# Patient Record
Sex: FEMALE | Race: OTHER | Employment: OTHER | URBAN - METROPOLITAN AREA
[De-identification: names, ages, dates, MRNs, and addresses within clinical notes are randomized per-mention and may not be internally consistent; named-entity substitution may affect disease eponyms.]

---

## 2017-01-23 DIAGNOSIS — M50.90 CERVICAL NECK PAIN WITH EVIDENCE OF DISC DISEASE: ICD-10-CM

## 2017-01-23 DIAGNOSIS — M54.2 POSTERIOR NECK PAIN: ICD-10-CM

## 2017-02-14 DIAGNOSIS — I10 ESSENTIAL HYPERTENSION: ICD-10-CM

## 2017-02-14 DIAGNOSIS — E11.42 DM TYPE 2 WITH DIABETIC PERIPHERAL NEUROPATHY (HCC): ICD-10-CM

## 2017-02-15 ENCOUNTER — HOSPITAL ENCOUNTER (OUTPATIENT)
Dept: PREADMISSION TESTING | Age: 62
Discharge: HOME | End: 2017-02-15
Payer: COMMERCIAL

## 2017-02-15 VITALS
HEART RATE: 60 BPM | TEMPERATURE: 97.7 F | BODY MASS INDEX: 28.17 KG/M2 | HEIGHT: 64 IN | SYSTOLIC BLOOD PRESSURE: 120 MMHG | OXYGEN SATURATION: 95 % | WEIGHT: 165 LBS | DIASTOLIC BLOOD PRESSURE: 59 MMHG | RESPIRATION RATE: 16 BRPM

## 2017-02-15 LAB
ANION GAP SERPL CALCULATED.3IONS-SCNC: 14 MEQ/L (ref 7–13)
BUN BLDV-MCNC: 21 MG/DL (ref 8–23)
CALCIUM SERPL-MCNC: 10 MG/DL (ref 8.6–10.2)
CHLORIDE BLD-SCNC: 103 MEQ/L (ref 98–107)
CO2: 26 MEQ/L (ref 22–29)
CREAT SERPL-MCNC: 1.37 MG/DL (ref 0.5–0.9)
GFR AFRICAN AMERICAN: 47.5
GFR NON-AFRICAN AMERICAN: 39.3
GLUCOSE BLD-MCNC: 166 MG/DL (ref 74–109)
HCT VFR BLD CALC: 38.4 % (ref 37–47)
HEMOGLOBIN: 13 G/DL (ref 12–16)
MCH RBC QN AUTO: 31 PG (ref 27–31.3)
MCHC RBC AUTO-ENTMCNC: 33.8 % (ref 33–37)
MCV RBC AUTO: 91.8 FL (ref 82–100)
PDW BLD-RTO: 13.7 % (ref 11.5–14.5)
PLATELET # BLD: 246 K/UL (ref 130–400)
POTASSIUM SERPL-SCNC: 4.4 MEQ/L (ref 3.5–5.1)
RBC # BLD: 4.18 M/UL (ref 4.2–5.4)
SODIUM BLD-SCNC: 143 MEQ/L (ref 132–144)
TSH SERPL DL<=0.05 MIU/L-ACNC: <0.01 UIU/ML (ref 0.27–4.2)
WBC # BLD: 8.4 K/UL (ref 4.8–10.8)

## 2017-02-15 PROCEDURE — 36415 COLL VENOUS BLD VENIPUNCTURE: CPT

## 2017-02-15 PROCEDURE — 84443 ASSAY THYROID STIM HORMONE: CPT

## 2017-02-15 PROCEDURE — 80048 BASIC METABOLIC PNL TOTAL CA: CPT

## 2017-02-15 PROCEDURE — 85027 COMPLETE CBC AUTOMATED: CPT

## 2017-02-15 RX ORDER — SODIUM CHLORIDE 0.9 % (FLUSH) 0.9 %
10 SYRINGE (ML) INJECTION PRN
Status: CANCELLED | OUTPATIENT
Start: 2017-02-15

## 2017-02-15 RX ORDER — LIDOCAINE HYDROCHLORIDE 10 MG/ML
1 INJECTION, SOLUTION EPIDURAL; INFILTRATION; INTRACAUDAL; PERINEURAL
Status: CANCELLED | OUTPATIENT
Start: 2017-02-15 | End: 2017-02-15

## 2017-02-15 RX ORDER — SODIUM CHLORIDE, SODIUM LACTATE, POTASSIUM CHLORIDE, CALCIUM CHLORIDE 600; 310; 30; 20 MG/100ML; MG/100ML; MG/100ML; MG/100ML
INJECTION, SOLUTION INTRAVENOUS CONTINUOUS
Status: CANCELLED | OUTPATIENT
Start: 2017-02-15

## 2017-02-15 RX ORDER — SODIUM CHLORIDE 0.9 % (FLUSH) 0.9 %
10 SYRINGE (ML) INJECTION EVERY 12 HOURS SCHEDULED
Status: CANCELLED | OUTPATIENT
Start: 2017-02-15

## 2017-02-15 RX ORDER — GABAPENTIN 300 MG/1
CAPSULE ORAL
Qty: 270 CAPSULE | Refills: 1 | Status: SHIPPED | OUTPATIENT
Start: 2017-02-15 | End: 2017-05-24 | Stop reason: DRUGHIGH

## 2017-02-15 RX ORDER — AMLODIPINE BESYLATE 10 MG/1
TABLET ORAL
Qty: 90 TABLET | Refills: 1 | Status: SHIPPED | OUTPATIENT
Start: 2017-02-15 | End: 2017-08-30 | Stop reason: SDUPTHER

## 2017-02-23 ENCOUNTER — TELEPHONE (OUTPATIENT)
Dept: CARDIOLOGY | Facility: CLINIC | Age: 62
End: 2017-02-23

## 2017-02-23 ENCOUNTER — OFFICE VISIT (OUTPATIENT)
Dept: FAMILY MEDICINE CLINIC | Facility: CLINIC | Age: 62
End: 2017-02-23

## 2017-02-23 VITALS
HEART RATE: 68 BPM | WEIGHT: 167 LBS | RESPIRATION RATE: 18 BRPM | HEIGHT: 64 IN | BODY MASS INDEX: 28.51 KG/M2 | SYSTOLIC BLOOD PRESSURE: 128 MMHG | DIASTOLIC BLOOD PRESSURE: 68 MMHG

## 2017-02-23 DIAGNOSIS — I10 ESSENTIAL HYPERTENSION: Chronic | ICD-10-CM

## 2017-02-23 DIAGNOSIS — E05.00 GRAVES' DISEASE: ICD-10-CM

## 2017-02-23 DIAGNOSIS — Z79.4 TYPE 2 DIABETES MELLITUS WITH DIABETIC NEUROPATHY, WITH LONG-TERM CURRENT USE OF INSULIN (HCC): Chronic | ICD-10-CM

## 2017-02-23 DIAGNOSIS — M50.90 CERVICAL NECK PAIN WITH EVIDENCE OF DISC DISEASE: ICD-10-CM

## 2017-02-23 DIAGNOSIS — Z01.818 ENCOUNTER FOR PREOPERATIVE EXAMINATION FOR GENERAL SURGICAL PROCEDURE: Primary | ICD-10-CM

## 2017-02-23 DIAGNOSIS — E11.40 TYPE 2 DIABETES MELLITUS WITH DIABETIC NEUROPATHY, WITH LONG-TERM CURRENT USE OF INSULIN (HCC): Chronic | ICD-10-CM

## 2017-02-23 DIAGNOSIS — I48.91 ATRIAL FIBRILLATION, UNSPECIFIED TYPE (HCC): ICD-10-CM

## 2017-02-23 PROCEDURE — 99213 OFFICE O/P EST LOW 20 MIN: CPT | Performed by: FAMILY MEDICINE

## 2017-02-23 RX ORDER — DIAPER,BRIEF,ADULT, DISPOSABLE
EACH MISCELLANEOUS
Refills: 2 | COMMUNITY
Start: 2017-02-15 | End: 2017-11-21 | Stop reason: SDUPTHER

## 2017-02-23 RX ORDER — HYDROCHLOROTHIAZIDE 50 MG/1
50 TABLET ORAL DAILY
Refills: 11 | COMMUNITY
Start: 2017-01-28 | End: 2017-09-29 | Stop reason: SDUPTHER

## 2017-02-23 RX ORDER — CHLORAL HYDRATE 500 MG
2000 CAPSULE ORAL DAILY
COMMUNITY
End: 2018-07-24 | Stop reason: SDUPTHER

## 2017-02-23 ASSESSMENT — ENCOUNTER SYMPTOMS
PHOTOPHOBIA: 0
APNEA: 0
SORE THROAT: 0
COLOR CHANGE: 0
ABDOMINAL DISTENTION: 0
EYE DISCHARGE: 0
EYE ITCHING: 0
CONSTIPATION: 0
RHINORRHEA: 0
ANAL BLEEDING: 0
CHEST TIGHTNESS: 0
BLOOD IN STOOL: 0
BACK PAIN: 0
COUGH: 0
FACIAL SWELLING: 0
ABDOMINAL PAIN: 0
NAUSEA: 0
SINUS PRESSURE: 0
VOMITING: 0
SHORTNESS OF BREATH: 0
STRIDOR: 0
VOICE CHANGE: 0
TROUBLE SWALLOWING: 0
CHOKING: 0
WHEEZING: 0
EYE PAIN: 0
ALLERGIC/IMMUNOLOGIC NEGATIVE: 1
RECTAL PAIN: 0
EYE REDNESS: 0
DIARRHEA: 0

## 2017-03-07 ENCOUNTER — ANESTHESIA EVENT (OUTPATIENT)
Dept: OPERATING ROOM | Age: 62
End: 2017-03-07
Payer: COMMERCIAL

## 2017-03-08 ENCOUNTER — ANESTHESIA (OUTPATIENT)
Dept: OPERATING ROOM | Age: 62
End: 2017-03-08
Payer: COMMERCIAL

## 2017-03-08 ENCOUNTER — HOSPITAL ENCOUNTER (OUTPATIENT)
Age: 62
Setting detail: OUTPATIENT SURGERY
Discharge: HOME | End: 2017-03-08
Payer: COMMERCIAL

## 2017-03-08 VITALS
TEMPERATURE: 97 F | HEIGHT: 64 IN | BODY MASS INDEX: 25.61 KG/M2 | WEIGHT: 150 LBS | HEART RATE: 74 BPM | OXYGEN SATURATION: 90 % | DIASTOLIC BLOOD PRESSURE: 67 MMHG | RESPIRATION RATE: 16 BRPM | SYSTOLIC BLOOD PRESSURE: 124 MMHG

## 2017-03-08 VITALS
SYSTOLIC BLOOD PRESSURE: 149 MMHG | RESPIRATION RATE: 12 BRPM | DIASTOLIC BLOOD PRESSURE: 67 MMHG | OXYGEN SATURATION: 100 %

## 2017-03-08 LAB
GLUCOSE BLD-MCNC: 134 MG/DL (ref 60–115)
GLUCOSE BLD-MCNC: 134 MG/DL (ref 60–115)
PERFORMED ON: ABNORMAL
PERFORMED ON: ABNORMAL

## 2017-03-08 PROCEDURE — 2500000003 HC RX 250 WO HCPCS: Performed by: ANESTHESIOLOGY

## 2017-03-08 PROCEDURE — 2580000003 HC RX 258

## 2017-03-08 PROCEDURE — 6360000002 HC RX W HCPCS

## 2017-03-08 PROCEDURE — 2500000003 HC RX 250 WO HCPCS

## 2017-03-08 PROCEDURE — 7100000011 HC PHASE II RECOVERY - ADDTL 15 MIN

## 2017-03-08 PROCEDURE — 7100000001 HC PACU RECOVERY - ADDTL 15 MIN

## 2017-03-08 PROCEDURE — 3600000002 HC SURGERY LEVEL 2 BASE

## 2017-03-08 PROCEDURE — 88311 DECALCIFY TISSUE: CPT

## 2017-03-08 PROCEDURE — 7100000010 HC PHASE II RECOVERY - FIRST 15 MIN

## 2017-03-08 PROCEDURE — 2580000003 HC RX 258: Performed by: ANESTHESIOLOGY

## 2017-03-08 PROCEDURE — 3700000001 HC ADD 15 MINUTES (ANESTHESIA)

## 2017-03-08 PROCEDURE — 7100000000 HC PACU RECOVERY - FIRST 15 MIN

## 2017-03-08 PROCEDURE — 3600000012 HC SURGERY LEVEL 2 ADDTL 15MIN

## 2017-03-08 PROCEDURE — 6360000002 HC RX W HCPCS: Performed by: ANESTHESIOLOGY

## 2017-03-08 PROCEDURE — 6370000000 HC RX 637 (ALT 250 FOR IP): Performed by: ANESTHESIOLOGY

## 2017-03-08 PROCEDURE — 3700000000 HC ANESTHESIA ATTENDED CARE

## 2017-03-08 PROCEDURE — 2580000003 HC RX 258: Performed by: STUDENT IN AN ORGANIZED HEALTH CARE EDUCATION/TRAINING PROGRAM

## 2017-03-08 PROCEDURE — 88304 TISSUE EXAM BY PATHOLOGIST: CPT

## 2017-03-08 PROCEDURE — 94640 AIRWAY INHALATION TREATMENT: CPT

## 2017-03-08 RX ORDER — HYDROCODONE BITARTRATE AND ACETAMINOPHEN 5; 325 MG/1; MG/1
1 TABLET ORAL EVERY 4 HOURS PRN
Qty: 20 TABLET | Refills: 0 | Status: SHIPPED | OUTPATIENT
Start: 2017-03-08 | End: 2017-05-24 | Stop reason: ALTCHOICE

## 2017-03-08 RX ORDER — NEOSTIGMINE METHYLSULFATE 1 MG/ML
INJECTION, SOLUTION INTRAVENOUS PRN
Status: DISCONTINUED | OUTPATIENT
Start: 2017-03-08 | End: 2017-03-08 | Stop reason: SDUPTHER

## 2017-03-08 RX ORDER — SODIUM CHLORIDE 0.9 % (FLUSH) 0.9 %
10 SYRINGE (ML) INJECTION PRN
Status: DISCONTINUED | OUTPATIENT
Start: 2017-03-08 | End: 2017-03-08 | Stop reason: HOSPADM

## 2017-03-08 RX ORDER — LIDOCAINE HYDROCHLORIDE 10 MG/ML
1 INJECTION, SOLUTION EPIDURAL; INFILTRATION; INTRACAUDAL; PERINEURAL
Status: DISCONTINUED | OUTPATIENT
Start: 2017-03-08 | End: 2017-03-08 | Stop reason: HOSPADM

## 2017-03-08 RX ORDER — ONDANSETRON 2 MG/ML
4 INJECTION INTRAMUSCULAR; INTRAVENOUS
Status: DISCONTINUED | OUTPATIENT
Start: 2017-03-08 | End: 2017-03-08 | Stop reason: HOSPADM

## 2017-03-08 RX ORDER — SODIUM CHLORIDE 0.9 % (FLUSH) 0.9 %
10 SYRINGE (ML) INJECTION EVERY 12 HOURS SCHEDULED
Status: DISCONTINUED | OUTPATIENT
Start: 2017-03-08 | End: 2017-03-08 | Stop reason: HOSPADM

## 2017-03-08 RX ORDER — SODIUM CHLORIDE, SODIUM LACTATE, POTASSIUM CHLORIDE, CALCIUM CHLORIDE 600; 310; 30; 20 MG/100ML; MG/100ML; MG/100ML; MG/100ML
INJECTION, SOLUTION INTRAVENOUS
Status: DISCONTINUED
Start: 2017-03-08 | End: 2017-03-08 | Stop reason: HOSPADM

## 2017-03-08 RX ORDER — ROCURONIUM BROMIDE 10 MG/ML
INJECTION, SOLUTION INTRAVENOUS PRN
Status: DISCONTINUED | OUTPATIENT
Start: 2017-03-08 | End: 2017-03-08 | Stop reason: SDUPTHER

## 2017-03-08 RX ORDER — ONDANSETRON 2 MG/ML
INJECTION INTRAMUSCULAR; INTRAVENOUS PRN
Status: DISCONTINUED | OUTPATIENT
Start: 2017-03-08 | End: 2017-03-08 | Stop reason: SDUPTHER

## 2017-03-08 RX ORDER — MEPERIDINE HYDROCHLORIDE 50 MG/ML
12.5 INJECTION INTRAMUSCULAR; INTRAVENOUS; SUBCUTANEOUS EVERY 5 MIN PRN
Status: DISCONTINUED | OUTPATIENT
Start: 2017-03-08 | End: 2017-03-08 | Stop reason: HOSPADM

## 2017-03-08 RX ORDER — GLYCOPYRROLATE 0.2 MG/ML
INJECTION INTRAMUSCULAR; INTRAVENOUS PRN
Status: DISCONTINUED | OUTPATIENT
Start: 2017-03-08 | End: 2017-03-08 | Stop reason: SDUPTHER

## 2017-03-08 RX ORDER — FENTANYL CITRATE 50 UG/ML
50 INJECTION, SOLUTION INTRAMUSCULAR; INTRAVENOUS EVERY 10 MIN PRN
Status: DISCONTINUED | OUTPATIENT
Start: 2017-03-08 | End: 2017-03-08 | Stop reason: HOSPADM

## 2017-03-08 RX ORDER — DIPHENHYDRAMINE HYDROCHLORIDE 50 MG/ML
12.5 INJECTION INTRAMUSCULAR; INTRAVENOUS
Status: DISCONTINUED | OUTPATIENT
Start: 2017-03-08 | End: 2017-03-08 | Stop reason: HOSPADM

## 2017-03-08 RX ORDER — LIDOCAINE HYDROCHLORIDE AND EPINEPHRINE 10; 10 MG/ML; UG/ML
INJECTION, SOLUTION INFILTRATION; PERINEURAL PRN
Status: DISCONTINUED | OUTPATIENT
Start: 2017-03-08 | End: 2017-03-08 | Stop reason: HOSPADM

## 2017-03-08 RX ORDER — LIDOCAINE HYDROCHLORIDE 20 MG/ML
INJECTION, SOLUTION INFILTRATION; PERINEURAL PRN
Status: DISCONTINUED | OUTPATIENT
Start: 2017-03-08 | End: 2017-03-08 | Stop reason: SDUPTHER

## 2017-03-08 RX ORDER — HYDROCODONE BITARTRATE AND ACETAMINOPHEN 5; 325 MG/1; MG/1
2 TABLET ORAL PRN
Status: COMPLETED | OUTPATIENT
Start: 2017-03-08 | End: 2017-03-08

## 2017-03-08 RX ORDER — PROPOFOL 10 MG/ML
INJECTION, EMULSION INTRAVENOUS PRN
Status: DISCONTINUED | OUTPATIENT
Start: 2017-03-08 | End: 2017-03-08 | Stop reason: SDUPTHER

## 2017-03-08 RX ORDER — SUCCINYLCHOLINE CHLORIDE 20 MG/ML
INJECTION INTRAMUSCULAR; INTRAVENOUS PRN
Status: DISCONTINUED | OUTPATIENT
Start: 2017-03-08 | End: 2017-03-08 | Stop reason: SDUPTHER

## 2017-03-08 RX ORDER — EPHEDRINE SULFATE 50 MG/ML
INJECTION, SOLUTION INTRAVENOUS PRN
Status: DISCONTINUED | OUTPATIENT
Start: 2017-03-08 | End: 2017-03-08 | Stop reason: SDUPTHER

## 2017-03-08 RX ORDER — HYDROCODONE BITARTRATE AND ACETAMINOPHEN 5; 325 MG/1; MG/1
1 TABLET ORAL PRN
Status: COMPLETED | OUTPATIENT
Start: 2017-03-08 | End: 2017-03-08

## 2017-03-08 RX ORDER — AMOXICILLIN 500 MG/1
500 CAPSULE ORAL 3 TIMES DAILY
Qty: 30 CAPSULE | Refills: 0 | Status: SHIPPED | OUTPATIENT
Start: 2017-03-08 | End: 2017-05-24 | Stop reason: ALTCHOICE

## 2017-03-08 RX ORDER — SODIUM CHLORIDE, SODIUM LACTATE, POTASSIUM CHLORIDE, CALCIUM CHLORIDE 600; 310; 30; 20 MG/100ML; MG/100ML; MG/100ML; MG/100ML
INJECTION, SOLUTION INTRAVENOUS CONTINUOUS
Status: DISCONTINUED | OUTPATIENT
Start: 2017-03-08 | End: 2017-03-08 | Stop reason: HOSPADM

## 2017-03-08 RX ORDER — ALBUTEROL SULFATE 2.5 MG/3ML
SOLUTION RESPIRATORY (INHALATION)
Status: COMPLETED
Start: 2017-03-08 | End: 2017-03-08

## 2017-03-08 RX ORDER — SODIUM CHLORIDE, SODIUM LACTATE, POTASSIUM CHLORIDE, CALCIUM CHLORIDE 600; 310; 30; 20 MG/100ML; MG/100ML; MG/100ML; MG/100ML
INJECTION, SOLUTION INTRAVENOUS CONTINUOUS PRN
Status: DISCONTINUED | OUTPATIENT
Start: 2017-03-08 | End: 2017-03-08 | Stop reason: SDUPTHER

## 2017-03-08 RX ORDER — CHLORHEXIDINE GLUCONATE 0.12 MG/ML
15 RINSE ORAL 2 TIMES DAILY
Qty: 1 BOTTLE | Refills: 0 | Status: SHIPPED | OUTPATIENT
Start: 2017-03-08 | End: 2017-05-24 | Stop reason: ALTCHOICE

## 2017-03-08 RX ORDER — KETOROLAC TROMETHAMINE 30 MG/ML
INJECTION, SOLUTION INTRAMUSCULAR; INTRAVENOUS PRN
Status: DISCONTINUED | OUTPATIENT
Start: 2017-03-08 | End: 2017-03-08 | Stop reason: SDUPTHER

## 2017-03-08 RX ORDER — HYDROMORPHONE HCL 110MG/55ML
0.5 PATIENT CONTROLLED ANALGESIA SYRINGE INTRAVENOUS EVERY 10 MIN PRN
Status: DISCONTINUED | OUTPATIENT
Start: 2017-03-08 | End: 2017-03-08 | Stop reason: HOSPADM

## 2017-03-08 RX ORDER — MAGNESIUM HYDROXIDE 1200 MG/15ML
LIQUID ORAL CONTINUOUS PRN
Status: DISCONTINUED | OUTPATIENT
Start: 2017-03-08 | End: 2017-03-08 | Stop reason: HOSPADM

## 2017-03-08 RX ORDER — ALBUTEROL SULFATE 2.5 MG/3ML
2.5 SOLUTION RESPIRATORY (INHALATION) EVERY 6 HOURS PRN
Status: DISCONTINUED | OUTPATIENT
Start: 2017-03-08 | End: 2017-03-08 | Stop reason: HOSPADM

## 2017-03-08 RX ORDER — METOCLOPRAMIDE HYDROCHLORIDE 5 MG/ML
10 INJECTION INTRAMUSCULAR; INTRAVENOUS
Status: DISCONTINUED | OUTPATIENT
Start: 2017-03-08 | End: 2017-03-08 | Stop reason: HOSPADM

## 2017-03-08 RX ORDER — MIDAZOLAM HYDROCHLORIDE 1 MG/ML
INJECTION INTRAMUSCULAR; INTRAVENOUS PRN
Status: DISCONTINUED | OUTPATIENT
Start: 2017-03-08 | End: 2017-03-08 | Stop reason: SDUPTHER

## 2017-03-08 RX ADMIN — LIDOCAINE HYDROCHLORIDE 100 MG: 20 INJECTION, SOLUTION INFILTRATION; PERINEURAL at 08:04

## 2017-03-08 RX ADMIN — ALBUTEROL SULFATE 2.5 MG: 2.5 SOLUTION RESPIRATORY (INHALATION) at 10:08

## 2017-03-08 RX ADMIN — NEOSTIGMINE METHYLSULFATE 3 MG: 1 INJECTION INTRAVENOUS at 08:55

## 2017-03-08 RX ADMIN — EPHEDRINE SULFATE 10 MG: 50 INJECTION INTRAMUSCULAR; INTRAVENOUS; SUBCUTANEOUS at 08:51

## 2017-03-08 RX ADMIN — KETOROLAC TROMETHAMINE 30 MG: 30 INJECTION, SOLUTION INTRAMUSCULAR; INTRAVENOUS at 08:19

## 2017-03-08 RX ADMIN — SUCCINYLCHOLINE CHLORIDE 120 MG: 20 INJECTION, SOLUTION INTRAMUSCULAR; INTRAVENOUS at 08:04

## 2017-03-08 RX ADMIN — ROCURONIUM BROMIDE 10 MG: 10 INJECTION INTRAVENOUS at 08:14

## 2017-03-08 RX ADMIN — SODIUM CHLORIDE, POTASSIUM CHLORIDE, SODIUM LACTATE AND CALCIUM CHLORIDE: 600; 310; 30; 20 INJECTION, SOLUTION INTRAVENOUS at 07:46

## 2017-03-08 RX ADMIN — EPHEDRINE SULFATE 20 MG: 50 INJECTION INTRAMUSCULAR; INTRAVENOUS; SUBCUTANEOUS at 08:35

## 2017-03-08 RX ADMIN — PROPOFOL 160 MG: 10 INJECTION, EMULSION INTRAVENOUS at 08:04

## 2017-03-08 RX ADMIN — EPHEDRINE SULFATE 10 MG: 50 INJECTION INTRAMUSCULAR; INTRAVENOUS; SUBCUTANEOUS at 08:47

## 2017-03-08 RX ADMIN — SODIUM CHLORIDE, POTASSIUM CHLORIDE, SODIUM LACTATE AND CALCIUM CHLORIDE: 600; 310; 30; 20 INJECTION, SOLUTION INTRAVENOUS at 07:50

## 2017-03-08 RX ADMIN — FENTANYL CITRATE 50 MCG: 50 INJECTION, SOLUTION INTRAMUSCULAR; INTRAVENOUS at 08:04

## 2017-03-08 RX ADMIN — GLYCOPYRROLATE 0.6 MG: 0.2 INJECTION INTRAMUSCULAR; INTRAVENOUS at 08:55

## 2017-03-08 RX ADMIN — MIDAZOLAM HYDROCHLORIDE 2 MG: 1 INJECTION, SOLUTION INTRAMUSCULAR; INTRAVENOUS at 07:58

## 2017-03-08 RX ADMIN — CEFAZOLIN SODIUM 2 G: 2 SOLUTION INTRAVENOUS at 08:16

## 2017-03-08 RX ADMIN — FENTANYL CITRATE 50 MCG: 50 INJECTION, SOLUTION INTRAMUSCULAR; INTRAVENOUS at 08:13

## 2017-03-08 RX ADMIN — ROCURONIUM BROMIDE 10 MG: 10 INJECTION INTRAVENOUS at 08:04

## 2017-03-08 RX ADMIN — HYDROCODONE BITARTRATE AND ACETAMINOPHEN 2 TABLET: 5; 325 TABLET ORAL at 10:20

## 2017-03-08 RX ADMIN — ONDANSETRON HYDROCHLORIDE 4 MG: 2 INJECTION, SOLUTION INTRAVENOUS at 08:21

## 2017-03-08 RX ADMIN — EPHEDRINE SULFATE 10 MG: 50 INJECTION INTRAMUSCULAR; INTRAVENOUS; SUBCUTANEOUS at 08:37

## 2017-03-08 ASSESSMENT — PAIN - FUNCTIONAL ASSESSMENT: PAIN_FUNCTIONAL_ASSESSMENT: 0-10

## 2017-03-08 ASSESSMENT — PAIN SCALES - GENERAL: PAINLEVEL_OUTOF10: 9

## 2017-03-27 RX ORDER — METHIMAZOLE 5 MG/1
TABLET ORAL
Qty: 60 TABLET | Refills: 10 | Status: SHIPPED | OUTPATIENT
Start: 2017-03-27 | End: 2018-01-30 | Stop reason: SDUPTHER

## 2017-03-29 RX ORDER — ISOPROPYL ALCOHOL 70 ML/100ML
SWAB TOPICAL
Qty: 100 EACH | Refills: 5 | Status: SHIPPED | OUTPATIENT
Start: 2017-03-29 | End: 2021-01-29 | Stop reason: SDUPTHER

## 2017-03-29 RX ORDER — BLOOD-GLUCOSE METER
KIT MISCELLANEOUS
Qty: 100 EACH | Refills: 5 | Status: SHIPPED | OUTPATIENT
Start: 2017-03-29 | End: 2020-11-19

## 2017-03-29 RX ORDER — ATORVASTATIN CALCIUM 40 MG/1
TABLET, FILM COATED ORAL
Qty: 30 TABLET | Refills: 11 | Status: SHIPPED | OUTPATIENT
Start: 2017-03-29 | End: 2018-03-29 | Stop reason: SDUPTHER

## 2017-05-04 RX ORDER — APIXABAN 5 MG/1
TABLET, FILM COATED ORAL
Qty: 60 TABLET | Refills: 11 | Status: SHIPPED | OUTPATIENT
Start: 2017-05-04 | End: 2018-04-26 | Stop reason: SDUPTHER

## 2017-05-04 RX ORDER — INSULIN GLARGINE 100 [IU]/ML
INJECTION, SOLUTION SUBCUTANEOUS
Qty: 15 ML | Refills: 11 | Status: SHIPPED | OUTPATIENT
Start: 2017-05-04 | End: 2017-05-24 | Stop reason: SDUPTHER

## 2017-05-04 RX ORDER — LANCETS 28 GAUGE
EACH MISCELLANEOUS
Qty: 100 EACH | Refills: 11 | Status: SHIPPED | OUTPATIENT
Start: 2017-05-04 | End: 2018-08-27 | Stop reason: SDUPTHER

## 2017-05-08 ENCOUNTER — TELEPHONE (OUTPATIENT)
Dept: FAMILY MEDICINE CLINIC | Age: 62
End: 2017-05-08

## 2017-05-08 DIAGNOSIS — E11.40 TYPE 2 DIABETES MELLITUS WITH DIABETIC NEUROPATHY, WITH LONG-TERM CURRENT USE OF INSULIN (HCC): Primary | Chronic | ICD-10-CM

## 2017-05-08 DIAGNOSIS — E05.00 GRAVES' DISEASE: ICD-10-CM

## 2017-05-08 DIAGNOSIS — Z79.4 TYPE 2 DIABETES MELLITUS WITH DIABETIC NEUROPATHY, WITH LONG-TERM CURRENT USE OF INSULIN (HCC): Primary | Chronic | ICD-10-CM

## 2017-05-24 ENCOUNTER — OFFICE VISIT (OUTPATIENT)
Dept: FAMILY MEDICINE CLINIC | Age: 62
End: 2017-05-24
Payer: COMMERCIAL

## 2017-05-24 ENCOUNTER — HOSPITAL ENCOUNTER (OUTPATIENT)
Age: 62
Setting detail: SPECIMEN
Discharge: HOME | End: 2017-05-24
Payer: COMMERCIAL

## 2017-05-24 VITALS
HEART RATE: 64 BPM | BODY MASS INDEX: 26.43 KG/M2 | HEIGHT: 64 IN | DIASTOLIC BLOOD PRESSURE: 68 MMHG | SYSTOLIC BLOOD PRESSURE: 126 MMHG | WEIGHT: 154.8 LBS

## 2017-05-24 DIAGNOSIS — I10 ESSENTIAL HYPERTENSION: Chronic | ICD-10-CM

## 2017-05-24 DIAGNOSIS — E11.43 TYPE 2 DIABETES MELLITUS WITH DIABETIC AUTONOMIC NEUROPATHY, WITH LONG-TERM CURRENT USE OF INSULIN (HCC): Primary | ICD-10-CM

## 2017-05-24 DIAGNOSIS — L50.9 URTICARIA: ICD-10-CM

## 2017-05-24 DIAGNOSIS — Z13.31 POSITIVE DEPRESSION SCREENING: ICD-10-CM

## 2017-05-24 DIAGNOSIS — D50.9 IRON DEFICIENCY ANEMIA, UNSPECIFIED IRON DEFICIENCY ANEMIA TYPE: ICD-10-CM

## 2017-05-24 DIAGNOSIS — F33.0 MILD EPISODE OF RECURRENT MAJOR DEPRESSIVE DISORDER (HCC): ICD-10-CM

## 2017-05-24 DIAGNOSIS — E11.42 DIABETIC PERIPHERAL NEUROPATHY (HCC): ICD-10-CM

## 2017-05-24 DIAGNOSIS — M50.90 CERVICAL NECK PAIN WITH EVIDENCE OF DISC DISEASE: ICD-10-CM

## 2017-05-24 DIAGNOSIS — K58.2 IRRITABLE BOWEL SYNDROME WITH BOTH CONSTIPATION AND DIARRHEA: ICD-10-CM

## 2017-05-24 DIAGNOSIS — Z79.4 TYPE 2 DIABETES MELLITUS WITH DIABETIC AUTONOMIC NEUROPATHY, WITH LONG-TERM CURRENT USE OF INSULIN (HCC): Primary | ICD-10-CM

## 2017-05-24 DIAGNOSIS — M54.2 POSTERIOR NECK PAIN: ICD-10-CM

## 2017-05-24 LAB — HBA1C MFR BLD: 6.7 %

## 2017-05-24 PROCEDURE — 99214 OFFICE O/P EST MOD 30 MIN: CPT | Performed by: FAMILY MEDICINE

## 2017-05-24 PROCEDURE — 82570 ASSAY OF URINE CREATININE: CPT

## 2017-05-24 PROCEDURE — 83036 HEMOGLOBIN GLYCOSYLATED A1C: CPT | Performed by: FAMILY MEDICINE

## 2017-05-24 PROCEDURE — 82043 UR ALBUMIN QUANTITATIVE: CPT

## 2017-05-24 PROCEDURE — G8431 POS CLIN DEPRES SCRN F/U DOC: HCPCS | Performed by: FAMILY MEDICINE

## 2017-05-24 RX ORDER — GABAPENTIN 600 MG/1
600 TABLET ORAL DAILY
Qty: 90 TABLET | Refills: 3 | Status: SHIPPED | OUTPATIENT
Start: 2017-05-24 | End: 2017-09-05 | Stop reason: SDUPTHER

## 2017-05-24 RX ORDER — LORATADINE 10 MG/1
10 TABLET ORAL DAILY
Qty: 90 TABLET | Refills: 1 | Status: SHIPPED | OUTPATIENT
Start: 2017-05-24 | End: 2017-10-31 | Stop reason: SDUPTHER

## 2017-05-24 RX ORDER — FERROUS SULFATE 325(65) MG
325 TABLET ORAL DAILY
Qty: 90 TABLET | Refills: 2 | Status: SHIPPED | OUTPATIENT
Start: 2017-05-24 | End: 2018-01-30 | Stop reason: SDUPTHER

## 2017-05-24 RX ORDER — SERTRALINE HYDROCHLORIDE 25 MG/1
25 TABLET, FILM COATED ORAL DAILY
Qty: 30 TABLET | Refills: 3 | Status: SHIPPED | OUTPATIENT
Start: 2017-05-24 | End: 2018-01-26 | Stop reason: SDUPTHER

## 2017-05-24 ASSESSMENT — ENCOUNTER SYMPTOMS
FACIAL SWELLING: 0
ALLERGIC/IMMUNOLOGIC NEGATIVE: 1
HEMATOCHEZIA: 0
RHINORRHEA: 0
VISUAL CHANGE: 0
EYE DISCHARGE: 0
VOICE CHANGE: 0
BACK PAIN: 0
NAUSEA: 0
CHOKING: 0
RECTAL PAIN: 0
EYE PAIN: 0
CRAMPS: 1
WHEEZING: 0
ANAL BLEEDING: 0
CHEST TIGHTNESS: 0
ABDOMINAL PAIN: 0
SORE THROAT: 0
BELCHING: 0
COUGH: 0
PHOTOPHOBIA: 0
ABDOMINAL DISTENTION: 0
TROUBLE SWALLOWING: 0
EYE ITCHING: 0
VOMITING: 0
EYE REDNESS: 0
SINUS PRESSURE: 0
APNEA: 0
CONSTIPATION: 1
STRIDOR: 0
FLATUS: 0
SHORTNESS OF BREATH: 0
COLOR CHANGE: 0
BLOOD IN STOOL: 0
DIARRHEA: 1

## 2017-05-24 ASSESSMENT — PATIENT HEALTH QUESTIONNAIRE - PHQ9
10. IF YOU CHECKED OFF ANY PROBLEMS, HOW DIFFICULT HAVE THESE PROBLEMS MADE IT FOR YOU TO DO YOUR WORK, TAKE CARE OF THINGS AT HOME, OR GET ALONG WITH OTHER PEOPLE: 1
SUM OF ALL RESPONSES TO PHQ QUESTIONS 1-9: 9
SUM OF ALL RESPONSES TO PHQ9 QUESTIONS 1 & 2: 4
5. POOR APPETITE OR OVEREATING: 1
9. THOUGHTS THAT YOU WOULD BE BETTER OFF DEAD, OR OF HURTING YOURSELF: 0
1. LITTLE INTEREST OR PLEASURE IN DOING THINGS: 2
2. FEELING DOWN, DEPRESSED OR HOPELESS: 2
8. MOVING OR SPEAKING SO SLOWLY THAT OTHER PEOPLE COULD HAVE NOTICED. OR THE OPPOSITE, BEING SO FIGETY OR RESTLESS THAT YOU HAVE BEEN MOVING AROUND A LOT MORE THAN USUAL: 0
4. FEELING TIRED OR HAVING LITTLE ENERGY: 1
3. TROUBLE FALLING OR STAYING ASLEEP: 2
6. FEELING BAD ABOUT YOURSELF - OR THAT YOU ARE A FAILURE OR HAVE LET YOURSELF OR YOUR FAMILY DOWN: 1
7. TROUBLE CONCENTRATING ON THINGS, SUCH AS READING THE NEWSPAPER OR WATCHING TELEVISION: 0

## 2017-05-24 ASSESSMENT — CROHNS DISEASE ACTIVITY INDEX (CDAI): CDAI SCORE: 0

## 2017-05-25 LAB
CREATININE URINE: 100.4 MG/DL (ref 28–217)
MICROALBUMIN/CREAT 24H UR: <12 MG/L
MICROALBUMIN/CREAT UR-RTO: 12 MCG/MG CREAT

## 2017-06-06 DIAGNOSIS — Z12.11 SCREENING FOR COLON CANCER: Primary | ICD-10-CM

## 2017-06-06 LAB
CONTROL: NORMAL
HEMOCCULT STL QL: NORMAL

## 2017-06-14 ENCOUNTER — HOSPITAL ENCOUNTER (EMERGENCY)
Age: 62
Discharge: HOME | End: 2017-06-14
Attending: FAMILY MEDICINE
Payer: COMMERCIAL

## 2017-06-14 ENCOUNTER — APPOINTMENT (OUTPATIENT)
Dept: GENERAL RADIOLOGY | Age: 62
End: 2017-06-14
Payer: COMMERCIAL

## 2017-06-14 VITALS
TEMPERATURE: 98.3 F | OXYGEN SATURATION: 93 % | RESPIRATION RATE: 20 BRPM | DIASTOLIC BLOOD PRESSURE: 68 MMHG | SYSTOLIC BLOOD PRESSURE: 162 MMHG | HEART RATE: 69 BPM

## 2017-06-14 DIAGNOSIS — M79.671 RIGHT FOOT PAIN: Primary | ICD-10-CM

## 2017-06-14 PROCEDURE — 73590 X-RAY EXAM OF LOWER LEG: CPT

## 2017-06-14 PROCEDURE — 99283 EMERGENCY DEPT VISIT LOW MDM: CPT

## 2017-06-14 PROCEDURE — 73630 X-RAY EXAM OF FOOT: CPT

## 2017-06-14 RX ORDER — NAPROXEN 500 MG/1
500 TABLET ORAL 2 TIMES DAILY
Qty: 60 TABLET | Refills: 0 | Status: SHIPPED | OUTPATIENT
Start: 2017-06-14 | End: 2017-09-26 | Stop reason: ALTCHOICE

## 2017-06-14 ASSESSMENT — PAIN - FUNCTIONAL ASSESSMENT: PAIN_FUNCTIONAL_ASSESSMENT: 0-10

## 2017-06-14 ASSESSMENT — PAIN DESCRIPTION - FREQUENCY: FREQUENCY: CONTINUOUS

## 2017-06-14 ASSESSMENT — PAIN DESCRIPTION - LOCATION: LOCATION: FOOT

## 2017-06-14 ASSESSMENT — PAIN DESCRIPTION - DESCRIPTORS: DESCRIPTORS: THROBBING

## 2017-06-14 ASSESSMENT — PAIN DESCRIPTION - ORIENTATION: ORIENTATION: RIGHT

## 2017-06-14 ASSESSMENT — PAIN SCALES - GENERAL
PAINLEVEL_OUTOF10: 8
PAINLEVEL_OUTOF10: 8

## 2017-06-14 ASSESSMENT — PAIN DESCRIPTION - PAIN TYPE: TYPE: ACUTE PAIN

## 2017-06-21 ENCOUNTER — OFFICE VISIT (OUTPATIENT)
Dept: FAMILY MEDICINE CLINIC | Age: 62
End: 2017-06-21
Payer: COMMERCIAL

## 2017-06-21 VITALS
SYSTOLIC BLOOD PRESSURE: 136 MMHG | DIASTOLIC BLOOD PRESSURE: 68 MMHG | BODY MASS INDEX: 28.17 KG/M2 | HEART RATE: 68 BPM | HEIGHT: 63 IN | WEIGHT: 159 LBS

## 2017-06-21 DIAGNOSIS — M79.671 RIGHT FOOT PAIN: Primary | ICD-10-CM

## 2017-06-21 DIAGNOSIS — F32.1 MODERATE SINGLE CURRENT EPISODE OF MAJOR DEPRESSIVE DISORDER (HCC): ICD-10-CM

## 2017-06-21 PROCEDURE — 99213 OFFICE O/P EST LOW 20 MIN: CPT | Performed by: FAMILY MEDICINE

## 2017-06-21 ASSESSMENT — ENCOUNTER SYMPTOMS
VOICE CHANGE: 0
RECTAL PAIN: 0
SHORTNESS OF BREATH: 0
CHEST TIGHTNESS: 0
COLOR CHANGE: 0
ABDOMINAL PAIN: 0
EYE DISCHARGE: 0
TROUBLE SWALLOWING: 0
SINUS PRESSURE: 0
BLOOD IN STOOL: 0
EYE PAIN: 0
ABDOMINAL DISTENTION: 0
EYE ITCHING: 0
NAUSEA: 0
APNEA: 0
FACIAL SWELLING: 0
CONSTIPATION: 0
VOMITING: 0
PHOTOPHOBIA: 0
COUGH: 0
WHEEZING: 0
SORE THROAT: 0
ALLERGIC/IMMUNOLOGIC NEGATIVE: 1
ANAL BLEEDING: 0
RHINORRHEA: 0
DIARRHEA: 0
CHOKING: 0
BACK PAIN: 0
EYE REDNESS: 0
STRIDOR: 0

## 2017-06-28 ENCOUNTER — HOSPITAL ENCOUNTER (OUTPATIENT)
Dept: BONE DENSITY | Age: 62
Discharge: HOME | End: 2017-06-28
Payer: COMMERCIAL

## 2017-06-28 DIAGNOSIS — S92.314D CLOSED NONDISPLACED FRACTURE OF FIRST METATARSAL BONE OF RIGHT FOOT WITH ROUTINE HEALING, SUBSEQUENT ENCOUNTER: ICD-10-CM

## 2017-06-28 PROCEDURE — 77080 DXA BONE DENSITY AXIAL: CPT

## 2017-08-30 DIAGNOSIS — I10 ESSENTIAL HYPERTENSION: ICD-10-CM

## 2017-08-31 RX ORDER — AMLODIPINE BESYLATE 10 MG/1
10 TABLET ORAL DAILY
Qty: 90 TABLET | Refills: 1 | Status: SHIPPED | OUTPATIENT
Start: 2017-08-31 | End: 2018-03-01 | Stop reason: SDUPTHER

## 2017-09-05 ENCOUNTER — TELEPHONE (OUTPATIENT)
Dept: FAMILY MEDICINE CLINIC | Age: 62
End: 2017-09-05

## 2017-09-05 DIAGNOSIS — E11.42 DIABETIC PERIPHERAL NEUROPATHY (HCC): Primary | ICD-10-CM

## 2017-09-05 RX ORDER — GABAPENTIN 600 MG/1
600 TABLET ORAL 2 TIMES DAILY
Qty: 180 TABLET | Refills: 0 | Status: SHIPPED | OUTPATIENT
Start: 2017-09-05 | End: 2017-11-29 | Stop reason: SDUPTHER

## 2017-09-26 ENCOUNTER — OFFICE VISIT (OUTPATIENT)
Dept: FAMILY MEDICINE CLINIC | Age: 62
End: 2017-09-26
Payer: COMMERCIAL

## 2017-09-26 VITALS
RESPIRATION RATE: 18 BRPM | DIASTOLIC BLOOD PRESSURE: 74 MMHG | HEIGHT: 64 IN | WEIGHT: 166 LBS | HEART RATE: 66 BPM | BODY MASS INDEX: 28.34 KG/M2 | SYSTOLIC BLOOD PRESSURE: 130 MMHG

## 2017-09-26 DIAGNOSIS — I10 ESSENTIAL HYPERTENSION: Chronic | ICD-10-CM

## 2017-09-26 DIAGNOSIS — Z23 NEED FOR SHINGLES VACCINE: ICD-10-CM

## 2017-09-26 DIAGNOSIS — Z79.4 TYPE 2 DIABETES MELLITUS WITH DIABETIC NEUROPATHY, WITH LONG-TERM CURRENT USE OF INSULIN (HCC): Primary | Chronic | ICD-10-CM

## 2017-09-26 DIAGNOSIS — E78.2 MIXED HYPERLIPIDEMIA: ICD-10-CM

## 2017-09-26 DIAGNOSIS — E11.40 TYPE 2 DIABETES MELLITUS WITH DIABETIC NEUROPATHY, WITH LONG-TERM CURRENT USE OF INSULIN (HCC): Primary | Chronic | ICD-10-CM

## 2017-09-26 DIAGNOSIS — Z23 NEED FOR INFLUENZA VACCINATION: ICD-10-CM

## 2017-09-26 LAB — HBA1C MFR BLD: 6.6 %

## 2017-09-26 PROCEDURE — 99213 OFFICE O/P EST LOW 20 MIN: CPT | Performed by: FAMILY MEDICINE

## 2017-09-26 PROCEDURE — 90471 IMMUNIZATION ADMIN: CPT | Performed by: FAMILY MEDICINE

## 2017-09-26 PROCEDURE — 83036 HEMOGLOBIN GLYCOSYLATED A1C: CPT | Performed by: FAMILY MEDICINE

## 2017-09-26 PROCEDURE — 90686 IIV4 VACC NO PRSV 0.5 ML IM: CPT | Performed by: FAMILY MEDICINE

## 2017-09-26 ASSESSMENT — ENCOUNTER SYMPTOMS
ALLERGIC/IMMUNOLOGIC NEGATIVE: 1
ABDOMINAL DISTENTION: 0
VOMITING: 0
RECTAL PAIN: 0
PHOTOPHOBIA: 0
FACIAL SWELLING: 0
ANAL BLEEDING: 0
TROUBLE SWALLOWING: 0
SINUS PRESSURE: 0
SHORTNESS OF BREATH: 0
CHEST TIGHTNESS: 0
EYE ITCHING: 0
BLOOD IN STOOL: 0
CONSTIPATION: 0
COLOR CHANGE: 0
NAUSEA: 0
COUGH: 0
RHINORRHEA: 0
EYE REDNESS: 0
BACK PAIN: 0
CHOKING: 0
APNEA: 0
ORTHOPNEA: 0
STRIDOR: 0
ABDOMINAL PAIN: 0
EYE PAIN: 0
SORE THROAT: 0
VOICE CHANGE: 0
EYE DISCHARGE: 0
DIARRHEA: 0
BLURRED VISION: 0
WHEEZING: 0

## 2017-09-29 RX ORDER — HYDROCHLOROTHIAZIDE 50 MG/1
TABLET ORAL
Qty: 30 TABLET | Refills: 11 | Status: SHIPPED | OUTPATIENT
Start: 2017-09-29 | End: 2018-09-19 | Stop reason: SDUPTHER

## 2017-10-09 ENCOUNTER — HOSPITAL ENCOUNTER (OUTPATIENT)
Age: 62
Discharge: HOME | End: 2017-10-09
Payer: COMMERCIAL

## 2017-10-09 DIAGNOSIS — E05.90 HYPERTHYROIDISM: ICD-10-CM

## 2017-10-09 DIAGNOSIS — E11.42 TYPE 2 DIABETES MELLITUS WITH DIABETIC POLYNEUROPATHY, WITH LONG-TERM CURRENT USE OF INSULIN (HCC): ICD-10-CM

## 2017-10-09 DIAGNOSIS — I48.91 ATRIAL FIBRILLATION, UNSPECIFIED TYPE (HCC): ICD-10-CM

## 2017-10-09 DIAGNOSIS — E11.10 DIABETIC KETOACIDOSIS WITHOUT COMA ASSOCIATED WITH TYPE 2 DIABETES MELLITUS (HCC): ICD-10-CM

## 2017-10-09 DIAGNOSIS — E55.9 VITAMIN D DEFICIENCY DISEASE: ICD-10-CM

## 2017-10-09 DIAGNOSIS — I10 ESSENTIAL HYPERTENSION: ICD-10-CM

## 2017-10-09 DIAGNOSIS — Z79.4 TYPE 2 DIABETES MELLITUS WITH DIABETIC POLYNEUROPATHY, WITH LONG-TERM CURRENT USE OF INSULIN (HCC): ICD-10-CM

## 2017-10-09 DIAGNOSIS — Z86.79 HX OF ESSENTIAL HYPERTENSION: ICD-10-CM

## 2017-10-09 DIAGNOSIS — R60.0 BILATERAL EDEMA OF LOWER EXTREMITY: ICD-10-CM

## 2017-10-09 LAB
ABSOLUTE EOS #: 0.5 K/UL (ref 0–0.4)
ABSOLUTE LYMPH #: 2.4 K/UL (ref 1–4.8)
ABSOLUTE MONO #: 0.4 K/UL (ref 0–1)
ALBUMIN SERPL-MCNC: 4.7 G/DL (ref 3.5–5.2)
ALBUMIN/GLOBULIN RATIO: ABNORMAL (ref 1–2.5)
ALP BLD-CCNC: 72 U/L (ref 35–104)
ALT SERPL-CCNC: 20 U/L (ref 5–33)
ANION GAP SERPL CALCULATED.3IONS-SCNC: 13 MMOL/L (ref 9–17)
AST SERPL-CCNC: 22 U/L
BASOPHILS # BLD: 1 %
BASOPHILS ABSOLUTE: 0.1 K/UL (ref 0–0.2)
BILIRUB SERPL-MCNC: 0.47 MG/DL (ref 0.3–1.2)
BUN BLDV-MCNC: 23 MG/DL (ref 8–23)
BUN/CREAT BLD: 17 (ref 9–20)
CALCIUM SERPL-MCNC: 9.5 MG/DL (ref 8.6–10.4)
CHLORIDE BLD-SCNC: 98 MMOL/L (ref 98–107)
CHOLESTEROL/HDL RATIO: 4.1
CHOLESTEROL: 135 MG/DL
CO2: 26 MMOL/L (ref 20–31)
CREAT SERPL-MCNC: 1.37 MG/DL (ref 0.5–0.9)
DIFFERENTIAL TYPE: YES
EKG ATRIAL RATE: 53 BPM
EKG P AXIS: 76 DEGREES
EKG P-R INTERVAL: 230 MS
EKG Q-T INTERVAL: 466 MS
EKG QRS DURATION: 94 MS
EKG QTC CALCULATION (BAZETT): 437 MS
EKG R AXIS: 65 DEGREES
EKG T AXIS: 60 DEGREES
EKG VENTRICULAR RATE: 53 BPM
EOSINOPHILS RELATIVE PERCENT: 9 %
GFR AFRICAN AMERICAN: 48 ML/MIN
GFR NON-AFRICAN AMERICAN: 39 ML/MIN
GFR SERPL CREATININE-BSD FRML MDRD: ABNORMAL ML/MIN/{1.73_M2}
GFR SERPL CREATININE-BSD FRML MDRD: ABNORMAL ML/MIN/{1.73_M2}
GLUCOSE BLD-MCNC: 146 MG/DL (ref 70–99)
HCT VFR BLD CALC: 35 % (ref 36–46)
HDLC SERPL-MCNC: 33 MG/DL
HEMOGLOBIN: 11.5 G/DL (ref 12–16)
LDL CHOLESTEROL: 66 MG/DL (ref 0–130)
LYMPHOCYTES # BLD: 40 %
MAGNESIUM: 2 MG/DL (ref 1.6–2.6)
MCH RBC QN AUTO: 30.8 PG (ref 26–34)
MCHC RBC AUTO-ENTMCNC: 33 G/DL (ref 31–37)
MCV RBC AUTO: 93.2 FL (ref 80–100)
MONOCYTES # BLD: 7 %
PATIENT FASTING?: YES
PDW BLD-RTO: 14.1 % (ref 12.1–15.2)
PLATELET # BLD: 206 K/UL (ref 140–450)
PLATELET ESTIMATE: ABNORMAL
PMV BLD AUTO: ABNORMAL FL (ref 6–12)
POTASSIUM SERPL-SCNC: 3.7 MMOL/L (ref 3.7–5.3)
RBC # BLD: 3.75 M/UL (ref 4–5.2)
RBC # BLD: ABNORMAL 10*6/UL
SEG NEUTROPHILS: 43 %
SEGMENTED NEUTROPHILS ABSOLUTE COUNT: 2.6 K/UL (ref 2.5–7)
SODIUM BLD-SCNC: 137 MMOL/L (ref 135–144)
TOTAL PROTEIN: 7.6 G/DL (ref 6.4–8.3)
TRIGL SERPL-MCNC: 180 MG/DL
TSH SERPL DL<=0.05 MIU/L-ACNC: 3.24 MIU/L (ref 0.3–5)
VITAMIN D 25-HYDROXY: 50.6 NG/ML (ref 30–100)
VLDLC SERPL CALC-MCNC: ABNORMAL MG/DL (ref 1–30)
WBC # BLD: 5.9 K/UL (ref 3.5–11)
WBC # BLD: ABNORMAL 10*3/UL

## 2017-10-09 PROCEDURE — 83735 ASSAY OF MAGNESIUM: CPT

## 2017-10-09 PROCEDURE — 80053 COMPREHEN METABOLIC PANEL: CPT

## 2017-10-09 PROCEDURE — 36415 COLL VENOUS BLD VENIPUNCTURE: CPT

## 2017-10-09 PROCEDURE — 93005 ELECTROCARDIOGRAM TRACING: CPT

## 2017-10-09 PROCEDURE — 85025 COMPLETE CBC W/AUTO DIFF WBC: CPT

## 2017-10-09 PROCEDURE — 82306 VITAMIN D 25 HYDROXY: CPT

## 2017-10-09 PROCEDURE — 80061 LIPID PANEL: CPT

## 2017-10-09 PROCEDURE — 84443 ASSAY THYROID STIM HORMONE: CPT

## 2017-10-18 ENCOUNTER — TELEPHONE (OUTPATIENT)
Dept: FAMILY MEDICINE CLINIC | Age: 62
End: 2017-10-18

## 2017-10-18 DIAGNOSIS — E05.00 GRAVES' DISEASE: Primary | ICD-10-CM

## 2017-10-18 NOTE — TELEPHONE ENCOUNTER
Information faxed to Dr. Virgil Snowden
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

## 2017-10-24 ENCOUNTER — OFFICE VISIT (OUTPATIENT)
Dept: CARDIOLOGY CLINIC | Age: 62
End: 2017-10-24
Payer: COMMERCIAL

## 2017-10-24 VITALS
DIASTOLIC BLOOD PRESSURE: 80 MMHG | SYSTOLIC BLOOD PRESSURE: 160 MMHG | BODY MASS INDEX: 28.32 KG/M2 | WEIGHT: 165 LBS | HEART RATE: 70 BPM | OXYGEN SATURATION: 94 %

## 2017-10-24 DIAGNOSIS — D50.9 IRON DEFICIENCY ANEMIA, UNSPECIFIED IRON DEFICIENCY ANEMIA TYPE: ICD-10-CM

## 2017-10-24 DIAGNOSIS — E78.2 MIXED HYPERLIPIDEMIA: ICD-10-CM

## 2017-10-24 DIAGNOSIS — E55.9 VITAMIN D DEFICIENCY DISEASE: ICD-10-CM

## 2017-10-24 DIAGNOSIS — I48.91 ATRIAL FIBRILLATION, UNSPECIFIED TYPE (HCC): Primary | ICD-10-CM

## 2017-10-24 DIAGNOSIS — E11.10 DIABETIC KETOACIDOSIS WITHOUT COMA ASSOCIATED WITH TYPE 2 DIABETES MELLITUS (HCC): ICD-10-CM

## 2017-10-24 PROCEDURE — 1036F TOBACCO NON-USER: CPT | Performed by: INTERNAL MEDICINE

## 2017-10-24 PROCEDURE — G8427 DOCREV CUR MEDS BY ELIG CLIN: HCPCS | Performed by: INTERNAL MEDICINE

## 2017-10-24 PROCEDURE — 3017F COLORECTAL CA SCREEN DOC REV: CPT | Performed by: INTERNAL MEDICINE

## 2017-10-24 PROCEDURE — G8417 CALC BMI ABV UP PARAM F/U: HCPCS | Performed by: INTERNAL MEDICINE

## 2017-10-24 PROCEDURE — 99214 OFFICE O/P EST MOD 30 MIN: CPT | Performed by: INTERNAL MEDICINE

## 2017-10-24 PROCEDURE — 3044F HG A1C LEVEL LT 7.0%: CPT | Performed by: INTERNAL MEDICINE

## 2017-10-24 PROCEDURE — 3014F SCREEN MAMMO DOC REV: CPT | Performed by: INTERNAL MEDICINE

## 2017-10-24 PROCEDURE — G8484 FLU IMMUNIZE NO ADMIN: HCPCS | Performed by: INTERNAL MEDICINE
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
## 2017-11-02 ENCOUNTER — TELEPHONE (OUTPATIENT)
Dept: FAMILY MEDICINE CLINIC | Age: 62
End: 2017-11-02

## 2017-11-02 DIAGNOSIS — K59.09 CHRONIC CONSTIPATION: Primary | ICD-10-CM

## 2017-11-02 NOTE — TELEPHONE ENCOUNTER
[facility] doesn't cover pt's Linzess at 90 day supply with refills. They will cover it as 30 day supply with refills. Could you send in new Rx for 30 day + refills to [place]? Health Maintenance   Topic Date Due    Zostavax vaccine  08/01/2016    Diabetic foot exam  04/19/2017    Diabetic retinal exam  01/21/2018    Diabetic microalbuminuria test  05/24/2018    Colon Cancer Screen FIT/FOBT  06/06/2018    Diabetic hemoglobin A1C test  09/26/2018    Breast cancer screen  10/05/2018    Lipid screen  10/09/2018    Cervical cancer screen  10/27/2019    DTaP/Tdap/Td vaccine (2 - Td) 08/12/2026    Flu vaccine  Completed    Pneumococcal med risk  Completed    Hepatitis C screen  Addressed    HIV screen  Addressed             (applicable per patient's age: Cancer Screenings, Depression Screening, Fall Risk Screening, Immunizations)    Hemoglobin A1C (%)   Date Value   09/26/2017 6.6   05/24/2017 6.7   10/14/2016 5.5     Microalb/Crt.  Ratio (mcg/mg creat)   Date Value   05/24/2017 12     LDL Cholesterol (mg/dL)   Date Value   10/09/2017 66     AST (U/L)   Date Value   10/09/2017 22     ALT (U/L)   Date Value   10/09/2017 20     BUN (mg/dL)   Date Value   10/09/2017 23      (goal A1C is < 7)   (goal LDL is <100) need 30-50% reduction from baseline     BP Readings from Last 3 Encounters:   10/24/17 (!) 160/80   09/26/17 130/74   06/21/17 136/68    (goal /80)      All Future Testing planned in [facility]:  Lab Frequency Next Occurrence   EKG 12 Lead Once 11/18/2017   XR Chest Standard TWO VW Once 29/35/7257   Basic Metabolic Panel Once 46/42/6127   TSH with Reflex Once 11/24/2018   CBC Auto Differential Once 11/24/2018   Comprehensive Metabolic Panel Once 82/10/2986   Vitamin D 25 Hydroxy Once 11/24/2018   Lipid Panel Once 11/24/2018   Magnesium Once 11/24/2018   EKG 12 Lead Once 11/24/2018   XR CHEST STANDARD (2 VW) Once 11/24/2018       Next Visit Date:  Future Appointments  Date Time

## 2017-11-06 ENCOUNTER — TELEPHONE (OUTPATIENT)
Dept: FAMILY MEDICINE CLINIC | Age: 62
End: 2017-11-06

## 2017-11-06 NOTE — TELEPHONE ENCOUNTER
Pt notified, but can not reschedule with Dr. Gerardo Victor once you no show.   Going to refer pt to another endocrinologist

## 2017-11-21 ENCOUNTER — OFFICE VISIT (OUTPATIENT)
Dept: OBGYN CLINIC | Age: 62
End: 2017-11-21
Payer: COMMERCIAL

## 2017-11-21 ENCOUNTER — HOSPITAL ENCOUNTER (OUTPATIENT)
Age: 62
Setting detail: SPECIMEN
Discharge: HOME | End: 2017-11-21
Payer: COMMERCIAL

## 2017-11-21 VITALS
HEIGHT: 64 IN | BODY MASS INDEX: 27.49 KG/M2 | RESPIRATION RATE: 18 BRPM | WEIGHT: 161 LBS | DIASTOLIC BLOOD PRESSURE: 82 MMHG | SYSTOLIC BLOOD PRESSURE: 132 MMHG

## 2017-11-21 DIAGNOSIS — Z12.4 ENCOUNTER FOR SCREENING FOR CERVICAL CANCER: ICD-10-CM

## 2017-11-21 DIAGNOSIS — R32 INCONTINENCE IN FEMALE: ICD-10-CM

## 2017-11-21 DIAGNOSIS — Z12.31 ENCOUNTER FOR SCREENING MAMMOGRAM FOR BREAST CANCER: Primary | ICD-10-CM

## 2017-11-21 PROCEDURE — G0145 SCR C/V CYTO,THINLAYER,RESCR: HCPCS

## 2017-11-21 PROCEDURE — 99396 PREV VISIT EST AGE 40-64: CPT | Performed by: OBSTETRICS & GYNECOLOGY

## 2017-11-21 RX ORDER — DIAPER,BRIEF,ADULT, DISPOSABLE
1 EACH MISCELLANEOUS PRN
Qty: 100 EACH | Refills: 5 | Status: SHIPPED | OUTPATIENT
Start: 2017-11-21 | End: 2021-01-29 | Stop reason: SDUPTHER

## 2017-11-21 RX ORDER — OXYBUTYNIN CHLORIDE 10 MG/1
10 TABLET, EXTENDED RELEASE ORAL DAILY
Qty: 30 TABLET | Refills: 12 | Status: SHIPPED | OUTPATIENT
Start: 2017-11-21 | End: 2018-12-11 | Stop reason: SDUPTHER

## 2017-11-21 NOTE — PROGRESS NOTES
YEARLY PHYSICAL    Date of service: 2017    Travis Ramon  Is a 65 y.o.   female    PT's PCP is: Zuleika Rodriguez MD     : 1955                                             Subjective:       No LMP recorded.  Patient is postmenopausal.     Are your menses regular: not applicable    OB History    Para Term  AB Living   1 1 1     1   SAB TAB Ectopic Molar Multiple Live Births             1      # Outcome Date GA Lbr Jonas/2nd Weight Sex Delivery Anes PTL Lv   1 Term                    History   Smoking Status    Former Smoker    Packs/day: 1.00    Years: 20.00    Types: Cigarettes    Quit date: 10/23/2013   Smokeless Tobacco    Never Used        History   Alcohol Use No     Comment: beer once a month       Family History   Problem Relation Age of Onset    Heart Disease Mother     High Cholesterol Mother     No Known Problems Son        Allergies: Percocet [oxycodone-acetaminophen]      Current Outpatient Prescriptions:     Incontinence Supply Disposable (POISE PAD) PADS, Apply 1 Piece topically as needed (incontinence), Disp: 100 each, Rfl: 5    oxybutynin (DITROPAN XL) 10 MG extended release tablet, Take 1 tablet by mouth daily, Disp: 30 tablet, Rfl: 12    Linaclotide (LINZESS) 72 MCG CAPS, Take 72 mg by mouth daily as needed (constipation), Disp: 30 capsule, Rfl: 5    sertraline (ZOLOFT) 50 MG tablet, TAKE 1 TABLET BY MOUTH ONCE DAILY, Disp: 90 tablet, Rfl: 1    metFORMIN (GLUCOPHAGE) 500 MG tablet, TAKE 1 TABLET BY MOUTH 2 TIMES DAILY (WITH MEALS), Disp: 180 tablet, Rfl: 1    LINZESS 290 MCG CAPS capsule, TAKE ONE (1) CAPSULE BY MOUTH EACH MORNING BEFORE BREAKFAST, Disp: 90 capsule, Rfl: 2    loratadine (CLARITIN) 10 MG tablet, TAKE (1) TABLET BY MOUTH DAILY, Disp: 90 tablet, Rfl: 1    hydrochlorothiazide (HYDRODIURIL) 50 MG tablet, TAKE (1) TABLET BY MOUTH ONCE DAILY, Disp: 30 tablet, Rfl: 11    gabapentin CAPS capsule, Take 1,000 capsules by mouth daily. (Patient taking differently: Take 1,000 Units by mouth daily.), Disp: 30 capsule, Rfl: 5    BLOOD GLUC METER DISP-STRIPS, by Does not apply route., Disp: , Rfl:     History   Sexual Activity    Sexual activity: Not on file       Any bleeding or pain with intercourse: No    Last Yearly:  2016    Last pap: 2016    Last HPV:     Last Mammogram: 2016    Last Dexascan     Do you do self breast exams: No    Past Medical History:   Diagnosis Date    Asthma     [facility]     COPD (chronic obstructive pulmonary disease) ([facility]     DDD (degenerative disc disease)     Diabetes mellitus ([facility]     Hyperlipidemia     Hypertension     Hyperthyroidism     Type II or unspecified type diabetes mellitus without mention of complication, not stated as uncontrolled        Past Surgical History:   Procedure Laterality Date    BACK SURGERY      COLONOSCOPY  04/23/14   Kendrick Duronbandar DENTAL SURGERY N/A 3/8/2017    REMOVAL OF BILATERAL MANDIBULAR LELO AND MAXILLARY EDENTULOUS ALVEOPLASTY performed by Gadiel Morfin DDS at [facility]      removal    GASTROSTOMY TUBE PLACEMENT      TONSILLECTOMY      TRACHEOSTOMY      TRACHEOSTOMY CLOSURE      TUBAL LIGATION      UPPER GASTROINTESTINAL ENDOSCOPY  3/16/2016    Peg tube insertion       Family History   Problem Relation Age of Onset    Heart Disease Mother     High Cholesterol Mother     No Known Problems Son        Chief Complaint   Patient presents with    Gynecologic Exam     yearly-wants to lose weight          PE:  Vital Signs  Blood pressure 132/82, resp. rate 18, height 5' 4\" (1.626 m), weight 161 lb (73 kg). Labs:    No results found for this visit on 11/21/17. NURSE: Aranza GUILLEN    HPI: The patient is here today for a yearly exam.  She is troubled by incontinence that seems to be getting worse over time.   SHe would like to try some medicine to see if it would help with the symptoms. Review of systems: No PT denies fever, chills, nausea and vomiting         Objective  Lymphatic:   no lymphadenopathy  Heent:   negative   Cor: regular rate and rhythm, no murmurs              Pul:clear to auscultation bilaterally- no wheezes, rales or rhonchi, normal air movement, no respiratory distress      GI: Abdomen soft, non-tender. BS normal. No masses,  No organomegaly           Extremities: normal strength, tone, and muscle mass   Breasts: Breast:normal appearance, no masses or tenderness, Inspection negative, No nipple retraction or dimpling, No nipple discharge or bleeding, No axillary or supraclavicular adenopathy, Normal to palpation without dominant masses   Pelvic Exam: GENITAL/URINARY:  External Genitalia:  General appearance; normal, Hair distribution; normal, Lesions absent  Vagina:  General appearance normal, Discharge absent, Lesions absent, the patient has a mild cystocele and mild rectocele present. Cervix:  General appearance normal, Lesions absent, Discharge absent, Tenderness absent, Enlargement absent, Nodularity absent  Uterus:  Size normal, Contour normal, Position normal, Masses absent, Consistency; normal, Support normal, Tenderness absent, the uterus is well supported with no significant prolapse  Adenexa: Masses absent, Tenderness absent, Enlargement absent, Nodularity absent                                    Vaginal discharge: no vaginal discharge        She was also counseled on her preventative health maintenance recommendations and follow-up. Assessment and Plan: Will try conservative treatment to help with her symptoms of incontinence. If this is ineffective: Consider  referral to urology. 1. Encounter for screening mammogram for breast cancer  MICHAEL DIGITAL SCREEN W CAD BILATERAL   2. Encounter for screening for cervical cancer   PAP Smear   3.  Incontinence in female  Incontinence Supply Disposable (POISE PAD) PADS    oxybutynin

## 2017-11-29 DIAGNOSIS — E11.43 TYPE 2 DIABETES MELLITUS WITH DIABETIC AUTONOMIC NEUROPATHY, WITH LONG-TERM CURRENT USE OF INSULIN (HCC): ICD-10-CM

## 2017-11-29 DIAGNOSIS — M50.90 CERVICAL NECK PAIN WITH EVIDENCE OF DISC DISEASE: ICD-10-CM

## 2017-11-29 DIAGNOSIS — E11.42 DIABETIC PERIPHERAL NEUROPATHY (HCC): ICD-10-CM

## 2017-11-29 DIAGNOSIS — M54.2 POSTERIOR NECK PAIN: ICD-10-CM

## 2017-11-29 DIAGNOSIS — Z79.4 TYPE 2 DIABETES MELLITUS WITH DIABETIC AUTONOMIC NEUROPATHY, WITH LONG-TERM CURRENT USE OF INSULIN (HCC): ICD-10-CM

## 2017-11-30 LAB — CYTOLOGY REPORT: NORMAL

## 2017-11-30 RX ORDER — DIPHENOXYLATE HYDROCHLORIDE AND ATROPINE SULFATE 2.5; .025 MG/1; MG/1
TABLET ORAL
Qty: 30 TABLET | Refills: 10 | Status: SHIPPED | OUTPATIENT
Start: 2017-11-30 | End: 2018-10-23 | Stop reason: SDUPTHER

## 2017-11-30 RX ORDER — GABAPENTIN 600 MG/1
TABLET ORAL
Qty: 180 TABLET | Refills: 10 | Status: SHIPPED | OUTPATIENT
Start: 2017-11-30 | End: 2018-06-15 | Stop reason: DRUGHIGH

## 2017-11-30 RX ORDER — PEN NEEDLE, DIABETIC 31 GX5/16"
NEEDLE, DISPOSABLE MISCELLANEOUS
Qty: 100 EACH | Refills: 2 | Status: SHIPPED | OUTPATIENT
Start: 2017-11-30 | End: 2018-02-26 | Stop reason: SDUPTHER

## 2017-12-20 ENCOUNTER — TELEPHONE (OUTPATIENT)
Dept: FAMILY MEDICINE CLINIC | Age: 62
End: 2017-12-20

## 2017-12-20 ENCOUNTER — HOSPITAL ENCOUNTER (OUTPATIENT)
Dept: MAMMOGRAPHY | Age: 62
Discharge: HOME | End: 2017-12-20
Payer: COMMERCIAL

## 2017-12-20 DIAGNOSIS — Z12.31 ENCOUNTER FOR SCREENING MAMMOGRAM FOR BREAST CANCER: ICD-10-CM

## 2017-12-20 PROCEDURE — G0202 SCR MAMMO BI INCL CAD: HCPCS

## 2017-12-20 NOTE — TELEPHONE ENCOUNTER
Pt's pharmacy called questioning the strength on pt's Linzess. 10-31-17 Dr. Brandy Lee sent in 290 mcg but when asked to correct from 90 day supply to 30 day supply he sent 72 mcg. What do advise at this point please? Health Maintenance   Topic Date Due    Zostavax vaccine  08/01/2016    Diabetic foot exam  04/19/2017    Diabetic retinal exam  01/21/2018    Diabetic microalbuminuria test  05/24/2018    Colon Cancer Screen FIT/FOBT  06/06/2018    Diabetic hemoglobin A1C test  09/26/2018    Breast cancer screen  10/05/2018    Lipid screen  10/09/2018    Cervical cancer screen  11/21/2020    DTaP/Tdap/Td vaccine (2 - Td) 08/12/2026    Flu vaccine  Completed    Pneumococcal med risk  Completed    Hepatitis C screen  Addressed    HIV screen  Addressed             (applicable per patient's age: Cancer Screenings, Depression Screening, Fall Risk Screening, Immunizations)    Hemoglobin A1C (%)   Date Value   09/26/2017 6.6   05/24/2017 6.7   10/14/2016 5.5     Microalb/Crt.  Ratio (mcg/mg creat)   Date Value   05/24/2017 12     LDL Cholesterol (mg/dL)   Date Value   10/09/2017 66     AST (U/L)   Date Value   10/09/2017 22     ALT (U/L)   Date Value   10/09/2017 20     BUN (mg/dL)   Date Value   10/09/2017 23      (goal A1C is < 7)   (goal LDL is <100) need 30-50% reduction from baseline     BP Readings from Last 3 Encounters:   11/21/17 132/82   10/24/17 (!) 160/80   09/26/17 130/74    (goal /80)      All Future Testing planned in [facility]:  Lab Frequency Next Occurrence   TSH with Reflex Once 11/24/2018   CBC Auto Differential Once 11/24/2018   Comprehensive Metabolic Panel Once 13/39/4136   Vitamin D 25 Hydroxy Once 11/24/2018   Lipid Panel Once 11/24/2018   Magnesium Once 11/24/2018   EKG 12 Lead Once 11/24/2018   XR CHEST STANDARD (2 VW) Once 11/24/2018   MICHAEL DIGITAL SCREEN W CAD BILATERAL Once 05/21/2018       Next Visit Date:  Future Appointments  Date Time Provider Diomedes Cardenas   12/20/2017

## 2018-01-02 DIAGNOSIS — E11.43 TYPE 2 DIABETES MELLITUS WITH DIABETIC AUTONOMIC NEUROPATHY, WITH LONG-TERM CURRENT USE OF INSULIN (HCC): ICD-10-CM

## 2018-01-02 DIAGNOSIS — Z79.4 TYPE 2 DIABETES MELLITUS WITH DIABETIC AUTONOMIC NEUROPATHY, WITH LONG-TERM CURRENT USE OF INSULIN (HCC): ICD-10-CM

## 2018-01-03 DIAGNOSIS — E11.43 TYPE 2 DIABETES MELLITUS WITH DIABETIC AUTONOMIC NEUROPATHY, WITH LONG-TERM CURRENT USE OF INSULIN (HCC): ICD-10-CM

## 2018-01-03 DIAGNOSIS — Z79.4 TYPE 2 DIABETES MELLITUS WITH DIABETIC AUTONOMIC NEUROPATHY, WITH LONG-TERM CURRENT USE OF INSULIN (HCC): ICD-10-CM

## 2018-01-26 ENCOUNTER — OFFICE VISIT (OUTPATIENT)
Dept: FAMILY MEDICINE CLINIC | Age: 63
End: 2018-01-26
Payer: COMMERCIAL

## 2018-01-26 VITALS
BODY MASS INDEX: 29.37 KG/M2 | DIASTOLIC BLOOD PRESSURE: 70 MMHG | HEART RATE: 68 BPM | WEIGHT: 172 LBS | RESPIRATION RATE: 18 BRPM | HEIGHT: 64 IN | SYSTOLIC BLOOD PRESSURE: 130 MMHG

## 2018-01-26 DIAGNOSIS — I10 ESSENTIAL HYPERTENSION: Chronic | ICD-10-CM

## 2018-01-26 DIAGNOSIS — E78.2 MIXED HYPERLIPIDEMIA: ICD-10-CM

## 2018-01-26 DIAGNOSIS — E11.40 TYPE 2 DIABETES MELLITUS WITH DIABETIC NEUROPATHY, WITH LONG-TERM CURRENT USE OF INSULIN (HCC): Primary | Chronic | ICD-10-CM

## 2018-01-26 DIAGNOSIS — D50.9 IRON DEFICIENCY ANEMIA, UNSPECIFIED IRON DEFICIENCY ANEMIA TYPE: ICD-10-CM

## 2018-01-26 DIAGNOSIS — Z79.4 TYPE 2 DIABETES MELLITUS WITH DIABETIC NEUROPATHY, WITH LONG-TERM CURRENT USE OF INSULIN (HCC): Primary | Chronic | ICD-10-CM

## 2018-01-26 DIAGNOSIS — K58.2 IRRITABLE BOWEL SYNDROME WITH BOTH CONSTIPATION AND DIARRHEA: ICD-10-CM

## 2018-01-26 DIAGNOSIS — R19.7 DIARRHEA, UNSPECIFIED TYPE: ICD-10-CM

## 2018-01-26 LAB — HBA1C MFR BLD: 6.7 %

## 2018-01-26 PROCEDURE — 99214 OFFICE O/P EST MOD 30 MIN: CPT | Performed by: INTERNAL MEDICINE

## 2018-01-26 PROCEDURE — G8427 DOCREV CUR MEDS BY ELIG CLIN: HCPCS | Performed by: INTERNAL MEDICINE

## 2018-01-26 PROCEDURE — G8417 CALC BMI ABV UP PARAM F/U: HCPCS | Performed by: INTERNAL MEDICINE

## 2018-01-26 PROCEDURE — 1036F TOBACCO NON-USER: CPT | Performed by: INTERNAL MEDICINE

## 2018-01-26 PROCEDURE — 3014F SCREEN MAMMO DOC REV: CPT | Performed by: INTERNAL MEDICINE

## 2018-01-26 PROCEDURE — 3017F COLORECTAL CA SCREEN DOC REV: CPT | Performed by: INTERNAL MEDICINE

## 2018-01-26 PROCEDURE — 83036 HEMOGLOBIN GLYCOSYLATED A1C: CPT | Performed by: INTERNAL MEDICINE

## 2018-01-26 PROCEDURE — G8484 FLU IMMUNIZE NO ADMIN: HCPCS | Performed by: INTERNAL MEDICINE

## 2018-01-26 PROCEDURE — 3044F HG A1C LEVEL LT 7.0%: CPT | Performed by: INTERNAL MEDICINE

## 2018-01-26 ASSESSMENT — ENCOUNTER SYMPTOMS
BLURRED VISION: 0
COUGH: 0
SHORTNESS OF BREATH: 0
ABDOMINAL PAIN: 0
HEARTBURN: 0
NAUSEA: 0
SORE THROAT: 0

## 2018-01-26 NOTE — PROGRESS NOTES
HPI Notes    Name: Rj Ricketts  : 1955         Chief Complaint:     Chief Complaint   Patient presents with    Diabetes     4 month follow up    Hypertension    Hyperlipidemia       History of Present Illness:        Jauny Rowley is here for follow up for DM, HTN, Constipation and hyperlipidemia    She is complaining of constipation. She takes linzess and it does not help with constipation. She  is on iron supplement for hx chronic anemia  She is complaining of having runny stools and wants to stop Linzess because now she is having diarrhea  Has associated cramping in the lower abdomen, \" I felt like was going to have a period \"  Denies seeing bright red blood in her stool      Diabetes   She presents for her follow-up diabetic visit. She has type 2 diabetes mellitus. No MedicAlert identification noted. Her disease course has been stable. There are no hypoglycemic associated symptoms. Pertinent negatives for hypoglycemia include no headaches or nervousness/anxiousness. Associated symptoms include foot paresthesias. Pertinent negatives for diabetes include no blurred vision and no chest pain. There are no hypoglycemic complications. Symptoms are stable. Diabetic complications include peripheral neuropathy. Pertinent negatives for diabetic complications include no CVA or PVD. Risk factors for coronary artery disease include diabetes mellitus, dyslipidemia, hypertension and obesity. Current diabetic treatment includes insulin injections and oral agent (monotherapy). She is compliant with treatment all of the time. Her weight is stable. She is following a generally healthy diet. She has not had a previous visit with a dietitian. She rarely participates in exercise. Her breakfast blood glucose range is generally 110-130 mg/dl. Her dinner blood glucose range is generally 130-140 mg/dl. She does not see a podiatrist.Eye exam is current. Hypertension   This is a chronic problem.  The current episode started more than MALZ OR    GASTROSTOMY TUBE CHANGE      removal    GASTROSTOMY TUBE PLACEMENT      TONSILLECTOMY      TRACHEOSTOMY      TRACHEOSTOMY CLOSURE      TUBAL LIGATION      UPPER GASTROINTESTINAL ENDOSCOPY  3/16/2016    Peg tube insertion        Medications:       Prior to Admission medications    Medication Sig Start Date End Date Taking?  Authorizing Provider   insulin glargine (LANTUS SOLOSTAR) 100 UNIT/ML injection pen Inject 35 Units into the skin nightly 1/3/18  Yes Neetu Rowley MD   EASY TOUCH PEN NEEDLES 31G X 8 MM MISC USE AS DIRECTED DAILY 11/30/17  Yes Neetu Rowley MD   gabapentin (NEURONTIN) 600 MG tablet TAKE ONE (1) TABLET BY MOUTH TWICE DAILY 11/30/17  Yes Neetu Rowley MD   Multiple Vitamin (MULTI-VITAMINS) TABS TAKE ONE (1) TABLET BY MOUTH ONCE DAILY 11/30/17  Yes Neetu Rowley MD   diclofenac (VOLTAREN) 50 MG EC tablet TAKE (1) TABLET BY MOUTH THREE TIMES A DAY WITH MEALS 11/30/17  Yes Neetu Rowley MD   Incontinence Supply Disposable (POISE PAD) PADS Apply 1 Piece topically as needed (incontinence) 11/21/17  Yes Bossman David MD   oxybutynin (DITROPAN XL) 10 MG extended release tablet Take 1 tablet by mouth daily 11/21/17  Yes Bossman David MD   sertraline (ZOLOFT) 50 MG tablet TAKE 1 TABLET BY MOUTH ONCE DAILY 10/31/17  Yes Sneha Shah MD   metFORMIN (GLUCOPHAGE) 500 MG tablet TAKE 1 TABLET BY MOUTH 2 TIMES DAILY (WITH MEALS) 10/31/17  Yes Sneha Shah MD   loratadine (CLARITIN) 10 MG tablet TAKE (1) TABLET BY MOUTH DAILY 10/31/17  Yes Sneha Shah MD   hydrochlorothiazide (HYDRODIURIL) 50 MG tablet TAKE (1) TABLET BY MOUTH ONCE DAILY 9/29/17  Yes Malik Reese MD   amLODIPine (NORVASC) 10 MG tablet Take 1 tablet by mouth daily 8/31/17  Yes Sneha Shah MD   metoprolol tartrate (LOPRESSOR) 25 MG tablet Take 1 tablet by mouth 2 times daily 8/31/17  Yes Sneha Shah MD   Blood Glucose Monitoring Suppl SOUTH 1 each by Does not apply route 3 times Constitutional: Negative for chills and fever. HENT: Negative for congestion and sore throat. Eyes: Negative for blurred vision. Respiratory: Negative for cough and shortness of breath. Cardiovascular: Negative for chest pain and palpitations. Gastrointestinal: Negative for abdominal pain, heartburn and nausea. Genitourinary: Negative for dysuria. Skin: Negative for rash. Neurological: Negative for headaches. Psychiatric/Behavioral: Negative for depression. The patient is not nervous/anxious. Physical Exam:     Vitals:  /70 (Site: Left Arm, Position: Sitting, Cuff Size: Medium Adult)   Pulse 68   Resp 18   Ht 5' 4\" (1.626 m)   Wt 172 lb (78 kg)   BMI 29.52 kg/m²       Physical Exam   Constitutional: She is oriented to person, place, and time. She appears well-developed and well-nourished. No distress. HENT:   Head: Normocephalic and atraumatic. Neck: No thyromegaly present. Cardiovascular: Normal rate, regular rhythm and normal heart sounds. No murmur heard. Pulmonary/Chest: Effort normal and breath sounds normal. She has no wheezes. She has no rales. Abdominal: Soft. Bowel sounds are normal. She exhibits no distension and no mass. There is no tenderness. Musculoskeletal: Normal range of motion. Lymphadenopathy:     She has no cervical adenopathy. Neurological: She is alert and oriented to person, place, and time. Skin: Skin is warm and dry. No rash noted. Psychiatric: She has a normal mood and affect. Her behavior is normal. Judgment normal.   Vitals reviewed.             Data:     Lab Results   Component Value Date     10/09/2017    K 3.7 10/09/2017    CL 98 10/09/2017    CO2 26 10/09/2017    BUN 23 10/09/2017    CREATININE 1.37 10/09/2017    GLUCOSE 146 10/09/2017    PROT 7.6 10/09/2017    LABALBU 4.7 10/09/2017    BILITOT 0.47 10/09/2017    ALKPHOS 72 10/09/2017    AST 22 10/09/2017    ALT 20 10/09/2017     Lab Results   Component Value Date

## 2018-01-30 ENCOUNTER — TELEPHONE (OUTPATIENT)
Dept: FAMILY MEDICINE CLINIC | Age: 63
End: 2018-01-30

## 2018-01-30 DIAGNOSIS — Z79.4 TYPE 2 DIABETES MELLITUS WITH DIABETIC AUTONOMIC NEUROPATHY, WITH LONG-TERM CURRENT USE OF INSULIN (HCC): ICD-10-CM

## 2018-01-30 DIAGNOSIS — E11.43 TYPE 2 DIABETES MELLITUS WITH DIABETIC AUTONOMIC NEUROPATHY, WITH LONG-TERM CURRENT USE OF INSULIN (HCC): ICD-10-CM

## 2018-01-30 RX ORDER — METHIMAZOLE 5 MG/1
TABLET ORAL
Qty: 60 TABLET | Refills: 10 | Status: SHIPPED | OUTPATIENT
Start: 2018-01-30 | End: 2018-02-01 | Stop reason: SDUPTHER

## 2018-01-30 RX ORDER — FERROUS SULFATE 325(65) MG
325 TABLET ORAL DAILY
Qty: 90 TABLET | Refills: 2 | Status: SHIPPED | OUTPATIENT
Start: 2018-01-30 | End: 2018-02-01 | Stop reason: SDUPTHER

## 2018-01-30 NOTE — TELEPHONE ENCOUNTER
Rx refill request    [facility]    Ferrous Sulfate 325 mg qd    Methimazole 5 mg 1 tab bid    Last seen 1/26/18    Health Maintenance   Topic Date Due    Zostavax vaccine  08/01/2016    Diabetic foot exam  04/19/2017    Diabetic retinal exam  01/21/2018    Diabetic microalbuminuria test  05/24/2018    Colon Cancer Screen FIT/FOBT  06/06/2018    Lipid screen  10/09/2018    TSH testing  10/09/2018    Potassium monitoring  10/09/2018    Creatinine monitoring  10/09/2018    A1C test (Diabetic or Prediabetic)  01/26/2019    Breast cancer screen  12/20/2019    Cervical cancer screen  11/21/2020    DTaP/Tdap/Td vaccine (2 - Td) 08/12/2026    Flu vaccine  Completed    Pneumococcal med risk  Completed    Hepatitis C screen  Addressed    HIV screen  Addressed             (applicable per patient's age: Cancer Screenings, Depression Screening, Fall Risk Screening, Immunizations)    Hemoglobin A1C (%)   Date Value   01/26/2018 6.7   09/26/2017 6.6   05/24/2017 6.7     Microalb/Crt.  Ratio (mcg/mg creat)   Date Value   05/24/2017 12     LDL Cholesterol (mg/dL)   Date Value   10/09/2017 66     AST (U/L)   Date Value   10/09/2017 22     ALT (U/L)   Date Value   10/09/2017 20     BUN (mg/dL)   Date Value   10/09/2017 23      (goal A1C is < 7)   (goal LDL is <100) need 30-50% reduction from baseline     BP Readings from Last 3 Encounters:   01/26/18 130/70   11/21/17 132/82   10/24/17 (!) 160/80    (goal /80)      All Future Testing planned in [facility]:  Lab Frequency Next Occurrence   TSH with Reflex Once 11/24/2018   CBC Auto Differential Once 11/24/2018   Comprehensive Metabolic Panel Once 91/28/9646   Vitamin D 25 Hydroxy Once 11/24/2018   Lipid Panel Once 11/24/2018   Magnesium Once 11/24/2018   EKG 12 Lead Once 11/24/2018   XR CHEST STANDARD (2 VW) Once 11/24/2018       Next Visit Date:  Future Appointments  Date Time Provider Diomedes Cardenas   4/26/2018 11:00 AM Baudilio Camacho MD [facility]   10/22/2018 3:00 PM MD Abbey Blankenship [facility]            Patient Active Problem List:     DM2 (diabetes mellitus, type 2) ([facility]     Cervical neck pain with evidence of disc disease     Graves' disease     Atrial fibrillation     Acute respiratory failure ([address]     Asthma with COPD ([facility]     Essential hypertension     Chronic atrial fibrillation ([facility]     Diabetic ketoacidosis without coma associated with type 2 diabetes mellitus ([facility]     Type 2 diabetes mellitus with complications (HCC)     Iron deficiency anemia     Electromechanical dissociation (EMD) (HCC)     Irritable bowel syndrome with both constipation and diarrhea     Mixed hyperlipidemia     Chronic constipation

## 2018-02-01 DIAGNOSIS — Z79.4 TYPE 2 DIABETES MELLITUS WITH DIABETIC AUTONOMIC NEUROPATHY, WITH LONG-TERM CURRENT USE OF INSULIN (HCC): ICD-10-CM

## 2018-02-01 DIAGNOSIS — E11.43 TYPE 2 DIABETES MELLITUS WITH DIABETIC AUTONOMIC NEUROPATHY, WITH LONG-TERM CURRENT USE OF INSULIN (HCC): ICD-10-CM

## 2018-02-01 RX ORDER — FERROUS SULFATE 325(65) MG
325 TABLET ORAL DAILY
Qty: 90 TABLET | Refills: 2 | Status: SHIPPED | OUTPATIENT
Start: 2018-02-01 | End: 2019-01-29 | Stop reason: SDUPTHER

## 2018-02-01 RX ORDER — METHIMAZOLE 5 MG/1
TABLET ORAL
Qty: 60 TABLET | Refills: 10 | Status: SHIPPED | OUTPATIENT
Start: 2018-02-01 | End: 2019-02-05 | Stop reason: ALTCHOICE

## 2018-02-19 ENCOUNTER — TELEPHONE (OUTPATIENT)
Dept: FAMILY MEDICINE CLINIC | Age: 63
End: 2018-02-19

## 2018-02-26 DIAGNOSIS — Z79.4 TYPE 2 DIABETES MELLITUS WITH DIABETIC AUTONOMIC NEUROPATHY, WITH LONG-TERM CURRENT USE OF INSULIN (HCC): ICD-10-CM

## 2018-02-26 DIAGNOSIS — E11.43 TYPE 2 DIABETES MELLITUS WITH DIABETIC AUTONOMIC NEUROPATHY, WITH LONG-TERM CURRENT USE OF INSULIN (HCC): ICD-10-CM

## 2018-02-26 RX ORDER — PEN NEEDLE, DIABETIC 31 GX5/16"
NEEDLE, DISPOSABLE MISCELLANEOUS
Qty: 100 EACH | Refills: 1 | Status: SHIPPED | OUTPATIENT
Start: 2018-02-26 | End: 2018-04-26

## 2018-03-01 DIAGNOSIS — I10 ESSENTIAL HYPERTENSION: ICD-10-CM

## 2018-03-01 RX ORDER — AMLODIPINE BESYLATE 10 MG/1
10 TABLET ORAL DAILY
Qty: 90 TABLET | Refills: 1 | Status: SHIPPED | OUTPATIENT
Start: 2018-03-01 | End: 2018-08-29 | Stop reason: SDUPTHER

## 2018-03-01 NOTE — TELEPHONE ENCOUNTER
Pt needs a refilll on her Amlodipine 10 mg sent to [facility] Maintenance   Topic Date Due    Shingles Vaccine (1 of 2 - 2 Dose Series) 08/01/2006    Diabetic foot exam  04/19/2017    Diabetic retinal exam  01/21/2018    Diabetic microalbuminuria test  05/24/2018    Colon Cancer Screen FIT/FOBT  06/06/2018    Lipid screen  10/09/2018    TSH testing  10/09/2018    Potassium monitoring  10/09/2018    Creatinine monitoring  10/09/2018    A1C test (Diabetic or Prediabetic)  01/26/2019    Breast cancer screen  12/20/2019    Cervical cancer screen  11/21/2020    DTaP/Tdap/Td vaccine (2 - Td) 08/12/2026    Flu vaccine  Completed    Pneumococcal med risk  Completed    Hepatitis C screen  Addressed    HIV screen  Addressed             (applicable per patient's age: Cancer Screenings, Depression Screening, Fall Risk Screening, Immunizations)    Hemoglobin A1C (%)   Date Value   01/26/2018 6.7   09/26/2017 6.6   05/24/2017 6.7     Microalb/Crt.  Ratio (mcg/mg creat)   Date Value   05/24/2017 12     LDL Cholesterol (mg/dL)   Date Value   10/09/2017 66     AST (U/L)   Date Value   10/09/2017 22     ALT (U/L)   Date Value   10/09/2017 20     BUN (mg/dL)   Date Value   10/09/2017 23      (goal A1C is < 7)   (goal LDL is <100) need 30-50% reduction from baseline     BP Readings from Last 3 Encounters:   01/26/18 130/70   11/21/17 132/82   10/24/17 (!) 160/80    (goal /80)      All Future Testing planned in [facility]:  Lab Frequency Next Occurrence   TSH with Reflex Once 11/24/2018   CBC Auto Differential Once 11/24/2018   Comprehensive Metabolic Panel Once 58/14/2215   Vitamin D 25 Hydroxy Once 11/24/2018   Lipid Panel Once 11/24/2018   Magnesium Once 11/24/2018   EKG 12 Lead Once 11/24/2018   XR CHEST STANDARD (2 VW) Once

## 2018-03-02 ENCOUNTER — TELEPHONE (OUTPATIENT)
Dept: FAMILY MEDICINE CLINIC | Age: 63
End: 2018-03-02

## 2018-03-02 DIAGNOSIS — I10 ESSENTIAL HYPERTENSION: ICD-10-CM

## 2018-03-05 RX ORDER — LINACLOTIDE 290 UG/1
CAPSULE, GELATIN COATED ORAL
COMMUNITY
Start: 2018-02-05 | End: 2018-04-26 | Stop reason: SINTOL

## 2018-03-14 ENCOUNTER — HOSPITAL ENCOUNTER (OUTPATIENT)
Age: 63
Discharge: HOME | End: 2018-03-14
Payer: COMMERCIAL

## 2018-03-14 LAB
ABSOLUTE EOS #: 0.6 K/UL (ref 0–0.4)
ABSOLUTE IMMATURE GRANULOCYTE: ABNORMAL K/UL (ref 0–0.3)
ABSOLUTE LYMPH #: 2.5 K/UL (ref 1–4.8)
ABSOLUTE MONO #: 0.4 K/UL (ref 0–1)
ALBUMIN SERPL-MCNC: 4.6 G/DL (ref 3.5–5.2)
ALBUMIN/GLOBULIN RATIO: ABNORMAL (ref 1–2.5)
ALP BLD-CCNC: 60 U/L (ref 35–104)
ALT SERPL-CCNC: 24 U/L (ref 5–33)
ANION GAP SERPL CALCULATED.3IONS-SCNC: 15 MMOL/L (ref 9–17)
AST SERPL-CCNC: 23 U/L
BASOPHILS # BLD: 1 % (ref 0–2)
BASOPHILS ABSOLUTE: 0.1 K/UL (ref 0–0.2)
BILIRUB SERPL-MCNC: 0.29 MG/DL (ref 0.3–1.2)
BUN BLDV-MCNC: 30 MG/DL (ref 8–23)
BUN/CREAT BLD: 20 (ref 9–20)
CALCIUM SERPL-MCNC: 9.6 MG/DL (ref 8.6–10.4)
CHLORIDE BLD-SCNC: 99 MMOL/L (ref 98–107)
CHOLESTEROL/HDL RATIO: 4.8
CHOLESTEROL: 162 MG/DL
CO2: 25 MMOL/L (ref 20–31)
CREAT SERPL-MCNC: 1.52 MG/DL (ref 0.5–0.9)
DIFFERENTIAL TYPE: YES
EOSINOPHILS RELATIVE PERCENT: 9 % (ref 0–5)
ESTIMATED AVERAGE GLUCOSE: 131 MG/DL
GFR AFRICAN AMERICAN: 42 ML/MIN
GFR NON-AFRICAN AMERICAN: 35 ML/MIN
GFR SERPL CREATININE-BSD FRML MDRD: ABNORMAL ML/MIN/{1.73_M2}
GFR SERPL CREATININE-BSD FRML MDRD: ABNORMAL ML/MIN/{1.73_M2}
GLUCOSE BLD-MCNC: 160 MG/DL (ref 70–99)
HBA1C MFR BLD: 6.2 % (ref 4.8–5.9)
HCT VFR BLD CALC: 34.7 % (ref 36–46)
HDLC SERPL-MCNC: 34 MG/DL
HEMOGLOBIN: 12 G/DL (ref 12–16)
IMMATURE GRANULOCYTES: ABNORMAL %
LDL CHOLESTEROL: 60 MG/DL (ref 0–130)
LYMPHOCYTES # BLD: 40 % (ref 15–40)
MCH RBC QN AUTO: 32.3 PG (ref 26–34)
MCHC RBC AUTO-ENTMCNC: 34.5 G/DL (ref 31–37)
MCV RBC AUTO: 93.6 FL (ref 80–100)
MONOCYTES # BLD: 7 % (ref 4–8)
NRBC AUTOMATED: ABNORMAL PER 100 WBC
PATIENT FASTING?: YES
PDW BLD-RTO: 13.9 % (ref 12.1–15.2)
PLATELET # BLD: 238 K/UL (ref 140–450)
PLATELET ESTIMATE: ABNORMAL
PMV BLD AUTO: ABNORMAL FL (ref 6–12)
POTASSIUM SERPL-SCNC: 4.2 MMOL/L (ref 3.7–5.3)
RBC # BLD: 3.7 M/UL (ref 4–5.2)
RBC # BLD: ABNORMAL 10*6/UL
SEG NEUTROPHILS: 43 % (ref 47–75)
SEGMENTED NEUTROPHILS ABSOLUTE COUNT: 2.7 K/UL (ref 2.5–7)
SODIUM BLD-SCNC: 139 MMOL/L (ref 135–144)
T3 FREE: 4.64 PG/ML (ref 2.02–4.43)
THYROXINE, FREE: 1.08 NG/DL (ref 0.93–1.7)
TOTAL PROTEIN: 7.7 G/DL (ref 6.4–8.3)
TRIGL SERPL-MCNC: 338 MG/DL
TSH SERPL DL<=0.05 MIU/L-ACNC: 0.53 MIU/L (ref 0.3–5)
VLDLC SERPL CALC-MCNC: ABNORMAL MG/DL (ref 1–30)
WBC # BLD: 6.2 K/UL (ref 3.5–11)
WBC # BLD: ABNORMAL 10*3/UL

## 2018-03-14 PROCEDURE — 83520 IMMUNOASSAY QUANT NOS NONAB: CPT

## 2018-03-14 PROCEDURE — 80053 COMPREHEN METABOLIC PANEL: CPT

## 2018-03-14 PROCEDURE — 84439 ASSAY OF FREE THYROXINE: CPT

## 2018-03-14 PROCEDURE — 36415 COLL VENOUS BLD VENIPUNCTURE: CPT

## 2018-03-14 PROCEDURE — 84481 FREE ASSAY (FT-3): CPT

## 2018-03-14 PROCEDURE — 84432 ASSAY OF THYROGLOBULIN: CPT

## 2018-03-14 PROCEDURE — 86376 MICROSOMAL ANTIBODY EACH: CPT

## 2018-03-14 PROCEDURE — 83036 HEMOGLOBIN GLYCOSYLATED A1C: CPT

## 2018-03-14 PROCEDURE — 84443 ASSAY THYROID STIM HORMONE: CPT

## 2018-03-14 PROCEDURE — 85025 COMPLETE CBC W/AUTO DIFF WBC: CPT

## 2018-03-14 PROCEDURE — 84445 ASSAY OF TSI GLOBULIN: CPT

## 2018-03-14 PROCEDURE — 80061 LIPID PANEL: CPT

## 2018-03-16 LAB
THYROGLOBULIN: 165 NG/ML (ref 0–63.4)
THYROID PEROXIDASE (TPO) AB: 34.2 IU/ML (ref 0–35)
TSH RECEPTOR AB: 12.64 IU/L

## 2018-03-19 LAB — THYROID STIMULATING IMMUNOGLOB: >500 %

## 2018-03-29 RX ORDER — ATORVASTATIN CALCIUM 40 MG/1
TABLET, FILM COATED ORAL
Qty: 30 TABLET | Refills: 11 | Status: SHIPPED | OUTPATIENT
Start: 2018-03-29 | End: 2019-03-25 | Stop reason: SDUPTHER

## 2018-04-10 ENCOUNTER — TELEPHONE (OUTPATIENT)
Dept: FAMILY MEDICINE CLINIC | Age: 63
End: 2018-04-10

## 2018-04-11 ENCOUNTER — CARE COORDINATION (OUTPATIENT)
Dept: CARE COORDINATION | Age: 63
End: 2018-04-11

## 2018-04-12 DIAGNOSIS — E11.00 TYPE 2 DIABETES MELLITUS WITH HYPEROSMOLARITY WITHOUT COMA, UNSPECIFIED LONG TERM INSULIN USE STATUS: Primary | Chronic | ICD-10-CM

## 2018-04-13 ENCOUNTER — CARE COORDINATION (OUTPATIENT)
Dept: CARE COORDINATION | Age: 63
End: 2018-04-13

## 2018-04-17 ENCOUNTER — CARE COORDINATION (OUTPATIENT)
Dept: CARE COORDINATION | Age: 63
End: 2018-04-17

## 2018-04-26 ENCOUNTER — OFFICE VISIT (OUTPATIENT)
Dept: FAMILY MEDICINE CLINIC | Age: 63
End: 2018-04-26
Payer: COMMERCIAL

## 2018-04-26 VITALS
WEIGHT: 166 LBS | BODY MASS INDEX: 28.34 KG/M2 | SYSTOLIC BLOOD PRESSURE: 130 MMHG | OXYGEN SATURATION: 97 % | HEART RATE: 64 BPM | DIASTOLIC BLOOD PRESSURE: 70 MMHG | HEIGHT: 64 IN | RESPIRATION RATE: 18 BRPM

## 2018-04-26 DIAGNOSIS — J44.9 ASTHMA WITH COPD (HCC): ICD-10-CM

## 2018-04-26 DIAGNOSIS — I10 ESSENTIAL HYPERTENSION: Chronic | ICD-10-CM

## 2018-04-26 DIAGNOSIS — R32 INCONTINENCE IN FEMALE: ICD-10-CM

## 2018-04-26 DIAGNOSIS — E05.00 GRAVES' DISEASE: ICD-10-CM

## 2018-04-26 DIAGNOSIS — E11.43 TYPE 2 DIABETES MELLITUS WITH DIABETIC AUTONOMIC NEUROPATHY, WITH LONG-TERM CURRENT USE OF INSULIN (HCC): Primary | ICD-10-CM

## 2018-04-26 DIAGNOSIS — I48.20 CHRONIC ATRIAL FIBRILLATION (HCC): ICD-10-CM

## 2018-04-26 DIAGNOSIS — L50.9 URTICARIA: ICD-10-CM

## 2018-04-26 DIAGNOSIS — N18.2 CKD (CHRONIC KIDNEY DISEASE) STAGE 2, GFR 60-89 ML/MIN: ICD-10-CM

## 2018-04-26 DIAGNOSIS — M50.90 CERVICAL NECK PAIN WITH EVIDENCE OF DISC DISEASE: ICD-10-CM

## 2018-04-26 DIAGNOSIS — Z79.4 TYPE 2 DIABETES MELLITUS WITH DIABETIC AUTONOMIC NEUROPATHY, WITH LONG-TERM CURRENT USE OF INSULIN (HCC): Primary | ICD-10-CM

## 2018-04-26 PROBLEM — K59.09 CHRONIC CONSTIPATION: Status: RESOLVED | Noted: 2017-11-02 | Resolved: 2018-04-26

## 2018-04-26 PROCEDURE — 1036F TOBACCO NON-USER: CPT | Performed by: INTERNAL MEDICINE

## 2018-04-26 PROCEDURE — 3023F SPIROM DOC REV: CPT | Performed by: INTERNAL MEDICINE

## 2018-04-26 PROCEDURE — G8926 SPIRO NO PERF OR DOC: HCPCS | Performed by: INTERNAL MEDICINE

## 2018-04-26 PROCEDURE — G8417 CALC BMI ABV UP PARAM F/U: HCPCS | Performed by: INTERNAL MEDICINE

## 2018-04-26 PROCEDURE — G8427 DOCREV CUR MEDS BY ELIG CLIN: HCPCS | Performed by: INTERNAL MEDICINE

## 2018-04-26 PROCEDURE — 3044F HG A1C LEVEL LT 7.0%: CPT | Performed by: INTERNAL MEDICINE

## 2018-04-26 PROCEDURE — 3017F COLORECTAL CA SCREEN DOC REV: CPT | Performed by: INTERNAL MEDICINE

## 2018-04-26 PROCEDURE — 2022F DILAT RTA XM EVC RTNOPTHY: CPT | Performed by: INTERNAL MEDICINE

## 2018-04-26 PROCEDURE — 99214 OFFICE O/P EST MOD 30 MIN: CPT | Performed by: INTERNAL MEDICINE

## 2018-04-26 RX ORDER — GLUCOSAMINE HCL/CHONDROITIN SU 500-400 MG
CAPSULE ORAL
Qty: 200 STRIP | Refills: 3 | Status: SHIPPED | OUTPATIENT
Start: 2018-04-26 | End: 2018-07-05 | Stop reason: ALTCHOICE

## 2018-04-26 RX ORDER — LORATADINE 10 MG/1
TABLET ORAL
Qty: 90 TABLET | Refills: 1 | Status: SHIPPED | OUTPATIENT
Start: 2018-04-26 | End: 2018-10-23 | Stop reason: SDUPTHER

## 2018-04-26 RX ORDER — PEN NEEDLE, DIABETIC 31 GX3/16"
NEEDLE, DISPOSABLE MISCELLANEOUS
COMMUNITY
Start: 2018-03-08 | End: 2018-07-24 | Stop reason: SDUPTHER

## 2018-04-26 RX ORDER — LANCETS 30 GAUGE
EACH MISCELLANEOUS
Qty: 100 EACH | Refills: 3 | Status: SHIPPED | OUTPATIENT
Start: 2018-04-26 | End: 2018-07-24 | Stop reason: SDUPTHER

## 2018-04-26 ASSESSMENT — ENCOUNTER SYMPTOMS
WHEEZING: 0
ABDOMINAL PAIN: 0
DIFFICULTY BREATHING: 0
SPUTUM PRODUCTION: 0
CHEST TIGHTNESS: 0
BLURRED VISION: 0
SHORTNESS OF BREATH: 0
HEARTBURN: 0
VOMITING: 0
NAUSEA: 0
COUGH: 1
HEMOPTYSIS: 0
DIARRHEA: 0
SORE THROAT: 0
RHINORRHEA: 0

## 2018-04-26 ASSESSMENT — COPD QUESTIONNAIRES: COPD: 1

## 2018-05-03 ENCOUNTER — TELEPHONE (OUTPATIENT)
Dept: FAMILY MEDICINE CLINIC | Age: 63
End: 2018-05-03

## 2018-05-03 RX ORDER — ALBUTEROL SULFATE 90 UG/1
2 AEROSOL, METERED RESPIRATORY (INHALATION) EVERY 6 HOURS PRN
Qty: 1 INHALER | Refills: 3 | Status: SHIPPED | OUTPATIENT
Start: 2018-05-03 | End: 2018-08-29 | Stop reason: SDUPTHER

## 2018-05-09 ENCOUNTER — CARE COORDINATION (OUTPATIENT)
Dept: CARE COORDINATION | Age: 63
End: 2018-05-09

## 2018-05-18 ENCOUNTER — CARE COORDINATION (OUTPATIENT)
Dept: CARE COORDINATION | Age: 63
End: 2018-05-18

## 2018-06-05 ENCOUNTER — HOSPITAL ENCOUNTER (OUTPATIENT)
Dept: PHYSICAL THERAPY | Age: 63
Setting detail: THERAPIES SERIES
Discharge: HOME | End: 2018-06-05
Payer: COMMERCIAL

## 2018-06-08 ENCOUNTER — HOSPITAL ENCOUNTER (OUTPATIENT)
Dept: ULTRASOUND IMAGING | Age: 63
Discharge: HOME | End: 2018-06-10
Payer: COMMERCIAL

## 2018-06-08 DIAGNOSIS — N18.30 CHRONIC KIDNEY DISEASE, STAGE III (MODERATE) (HCC): ICD-10-CM

## 2018-06-08 PROCEDURE — 76770 US EXAM ABDO BACK WALL COMP: CPT

## 2018-06-15 ENCOUNTER — CARE COORDINATION (OUTPATIENT)
Dept: CARE COORDINATION | Age: 63
End: 2018-06-15

## 2018-06-15 ENCOUNTER — CARE COORDINATION (OUTPATIENT)
Dept: FAMILY MEDICINE CLINIC | Age: 63
End: 2018-06-15

## 2018-06-15 DIAGNOSIS — E11.42 DIABETIC PERIPHERAL NEUROPATHY (HCC): ICD-10-CM

## 2018-06-15 RX ORDER — GABAPENTIN 600 MG/1
600 TABLET ORAL 3 TIMES DAILY
Qty: 90 TABLET | Refills: 3 | Status: SHIPPED | OUTPATIENT
Start: 2018-06-15 | End: 2018-07-18 | Stop reason: SDUPTHER

## 2018-06-15 RX ORDER — GABAPENTIN 600 MG/1
TABLET ORAL
Qty: 180 TABLET | Refills: 10 | Status: CANCELLED
Start: 2018-06-15

## 2018-06-19 ENCOUNTER — HOSPITAL ENCOUNTER (OUTPATIENT)
Dept: PHYSICAL THERAPY | Age: 63
Setting detail: THERAPIES SERIES
Discharge: HOME | End: 2018-06-19
Payer: COMMERCIAL

## 2018-06-19 PROCEDURE — 97162 PT EVAL MOD COMPLEX 30 MIN: CPT

## 2018-06-19 ASSESSMENT — PAIN SCALES - GENERAL: PAINLEVEL_OUTOF10: 6

## 2018-06-22 DIAGNOSIS — Z79.4 TYPE 2 DIABETES MELLITUS WITH DIABETIC AUTONOMIC NEUROPATHY, WITH LONG-TERM CURRENT USE OF INSULIN (HCC): ICD-10-CM

## 2018-06-22 DIAGNOSIS — E11.43 TYPE 2 DIABETES MELLITUS WITH DIABETIC AUTONOMIC NEUROPATHY, WITH LONG-TERM CURRENT USE OF INSULIN (HCC): ICD-10-CM

## 2018-06-25 RX ORDER — PEN NEEDLE, DIABETIC 31 GX3/16"
NEEDLE, DISPOSABLE MISCELLANEOUS
Qty: 100 EACH | Refills: 1 | Status: SHIPPED | OUTPATIENT
Start: 2018-06-25 | End: 2019-01-29 | Stop reason: SDUPTHER

## 2018-06-28 ENCOUNTER — HOSPITAL ENCOUNTER (OUTPATIENT)
Dept: PHYSICAL THERAPY | Age: 63
Setting detail: THERAPIES SERIES
Discharge: HOME | End: 2018-06-28
Payer: COMMERCIAL

## 2018-06-28 PROCEDURE — 97140 MANUAL THERAPY 1/> REGIONS: CPT

## 2018-06-28 PROCEDURE — 97110 THERAPEUTIC EXERCISES: CPT

## 2018-06-28 ASSESSMENT — PAIN SCALES - GENERAL: PAINLEVEL_OUTOF10: 5

## 2018-07-03 ENCOUNTER — CARE COORDINATION (OUTPATIENT)
Dept: CARE COORDINATION | Age: 63
End: 2018-07-03

## 2018-07-03 NOTE — CARE COORDINATION
covered       patient will stay compliant and follow up with her own care   On track (7/3/2018)          Prior to Admission medications    Medication Sig Start Date End Date Taking? Authorizing Provider   EASY TOUCH PEN NEEDLES 31G X 5 MM MISC USE AS DIRECTED DAILY 6/25/18   Lis Quintanilla MD   gabapentin (NEURONTIN) 600 MG tablet Take 1 tablet by mouth 3 times daily for 30 days. . 6/15/18 7/15/18  Lis Quintanilla MD   insulin glargine (BASAGLAR KWIKPEN) 100 UNIT/ML injection pen Inject 32 Units into the skin nightly 6/15/18   Lis Quintanilla MD   albuterol sulfate HFA (VENTOLIN HFA) 108 (90 Base) MCG/ACT inhaler Inhale 2 puffs into the lungs every 6 hours as needed for Wheezing 5/3/18   Lis Quintanilla MD   EASY TOUCH PEN NEEDLES 31G X 5 MM [facility]  3/8/18   Historical Provider, MD   loratadine (CLARITIN) 10 MG tablet TAKE (1) TABLET BY MOUTH DAILY 4/26/18   Lis Quintanilla MD   Glucose Blood (BLOOD GLUCOSE TEST STRIPS) STRP PLease dispense strips to go with her meter that is covered by her insurance  Pt tests BID  Pt is on Insulin  DX E11.9 4/26/18   Lis Quintanilla MD   Blood Glucose Monitoring Suppl SOUTH 1 Units by Does not apply route once for 1 dose PLease dispense a machine that is covered by her insurance  DX E11.9 4/26/18 4/26/18  Lis Quintanilla MD   Lancets MISC Please dispense lancets to go with her meter  Pt tests BID  DX E11.9  Pt does use insulin 4/26/18   Lis Quintanilla MD   apixaban (ELIQUIS) 5 MG TABS tablet Take 1 tablet by mouth 2 times daily 4/26/18   Lis Quintanilla MD   atorvastatin (LIPITOR) 40 MG tablet TAKE 1 TABLET BY MOUTH ONCE DAILY 3/29/18   Vannessa Woodard MD   metoprolol tartrate (LOPRESSOR) 25 MG tablet Take 1 tablet by mouth 2 times daily 3/2/18   Lis Quintanilla MD   amLODIPine (NORVASC) 10 MG tablet Take 1 tablet by mouth daily 3/1/18   Lis Quintanilla MD   ferrous sulfate 325 (65 Fe) MG tablet Take 1 tablet by mouth daily 2/1/18   Lis Quintanilla MD   methimazole Aleksandr [facility] [place] [facility]   7/12/2018 1:00 PM Jeanette Sow PT [facility] [place] [facility]   7/26/2018 11:00 AM Ky Simms MD [facility]   10/22/2018 3:00 PM MD María Carrillo [facility]

## 2018-07-05 ENCOUNTER — OFFICE VISIT (OUTPATIENT)
Dept: FAMILY MEDICINE CLINIC | Age: 63
End: 2018-07-05
Payer: COMMERCIAL

## 2018-07-05 VITALS
DIASTOLIC BLOOD PRESSURE: 78 MMHG | WEIGHT: 156 LBS | HEIGHT: 64 IN | HEART RATE: 76 BPM | BODY MASS INDEX: 26.63 KG/M2 | SYSTOLIC BLOOD PRESSURE: 126 MMHG | RESPIRATION RATE: 18 BRPM

## 2018-07-05 DIAGNOSIS — N39.0 URINARY TRACT INFECTION WITHOUT HEMATURIA, SITE UNSPECIFIED: Primary | ICD-10-CM

## 2018-07-05 DIAGNOSIS — R30.0 BURNING WITH URINATION: ICD-10-CM

## 2018-07-05 DIAGNOSIS — E11.8 TYPE 2 DIABETES MELLITUS WITH COMPLICATION, UNSPECIFIED LONG TERM INSULIN USE STATUS: ICD-10-CM

## 2018-07-05 LAB
BILIRUBIN, POC: ABNORMAL
BLOOD URINE, POC: ABNORMAL
CLARITY, POC: ABNORMAL
COLOR, POC: ABNORMAL
CREATININE URINE POCT: 200
GLUCOSE URINE, POC: 100
KETONES, POC: ABNORMAL
LEUKOCYTE EST, POC: ABNORMAL
MICROALBUMIN/CREAT 24H UR: 30 MG/G{CREAT}
MICROALBUMIN/CREAT UR-RTO: <30
NITRITE, POC: ABNORMAL
PH, POC: 6
PROTEIN, POC: ABNORMAL
SPECIFIC GRAVITY, POC: 1.01
UROBILINOGEN, POC: 0.2

## 2018-07-05 PROCEDURE — G8417 CALC BMI ABV UP PARAM F/U: HCPCS | Performed by: INTERNAL MEDICINE

## 2018-07-05 PROCEDURE — 3017F COLORECTAL CA SCREEN DOC REV: CPT | Performed by: INTERNAL MEDICINE

## 2018-07-05 PROCEDURE — 2022F DILAT RTA XM EVC RTNOPTHY: CPT | Performed by: INTERNAL MEDICINE

## 2018-07-05 PROCEDURE — 3044F HG A1C LEVEL LT 7.0%: CPT | Performed by: INTERNAL MEDICINE

## 2018-07-05 PROCEDURE — G8427 DOCREV CUR MEDS BY ELIG CLIN: HCPCS | Performed by: INTERNAL MEDICINE

## 2018-07-05 PROCEDURE — 1036F TOBACCO NON-USER: CPT | Performed by: INTERNAL MEDICINE

## 2018-07-05 PROCEDURE — 99213 OFFICE O/P EST LOW 20 MIN: CPT | Performed by: INTERNAL MEDICINE

## 2018-07-05 PROCEDURE — 81003 URINALYSIS AUTO W/O SCOPE: CPT | Performed by: INTERNAL MEDICINE

## 2018-07-05 PROCEDURE — 82044 UR ALBUMIN SEMIQUANTITATIVE: CPT | Performed by: INTERNAL MEDICINE

## 2018-07-05 RX ORDER — AMOXICILLIN 500 MG/1
500 CAPSULE ORAL 3 TIMES DAILY
Qty: 15 CAPSULE | Refills: 0 | Status: SHIPPED | OUTPATIENT
Start: 2018-07-05 | End: 2018-07-10

## 2018-07-05 RX ORDER — FLUCONAZOLE 50 MG/1
50 TABLET ORAL DAILY
Qty: 3 TABLET | Refills: 0 | Status: SHIPPED | OUTPATIENT
Start: 2018-07-05 | End: 2018-07-08

## 2018-07-05 ASSESSMENT — PATIENT HEALTH QUESTIONNAIRE - PHQ9
1. LITTLE INTEREST OR PLEASURE IN DOING THINGS: 0
2. FEELING DOWN, DEPRESSED OR HOPELESS: 0
SUM OF ALL RESPONSES TO PHQ QUESTIONS 1-9: 0
SUM OF ALL RESPONSES TO PHQ9 QUESTIONS 1 & 2: 0

## 2018-07-05 ASSESSMENT — ENCOUNTER SYMPTOMS
SHORTNESS OF BREATH: 0
NAUSEA: 0
HEARTBURN: 0
VOMITING: 0
ABDOMINAL PAIN: 0
BLOOD IN STOOL: 0
COUGH: 0
BLURRED VISION: 0
SORE THROAT: 0

## 2018-07-05 NOTE — PROGRESS NOTES
1,000 capsules by mouth daily. Patient taking differently: Take 1,000 Units by mouth daily. 1/5/15  Yes Fady Turner MD   Blood Glucose Monitoring Suppl SOUTH 1 Units by Does not apply route once for 1 dose PLease dispense a machine that is covered by her insurance  DX E11.9 4/26/18 4/26/18  Alayna Ness MD        Allergies:       Percocet [oxycodone-acetaminophen]    Social History:     Tobacco:    reports that she quit smoking about 4 years ago. Her smoking use included Cigarettes. She has a 20.00 pack-year smoking history. She has never used smokeless tobacco.  Alcohol:      reports that she does not drink alcohol. Drug Use:  reports that she does not use drugs. Family History:     Family History   Problem Relation Age of Onset    Heart Disease Mother     High Cholesterol Mother     No Known Problems Son        Review of Systems:         Review of Systems   Constitutional: Negative for chills, fever, malaise/fatigue and weight loss. HENT: Negative for congestion and sore throat. Eyes: Negative for blurred vision. Respiratory: Negative for cough and shortness of breath. Cardiovascular: Negative for chest pain and palpitations. Gastrointestinal: Negative for abdominal pain, blood in stool, heartburn, nausea and vomiting. Genitourinary: Positive for dysuria, flank pain and frequency. Negative for hematuria. Skin: Negative for rash. Neurological: Negative for headaches. Psychiatric/Behavioral: Negative for depression. The patient is not nervous/anxious. Physical Exam:     Vitals:  /78 (Site: Left Arm, Position: Sitting, Cuff Size: Medium Adult)   Pulse 76   Resp 18   Ht 5' 4\" (1.626 m)   Wt 156 lb (70.8 kg)   BMI 26.78 kg/m²       Physical Exam   Constitutional: She is oriented to person, place, and time. She appears well-developed and well-nourished. No distress. HENT:   Head: Normocephalic and atraumatic.    Mouth/Throat: Oropharynx is clear and moist. No

## 2018-07-09 ENCOUNTER — HOSPITAL ENCOUNTER (OUTPATIENT)
Dept: PHYSICAL THERAPY | Age: 63
Setting detail: THERAPIES SERIES
Discharge: HOME | End: 2018-07-09
Payer: COMMERCIAL

## 2018-07-09 PROCEDURE — 97140 MANUAL THERAPY 1/> REGIONS: CPT

## 2018-07-09 PROCEDURE — 97110 THERAPEUTIC EXERCISES: CPT

## 2018-07-09 ASSESSMENT — PAIN SCALES - GENERAL: PAINLEVEL_OUTOF10: 3

## 2018-07-18 RX ORDER — ALBUTEROL SULFATE 2.5 MG/3ML
2.5 SOLUTION RESPIRATORY (INHALATION) 4 TIMES DAILY
COMMUNITY
End: 2020-07-23 | Stop reason: SDUPTHER

## 2018-07-18 RX ORDER — GUAIFENESIN 600 MG/1
1200 TABLET, EXTENDED RELEASE ORAL 2 TIMES DAILY
COMMUNITY
End: 2018-11-06 | Stop reason: ALTCHOICE

## 2018-07-18 NOTE — TELEPHONE ENCOUNTER
Gabapentin 600 mg    Drug Serjio Bey  Med pending    Has appt on 7/24/18 for a hospital follow up    Health Maintenance   Topic Date Due    Shingles Vaccine (1 of 2 - 2 Dose Series) 08/01/2006    Diabetic retinal exam  01/21/2018    Colon Cancer Screen FIT/FOBT  06/06/2018    Flu vaccine (1) 09/01/2018    A1C test (Diabetic or Prediabetic)  03/14/2019    Lipid screen  03/14/2019    TSH testing  03/14/2019    Potassium monitoring  03/14/2019    Creatinine monitoring  03/14/2019    Diabetic foot exam  04/26/2019    Diabetic microalbuminuria test  07/05/2019    Breast cancer screen  12/20/2019    Cervical cancer screen  11/21/2020    DTaP/Tdap/Td vaccine (2 - Td) 08/12/2026    Pneumococcal med risk  Completed    Hepatitis C screen  Addressed    HIV screen  Addressed             (applicable per patient's age: Cancer Screenings, Depression Screening, Fall Risk Screening, Immunizations)    Hemoglobin A1C (%)   Date Value   03/14/2018 6.2 (H)   01/26/2018 6.7   09/26/2017 6.6     Microalb/Crt.  Ratio (mcg/mg creat)   Date Value   05/24/2017 12     LDL Cholesterol (mg/dL)   Date Value   03/14/2018 60     AST (U/L)   Date Value   03/14/2018 23     ALT (U/L)   Date Value   03/14/2018 24     BUN (mg/dL)   Date Value   03/14/2018 30 (H)      (goal A1C is < 7)   (goal LDL is <100) need 30-50% reduction from baseline     BP Readings from Last 3 Encounters:   07/05/18 126/78   04/26/18 130/70   01/26/18 130/70    (goal /80)      All Future Testing planned in [facility]:  Lab Frequency Next Occurrence   TSH with Reflex Once 11/24/2018   CBC Auto Differential Once 11/24/2018   Comprehensive Metabolic Panel Once 33/00/7426   Vitamin D 25 Hydroxy Once 11/24/2018   Lipid Panel Once 11/24/2018   Magnesium Once 11/24/2018   EKG 12 Lead Once 11/24/2018   XR CHEST STANDARD (2 VW) Once 11/24/2018       Next Visit Date:  Future Appointments  Date Time Provider Diomedes Cardenas   7/19/2018 1:45 PM Cleve Simon PT [facility]

## 2018-07-18 NOTE — TELEPHONE ENCOUNTER
understand what all the medications are taken for? yes      ( Update medication list and refresh medication smartlinks below)        All Active Meds in Chart - (keep all current active meds, add hospital meds)  Current Outpatient Prescriptions   Medication Sig Dispense Refill    EASY TOUCH PEN NEEDLES 31G X 5 MM MISC USE AS DIRECTED DAILY 100 each 1    gabapentin (NEURONTIN) 600 MG tablet Take 1 tablet by mouth 3 times daily for 30 days. . 90 tablet 3    insulin glargine (BASAGLAR KWIKPEN) 100 UNIT/ML injection pen Inject 32 Units into the skin nightly 5 pen 3    albuterol sulfate HFA (VENTOLIN HFA) 108 (90 Base) MCG/ACT inhaler Inhale 2 puffs into the lungs every 6 hours as needed for Wheezing 1 Inhaler 3    EASY TOUCH PEN NEEDLES 31G X 5 MM MISC       loratadine (CLARITIN) 10 MG tablet TAKE (1) TABLET BY MOUTH DAILY 90 tablet 1    Blood Glucose Monitoring Suppl SOUTH 1 Units by Does not apply route once for 1 dose PLease dispense a machine that is covered by her insurance  DX E11.9 1 Device 0    Lancets MISC Please dispense lancets to go with her meter  Pt tests BID  DX E11.9  Pt does use insulin 100 each 3    apixaban (ELIQUIS) 5 MG TABS tablet Take 1 tablet by mouth 2 times daily 180 tablet 3    atorvastatin (LIPITOR) 40 MG tablet TAKE 1 TABLET BY MOUTH ONCE DAILY 30 tablet 11    metoprolol tartrate (LOPRESSOR) 25 MG tablet Take 1 tablet by mouth 2 times daily 180 tablet 1    amLODIPine (NORVASC) 10 MG tablet Take 1 tablet by mouth daily 90 tablet 1    ferrous sulfate 325 (65 Fe) MG tablet Take 1 tablet by mouth daily 90 tablet 2    methimazole (TAPAZOLE) 5 MG tablet TAKE ONE (1) TABLET BY MOUTH TWICE DAILY 60 tablet 10    Multiple Vitamin (MULTI-VITAMINS) TABS TAKE ONE (1) TABLET BY MOUTH ONCE DAILY 30 tablet 10    diclofenac (VOLTAREN) 50 MG EC tablet TAKE (1) TABLET BY MOUTH THREE TIMES A DAY WITH MEALS 270 tablet 10    Incontinence Supply Disposable (POISE PAD) PADS Apply 1 Piece topically as needed (incontinence) 100 each 5    oxybutynin (DITROPAN XL) 10 MG extended release tablet Take 1 tablet by mouth daily 30 tablet 12    sertraline (ZOLOFT) 50 MG tablet TAKE 1 TABLET BY MOUTH ONCE DAILY 90 tablet 1    hydrochlorothiazide (HYDRODIURIL) 50 MG tablet TAKE (1) TABLET BY MOUTH ONCE DAILY 30 tablet 11    Blood Glucose Monitoring Suppl SOUTH 1 each by Does not apply route 3 times daily 1 Device 0    FREESTYLE LANCETS MISC TEST BLOOD SUGAR THREE TIMES A  each 11    Alcohol Swabs (EASY TOUCH ALCOHOL PREP MEDIUM) 70 % PADS USE FOUR TIMES DAILY 100 each 5    FREESTYLE LITE strip TEST BLOOD SUGAR THREE TIMES A  each 5    Omega-3 Fatty Acids (FISH OIL) 1000 MG CAPS Take 2,000 mg by mouth daily      Blood Pressure Monitoring (B-D ASSURE BPM/AUTO ARM CUFF) MISC 1 Device by Does not apply route daily as needed (htn) 1 each 0    albuterol (PROVENTIL HFA;VENTOLIN HFA) 108 (90 BASE) MCG/ACT inhaler Inhale 4 puffs into the lungs every 4 hours as needed for Wheezing 1 Inhaler 3    Vitamin D (CHOLECALCIFEROL) 1000 UNITS CAPS capsule Take 1,000 capsules by mouth daily. (Patient taking differently: Take 1,000 Units by mouth daily.) 30 capsule 5     No current facility-administered medications for this visit. Current Medications (record all meds as 'taken' or 'not taken' in home med activity)  No outpatient prescriptions have been marked as taking for the 7/18/18 encounter (Refill) with Bob Eastman MD.         Are there any questions about how the patient should take care of herself? No   Does the patient understand her diet regimen? yes   Does the patient understand his or her activity level? yes   Does the patient understand how to care for her wound? N/A   Does the patient understand how to monitor her weight?  no - patient does not have a scales   Does the patient understand what to do if she becomes short of breath?   yes   Does the patient understand what to do if she has

## 2018-07-19 RX ORDER — GABAPENTIN 600 MG/1
600 TABLET ORAL 3 TIMES DAILY
Qty: 90 TABLET | Refills: 0 | Status: SHIPPED | OUTPATIENT
Start: 2018-07-19 | End: 2018-11-06 | Stop reason: ALTCHOICE

## 2018-07-24 ENCOUNTER — OFFICE VISIT (OUTPATIENT)
Dept: FAMILY MEDICINE CLINIC | Age: 63
End: 2018-07-24
Payer: COMMERCIAL

## 2018-07-24 VITALS
HEART RATE: 75 BPM | WEIGHT: 155 LBS | RESPIRATION RATE: 20 BRPM | BODY MASS INDEX: 26.46 KG/M2 | DIASTOLIC BLOOD PRESSURE: 88 MMHG | SYSTOLIC BLOOD PRESSURE: 156 MMHG | HEIGHT: 64 IN | OXYGEN SATURATION: 97 %

## 2018-07-24 DIAGNOSIS — I10 ESSENTIAL HYPERTENSION: Chronic | ICD-10-CM

## 2018-07-24 DIAGNOSIS — J44.1 COPD EXACERBATION (HCC): Primary | ICD-10-CM

## 2018-07-24 DIAGNOSIS — Z93.0 TRACHEOSTOMY STATUS (HCC): ICD-10-CM

## 2018-07-24 DIAGNOSIS — I48.20 CHRONIC ATRIAL FIBRILLATION (HCC): ICD-10-CM

## 2018-07-24 DIAGNOSIS — Z12.11 SCREENING FOR COLON CANCER: ICD-10-CM

## 2018-07-24 DIAGNOSIS — E11.8 TYPE 2 DIABETES MELLITUS WITH COMPLICATION, UNSPECIFIED LONG TERM INSULIN USE STATUS: ICD-10-CM

## 2018-07-24 DIAGNOSIS — E55.9 VITAMIN D DEFICIENCY: ICD-10-CM

## 2018-07-24 LAB
CONTROL: PRESENT
HEMOCCULT STL QL: NEGATIVE

## 2018-07-24 PROCEDURE — 1111F DSCHRG MED/CURRENT MED MERGE: CPT | Performed by: INTERNAL MEDICINE

## 2018-07-24 PROCEDURE — 82274 ASSAY TEST FOR BLOOD FECAL: CPT | Performed by: INTERNAL MEDICINE

## 2018-07-24 PROCEDURE — 99495 TRANSJ CARE MGMT MOD F2F 14D: CPT | Performed by: INTERNAL MEDICINE

## 2018-07-24 RX ORDER — CHLORAL HYDRATE 500 MG
2000 CAPSULE ORAL DAILY
Qty: 180 CAPSULE | Refills: 2 | Status: SHIPPED | OUTPATIENT
Start: 2018-07-24 | End: 2019-04-11 | Stop reason: SDUPTHER

## 2018-07-24 NOTE — PROGRESS NOTES
Post-Discharge Transitional Care Management Services      Chantal Flaherty   YOB: 1955    Date of Office Visit:  7/24/2018  Date of Hospital Admission: 6/14/17  Date of Hospital Discharge: 6/14/17  [facility] Risk Score [risk of hospital readmission >=10  medium risk (chance of readmission ~ 12%) >14  high risk (chance of readmission ~18%)]: No Data Recorded    Care management risk score Rising risk (score 2-5) and Complex Care (Scores >=6): 8       Patient Active Problem List   Diagnosis    Cervical neck pain with evidence of disc disease    Graves' disease    Asthma with COPD ([facility] [address]    Essential hypertension    Chronic atrial fibrillation (HCC)    Type 2 diabetes mellitus with complications (HCC)    Iron deficiency anemia    Electromechanical dissociation (EMD) (HCC)    Irritable bowel syndrome with both constipation and diarrhea    Mixed hyperlipidemia    CKD (chronic kidney disease) stage 2, GFR 60-89 ml/min       Allergies   Allergen Reactions    Percocet [Oxycodone-Acetaminophen] Nausea And Vomiting       Medications listed as ordered at the time of discharge from hospital   Silvia SMITH   Home Medication Instructions MAXIM:    Printed on:07/24/18 1431   Medication Information                      albuterol (PROVENTIL) (2.5 MG/3ML) 0.083% nebulizer solution  Take 2.5 mg by nebulization 4 times daily             albuterol sulfate HFA (VENTOLIN HFA) 108 (90 Base) MCG/ACT inhaler  Inhale 2 puffs into the lungs every 6 hours as needed for Wheezing             Alcohol Swabs (EASY TOUCH ALCOHOL PREP MEDIUM) 70 % PADS  USE FOUR TIMES DAILY             amLODIPine (NORVASC) 10 MG tablet  Take 1 tablet by mouth daily             apixaban (ELIQUIS) 5 MG TABS tablet  Take 1 tablet by mouth 2 times daily             atorvastatin (LIPITOR) 40 MG tablet  TAKE 1 TABLET BY MOUTH ONCE DAILY             Blood Glucose Monitoring Suppl SOUTH  1 each by Does not apply route 3 times daily             Blood Glucose Monitoring Suppl SOUTH  1 Units by Does not apply route once for 1 dose PLease dispense a machine that is covered by her insurance  DX E11.9             Blood Pressure Monitoring (B-D ASSURE BPM/AUTO ARM CUFF) MISC  1 Device by Does not apply route daily as needed (htn)             EASY TOUCH PEN NEEDLES 31G X 5 MM MISC  USE AS DIRECTED DAILY             ferrous sulfate 325 (65 Fe) MG tablet  Take 1 tablet by mouth daily             FREESTYLE LANCETS MISC  TEST BLOOD SUGAR THREE TIMES A DAY             FREESTYLE LITE strip  TEST BLOOD SUGAR THREE TIMES A DAY             gabapentin (NEURONTIN) 600 MG tablet  Take 1 tablet by mouth 3 times daily for 30 days. Mordecai Stockton guaiFENesin (MUCINEX) 600 MG extended release tablet  Take 1,200 mg by mouth 2 times daily             hydrochlorothiazide (HYDRODIURIL) 50 MG tablet  TAKE (1) TABLET BY MOUTH ONCE DAILY             Incontinence Supply Disposable (POISE PAD) PADS  Apply 1 Piece topically as needed (incontinence)             insulin glargine (BASAGLAR KWIKPEN) 100 UNIT/ML injection pen  Inject 32 Units into the skin nightly             loratadine (CLARITIN) 10 MG tablet  TAKE (1) TABLET BY MOUTH DAILY             methimazole (TAPAZOLE) 5 MG tablet  TAKE ONE (1) TABLET BY MOUTH TWICE DAILY             metoprolol tartrate (LOPRESSOR) 25 MG tablet  Take 1 tablet by mouth 2 times daily             Multiple Vitamin (MULTI-VITAMINS) TABS  TAKE ONE (1) TABLET BY MOUTH ONCE DAILY             Omega-3 Fatty Acids (FISH OIL) 1000 MG CAPS  Take 2 capsules by mouth daily             oxybutynin (DITROPAN XL) 10 MG extended release tablet  Take 1 tablet by mouth daily             vitamin D (CHOLECALCIFEROL) 1000 UNIT TABS tablet  Take 1 tablet by mouth daily             Vitamin D (CHOLECALCIFEROL) 1000 UNITS CAPS capsule  Take 1,000 capsules by mouth daily.                    Medications marked \"taking\" at this time  Outpatient Prescriptions Marked as Taking for the 7/24/18 encounter (Office Visit) with Melly Scott MD   Medication Sig Dispense Refill    Omega-3 Fatty Acids (FISH OIL) 1000 MG CAPS Take 2 capsules by mouth daily 180 capsule 2    vitamin D (CHOLECALCIFEROL) 1000 UNIT TABS tablet Take 1 tablet by mouth daily 90 tablet 2    gabapentin (NEURONTIN) 600 MG tablet Take 1 tablet by mouth 3 times daily for 30 days. . 90 tablet 0    albuterol (PROVENTIL) (2.5 MG/3ML) 0.083% nebulizer solution Take 2.5 mg by nebulization 4 times daily      guaiFENesin (MUCINEX) 600 MG extended release tablet Take 1,200 mg by mouth 2 times daily      EASY TOUCH PEN NEEDLES 31G X 5 MM MISC USE AS DIRECTED DAILY 100 each 1    insulin glargine (BASAGLAR KWIKPEN) 100 UNIT/ML injection pen Inject 32 Units into the skin nightly 5 pen 3    albuterol sulfate HFA (VENTOLIN HFA) 108 (90 Base) MCG/ACT inhaler Inhale 2 puffs into the lungs every 6 hours as needed for Wheezing 1 Inhaler 3    loratadine (CLARITIN) 10 MG tablet TAKE (1) TABLET BY MOUTH DAILY 90 tablet 1    apixaban (ELIQUIS) 5 MG TABS tablet Take 1 tablet by mouth 2 times daily 180 tablet 3    atorvastatin (LIPITOR) 40 MG tablet TAKE 1 TABLET BY MOUTH ONCE DAILY 30 tablet 11    metoprolol tartrate (LOPRESSOR) 25 MG tablet Take 1 tablet by mouth 2 times daily 180 tablet 1    amLODIPine (NORVASC) 10 MG tablet Take 1 tablet by mouth daily 90 tablet 1    ferrous sulfate 325 (65 Fe) MG tablet Take 1 tablet by mouth daily 90 tablet 2    methimazole (TAPAZOLE) 5 MG tablet TAKE ONE (1) TABLET BY MOUTH TWICE DAILY 60 tablet 10    Multiple Vitamin (MULTI-VITAMINS) TABS TAKE ONE (1) TABLET BY MOUTH ONCE DAILY 30 tablet 10    Incontinence Supply Disposable (POISE PAD) PADS Apply 1 Piece topically as needed (incontinence) 100 each 5    oxybutynin (DITROPAN XL) 10 MG extended release tablet Take 1 tablet by mouth daily 30 tablet 12    hydrochlorothiazide (HYDRODIURIL) 50 MG tablet TAKE (1) TABLET BY MOUTH ONCE DAILY 30 tablet 11    Blood

## 2018-07-26 ENCOUNTER — HOSPITAL ENCOUNTER (EMERGENCY)
Age: 63
Discharge: HOME | End: 2018-07-26
Attending: EMERGENCY MEDICINE
Payer: COMMERCIAL

## 2018-07-26 VITALS
HEART RATE: 82 BPM | DIASTOLIC BLOOD PRESSURE: 85 MMHG | TEMPERATURE: 98.9 F | RESPIRATION RATE: 17 BRPM | OXYGEN SATURATION: 94 % | SYSTOLIC BLOOD PRESSURE: 141 MMHG

## 2018-07-26 DIAGNOSIS — R10.10 PAIN OF UPPER ABDOMEN: ICD-10-CM

## 2018-07-26 DIAGNOSIS — K62.5 RECTAL BLEEDING: Primary | ICD-10-CM

## 2018-07-26 LAB
ABSOLUTE EOS #: 0.3 K/UL (ref 0–0.4)
ABSOLUTE IMMATURE GRANULOCYTE: ABNORMAL K/UL (ref 0–0.3)
ABSOLUTE LYMPH #: 3.5 K/UL (ref 1–4.8)
ABSOLUTE MONO #: 0.8 K/UL (ref 0–1)
ALBUMIN SERPL-MCNC: 3.7 G/DL (ref 3.5–5.2)
ALBUMIN/GLOBULIN RATIO: ABNORMAL (ref 1–2.5)
ALP BLD-CCNC: 90 U/L (ref 35–104)
ALT SERPL-CCNC: 49 U/L (ref 5–33)
ANION GAP SERPL CALCULATED.3IONS-SCNC: 11 MMOL/L (ref 9–17)
AST SERPL-CCNC: 27 U/L
BASOPHILS # BLD: 1 % (ref 0–2)
BASOPHILS ABSOLUTE: 0.2 K/UL (ref 0–0.2)
BILIRUB SERPL-MCNC: 0.27 MG/DL (ref 0.3–1.2)
BILIRUBIN URINE: NEGATIVE
BUN BLDV-MCNC: 24 MG/DL (ref 8–23)
BUN/CREAT BLD: 25 (ref 9–20)
CALCIUM SERPL-MCNC: 9.5 MG/DL (ref 8.6–10.4)
CHLORIDE BLD-SCNC: 101 MMOL/L (ref 98–107)
CO2: 29 MMOL/L (ref 20–31)
COLOR: YELLOW
COMMENT UA: ABNORMAL
CREAT SERPL-MCNC: 0.97 MG/DL (ref 0.5–0.9)
DIFFERENTIAL TYPE: YES
EOSINOPHILS RELATIVE PERCENT: 3 % (ref 0–5)
GFR AFRICAN AMERICAN: >60 ML/MIN
GFR NON-AFRICAN AMERICAN: 58 ML/MIN
GFR SERPL CREATININE-BSD FRML MDRD: ABNORMAL ML/MIN/{1.73_M2}
GFR SERPL CREATININE-BSD FRML MDRD: ABNORMAL ML/MIN/{1.73_M2}
GLUCOSE BLD-MCNC: 252 MG/DL (ref 70–99)
GLUCOSE URINE: ABNORMAL
HCT VFR BLD CALC: 37.3 % (ref 36–46)
HEMOGLOBIN: 12.5 G/DL (ref 12–16)
IMMATURE GRANULOCYTES: ABNORMAL %
KETONES, URINE: NEGATIVE
LEUKOCYTE ESTERASE, URINE: NEGATIVE
LIPASE: 44 U/L (ref 13–60)
LYMPHOCYTES # BLD: 29 % (ref 15–40)
MCH RBC QN AUTO: 29.9 PG (ref 26–34)
MCHC RBC AUTO-ENTMCNC: 33.5 G/DL (ref 31–37)
MCV RBC AUTO: 89.3 FL (ref 80–100)
MONOCYTES # BLD: 6 % (ref 4–8)
NITRITE, URINE: NEGATIVE
NRBC AUTOMATED: ABNORMAL PER 100 WBC
PDW BLD-RTO: 13 % (ref 12.1–15.2)
PH UA: 7 (ref 5–8)
PLATELET # BLD: 214 K/UL (ref 140–450)
PLATELET ESTIMATE: ABNORMAL
PMV BLD AUTO: ABNORMAL FL (ref 6–12)
POTASSIUM SERPL-SCNC: 4.3 MMOL/L (ref 3.7–5.3)
PROTEIN UA: NEGATIVE
RBC # BLD: 4.18 M/UL (ref 4–5.2)
RBC # BLD: ABNORMAL 10*6/UL
SEG NEUTROPHILS: 61 % (ref 47–75)
SEGMENTED NEUTROPHILS ABSOLUTE COUNT: 7.3 K/UL (ref 2.5–7)
SODIUM BLD-SCNC: 141 MMOL/L (ref 135–144)
SPECIFIC GRAVITY UA: 1 (ref 1–1.03)
TOTAL PROTEIN: 6.4 G/DL (ref 6.4–8.3)
TURBIDITY: CLEAR
URINE HGB: NEGATIVE
UROBILINOGEN, URINE: NORMAL
WBC # BLD: 12.1 K/UL (ref 3.5–11)
WBC # BLD: ABNORMAL 10*3/UL

## 2018-07-26 PROCEDURE — 85025 COMPLETE CBC W/AUTO DIFF WBC: CPT

## 2018-07-26 PROCEDURE — 81003 URINALYSIS AUTO W/O SCOPE: CPT

## 2018-07-26 PROCEDURE — 83690 ASSAY OF LIPASE: CPT

## 2018-07-26 PROCEDURE — 99284 EMERGENCY DEPT VISIT MOD MDM: CPT

## 2018-07-26 PROCEDURE — 80053 COMPREHEN METABOLIC PANEL: CPT

## 2018-07-26 RX ORDER — POLYETHYLENE GLYCOL 3350 17 G/17G
17 POWDER, FOR SOLUTION ORAL ONCE
COMMUNITY
End: 2018-11-06

## 2018-07-26 RX ORDER — FAMOTIDINE 20 MG/1
20 TABLET, FILM COATED ORAL 2 TIMES DAILY
Qty: 14 TABLET | Refills: 0 | Status: SHIPPED | OUTPATIENT
Start: 2018-07-26 | End: 2018-12-11 | Stop reason: ALTCHOICE

## 2018-07-26 ASSESSMENT — ENCOUNTER SYMPTOMS
BACK PAIN: 0
COLOR CHANGE: 0
ANAL BLEEDING: 1
SHORTNESS OF BREATH: 0
VOMITING: 0
ABDOMINAL PAIN: 1
TROUBLE SWALLOWING: 0
SORE THROAT: 0
EYE REDNESS: 0
DIARRHEA: 0
EYE PAIN: 0
COUGH: 0
NAUSEA: 0

## 2018-07-26 ASSESSMENT — PAIN DESCRIPTION - FREQUENCY: FREQUENCY: INTERMITTENT

## 2018-07-26 ASSESSMENT — PAIN DESCRIPTION - ORIENTATION: ORIENTATION: LOWER;MID

## 2018-07-26 ASSESSMENT — PAIN SCALES - GENERAL: PAINLEVEL_OUTOF10: 8

## 2018-07-26 ASSESSMENT — PAIN DESCRIPTION - LOCATION: LOCATION: ABDOMEN

## 2018-07-26 ASSESSMENT — PAIN DESCRIPTION - DESCRIPTORS: DESCRIPTORS: SHARP;STABBING

## 2018-07-26 ASSESSMENT — PAIN DESCRIPTION - PAIN TYPE: TYPE: ACUTE PAIN

## 2018-07-26 NOTE — ED PROVIDER NOTES
[facility] ED  eMERGENCY dEPARTMENT eNCOUnter      Pt Name: Chikis Poole  MRN: [id number]  [place] 1955  Date of evaluation: 7/26/2018  Provider: Rocco Ricketts MD    CHIEF COMPLAINT       Chief Complaint   Patient presents with    Rectal Bleeding     pt states rectal bleeding starting yesterday. she states she has low mid abd pain and bilat flank pain. Patient is a 72-year-old female who presents to the emergency department complaining of mild mid abdominal pain and slight amount of rectal bleeding which started yesterday. She states the pain and bleeding is been intermittent. She states she has had rectal bleeding in the past due to hemorrhoids. She denies any fever or chills. She denies vomiting or diarrhea. She states her pain is mild in severity. She states nothing makes it better or worse. He denies any chest pain or shortness of breath. She denies other associated symptoms. Nursing Notes were reviewed. REVIEW OF SYSTEMS    (2-9 systems for level 4, 10 or more for level 5)     Review of Systems   Constitutional: Negative for chills and fever. HENT: Negative for ear pain, sore throat and trouble swallowing. Eyes: Negative for pain and redness. Respiratory: Negative for cough and shortness of breath. Cardiovascular: Negative for chest pain and palpitations. Gastrointestinal: Positive for abdominal pain and anal bleeding. Negative for diarrhea, nausea and vomiting. Genitourinary: Negative for dysuria and frequency. Musculoskeletal: Negative for back pain and neck pain. Skin: Negative for color change and rash. Neurological: Negative for dizziness, syncope and headaches. Psychiatric/Behavioral: Negative for hallucinations and suicidal ideas. Except as noted above the remainder of the review of systems was reviewed and negative.        PAST MEDICAL HISTORY     Past Medical History:   Diagnosis Date    Asthma     Atrial fibrillation ([facility] [address]     COPD [facility]ronic obstructive pulmonary disease) [facility] [address]     DDD (degenerative disc disease)     Diabetes mellitus ([facility] [address]     Hyperlipidemia     Hypertension     Hyperthyroidism     Type II or unspecified type diabetes mellitus without mention of complication, not stated as uncontrolled          SURGICAL HISTORY       Past Surgical History:   Procedure Laterality Date    BACK SURGERY      COLONOSCOPY  04/23/14   [facility] DENTAL SURGERY N/A 3/8/2017    REMOVAL OF BILATERAL MANDIBULAR LELO AND MAXILLARY EDENTULOUS ALVEOPLASTY performed by Eda Falcon DDS at [facility]      removal    GASTROSTOMY TUBE PLACEMENT      TONSILLECTOMY      TRACHEOSTOMY     [address]      UPPER GASTROINTESTINAL ENDOSCOPY  3/16/2016    Peg tube insertion         ALLERGIES     Percocet [oxycodone-acetaminophen]    FAMILY HISTORY       Family History   Problem Relation Age of Onset    Heart Disease Mother     High Cholesterol Mother     No Known Problems Son           SOCIAL HISTORY       Social History     Social History    Marital status: Single     Spouse name: N/A    Number of children: N/A    Years of education: N/A     Social History Main Topics    Smoking status: Former Smoker     Packs/day: 1.00     Years: 20.00     Types: Cigarettes     Quit date: 10/23/2013    Smokeless tobacco: Never Used    Alcohol use No      Comment: beer once a month    Drug use: No      Comment: former marijuana    Sexual activity: Not Asked     Other Topics Concern    None     Social History Narrative    None           PHYSICAL EXAM    (up to 7 for level 4, 8 or more for level 5)     ED Triage Vitals [07/26/18 1932]   BP Temp Temp Source Pulse Resp SpO2 Height Weight   128/78 98.9 °F (37.2 °C) Oral 82 17 97 % -- --       Physical Exam     Physical    Vital signs and nursing notes were reviewed as well as the social, family, and past medical history. Gen.  appearance: Patient is alert and oriented and in no acute distress    Head: Atraumatic, normocephalic    Neck: Supple, trachea/thyroid normal    EENT: PERRLA, EOMI, conjunctiva normal.    Skin: Warm and dry with no rash    Cardiovascular: Heart RRR, no gallops or rubs, no aortic enlargement or bruits noted. Respiratory: Lungs clear, no wheezing, no rales, normal breath sounds. Gastrointestinal: Abdomen is only tender in the mid abdomen, bowel sounds normal, no rebound/guarding/distention or mass, rectal exam shows one hemorrhoid with no bleeding noted. Musculoskeletal: No tenderness in the extremities, no back or hip pain. Neurological: Patient is alert and oriented ×3, no focal motor or sensory deficits noted,       DIAGNOSTIC RESULTS             LABS:  Labs Reviewed   CBC WITH AUTO DIFFERENTIAL - Abnormal; Notable for the following:        Result Value    WBC 12.1 (*)     Segs Absolute 7.30 (*)     All other components within normal limits   COMPREHENSIVE METABOLIC PANEL - Abnormal; Notable for the following:     Glucose 252 (*)     BUN 24 (*)     CREATININE 0.97 (*)     Bun/Cre Ratio 25 (*)     ALT 49 (*)     Total Bilirubin 0.27 (*)     GFR Non- 58 (*)     All other components within normal limits   URINALYSIS - Abnormal; Notable for the following:     Glucose, Ur 100 mg/dL (*)     All other components within normal limits   LIPASE       All other labs were within normal range or not returned as of this dictation. EMERGENCY DEPARTMENT COURSE and DIFFERENTIAL DIAGNOSIS/MDM:   Vitals:    Vitals:    07/26/18 1932 07/26/18 1935 07/26/18 1948   BP: 128/78 128/78 (!) 141/85   Pulse: 82     Resp: 17     Temp: 98.9 °F (37.2 °C)     TempSrc: Oral     SpO2: 97% 97% 94%                 REASSESSMENT      Patient's labs were unremarkable. On repeat exam patient has no abdominal tenderness.   I discussed with the patient and we'll discharge with Pepcid and have her follow-up with her primary doctor for further

## 2018-07-30 ENCOUNTER — CARE COORDINATION (OUTPATIENT)
Dept: CARE COORDINATION | Age: 63
End: 2018-07-30

## 2018-08-01 ENCOUNTER — CARE COORDINATION (OUTPATIENT)
Dept: CARE COORDINATION | Age: 63
End: 2018-08-01

## 2018-08-01 NOTE — CARE COORDINATION
Ambulatory Care Coordination Note  8/1/2018  CM Risk Score: 8  Zen Mortality Risk Score:      ACC: Taisha Reyes, RN    Summary Note:   PHone call back from Mouna with [place] to discuss patient's needs. Mouna informs, \"patient gets easily confused. \" Patient was evaluated for waiver program; but after caresozoee  followed up with waiver; and they inform that patient actually did not qualify due to level of care. Mouna states, \"that the  with careginnyrichard will be going back to patient's home when passport comes back out to re-evaluate as Mouna truly feels more due to patient's cognitive that she should qualify for assistance. Mouna informs, \"patient has been behind in her bills lately, and [employer] has been trying to help patient organize when she goes to her home for home visits. Patient had recent fall and was recently in the hospital for COPD at [facility]. Patient did see Dr. Cooper Silveira following this hospitalization. Patient was also in the ED on 07/26/18 for Rectal Bleeding. ED discharge instructions included:  FINAL IMPRESSION       1. Rectal bleeding    2. Pain of upper abdomen           DISPOSITION/PLAN   DISPOSITION Decision To Discharge 07/26/2018 09:09:33 PM        PATIENT REFERRED TO:  No follow-up provider specified.     DISCHARGE MEDICATIONS:       New Prescriptions     FAMOTIDINE (PEPCID) 20 MG TABLET    Take 1 tablet by mouth 2 times daily         Per Mouna, \"Patient's phone was shut off, so they can not call her to follow up regarding ER; but patient informed that she did also see the Kidney specialist yesterday as well. \" This nurse CC will call Dr. Joel Melissa office to request copy of office visit note. PHone call to Dr. Joel Melissa office and spoke to staff who informs, patient cancelled her appointment yesterday with Dr. Foster Carr; rescheduling for 09/05/18. Phone call back to Mouna to inform of this.     CC Plan:   Mouna will call this nurse back and keep this nurse posted on patient and home needs, appointments, and scheduled passport assessment. This nurse CC did attempt to call patient's phone and left voicemail requesting kjen call back. Will await phone call back from patient if able to check her voicemail. Otherwise will await call back from Cherry Bonds with [place] with further patient update. Care Coordination Interventions    Program Enrollment:  Complex Care  Referral from Primary Care Provider:  Yes  Suggested Interventions and Community Resources  Diabetes Education: In Process (Comment: requesting order for Diabetic Ed)  Home Care Waiver: In Process (Comment: faxed passport referral)  Meals on Wheels: In Process (Comment: contacted [facility], they will start 04/16)  Medi Set or Pill Pack:  Completed (Comment: patient has exact [facility] pharmacy that prefills her medications and mails them to her)  Transportation Support: In Process           Prior to Admission medications    Medication Sig Start Date End Date Taking? Authorizing Provider   diclofenac (VOLTAREN) 50 MG EC tablet  5/8/18   Historical Provider, MD   polyethylene glycol (GLYCOLAX) powder Take 17 g by mouth once    Historical Provider, MD   famotidine (PEPCID) 20 MG tablet Take 1 tablet by mouth 2 times daily 7/26/18   Debby Leary MD   Omega-3 Fatty Acids (FISH OIL) 1000 MG CAPS Take 2 capsules by mouth daily 7/24/18   Sukumar Zamorano MD   vitamin D (CHOLECALCIFEROL) 1000 UNIT TABS tablet Take 1 tablet by mouth daily 7/24/18   Sukumar Zamorano MD   gabapentin (NEURONTIN) 600 MG tablet Take 1 tablet by mouth 3 times daily for 30 days. .  Patient taking differently: Take 600 mg by mouth 2 times daily.  . 7/19/18 8/18/18  Sukumar Zamorano MD   albuterol (PROVENTIL) (2.5 MG/3ML) 0.083% nebulizer solution Take 2.5 mg by nebulization 4 times daily    Historical Provider, MD   guaiFENesin (MUCINEX) 600 MG extended release tablet Take 1,200 mg by mouth 2 times daily Historical Provider, MD   EASY TOUCH PEN NEEDLES 31G X 5 MM MISC USE AS DIRECTED DAILY 6/25/18   Joel Marcelino MD   insulin glargine (BASAGLAR KWIKPEN) 100 UNIT/ML injection pen Inject 32 Units into the skin nightly 6/15/18   Joel Marcelino MD   albuterol sulfate HFA (VENTOLIN HFA) 108 (90 Base) MCG/ACT inhaler Inhale 2 puffs into the lungs every 6 hours as needed for Wheezing 5/3/18   Joel Marcelino MD   loratadine (CLARITIN) 10 MG tablet TAKE (1) TABLET BY MOUTH DAILY 4/26/18   Joel Marcelino MD   Blood Glucose Monitoring Suppl SOUTH 1 Units by Does not apply route once for 1 dose PLease dispense a machine that is covered by her insurance  DX E11.9 4/26/18 4/26/18  Joel Marcelino MD   apixaban (ELIQUIS) 5 MG TABS tablet Take 1 tablet by mouth 2 times daily 4/26/18   Jeol Marcelino MD   atorvastatin (LIPITOR) 40 MG tablet TAKE 1 TABLET BY MOUTH ONCE DAILY 3/29/18   Jessy Mann MD   metoprolol tartrate (LOPRESSOR) 25 MG tablet Take 1 tablet by mouth 2 times daily 3/2/18   Joel Marcelino MD   amLODIPine (NORVASC) 10 MG tablet Take 1 tablet by mouth daily 3/1/18   Joel Marcelino MD   ferrous sulfate 325 (65 Fe) MG tablet Take 1 tablet by mouth daily 2/1/18   Joel Marcelino MD   methimazole (TAPAZOLE) 5 MG tablet TAKE ONE (1) TABLET BY MOUTH TWICE DAILY 2/1/18   Joel Marcelino MD   Multiple Vitamin (MULTI-VITAMINS) TABS TAKE ONE (1) TABLET BY MOUTH ONCE DAILY 11/30/17   Joel Marcelino MD   Incontinence Supply Disposable (POISE PAD) PADS Apply 1 Piece topically as needed (incontinence) 11/21/17   Iam Maravilla MD   oxybutynin (DITROPAN XL) 10 MG extended release tablet Take 1 tablet by mouth daily 11/21/17   Iam Maravilla MD   hydrochlorothiazide (HYDRODIURIL) 50 MG tablet TAKE (1) TABLET BY MOUTH ONCE DAILY 9/29/17   George Gutierres MD   Blood Glucose Monitoring Suppl SOUTH 1 each by Does not apply route 3 times daily 6/6/17   Janessa Hardin MD   FREESTYLE LANCETS MISC TEST BLOOD SUGAR THREE TIMES A DAY 5/4/17   Pawel Marrufo MD   Alcohol Swabs (EASY TOUCH ALCOHOL PREP MEDIUM) 70 % PADS USE FOUR TIMES DAILY 3/29/17   Pawel Marrufo MD   FREESTYLE LITE strip TEST BLOOD SUGAR THREE TIMES A DAY 3/29/17   Pawel Marrufo MD   Blood Pressure Monitoring (B-D ASSURE BPM/AUTO ARM CUFF) MISC 1 Device by Does not apply route daily as needed (htn) 8/25/16   Pawel Marrufo MD   Vitamin D (CHOLECALCIFEROL) 1000 UNITS CAPS capsule Take 1,000 capsules by mouth daily. Patient taking differently: Take 1,000 Units by mouth daily.  1/5/15   Pawel Marrufo MD       Future Appointments  Date Time Provider Diomedes Cardenas   10/22/2018 3:00 PM MD Anival Bashir [facility]

## 2018-08-01 NOTE — CARE COORDINATION
Aunjc Chang MD   Alcohol Swabs (EASY TOUCH ALCOHOL PREP MEDIUM) 70 % PADS USE FOUR TIMES DAILY 3/29/17   Thania Reese MD   FREESTYLE LITE strip TEST BLOOD SUGAR THREE TIMES A DAY 3/29/17   Thania Reese MD   Blood Pressure Monitoring (B-D ASSURE BPM/AUTO ARM CUFF) MISC 1 Device by Does not apply route daily as needed (htn) 8/25/16   Thania Reese MD   Vitamin D (CHOLECALCIFEROL) 1000 UNITS CAPS capsule Take 1,000 capsules by mouth daily. Patient taking differently: Take 1,000 Units by mouth daily.  1/5/15   Thania Reese MD       Future Appointments  Date Time Provider Diomedes Wiseisti   10/22/2018 3:00 PM MD Hayden Dumont [facility]

## 2018-08-09 ENCOUNTER — HOSPITAL ENCOUNTER (OUTPATIENT)
Dept: PHYSICAL THERAPY | Age: 63
Setting detail: THERAPIES SERIES
Discharge: HOME | End: 2018-08-09
Payer: MEDICAID

## 2018-08-09 PROCEDURE — 97140 MANUAL THERAPY 1/> REGIONS: CPT

## 2018-08-09 ASSESSMENT — PAIN SCALES - GENERAL: PAINLEVEL_OUTOF10: 6

## 2018-08-09 NOTE — PROGRESS NOTES
Phone: [phone]      Fax: [phone]                            Outpatient Physical Therapy                                                                            Daily Note    Date: 2018  Patient Name: Nathaniel Aguila        MRN: [id number]   ACCT#:  [de-identified]  : 1955  (59 y.o.)    Referring Practitioner: Dr. Amalia Andrews    Referral Date : 05/10/18    Diagnosis: Cervical pain  Treatment Diagnosis: Cervical pain    Onset Date: 05/10/18  PT Insurance Information: 27 CRS    Per Physician Order  Total # of Visits to Date: 4  No Show: 1  Canceled Appointment: 1    Pre-Treatment Pain:  6/10     Assessment  Assessment: Patient returns for cervical treatment; patient's last actual attended visit 18 as patient reports several recent hospitalizations related to COPD issues. Patient subjectively reports continued cervical pain/tight with headaches noted every 1-2 days. Her cervical rotation is limited 25% and extension limited 50% with c/o dizziness. Passive rotation WNL as well as C1-2 mobility. Tightness/tenderness is noted of bilateral SCM and cervical paraspinals right > left. As patient's attendance has been inconsistent due to unrelated medical issues and transportation, do not feel that PT has been able to provide that treatment and symptom reduction fully. Discussed with patient need for consistency for treatment to be beneficial.  Patient is scheduled 3 x per week with [employer] [facility] transportation.   Will plan to resume postural strengthening and manual therapy ; will plan to continue x 2 weeks and then determine if additional PT needed or refer back for MD follow-up  Chart Reviewed: Yes    Plan  Plan: Continue with current plan    Exercises/Modalities/Manual:  See DocFlow Sheet    Education: Consistency of treatmemnt          Goals  (Total # of Visits to Date: 4)   Short Term Goals - Time Frame for Short term goals: 6 visits  Short term goal 1: Educate on home

## 2018-08-10 ENCOUNTER — CARE COORDINATION (OUTPATIENT)
Dept: CARE COORDINATION | Age: 63
End: 2018-08-10

## 2018-08-10 ENCOUNTER — HOSPITAL ENCOUNTER (OUTPATIENT)
Age: 63
Discharge: HOME | End: 2018-08-10
Payer: MEDICAID

## 2018-08-10 LAB
-: ABNORMAL
ALBUMIN SERPL-MCNC: 4.1 G/DL (ref 3.5–5.2)
ALBUMIN SERPL-MCNC: 4.1 G/DL (ref 3.5–5.2)
ALBUMIN/GLOBULIN RATIO: ABNORMAL (ref 1–2.5)
ALP BLD-CCNC: 112 U/L (ref 35–104)
ALT SERPL-CCNC: 24 U/L (ref 5–33)
AMORPHOUS: ABNORMAL
ANION GAP SERPL CALCULATED.3IONS-SCNC: 14 MMOL/L (ref 9–17)
ANION GAP SERPL CALCULATED.3IONS-SCNC: 14 MMOL/L (ref 9–17)
AST SERPL-CCNC: 19 U/L
BACTERIA: ABNORMAL
BILIRUB SERPL-MCNC: 0.4 MG/DL (ref 0.3–1.2)
BILIRUBIN URINE: NEGATIVE
BUN BLDV-MCNC: 21 MG/DL (ref 8–23)
BUN BLDV-MCNC: 21 MG/DL (ref 8–23)
BUN/CREAT BLD: 22 (ref 9–20)
BUN/CREAT BLD: 22 (ref 9–20)
CALCIUM SERPL-MCNC: 10 MG/DL (ref 8.6–10.4)
CALCIUM SERPL-MCNC: 10 MG/DL (ref 8.6–10.4)
CASTS UA: ABNORMAL /LPF
CHLORIDE BLD-SCNC: 97 MMOL/L (ref 98–107)
CHLORIDE BLD-SCNC: 97 MMOL/L (ref 98–107)
CHOLESTEROL/HDL RATIO: 3.9
CHOLESTEROL: 143 MG/DL
CO2: 26 MMOL/L (ref 20–31)
CO2: 26 MMOL/L (ref 20–31)
COLOR: YELLOW
COMMENT UA: ABNORMAL
COMPLEMENT C3: 145 MG/DL (ref 90–180)
COMPLEMENT C4: 44 MG/DL (ref 10–40)
CREAT SERPL-MCNC: 0.95 MG/DL (ref 0.5–0.9)
CREAT SERPL-MCNC: 0.95 MG/DL (ref 0.5–0.9)
CREATININE URINE: 60.2 MG/DL (ref 28–217)
CRYSTALS, UA: ABNORMAL /HPF
EPITHELIAL CELLS UA: ABNORMAL /HPF
ESTIMATED AVERAGE GLUCOSE: 189 MG/DL
FERRITIN: 164 UG/L (ref 13–150)
GFR AFRICAN AMERICAN: >60 ML/MIN
GFR AFRICAN AMERICAN: >60 ML/MIN
GFR NON-AFRICAN AMERICAN: 60 ML/MIN
GFR NON-AFRICAN AMERICAN: 60 ML/MIN
GFR SERPL CREATININE-BSD FRML MDRD: ABNORMAL ML/MIN/{1.73_M2}
GLUCOSE BLD-MCNC: 338 MG/DL (ref 70–99)
GLUCOSE BLD-MCNC: 338 MG/DL (ref 70–99)
GLUCOSE URINE: ABNORMAL
HBA1C MFR BLD: 8.2 % (ref 4.8–5.9)
HCT VFR BLD CALC: 38.4 % (ref 36–46)
HCT VFR BLD CALC: 38.4 % (ref 36–46)
HDLC SERPL-MCNC: 37 MG/DL
HEMOGLOBIN: 12.8 G/DL (ref 12–16)
HEMOGLOBIN: 12.8 G/DL (ref 12–16)
IRON SATURATION: 30 % (ref 20–55)
IRON: 80 UG/DL (ref 37–145)
KETONES, URINE: NEGATIVE
LDL CHOLESTEROL: 53 MG/DL (ref 0–130)
LEUKOCYTE ESTERASE, URINE: ABNORMAL
MAGNESIUM: 1.9 MG/DL (ref 1.6–2.6)
MCH RBC QN AUTO: 29.3 PG (ref 26–34)
MCH RBC QN AUTO: 29.3 PG (ref 26–34)
MCHC RBC AUTO-ENTMCNC: 33.4 G/DL (ref 31–37)
MCHC RBC AUTO-ENTMCNC: 33.4 G/DL (ref 31–37)
MCV RBC AUTO: 87.6 FL (ref 80–100)
MCV RBC AUTO: 87.6 FL (ref 80–100)
MUCUS: ABNORMAL
NITRITE, URINE: NEGATIVE
NRBC AUTOMATED: NORMAL PER 100 WBC
NRBC AUTOMATED: NORMAL PER 100 WBC
OTHER OBSERVATIONS UA: ABNORMAL
PATIENT FASTING?: YES
PDW BLD-RTO: 12.8 % (ref 12.1–15.2)
PDW BLD-RTO: 12.8 % (ref 12.1–15.2)
PH UA: 6.5 (ref 5–8)
PHOSPHORUS: 3.7 MG/DL (ref 2.6–4.5)
PLATELET # BLD: 231 K/UL (ref 140–450)
PLATELET # BLD: 231 K/UL (ref 140–450)
PMV BLD AUTO: NORMAL FL (ref 6–12)
PMV BLD AUTO: NORMAL FL (ref 6–12)
POTASSIUM SERPL-SCNC: 3.7 MMOL/L (ref 3.7–5.3)
POTASSIUM SERPL-SCNC: 3.7 MMOL/L (ref 3.7–5.3)
PROTEIN UA: NEGATIVE
PTH INTACT: 41.75 PG/ML (ref 15–65)
RBC # BLD: 4.38 M/UL (ref 4–5.2)
RBC # BLD: 4.38 M/UL (ref 4–5.2)
RBC UA: ABNORMAL /HPF (ref 0–2)
RENAL EPITHELIAL, UA: ABNORMAL /HPF
SODIUM BLD-SCNC: 137 MMOL/L (ref 135–144)
SODIUM BLD-SCNC: 137 MMOL/L (ref 135–144)
SPECIFIC GRAVITY UA: 1.01 (ref 1–1.03)
T3 FREE: 6.66 PG/ML (ref 2.02–4.43)
THYROXINE, FREE: 2.23 NG/DL (ref 0.93–1.7)
TOTAL IRON BINDING CAPACITY: 263 UG/DL (ref 250–450)
TOTAL PROTEIN, URINE: 8 MG/DL
TOTAL PROTEIN: 6.9 G/DL (ref 6.4–8.3)
TRANSFERRIN: 213 MG/DL (ref 200–360)
TRICHOMONAS: ABNORMAL
TRIGL SERPL-MCNC: 263 MG/DL
TSH SERPL DL<=0.05 MIU/L-ACNC: <0.01 MIU/L (ref 0.3–5)
TURBIDITY: CLEAR
UNSATURATED IRON BINDING CAPACITY: 183 UG/DL (ref 112–347)
URINE HGB: NEGATIVE
URINE TOTAL PROTEIN CREATININE RATIO: 0.13 (ref 0–0.2)
UROBILINOGEN, URINE: NORMAL
VITAMIN D 25-HYDROXY: 49.8 NG/ML (ref 30–100)
VLDLC SERPL CALC-MCNC: ABNORMAL MG/DL (ref 1–30)
WBC # BLD: 5.2 K/UL (ref 3.5–11)
WBC # BLD: 5.2 K/UL (ref 3.5–11)
WBC UA: ABNORMAL /HPF
YEAST: ABNORMAL

## 2018-08-10 PROCEDURE — 84443 ASSAY THYROID STIM HORMONE: CPT

## 2018-08-10 PROCEDURE — 82306 VITAMIN D 25 HYDROXY: CPT

## 2018-08-10 PROCEDURE — 83036 HEMOGLOBIN GLYCOSYLATED A1C: CPT

## 2018-08-10 PROCEDURE — 86160 COMPLEMENT ANTIGEN: CPT

## 2018-08-10 PROCEDURE — 36415 COLL VENOUS BLD VENIPUNCTURE: CPT

## 2018-08-10 PROCEDURE — 84466 ASSAY OF TRANSFERRIN: CPT

## 2018-08-10 PROCEDURE — 86335 IMMUNFIX E-PHORSIS/URINE/CSF: CPT

## 2018-08-10 PROCEDURE — 84481 FREE ASSAY (FT-3): CPT

## 2018-08-10 PROCEDURE — 80053 COMPREHEN METABOLIC PANEL: CPT

## 2018-08-10 PROCEDURE — 84156 ASSAY OF PROTEIN URINE: CPT

## 2018-08-10 PROCEDURE — 85027 COMPLETE CBC AUTOMATED: CPT

## 2018-08-10 PROCEDURE — 82570 ASSAY OF URINE CREATININE: CPT

## 2018-08-10 PROCEDURE — 83550 IRON BINDING TEST: CPT

## 2018-08-10 PROCEDURE — 83516 IMMUNOASSAY NONANTIBODY: CPT

## 2018-08-10 PROCEDURE — 81001 URINALYSIS AUTO W/SCOPE: CPT

## 2018-08-10 PROCEDURE — 84439 ASSAY OF FREE THYROXINE: CPT

## 2018-08-10 PROCEDURE — 86334 IMMUNOFIX E-PHORESIS SERUM: CPT

## 2018-08-10 PROCEDURE — 80061 LIPID PANEL: CPT

## 2018-08-10 PROCEDURE — 82728 ASSAY OF FERRITIN: CPT

## 2018-08-10 PROCEDURE — 86038 ANTINUCLEAR ANTIBODIES: CPT

## 2018-08-10 PROCEDURE — 83970 ASSAY OF PARATHORMONE: CPT

## 2018-08-10 PROCEDURE — 83540 ASSAY OF IRON: CPT

## 2018-08-10 NOTE — CARE COORDINATION
Informs, Ishmael Clinton is taking her medications as prescribed. \"     Patient informs, \"her breathing is much better.:\"   States, Ishmael Clinton has her air conditioning on, and she is doing much better having this on. \"   She is u sing her nebulizer machine and is (doing okay with this), and is using her inhaler. Patient reports, Ishmael Clinton did stop the Prednisone as directed. \"   Patient informs, Ishmael Clinton is happy in her apartment, and staying away from her brother and his friends since they smoke. \"     CC Plan: Will follow up with patient in 1 week. Make sure patient able to get meter working. Follow up on blood sugars. Will await call back from Bhavya Rider . Diabetes Assessment    Meal Planning:  Avoidance of concentrated sweets   How often do you test your blood sugar?:  Daily   Do you have barriers with adherence to non-pharmacologic self-management interventions? (Nutrition/Exercise/Self-Monitoring):  Yes   Have you ever had to go to the ED for symptoms of low blood sugar?:  No       Increase or Decrease trend in Blood Sugars   Blood Sugar Trends:  Fluctuating      ,   COPD Assessment    Does the patient understand envrionmental exposure?:  Yes  Is the patient able to verbalize Rescue vs. Long Acting medications?:  Yes  Does the patient have a nebulizer?:  Yes     No patient-reported symptoms         Symptoms:      Change in chronic cough?:  No/At Baseline  Change in sputum?:  No/At Baseline      and   General Assessment             Care Coordination Interventions    Program Enrollment:  Complex Care  Referral from Primary Care Provider:  Yes  Suggested Interventions and Community Resources  Diabetes Education: In Process (Comment: requesting order for Diabetic Ed)  Home Care Waiver: In Process (Comment: faxed passport referral)  Meals on Wheels:   In Process (Comment: contacted [facility], they will start 04/16)  Medi Set or Pill Pack:  Completed (Comment: [facility] medications and

## 2018-08-12 LAB
PATHOLOGIST: NORMAL
SERUM IFX INTERP: NORMAL
URINE IFX INTERP: NORMAL
URINE IFX SPECIMEN: NORMAL
URINE TOTAL PROTEIN: 9 MG/DL
VOLUME: NORMAL ML

## 2018-08-13 ENCOUNTER — HOSPITAL ENCOUNTER (OUTPATIENT)
Dept: PHYSICAL THERAPY | Age: 63
Setting detail: THERAPIES SERIES
Discharge: HOME | End: 2018-08-13
Payer: MEDICAID

## 2018-08-13 LAB — ANTI-NUCLEAR ANTIBODY (ANA): NEGATIVE

## 2018-08-13 PROCEDURE — 97110 THERAPEUTIC EXERCISES: CPT

## 2018-08-13 PROCEDURE — 97140 MANUAL THERAPY 1/> REGIONS: CPT

## 2018-08-13 ASSESSMENT — PAIN SCALES - GENERAL: PAINLEVEL_OUTOF10: 8

## 2018-08-14 LAB
ANCA MYELOPEROXIDASE: 6 AU/ML
ANCA PROTEINASE 3: 3 AU/ML

## 2018-08-15 ENCOUNTER — CARE COORDINATION (OUTPATIENT)
Dept: CARE COORDINATION | Age: 63
End: 2018-08-15

## 2018-08-15 NOTE — CARE COORDINATION
Received voicemail from Damion Barfield with [facility] to inform that patient's face to face universal assessment is 08/24/18. Damion Barfield does plan to be at patient's home with her during the assessment. Damion Barfield to keep this nurse CC posted on how the assessment goes. CC Plan: Will await call back regarding patient's assessment/assess for other CC needs.  Will attempt phone call to patient next week prior to assessment

## 2018-08-16 ENCOUNTER — HOSPITAL ENCOUNTER (OUTPATIENT)
Dept: PHYSICAL THERAPY | Age: 63
Setting detail: THERAPIES SERIES
Discharge: HOME | End: 2018-08-16
Payer: MEDICAID

## 2018-08-16 PROCEDURE — 97110 THERAPEUTIC EXERCISES: CPT

## 2018-08-16 PROCEDURE — 97140 MANUAL THERAPY 1/> REGIONS: CPT

## 2018-08-17 ENCOUNTER — HOSPITAL ENCOUNTER (OUTPATIENT)
Dept: GENERAL RADIOLOGY | Age: 63
Discharge: HOME | End: 2018-08-19
Payer: MEDICAID

## 2018-08-17 ENCOUNTER — OFFICE VISIT (OUTPATIENT)
Dept: FAMILY MEDICINE CLINIC | Age: 63
End: 2018-08-17
Payer: COMMERCIAL

## 2018-08-17 ENCOUNTER — HOSPITAL ENCOUNTER (OUTPATIENT)
Age: 63
Discharge: HOME | End: 2018-08-19
Payer: MEDICAID

## 2018-08-17 VITALS
BODY MASS INDEX: 26.63 KG/M2 | HEART RATE: 72 BPM | SYSTOLIC BLOOD PRESSURE: 130 MMHG | WEIGHT: 156 LBS | HEIGHT: 64 IN | RESPIRATION RATE: 18 BRPM | DIASTOLIC BLOOD PRESSURE: 80 MMHG

## 2018-08-17 DIAGNOSIS — M25.551 PAIN OF BOTH HIP JOINTS: ICD-10-CM

## 2018-08-17 DIAGNOSIS — M25.551 PAIN OF BOTH HIP JOINTS: Primary | ICD-10-CM
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
## 2018-08-17 DIAGNOSIS — M25.552 PAIN OF BOTH HIP JOINTS: ICD-10-CM

## 2018-08-17 DIAGNOSIS — M25.552 PAIN OF BOTH HIP JOINTS: Primary | ICD-10-CM

## 2018-08-17 PROCEDURE — 99213 OFFICE O/P EST LOW 20 MIN: CPT | Performed by: INTERNAL MEDICINE

## 2018-08-17 PROCEDURE — 73502 X-RAY EXAM HIP UNI 2-3 VIEWS: CPT

## 2018-08-17 PROCEDURE — G8417 CALC BMI ABV UP PARAM F/U: HCPCS | Performed by: INTERNAL MEDICINE

## 2018-08-17 PROCEDURE — G8427 DOCREV CUR MEDS BY ELIG CLIN: HCPCS | Performed by: INTERNAL MEDICINE

## 2018-08-17 PROCEDURE — 1036F TOBACCO NON-USER: CPT | Performed by: INTERNAL MEDICINE

## 2018-08-17 PROCEDURE — 3017F COLORECTAL CA SCREEN DOC REV: CPT | Performed by: INTERNAL MEDICINE

## 2018-08-17 RX ORDER — CYCLOBENZAPRINE HCL 5 MG
5 TABLET ORAL 3 TIMES DAILY PRN
Qty: 30 TABLET | Refills: 0 | Status: SHIPPED | OUTPATIENT
Start: 2018-08-17 | End: 2018-08-27 | Stop reason: SDUPTHER

## 2018-08-17 RX ORDER — METHIMAZOLE 5 MG/1
5 TABLET ORAL
COMMUNITY
Start: 2018-08-15 | End: 2018-08-17

## 2018-08-17 RX ORDER — OXYBUTYNIN CHLORIDE 10 MG/1
TABLET, EXTENDED RELEASE ORAL
COMMUNITY
Start: 2018-07-31 | End: 2018-08-17

## 2018-08-17 RX ORDER — NAPROXEN 500 MG/1
500 TABLET ORAL 2 TIMES DAILY WITH MEALS
Qty: 20 TABLET | Refills: 0 | Status: SHIPPED | OUTPATIENT
Start: 2018-08-17 | End: 2018-08-27 | Stop reason: SDUPTHER

## 2018-08-17 ASSESSMENT — ENCOUNTER SYMPTOMS
SHORTNESS OF BREATH: 0
BACK PAIN: 1
CONSTIPATION: 0
ORTHOPNEA: 0
HEARTBURN: 0
ABDOMINAL PAIN: 0
VOMITING: 0
SORE THROAT: 0
DIARRHEA: 0
BLURRED VISION: 0
NAUSEA: 0
COUGH: 0

## 2018-08-17 NOTE — PROGRESS NOTES
HPI Notes    Name: Son Raya  : 1955         Chief Complaint:     Chief Complaint   Patient presents with    Fall     x 2  last week    Hip Pain     right hip radiating to the left-hard to walk at times       History of Present Illness:        Jhonny Aguila presents to office c/o falls and pain in both hips    First time she fell about one week ago  in the parking lot outside her house. She was wearing high heels, lost her balance and fell on her knees. Has no pain in knees but developed pain in both hips  The second fall happened at home few days later, she tripped on something. She did not go to ER for evaluation. The pain is persisting      Hip Pain    The incident occurred 5 to 7 days ago. The incident occurred in the street. The injury mechanism was a fall. The pain is present in the left hip and right hip. The quality of the pain is described as aching. The pain is at a severity of 8/10. The pain is moderate. The pain has been constant since onset. Pertinent negatives include no inability to bear weight, loss of sensation or numbness. She reports no foreign bodies present. The symptoms are aggravated by weight bearing. Treatments tried: gapapentin. The treatment provided mild relief.            Past Medical History:     Past Medical History:   Diagnosis Date    Asthma     Atrial fibrillation ([address]     COPD (chronic obstructive pulmonary disease) ([address]     DDD (degenerative disc disease)     Diabetes mellitus ([address]     Hyperlipidemia     Hypertension     Hyperthyroidism     Type II or unspecified type diabetes mellitus without mention of complication, not stated as uncontrolled       Reviewed all health maintenance requirements and ordered appropriate tests  Health Maintenance Due   Topic Date Due    Shingles Vaccine (1 of 2 - 2 Dose Series) 2006    Diabetic retinal exam  2018       Past Surgical History:     Past Surgical History:   Procedure Laterality Date    BACK SURGERY      into the skin nightly 6/15/18  Yes Marianne Patricia MD   albuterol sulfate HFA (VENTOLIN HFA) 108 (90 Base) MCG/ACT inhaler Inhale 2 puffs into the lungs every 6 hours as needed for Wheezing 5/3/18  Yes Marianne Patricia MD   loratadine (CLARITIN) 10 MG tablet TAKE (1) TABLET BY MOUTH DAILY 4/26/18  Yes Marianne Patricia MD   apixaban (ELIQUIS) 5 MG TABS tablet Take 1 tablet by mouth 2 times daily 4/26/18  Yes Marianne Patricia MD   atorvastatin (LIPITOR) 40 MG tablet TAKE 1 TABLET BY MOUTH ONCE DAILY 3/29/18  Yes Anil Rush MD   metoprolol tartrate (LOPRESSOR) 25 MG tablet Take 1 tablet by mouth 2 times daily 3/2/18  Yes Marianne Patricia MD   amLODIPine (NORVASC) 10 MG tablet Take 1 tablet by mouth daily 3/1/18  Yes Marianne Patricia MD   ferrous sulfate 325 (65 Fe) MG tablet Take 1 tablet by mouth daily 2/1/18  Yes Marianne Patricia MD   methimazole (TAPAZOLE) 5 MG tablet TAKE ONE (1) TABLET BY MOUTH TWICE DAILY 2/1/18  Yes Marianne Patricia MD   Multiple Vitamin (MULTI-VITAMINS) TABS TAKE ONE (1) TABLET BY MOUTH ONCE DAILY 11/30/17  Yes Marianne Patricia MD   Incontinence Supply Disposable (POISE PAD) PADS Apply 1 Piece topically as needed (incontinence) 11/21/17  Yes Gibsno Roberson MD   oxybutynin (DITROPAN XL) 10 MG extended release tablet Take 1 tablet by mouth daily 11/21/17  Yes Gibson Roberson MD   hydrochlorothiazide (HYDRODIURIL) 50 MG tablet TAKE (1) TABLET BY MOUTH ONCE DAILY 9/29/17  Yes Ana Lennon MD   Blood Glucose Monitoring Suppl SOUTH 1 each by Does not apply route 3 times daily 6/6/17  Yes MD PASCUAL [place] LANCETS MISC TEST BLOOD SUGAR THREE TIMES A DAY 5/4/17  Yes Shannon Lawler MD   Alcohol Swabs (EASY TOUCH ALCOHOL PREP MEDIUM) 70 % PADS USE FOUR TIMES DAILY 3/29/17  Yes Shannon Lawler MD   FREESTYLE LITE strip TEST BLOOD SUGAR THREE TIMES A DAY 3/29/17  Yes Shannon Lawler MD   Blood Pressure Monitoring (B-D ASSURE BPM/AUTO ARM CUFF) MISC 1 Device by Does not apply naproxen (EC NAPROSYN) 500 MG EC tablet    cyclobenzaprine (FLEXERIL) 5 MG tablet   2. Fall, initial encounter                     Completed Refills   Requested Prescriptions     Signed Prescriptions Disp Refills    naproxen (EC NAPROSYN) 500 MG EC tablet 20 tablet 0     Sig: Take 1 tablet by mouth 2 times daily (with meals) for 10 days    cyclobenzaprine (FLEXERIL) 5 MG tablet 30 tablet 0     Sig: Take 1 tablet by mouth 3 times daily as needed for Muscle spasms     Return if symptoms worsen or fail to improve, for hip pain. Orders Placed This Encounter   Medications    naproxen (EC NAPROSYN) 500 MG EC tablet     Sig: Take 1 tablet by mouth 2 times daily (with meals) for 10 days     Dispense:  20 tablet     Refill:  0    cyclobenzaprine (FLEXERIL) 5 MG tablet     Sig: Take 1 tablet by mouth 3 times daily as needed for Muscle spasms     Dispense:  30 tablet     Refill:  0     Orders Placed This Encounter   Procedures    XR HIP RIGHT (2-3 VIEWS)     Standing Status:   Future     Standing Expiration Date:   8/17/2019    XR HIP LEFT (2-3 VIEWS)     Standing Status:   Future     Standing Expiration Date:   8/17/2019         There are no Patient Instructions on file for this visit.     Electronically signed by Day Bunch MD on 8/17/2018 at 10:00 AM           Completed Refills   Requested Prescriptions     Signed Prescriptions Disp Refills    naproxen (EC NAPROSYN) 500 MG EC tablet 20 tablet 0     Sig: Take 1 tablet by mouth 2 times daily (with meals) for 10 days    cyclobenzaprine (FLEXERIL) 5 MG tablet 30 tablet 0     Sig: Take 1 tablet by mouth 3 times daily as needed for Muscle spasms

## 2018-08-24 ENCOUNTER — CARE COORDINATION (OUTPATIENT)
Dept: CARE COORDINATION | Age: 63
End: 2018-08-24

## 2018-08-24 NOTE — CARE COORDINATION
Ambulatory Care Coordination Note  8/27/2018  CM Risk Score: 8  Zen Mortality Risk Score:      ACC: Yudi Gilmore RN    Summary Note:   Patient Active Problem List    Diagnosis Date Noted    Cervical neck pain with evidence of disc disease 09/04/2013     Priority: High    CKD (chronic kidney disease) stage 2, GFR 60-89 ml/min 04/26/2018    Mixed hyperlipidemia 09/26/2017    Irritable bowel syndrome with both constipation and diarrhea 05/24/2017    Essential hypertension 08/25/2016    Type 2 diabetes mellitus with complications ([facility] 55/70/8489    Chronic atrial fibrillation ([facility] 04/06/2016    Iron deficiency anemia 04/06/2016    Electromechanical dissociation (EMD) ([facility] 04/06/2016    Asthma with COPD ([facility] 10/21/2015    Graves' disease 04/30/2014     Lab Results   Component Value Date    LABA1C 8.2 (H) 08/10/2018     Lab Results   Component Value Date     08/10/2018     Phone call to patient today to follow up with her care. States, Catalino Munson had 2 falls. \" Reports, Catalino Munson got in too big of a hurry and fell both in her apartment and outside\". States, Catalino Munson is going to therapy now, and is hoping this will hellp. \" Patient seen Dr. Coral Baker 08/17/18 in follow up from the falls\". Patient living in apartments; [facility] in [facility]. Discussed with patient and , Julissa Milton from [address] [place] [address], who was at patient's home when this nurse CC called today\". Reviewed resources with patient/. Patient \"brings the frank Borrero to appointments. \" Patient is bringing the frank Moe to therapy appointments. Patient reports, Catalino Munson feels therapy is going pretty good. \"   States, \"during therapy it feels good, and then she gets sore when therapy is over. \"     Discussed with patient taking her time; with slow position changes. \"   Patient informs, \"therapy was going to reach out to Dr. Coral Baker regarding patient getting a walker. \" Therapy also suggested a shower chair\".  Per Julissa Score, \"  Nell Moncada should cover both of these for patient\". Discussed with patient getting grab bars there at her apartment. Informs, \"the  there at her apartment complex, said he, \"would install these for her\". Per Naveen, \"passport came back today, and did another universal assessment for passport approval\". Naveen was there with patient during the assessment. \"   Informs, \"that the paperwork is being completed for passport/medicaid approval\". Patient did meet level of care as of today\". Naveen also discussed incontinence pads: the Diagnosis needs to actually say Ronnie Marte is the incontinence from that is causing the incontinence. \" Patient has contacted Dr. Yesenia Barrera office today and left voicemail message to request appointment to discuss shower chair, walker, and also incontinence pads\". Discussed with patient/ patient possibly going to [facility] the future? Patient feels she, \"is okay in her apartment currently; but is really hoping that once the home health aides start in there with her, that this will help her. CC Plan:   Plan at this time is to follow up with patient next week and make sure patient gets scheduled with Dr. Star Ramírez. Patient seen Dr. Star Ramírez 08/17/18:   Assessment & Plan           Diagnosis Orders   1. Pain of both hip joints   Order x-ray to rule out occult fractures. Ordered Naproxen for pain relief and Flexeril for muscle spasms. Follow-up in 2 weeks if needed. XR HIP RIGHT (2-3 VIEWS)     XR HIP LEFT (2-3 VIEWS)     naproxen (EC NAPROSYN) 500 MG EC tablet     cyclobenzaprine (FLEXERIL) 5 MG tablet   2. Fall, initial encounter         Patient going to physical therapy and was scheduled today. Care Coordination Interventions    Program Enrollment:  Complex Care  Referral from Primary Care Provider:  Yes  Suggested Interventions and Community Resources  Diabetes Education:   In Process (Comment: requesting order for Diabetic Ed)  Home Care Waiver: In Process (Comment: faxed passport referral)  Meals on Wheels: In Process (Comment: contacted [facility], they will start 04/16)  Medi Set or Pill Pack:  Completed (Comment: patient has exact [facility] pharmacy that prefills her medications and mails them to her)  Transportation Support: In Process         Goals Addressed             Most Recent     Conditions and Symptoms   Improving (8/24/2018)             I will schedule office visits, as directed by my provider. I will keep my appointment or reschedule if I have to cancel. I will notify my provider of any barriers to my plan of care. I will notify my provider of any symptoms that indicate a worsening of my condition. Patient to check blood sugars. Patient to follow up with Dr. Ofelia Garcia as directed. Barriers: financial, lack of support, overwhelmed by complexity of regimen and stress  Plan for overcoming my barriers: patient to continue to follow up with [facility] /; this nurse CC will continue to follow up. Confidence: 6/10  Anticipated Goal Completion Date: 11/15/18              Prior to Admission medications    Medication Sig Start Date End Date Taking?  Authorizing Provider   naproxen (EC NAPROSYN) 500 MG EC tablet Take 1 tablet by mouth 2 times daily (with meals) for 10 days 8/17/18 8/27/18  Ayah Bedolla MD   cyclobenzaprine (FLEXERIL) 5 MG tablet Take 1 tablet by mouth 3 times daily as needed for Muscle spasms 8/17/18 8/27/18  Ayah Bedolla MD   diclofenac (VOLTAREN) 50 MG EC tablet  5/8/18   Historical Provider, MD   polyethylene glycol (GLYCOLAX) powder Take 17 g by mouth once    Historical Provider, MD   famotidine (PEPCID) 20 MG tablet Take 1 tablet by mouth 2 times daily 7/26/18   Renetta Flower MD   Omega-3 Fatty Acids (FISH OIL) 1000 MG CAPS Take 2 capsules by mouth daily 7/24/18   Ayah Bedolla MD   vitamin D (CHOLECALCIFEROL) 1000 UNIT TABS tablet Take 1 tablet by mouth daily 7/24/18   Ayah Bedolla MD   gabapentin (NEURONTIN) 600 MG tablet Take 1 tablet by mouth 3 times daily for 30 days. .  Patient taking differently: Take 600 mg by mouth 2 times daily.  . 7/19/18 8/18/18  Ayah Bedolla MD   albuterol (PROVENTIL) (2.5 MG/3ML) 0.083% nebulizer solution Take 2.5 mg by nebulization 4 times daily    Historical Provider, MD   guaiFENesin (MUCINEX) 600 MG extended release tablet Take 1,200 mg by mouth 2 times daily    Historical Provider, MD   EASY TOUCH PEN NEEDLES 31G X 5 MM MISC USE AS DIRECTED DAILY 6/25/18   Ayah Bedolla MD   insulin glargine (BASAGLAR KWIKPEN) 100 UNIT/ML injection pen Inject 32 Units into the skin nightly 6/15/18   Ayah Bedolla MD   albuterol sulfate HFA (VENTOLIN HFA) 108 (90 Base) MCG/ACT inhaler Inhale 2 puffs into the lungs every 6 hours as needed for Wheezing 5/3/18   Ayah Bedolla MD   loratadine (CLARITIN) 10 MG tablet TAKE (1) TABLET BY MOUTH DAILY 4/26/18   Ayah Bedolla MD   Blood Glucose Monitoring Suppl SOUTH 1 Units by Does not apply route once for 1 dose PLease dispense a machine that is covered by her insurance  DX E11.9 4/26/18 4/26/18  Ayah Bedolla MD   apixaban (ELIQUIS) 5 MG TABS tablet Take 1 tablet by mouth 2 times daily 4/26/18   Ayah Bedolla MD   atorvastatin (LIPITOR) 40 MG tablet TAKE 1 TABLET BY MOUTH ONCE DAILY 3/29/18   Willem Cameron MD   metoprolol tartrate (LOPRESSOR) 25 MG tablet Take 1 tablet by mouth 2 times daily 3/2/18   Ayah Bedolla MD   amLODIPine (NORVASC) 10 MG tablet Take 1 tablet by mouth daily 3/1/18   Ayah Bedolla MD   ferrous sulfate 325 (65 Fe) MG tablet Take 1 tablet by mouth daily 2/1/18   Ayah Bedolla MD   methimazole (TAPAZOLE) 5 MG tablet TAKE ONE (1) TABLET BY MOUTH TWICE DAILY 2/1/18   Ayah Bedolla MD   Multiple Vitamin (MULTI-VITAMINS) TABS TAKE ONE (1) TABLET BY MOUTH ONCE DAILY 11/30/17   Ayah Bedolla MD   Incontinence Supply Disposable (POISE PAD) PADS Apply 1 Piece topically as needed

## 2018-08-27 ENCOUNTER — HOSPITAL ENCOUNTER (OUTPATIENT)
Dept: PHYSICAL THERAPY | Age: 63
Setting detail: THERAPIES SERIES
Discharge: HOME | End: 2018-08-27
Payer: MEDICAID

## 2018-08-27 ENCOUNTER — TELEPHONE (OUTPATIENT)
Dept: FAMILY MEDICINE CLINIC | Age: 63
End: 2018-08-27

## 2018-08-27 DIAGNOSIS — M25.552 PAIN OF BOTH HIP JOINTS: ICD-10-CM

## 2018-08-27 DIAGNOSIS — M50.90 CERVICAL NECK PAIN WITH EVIDENCE OF DISC DISEASE: ICD-10-CM

## 2018-08-27 DIAGNOSIS — M25.551 PAIN OF BOTH HIP JOINTS: ICD-10-CM

## 2018-08-27 DIAGNOSIS — E11.8 TYPE 2 DIABETES MELLITUS WITH COMPLICATION, UNSPECIFIED WHETHER LONG TERM INSULIN USE: Primary | ICD-10-CM

## 2018-08-27 PROCEDURE — 97140 MANUAL THERAPY 1/> REGIONS: CPT

## 2018-08-27 RX ORDER — NAPROXEN 500 MG/1
500 TABLET ORAL 2 TIMES DAILY WITH MEALS
Qty: 20 TABLET | Refills: 0 | Status: SHIPPED | OUTPATIENT
Start: 2018-08-27 | End: 2018-08-29 | Stop reason: SDUPTHER

## 2018-08-27 RX ORDER — CYCLOBENZAPRINE HCL 5 MG
5 TABLET ORAL 3 TIMES DAILY PRN
Qty: 30 TABLET | Refills: 0 | Status: SHIPPED | OUTPATIENT
Start: 2018-08-27 | End: 2018-08-29 | Stop reason: SDUPTHER

## 2018-08-27 ASSESSMENT — PAIN SCALES - GENERAL: PAINLEVEL_OUTOF10: 7

## 2018-08-27 NOTE — TELEPHONE ENCOUNTER
Pt requesting shower bench and Rolator walker to [facility]    Also Rx for her Lancets to [facility] Maintenance   Topic Date Due    Shingles Vaccine (1 of 2 - 2 Dose Series) 08/01/2006    Flu vaccine (1) 09/01/2018    Diabetic foot exam  04/26/2019    Diabetic microalbuminuria test  07/05/2019    Colon Cancer Screen FIT/FOBT  07/24/2019    A1C test (Diabetic or Prediabetic)  08/10/2019    Lipid screen  08/10/2019    TSH testing  08/10/2019    Potassium monitoring  08/10/2019    Creatinine monitoring  08/10/2019    Diabetic retinal exam  08/11/2019    Breast cancer screen  12/20/2019    Cervical cancer screen  11/21/2020    DTaP/Tdap/Td vaccine (2 - Td) 08/12/2026    Pneumococcal med risk  Completed    Hepatitis C screen  Addressed    HIV screen  Addressed             (applicable per patient's age: Cancer Screenings, Depression Screening, Fall Risk Screening, Immunizations)    Hemoglobin A1C (%)   Date Value   08/10/2018 8.2 (H)   03/14/2018 6.2 (H)   01/26/2018 6.7     Microalb/Crt.  Ratio (mcg/mg creat)   Date Value   05/24/2017 12     LDL Cholesterol (mg/dL)   Date Value   08/10/2018 53     AST (U/L)   Date Value   08/10/2018 19     ALT (U/L)   Date Value   08/10/2018 24     BUN (mg/dL)   Date Value   08/10/2018 21      (goal A1C is < 7)   (goal LDL is <100) need 30-50% reduction from baseline     BP Readings from Last 3 Encounters:   08/17/18 130/80   07/26/18 (!) 141/85   07/24/18 (!) 156/88    (goal /80)      All Future Testing planned in [facility]:  Lab Frequency Next Occurrence   TSH with Reflex Once 11/24/2018   CBC Auto Differential Once 11/24/2018   Comprehensive Metabolic Panel Once 44/18/5431   Vitamin D 25 Hydroxy Once 11/24/2018   Lipid Panel Once 11/24/2018   Magnesium Once 11/24/2018   EKG 12 Lead Once 11/24/2018   XR CHEST STANDARD (2 VW) Once 11/24/2018       Next Visit Date:  Future Appointments  Date Time Provider Diomedes Cardenas

## 2018-08-27 NOTE — PROGRESS NOTES
Phone: [address]      Fax: [phone]                            Outpatient Physical Therapy                                                                            Daily Note    Date: 2018  Patient Name: Neil Schofield        MRN: [id number]   ACCT#:  [de-identified]  : 1955  (59 y.o.)    Referring Practitioner: Dr. Nusrat Odom    Referral Date : 05/10/18    Diagnosis: Cervical pain  Treatment Diagnosis: Cervical pain    Onset Date: 05/10/18  PT Insurance Information: 27 CRS    Per Physician Order  Total # of Visits to Date: 7  No Show: 1  Canceled Appointment: 4    Pre-Treatment Pain:  7/10     Assessment  Assessment: Pt presents stating she fell  a few days ago with c/o lumbar and LE pain. She did go to the ER. she notes since then she has had headache d/t \"jarring\" herself. Per. P.T. consult held ex and applied heat followed by manual therapy to cervical region . Pt See's Dr next week. Plan to resume ex as zackary. Chart Reviewed: Yes    Plan  Plan: Continue with current plan    Exercises/Modalities/Manual:  See DocFlow Sheet      Goals  (Total # of Visits to Date: 7)   Short Term Goals - Time Frame for Short term goals: 6 visits  Short term goal 1: Educate on home stretching and postural strengthening exercises-met    Long Term Goals - Time Frame for Long term goals : 12 visits - expires 18  Long term goal 1: Decrease subjective c/o headaches by > 50% in severity and/or frequency  Long term goal 2: Decrease subjective cervical pain to 4/10 with daily activities and complete simple household tasks without rest break.   Long term goal 3: Cervical mobility WFL to complete driving and daily tasks    Post Treatment Pain:  6/10    Time In: 1120    Time Out : 1200  Timed Code Treatment Minutes: 30 Minutes  Total Treatment Time: 40 (Discussing current situation with patient and P.T.) Minutes    [facility] Therapy License Number: [id number]    Date: 2018

## 2018-08-28 RX ORDER — LANCETS 28 GAUGE
EACH MISCELLANEOUS
Qty: 100 EACH | Refills: 11 | Status: SHIPPED | OUTPATIENT
Start: 2018-08-28 | End: 2020-11-19

## 2018-08-29 ENCOUNTER — HOSPITAL ENCOUNTER (OUTPATIENT)
Dept: PHYSICAL THERAPY | Age: 63
Setting detail: THERAPIES SERIES
Discharge: HOME | End: 2018-08-29
Payer: MEDICAID

## 2018-08-29 DIAGNOSIS — I10 ESSENTIAL HYPERTENSION: ICD-10-CM

## 2018-08-29 DIAGNOSIS — M25.551 PAIN OF BOTH HIP JOINTS: ICD-10-CM

## 2018-08-29 DIAGNOSIS — M25.552 PAIN OF BOTH HIP JOINTS: ICD-10-CM

## 2018-08-29 PROCEDURE — 97110 THERAPEUTIC EXERCISES: CPT

## 2018-08-29 PROCEDURE — 97140 MANUAL THERAPY 1/> REGIONS: CPT

## 2018-08-29 ASSESSMENT — PAIN SCALES - GENERAL: PAINLEVEL_OUTOF10: 7

## 2018-08-30 ENCOUNTER — CARE COORDINATION (OUTPATIENT)
Dept: CARE COORDINATION | Age: 63
End: 2018-08-30

## 2018-08-30 RX ORDER — AMLODIPINE BESYLATE 10 MG/1
TABLET ORAL
Qty: 90 TABLET | Refills: 11 | Status: SHIPPED | OUTPATIENT
Start: 2018-08-30 | End: 2019-01-04 | Stop reason: SINTOL

## 2018-08-30 RX ORDER — CYCLOBENZAPRINE HCL 5 MG
TABLET ORAL
Qty: 30 TABLET | Refills: 11 | Status: SHIPPED | OUTPATIENT
Start: 2018-08-30 | End: 2019-04-12 | Stop reason: SDUPTHER

## 2018-08-31 ENCOUNTER — HOSPITAL ENCOUNTER (OUTPATIENT)
Dept: PHYSICAL THERAPY | Age: 63
Setting detail: THERAPIES SERIES
Discharge: HOME | End: 2018-08-31
Payer: MEDICAID

## 2018-08-31 PROCEDURE — 97110 THERAPEUTIC EXERCISES: CPT

## 2018-08-31 PROCEDURE — 97140 MANUAL THERAPY 1/> REGIONS: CPT

## 2018-09-06 ENCOUNTER — HOSPITAL ENCOUNTER (OUTPATIENT)
Dept: PHYSICAL THERAPY | Age: 63
Setting detail: THERAPIES SERIES
Discharge: HOME | End: 2018-09-06
Payer: MEDICAID

## 2018-09-06 ENCOUNTER — CARE COORDINATION (OUTPATIENT)
Dept: CARE COORDINATION | Age: 63
End: 2018-09-06

## 2018-09-06 PROCEDURE — 97110 THERAPEUTIC EXERCISES: CPT

## 2018-09-06 PROCEDURE — 97140 MANUAL THERAPY 1/> REGIONS: CPT

## 2018-09-06 ASSESSMENT — PAIN SCALES - GENERAL: PAINLEVEL_OUTOF10: 4

## 2018-09-06 NOTE — CARE COORDINATION
Planned follow up phone call today to patient. Patient actually scheduled for 145 for physical therapy. Will plan to attempt to contact patient again tomorrow. Wanting to follow up on blood sugars. Lab Results   Component Value Date    LABA1C 8.2 (H) 08/10/2018     Lab Results   Component Value Date     08/10/2018     Wanting to follow up on patient getting DME equipment; shower chair/walker.

## 2018-09-06 NOTE — PROGRESS NOTES
Phone: [address]      Fax: [phone]                            Outpatient Physical Therapy                                                                            Daily Note    Date: 2018  Patient Name: Brunilda Bautista        MRN: [id number]   ACCT#:  [de-identified]  : 1955  (59 y.o.)    Referring Practitioner: Dr. Mane Souza    Referral Date : 05/10/18    Diagnosis: Cervical pain  Treatment Diagnosis: Cervical pain    Onset Date: 05/10/18  PT Insurance Information: 27 CRS    Per Physician Order  Total # of Visits to Date: 10  No Show: 1  Canceled Appointment: 4    Pre-Treatment Pain:  10     Assessment  Assessment: Continlues to report  no headaches. Reviewed proper cervical posture. Tightness/tenderness along C7 parapsinal region. Plan to continue x 2 visits and if patient remains headache-free will plan discharge with HEP. Chart Reviewed: Yes    Plan  Plan: Continue with current plan    Exercises/Modalities/Manual:  See DocFlow Sheet    Education: Proper postural alignment          Goals  (Total # of Visits to Date: 10)   Short Term Goals - Time Frame for Short term goals: 6 visits  Short term goal 1: Educate on home stretching and postural strengthening exercises-met                Long Term Goals - Time Frame for Long term goals : 12 visits - expires 18  Long term goal 1: Decrease subjective c/o headaches by > 50% in severity and/or frequency  Long term goal 2: Decrease subjective cervical pain to 4/10 with daily activities and complete simple household tasks without rest break.   Long term goal 3: Cervical mobility WFL to complete driving and daily tasks          Post Treatment Pain:  10    Time In: 1345    Time Out : 1430        Timed Code Treatment Minutes: 40 Minutes  Total Treatment Time: [address] [facility] Number: [id number]    Date: 2018

## 2018-09-13 ENCOUNTER — HOSPITAL ENCOUNTER (OUTPATIENT)
Dept: PHYSICAL THERAPY | Age: 63
Setting detail: THERAPIES SERIES
Discharge: HOME | End: 2018-09-13
Payer: MEDICAID

## 2018-09-13 ENCOUNTER — CARE COORDINATION (OUTPATIENT)
Dept: CARE COORDINATION | Age: 63
End: 2018-09-13

## 2018-09-13 PROCEDURE — 97140 MANUAL THERAPY 1/> REGIONS: CPT

## 2018-09-13 PROCEDURE — 97110 THERAPEUTIC EXERCISES: CPT

## 2018-09-13 ASSESSMENT — PAIN SCALES - GENERAL: PAINLEVEL_OUTOF10: 2

## 2018-09-13 NOTE — CARE COORDINATION
Ambulatory Care Coordination Note  9/13/2018  CM Risk Score: 8  Zen Mortality Risk Score:      ACC: Shonna Flores RN    Summary Note:   Patient Active Problem List    Diagnosis Date Noted    Cervical neck pain with evidence of disc disease 09/04/2013     Priority: High    CKD (chronic kidney disease) stage 2, GFR 60-89 ml/min 04/26/2018    Mixed hyperlipidemia 09/26/2017    Irritable bowel syndrome with both constipation and diarrhea 05/24/2017    Essential hypertension 08/25/2016    Type 2 diabetes mellitus with complications ([facility] 28/36/2404    Chronic atrial fibrillation ([facility] 04/06/2016    Iron deficiency anemia 04/06/2016    Electromechanical dissociation (EMD) ([facility] 04/06/2016    Asthma with COPD ([facility] 10/21/2015    Graves' disease 04/30/2014     Lab Results   Component Value Date    LABA1C 8.2 (H) 08/10/2018     Lab Results   Component Value Date     08/10/2018     Phone call attempt to patient today. Phone rings, and automated message, \"party not available\". Will attempt to reach patient again tomorrow, and if no answer, may try to reach patient's caresource . Care Coordination Interventions    Program Enrollment:  Complex Care  Referral from Primary Care Provider:  Yes  Suggested Interventions and Community Resources  Diabetes Education: In Process (Comment: requesting order for Diabetic Ed)  Home Care Waiver: In Process (Comment: faxed passport referral)  Meals on Wheels: In Process (Comment: contacted [facility], they will start 04/16)  Medi Set or Pill Pack:  Completed (Comment: patient has exact [facility] pharmacy that prefills her medications and mails them to her)  Transportation Support: In Process           Prior to Admission medications    Medication Sig Start Date End Date Taking?  Authorizing Provider   VENTOLIN  (90 Base) MCG/ACT inhaler INHALE 2 PUFFS BY MOUTH EVERY 6 HOURS AS NEEDED FOR WHEEZING 8/30/18   [place] MD   metoprolol tartrate (LOPRESSOR) 25 MG tablet TAKE 1 TABLET BY MOUTH 2 TIMES DAILY 8/30/18   Haydee Carranza MD   amLODIPine (NORVASC) 10 MG tablet TAKE 1 TABLET BY MOUTH ONCE DAILY 8/30/18   Haydee Carranza MD   NAPROXEN 500 MG EC tablet TAKE ONE (1) TABLET BY MOUTH TWICE DAILY WITH MEALS FOR 10 DAYS 8/30/18   Haydee Carranza MD   cyclobenzaprine (FLEXERIL) 5 MG tablet TAKE ONE (1) TABLET BY MOUTH THREE TIMES DAILY AS NEEDED FOR MUSCLE SPASMS 8/30/18   Haydee Carranza MD   FREESTYLE LANCETS [facility] TEST BLOOD SUGAR THREE TIMES A DAY 8/28/18   Haydee Carranza MD   polyethylene glycol (GLYCOLAX) powder Take 17 g by mouth once    Historical Provider, MD   famotidine (PEPCID) 20 MG tablet Take 1 tablet by mouth 2 times daily 7/26/18   Deana Acevedo MD   Omega-3 Fatty Acids (FISH OIL) 1000 MG CAPS Take 2 capsules by mouth daily 7/24/18   Haydee Carranza MD   vitamin D (CHOLECALCIFEROL) 1000 UNIT TABS tablet Take 1 tablet by mouth daily 7/24/18   Haydee Carranza MD   gabapentin (NEURONTIN) 600 MG tablet Take 1 tablet by mouth 3 times daily for 30 days. .  Patient taking differently: Take 600 mg by mouth 2 times daily.  . 7/19/18 8/18/18  Haydee Carranza MD   albuterol (PROVENTIL) (2.5 MG/3ML) 0.083% nebulizer solution Take 2.5 mg by nebulization 4 times daily    Historical Provider, MD   guaiFENesin (MUCINEX) 600 MG extended release tablet Take 1,200 mg by mouth 2 times daily    Historical Provider, MD   EASY TOUCH PEN NEEDLES 31G X 5 MM MISC USE AS DIRECTED DAILY 6/25/18   Haydee Carranza MD   insulin glargine (BASAGLAR KWIKPEN) 100 UNIT/ML injection pen Inject 32 Units into the skin nightly 6/15/18   Haydee Carranza MD   loratadine (CLARITIN) 10 MG tablet TAKE (1) TABLET BY MOUTH DAILY 4/26/18   Haydee Carranza MD   Blood Glucose Monitoring Suppl SOUTH 1 Units by Does not apply route once for 1 dose PLease dispense a machine that is covered by her insurance  DX E11.9 4/26/18 4/26/18  Haydee Carranza MD   apixaban [facility]) 5 MG TABS tablet Take 1 tablet by mouth 2 times daily 4/26/18   Macy Croft MD   atorvastatin (LIPITOR) 40 MG tablet TAKE 1 TABLET BY MOUTH ONCE DAILY 3/29/18   Ash Ann MD   ferrous sulfate 325 (65 Fe) MG tablet Take 1 tablet by mouth daily 2/1/18   Macy Croft MD   methimazole (TAPAZOLE) 5 MG tablet TAKE ONE (1) TABLET BY MOUTH TWICE DAILY 2/1/18   Macy Croft MD   Multiple Vitamin (MULTI-VITAMINS) TABS TAKE ONE (1) TABLET BY MOUTH ONCE DAILY 11/30/17   Macy Croft MD   Incontinence Supply Disposable (POISE PAD) PADS Apply 1 Piece topically as needed (incontinence) 11/21/17   Nick Crawford MD   oxybutynin (DITROPAN XL) 10 MG extended release tablet Take 1 tablet by mouth daily 11/21/17   Nick Crawford MD   hydrochlorothiazide (HYDRODIURIL) 50 MG tablet TAKE (1) TABLET BY MOUTH ONCE DAILY 9/29/17   Kailey Maki MD   Blood Glucose Monitoring Suppl OSUTH 1 each by Does not apply route 3 times daily 6/6/17   Carri Boo MD   Alcohol Swabs (EASY TOUCH ALCOHOL PREP MEDIUM) 70 % PADS USE FOUR TIMES DAILY 3/29/17   Carri Boo MD   FREESTYLE LITE strip TEST BLOOD SUGAR THREE TIMES A DAY 3/29/17   Carri Boo MD   Blood Pressure Monitoring (B-D ASSURE BPM/AUTO ARM CUFF) MISC 1 Device by Does not apply route daily as needed (htn) 8/25/16   Carri Boo MD       Future Appointments  Date Time Provider Diomedes Cardenas   9/19/2018 2:10  [address]  None   9/19/2018 2:45 PM Selina Morales, PT [facility] PT [place]   9/21/2018 1:40 PM [facility] MOBILE VAN UNIT MTHZ MOBILE  None   9/21/2018 2:15 PM Daniela Tran [address] PT [place]   10/22/2018 3:00 PM MD Pedro Lynn [facility]   11/27/2018 10:40 AM MD Melanie Cid [facility]

## 2018-09-19 ENCOUNTER — HOSPITAL ENCOUNTER (OUTPATIENT)
Dept: PHYSICAL THERAPY | Age: 63
Setting detail: THERAPIES SERIES
Discharge: HOME | End: 2018-09-19
Payer: MEDICAID

## 2018-09-19 PROCEDURE — 97110 THERAPEUTIC EXERCISES: CPT

## 2018-09-19 PROCEDURE — 97140 MANUAL THERAPY 1/> REGIONS: CPT

## 2018-09-19 RX ORDER — INSULIN GLARGINE 100 [IU]/ML
32 INJECTION, SOLUTION SUBCUTANEOUS NIGHTLY
Qty: 15 ML | Refills: 10 | Status: SHIPPED | OUTPATIENT
Start: 2018-09-19 | End: 2018-11-06 | Stop reason: DRUGHIGH

## 2018-09-19 NOTE — PROGRESS NOTES
Phone: Cory Chambers      Fax: [phone]                            Outpatient Physical Therapy                                                                            Daily Note    Date: 2018  Patient Name: Dwight Gill        MRN: [id number]   ACCT#:  [de-identified]  : 1955  (59 y.o.)    Referring Practitioner: Dr. Grant Pretty    Referral Date : 05/10/18    Diagnosis: Cervical pain  Treatment Diagnosis: Cervical pain    Onset Date: 05/10/18  PT Insurance Information: [id number] [address] [place]    Per Physician Order  Total # of Visits to Date: 12  No Show: 1  Canceled Appointment: 4    Pre-Treatment Pain: 2-3/10     Assessment: Patient has completed 12 treatment sessions consisting of exercise for stretching of the cervical region and postural strengthening as well as manual therapy to address soft tissue tightness. She has noted a signfican reduction in headaches however continues to present with limitations with cervical mobility primarily with right rotation and extension. She denies signficant UE radicular symptoms. Patient has been educated on a home program and discussed thoroughly the importance of proper postural alignment.  Patient has completed her scheduled sessions and will now be discharged with a home program.     Chart Reviewed: Yes    Plan  Plan: Discharge    Exercises/Modalities/Manual:  See DocFlow Sheet    Education: Reviewed stretches/postural alignment and tband strengthening ex          Goals  (Total # of Visits to Date: 15)   Short Term Goals - Time Frame for Short term goals: 6 visits  Short term goal 1: Educate on home stretching and postural strengthening exercises-met                Long Term Goals - Time Frame for Long term goals : 12 visits - expires 18  Long term goal 1: Decrease subjective c/o headaches by > 50% in severity and/or frequency- MET  Long term goal 2: Decrease subjective cervical pain to 4/10 with daily activities and complete simple

## 2018-09-20 ENCOUNTER — CARE COORDINATION (OUTPATIENT)
Dept: CARE COORDINATION | Age: 63
End: 2018-09-20

## 2018-09-20 RX ORDER — HYDROCHLOROTHIAZIDE 50 MG/1
TABLET ORAL
Qty: 30 TABLET | Refills: 10 | Status: SHIPPED | OUTPATIENT
Start: 2018-09-20 | End: 2019-03-25 | Stop reason: SDUPTHER

## 2018-09-20 NOTE — CARE COORDINATION
PHone call attempt today too patient to follow up with her care. Automated message, \"not able to reach customer, as they are unavailable. \" [facility] , and she is out of the office until 09/28/18. Will attempt to reach patient again next week. Wanting to follow up on blood sugars. Lab Results   Component Value Date    LABA1C 8.2 (H) 08/10/2018     Lab Results   Component Value Date     08/10/2018     Wanting to follow up on home needs. Walker and bath/shower seat were ordered; wanting to follow up and make sure these were picked up. CC Plan: Will attempt to reach patient again by phone next week.

## 2018-09-21 ENCOUNTER — HOSPITAL ENCOUNTER (OUTPATIENT)
Dept: PHYSICAL THERAPY | Age: 63
Setting detail: THERAPIES SERIES
End: 2018-09-21
Payer: MEDICAID

## 2018-09-25 ENCOUNTER — CARE COORDINATION (OUTPATIENT)
Dept: CARE COORDINATION | Age: 63
End: 2018-09-25

## 2018-09-27 ENCOUNTER — CARE COORDINATION (OUTPATIENT)
Dept: CARE COORDINATION | Age: 63
End: 2018-09-27

## 2018-09-27 NOTE — CARE COORDINATION
(NEURONTIN) 600 MG tablet Take 1 tablet by mouth 3 times daily for 30 days. .  Patient taking differently: Take 600 mg by mouth 2 times daily.  . 7/19/18 8/18/18  Ruslan Almanza MD   albuterol (PROVENTIL) (2.5 MG/3ML) 0.083% nebulizer solution Take 2.5 mg by nebulization 4 times daily    Historical Provider, MD   guaiFENesin (MUCINEX) 600 MG extended release tablet Take 1,200 mg by mouth 2 times daily    Historical Provider, MD   EASY TOUCH PEN NEEDLES 31G X 5 MM MISC USE AS DIRECTED DAILY 6/25/18   Ruslan Almanza MD   loratadine (CLARITIN) 10 MG tablet TAKE (1) TABLET BY MOUTH DAILY 4/26/18   Ruslan Almanza MD   Blood Glucose Monitoring Suppl SOUTH 1 Units by Does not apply route once for 1 dose PLease dispense a machine that is covered by her insurance  DX E11.9 4/26/18 4/26/18  Ruslan Almanza MD   apixaban (ELIQUIS) 5 MG TABS tablet Take 1 tablet by mouth 2 times daily 4/26/18   Ruslan Almanza MD   atorvastatin (LIPITOR) 40 MG tablet TAKE 1 TABLET BY MOUTH ONCE DAILY 3/29/18   Alli Narvaez MD   ferrous sulfate 325 (65 Fe) MG tablet Take 1 tablet by mouth daily 2/1/18   Ruslan Almanza MD   methimazole (TAPAZOLE) 5 MG tablet TAKE ONE (1) TABLET BY MOUTH TWICE DAILY 2/1/18   Ruslan Almanza MD   Multiple Vitamin (MULTI-VITAMINS) TABS TAKE ONE (1) TABLET BY MOUTH ONCE DAILY 11/30/17   Ruslan Almanza MD   Incontinence Supply Disposable (POISE PAD) PADS Apply 1 Piece topically as needed (incontinence) 11/21/17   Franki Barbosa MD   oxybutynin (DITROPAN XL) 10 MG extended release tablet Take 1 tablet by mouth daily 11/21/17   Franki Barbosa MD   Blood Glucose Monitoring Suppl SOUTH 1 each by Does not apply route 3 times daily 6/6/17   Reagan Cabrera MD   Alcohol Swabs (EASY TOUCH ALCOHOL PREP MEDIUM) 70 % PADS USE FOUR TIMES DAILY 3/29/17   Reagan Cabrera MD   FREESTYLE LITE strip TEST BLOOD SUGAR THREE TIMES A DAY 3/29/17   Reagan Cabrera MD   Blood Pressure Monitoring (B-D ASSURE BPM/AUTO ARM

## 2018-09-28 ENCOUNTER — CARE COORDINATION (OUTPATIENT)
Dept: CARE COORDINATION | Age: 63
End: 2018-09-28

## 2018-09-28 NOTE — CARE COORDINATION
Ambulatory Care Coordination Note  9/28/2018  CM Risk Score: 8  Zen Mortality Risk Score:      ACC: Yudi Gilmore RN    Summary Note: PHone call back from [place] with [employer] Whe[employer] to inform that patient is now on the meals on wheels list and will start receiving these hopefully next Wednesday. This will not be affected by her frozen meals she will be getting through [employer]. CC Plan: Will follow up with patient later next week. Also awaiting update back form deborah . Patient's passport service also are to be starting as well. Care Coordination Interventions    Program Enrollment:  Complex Care  Referral from Primary Care Provider:  Yes  Suggested Interventions and Community Resources  Diabetes Education: In Process (Comment: requesting order for Diabetic Ed)  Home Care Waiver: In Process (Comment: faxed passport referral)  Meals on Wheels: In Process (Comment: contacted [facility], they will start 04/16)  Medi Set or Pill Pack:  Completed (Comment: patient has exact [facility] pharmacy that prefills her medications and mails them to her)  Transportation Support: In Process             Prior to Admission medications    Medication Sig Start Date End Date Taking?  Authorizing Provider   hydrochlorothiazide (HYDRODIURIL) 50 MG tablet TAKE 1 TABLET BY MOUTH ONCE DAILY 9/20/18   MD FOREIGN MishraAGLAR KWIKPEN 100 UNIT/ML injection pen INJECT 32 UNITS INTO THE SKIN NIGHTLY 9/19/18   Joel Marcelino MD   VENTOLIN  (90 Base) MCG/ACT inhaler INHALE 2 PUFFS BY MOUTH EVERY 6 HOURS AS NEEDED FOR WHEEZING 8/30/18   Joel Marcelino MD   metoprolol tartrate (LOPRESSOR) 25 MG tablet TAKE 1 TABLET BY MOUTH 2 TIMES DAILY 8/30/18   Joel Marcelino MD   amLODIPine (NORVASC) 10 MG tablet TAKE 1 TABLET BY MOUTH ONCE DAILY 8/30/18   Joel Marcelino MD   NAPROXEN 500 MG EC tablet TAKE ONE (1) TABLET BY MOUTH TWICE DAILY WITH MEALS FOR 10 DAYS 8/30/18   Joel Marcelino MD cyclobenzaprine (FLEXERIL) 5 MG tablet TAKE ONE (1) TABLET BY MOUTH THREE TIMES DAILY AS NEEDED FOR MUSCLE SPASMS 8/30/18   Alayna Ness MD   FREESTYLE LANCETS [facility] TEST BLOOD SUGAR THREE TIMES A DAY 8/28/18   Alayna Ness MD   polyethylene glycol (GLYCOLAX) powder Take 17 g by mouth once    Historical Provider, MD   famotidine (PEPCID) 20 MG tablet Take 1 tablet by mouth 2 times daily 7/26/18   Salinas Acosta MD   Omega-3 Fatty Acids (FISH OIL) 1000 MG CAPS Take 2 capsules by mouth daily 7/24/18   Alayna Ness MD   vitamin D (CHOLECALCIFEROL) 1000 UNIT TABS tablet Take 1 tablet by mouth daily 7/24/18   Alayna Ness MD   gabapentin (NEURONTIN) 600 MG tablet Take 1 tablet by mouth 3 times daily for 30 days. .  Patient taking differently: Take 600 mg by mouth 2 times daily.  . 7/19/18 8/18/18  Alayna Ness MD   albuterol (PROVENTIL) (2.5 MG/3ML) 0.083% nebulizer solution Take 2.5 mg by nebulization 4 times daily    Historical Provider, MD   guaiFENesin (MUCINEX) 600 MG extended release tablet Take 1,200 mg by mouth 2 times daily    Historical Provider, MD   EASY TOUCH PEN NEEDLES 31G X 5 MM MISC USE AS DIRECTED DAILY 6/25/18   Alayna Ness MD   loratadine (CLARITIN) 10 MG tablet TAKE (1) TABLET BY MOUTH DAILY 4/26/18   Alayna Ness MD   Blood Glucose Monitoring Suppl SOUTH 1 Units by Does not apply route once for 1 dose PLease dispense a machine that is covered by her insurance  DX E11.9 4/26/18 4/26/18  Alayna Ness MD   apixaban (ELIQUIS) 5 MG TABS tablet Take 1 tablet by mouth 2 times daily 4/26/18   Alayna Ness MD   atorvastatin (LIPITOR) 40 MG tablet TAKE 1 TABLET BY MOUTH ONCE DAILY 3/29/18   Naomi Lobato MD   ferrous sulfate 325 (65 Fe) MG tablet Take 1 tablet by mouth daily 2/1/18   Alayna Ness MD   methimazole (TAPAZOLE) 5 MG tablet TAKE ONE (1) TABLET BY MOUTH TWICE DAILY 2/1/18   Alayna Ness MD   Multiple Vitamin (MULTI-VITAMINS) TABS TAKE ONE (1) TABLET BY MOUTH

## 2018-10-02 ENCOUNTER — CARE COORDINATION (OUTPATIENT)
Dept: CARE COORDINATION | Age: 63
End: 2018-10-02

## 2018-10-02 NOTE — CARE COORDINATION
Voicemail message received from Lj Camarena with [address] [place] [address] () informed that patient is approved for passport, and they were to be getting services started in the home. Lj Camarena did inform, \"that caresource would be pulling out of patient's case, did not have an exact date\". This nurse CC attempted to call Lj Camarena back and had to leave voicemail back for her. CC Plan: Will await call back from Lj Camarena with patient update. Patient referred to dietician to work with her with her diet with limited resources and needing to try to follow carbohydrate controlled diet with her diabetes.   Lab Results   Component Value Date    LABA1C 8.2 (H) 08/10/2018     Lab Results   Component Value Date     08/10/2018

## 2018-10-02 NOTE — CARE COORDINATION
[facility] Justin Shock are still going to be in patient's case for now. [facility] will update this nurse CC with new information or will notify when they will be pulling out of patient's case as they find out. CC Plan:    Will await update from [place] and [facility] case management team

## 2018-10-03 ENCOUNTER — CARE COORDINATION (OUTPATIENT)
Dept: CARE COORDINATION | Age: 63
End: 2018-10-03

## 2018-10-10 ENCOUNTER — CARE COORDINATION (OUTPATIENT)
Dept: CARE COORDINATION | Age: 63
End: 2018-10-10

## 2018-10-10 NOTE — CARE COORDINATION
TABLET BY MOUTH ONCE DAILY 11/30/17   Everett Ordonez MD   Incontinence Supply Disposable (POISE PAD) PADS Apply 1 Piece topically as needed (incontinence) 11/21/17   Damian Lim MD   oxybutynin (DITROPAN XL) 10 MG extended release tablet Take 1 tablet by mouth daily 11/21/17   Damian Lim MD   Blood Glucose Monitoring Suppl SOUTH 1 each by Does not apply route 3 times daily 6/6/17   Mami Carrington MD   Alcohol Swabs (EASY TOUCH ALCOHOL PREP MEDIUM) 70 % PADS USE FOUR TIMES DAILY 3/29/17   Mami Carrington MD   FREESTYLE LITE strip TEST BLOOD SUGAR THREE TIMES A DAY 3/29/17   Mami Carrington MD   Blood Pressure Monitoring (B-D ASSURE BPM/AUTO ARM CUFF) MISC 1 Device by Does not apply route daily as needed (htn) 8/25/16   Mami Carrington MD       Future Appointments  Date Time Provider Diomedes Cardenas   10/22/2018 3:00 PM MD Hong Hannah [facility]   11/27/2018 10:40 AM MD Madeline Bernstein [facility]

## 2018-10-11 ENCOUNTER — CARE COORDINATION (OUTPATIENT)
Dept: CARE COORDINATION | Age: 63
End: 2018-10-11

## 2018-10-11 NOTE — CARE COORDINATION
daily 7/26/18   Juan Montaño MD   Omega-3 Fatty Acids (FISH OIL) 1000 MG CAPS Take 2 capsules by mouth daily 7/24/18   Heather Christopher MD   vitamin D (CHOLECALCIFEROL) 1000 UNIT TABS tablet Take 1 tablet by mouth daily 7/24/18   Heather Christopher MD   gabapentin (NEURONTIN) 600 MG tablet Take 1 tablet by mouth 3 times daily for 30 days. .  Patient taking differently: Take 600 mg by mouth 2 times daily.  . 7/19/18 8/18/18  Heather Christopher MD   albuterol (PROVENTIL) (2.5 MG/3ML) 0.083% nebulizer solution Take 2.5 mg by nebulization 4 times daily    Historical Provider, MD   guaiFENesin (MUCINEX) 600 MG extended release tablet Take 1,200 mg by mouth 2 times daily    Historical Provider, MD   EASY TOUCH PEN NEEDLES 31G X 5 MM MISC USE AS DIRECTED DAILY 6/25/18   Heather Christopher MD   loratadine (CLARITIN) 10 MG tablet TAKE (1) TABLET BY MOUTH DAILY 4/26/18   Heather Christopher MD   Blood Glucose Monitoring Suppl SOUTH 1 Units by Does not apply route once for 1 dose PLease dispense a machine that is covered by her insurance  DX E11.9 4/26/18 4/26/18  Heather Christopher MD   apixaban (ELIQUIS) 5 MG TABS tablet Take 1 tablet by mouth 2 times daily 4/26/18   Heather Christopher MD   atorvastatin (LIPITOR) 40 MG tablet TAKE 1 TABLET BY MOUTH ONCE DAILY 3/29/18   Leda Larkin MD   ferrous sulfate 325 (65 Fe) MG tablet Take 1 tablet by mouth daily 2/1/18   Heather Christopher MD   methimazole (TAPAZOLE) 5 MG tablet TAKE ONE (1) TABLET BY MOUTH TWICE DAILY 2/1/18   Heather Christopher MD   Multiple Vitamin (MULTI-VITAMINS) TABS TAKE ONE (1) TABLET BY MOUTH ONCE DAILY 11/30/17   Heather Christopher MD   Incontinence Supply Disposable (POISE PAD) PADS Apply 1 Piece topically as needed (incontinence) 11/21/17   Clyde Lal MD   oxybutynin (DITROPAN XL) 10 MG extended release tablet Take 1 tablet by mouth daily 11/21/17   Clyde Lal MD   Blood Glucose Monitoring Suppl SOUTH 1 each by Does not apply route 3 times daily 6/6/17   Christy Slater

## 2018-10-18 ENCOUNTER — HOSPITAL ENCOUNTER (OUTPATIENT)
Dept: NUCLEAR MEDICINE | Age: 63
Discharge: HOME | End: 2018-10-20
Payer: MEDICAID

## 2018-10-18 DIAGNOSIS — E05.00 GRAVES DISEASE: ICD-10-CM

## 2018-10-18 PROCEDURE — 3430000000 HC RX DIAGNOSTIC RADIOPHARMACEUTICAL: Performed by: INTERNAL MEDICINE

## 2018-10-18 PROCEDURE — A9516 IODINE I-123 SOD IODIDE MIC: HCPCS | Performed by: INTERNAL MEDICINE

## 2018-10-18 RX ADMIN — SODIUM IODIDE I 123 253 MICRO CURIE: 100 CAPSULE, GELATIN COATED ORAL at 08:20

## 2018-10-19 ENCOUNTER — HOSPITAL ENCOUNTER (OUTPATIENT)
Dept: NUCLEAR MEDICINE | Age: 63
Discharge: HOME | End: 2018-10-21
Payer: MEDICAID

## 2018-10-19 ENCOUNTER — CARE COORDINATION (OUTPATIENT)
Dept: CARE COORDINATION | Age: 63
End: 2018-10-19

## 2018-10-19 PROCEDURE — 78014 THYROID IMAGING W/BLOOD FLOW: CPT

## 2018-10-22 ENCOUNTER — CARE COORDINATION (OUTPATIENT)
Dept: CARE COORDINATION | Age: 63
End: 2018-10-22

## 2018-10-22 ENCOUNTER — TELEPHONE (OUTPATIENT)
Dept: CARDIOLOGY CLINIC | Age: 63
End: 2018-10-22

## 2018-10-22 DIAGNOSIS — I10 ESSENTIAL HYPERTENSION: Primary | Chronic | ICD-10-CM

## 2018-10-22 DIAGNOSIS — E55.9 VITAMIN D DEFICIENCY: ICD-10-CM

## 2018-10-22 DIAGNOSIS — N18.2 CKD (CHRONIC KIDNEY DISEASE) STAGE 2, GFR 60-89 ML/MIN: ICD-10-CM

## 2018-10-22 DIAGNOSIS — I48.20 CHRONIC ATRIAL FIBRILLATION (HCC): ICD-10-CM

## 2018-10-22 DIAGNOSIS — E11.8 TYPE 2 DIABETES MELLITUS WITH COMPLICATION, WITHOUT LONG-TERM CURRENT USE OF INSULIN (HCC): ICD-10-CM

## 2018-10-22 DIAGNOSIS — E78.2 MIXED HYPERLIPIDEMIA: ICD-10-CM

## 2018-10-22 NOTE — CARE COORDINATION
Ambulatory Care Coordination Note  10/23/2018  CM Risk Score: 8  Zen Mortality Risk Score:      ACC: Sam Banuelos RN    Summary Note:     Patient Active Problem List    Diagnosis Date Noted    Cervical neck pain with evidence of disc disease 09/04/2013     Priority: High    CKD (chronic kidney disease) stage 2, GFR 60-89 ml/min 04/26/2018    Mixed hyperlipidemia 09/26/2017    Irritable bowel syndrome with both constipation and diarrhea 05/24/2017    Essential hypertension 08/25/2016    Type 2 diabetes mellitus with complications ([facility] 75/91/7764    Chronic atrial fibrillation ([facility] 04/06/2016    Iron deficiency anemia 04/06/2016    [facility]) [facility] 04/06/2016    Asthma with COPD ([facility] 10/21/2015    Graves' disease 04/30/2014     Lab Results   Component Value Date    LABA1C 8.2 (H) 08/10/2018     Lab Results   Component Value Date     08/10/2018     Phone call from patient today requesting assistance from this nurse CC. Patient to get an MRI on 10/30/18 for Dr. Sal Colunga for [facility]; patient does have transportation arranged for this. Patient can get transportation through [employer], as well as frank Becker. Patient is aware of these transportation options; but did not get her transportation arranged for Dr. Shruti Kilgore appointment today, therefore she had to cancel this appointment. Patient now rescheduled for December, and is aware of the importance of keeping this appointment, and if transportation is an issue; patient to let this nurse CC know. Patient will need labs and EKG prior to this appointment. Patient is aware of this  Phone call to frank Becker to schedule patient the Rhonda Crespo for this day. Patient does have new tomi, and she is so excited about him. The kittens name is \"Whitman\".      Patient to also see Dr. Shaka Dorseyal, endocrinologist. Patient is not sure when her appointment is/   This nurse CC called Dr. Alexandria Rizvi office today [phone]    This nurse CC to call 1Dr. Taz, 2)Call Concepción mckeerebecca , 3)juan carlos ornelas ext [phone] ([facility]). Phone call to [facility] with passport regarding patient; wanting to verify patient's services. Question can nursing come in 1 times weekly to fill medplanner for patient? Patient needs the support, and would benefit from this. Patient would also benefit from home health aide for personal care 3 times a week; but does feel she is okay and would not need the home health for homemaker services at this time. Left voicemail and requested phone call back. Patient reports, Anton Escamilla took all of her letters to the job/family services to see if she was still going to get the $750 on First of the month\". Informs, Anton Escamilla knows she will be getting the $871 from now on the 3rd of the month, but she was questioning if she would be getting the $750 still on November 1st? Questioned patient if she was aware of who her  is at [facility] through [place]? Patient states, \"it is Candace\". Patient to call Thanh Trevino and inquire and report back to this nurse CC on this. Will refer patient to /CHF on Ambulation Care Coordination team. Patient could use assistance with resources in the community; including assistance in her monthly check. Phone call to Dr. Shahrzad Benson, endocrinology office to question when is patient scheduled for appointment? Spoke to Staff who informs, \"patient seen Dr. Shahrzad Benson on 09/26/18; and is to get a Thyroid scan done, and then Dr. Shahrzad Benson will see patient following this. Did question, \"can this be done here at [facility] which would be more convenient for patient\"  Thyroid uptake Scan order to be faxed to this nurse [facility] at [phone]. This nurse CC to assist patient in getting this scheduled at [facility].     Phone call to Ervin Lew  to see if they are involved in patient's case any longer?  Left vm message and

## 2018-10-23 ENCOUNTER — CARE COORDINATION (OUTPATIENT)
Dept: CARE COORDINATION | Age: 63
End: 2018-10-23

## 2018-10-23 DIAGNOSIS — L50.9 URTICARIA: ICD-10-CM

## 2018-10-23 RX ORDER — DIPHENOXYLATE HYDROCHLORIDE AND ATROPINE SULFATE 2.5; .025 MG/1; MG/1
TABLET ORAL
Qty: 90 TABLET | Refills: 1 | Status: SHIPPED | OUTPATIENT
Start: 2018-10-23 | End: 2019-04-22 | Stop reason: SDUPTHER

## 2018-10-23 RX ORDER — LORATADINE 10 MG/1
TABLET ORAL
Qty: 90 TABLET | Refills: 1 | Status: SHIPPED | OUTPATIENT
Start: 2018-10-23 | End: 2019-04-23 | Stop reason: SDUPTHER

## 2018-10-23 NOTE — TELEPHONE ENCOUNTER
Comprehensive Metabolic Panel Once 32/83/5387   TSH with Reflex Once 12/11/2018   EKG 12 Lead Once 12/11/2018   Magnesium Once 12/11/2018   Hemoglobin A1C Once 10/22/2018       Next Visit Date:  Future Appointments  Date Time Provider Diomedes Crespoi   11/27/2018 10:10 AM [facility] MOBILE VAN UNIT MTHZ MOBILE  None   11/27/2018 10:40 AM Pricila Dias [facility]   12/11/2018 9:00 AM [facility] MOBILE VAN UNIT MTHZ MOBILE  None   12/11/2018 10:00 AM MD [facility]            Patient Active Problem List:     Cervical neck pain with evidence of disc disease     Graves' disease     Asthma with COPD ([facility]     Essential hypertension     Chronic atrial fibrillation (HCC)     Type 2 diabetes mellitus with complications (HCC)     Iron deficiency anemia     Electromechanical dissociation (EMD) (HCC)     Irritable bowel syndrome with both constipation and diarrhea     Mixed hyperlipidemia     CKD (chronic kidney disease) stage 2, GFR 60-89 ml/min

## 2018-10-24 ENCOUNTER — CARE COORDINATION (OUTPATIENT)
Dept: CARE COORDINATION | Age: 63
End: 2018-10-24

## 2018-10-24 NOTE — CARE COORDINATION
called and spoke to Chema oL at [employer]. Per [facility], there is an annual earring limit when an individual collects SS earlier than 77years old. [facility] reports that the limit is 17,040 dollars a year.  explained that Jessica Oscar only makes $840 a month which equals 10,080 a year. Chema Lo was limited to what she information she could provide  due to Jessica Oscar not being present. Dominic recommended having Jessica Oscar call [phone] and setting up an appointment via phone or face-to-face to review her case.  also attempted to reach Rahul Altamirano at the [facility] however had to leave a message.

## 2018-10-24 NOTE — CARE COORDINATION
Destin Chavez is a 59-year-old female who is a patient of Dr. Britney Shearer. Favian Logan sent  a referral to assist Demario Asencio with getting organized, follow up with [facility], and community resources.  called and spoke to Demario Asencio. Demario Asencio reports that she is overwhelmed with being behind on bills. Demario Asencio reports the water company is stating she is behind and owes them over $100 dollars when her water bill is usually around $30 dollars. Demario Asencio also reports that the [employer] is saying she is behind and owes more money this month as well. Demario Asencio reports that she has been paying her bills and is not sure why everyone is saying she owes them more money. Demario Asencio reports that she receives about $800 dollars a month from her [facility]. Demario Asencio stated that she applied for her social security however they told her Vinnie Barnes makes too much to receive SSI. Kaylee Bland stated I just dont understand all of this. Demario Asencio reports that she used to have a [facility]  who helped her however no longer has one due to [employer]. Demario Asencio reports her  would help her keep all her appointments straight and help schedule transportation. Demario Asencio reports that she relies on the [employer], [facility], and the [employer] for transportation.    Plan:    will contact Social Security     will contact [facility]     will contact [place]

## 2018-10-24 NOTE — CARE COORDINATION
11/27/2018 10:40 AM MD Lance Segovia [facility]   12/11/2018 9:00 AM [facility] MOBILE VAN UNIT [facility] MOBILE  None   12/11/2018 10:00 AM MD Fadumo Hyman [facility]

## 2018-10-25 NOTE — CARE COORDINATION
Yashira Wetzel called and left message with  explaining that she already talked to [place] about getting a script from the PCP and sending it to a Medicaid certified [facility] that can come in and setup a pill dispenser for [employer]. Yashira Wetzel reports that she did tell [employer] she is waiting to hear back from referring agencies that can accommodate her needs on the days and times she requested. Yashira Wetzel did state she was a little concerned about how confused [employer] was. Yashira Wetzel reports that [employer] was under the impression that Yashira Wetzel was supposed to come over to her place the other day and assist with her bath, which was not the case.      will follow up with Lena Espinosa to inquire if we can get an order for the medication management    will call [employer] and provide her with SS number to call about receiving her social security       called [employer] however she reports that she is at \[facility]\"" and will call  tomorrow

## 2018-10-26 ENCOUNTER — CARE COORDINATION (OUTPATIENT)
Dept: CARE COORDINATION | Age: 63
End: 2018-10-26

## 2018-10-30 ENCOUNTER — CARE COORDINATION (OUTPATIENT)
Dept: CARE COORDINATION | Age: 63
End: 2018-10-30

## 2018-10-30 NOTE — CARE COORDINATION
attempted to contact Loraine Sam however her phone is still stating \"the party you are trying to reach is unavailable. \"   will contact [address] to inquire if she has spoken with Loraine Sam. Plan:   Follow up with [address]. Possible Safety Check ?

## 2018-10-30 NOTE — CARE COORDINATION
Historical Provider, MD   guaiFENesin (MUCINEX) 600 MG extended release tablet Take 1,200 mg by mouth 2 times daily    Historical Provider, MD   EASY TOUCH PEN NEEDLES 31G X 5 MM MISC USE AS DIRECTED DAILY 6/25/18   Zackary Martines MD   Blood Glucose Monitoring Suppl SOUTH 1 Units by Does not apply route once for 1 dose PLease dispense a machine that is covered by her insurance  DX E11.9 4/26/18 4/26/18  Zackary Martines MD   apixaban (ELIQUIS) 5 MG TABS tablet Take 1 tablet by mouth 2 times daily 4/26/18   Zackary Martines MD   atorvastatin (LIPITOR) 40 MG tablet TAKE 1 TABLET BY MOUTH ONCE DAILY 3/29/18   Latesha Tian MD   ferrous sulfate 325 (65 Fe) MG tablet Take 1 tablet by mouth daily 2/1/18   Zackary Martines MD   methimazole (TAPAZOLE) 5 MG tablet TAKE ONE (1) TABLET BY MOUTH TWICE DAILY 2/1/18   Zackary Martines MD   Incontinence Supply Disposable (POISE PAD) PADS Apply 1 Piece topically as needed (incontinence) 11/21/17   Hubert Lott MD   oxybutynin (DITROPAN XL) 10 MG extended release tablet Take 1 tablet by mouth daily 11/21/17   Hubert Lott MD   Blood Glucose Monitoring Suppl SOUTH 1 each by Does not apply route 3 times daily 6/6/17   Brook Strickland MD   Alcohol Swabs (EASY TOUCH ALCOHOL PREP MEDIUM) 70 % PADS USE FOUR TIMES DAILY 3/29/17   Brook Strickland MD   FREESTYLE LITE strip TEST BLOOD SUGAR THREE TIMES A DAY 3/29/17   Brook Strickland MD   Blood Pressure Monitoring (B-D ASSURE BPM/AUTO ARM CUFF) MISC 1 Device by Does not apply route daily as needed (htn) 8/25/16   Brook Strickland MD       Future Appointments  Date Time Provider Diomedes Cardenas   11/27/2018 10:10 AM [facility] MOBILE VAN UNIT [facility] MOBILE  None   11/27/2018 10:40 AM MD Galen Hawkins [facility]   12/11/2018 9:00 AM [facility] MOBILE VAN UNIT [facility] MOBILE  None   12/11/2018 10:00 AM MD Yvon Kaufman [facility]

## 2018-11-06 ENCOUNTER — CARE COORDINATION (OUTPATIENT)
Dept: CARE COORDINATION | Age: 63
End: 2018-11-06

## 2018-11-06 RX ORDER — GABAPENTIN 600 MG/1
600 TABLET ORAL 2 TIMES DAILY
COMMUNITY
End: 2019-01-14 | Stop reason: ALTCHOICE

## 2018-11-06 NOTE — CARE COORDINATION
Dr. Chela Valdez:     Patient no longer taking Pepcid. This was just given from the ER when she was in in July with REctal bleeding; she has no further problems with rectal bleeding. Patient's bowels moving normal.     Okay to remove Pepcid from medication list?       Ambulatory Care Coordination Note  11/6/2018  CM Risk Score: 8  Zen Mortality Risk Score:      ACC: Salinas Eubanks RN    Summary Note:   Patient Active Problem List    Diagnosis Date Noted    Cervical neck pain with evidence of disc disease 09/04/2013     Priority: High    CKD (chronic kidney disease) stage 2, GFR 60-89 ml/min 04/26/2018    Mixed hyperlipidemia 09/26/2017    Irritable bowel syndrome with both constipation and diarrhea 05/24/2017    Essential hypertension 08/25/2016    Type 2 diabetes mellitus with complications ([facility] 15/08/4932    Chronic atrial fibrillation ([facility] 04/06/2016    Iron deficiency anemia 04/06/2016    Electromechanical dissociation (EMD) ([facility] 04/06/2016    Asthma with COPD ([facility] 10/21/2015    Graves' disease 04/30/2014     Lab Results   Component Value Date    LABA1C 8.2 (H) 08/10/2018     Lab Results   Component Value Date     08/10/2018     Informed patient A1C blood test measures the percentage of glycated hemoglobin cells in your blood, which is a good indicator of the average blood glucose level in your blood for the past 2-3 months. You should have your A1C checked every 3-6 months, with the goal of an A1C below 7. By maintaining an A1C below 7, the patient has a lower risk of diabetic complications including Cardiovascular disease, nerve damage, kidney damage,  and eye damage. Patient verbalizes understanding. Reviewed with patient A1C goal of getting A1C down around 7. Reviewed with patient goal of blood sugar being 130 and under prior to meals, and 180 and under 2 hours after meals. Patient verbalizes understanding.      Blood Sugars Reported:   11/06: 156  11/05: am 152, bedtime gabapentin (NEURONTIN) 600 MG tablet Take 600 mg by mouth 2 times daily. Per Dr. Bhavani Chou, pain management .    Yes Historical Provider, MD   Insulin Glargine (BASAGLAR KWIKPEN SC) Inject 25 Units into the skin nightly Per Dr. Bear Guo   Yes Historical Provider, MD   Multiple Vitamin (MULTI-VITAMINS) TABS TAKE ONE (1) TABLET BY MOUTH ONCE DAILY 10/23/18  Yes Adarsh Hassan MD   loratadine (CLARITIN) 10 MG tablet TAKE (1) TABLET BY MOUTH DAILY 10/23/18  Yes Adarsh Hassan MD   Dulaglutide (TRULICITY) 9.40 VW/4.6FC SOPN Inject 0.75 mg into the skin once a week Per Dr. Sarah Woodson, endocrinologist   Yes Historical Provider, MD   hydrochlorothiazide (HYDRODIURIL) 50 MG tablet TAKE 1 TABLET BY MOUTH ONCE DAILY 9/20/18  Yes Esau Krishna MD   VENTOLIN  (90 Base) MCG/ACT inhaler INHALE 2 PUFFS BY MOUTH EVERY 6 HOURS AS NEEDED FOR WHEEZING 8/30/18  Yes Adarsh Hassan MD   metoprolol tartrate (LOPRESSOR) 25 MG tablet TAKE 1 TABLET BY MOUTH 2 TIMES DAILY 8/30/18  Yes Adarsh Hassan MD   amLODIPine (NORVASC) 10 MG tablet TAKE 1 TABLET BY MOUTH ONCE DAILY 8/30/18  Yes Adarsh Hassan MD   NAPROXEN 500 MG EC tablet TAKE ONE (1) TABLET BY MOUTH TWICE DAILY WITH MEALS FOR 10 DAYS 8/30/18  Yes Adarsh Hassan MD   cyclobenzaprine (FLEXERIL) 5 MG tablet TAKE ONE (1) TABLET BY MOUTH THREE TIMES DAILY AS NEEDED FOR MUSCLE SPASMS 8/30/18  Yes Adarsh Hassan MD   FREESTYLE LANCETS MISC TEST BLOOD SUGAR THREE TIMES A DAY 8/28/18  Yes Adarsh Hassan MD   Omega-3 Fatty Acids (FISH OIL) 1000 MG CAPS Take 2 capsules by mouth daily 7/24/18  Yes Adarsh Hassan MD   vitamin D (CHOLECALCIFEROL) 1000 UNIT TABS tablet Take 1 tablet by mouth daily 7/24/18  Yes Adarsh Hassan MD   albuterol (PROVENTIL) (2.5 MG/3ML) 0.083% nebulizer solution Take 2.5 mg by nebulization 4 times daily   Yes Historical Provider, MD   EASY TOUCH PEN NEEDLES 31G X 5 MM MISC USE AS DIRECTED DAILY 6/25/18  Yes Adarsh Hassan MD   [facility]) 5

## 2018-11-07 ENCOUNTER — CARE COORDINATION (OUTPATIENT)
Dept: CARE COORDINATION | Age: 63
End: 2018-11-07

## 2018-11-07 NOTE — CARE COORDINATION
called and left a message with Jon @ [address] [place] [address].  left contact information and requested a call back.

## 2018-11-09 ENCOUNTER — CARE COORDINATION (OUTPATIENT)
Dept: CARE COORDINATION | Age: 63
End: 2018-11-09

## 2018-11-09 NOTE — CARE COORDINATION
Nutrition Care Coordinator Follow-Up visit:    Food Recall: eating 3 meals/day now    Activity Level:  Sedentary:  Lightly Active: X  Moderately Active:  Very Active:    Adult BMI:  Underweight (below 18.5)  Normal Weight (18.5-24.9)  Overweight (25-29. 9) X  Obese (30-39. 9)  Morbidly Obese (>40)    Weight Change: none noted    Plan:  Plan was established with patient:  Increase dietary fiber by consuming whole grains, fruits and vegetables: X  Limit dietary cholesterol to >200mg/day: Increase water intake:  Avoid added sugar: X  Avoid sweetened beverages: X  Choose lean meats:      Monitoring: Will monitor weight:  Will monitor adherence to meal plan: Will monitor adherence to exercise plan: Will monitor HGA1c: X    Handouts Provided :  Low Carb snacking: X  Carb counting /individual meal plan: X  Portion Control: X  Food Labels: X  Physical Activity:  Low Fat/Cholesterol:  Hypo/Hyperglycemia:  Calorie Controlled Meal Plan:    Goals: Increase water consumption to 8oz. 6-8 times daily:  Manage blood sugars by consuming 3 meals spaced every 4-5 hours with 2-3 snacks daily: Discussed  Increase fiber and decrease fat intake by consuming 1-2 fruit servings and 2-3 vegetable servings per day. Increase physical activity by:  Consume less than 2,000mg of sodium/day  Avoid consumption of sweetened beverages and added sugar by reading food labels: Discussed  Monitor blood sugars by using meter to check blood glucose before morning meal and 2 hours after a meal daily: BS-  per patient  Decrease risk of coronary heart disease by consuming fish that contains omega-3 fatty acids at least twice a week, avoiding partially hydrogenated oil/trans fats and limiting saturated fat intake by reading food labels:    Patient goals set: 1. Reviewed reading food labels and measuring out portions to carb count. Patient relays she is doing better with this, is limiting her portions of carrizales beans and rice now.  Goal is

## 2018-11-14 ENCOUNTER — CARE COORDINATION (OUTPATIENT)
Dept: CARE COORDINATION | Age: 63
End: 2018-11-14

## 2018-11-15 ENCOUNTER — CARE COORDINATION (OUTPATIENT)
Dept: CARE COORDINATION | Age: 63
End: 2018-11-15

## 2018-11-15 NOTE — CARE COORDINATION
called and spoke to [place]. [place] reports \"I think I have the flu. \"  [place] reports that she feels \"awful\".  suggested [place] to call Dr. Michael Evans office. [place] reports that she is thinking about it. [place] reports that she has not been able to keep anything down. [place] reports that her blood sugars are still around 143.  inquired if [place] was able to find out who was using her card. [place] reports that she did call her bank and have her card canceled and a new card mailed to her. [place] reports that she still needs to make a police report however has not felt up to it.  reminded [place] the importance of making a police report. [place] thanked  for General Sanchez out for [employer]. [place] denied any other concerns or needs at this time. Plan:    will inform Vinicio Toledo of Dianna's blood sugar and \"flu systems\".

## 2018-11-16 ENCOUNTER — CARE COORDINATION (OUTPATIENT)
Dept: CARE COORDINATION | Age: 63
End: 2018-11-16

## 2018-11-16 NOTE — CARE COORDINATION
Historical Provider, MD   Insulin Glargine (BASAGLAR KWIKPEN SC) Inject 25 Units into the skin nightly Per Dr. Tyesha Vidal Provider, MD   Multiple Vitamin (MULTI-VITAMINS) TABS TAKE ONE (1) TABLET BY MOUTH ONCE DAILY 10/23/18   Juany Valdez MD   loratadine (CLARITIN) 10 MG tablet TAKE (1) TABLET BY MOUTH DAILY 10/23/18   Juany Valdez MD   Dulaglutide (TRULICITY) 2.06 CS/4.9EQ SOPN Inject 0.75 mg into the skin once a week Per Dr. Shahrzad Benson, endocrinologist    Historical Provider, MD   hydrochlorothiazide (HYDRODIURIL) 50 MG tablet TAKE 1 TABLET BY MOUTH ONCE DAILY 9/20/18   Jessica Dudley MD   VENTOLIN  (90 Base) MCG/ACT inhaler INHALE 2 PUFFS BY MOUTH EVERY 6 HOURS AS NEEDED FOR WHEEZING 8/30/18   Juany Valdez MD   metoprolol tartrate (LOPRESSOR) 25 MG tablet TAKE 1 TABLET BY MOUTH 2 TIMES DAILY 8/30/18   Juany Valdez MD   amLODIPine (NORVASC) 10 MG tablet TAKE 1 TABLET BY MOUTH ONCE DAILY 8/30/18   Juany Valdez MD   NAPROXEN 500 MG EC tablet TAKE ONE (1) TABLET BY MOUTH TWICE DAILY WITH MEALS FOR 10 DAYS 8/30/18   Juany Valdez MD   cyclobenzaprine (FLEXERIL) 5 MG tablet TAKE ONE (1) TABLET BY MOUTH THREE TIMES DAILY AS NEEDED FOR MUSCLE SPASMS 8/30/18   MD BONI Grey [facility] TEST BLOOD SUGAR THREE TIMES A DAY 8/28/18   Juany Valdez MD   famotidine (PEPCID) 20 MG tablet Take 1 tablet by mouth 2 times daily 7/26/18   Patric Barragan MD   Omega-3 Fatty Acids (FISH OIL) 1000 MG CAPS Take 2 capsules by mouth daily 7/24/18   Juany Valdez MD   vitamin D (CHOLECALCIFEROL) 1000 UNIT TABS tablet Take 1 tablet by mouth daily 7/24/18   Juany Valdez MD   albuterol (PROVENTIL) (2.5 MG/3ML) 0.083% nebulizer solution Take 2.5 mg by nebulization 4 times daily    Historical Provider, MD   EASY TOUCH PEN NEEDLES 31G X 5 MM MISC USE AS DIRECTED DAILY 6/25/18   Juany Valdez MD   Blood Glucose Monitoring Suppl SOUTH 1 Units by Does not apply route once for 1 dose

## 2018-11-19 ENCOUNTER — CARE COORDINATION (OUTPATIENT)
Dept: CARE COORDINATION | Age: 63
End: 2018-11-19

## 2018-11-19 NOTE — CARE COORDINATION
Ambulatory Care Coordination Note  2018  CM Risk Score: 8  Zen Mortality Risk Score:      ACC: Tavares Lacy RN    Summary Note:     Phone call from patient today, tearful regarding granddaughter, \"dying over the weekend, as well as her aunt also passing away\". Informs, \"that she, \"doesn't have the money to travel to the , questioning if there is any help? \"  CC Plan: This nurse CC forwarding this case to  to review to see if there are any options available to help her? CC Plan: This nurse CC will attempt follow up to patient again this week, follow up on resolving transportation issues, follow up on supporting patient in her family deaths. Care Coordination Interventions    Program Enrollment:  Complex Care  Referral from Primary Care Provider:  Yes  Suggested Interventions and Community Resources  Diabetes Education: In Process (Comment: requesting order for Diabetic Ed)  Home Care Waiver: In Process (Comment: faxed passport referral)  Meals on Wheels: In Process (Comment: contacted [facility], they will start )  Medi Set or Pill Pack:  Completed (Comment: [facility] her medications and mails them to her)  Registered Dietician:  Completed  Social Work:  In Process  Transportation Support: In Process           Prior to Admission medications    Medication Sig Start Date End Date Taking? Authorizing Provider   gabapentin (NEURONTIN) 600 MG tablet Take 600 mg by mouth 2 times daily. Per Dr. Karissa Maravilla, pain management .     Historical Provider, MD   Insulin Glargine (BASAGLAR KWIKPEN SC) Inject 25 Units into the skin nightly Per Dr. Candelaria Dillard Provider, MD   Multiple Vitamin (MULTI-VITAMINS) TABS TAKE ONE (1) TABLET BY MOUTH ONCE DAILY 10/23/18   Britney Shearer MD   loratadine (CLARITIN) 10 MG tablet TAKE (1) TABLET BY MOUTH DAILY 10/23/18   Britney Shearer MD   Dulaglutide (TRULICITY) 8.31 DP/5.5UW SOPN Inject 0.75 mg

## 2018-11-21 ENCOUNTER — CARE COORDINATION (OUTPATIENT)
Dept: CARE COORDINATION | Age: 63
End: 2018-11-21

## 2018-11-21 NOTE — CARE COORDINATION
Received phone call today from Vincenzo Greene, patient's passport , to inform that patient did, \"take off for [place] for the next couple weeks to go to her granddaughter's \". Vincenzo Greene just wanted to let us know. Future Appointments  Date Time Provider Diomedes Crespoi   2018 1:10 PM [facility] MOBILE VAN UNIT [facility] MOBILE  None   2018 1:25 PM MD Nettie Rivas [facility]   2018 9:00 AM [facility] MOBILE VAN UNIT [facility] MOBILE  None   2018 10:00 AM MD Ashley Dukes [facility]     Patient is on the schedule with Dr. Gordo Carrington for next week. Phone call to Dr. Gordo Carrington office to cancel this appointment for next week, left vm and notified that patient is out of the state for a family emergency. \    PHone call to patient to let her know we are th inking of her. Patient informs, \"that she couldn't go to [place], but she is out of town for her aunt's , but will be returning n next week. Reminded patient of Dr. Gordo Carrington appointment for 18, informed patient that this nurse CC already called to cancel the appointment with her out of town. Patient would like to keep this appointment. States, 'she will get home in time to go to this. Patient denies any other new concerns at this time. Sympathy given to patient for multiple deaths in her family    Phone call back to Dr. Gordo Carrington office, left additional voicemail message to inform that patient will plan to come to appointment with Dr. Gordo Carrington on 18. CC Plan: Will attempt follow up to patient following Dr. Gordo Carrington appointment. Follow up on blood sugars, follow up on breathing, follow up on patient emotionally with loosing multiple family members this week.

## 2018-11-27 ENCOUNTER — CARE COORDINATION (OUTPATIENT)
Dept: CARE COORDINATION | Age: 63
End: 2018-11-27

## 2018-12-03 ENCOUNTER — CARE COORDINATION (OUTPATIENT)
Dept: CARE COORDINATION | Age: 63
End: 2018-12-03

## 2018-12-04 NOTE — CARE COORDINATION
called and spoke with [place]. [place] reports that she is doing ok and has been emotional since her granddaughters death. [place] reports that she was 26years old.  empathized with [place].  encouraged [place] to talk to her PCP if she continues to feel down or depressed. [place] reports she will. [place] denied any concerns or needs. [place] did state she was going to call Maisha Echeverria at the [facility] and request not to have the frozen meals delivered to her house. [place] reports that she does not like them.  inquired about the box of food with bread, milk, and fruit. [place] reports she does enjoy getting those items.  encouraged [place] to keep those items for delivery if possible. Plan:    will follow up with [place] in 3 weeks.

## 2018-12-07 ENCOUNTER — CARE COORDINATION (OUTPATIENT)
Dept: CARE COORDINATION | Age: 63
End: 2018-12-07

## 2018-12-07 NOTE — CARE COORDINATION
Nutrition Care Coordinator Follow-Up visit:    Food Recall: eating 2 meals/d    Activity Level:  Sedentary:  Lightly Active: X  Moderately Active:  Very Active:    Adult BMI:  Underweight (below 18.5)  Normal Weight (18.5-24.9)  Overweight (25-29. 9) X  Obese (30-39. 9)  Morbidly Obese (>40)    Weight Change: no recent change noted    Plan:  Plan was established with patient:  Increase dietary fiber by consuming whole grains, fruits and vegetables: X  Limit dietary cholesterol to >200mg/day: Increase water intake:  Avoid added sugar: X  Avoid sweetened beverages: X  Choose lean meats:      Monitoring: Will monitor weight:  Will monitor adherence to meal plan: Will monitor adherence to exercise plan: Will monitor HGA1c: X    Handouts Provided :  Low Carb snacking: X  Carb counting /individual meal plan:  Portion Control: X  Food Labels: X  Physical Activity:  Low Fat/Cholesterol:  Hypo/Hyperglycemia:  Calorie Controlled Meal Plan:    Goals: Increase water consumption to 8oz. 6-8 times daily:  Manage blood sugars by consuming 3 meals spaced every 4-5 hours with 2-3 snacks daily: Reviewed  Increase fiber and decrease fat intake by consuming 1-2 fruit servings and 2-3 vegetable servings per day. Increase physical activity by:  Consume less than 2,000mg of sodium/day  Avoid consumption of sweetened beverages and added sugar by reading food labels: Reviewed  Monitor blood sugars by using meter to check blood glucose before morning meal and 2 hours after a meal daily: BS  per patient  Decrease risk of coronary heart disease by consuming fish that contains omega-3 fatty acids at least twice a week, avoiding partially hydrogenated oil/trans fats and limiting saturated fat intake by reading food labels: Discussed    Patient goals set:  1. Reviewed reading food labels focusing on looking at serving size and grams of carbohydrate/serving. Goal is for patient to limit intake to 45gms of carb/meal, 15gms/snack.

## 2018-12-10 ENCOUNTER — CARE COORDINATION (OUTPATIENT)
Dept: CARE COORDINATION | Age: 63
End: 2018-12-10

## 2018-12-11 ENCOUNTER — OFFICE VISIT (OUTPATIENT)
Dept: OBGYN CLINIC | Age: 63
End: 2018-12-11
Payer: COMMERCIAL

## 2018-12-11 ENCOUNTER — HOSPITAL ENCOUNTER (OUTPATIENT)
Age: 63
Setting detail: SPECIMEN
Discharge: HOME | End: 2018-12-11
Payer: COMMERCIAL

## 2018-12-11 VITALS
SYSTOLIC BLOOD PRESSURE: 134 MMHG | HEIGHT: 64 IN | WEIGHT: 157 LBS | BODY MASS INDEX: 26.8 KG/M2 | DIASTOLIC BLOOD PRESSURE: 72 MMHG

## 2018-12-11 DIAGNOSIS — Z01.419 WOMEN'S ANNUAL ROUTINE GYNECOLOGICAL EXAMINATION: ICD-10-CM

## 2018-12-11 DIAGNOSIS — R32 INCONTINENCE IN FEMALE: ICD-10-CM

## 2018-12-11 DIAGNOSIS — Z12.39 SCREENING FOR BREAST CANCER: ICD-10-CM

## 2018-12-11 DIAGNOSIS — Z01.419 WOMEN'S ANNUAL ROUTINE GYNECOLOGICAL EXAMINATION: Primary | ICD-10-CM

## 2018-12-11 PROCEDURE — 99396 PREV VISIT EST AGE 40-64: CPT | Performed by: OBSTETRICS & GYNECOLOGY

## 2018-12-11 PROCEDURE — G0145 SCR C/V CYTO,THINLAYER,RESCR: HCPCS

## 2018-12-11 PROCEDURE — G8484 FLU IMMUNIZE NO ADMIN: HCPCS | Performed by: OBSTETRICS & GYNECOLOGY

## 2018-12-11 RX ORDER — OXYBUTYNIN CHLORIDE 10 MG/1
10 TABLET, EXTENDED RELEASE ORAL DAILY
Qty: 30 TABLET | Refills: 12 | Status: SHIPPED | OUTPATIENT
Start: 2018-12-11 | End: 2019-12-17

## 2018-12-11 NOTE — PROGRESS NOTES
famotidine. I am also having her maintain her B-D ASSURE BPM/AUTO ARM CUFF, EASY TOUCH ALCOHOL PREP MEDIUM, FREESTYLE LITE, blood glucose monitor kit and supplies, POISE PAD, ferrous sulfate, methimazole, atorvastatin, blood glucose monitor kit and supplies, apixaban, EASY TOUCH PEN NEEDLES, albuterol, fish oil, vitamin D, FREESTYLE LANCETS, VENTOLIN HFA, metoprolol tartrate, amLODIPine, naproxen, cyclobenzaprine, hydrochlorothiazide, Dulaglutide, MULTI-VITAMINS, loratadine, gabapentin, Insulin Glargine (BASAGLAR KWIKPEN SC), and oxybutynin.

## 2018-12-13 DIAGNOSIS — E11.42 DIABETIC PERIPHERAL NEUROPATHY (HCC): ICD-10-CM

## 2018-12-13 RX ORDER — GABAPENTIN 600 MG/1
TABLET ORAL
Qty: 180 TABLET | Refills: 3 | Status: SHIPPED | OUTPATIENT
Start: 2018-12-13 | End: 2019-01-15 | Stop reason: ALTCHOICE

## 2018-12-17 ENCOUNTER — HOSPITAL ENCOUNTER (OUTPATIENT)
Age: 63
Discharge: HOME | End: 2018-12-17
Payer: COMMERCIAL

## 2018-12-17 DIAGNOSIS — E55.9 VITAMIN D DEFICIENCY: ICD-10-CM

## 2018-12-17 DIAGNOSIS — E11.8 TYPE 2 DIABETES MELLITUS WITH COMPLICATION, WITHOUT LONG-TERM CURRENT USE OF INSULIN (HCC): ICD-10-CM

## 2018-12-17 DIAGNOSIS — N18.2 CKD (CHRONIC KIDNEY DISEASE) STAGE 2, GFR 60-89 ML/MIN: ICD-10-CM

## 2018-12-17 DIAGNOSIS — E78.2 MIXED HYPERLIPIDEMIA: ICD-10-CM

## 2018-12-17 DIAGNOSIS — I48.20 CHRONIC ATRIAL FIBRILLATION (HCC): ICD-10-CM

## 2018-12-17 DIAGNOSIS — I10 ESSENTIAL HYPERTENSION: Chronic | ICD-10-CM

## 2018-12-17 LAB
ABSOLUTE EOS #: 0.4 K/UL (ref 0–0.4)
ABSOLUTE IMMATURE GRANULOCYTE: ABNORMAL K/UL (ref 0–0.3)
ABSOLUTE LYMPH #: 3.2 K/UL (ref 1–4.8)
ABSOLUTE MONO #: 0.6 K/UL (ref 0–1)
ALBUMIN SERPL-MCNC: 4.6 G/DL (ref 3.5–5.2)
ALBUMIN/GLOBULIN RATIO: ABNORMAL (ref 1–2.5)
ALP BLD-CCNC: 113 U/L (ref 35–104)
ALT SERPL-CCNC: 34 U/L (ref 5–33)
ANION GAP SERPL CALCULATED.3IONS-SCNC: 13 MMOL/L (ref 9–17)
AST SERPL-CCNC: 26 U/L
BASOPHILS # BLD: 1 % (ref 0–2)
BASOPHILS ABSOLUTE: 0.1 K/UL (ref 0–0.2)
BILIRUB SERPL-MCNC: 0.38 MG/DL (ref 0.3–1.2)
BUN BLDV-MCNC: 21 MG/DL (ref 8–23)
BUN/CREAT BLD: 20 (ref 9–20)
CALCIUM SERPL-MCNC: 10.5 MG/DL (ref 8.6–10.4)
CHLORIDE BLD-SCNC: 100 MMOL/L (ref 98–107)
CHOLESTEROL/HDL RATIO: 3.1
CHOLESTEROL: 109 MG/DL
CO2: 29 MMOL/L (ref 20–31)
CREAT SERPL-MCNC: 1.05 MG/DL (ref 0.5–0.9)
DIFFERENTIAL TYPE: YES
EKG ATRIAL RATE: 227 BPM
EKG Q-T INTERVAL: 384 MS
EKG QRS DURATION: 76 MS
EKG QTC CALCULATION (BAZETT): 453 MS
EKG R AXIS: 67 DEGREES
EKG T AXIS: 68 DEGREES
EKG VENTRICULAR RATE: 84 BPM
EOSINOPHILS RELATIVE PERCENT: 5 % (ref 0–5)
ESTIMATED AVERAGE GLUCOSE: 143 MG/DL
GFR AFRICAN AMERICAN: >60 ML/MIN
GFR NON-AFRICAN AMERICAN: 53 ML/MIN
GFR SERPL CREATININE-BSD FRML MDRD: ABNORMAL ML/MIN/{1.73_M2}
GFR SERPL CREATININE-BSD FRML MDRD: ABNORMAL ML/MIN/{1.73_M2}
GLUCOSE BLD-MCNC: 157 MG/DL (ref 70–99)
HBA1C MFR BLD: 6.6 % (ref 4.8–5.9)
HCT VFR BLD CALC: 41.3 % (ref 36–46)
HDLC SERPL-MCNC: 35 MG/DL
HEMOGLOBIN: 13.8 G/DL (ref 12–16)
IMMATURE GRANULOCYTES: ABNORMAL %
LDL CHOLESTEROL: 36 MG/DL (ref 0–130)
LYMPHOCYTES # BLD: 43 % (ref 15–40)
MAGNESIUM: 2.1 MG/DL (ref 1.6–2.6)
MCH RBC QN AUTO: 29 PG (ref 26–34)
MCHC RBC AUTO-ENTMCNC: 33.3 G/DL (ref 31–37)
MCV RBC AUTO: 87 FL (ref 80–100)
MONOCYTES # BLD: 8 % (ref 4–8)
NRBC AUTOMATED: ABNORMAL PER 100 WBC
PATIENT FASTING?: YES
PDW BLD-RTO: 12.8 % (ref 12.1–15.2)
PLATELET # BLD: 275 K/UL (ref 140–450)
PLATELET ESTIMATE: ABNORMAL
PMV BLD AUTO: ABNORMAL FL (ref 6–12)
POTASSIUM SERPL-SCNC: 3.2 MMOL/L (ref 3.7–5.3)
RBC # BLD: 4.75 M/UL (ref 4–5.2)
RBC # BLD: ABNORMAL 10*6/UL
SEG NEUTROPHILS: 43 % (ref 47–75)
SEGMENTED NEUTROPHILS ABSOLUTE COUNT: 3.3 K/UL (ref 2.5–7)
SODIUM BLD-SCNC: 142 MMOL/L (ref 135–144)
THYROXINE, FREE: 4.49 NG/DL (ref 0.93–1.7)
TOTAL PROTEIN: 7.9 G/DL (ref 6.4–8.3)
TRIGL SERPL-MCNC: 188 MG/DL
TSH SERPL DL<=0.05 MIU/L-ACNC: <0.01 MIU/L (ref 0.3–5)
VITAMIN D 25-HYDROXY: 52.5 NG/ML (ref 30–100)
VLDLC SERPL CALC-MCNC: ABNORMAL MG/DL (ref 1–30)
WBC # BLD: 7.6 K/UL (ref 3.5–11)
WBC # BLD: ABNORMAL 10*3/UL

## 2018-12-17 PROCEDURE — 85025 COMPLETE CBC W/AUTO DIFF WBC: CPT

## 2018-12-17 PROCEDURE — 80061 LIPID PANEL: CPT

## 2018-12-17 PROCEDURE — 83036 HEMOGLOBIN GLYCOSYLATED A1C: CPT

## 2018-12-17 PROCEDURE — 83735 ASSAY OF MAGNESIUM: CPT

## 2018-12-17 PROCEDURE — 93005 ELECTROCARDIOGRAM TRACING: CPT

## 2018-12-17 PROCEDURE — 36415 COLL VENOUS BLD VENIPUNCTURE: CPT

## 2018-12-17 PROCEDURE — 82306 VITAMIN D 25 HYDROXY: CPT

## 2018-12-17 PROCEDURE — 84439 ASSAY OF FREE THYROXINE: CPT

## 2018-12-17 PROCEDURE — 80053 COMPREHEN METABOLIC PANEL: CPT

## 2018-12-17 PROCEDURE — 84443 ASSAY THYROID STIM HORMONE: CPT

## 2018-12-18 ENCOUNTER — OFFICE VISIT (OUTPATIENT)
Dept: CARDIOLOGY CLINIC | Age: 63
End: 2018-12-18
Payer: COMMERCIAL

## 2018-12-18 VITALS
SYSTOLIC BLOOD PRESSURE: 140 MMHG | BODY MASS INDEX: 26.95 KG/M2 | OXYGEN SATURATION: 98 % | DIASTOLIC BLOOD PRESSURE: 70 MMHG | WEIGHT: 157 LBS | HEART RATE: 104 BPM

## 2018-12-18 DIAGNOSIS — I48.20 CHRONIC ATRIAL FIBRILLATION (HCC): ICD-10-CM

## 2018-12-18 DIAGNOSIS — E78.2 MIXED HYPERLIPIDEMIA: ICD-10-CM

## 2018-12-18 DIAGNOSIS — E55.9 VITAMIN D DEFICIENCY DISEASE: ICD-10-CM

## 2018-12-18 DIAGNOSIS — I10 ESSENTIAL HYPERTENSION: Primary | Chronic | ICD-10-CM

## 2018-12-18 DIAGNOSIS — E11.8 TYPE 2 DIABETES MELLITUS WITH COMPLICATION, WITHOUT LONG-TERM CURRENT USE OF INSULIN (HCC): ICD-10-CM

## 2018-12-18 PROCEDURE — G8417 CALC BMI ABV UP PARAM F/U: HCPCS | Performed by: INTERNAL MEDICINE

## 2018-12-18 PROCEDURE — 99214 OFFICE O/P EST MOD 30 MIN: CPT | Performed by: INTERNAL MEDICINE

## 2018-12-18 PROCEDURE — 2022F DILAT RTA XM EVC RTNOPTHY: CPT | Performed by: INTERNAL MEDICINE

## 2018-12-18 PROCEDURE — 1036F TOBACCO NON-USER: CPT | Performed by: INTERNAL MEDICINE

## 2018-12-18 PROCEDURE — G8428 CUR MEDS NOT DOCUMENT: HCPCS | Performed by: INTERNAL MEDICINE

## 2018-12-18 PROCEDURE — G8484 FLU IMMUNIZE NO ADMIN: HCPCS | Performed by: INTERNAL MEDICINE

## 2018-12-18 PROCEDURE — 3044F HG A1C LEVEL LT 7.0%: CPT | Performed by: INTERNAL MEDICINE

## 2018-12-18 PROCEDURE — 3017F COLORECTAL CA SCREEN DOC REV: CPT | Performed by: INTERNAL MEDICINE

## 2018-12-18 RX ORDER — POTASSIUM CHLORIDE 20 MEQ/1
20 TABLET, EXTENDED RELEASE ORAL DAILY
Qty: 30 TABLET | Refills: 11 | Status: SHIPPED | OUTPATIENT
Start: 2018-12-18 | End: 2020-01-14 | Stop reason: SDUPTHER

## 2018-12-18 RX ORDER — POTASSIUM CHLORIDE 1.5 G/1.77G
20 POWDER, FOR SOLUTION ORAL DAILY
COMMUNITY
End: 2018-12-18

## 2018-12-27 ENCOUNTER — CARE COORDINATION (OUTPATIENT)
Dept: CARE COORDINATION | Age: 63
End: 2018-12-27

## 2018-12-28 LAB — CYTOLOGY REPORT: NORMAL

## 2019-01-02 ENCOUNTER — CARE COORDINATION (OUTPATIENT)
Dept: CARE COORDINATION | Age: 64
End: 2019-01-02

## 2019-01-04 ENCOUNTER — OFFICE VISIT (OUTPATIENT)
Dept: FAMILY MEDICINE CLINIC | Age: 64
End: 2019-01-04
Payer: COMMERCIAL

## 2019-01-04 VITALS
SYSTOLIC BLOOD PRESSURE: 118 MMHG | OXYGEN SATURATION: 98 % | BODY MASS INDEX: 26.29 KG/M2 | DIASTOLIC BLOOD PRESSURE: 58 MMHG | HEIGHT: 64 IN | WEIGHT: 154 LBS | HEART RATE: 80 BPM

## 2019-01-04 DIAGNOSIS — E11.8 TYPE 2 DIABETES MELLITUS WITH COMPLICATION, WITHOUT LONG-TERM CURRENT USE OF INSULIN (HCC): Primary | ICD-10-CM

## 2019-01-04 DIAGNOSIS — E05.00 GRAVES' DISEASE: ICD-10-CM

## 2019-01-04 DIAGNOSIS — M50.90 CERVICAL NECK PAIN WITH EVIDENCE OF DISC DISEASE: ICD-10-CM

## 2019-01-04 DIAGNOSIS — I10 ESSENTIAL HYPERTENSION: Chronic | ICD-10-CM

## 2019-01-04 DIAGNOSIS — I48.0 PAROXYSMAL ATRIAL FIBRILLATION (HCC): ICD-10-CM

## 2019-01-04 DIAGNOSIS — J44.9 ASTHMA WITH COPD (HCC): ICD-10-CM

## 2019-01-04 PROCEDURE — G8926 SPIRO NO PERF OR DOC: HCPCS | Performed by: INTERNAL MEDICINE

## 2019-01-04 PROCEDURE — 3017F COLORECTAL CA SCREEN DOC REV: CPT | Performed by: INTERNAL MEDICINE

## 2019-01-04 PROCEDURE — 2022F DILAT RTA XM EVC RTNOPTHY: CPT | Performed by: INTERNAL MEDICINE

## 2019-01-04 PROCEDURE — G8417 CALC BMI ABV UP PARAM F/U: HCPCS | Performed by: INTERNAL MEDICINE

## 2019-01-04 PROCEDURE — 99214 OFFICE O/P EST MOD 30 MIN: CPT | Performed by: INTERNAL MEDICINE

## 2019-01-04 PROCEDURE — 3046F HEMOGLOBIN A1C LEVEL >9.0%: CPT | Performed by: INTERNAL MEDICINE

## 2019-01-04 PROCEDURE — G8484 FLU IMMUNIZE NO ADMIN: HCPCS | Performed by: INTERNAL MEDICINE

## 2019-01-04 PROCEDURE — G8427 DOCREV CUR MEDS BY ELIG CLIN: HCPCS | Performed by: INTERNAL MEDICINE

## 2019-01-04 PROCEDURE — 1036F TOBACCO NON-USER: CPT | Performed by: INTERNAL MEDICINE

## 2019-01-04 PROCEDURE — 3023F SPIROM DOC REV: CPT | Performed by: INTERNAL MEDICINE

## 2019-01-04 RX ORDER — LISINOPRIL 5 MG/1
5 TABLET ORAL DAILY
Qty: 30 TABLET | Refills: 3 | Status: SHIPPED | OUTPATIENT
Start: 2019-01-04 | End: 2019-04-23 | Stop reason: SDUPTHER

## 2019-01-04 ASSESSMENT — ENCOUNTER SYMPTOMS
SHORTNESS OF BREATH: 0
COUGH: 0
ABDOMINAL DISTENTION: 0
SORE THROAT: 0
DIARRHEA: 0
ABDOMINAL PAIN: 1
CONSTIPATION: 0
BELCHING: 0
CHEST TIGHTNESS: 0
BLOOD IN STOOL: 0
NAUSEA: 1
VOMITING: 1

## 2019-01-14 ENCOUNTER — APPOINTMENT (OUTPATIENT)
Dept: GENERAL RADIOLOGY | Age: 64
End: 2019-01-14
Payer: COMMERCIAL

## 2019-01-14 ENCOUNTER — TELEPHONE (OUTPATIENT)
Dept: FAMILY MEDICINE CLINIC | Age: 64
End: 2019-01-14

## 2019-01-14 ENCOUNTER — HOSPITAL ENCOUNTER (EMERGENCY)
Age: 64
Discharge: HOME | End: 2019-01-14
Attending: EMERGENCY MEDICINE
Payer: COMMERCIAL

## 2019-01-14 VITALS
DIASTOLIC BLOOD PRESSURE: 83 MMHG | HEART RATE: 87 BPM | RESPIRATION RATE: 14 BRPM | SYSTOLIC BLOOD PRESSURE: 110 MMHG | TEMPERATURE: 98.4 F | OXYGEN SATURATION: 95 %

## 2019-01-14 DIAGNOSIS — M50.90 CERVICAL NECK PAIN WITH EVIDENCE OF DISC DISEASE: Primary | ICD-10-CM
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
## 2019-01-14 DIAGNOSIS — S30.0XXA CONTUSION OF LOWER BACK, INITIAL ENCOUNTER: Primary | ICD-10-CM

## 2019-01-14 PROCEDURE — 6370000000 HC RX 637 (ALT 250 FOR IP): Performed by: EMERGENCY MEDICINE

## 2019-01-14 PROCEDURE — 72110 X-RAY EXAM L-2 SPINE 4/>VWS: CPT

## 2019-01-14 PROCEDURE — 99283 EMERGENCY DEPT VISIT LOW MDM: CPT

## 2019-01-14 RX ORDER — GABAPENTIN 800 MG/1
800 TABLET ORAL 2 TIMES DAILY
Qty: 60 TABLET | Refills: 2 | Status: SHIPPED | OUTPATIENT
Start: 2019-01-14 | End: 2019-04-11 | Stop reason: SDUPTHER

## 2019-01-14 RX ORDER — HYDROCODONE BITARTRATE AND ACETAMINOPHEN 5; 325 MG/1; MG/1
1 TABLET ORAL EVERY 6 HOURS PRN
Qty: 6 TABLET | Refills: 0 | Status: SHIPPED | OUTPATIENT
Start: 2019-01-14 | End: 2019-01-17

## 2019-01-14 RX ORDER — NAPROXEN 375 MG/1
375 TABLET ORAL 2 TIMES DAILY
Qty: 12 TABLET | Refills: 0 | Status: SHIPPED | OUTPATIENT
Start: 2019-01-14 | End: 2019-10-14

## 2019-01-14 RX ORDER — HYDROCODONE BITARTRATE AND ACETAMINOPHEN 5; 325 MG/1; MG/1
1 TABLET ORAL ONCE
Status: COMPLETED | OUTPATIENT
Start: 2019-01-14 | End: 2019-01-14

## 2019-01-14 RX ADMIN — HYDROCODONE BITARTRATE AND ACETAMINOPHEN 1 TABLET: 5; 325 TABLET ORAL at 11:52

## 2019-01-14 ASSESSMENT — PAIN DESCRIPTION - LOCATION: LOCATION: BACK

## 2019-01-14 ASSESSMENT — PAIN SCALES - GENERAL: PAINLEVEL_OUTOF10: 6

## 2019-01-14 ASSESSMENT — PAIN DESCRIPTION - ORIENTATION: ORIENTATION: LEFT;RIGHT;LOWER

## 2019-01-14 ASSESSMENT — PAIN DESCRIPTION - ONSET: ONSET: ON-GOING

## 2019-01-14 ASSESSMENT — PAIN DESCRIPTION - FREQUENCY: FREQUENCY: CONTINUOUS

## 2019-01-14 ASSESSMENT — PAIN DESCRIPTION - PROGRESSION: CLINICAL_PROGRESSION: GRADUALLY WORSENING

## 2019-01-14 ASSESSMENT — PAIN DESCRIPTION - PAIN TYPE: TYPE: ACUTE PAIN

## 2019-01-14 ASSESSMENT — PAIN DESCRIPTION - DESCRIPTORS: DESCRIPTORS: CONSTANT

## 2019-01-15 ENCOUNTER — HOSPITAL ENCOUNTER (EMERGENCY)
Age: 64
Discharge: HOME | DRG: 468 | End: 2019-01-15
Attending: INTERNAL MEDICINE
Payer: COMMERCIAL

## 2019-01-15 ENCOUNTER — CARE COORDINATION (OUTPATIENT)
Dept: CARE COORDINATION | Age: 64
End: 2019-01-15

## 2019-01-15 VITALS
DIASTOLIC BLOOD PRESSURE: 76 MMHG | TEMPERATURE: 97.8 F | SYSTOLIC BLOOD PRESSURE: 156 MMHG | OXYGEN SATURATION: 96 % | RESPIRATION RATE: 20 BRPM | BODY MASS INDEX: 25.58 KG/M2 | WEIGHT: 149 LBS | HEART RATE: 68 BPM

## 2019-01-15 DIAGNOSIS — E87.8 ELECTROLYTE IMBALANCE: ICD-10-CM

## 2019-01-15 DIAGNOSIS — E86.0 DEHYDRATION: Primary | ICD-10-CM

## 2019-01-15 DIAGNOSIS — E87.6 HYPOKALEMIA: ICD-10-CM

## 2019-01-15 DIAGNOSIS — E83.52 HYPERCALCEMIA: ICD-10-CM

## 2019-01-15 DIAGNOSIS — R11.2 NAUSEA AND VOMITING, INTRACTABILITY OF VOMITING NOT SPECIFIED, UNSPECIFIED VOMITING TYPE: ICD-10-CM

## 2019-01-15 LAB
-: NORMAL
ABSOLUTE EOS #: 0.2 K/UL (ref 0–0.4)
ABSOLUTE IMMATURE GRANULOCYTE: ABNORMAL K/UL (ref 0–0.3)
ABSOLUTE LYMPH #: 1.9 K/UL (ref 1–4.8)
ABSOLUTE MONO #: 0.5 K/UL (ref 0–1)
ALBUMIN SERPL-MCNC: 4.6 G/DL (ref 3.5–5.2)
ALBUMIN/GLOBULIN RATIO: ABNORMAL (ref 1–2.5)
ALP BLD-CCNC: 103 U/L (ref 35–104)
ALT SERPL-CCNC: 76 U/L (ref 5–33)
AMORPHOUS: NORMAL
AMYLASE: 65 U/L (ref 28–100)
ANION GAP SERPL CALCULATED.3IONS-SCNC: 16 MMOL/L (ref 9–17)
AST SERPL-CCNC: 57 U/L
BACTERIA: NORMAL
BASOPHILS # BLD: 0 % (ref 0–2)
BASOPHILS ABSOLUTE: 0 K/UL (ref 0–0.2)
BILIRUB SERPL-MCNC: 0.79 MG/DL (ref 0.3–1.2)
BILIRUBIN URINE: NEGATIVE
BUN BLDV-MCNC: 32 MG/DL (ref 8–23)
BUN/CREAT BLD: 29 (ref 9–20)
CALCIUM SERPL-MCNC: 10.9 MG/DL (ref 8.6–10.4)
CASTS UA: NORMAL /LPF
CHLORIDE BLD-SCNC: 98 MMOL/L (ref 98–107)
CO2: 27 MMOL/L (ref 20–31)
COLOR: YELLOW
COMMENT UA: ABNORMAL
CREAT SERPL-MCNC: 1.11 MG/DL (ref 0.5–0.9)
CRYSTALS, UA: NORMAL /HPF
DIFFERENTIAL TYPE: YES
EKG ATRIAL RATE: 91 BPM
EKG P AXIS: 80 DEGREES
EKG P-R INTERVAL: 168 MS
EKG Q-T INTERVAL: 372 MS
EKG QRS DURATION: 80 MS
EKG QTC CALCULATION (BAZETT): 457 MS
EKG R AXIS: 58 DEGREES
EKG T AXIS: 50 DEGREES
EKG VENTRICULAR RATE: 91 BPM
EOSINOPHILS RELATIVE PERCENT: 4 % (ref 0–5)
EPITHELIAL CELLS UA: NORMAL /HPF
GFR AFRICAN AMERICAN: >60 ML/MIN
GFR NON-AFRICAN AMERICAN: 50 ML/MIN
GFR SERPL CREATININE-BSD FRML MDRD: ABNORMAL ML/MIN/{1.73_M2}
GFR SERPL CREATININE-BSD FRML MDRD: ABNORMAL ML/MIN/{1.73_M2}
GLUCOSE BLD-MCNC: 174 MG/DL (ref 70–99)
GLUCOSE URINE: NEGATIVE
HCT VFR BLD CALC: 44 % (ref 36–46)
HEMOGLOBIN: 14.4 G/DL (ref 12–16)
IMMATURE GRANULOCYTES: ABNORMAL %
KETONES, URINE: NEGATIVE
LEUKOCYTE ESTERASE, URINE: ABNORMAL
LIPASE: 58 U/L (ref 13–60)
LYMPHOCYTES # BLD: 35 % (ref 15–40)
MAGNESIUM: 2.1 MG/DL (ref 1.6–2.6)
MCH RBC QN AUTO: 28 PG (ref 26–34)
MCHC RBC AUTO-ENTMCNC: 32.7 G/DL (ref 31–37)
MCV RBC AUTO: 85.8 FL (ref 80–100)
MONOCYTES # BLD: 10 % (ref 4–8)
MUCUS: NORMAL
NITRITE, URINE: NEGATIVE
NRBC AUTOMATED: ABNORMAL PER 100 WBC
OTHER OBSERVATIONS UA: NORMAL
PDW BLD-RTO: 12.9 % (ref 12.1–15.2)
PH UA: 6 (ref 5–8)
PLATELET # BLD: 224 K/UL (ref 140–450)
PLATELET ESTIMATE: ABNORMAL
PMV BLD AUTO: ABNORMAL FL (ref 6–12)
POTASSIUM SERPL-SCNC: 3 MMOL/L (ref 3.7–5.3)
PROTEIN UA: NEGATIVE
RBC # BLD: 5.13 M/UL (ref 4–5.2)
RBC # BLD: ABNORMAL 10*6/UL
RBC UA: NORMAL /HPF (ref 0–2)
RENAL EPITHELIAL, UA: NORMAL /HPF
SEG NEUTROPHILS: 51 % (ref 47–75)
SEGMENTED NEUTROPHILS ABSOLUTE COUNT: 2.7 K/UL (ref 2.5–7)
SODIUM BLD-SCNC: 141 MMOL/L (ref 135–144)
SPECIFIC GRAVITY UA: 1.02 (ref 1–1.03)
TOTAL PROTEIN: 7.9 G/DL (ref 6.4–8.3)
TRICHOMONAS: NORMAL
TROPONIN INTERP: NORMAL
TROPONIN T: <0.03 NG/ML
TROPONIN, HIGH SENSITIVITY: NORMAL NG/L (ref 0–14)
TURBIDITY: CLEAR
URINE HGB: NEGATIVE
UROBILINOGEN, URINE: NORMAL
WBC # BLD: 5.4 K/UL (ref 3.5–11)
WBC # BLD: ABNORMAL 10*3/UL
WBC UA: NORMAL /HPF
YEAST: NORMAL

## 2019-01-15 PROCEDURE — 93005 ELECTROCARDIOGRAM TRACING: CPT

## 2019-01-15 PROCEDURE — 85025 COMPLETE CBC W/AUTO DIFF WBC: CPT

## 2019-01-15 PROCEDURE — 81001 URINALYSIS AUTO W/SCOPE: CPT

## 2019-01-15 PROCEDURE — 87086 URINE CULTURE/COLONY COUNT: CPT

## 2019-01-15 PROCEDURE — 6370000000 HC RX 637 (ALT 250 FOR IP): Performed by: INTERNAL MEDICINE

## 2019-01-15 PROCEDURE — 84484 ASSAY OF TROPONIN QUANT: CPT

## 2019-01-15 PROCEDURE — 96374 THER/PROPH/DIAG INJ IV PUSH: CPT

## 2019-01-15 PROCEDURE — 83735 ASSAY OF MAGNESIUM: CPT

## 2019-01-15 PROCEDURE — 82150 ASSAY OF AMYLASE: CPT

## 2019-01-15 PROCEDURE — 2580000003 HC RX 258: Performed by: INTERNAL MEDICINE

## 2019-01-15 PROCEDURE — 83690 ASSAY OF LIPASE: CPT

## 2019-01-15 PROCEDURE — 6360000002 HC RX W HCPCS: Performed by: INTERNAL MEDICINE

## 2019-01-15 PROCEDURE — 99284 EMERGENCY DEPT VISIT MOD MDM: CPT

## 2019-01-15 PROCEDURE — 96361 HYDRATE IV INFUSION ADD-ON: CPT

## 2019-01-15 PROCEDURE — 80053 COMPREHEN METABOLIC PANEL: CPT

## 2019-01-15 RX ORDER — FUROSEMIDE 10 MG/ML
20 INJECTION INTRAMUSCULAR; INTRAVENOUS ONCE
Status: DISCONTINUED | OUTPATIENT
Start: 2019-01-15 | End: 2019-01-15

## 2019-01-15 RX ORDER — ONDANSETRON 2 MG/ML
4 INJECTION INTRAMUSCULAR; INTRAVENOUS ONCE
Status: COMPLETED | OUTPATIENT
Start: 2019-01-15 | End: 2019-01-15

## 2019-01-15 RX ORDER — 0.9 % SODIUM CHLORIDE 0.9 %
1000 INTRAVENOUS SOLUTION INTRAVENOUS ONCE
Status: COMPLETED | OUTPATIENT
Start: 2019-01-15 | End: 2019-01-15

## 2019-01-15 RX ORDER — POTASSIUM CHLORIDE 750 MG/1
40 TABLET, FILM COATED, EXTENDED RELEASE ORAL ONCE
Status: COMPLETED | OUTPATIENT
Start: 2019-01-15 | End: 2019-01-15

## 2019-01-15 RX ORDER — POTASSIUM CHLORIDE 1.5 G/1.77G
40 POWDER, FOR SOLUTION ORAL ONCE
Status: DISCONTINUED | OUTPATIENT
Start: 2019-01-15 | End: 2019-01-15

## 2019-01-15 RX ORDER — ONDANSETRON 4 MG/1
4 TABLET, ORALLY DISINTEGRATING ORAL EVERY 8 HOURS PRN
Qty: 8 TABLET | Refills: 0 | Status: SHIPPED | OUTPATIENT
Start: 2019-01-15 | End: 2019-02-05 | Stop reason: ALTCHOICE

## 2019-01-15 RX ADMIN — POTASSIUM CHLORIDE 40 MEQ: 750 TABLET, FILM COATED, EXTENDED RELEASE ORAL at 15:33

## 2019-01-15 RX ADMIN — SODIUM CHLORIDE 1000 ML: 9 INJECTION, SOLUTION INTRAVENOUS at 14:24

## 2019-01-15 RX ADMIN — ONDANSETRON 4 MG: 2 INJECTION INTRAMUSCULAR; INTRAVENOUS at 14:44

## 2019-01-15 ASSESSMENT — ENCOUNTER SYMPTOMS
RECTAL PAIN: 0
BLOOD IN STOOL: 0
CONSTIPATION: 0
EYE ITCHING: 0
CHEST TIGHTNESS: 0
COLOR CHANGE: 0
COUGH: 0
ANAL BLEEDING: 0
SORE THROAT: 0
WHEEZING: 0
STRIDOR: 0
BACK PAIN: 0
APNEA: 0
EYE PAIN: 0
EYE DISCHARGE: 0
ABDOMINAL PAIN: 0
PHOTOPHOBIA: 0
SINUS PRESSURE: 0
SINUS PAIN: 0
VOMITING: 1
CHOKING: 0
RHINORRHEA: 0
SHORTNESS OF BREATH: 0
ABDOMINAL DISTENTION: 0
EYE REDNESS: 0
NAUSEA: 1
FACIAL SWELLING: 0
DIARRHEA: 0

## 2019-01-16 ENCOUNTER — CARE COORDINATION (OUTPATIENT)
Dept: CARE COORDINATION | Age: 64
End: 2019-01-16

## 2019-01-16 LAB
CULTURE: NORMAL
Lab: NORMAL
SPECIMEN DESCRIPTION: NORMAL
STATUS: NORMAL

## 2019-01-17 ENCOUNTER — CARE COORDINATION (OUTPATIENT)
Dept: CARE COORDINATION | Age: 64
End: 2019-01-17

## 2019-01-18 ENCOUNTER — HOSPITAL ENCOUNTER (INPATIENT)
Age: 64
LOS: 2 days | Discharge: HOME | DRG: 468 | End: 2019-01-20
Attending: EMERGENCY MEDICINE | Admitting: INTERNAL MEDICINE
Payer: COMMERCIAL

## 2019-01-18 ENCOUNTER — APPOINTMENT (OUTPATIENT)
Dept: CT IMAGING | Age: 64
DRG: 468 | End: 2019-01-18
Payer: COMMERCIAL

## 2019-01-18 DIAGNOSIS — I48.91 ATRIAL FIBRILLATION WITH RVR (HCC): ICD-10-CM

## 2019-01-18 DIAGNOSIS — R11.2 INTRACTABLE VOMITING WITH NAUSEA, UNSPECIFIED VOMITING TYPE: Primary | ICD-10-CM

## 2019-01-18 LAB
-: NORMAL
ABSOLUTE EOS #: 0.1 K/UL (ref 0–0.4)
ABSOLUTE IMMATURE GRANULOCYTE: ABNORMAL K/UL (ref 0–0.3)
ABSOLUTE LYMPH #: 1.8 K/UL (ref 1–4.8)
ABSOLUTE MONO #: 0.6 K/UL (ref 0–1)
ALBUMIN SERPL-MCNC: 4.7 G/DL (ref 3.5–5.2)
ALBUMIN/GLOBULIN RATIO: ABNORMAL (ref 1–2.5)
ALP BLD-CCNC: 104 U/L (ref 35–104)
ALT SERPL-CCNC: 102 U/L (ref 5–33)
AMORPHOUS: NORMAL
ANION GAP SERPL CALCULATED.3IONS-SCNC: 18 MMOL/L (ref 9–17)
AST SERPL-CCNC: 88 U/L
BACTERIA: NORMAL
BASOPHILS # BLD: 1 % (ref 0–2)
BASOPHILS ABSOLUTE: 0 K/UL (ref 0–0.2)
BILIRUB SERPL-MCNC: 0.97 MG/DL (ref 0.3–1.2)
BILIRUBIN URINE: NEGATIVE
BUN BLDV-MCNC: 20 MG/DL (ref 8–23)
BUN/CREAT BLD: 19 (ref 9–20)
CALCIUM SERPL-MCNC: 11.6 MG/DL (ref 8.6–10.4)
CASTS UA: NORMAL /LPF
CHLORIDE BLD-SCNC: 95 MMOL/L (ref 98–107)
CO2: 25 MMOL/L (ref 20–31)
COLOR: YELLOW
COMMENT UA: ABNORMAL
CREAT SERPL-MCNC: 1.04 MG/DL (ref 0.5–0.9)
CRYSTALS, UA: NORMAL /HPF
DIFFERENTIAL TYPE: YES
EOSINOPHILS RELATIVE PERCENT: 1 % (ref 0–5)
EPITHELIAL CELLS UA: NORMAL /HPF
ESTIMATED AVERAGE GLUCOSE: 160 MG/DL
GFR AFRICAN AMERICAN: >60 ML/MIN
GFR NON-AFRICAN AMERICAN: 54 ML/MIN
GFR SERPL CREATININE-BSD FRML MDRD: ABNORMAL ML/MIN/{1.73_M2}
GFR SERPL CREATININE-BSD FRML MDRD: ABNORMAL ML/MIN/{1.73_M2}
GLUCOSE BLD-MCNC: 140 MG/DL (ref 65–99)
GLUCOSE BLD-MCNC: 214 MG/DL (ref 70–99)
GLUCOSE URINE: NEGATIVE
HBA1C MFR BLD: 7.2 % (ref 4.8–5.9)
HCT VFR BLD CALC: 45.5 % (ref 36–46)
HEMOGLOBIN: 15.1 G/DL (ref 12–16)
IMMATURE GRANULOCYTES: ABNORMAL %
KETONES, URINE: ABNORMAL
LEUKOCYTE ESTERASE, URINE: NEGATIVE
LIPASE: 38 U/L (ref 13–60)
LYMPHOCYTES # BLD: 31 % (ref 15–40)
MCH RBC QN AUTO: 28.1 PG (ref 26–34)
MCHC RBC AUTO-ENTMCNC: 33.1 G/DL (ref 31–37)
MCV RBC AUTO: 85.1 FL (ref 80–100)
MONOCYTES # BLD: 11 % (ref 4–8)
MUCUS: NORMAL
NITRITE, URINE: NEGATIVE
NRBC AUTOMATED: ABNORMAL PER 100 WBC
OTHER OBSERVATIONS UA: NORMAL
PDW BLD-RTO: 13.1 % (ref 12.1–15.2)
PH UA: 6 (ref 5–8)
PLATELET # BLD: 247 K/UL (ref 140–450)
PLATELET ESTIMATE: ABNORMAL
PMV BLD AUTO: ABNORMAL FL (ref 6–12)
POTASSIUM SERPL-SCNC: 3.4 MMOL/L (ref 3.7–5.3)
PROTEIN UA: ABNORMAL
RBC # BLD: 5.36 M/UL (ref 4–5.2)
RBC # BLD: ABNORMAL 10*6/UL
RBC UA: NORMAL /HPF (ref 0–2)
RENAL EPITHELIAL, UA: NORMAL /HPF
SEG NEUTROPHILS: 56 % (ref 47–75)
SEGMENTED NEUTROPHILS ABSOLUTE COUNT: 3.2 K/UL (ref 2.5–7)
SODIUM BLD-SCNC: 138 MMOL/L (ref 135–144)
SPECIFIC GRAVITY UA: 1.02 (ref 1–1.03)
TOTAL PROTEIN: 8.2 G/DL (ref 6.4–8.3)
TRICHOMONAS: NORMAL
TSH SERPL DL<=0.05 MIU/L-ACNC: <0.01 MIU/L (ref 0.3–5)
TURBIDITY: CLEAR
URINE HGB: ABNORMAL
UROBILINOGEN, URINE: NORMAL
WBC # BLD: 5.8 K/UL (ref 3.5–11)
WBC # BLD: ABNORMAL 10*3/UL
WBC UA: NORMAL /HPF
YEAST: NORMAL

## 2019-01-18 PROCEDURE — 82947 ASSAY GLUCOSE BLOOD QUANT: CPT

## 2019-01-18 PROCEDURE — 2500000003 HC RX 250 WO HCPCS: Performed by: EMERGENCY MEDICINE

## 2019-01-18 PROCEDURE — 2500000003 HC RX 250 WO HCPCS: Performed by: INTERNAL MEDICINE

## 2019-01-18 PROCEDURE — 99285 EMERGENCY DEPT VISIT HI MDM: CPT

## 2019-01-18 PROCEDURE — 6370000000 HC RX 637 (ALT 250 FOR IP): Performed by: INTERNAL MEDICINE

## 2019-01-18 PROCEDURE — 1200000000 HC SEMI PRIVATE

## 2019-01-18 PROCEDURE — 83036 HEMOGLOBIN GLYCOSYLATED A1C: CPT

## 2019-01-18 PROCEDURE — 2580000003 HC RX 258: Performed by: EMERGENCY MEDICINE

## 2019-01-18 PROCEDURE — 84443 ASSAY THYROID STIM HORMONE: CPT

## 2019-01-18 PROCEDURE — 94761 N-INVAS EAR/PLS OXIMETRY MLT: CPT

## 2019-01-18 PROCEDURE — 83690 ASSAY OF LIPASE: CPT

## 2019-01-18 PROCEDURE — 96374 THER/PROPH/DIAG INJ IV PUSH: CPT

## 2019-01-18 PROCEDURE — 99254 IP/OBS CNSLTJ NEW/EST MOD 60: CPT | Performed by: INTERNAL MEDICINE

## 2019-01-18 PROCEDURE — 94640 AIRWAY INHALATION TREATMENT: CPT

## 2019-01-18 PROCEDURE — 80053 COMPREHEN METABOLIC PANEL: CPT

## 2019-01-18 PROCEDURE — 36415 COLL VENOUS BLD VENIPUNCTURE: CPT

## 2019-01-18 PROCEDURE — 85025 COMPLETE CBC W/AUTO DIFF WBC: CPT

## 2019-01-18 PROCEDURE — 93005 ELECTROCARDIOGRAM TRACING: CPT

## 2019-01-18 PROCEDURE — 81001 URINALYSIS AUTO W/SCOPE: CPT

## 2019-01-18 PROCEDURE — 6360000002 HC RX W HCPCS: Performed by: EMERGENCY MEDICINE

## 2019-01-18 PROCEDURE — 6360000004 HC RX CONTRAST MEDICATION: Performed by: EMERGENCY MEDICINE

## 2019-01-18 PROCEDURE — 6360000002 HC RX W HCPCS: Performed by: INTERNAL MEDICINE

## 2019-01-18 PROCEDURE — 6370000000 HC RX 637 (ALT 250 FOR IP): Performed by: EMERGENCY MEDICINE

## 2019-01-18 PROCEDURE — 96375 TX/PRO/DX INJ NEW DRUG ADDON: CPT

## 2019-01-18 PROCEDURE — 74177 CT ABD & PELVIS W/CONTRAST: CPT

## 2019-01-18 RX ORDER — DEXTROSE MONOHYDRATE 50 MG/ML
100 INJECTION, SOLUTION INTRAVENOUS PRN
Status: DISCONTINUED | OUTPATIENT
Start: 2019-01-18 | End: 2019-01-20 | Stop reason: HOSPADM

## 2019-01-18 RX ORDER — 0.9 % SODIUM CHLORIDE 0.9 %
1000 INTRAVENOUS SOLUTION INTRAVENOUS ONCE
Status: COMPLETED | OUTPATIENT
Start: 2019-01-18 | End: 2019-01-18

## 2019-01-18 RX ORDER — SODIUM CHLORIDE 0.9 % (FLUSH) 0.9 %
10 SYRINGE (ML) INJECTION EVERY 12 HOURS SCHEDULED
Status: DISCONTINUED | OUTPATIENT
Start: 2019-01-18 | End: 2019-01-20 | Stop reason: HOSPADM

## 2019-01-18 RX ORDER — OMEGA-3-ACID ETHYL ESTERS 1 G/1
2 CAPSULE, LIQUID FILLED ORAL DAILY
Status: DISCONTINUED | OUTPATIENT
Start: 2019-01-19 | End: 2019-01-20 | Stop reason: HOSPADM

## 2019-01-18 RX ORDER — LISINOPRIL 5 MG/1
5 TABLET ORAL DAILY
Status: DISCONTINUED | OUTPATIENT
Start: 2019-01-18 | End: 2019-01-20 | Stop reason: HOSPADM

## 2019-01-18 RX ORDER — ONDANSETRON 2 MG/ML
4 INJECTION INTRAMUSCULAR; INTRAVENOUS EVERY 6 HOURS PRN
Status: DISCONTINUED | OUTPATIENT
Start: 2019-01-18 | End: 2019-01-20 | Stop reason: HOSPADM

## 2019-01-18 RX ORDER — ALBUTEROL SULFATE 2.5 MG/3ML
2.5 SOLUTION RESPIRATORY (INHALATION) 4 TIMES DAILY
Status: DISCONTINUED | OUTPATIENT
Start: 2019-01-18 | End: 2019-01-20 | Stop reason: HOSPADM

## 2019-01-18 RX ORDER — DULAGLUTIDE 0.75 MG/.5ML
0.75 INJECTION, SOLUTION SUBCUTANEOUS WEEKLY
Refills: 1 | COMMUNITY
Start: 2019-01-15 | End: 2020-04-17 | Stop reason: SINTOL

## 2019-01-18 RX ORDER — FERROUS SULFATE 325(65) MG
325 TABLET ORAL
Status: DISCONTINUED | OUTPATIENT
Start: 2019-01-19 | End: 2019-01-20 | Stop reason: HOSPADM

## 2019-01-18 RX ORDER — METHIMAZOLE 5 MG/1
5 TABLET ORAL 2 TIMES DAILY
Status: DISCONTINUED | OUTPATIENT
Start: 2019-01-18 | End: 2019-01-20 | Stop reason: HOSPADM

## 2019-01-18 RX ORDER — ATORVASTATIN CALCIUM 20 MG/1
40 TABLET, FILM COATED ORAL DAILY
Status: DISCONTINUED | OUTPATIENT
Start: 2019-01-18 | End: 2019-01-20 | Stop reason: HOSPADM

## 2019-01-18 RX ORDER — DEXTROSE MONOHYDRATE 25 G/50ML
12.5 INJECTION, SOLUTION INTRAVENOUS PRN
Status: DISCONTINUED | OUTPATIENT
Start: 2019-01-18 | End: 2019-01-20 | Stop reason: HOSPADM

## 2019-01-18 RX ORDER — LORATADINE 10 MG/1
5 TABLET ORAL DAILY
Status: DISCONTINUED | OUTPATIENT
Start: 2019-01-18 | End: 2019-01-18 | Stop reason: CLARIF

## 2019-01-18 RX ORDER — SODIUM CHLORIDE 0.9 % (FLUSH) 0.9 %
10 SYRINGE (ML) INJECTION PRN
Status: DISCONTINUED | OUTPATIENT
Start: 2019-01-18 | End: 2019-01-20 | Stop reason: HOSPADM

## 2019-01-18 RX ORDER — METOPROLOL TARTRATE 5 MG/5ML
5 INJECTION INTRAVENOUS ONCE
Status: COMPLETED | OUTPATIENT
Start: 2019-01-18 | End: 2019-01-18

## 2019-01-18 RX ORDER — POTASSIUM BICARBONATE 25 MEQ/1
25 TABLET, EFFERVESCENT ORAL ONCE
Status: COMPLETED | OUTPATIENT
Start: 2019-01-18 | End: 2019-01-18

## 2019-01-18 RX ORDER — CHLORAL HYDRATE 500 MG
2000 CAPSULE ORAL DAILY
Status: DISCONTINUED | OUTPATIENT
Start: 2019-01-19 | End: 2019-01-18 | Stop reason: CLARIF

## 2019-01-18 RX ORDER — METOPROLOL TARTRATE 5 MG/5ML
5 INJECTION INTRAVENOUS EVERY 6 HOURS
Status: DISCONTINUED | OUTPATIENT
Start: 2019-01-18 | End: 2019-01-19

## 2019-01-18 RX ORDER — SODIUM CHLORIDE AND POTASSIUM CHLORIDE .9; .15 G/100ML; G/100ML
SOLUTION INTRAVENOUS CONTINUOUS
Status: DISCONTINUED | OUTPATIENT
Start: 2019-01-18 | End: 2019-01-20

## 2019-01-18 RX ORDER — GABAPENTIN 300 MG/1
600 CAPSULE ORAL ONCE
Status: COMPLETED | OUTPATIENT
Start: 2019-01-18 | End: 2019-01-18

## 2019-01-18 RX ORDER — CETIRIZINE HYDROCHLORIDE 10 MG/1
5 TABLET ORAL DAILY
Status: DISCONTINUED | OUTPATIENT
Start: 2019-01-18 | End: 2019-01-20 | Stop reason: HOSPADM

## 2019-01-18 RX ORDER — ONDANSETRON 2 MG/ML
4 INJECTION INTRAMUSCULAR; INTRAVENOUS ONCE
Status: COMPLETED | OUTPATIENT
Start: 2019-01-18 | End: 2019-01-18

## 2019-01-18 RX ORDER — NICOTINE POLACRILEX 4 MG
15 LOZENGE BUCCAL PRN
Status: DISCONTINUED | OUTPATIENT
Start: 2019-01-18 | End: 2019-01-20 | Stop reason: HOSPADM

## 2019-01-18 RX ADMIN — POTASSIUM BICARBONATE 25 MEQ: 25 TABLET, EFFERVESCENT ORAL at 11:56

## 2019-01-18 RX ADMIN — GABAPENTIN 800 MG: 100 CAPSULE ORAL at 20:57

## 2019-01-18 RX ADMIN — METOPROLOL TARTRATE 5 MG: 5 INJECTION, SOLUTION INTRAVENOUS at 12:37

## 2019-01-18 RX ADMIN — ALBUTEROL SULFATE 2.5 MG: 2.5 SOLUTION RESPIRATORY (INHALATION) at 23:04

## 2019-01-18 RX ADMIN — METOPROLOL TARTRATE 5 MG: 5 INJECTION, SOLUTION INTRAVENOUS at 20:57

## 2019-01-18 RX ADMIN — SODIUM CHLORIDE 1000 ML: 9 INJECTION, SOLUTION INTRAVENOUS at 11:32

## 2019-01-18 RX ADMIN — ONDANSETRON 4 MG: 2 INJECTION INTRAMUSCULAR; INTRAVENOUS at 11:33

## 2019-01-18 RX ADMIN — ATORVASTATIN CALCIUM 40 MG: 20 TABLET, FILM COATED ORAL at 16:47

## 2019-01-18 RX ADMIN — CETIRIZINE HYDROCHLORIDE 5 MG: 10 TABLET, FILM COATED ORAL at 16:47

## 2019-01-18 RX ADMIN — ENOXAPARIN SODIUM 70 MG: 80 INJECTION SUBCUTANEOUS at 14:41

## 2019-01-18 RX ADMIN — POTASSIUM CHLORIDE AND SODIUM CHLORIDE: 900; 150 INJECTION, SOLUTION INTRAVENOUS at 16:47

## 2019-01-18 RX ADMIN — IOPAMIDOL 75 ML: 755 INJECTION, SOLUTION INTRAVENOUS at 13:28

## 2019-01-18 RX ADMIN — METOPROLOL TARTRATE 5 MG: 5 INJECTION, SOLUTION INTRAVENOUS at 16:48

## 2019-01-18 RX ADMIN — GABAPENTIN 600 MG: 300 CAPSULE ORAL at 14:52

## 2019-01-18 RX ADMIN — LISINOPRIL 5 MG: 5 TABLET ORAL at 16:47

## 2019-01-18 ASSESSMENT — PAIN DESCRIPTION - LOCATION
LOCATION: ABDOMEN
LOCATION: BACK
LOCATION: BACK

## 2019-01-18 ASSESSMENT — PAIN SCALES - GENERAL
PAINLEVEL_OUTOF10: 9

## 2019-01-18 ASSESSMENT — PAIN DESCRIPTION - PAIN TYPE
TYPE: ACUTE PAIN
TYPE: CHRONIC PAIN
TYPE: CHRONIC PAIN

## 2019-01-18 ASSESSMENT — PAIN DESCRIPTION - DESCRIPTORS: DESCRIPTORS: CRAMPING;CONSTANT

## 2019-01-19 LAB
ABSOLUTE EOS #: 0.2 K/UL (ref 0–0.4)
ABSOLUTE IMMATURE GRANULOCYTE: ABNORMAL K/UL (ref 0–0.3)
ABSOLUTE LYMPH #: 2.1 K/UL (ref 1–4.8)
ABSOLUTE MONO #: 0.5 K/UL (ref 0–1)
ALBUMIN SERPL-MCNC: 3.5 G/DL (ref 3.5–5.2)
ALBUMIN/GLOBULIN RATIO: ABNORMAL (ref 1–2.5)
ALP BLD-CCNC: 75 U/L (ref 35–104)
ALT SERPL-CCNC: 92 U/L (ref 5–33)
ANION GAP SERPL CALCULATED.3IONS-SCNC: 12 MMOL/L (ref 9–17)
AST SERPL-CCNC: 87 U/L
BASOPHILS # BLD: 1 % (ref 0–2)
BASOPHILS ABSOLUTE: 0 K/UL (ref 0–0.2)
BILIRUB SERPL-MCNC: 0.87 MG/DL (ref 0.3–1.2)
BUN BLDV-MCNC: 21 MG/DL (ref 8–23)
BUN/CREAT BLD: 23 (ref 9–20)
CALCIUM SERPL-MCNC: 9.9 MG/DL (ref 8.6–10.4)
CHLORIDE BLD-SCNC: 106 MMOL/L (ref 98–107)
CO2: 23 MMOL/L (ref 20–31)
CREAT SERPL-MCNC: 0.9 MG/DL (ref 0.5–0.9)
DIFFERENTIAL TYPE: YES
EKG ATRIAL RATE: 150 BPM
EKG ATRIAL RATE: 153 BPM
EKG Q-T INTERVAL: 306 MS
EKG Q-T INTERVAL: 364 MS
EKG QRS DURATION: 70 MS
EKG QRS DURATION: 74 MS
EKG QTC CALCULATION (BAZETT): 456 MS
EKG QTC CALCULATION (BAZETT): 483 MS
EKG R AXIS: 67 DEGREES
EKG R AXIS: 68 DEGREES
EKG T AXIS: 33 DEGREES
EKG T AXIS: 44 DEGREES
EKG VENTRICULAR RATE: 106 BPM
EKG VENTRICULAR RATE: 134 BPM
EOSINOPHILS RELATIVE PERCENT: 5 % (ref 0–5)
GFR AFRICAN AMERICAN: >60 ML/MIN
GFR NON-AFRICAN AMERICAN: >60 ML/MIN
GFR SERPL CREATININE-BSD FRML MDRD: ABNORMAL ML/MIN/{1.73_M2}
GFR SERPL CREATININE-BSD FRML MDRD: ABNORMAL ML/MIN/{1.73_M2}
GLUCOSE BLD-MCNC: 105 MG/DL (ref 65–99)
GLUCOSE BLD-MCNC: 121 MG/DL (ref 65–99)
GLUCOSE BLD-MCNC: 123 MG/DL (ref 65–99)
GLUCOSE BLD-MCNC: 130 MG/DL (ref 70–99)
GLUCOSE BLD-MCNC: 175 MG/DL (ref 65–99)
GLUCOSE BLD-MCNC: 187 MG/DL (ref 65–99)
GLUCOSE BLD-MCNC: 188 MG/DL (ref 65–99)
GLUCOSE BLD-MCNC: 240 MG/DL (ref 65–99)
HCT VFR BLD CALC: 38.7 % (ref 36–46)
HEMOGLOBIN: 12.8 G/DL (ref 12–16)
IMMATURE GRANULOCYTES: ABNORMAL %
LYMPHOCYTES # BLD: 52 % (ref 15–40)
MCH RBC QN AUTO: 28.4 PG (ref 26–34)
MCHC RBC AUTO-ENTMCNC: 33 G/DL (ref 31–37)
MCV RBC AUTO: 86.1 FL (ref 80–100)
MONOCYTES # BLD: 12 % (ref 4–8)
NRBC AUTOMATED: ABNORMAL PER 100 WBC
PDW BLD-RTO: 13.3 % (ref 12.1–15.2)
PLATELET # BLD: 190 K/UL (ref 140–450)
PLATELET ESTIMATE: ABNORMAL
PMV BLD AUTO: ABNORMAL FL (ref 6–12)
POTASSIUM SERPL-SCNC: 3.9 MMOL/L (ref 3.7–5.3)
RBC # BLD: 4.5 M/UL (ref 4–5.2)
RBC # BLD: ABNORMAL 10*6/UL
SEG NEUTROPHILS: 30 % (ref 47–75)
SEGMENTED NEUTROPHILS ABSOLUTE COUNT: 1.2 K/UL (ref 2.5–7)
SODIUM BLD-SCNC: 141 MMOL/L (ref 135–144)
TOTAL PROTEIN: 6.3 G/DL (ref 6.4–8.3)
WBC # BLD: 4 K/UL (ref 3.5–11)
WBC # BLD: ABNORMAL 10*3/UL

## 2019-01-19 PROCEDURE — 94640 AIRWAY INHALATION TREATMENT: CPT

## 2019-01-19 PROCEDURE — 6360000002 HC RX W HCPCS: Performed by: INTERNAL MEDICINE

## 2019-01-19 PROCEDURE — 85025 COMPLETE CBC W/AUTO DIFF WBC: CPT

## 2019-01-19 PROCEDURE — 36415 COLL VENOUS BLD VENIPUNCTURE: CPT

## 2019-01-19 PROCEDURE — 94761 N-INVAS EAR/PLS OXIMETRY MLT: CPT

## 2019-01-19 PROCEDURE — 6370000000 HC RX 637 (ALT 250 FOR IP): Performed by: INTERNAL MEDICINE

## 2019-01-19 PROCEDURE — 1200000000 HC SEMI PRIVATE

## 2019-01-19 PROCEDURE — 2500000003 HC RX 250 WO HCPCS: Performed by: INTERNAL MEDICINE

## 2019-01-19 PROCEDURE — 80053 COMPREHEN METABOLIC PANEL: CPT

## 2019-01-19 RX ORDER — METOPROLOL TARTRATE 5 MG/5ML
5 INJECTION INTRAVENOUS EVERY 6 HOURS PRN
Status: DISCONTINUED | OUTPATIENT
Start: 2019-01-19 | End: 2019-01-20 | Stop reason: HOSPADM

## 2019-01-19 RX ADMIN — CETIRIZINE HYDROCHLORIDE 5 MG: 10 TABLET, FILM COATED ORAL at 08:28

## 2019-01-19 RX ADMIN — POTASSIUM CHLORIDE AND SODIUM CHLORIDE: 900; 150 INJECTION, SOLUTION INTRAVENOUS at 23:19

## 2019-01-19 RX ADMIN — POTASSIUM CHLORIDE AND SODIUM CHLORIDE: 900; 150 INJECTION, SOLUTION INTRAVENOUS at 01:04

## 2019-01-19 RX ADMIN — LISINOPRIL 5 MG: 5 TABLET ORAL at 08:28

## 2019-01-19 RX ADMIN — METOPROLOL TARTRATE 25 MG: 25 TABLET ORAL at 08:28

## 2019-01-19 RX ADMIN — GABAPENTIN 800 MG: 100 CAPSULE ORAL at 21:07

## 2019-01-19 RX ADMIN — INSULIN LISPRO 1 UNITS: 100 INJECTION, SOLUTION INTRAVENOUS; SUBCUTANEOUS at 08:25

## 2019-01-19 RX ADMIN — ATORVASTATIN CALCIUM 40 MG: 20 TABLET, FILM COATED ORAL at 08:27

## 2019-01-19 RX ADMIN — APIXABAN 5 MG: 5 TABLET, FILM COATED ORAL at 08:28

## 2019-01-19 RX ADMIN — OMEGA-3-ACID ETHYL ESTERS 2 G: 1 CAPSULE, LIQUID FILLED ORAL at 08:27

## 2019-01-19 RX ADMIN — INSULIN LISPRO 2 UNITS: 100 INJECTION, SOLUTION INTRAVENOUS; SUBCUTANEOUS at 16:50

## 2019-01-19 RX ADMIN — INSULIN LISPRO 1 UNITS: 100 INJECTION, SOLUTION INTRAVENOUS; SUBCUTANEOUS at 11:51

## 2019-01-19 RX ADMIN — FERROUS SULFATE TAB 325 MG (65 MG ELEMENTAL FE) 325 MG: 325 (65 FE) TAB at 08:28

## 2019-01-19 RX ADMIN — METOPROLOL TARTRATE 5 MG: 5 INJECTION, SOLUTION INTRAVENOUS at 04:09

## 2019-01-19 RX ADMIN — ALBUTEROL SULFATE 2.5 MG: 2.5 SOLUTION RESPIRATORY (INHALATION) at 16:46

## 2019-01-19 RX ADMIN — ALBUTEROL SULFATE 2.5 MG: 2.5 SOLUTION RESPIRATORY (INHALATION) at 06:28

## 2019-01-19 RX ADMIN — VITAMIN D, TAB 1000IU (100/BT) 1000 UNITS: 25 TAB at 08:28

## 2019-01-19 RX ADMIN — APIXABAN 5 MG: 5 TABLET, FILM COATED ORAL at 21:07

## 2019-01-19 RX ADMIN — POTASSIUM CHLORIDE AND SODIUM CHLORIDE: 900; 150 INJECTION, SOLUTION INTRAVENOUS at 08:26

## 2019-01-19 RX ADMIN — METHIMAZOLE 5 MG: 5 TABLET ORAL at 08:28

## 2019-01-19 RX ADMIN — METHIMAZOLE 5 MG: 5 TABLET ORAL at 21:07

## 2019-01-19 RX ADMIN — ALBUTEROL SULFATE 2.5 MG: 2.5 SOLUTION RESPIRATORY (INHALATION) at 11:26

## 2019-01-19 RX ADMIN — GABAPENTIN 800 MG: 100 CAPSULE ORAL at 08:28

## 2019-01-19 RX ADMIN — METOPROLOL TARTRATE 25 MG: 25 TABLET ORAL at 21:07

## 2019-01-19 RX ADMIN — ALBUTEROL SULFATE 2.5 MG: 2.5 SOLUTION RESPIRATORY (INHALATION) at 21:23

## 2019-01-19 RX ADMIN — INSULIN LISPRO 1 UNITS: 100 INJECTION, SOLUTION INTRAVENOUS; SUBCUTANEOUS at 21:12

## 2019-01-19 ASSESSMENT — PAIN DESCRIPTION - LOCATION
LOCATION: BACK

## 2019-01-19 ASSESSMENT — PAIN SCALES - GENERAL
PAINLEVEL_OUTOF10: 9
PAINLEVEL_OUTOF10: 9
PAINLEVEL_OUTOF10: 4
PAINLEVEL_OUTOF10: 4
PAINLEVEL_OUTOF10: 3

## 2019-01-19 ASSESSMENT — PAIN DESCRIPTION - DESCRIPTORS
DESCRIPTORS: ACHING

## 2019-01-19 ASSESSMENT — PAIN DESCRIPTION - FREQUENCY
FREQUENCY: CONTINUOUS

## 2019-01-19 ASSESSMENT — PAIN DESCRIPTION - PAIN TYPE
TYPE: CHRONIC PAIN

## 2019-01-20 VITALS
SYSTOLIC BLOOD PRESSURE: 116 MMHG | TEMPERATURE: 97.9 F | OXYGEN SATURATION: 96 % | WEIGHT: 145.1 LBS | HEIGHT: 64 IN | DIASTOLIC BLOOD PRESSURE: 80 MMHG | HEART RATE: 101 BPM | RESPIRATION RATE: 18 BRPM | BODY MASS INDEX: 24.77 KG/M2

## 2019-01-20 LAB
ALBUMIN SERPL-MCNC: 3.6 G/DL (ref 3.5–5.2)
ALBUMIN/GLOBULIN RATIO: ABNORMAL (ref 1–2.5)
ALP BLD-CCNC: 74 U/L (ref 35–104)
ALT SERPL-CCNC: 71 U/L (ref 5–33)
ANION GAP SERPL CALCULATED.3IONS-SCNC: 12 MMOL/L (ref 9–17)
AST SERPL-CCNC: 49 U/L
BILIRUB SERPL-MCNC: 0.51 MG/DL (ref 0.3–1.2)
BUN BLDV-MCNC: 12 MG/DL (ref 8–23)
BUN/CREAT BLD: 14 (ref 9–20)
CALCIUM SERPL-MCNC: 9.9 MG/DL (ref 8.6–10.4)
CHLORIDE BLD-SCNC: 111 MMOL/L (ref 98–107)
CO2: 22 MMOL/L (ref 20–31)
CREAT SERPL-MCNC: 0.83 MG/DL (ref 0.5–0.9)
GFR AFRICAN AMERICAN: >60 ML/MIN
GFR NON-AFRICAN AMERICAN: >60 ML/MIN
GFR SERPL CREATININE-BSD FRML MDRD: ABNORMAL ML/MIN/{1.73_M2}
GFR SERPL CREATININE-BSD FRML MDRD: ABNORMAL ML/MIN/{1.73_M2}
GLUCOSE BLD-MCNC: 131 MG/DL (ref 65–99)
GLUCOSE BLD-MCNC: 141 MG/DL (ref 65–99)
GLUCOSE BLD-MCNC: 154 MG/DL (ref 70–99)
POTASSIUM SERPL-SCNC: 4.3 MMOL/L (ref 3.7–5.3)
SODIUM BLD-SCNC: 145 MMOL/L (ref 135–144)
TOTAL PROTEIN: 6.3 G/DL (ref 6.4–8.3)

## 2019-01-20 PROCEDURE — 6360000002 HC RX W HCPCS: Performed by: INTERNAL MEDICINE

## 2019-01-20 PROCEDURE — 82947 ASSAY GLUCOSE BLOOD QUANT: CPT

## 2019-01-20 PROCEDURE — 6370000000 HC RX 637 (ALT 250 FOR IP): Performed by: INTERNAL MEDICINE

## 2019-01-20 PROCEDURE — 2580000003 HC RX 258: Performed by: INTERNAL MEDICINE

## 2019-01-20 PROCEDURE — 36415 COLL VENOUS BLD VENIPUNCTURE: CPT

## 2019-01-20 PROCEDURE — 80053 COMPREHEN METABOLIC PANEL: CPT

## 2019-01-20 PROCEDURE — 94761 N-INVAS EAR/PLS OXIMETRY MLT: CPT

## 2019-01-20 RX ORDER — NALBUPHINE HCL 10 MG/ML
5 AMPUL (ML) INJECTION ONCE
Status: COMPLETED | OUTPATIENT
Start: 2019-01-20 | End: 2019-01-20

## 2019-01-20 RX ADMIN — ATORVASTATIN CALCIUM 40 MG: 20 TABLET, FILM COATED ORAL at 08:08

## 2019-01-20 RX ADMIN — FERROUS SULFATE TAB 325 MG (65 MG ELEMENTAL FE) 325 MG: 325 (65 FE) TAB at 08:09

## 2019-01-20 RX ADMIN — LISINOPRIL 5 MG: 5 TABLET ORAL at 08:09

## 2019-01-20 RX ADMIN — OMEGA-3-ACID ETHYL ESTERS 2 G: 1 CAPSULE, LIQUID FILLED ORAL at 08:08

## 2019-01-20 RX ADMIN — ONDANSETRON 4 MG: 2 INJECTION INTRAMUSCULAR; INTRAVENOUS at 09:23

## 2019-01-20 RX ADMIN — VITAMIN D, TAB 1000IU (100/BT) 1000 UNITS: 25 TAB at 08:09

## 2019-01-20 RX ADMIN — CETIRIZINE HYDROCHLORIDE 5 MG: 10 TABLET, FILM COATED ORAL at 08:09

## 2019-01-20 RX ADMIN — METOPROLOL TARTRATE 25 MG: 25 TABLET ORAL at 08:09

## 2019-01-20 RX ADMIN — APIXABAN 5 MG: 5 TABLET, FILM COATED ORAL at 08:09

## 2019-01-20 RX ADMIN — GABAPENTIN 800 MG: 100 CAPSULE ORAL at 08:08

## 2019-01-20 RX ADMIN — Medication 10 ML: at 08:12

## 2019-01-20 RX ADMIN — INSULIN LISPRO 1 UNITS: 100 INJECTION, SOLUTION INTRAVENOUS; SUBCUTANEOUS at 12:10

## 2019-01-20 RX ADMIN — Medication 10 ML: at 09:23

## 2019-01-20 RX ADMIN — NALBUPHINE HYDROCHLORIDE 5 MG: 10 INJECTION, SOLUTION INTRAMUSCULAR; INTRAVENOUS; SUBCUTANEOUS at 07:05

## 2019-01-20 RX ADMIN — METHIMAZOLE 5 MG: 5 TABLET ORAL at 08:09

## 2019-01-20 ASSESSMENT — PAIN SCALES - GENERAL
PAINLEVEL_OUTOF10: 4
PAINLEVEL_OUTOF10: 9
PAINLEVEL_OUTOF10: 0
PAINLEVEL_OUTOF10: 5

## 2019-01-20 ASSESSMENT — PAIN DESCRIPTION - DESCRIPTORS
DESCRIPTORS: ACHING
DESCRIPTORS: ACHING

## 2019-01-20 ASSESSMENT — PAIN DESCRIPTION - LOCATION
LOCATION: BACK
LOCATION: BACK

## 2019-01-20 ASSESSMENT — PAIN DESCRIPTION - PAIN TYPE
TYPE: CHRONIC PAIN
TYPE: CHRONIC PAIN

## 2019-01-21 ENCOUNTER — TELEPHONE (OUTPATIENT)
Dept: FAMILY MEDICINE CLINIC | Age: 64
End: 2019-01-21

## 2019-01-23 ENCOUNTER — CARE COORDINATION (OUTPATIENT)
Dept: CARE COORDINATION | Age: 64
End: 2019-01-23

## 2019-01-27 ENCOUNTER — CARE COORDINATION (OUTPATIENT)
Dept: CARE COORDINATION | Age: 64
End: 2019-01-27

## 2019-01-29 DIAGNOSIS — E11.43 TYPE 2 DIABETES MELLITUS WITH DIABETIC AUTONOMIC NEUROPATHY, WITH LONG-TERM CURRENT USE OF INSULIN (HCC): ICD-10-CM

## 2019-01-29 DIAGNOSIS — Z79.4 TYPE 2 DIABETES MELLITUS WITH DIABETIC AUTONOMIC NEUROPATHY, WITH LONG-TERM CURRENT USE OF INSULIN (HCC): ICD-10-CM

## 2019-01-29 RX ORDER — PEN NEEDLE, DIABETIC 31 GX3/16"
NEEDLE, DISPOSABLE MISCELLANEOUS
Qty: 100 EACH | Refills: 3 | Status: SHIPPED | OUTPATIENT
Start: 2019-01-29 | End: 2019-05-20 | Stop reason: SDUPTHER

## 2019-01-29 RX ORDER — FERROUS SULFATE 325(65) MG
325 TABLET ORAL DAILY
Qty: 90 TABLET | Refills: 0 | Status: SHIPPED | OUTPATIENT
Start: 2019-01-29 | End: 2019-04-22 | Stop reason: SDUPTHER

## 2019-01-31 ENCOUNTER — CARE COORDINATION (OUTPATIENT)
Dept: CARE COORDINATION | Age: 64
End: 2019-01-31

## 2019-02-04 ENCOUNTER — CARE COORDINATION (OUTPATIENT)
Dept: CARE COORDINATION | Age: 64
End: 2019-02-04

## 2019-02-05 ENCOUNTER — OFFICE VISIT (OUTPATIENT)
Dept: FAMILY MEDICINE CLINIC | Age: 64
End: 2019-02-05
Payer: COMMERCIAL

## 2019-02-05 VITALS
BODY MASS INDEX: 26.09 KG/M2 | DIASTOLIC BLOOD PRESSURE: 70 MMHG | SYSTOLIC BLOOD PRESSURE: 112 MMHG | OXYGEN SATURATION: 95 % | HEART RATE: 88 BPM | WEIGHT: 152 LBS

## 2019-02-05 DIAGNOSIS — I48.0 PAROXYSMAL ATRIAL FIBRILLATION (HCC): ICD-10-CM

## 2019-02-05 DIAGNOSIS — Z79.4 TYPE 2 DIABETES MELLITUS WITHOUT COMPLICATION, WITH LONG-TERM CURRENT USE OF INSULIN (HCC): ICD-10-CM

## 2019-02-05 DIAGNOSIS — E11.9 TYPE 2 DIABETES MELLITUS WITHOUT COMPLICATION, WITH LONG-TERM CURRENT USE OF INSULIN (HCC): ICD-10-CM

## 2019-02-05 DIAGNOSIS — R11.2 INTRACTABLE VOMITING WITH NAUSEA, UNSPECIFIED VOMITING TYPE: Primary | ICD-10-CM

## 2019-02-05 DIAGNOSIS — E05.00 GRAVES' DISEASE: ICD-10-CM

## 2019-02-05 PROBLEM — N18.2 CKD (CHRONIC KIDNEY DISEASE) STAGE 2, GFR 60-89 ML/MIN: Status: RESOLVED | Noted: 2018-04-26 | Resolved: 2019-02-05

## 2019-02-05 PROCEDURE — 3045F PR MOST RECENT HEMOGLOBIN A1C LEVEL 7.0-9.0%: CPT | Performed by: INTERNAL MEDICINE

## 2019-02-05 PROCEDURE — 99214 OFFICE O/P EST MOD 30 MIN: CPT | Performed by: INTERNAL MEDICINE

## 2019-02-05 PROCEDURE — 1036F TOBACCO NON-USER: CPT | Performed by: INTERNAL MEDICINE

## 2019-02-05 PROCEDURE — 3017F COLORECTAL CA SCREEN DOC REV: CPT | Performed by: INTERNAL MEDICINE

## 2019-02-05 PROCEDURE — G8427 DOCREV CUR MEDS BY ELIG CLIN: HCPCS | Performed by: INTERNAL MEDICINE

## 2019-02-05 PROCEDURE — G8484 FLU IMMUNIZE NO ADMIN: HCPCS | Performed by: INTERNAL MEDICINE

## 2019-02-05 PROCEDURE — 2022F DILAT RTA XM EVC RTNOPTHY: CPT | Performed by: INTERNAL MEDICINE

## 2019-02-05 PROCEDURE — G8417 CALC BMI ABV UP PARAM F/U: HCPCS | Performed by: INTERNAL MEDICINE

## 2019-02-05 RX ORDER — AMLODIPINE BESYLATE 10 MG/1
10 TABLET ORAL DAILY
COMMUNITY
End: 2019-03-25 | Stop reason: SDUPTHER

## 2019-02-05 ASSESSMENT — ENCOUNTER SYMPTOMS
ABDOMINAL PAIN: 0
SORE THROAT: 0
COUGH: 0
NAUSEA: 0
SHORTNESS OF BREATH: 0

## 2019-02-07 ENCOUNTER — CARE COORDINATION (OUTPATIENT)
Dept: CARE COORDINATION | Age: 64
End: 2019-02-07

## 2019-02-11 ENCOUNTER — CARE COORDINATION (OUTPATIENT)
Dept: CARE COORDINATION | Age: 64
End: 2019-02-11

## 2019-02-12 ENCOUNTER — CARE COORDINATION (OUTPATIENT)
Dept: CARE COORDINATION | Age: 64
End: 2019-02-12

## 2019-02-19 ENCOUNTER — HOSPITAL ENCOUNTER (OUTPATIENT)
Age: 64
Discharge: HOME | End: 2019-02-19
Payer: COMMERCIAL

## 2019-02-19 LAB
ALBUMIN SERPL-MCNC: 4.5 G/DL (ref 3.5–5.2)
ALBUMIN/GLOBULIN RATIO: ABNORMAL (ref 1–2.5)
ALP BLD-CCNC: 101 U/L (ref 35–104)
ALT SERPL-CCNC: 29 U/L (ref 5–33)
ANION GAP SERPL CALCULATED.3IONS-SCNC: 13 MMOL/L (ref 9–17)
AST SERPL-CCNC: 24 U/L
BILIRUB SERPL-MCNC: 0.56 MG/DL (ref 0.3–1.2)
BUN BLDV-MCNC: 29 MG/DL (ref 8–23)
BUN/CREAT BLD: 30 (ref 9–20)
CALCIUM SERPL-MCNC: 10.2 MG/DL (ref 8.6–10.4)
CHLORIDE BLD-SCNC: 102 MMOL/L (ref 98–107)
CO2: 24 MMOL/L (ref 20–31)
CREAT SERPL-MCNC: 0.98 MG/DL (ref 0.5–0.9)
GFR AFRICAN AMERICAN: >60 ML/MIN
GFR NON-AFRICAN AMERICAN: 58 ML/MIN
GFR SERPL CREATININE-BSD FRML MDRD: ABNORMAL ML/MIN/{1.73_M2}
GFR SERPL CREATININE-BSD FRML MDRD: ABNORMAL ML/MIN/{1.73_M2}
GLUCOSE BLD-MCNC: 217 MG/DL (ref 70–99)
POTASSIUM SERPL-SCNC: 3.8 MMOL/L (ref 3.7–5.3)
SODIUM BLD-SCNC: 139 MMOL/L (ref 135–144)
T3 FREE: 10.08 PG/ML (ref 2.02–4.43)
THYROXINE, FREE: 3.61 NG/DL (ref 0.93–1.7)
TOTAL PROTEIN: 7.6 G/DL (ref 6.4–8.3)
TSH SERPL DL<=0.05 MIU/L-ACNC: <0.01 MIU/L (ref 0.3–5)

## 2019-02-19 PROCEDURE — 36415 COLL VENOUS BLD VENIPUNCTURE: CPT

## 2019-02-19 PROCEDURE — 84481 FREE ASSAY (FT-3): CPT

## 2019-02-19 PROCEDURE — 84443 ASSAY THYROID STIM HORMONE: CPT

## 2019-02-19 PROCEDURE — 80053 COMPREHEN METABOLIC PANEL: CPT

## 2019-02-19 PROCEDURE — 84439 ASSAY OF FREE THYROXINE: CPT

## 2019-02-20 ENCOUNTER — CARE COORDINATION (OUTPATIENT)
Dept: CARE COORDINATION | Age: 64
End: 2019-02-20

## 2019-02-21 ENCOUNTER — CARE COORDINATION (OUTPATIENT)
Dept: CARE COORDINATION | Age: 64
End: 2019-02-21

## 2019-03-04 ENCOUNTER — CARE COORDINATION (OUTPATIENT)
Dept: CARE COORDINATION | Age: 64
End: 2019-03-04

## 2019-03-11 ENCOUNTER — CARE COORDINATION (OUTPATIENT)
Dept: CARE COORDINATION | Age: 64
End: 2019-03-11

## 2019-03-13 ENCOUNTER — CARE COORDINATION (OUTPATIENT)
Dept: CARE COORDINATION | Age: 64
End: 2019-03-13

## 2019-03-25 ENCOUNTER — CARE COORDINATION (OUTPATIENT)
Dept: CARE COORDINATION | Age: 64
End: 2019-03-25

## 2019-03-25 ENCOUNTER — OFFICE VISIT (OUTPATIENT)
Dept: FAMILY MEDICINE CLINIC | Age: 64
End: 2019-03-25
Payer: COMMERCIAL

## 2019-03-25 VITALS
WEIGHT: 145 LBS | OXYGEN SATURATION: 97 % | SYSTOLIC BLOOD PRESSURE: 126 MMHG | HEIGHT: 64 IN | HEART RATE: 79 BPM | DIASTOLIC BLOOD PRESSURE: 90 MMHG | TEMPERATURE: 97.3 F | BODY MASS INDEX: 24.75 KG/M2

## 2019-03-25 DIAGNOSIS — R11.2 NAUSEA AND VOMITING, INTRACTABILITY OF VOMITING NOT SPECIFIED, UNSPECIFIED VOMITING TYPE: ICD-10-CM

## 2019-03-25 DIAGNOSIS — J44.1 COPD EXACERBATION (HCC): Primary | ICD-10-CM

## 2019-03-25 DIAGNOSIS — E78.2 MIXED HYPERLIPIDEMIA: ICD-10-CM

## 2019-03-25 DIAGNOSIS — I48.0 PAROXYSMAL ATRIAL FIBRILLATION (HCC): ICD-10-CM

## 2019-03-25 DIAGNOSIS — I10 ESSENTIAL HYPERTENSION: ICD-10-CM

## 2019-03-25 PROCEDURE — G8420 CALC BMI NORM PARAMETERS: HCPCS | Performed by: INTERNAL MEDICINE

## 2019-03-25 PROCEDURE — 3017F COLORECTAL CA SCREEN DOC REV: CPT | Performed by: INTERNAL MEDICINE

## 2019-03-25 PROCEDURE — 99213 OFFICE O/P EST LOW 20 MIN: CPT | Performed by: INTERNAL MEDICINE

## 2019-03-25 PROCEDURE — G8926 SPIRO NO PERF OR DOC: HCPCS | Performed by: INTERNAL MEDICINE

## 2019-03-25 PROCEDURE — 1036F TOBACCO NON-USER: CPT | Performed by: INTERNAL MEDICINE

## 2019-03-25 PROCEDURE — 3023F SPIROM DOC REV: CPT | Performed by: INTERNAL MEDICINE

## 2019-03-25 PROCEDURE — G8484 FLU IMMUNIZE NO ADMIN: HCPCS | Performed by: INTERNAL MEDICINE

## 2019-03-25 PROCEDURE — G8427 DOCREV CUR MEDS BY ELIG CLIN: HCPCS | Performed by: INTERNAL MEDICINE

## 2019-03-25 RX ORDER — PREDNISONE 20 MG/1
20 TABLET ORAL DAILY
Qty: 5 TABLET | Refills: 0 | Status: SHIPPED | OUTPATIENT
Start: 2019-03-25 | End: 2019-03-30

## 2019-03-25 RX ORDER — BENZONATATE 100 MG/1
100 CAPSULE ORAL 3 TIMES DAILY PRN
Qty: 15 CAPSULE | Refills: 0 | Status: SHIPPED | OUTPATIENT
Start: 2019-03-25 | End: 2019-03-30

## 2019-03-25 RX ORDER — ATORVASTATIN CALCIUM 40 MG/1
TABLET, FILM COATED ORAL
Qty: 30 TABLET | Refills: 11 | Status: SHIPPED | OUTPATIENT
Start: 2019-03-25 | End: 2019-10-14 | Stop reason: SDUPTHER

## 2019-03-25 RX ORDER — METHIMAZOLE 5 MG/1
10 TABLET ORAL
COMMUNITY
Start: 2019-03-04 | End: 2019-07-13 | Stop reason: ALTCHOICE

## 2019-03-25 RX ORDER — AZITHROMYCIN 500 MG/1
500 TABLET, FILM COATED ORAL DAILY
Qty: 5 TABLET | Refills: 0 | Status: SHIPPED | OUTPATIENT
Start: 2019-03-25 | End: 2019-03-30

## 2019-03-25 RX ORDER — AMLODIPINE BESYLATE 10 MG/1
10 TABLET ORAL DAILY
Qty: 30 TABLET | Refills: 5 | Status: SHIPPED | OUTPATIENT
Start: 2019-03-25 | End: 2019-10-14

## 2019-03-25 RX ORDER — ONDANSETRON 4 MG/1
4 TABLET, FILM COATED ORAL 3 TIMES DAILY PRN
Qty: 20 TABLET | Refills: 0 | Status: SHIPPED
Start: 2019-03-25 | End: 2020-04-17 | Stop reason: SDUPTHER

## 2019-03-25 RX ORDER — HYDROCHLOROTHIAZIDE 50 MG/1
TABLET ORAL
Qty: 30 TABLET | Refills: 5 | Status: SHIPPED | OUTPATIENT
Start: 2019-03-25 | End: 2019-08-23 | Stop reason: SDUPTHER

## 2019-03-25 ASSESSMENT — ENCOUNTER SYMPTOMS
SHORTNESS OF BREATH: 0
VOMITING: 1
COUGH: 1
WHEEZING: 1
EYES NEGATIVE: 1
CHEST TIGHTNESS: 0
RHINORRHEA: 1
ALLERGIC/IMMUNOLOGIC NEGATIVE: 1
ABDOMINAL PAIN: 0
NAUSEA: 1
BACK PAIN: 1
SORE THROAT: 0

## 2019-03-25 ASSESSMENT — PATIENT HEALTH QUESTIONNAIRE - PHQ9
SUM OF ALL RESPONSES TO PHQ QUESTIONS 1-9: 0
1. LITTLE INTEREST OR PLEASURE IN DOING THINGS: 0
2. FEELING DOWN, DEPRESSED OR HOPELESS: 0
SUM OF ALL RESPONSES TO PHQ9 QUESTIONS 1 & 2: 0
SUM OF ALL RESPONSES TO PHQ QUESTIONS 1-9: 0

## 2019-03-26 ENCOUNTER — CARE COORDINATION (OUTPATIENT)
Dept: CARE COORDINATION | Age: 64
End: 2019-03-26

## 2019-03-27 ASSESSMENT — ENCOUNTER SYMPTOMS
ANAL BLEEDING: 0
CONSTIPATION: 0
DIARRHEA: 0

## 2019-04-02 ENCOUNTER — CARE COORDINATION (OUTPATIENT)
Dept: CARE COORDINATION | Age: 64
End: 2019-04-02

## 2019-04-02 NOTE — CARE COORDINATION
called and spoke with [place]. [place] reports that she is doing well. [place] stated that she is taking her medication as prescribed and it has helped with her COPD.  inquired about Dianna's food and supply and utility bills. [place] reports that she is getting low on food however her box of meals and the basics (bread, milk, and fruit cups) are scheduled to be delivered today. [place] also reports that she was unable to receive help from agencies due to not having proof of income.  inquired if [place] had a bank statement or slip from income being deposited into her account? [place] reports that she does not have a savings or checking account. [place] reports that her money is just placed on a card.  inquired if she has spoken to the bank for a print out of what is put on her card? [place] reports she has tried to talk to her bank but they were very rude to her. [place] reports \" I will just pay it\". [place] reports that her water bill is usually $35 dollars. [place] reports that she has so much late fees on her bill she owes over $100 to the water company.  encouraged [place] to call her old employment where she receives her pension payment from to request a printout. [place] stated \"ok\". [place] requested assistance with setting up transportation for her upcoming appointment on 4/4.  called [phone] Centralized intake to schedule transportation. Transportation was already arranged for Jere appointment on the 4/4.       Plan:     [place] will call her and request a print out of pension payment    [place] will use available resources for utilities    Danelle will attend her upcoming doctor's appointment on 4/4

## 2019-04-05 ENCOUNTER — CARE COORDINATION (OUTPATIENT)
Dept: CARE COORDINATION | Age: 64
End: 2019-04-05

## 2019-04-05 NOTE — CARE COORDINATION
denies any questions currently  · Does patient have any cost issues with medications No, denies at this time. -patient, \"getting her medications through [facility]. .    Assessments completed:   Diabetes Assessment    Meal Planning:  Avoidance of concentrated sweets   How often do you test your blood sugar?:  Daily   Do you have barriers with adherence to non-pharmacologic self-management interventions? (Nutrition/Exercise/Self-Monitoring):  Yes   Have you ever had to go to the ED for symptoms of low blood sugar?:  No       No patient-reported symptoms   Do you have hyperglycemia symptoms?:  No   Do you have hypoglycemia symptoms?:  No   Blood Sugar Monitoring Regimen:  Before Meals   Blood Sugar Trends:  No Change (Comment: \"blood sugars controlled currently\")      ,   COPD Assessment    Does the patient understand envrionmental exposure?:  Yes  Is the patient able to verbalize Rescue vs. Long Acting medications?:  Yes  Does the patient have a nebulizer?:  Yes     No patient-reported symptoms         Symptoms:      Symptom course:  stable (Comment: Using Nebulizer machine as needed. Typically, \"uses at bedtime\")  Increase use of rapid acting/rescue inhaled medications?:  No (Comment: \"takes Ventolin inhaler with her when she goes out of the house\")  Change in chronic cough?:  No/At Baseline  Have you had a recent diagnosis of pneumonia either by PCP or at a hospital?:  No      and   General Assessment    Do you have any symptoms that are causing concern?:  No           Zone Management tool reviewed today: Yes, Diabetes, COPD    Reviewed social needs/Home Needs if any:     · Does patient have enough food? Yes,     · Does patient have transportation to appointment/store/pharmacy, etc?Yes    · Does patient need any help in the home? no    ·  Is patient independent with ADL's/IADL'S?yes, denies needing any help     Reviewed Upcoming follow up appointments with   · 05/28/19  Will be Dr. Hermila Preston, Endocrinologist for thyroid monitoring  · :    Goals reviewed. Patient to continue to work to improve:   Goals        Care Coordination     Conditions and Symptoms      I will schedule office visits, as directed by my provider. I will keep my appointment or reschedule if I have to cancel. I will notify my provider of any barriers to my plan of care. I will notify my provider of any symptoms that indicate a worsening of my condition. Patient to check blood sugars. Patient to follow up endocrinology. Patient to try to keep on carbohydrate controlled diet as best as she can, even though she has difficulties with always being able to afford a wide variety of foods. Patient to follow up with Dr. Kelsey Tam as directed. Patient call and arrange transportation through [employer] at [phone], and through [employer] [phone]    Barriers: financial, lack of support, overwhelmed by complexity of regimen and stress  Plan for overcoming my barriers: patient to continue to follow up with [facility] /; this nurse CC will continue to follow up. Confidence: 6/10  Anticipated Goal Completion Date: 04/01/19    Update:   -patient with new passport assessment, denies further need for home health aides/passport service. 0patient to stay independent with keeping her apartment clean  -patient to stay compliant with medications. Medications come pre-packaged through [facility]  -patient's COPD to stay controlled.   -patient to use nebulizer breathing treatments and inhaler as  Needed.  -Patient to take blood sugars daily, continue Diabetic medications; monitor blood sugars and keep log  -Patient to stay compliant with Dr. Lucas Foote follow up appointments  -patient to continue follow up with dr. Cortes Sanchez, call ahead for [facility] transportation if needed.   Anticipated Goal completion Date: 06/02/19         Nutrition Plan      I will follow a nutritional plan as directed   Calorie Controlled:   1400cals/d carb consistent    Barriers: lack of education  Plan for overcoming my barriers: CC dietitian to educate on reading food labels, measuring out portions and carb counting  Confidence: 7/10  Anticipated Goal Completion Date: 04/30/2019  Updated goal- 2/20/19 continue to work on portion control, learning serving size              Education Reviewed with patient today: See Education Module. · Reviewed plan of care with patient Patient denies any questions today. · Patient to reach out to care coordinator at [phone] with questions. · Reminded patient of walk in care clinic availability/hours; and when to utilize the facility if MD office not available. Follow-up care is a key part of your treatment and safety. Be sure to make and go to all appointments, and call your doctor if you are having problems. It's also a good idea to know your test results and keep a list of the medicines you take. Patient  encouraged to contact PCP office for any questions or concerns. patient verbalizes understanding. Care Coordinator Plan of Care: This nurse CC will plan to follow up with patient in 2-3 weeks. Follow up on self care of apartment with, \"[employer] services stopping\". -requested update from [employer]  regarding, will [place] [facility]   -will follow up on Blood sugars, COPD, any other new concerns. -patient feels safe in her home. .                 Care Coordination Interventions    Program Enrollment:  Complex Care  Referral from Primary Care Provider:  Yes  Suggested Interventions and Community Resources  Diabetes Education: In Process (Comment: requesting order for Diabetic Ed)  Home Care Waiver: In Process (Comment: faxed passport referral)  Meals on Wheels:   In Process (Comment: contacted [facility], they will start 04/16)  Medi Set or Pill Pack:  Completed (Comment: [facility] has [facility] that prefills her medications and mails them to her)  Registered Dietician:  Completed  Social Work:  In Process  Transportation Support: In Process           Prior to Admission medications    Medication Sig Start Date End Date Taking? Authorizing Provider   methimazole (TAPAZOLE) 5 MG tablet Take 10 mg by mouth 3/4/19   Historical Provider, MD   amLODIPine (NORVASC) 10 MG tablet Take 1 tablet by mouth daily 3/25/19 4/24/19  George Brink MD   atorvastatin (LIPITOR) 40 MG tablet TAKE 1 TABLET BY MOUTH ONCE DAILY 3/25/19   George Brink MD   apixaban (ELIQUIS) 5 MG TABS tablet Take 1 tablet by mouth 2 times daily 3/25/19   George Brink MD   hydrochlorothiazide (HYDRODIURIL) 50 MG tablet TAKE 1 TABLET BY MOUTH ONCE DAILY 3/25/19   George Brink MD   ondansetron (ZOFRAN) 4 MG tablet Take 1 tablet by mouth 3 times daily as needed for Nausea or Vomiting 3/25/19   George Brink MD   TRUEPLUS PEN NEEDLES 31G X 5 MM MISC USE AS DIRECTED DAILY 1/29/19   George Brink MD   FEROSUL 325 (65 Fe) MG tablet TAKE 1 TABLET BY MOUTH DAILY 1/29/19   George Brink MD   TRULICITY 6.96 UQ/7.9BB SOPN Inject 0.75 mg into the skin once a week 0.75mg/0.5ml 1/15/19   Historical Provider, MD   gabapentin (NEURONTIN) 800 MG tablet Take 1 tablet by mouth 2 times daily for 30 days. . 1/14/19 3/25/19  George Brink MD   naproxen (NAPROSYN) 375 MG tablet Take 1 tablet by mouth 2 times daily 1/14/19   Isadore Cockayne, MD   lisinopril (PRINIVIL;ZESTRIL) 5 MG tablet Take 1 tablet by mouth daily 1/4/19   George Brink MD   potassium chloride (KLOR-CON M) 20 MEQ extended release tablet Take 1 tablet by mouth daily 12/18/18   Austin Miller MD   oxybutynin (DITROPAN XL) 10 MG extended release tablet Take 1 tablet by mouth daily 12/11/18   Elvie Posadas MD   Insulin Glargine [facility] KWIKPEN SC) Inject 25 Units into the skin nightly Per Dr. Laren Mcardle Provider, MD   Multiple Vitamin (MULTI-VITAMINS) TABS TAKE ONE (1) TABLET BY MOUTH ONCE DAILY 10/23/18

## 2019-04-08 ENCOUNTER — CARE COORDINATION (OUTPATIENT)
Dept: CARE COORDINATION | Age: 64
End: 2019-04-08

## 2019-04-08 NOTE — CARE COORDINATION
Reason For Call:   -Phone call from bonnie Webb  to inform that patient did have assessment for stopping passport services, and per patient, she did not feel she needed the help any further with bathing/showering\". - States, \"with patient not needing help in this area, patient no longer meets level of care\". Therefore, \"patient will now be removed from Passport services\". -patient was agreeable to this    -patient can call job/family services to request transportation assistance through Medicaid benefit, if would need help with transportation. Care Coordination Plan of Care: This nurse Care Coordinator will follow up with patient in 2 weeks to assess h ome needs, follow up to see how patient is doing with no passport services.   -Patient had no new concerns on Friday 04/05/19 when this nurse CC followed up with her.

## 2019-04-11 DIAGNOSIS — M50.90 CERVICAL NECK PAIN WITH EVIDENCE OF DISC DISEASE: ICD-10-CM

## 2019-04-11 RX ORDER — MELATONIN
Qty: 90 TABLET | Refills: 11 | Status: SHIPPED | OUTPATIENT
Start: 2019-04-11 | End: 2020-04-14

## 2019-04-11 RX ORDER — CHLORAL HYDRATE 500 MG
2000 CAPSULE ORAL DAILY
Qty: 180 CAPSULE | Refills: 11 | Status: SHIPPED | OUTPATIENT
Start: 2019-04-11 | End: 2020-04-14

## 2019-04-11 RX ORDER — GABAPENTIN 800 MG/1
TABLET ORAL
Qty: 60 TABLET | Refills: 3 | Status: SHIPPED | OUTPATIENT
Start: 2019-04-11 | End: 2019-07-26 | Stop reason: SDUPTHER

## 2019-04-12 ENCOUNTER — OFFICE VISIT (OUTPATIENT)
Dept: FAMILY MEDICINE CLINIC | Age: 64
End: 2019-04-12
Payer: COMMERCIAL

## 2019-04-12 VITALS
WEIGHT: 165 LBS | BODY MASS INDEX: 28.17 KG/M2 | HEART RATE: 74 BPM | SYSTOLIC BLOOD PRESSURE: 110 MMHG | DIASTOLIC BLOOD PRESSURE: 80 MMHG | HEIGHT: 64 IN | OXYGEN SATURATION: 93 %

## 2019-04-12 DIAGNOSIS — E05.00 GRAVES' DISEASE: ICD-10-CM

## 2019-04-12 DIAGNOSIS — E11.43 TYPE 2 DIABETES MELLITUS WITH DIABETIC AUTONOMIC NEUROPATHY, WITH LONG-TERM CURRENT USE OF INSULIN (HCC): Primary | ICD-10-CM

## 2019-04-12 DIAGNOSIS — M25.551 PAIN OF BOTH HIP JOINTS: ICD-10-CM

## 2019-04-12 DIAGNOSIS — I10 ESSENTIAL HYPERTENSION: ICD-10-CM

## 2019-04-12 DIAGNOSIS — Z79.4 TYPE 2 DIABETES MELLITUS WITH DIABETIC AUTONOMIC NEUROPATHY, WITH LONG-TERM CURRENT USE OF INSULIN (HCC): Primary | ICD-10-CM

## 2019-04-12 DIAGNOSIS — J44.9 ASTHMA WITH COPD (HCC): ICD-10-CM

## 2019-04-12 DIAGNOSIS — M25.552 PAIN OF BOTH HIP JOINTS: ICD-10-CM

## 2019-04-12 LAB — HBA1C MFR BLD: 7.6 %

## 2019-04-12 PROCEDURE — G8427 DOCREV CUR MEDS BY ELIG CLIN: HCPCS | Performed by: INTERNAL MEDICINE

## 2019-04-12 PROCEDURE — 99214 OFFICE O/P EST MOD 30 MIN: CPT | Performed by: INTERNAL MEDICINE

## 2019-04-12 PROCEDURE — G8417 CALC BMI ABV UP PARAM F/U: HCPCS | Performed by: INTERNAL MEDICINE

## 2019-04-12 PROCEDURE — 3045F PR MOST RECENT HEMOGLOBIN A1C LEVEL 7.0-9.0%: CPT | Performed by: INTERNAL MEDICINE

## 2019-04-12 PROCEDURE — 1036F TOBACCO NON-USER: CPT | Performed by: INTERNAL MEDICINE

## 2019-04-12 PROCEDURE — 3017F COLORECTAL CA SCREEN DOC REV: CPT | Performed by: INTERNAL MEDICINE

## 2019-04-12 PROCEDURE — G8926 SPIRO NO PERF OR DOC: HCPCS | Performed by: INTERNAL MEDICINE

## 2019-04-12 PROCEDURE — 83036 HEMOGLOBIN GLYCOSYLATED A1C: CPT | Performed by: INTERNAL MEDICINE

## 2019-04-12 PROCEDURE — 2022F DILAT RTA XM EVC RTNOPTHY: CPT | Performed by: INTERNAL MEDICINE

## 2019-04-12 PROCEDURE — 3023F SPIROM DOC REV: CPT | Performed by: INTERNAL MEDICINE

## 2019-04-12 RX ORDER — ATORVASTATIN CALCIUM 40 MG/1
TABLET, FILM COATED ORAL
Qty: 30 TABLET | Refills: 11 | Status: SHIPPED | OUTPATIENT
Start: 2019-04-12 | End: 2019-07-31 | Stop reason: SDUPTHER

## 2019-04-12 RX ORDER — CYCLOBENZAPRINE HCL 5 MG
TABLET ORAL
Qty: 90 TABLET | Refills: 5 | Status: SHIPPED | OUTPATIENT
Start: 2019-04-12 | End: 2019-08-01 | Stop reason: SDUPTHER

## 2019-04-12 ASSESSMENT — ENCOUNTER SYMPTOMS
BACK PAIN: 1
COUGH: 0
DIARRHEA: 0
WHEEZING: 0
ABDOMINAL PAIN: 0
VOMITING: 0
SHORTNESS OF BREATH: 0
DIFFICULTY BREATHING: 0
SORE THROAT: 0
NAUSEA: 0
CONSTIPATION: 0
ORTHOPNEA: 0
HOARSE VOICE: 0
TROUBLE SWALLOWING: 0
BLOOD IN STOOL: 0
CHEST TIGHTNESS: 0

## 2019-04-12 ASSESSMENT — COPD QUESTIONNAIRES: COPD: 1

## 2019-04-12 NOTE — PROGRESS NOTES
HPI Notes    Name: Clem Miller  : 1955         Chief Complaint:     Chief Complaint   Patient presents with    Hypertension    Diabetes     Last A1C was 7.2 on 19, states they have been running good.  COPD       History of Present Illness:        Awa Packer present to office to follow up for DM, HTN and COPD. She gained about 20 lbs in one month. Says has more swelling around stomach and in her feet/ ankles. Says has no abdominal pain, bowels moving ok. Has no CP, SOB. HAs Graves disease, s/p radioactive iodine ablation. Follows up with endocrinologist  at [facility].  has an appointment with him in May.  has been eating more lately. Hypertension   This is a chronic problem. The current episode started more than 1 year ago. The problem is controlled. Associated symptoms include peripheral edema. Pertinent negatives include no chest pain, headaches, orthopnea, palpitations or shortness of breath. Agents associated with hypertension include NSAIDs. Risk factors for coronary artery disease include diabetes mellitus, dyslipidemia, post-menopausal state and sedentary lifestyle. Past treatments include ACE inhibitors, beta blockers, calcium channel blockers and diuretics. The current treatment provides significant improvement. There are no compliance problems. There is no history of kidney disease, CAD/MI, CVA or heart failure. Diabetes   She presents for her follow-up diabetic visit. She has type 2 diabetes mellitus. Her disease course has been stable. Pertinent negatives for hypoglycemia include no dizziness, headaches, nervousness/anxiousness or tremors. There are no diabetic associated symptoms. Pertinent negatives for diabetes include no chest pain and no fatigue. There are no hypoglycemic complications. Pertinent negatives for diabetic complications include no CVA, heart disease, nephropathy or peripheral neuropathy.  Risk factors for coronary artery disease include diabetes mellitus, dyslipidemia, hypertension, post-menopausal, sedentary lifestyle and tobacco exposure. Current diabetic treatment includes insulin injections (has not been taking Trulicity due to nausea). She is compliant with treatment all of the time. Her weight is increasing rapidly. She is following a generally unhealthy diet. Her overall blood glucose range is 140-180 mg/dl. An ACE inhibitor/angiotensin II receptor blocker is being taken. COPD   There is no cough, difficulty breathing, hoarse voice, shortness of breath or wheezing. This is a chronic problem. The current episode started more than 1 year ago. The problem occurs every several days. The problem has been unchanged. Pertinent negatives include no appetite change, chest pain, dyspnea on exertion, ear congestion, ear pain, fever, headaches, sneezing, sore throat or trouble swallowing. Her symptoms are aggravated by change in weather, exposure to smoke and URI. Her symptoms are alleviated by beta-agonist. She reports complete improvement on treatment. Her past medical history is significant for asthma and COPD.            Past Medical History:     Past Medical History:   Diagnosis Date    Asthma     Atrial fibrillation ([address]     COPD (chronic obstructive pulmonary disease) ([address]     DDD (degenerative disc disease)     Diabetes mellitus ([address]     Hyperlipidemia     Hypertension     Hyperthyroidism     Type II or unspecified type diabetes mellitus without mention of complication, not stated as uncontrolled       Reviewed all health maintenance requirements and orderedappropriate tests  Health Maintenance Due   Topic Date Due    Shingles Vaccine (1 of 2) 08/01/2006    Diabetic foot exam  04/26/2019       Past Surgical History:     Past Surgical History:   Procedure Laterality Date    BACK SURGERY      COLONOSCOPY  04/23/14   [facility] DENTAL SURGERY N/A 3/8/2017    REMOVAL OF BILATERAL MANDIBULAR LELO AND MAXILLARY EDENTULOUS ALVEOPLASTY performed by Jimenez Velarde DDS at [facility]      [facility] [address]      UPPER GASTROINTESTINAL ENDOSCOPY  3/16/2016    Peg tube insertion        Medications:       Prior to Admission medications    Medication Sig Start Date End Date Taking?  Authorizing Provider   cyclobenzaprine (FLEXERIL) 5 MG tablet TAKE ONE (1) TABLET BY MOUTH THREE TIMES DAILY AS NEEDED FOR MUSCLE SPASMS 4/12/19  Yes Memo Gomez MD   Cholecalciferol (VITAMIN D3) 1000 units TABS TAKE 1 TABLET BY MOUTH DAILY 4/11/19  Yes Memo Gomez MD   gabapentin (NEURONTIN) 800 MG tablet TAKE 1 TABLET BY MOUTH TWICE DAILY 4/11/19 5/11/19 Yes Memo Gomez MD   Omega-3 Fatty Acids (FISH OIL) 1000 MG CAPS TAKE 2 CAPSULES BY MOUTH DAILY 4/11/19  Yes Memo Gomez MD   methimazole (TAPAZOLE) 5 MG tablet Take 10 mg by mouth 3/4/19  Yes Historical Provider, MD   amLODIPine (NORVASC) 10 MG tablet Take 1 tablet by mouth daily 3/25/19 4/24/19 Yes Memo Gomez MD   atorvastatin (LIPITOR) 40 MG tablet TAKE 1 TABLET BY MOUTH ONCE DAILY 3/25/19  Yes Memo Gomez MD   apixaban (ELIQUIS) 5 MG TABS tablet Take 1 tablet by mouth 2 times daily 3/25/19  Yes Memo Gomez MD   hydrochlorothiazide (HYDRODIURIL) 50 MG tablet TAKE 1 TABLET BY MOUTH ONCE DAILY 3/25/19  Yes Memo Gomez MD   ondansetron (ZOFRAN) 4 MG tablet Take 1 tablet by mouth 3 times daily as needed for Nausea or Vomiting 3/25/19  Yes Memo Gomez MD   TRUEPLUS PEN NEEDLES 31G X 5 MM MISC USE AS DIRECTED DAILY 1/29/19  Yes Memo Gomez MD   FEROSUL 325 (65 Fe) MG tablet TAKE 1 TABLET BY MOUTH DAILY 1/29/19  Yes Memo Gomez MD   TRULICITY 8.54 PK/4.5XG SOPN Inject 0.75 mg into the skin once a week 0.75mg/0.5ml 1/15/19  Yes Historical Provider, MD   naproxen (NAPROSYN) 375 MG tablet Take 1 tablet by mouth 2 times daily 1/14/19  Yes Ivan Villarreal MD   lisinopril (PRINIVIL;ZESTRIL) 5 MG tablet Take 1 tablet by mouth daily 1/4/19  Yes Isaiah Root MD   potassium chloride (KLOR-CON M) 20 MEQ extended release tablet Take 1 tablet by mouth daily 12/18/18  Yes Dinah Sparks MD   oxybutynin (DITROPAN XL) 10 MG extended release tablet Take 1 tablet by mouth daily 12/11/18  Yes Yadira Go MD   Insulin Glargine (Consuello Sheerer SC) Inject 25 Units into the skin nightly Per Dr. Odalis Gordillo   Yes Historical Provider, MD   Multiple Vitamin (MULTI-VITAMINS) TABS TAKE ONE (1) TABLET BY MOUTH ONCE DAILY 10/23/18  Yes Isaiah Root MD   loratadine (CLARITIN) 10 MG tablet TAKE (1) TABLET BY MOUTH DAILY 10/23/18  Yes Isaiah Root MD   VENTOLIN  (90 Base) MCG/ACT inhaler INHALE 2 PUFFS BY MOUTH EVERY 6 HOURS AS NEEDED FOR WHEEZING 8/30/18  Yes Isaiah Root MD   metoprolol tartrate (LOPRESSOR) 25 MG tablet TAKE 1 TABLET BY MOUTH 2 TIMES DAILY 8/30/18  Yes Isaiah Root MD   FREESTYLE LANCETS MISC TEST BLOOD SUGAR THREE TIMES A DAY 8/28/18  Yes Isaiah Root MD   albuterol (PROVENTIL) (2.5 MG/3ML) 0.083% nebulizer solution Take 2.5 mg by nebulization 4 times daily   Yes Historical Provider, MD   Incontinence Supply Disposable (POISE PAD) PADS Apply 1 Piece topically as needed (incontinence) 11/21/17  Yes Yadira Go MD   Blood Glucose Monitoring Suppl SOUTH 1 each by Does not apply route 3 times daily 6/6/17  Yes Zachariah Gunn MD   Alcohol Swabs (EASY TOUCH ALCOHOL PREP MEDIUM) 70 % PADS USE FOUR TIMES DAILY 3/29/17  Yes Zachariah Gunn MD   FREESTYLE LITE strip TEST BLOOD SUGAR THREE TIMES A DAY 3/29/17  Yes Zachariah Gunn MD   Blood Pressure Monitoring (B-D ASSURE BPM/AUTO ARM CUFF) MISC 1 Device by Does not apply route daily as needed (htn) 8/25/16  Yes Zachariah Gunn MD   atorvastatin (LIPITOR) 40 MG tablet TAKE 1 TABLET BY MOUTH ONCE DAILY 4/12/19   Dinah Sparks MD   Blood Glucose Monitoring Suppl SOUTH 1 Units by Does not apply route once for 1 dose [facility] dispense a machine that is covered by her insurance  DX E11.9 4/26/18 4/26/18  Jayesh Zhang MD        Allergies:       Norco [hydrocodone-acetaminophen] and Percocet [oxycodone-acetaminophen]    Social History:     Tobacco: reports that she quit smoking about 5 years ago. Her smoking use included cigarettes. She has a 20.00 pack-year smoking history. She has never used smokeless tobacco.  Alcohol:      reports that she does not drink alcohol. Drug Use:  reports that she does not use drugs. Family History:     Family History   Problem Relation Age of Onset    Heart Disease Mother     High Cholesterol Mother     No Known Problems Son        Review of Systems:         Review of Systems   Constitutional: Positive for unexpected weight change (weight gain). Negative for activity change, appetite change, chills, fatigue and fever. HENT: Negative for congestion, ear pain, hoarse voice, sneezing, sore throat and trouble swallowing. Respiratory: Negative for cough, chest tightness, shortness of breath and wheezing. Cardiovascular: Positive for leg swelling. Negative for chest pain, dyspnea on exertion, palpitations and orthopnea. Gastrointestinal: Negative for abdominal pain, blood in stool, constipation, diarrhea, nausea and vomiting. Genitourinary: Negative for dysuria. Musculoskeletal: Positive for arthralgias and back pain. Skin: Negative for rash. Neurological: Negative for dizziness, tremors and headaches. Psychiatric/Behavioral: Negative for dysphoric mood and sleep disturbance. The patient is not nervous/anxious. Physical Exam:     Vitals:  /80 (Site: Left Upper Arm, Position: Sitting, Cuff Size: Medium Adult)   Pulse 74   Ht 5' 4\" (1.626 m)   Wt 165 lb (74.8 kg)   SpO2 93%   BMI 28.32 kg/m²       Physical Exam   Constitutional: She is oriented to person, place, and time.  She appears well-developed and well-nourished. No distress. HENT:   Head: Normocephalic and atraumatic. Right Ear: External ear normal.   Left Ear: External ear normal.   Nose: Nose normal.   Mouth/Throat: Oropharynx is clear and moist.   Eyes: Conjunctivae are normal. Right eye exhibits no discharge. Left eye exhibits no discharge. No scleral icterus. Neck: No thyromegaly present. Cardiovascular: Normal rate, regular rhythm and normal heart sounds. No murmur heard. Pulmonary/Chest: Effort normal and breath sounds normal. She has no wheezes. She has no rales. Abdominal: Soft. Bowel sounds are normal. She exhibits no distension and no mass. There is no tenderness. Musculoskeletal: Normal range of motion. She exhibits edema (mild edema around ankles). She exhibits no tenderness. Lymphadenopathy:     She has no cervical adenopathy. Neurological: She is alert and oriented to person, place, and time. No cranial nerve deficit. Coordination normal.   Skin: Skin is warm and dry. No rash noted. Psychiatric: She has a normal mood and affect. Her behavior is normal. Judgment normal.   Vitals reviewed.             Data:     Lab Results   Component Value Date     02/19/2019    K 3.8 02/19/2019     02/19/2019    CO2 24 02/19/2019    BUN 29 02/19/2019    CREATININE 0.98 02/19/2019    GLUCOSE 217 02/19/2019    PROT 7.6 02/19/2019    LABALBU 4.5 02/19/2019    BILITOT 0.56 02/19/2019    ALKPHOS 101 02/19/2019    AST 24 02/19/2019    ALT 29 02/19/2019     Lab Results   Component Value Date    WBC 4.0 01/19/2019    RBC 4.50 01/19/2019    HGB 12.8 01/19/2019    HCT 38.7 01/19/2019    MCV 86.1 01/19/2019    MCH 28.4 01/19/2019    MCHC 33.0 01/19/2019    RDW 13.3 01/19/2019     01/19/2019    MPV NOT REPORTED 01/19/2019     Lab Results   Component Value Date    TSH <0.01 02/19/2019     Lab Results   Component Value Date    CHOL 109 12/17/2018    HDL 35 12/17/2018    LABA1C 7.6 04/12/2019          Assessment & Plan        Diagnosis Prescriptions     Signed Prescriptions Disp Refills    cyclobenzaprine (FLEXERIL) 5 MG tablet 90 tablet 5     Sig: TAKE ONE (1) TABLET BY MOUTH THREE TIMES DAILY AS NEEDED FOR MUSCLE SPASMS

## 2019-04-17 ENCOUNTER — HOSPITAL ENCOUNTER (OUTPATIENT)
Age: 64
Discharge: HOME | End: 2019-04-17
Payer: COMMERCIAL

## 2019-04-17 LAB
T3 FREE: 1.19 PG/ML (ref 2.02–4.43)
THYROXINE, FREE: 0.22 NG/DL (ref 0.93–1.7)
TSH SERPL DL<=0.05 MIU/L-ACNC: 0.8 MIU/L (ref 0.3–5)

## 2019-04-17 PROCEDURE — 84439 ASSAY OF FREE THYROXINE: CPT

## 2019-04-17 PROCEDURE — 36415 COLL VENOUS BLD VENIPUNCTURE: CPT

## 2019-04-17 PROCEDURE — 84481 FREE ASSAY (FT-3): CPT

## 2019-04-17 PROCEDURE — 84443 ASSAY THYROID STIM HORMONE: CPT

## 2019-04-22 DIAGNOSIS — Z79.4 TYPE 2 DIABETES MELLITUS WITH DIABETIC AUTONOMIC NEUROPATHY, WITH LONG-TERM CURRENT USE OF INSULIN (HCC): ICD-10-CM

## 2019-04-22 DIAGNOSIS — E11.43 TYPE 2 DIABETES MELLITUS WITH DIABETIC AUTONOMIC NEUROPATHY, WITH LONG-TERM CURRENT USE OF INSULIN (HCC): ICD-10-CM

## 2019-04-23 DIAGNOSIS — I48.0 PAROXYSMAL ATRIAL FIBRILLATION (HCC): ICD-10-CM

## 2019-04-23 DIAGNOSIS — L50.9 URTICARIA: ICD-10-CM

## 2019-04-23 RX ORDER — FERROUS SULFATE 325(65) MG
TABLET ORAL
Qty: 90 TABLET | Refills: 10 | Status: SHIPPED | OUTPATIENT
Start: 2019-04-23 | End: 2021-11-03

## 2019-04-23 RX ORDER — DIPHENOXYLATE HYDROCHLORIDE AND ATROPINE SULFATE 2.5; .025 MG/1; MG/1
TABLET ORAL
Qty: 90 TABLET | Refills: 10 | Status: SHIPPED | OUTPATIENT
Start: 2019-04-23 | End: 2020-05-07

## 2019-04-23 RX ORDER — LORATADINE 10 MG/1
TABLET ORAL
Qty: 90 TABLET | Refills: 1 | Status: SHIPPED | OUTPATIENT
Start: 2019-04-23 | End: 2019-10-17 | Stop reason: SDUPTHER

## 2019-04-23 RX ORDER — LISINOPRIL 5 MG/1
5 TABLET ORAL DAILY
Qty: 30 TABLET | Refills: 3 | Status: SHIPPED | OUTPATIENT
Start: 2019-04-23 | End: 2019-08-23 | Stop reason: SDUPTHER

## 2019-04-23 NOTE — TELEPHONE ENCOUNTER
Rx refill request    [facility]    Lisinopril 5 mg qd    Loratadine 10 mg qd    Eliquis 5 mg 1 tab bid    Last seen 4/12/19    Health Maintenance   Topic Date Due    Shingles Vaccine (1 of 2) 08/01/2006    Diabetic foot exam  04/26/2019    Diabetic microalbuminuria test  07/05/2019    Colon Cancer Screen FIT/FOBT  07/24/2019    Diabetic retinal exam  08/11/2019    Lipid screen  12/17/2019    Breast cancer screen  12/20/2019    Potassium monitoring  02/19/2020    Creatinine monitoring  02/19/2020    A1C test (Diabetic or Prediabetic)  04/12/2020    TSH testing  04/17/2020    Cervical cancer screen  12/11/2021    DTaP/Tdap/Td vaccine (2 - Td) 08/12/2026    Flu vaccine  Completed    Pneumococcal 0-64 years Vaccine  Completed    Hepatitis C screen  Addressed    HIV screen  Addressed             (applicable per patient's age: Cancer Screenings, Depression Screening, Fall Risk Screening, Immunizations)    Hemoglobin A1C (%)   Date Value   04/12/2019 7.6   01/18/2019 7.2 (H)   12/17/2018 6.6 (H)     Microalb/Crt.  Ratio (mcg/mg creat)   Date Value   05/24/2017 12     LDL Cholesterol (mg/dL)   Date Value   12/17/2018 36     AST (U/L)   Date Value   02/19/2019 24     ALT (U/L)   Date Value   02/19/2019 29     BUN (mg/dL)   Date Value   02/19/2019 29 (H)      (goal A1C is < 7)   (goal LDL is <100) need 30-50% reduction from baseline     BP Readings from Last 3 Encounters:   04/12/19 110/80   03/25/19 (!) 126/90   02/05/19 112/70    (goal /80)      All Future Testing planned in [facility]:  Lab Frequency Next Occurrence   MICHAEL DIGITAL SCREEN W CAD BILATERAL Once 07/15/2019   CBC Auto Differential Once 12/18/2019   Comprehensive Metabolic Panel Once 62/73/6073   Vitamin D 25 Hydroxy Once 12/18/2019   Lipid Panel Once 12/18/2019   TSH with Reflex Once 12/18/2019   Magnesium Once 12/18/2019   EKG 12 Lead Once 12/18/2019   XR CHEST STANDARD (2 VW) Once 12/18/2019   Hemoglobin A1C Once 12/18/2019

## 2019-04-23 NOTE — TELEPHONE ENCOUNTER
Last OV: 4/12/2019   Next scheduled apt: 7/11/2019    Medication pending\      Health Maintenance   Topic Date Due    Shingles Vaccine (1 of 2) 08/01/2006    Diabetic foot exam  04/26/2019    Diabetic microalbuminuria test  07/05/2019    Colon Cancer Screen FIT/FOBT  07/24/2019    Diabetic retinal exam  08/11/2019    Lipid screen  12/17/2019    Breast cancer screen  12/20/2019    Potassium monitoring  02/19/2020    Creatinine monitoring  02/19/2020    A1C test (Diabetic or Prediabetic)  04/12/2020    TSH testing  04/17/2020    Cervical cancer screen  12/11/2021    DTaP/Tdap/Td vaccine (2 - Td) 08/12/2026    Flu vaccine  Completed    Pneumococcal 0-64 years Vaccine  Completed    Hepatitis C screen  Addressed    HIV screen  Addressed             (applicable per patient's age: Cancer Screenings, Depression Screening, Fall Risk Screening, Immunizations)    Hemoglobin A1C (%)   Date Value   04/12/2019 7.6   01/18/2019 7.2 (H)   12/17/2018 6.6 (H)     Microalb/Crt.  Ratio (mcg/mg creat)   Date Value   05/24/2017 12     LDL Cholesterol (mg/dL)   Date Value   12/17/2018 36     AST (U/L)   Date Value   02/19/2019 24     ALT (U/L)   Date Value   02/19/2019 29     BUN (mg/dL)   Date Value   02/19/2019 29 (H)      (goal A1C is < 7)   (goal LDL is <100) need 30-50% reduction from baseline     BP Readings from Last 3 Encounters:   04/12/19 110/80   03/25/19 (!) 126/90   02/05/19 112/70    (goal /80)      All Future Testing planned in [facility]:  Lab Frequency Next Occurrence   MICHAEL DIGITAL SCREEN W CAD BILATERAL Once 07/15/2019   CBC Auto Differential Once 12/18/2019   Comprehensive Metabolic Panel Once 71/29/5074   Vitamin D 25 Hydroxy Once 12/18/2019   Lipid Panel Once 12/18/2019   TSH with Reflex Once 12/18/2019   Magnesium Once 12/18/2019   EKG 12 Lead Once 12/18/2019   XR CHEST STANDARD (2 VW) Once 12/18/2019   Hemoglobin A1C Once 12/18/2019       Next Visit Date:  Future Appointments   Date Time Provider

## 2019-04-25 ENCOUNTER — CARE COORDINATION (OUTPATIENT)
Dept: CARE COORDINATION | Age: 64
End: 2019-04-25

## 2019-04-25 NOTE — CARE COORDINATION
called and spoke to [place].  inquired about Dianna's utilities. [place] reports that she paid off her electric bill. [place] reports that she has a credit with the gas company and her water bill is all paid up. [place] reports that her landlord had to replace her water heater. [place] reports she is doing well except her washer and dryer was picked up because she missed a payment and was renting them. [place] reports that a friend has a small washer and dryer for $50 dollars. [place] reports next pay check she will buy it. [place] reports her food supply is pretty good  [place] reports that Charles Oliva her  at [facility] is attempting to kick [place] off PASSP[employer] since she has been bathing without her aide. [place] reports Charles Oliva yelled at her.     [place] is going to appeal the request to discontinue her PASSPORT    Plan:   [place] requested  inform Nancy about PASSP[employer] trying to stop

## 2019-04-26 ENCOUNTER — CARE COORDINATION (OUTPATIENT)
Dept: CARE COORDINATION | Age: 64
End: 2019-04-26

## 2019-04-26 NOTE — CARE COORDINATION
Nutrition Care Coordinator Follow-Up visit:    Food Recall: eating 2-3 meals/day    Activity Level:  Sedentary:  Lightly Active: X  Moderately Active:  Very Active:    Adult BMI:  Underweight (below 18.5)  Normal Weight (18.5-24.9)  Overweight (25-29. 9) X  Obese (30-39. 9)  Morbidly Obese (>40)    Weight Change: weight is up   ? If Last weight 4/12/19- 165# is correct weight on 3/25/19- 145   20# weight gain X 2 weeks? Plan:  Plan was established with patient:  Increase dietary fiber by consuming whole grains, fruits and vegetables: X  Limit dietary cholesterol to >200mg/day: Increase water intake:  Avoid added sugar: X  Avoid sweetened beverages: X  Choose lean meats: X      Monitoring: Will monitor weight:  Will monitor adherence to meal plan: Will monitor adherence to exercise plan: Will monitor HGA1c: X    Handouts Provided :  Low Carb snacking:  Carb counting /individual meal plan:  Portion Control: X  Food Labels:  Physical Activity:  Low Fat/Cholesterol:  Hypo/Hyperglycemia:  Calorie Controlled Meal Plan:    Goals: Increase water consumption to 8oz. 6-8 times daily:  Manage blood sugars by consuming 3 meals spaced every 4-5 hours with 2-3 snacks daily: reviewed  Increase fiber and decrease fat intake by consuming 1-2 fruit servings and 2-3 vegetable servings per day. Increase physical activity by:  Consume less than 2,000mg of sodium/day  Avoid consumption of sweetened beverages and added sugar by reading food labels: reviewed  Monitor blood sugars by using meter to check blood glucose before morning meal and 2 hours after a meal daily: BS- 140's  Decrease risk of coronary heart disease by consuming fish that contains omega-3 fatty acids at least twice a week, avoiding partially hydrogenated oil/trans fats and limiting saturated fat intake by reading food labels: reviewed    Patient goals set:  1.  Reviewed measuring out portions at meals and snacks, what a serving size is and how to estimate a serving using your hand.   Reviewed goal- to limit intake to <1 cup of carb/meal or 45gms of carb/meal, 15gms/snack. 2. Reviewed importance of meal pattern and eating same time of day each day. Goal is 3 meals daily spaced every 4-5 hours. 3. Reviewed using plate method at meals. Goal is to make 1/2 of plate at meals non-starchy vegetables, 1/4 lean proteins, 1/4 carbs.  Patient encouraged to focus on making sure she is eating protein at each meal. Reviewed protein sources- fish, chicken almost daily, discussed eggs, peanut butter and other protein sources. Patient's weight and A1c are up- encouraged patient to continue to work on lifestyle changes focusing on portion control and plate method.   Will follow up in 3-4 weeks to review and answer questions.      Juvenal Trivedi

## 2019-04-26 NOTE — CARE COORDINATION
Thank you for the update Governor Cassette. Per the last 2 care coordinator notes, patient requested to stop the passport services because, \"she did not feel she needed the help anymore\". Spoke to patient on 04/05/19, and patient informed this nurse CC that she did not feel she needed the help\". -patient had contacted Passport and requested the services to be stopped     -She felt, Harlan Reyes would have enough food without the additional frozen meals; as she gets the meals on wheels. -patient reported, Harlan Reyes was showering on her own, and felt she was able to do so\"    -04/08/19 did talk to [facility], Passport , and patient then no longer met level of care because, \"she no longer felt she needed help with bathing/showering\". -patient would have to have a new passport assessment if she feels she needs assistance again with ADL's/IADL's which is how patient meets level of care. -Please keep this Nurse CC posted. Thank you Governor Cassette.

## 2019-04-30 ENCOUNTER — HOSPITAL ENCOUNTER (OUTPATIENT)
Age: 64
Discharge: HOME | End: 2019-04-30
Payer: COMMERCIAL

## 2019-04-30 ENCOUNTER — CARE COORDINATION (OUTPATIENT)
Dept: CARE COORDINATION | Age: 64
End: 2019-04-30

## 2019-04-30 LAB
ABSOLUTE EOS #: 0.6 K/UL (ref 0–0.4)
ABSOLUTE IMMATURE GRANULOCYTE: ABNORMAL K/UL (ref 0–0.3)
ABSOLUTE LYMPH #: 2.6 K/UL (ref 1–4.8)
ABSOLUTE MONO #: 0.4 K/UL (ref 0–1)
ALBUMIN SERPL-MCNC: 5.2 G/DL (ref 3.5–5.2)
ALBUMIN/GLOBULIN RATIO: ABNORMAL (ref 1–2.5)
ALP BLD-CCNC: 105 U/L (ref 35–104)
ALT SERPL-CCNC: 29 U/L (ref 5–33)
ANION GAP SERPL CALCULATED.3IONS-SCNC: 12 MMOL/L (ref 9–17)
AST SERPL-CCNC: 29 U/L
BASOPHILS # BLD: 1 % (ref 0–2)
BASOPHILS ABSOLUTE: 0.1 K/UL (ref 0–0.2)
BILIRUB SERPL-MCNC: 0.32 MG/DL (ref 0.3–1.2)
BUN BLDV-MCNC: 19 MG/DL (ref 8–23)
BUN/CREAT BLD: 14 (ref 9–20)
CALCIUM SERPL-MCNC: 10.2 MG/DL (ref 8.6–10.4)
CHLORIDE BLD-SCNC: 103 MMOL/L (ref 98–107)
CHOLESTEROL/HDL RATIO: 4.8
CHOLESTEROL: 217 MG/DL
CO2: 27 MMOL/L (ref 20–31)
CREAT SERPL-MCNC: 1.4 MG/DL (ref 0.5–0.9)
DIFFERENTIAL TYPE: YES
EOSINOPHILS RELATIVE PERCENT: 9 % (ref 0–5)
ESTIMATED AVERAGE GLUCOSE: 169 MG/DL
GFR AFRICAN AMERICAN: 46 ML/MIN
GFR NON-AFRICAN AMERICAN: 38 ML/MIN
GFR SERPL CREATININE-BSD FRML MDRD: ABNORMAL ML/MIN/{1.73_M2}
GFR SERPL CREATININE-BSD FRML MDRD: ABNORMAL ML/MIN/{1.73_M2}
GLUCOSE BLD-MCNC: 130 MG/DL (ref 70–99)
HBA1C MFR BLD: 7.5 % (ref 4.8–5.9)
HCT VFR BLD CALC: 40.6 % (ref 36–46)
HDLC SERPL-MCNC: 45 MG/DL
HEMOGLOBIN: 13.6 G/DL (ref 12–16)
IMMATURE GRANULOCYTES: ABNORMAL %
LDL CHOLESTEROL: 121 MG/DL (ref 0–130)
LYMPHOCYTES # BLD: 36 % (ref 15–40)
MCH RBC QN AUTO: 29.8 PG (ref 26–34)
MCHC RBC AUTO-ENTMCNC: 33.6 G/DL (ref 31–37)
MCV RBC AUTO: 88.7 FL (ref 80–100)
MONOCYTES # BLD: 6 % (ref 4–8)
NRBC AUTOMATED: ABNORMAL PER 100 WBC
PATIENT FASTING?: YES
PDW BLD-RTO: 17.8 % (ref 12.1–15.2)
PLATELET # BLD: 241 K/UL (ref 140–450)
PLATELET ESTIMATE: ABNORMAL
PMV BLD AUTO: ABNORMAL FL (ref 6–12)
POTASSIUM SERPL-SCNC: 4.8 MMOL/L (ref 3.7–5.3)
RBC # BLD: 4.57 M/UL (ref 4–5.2)
RBC # BLD: ABNORMAL 10*6/UL
SEG NEUTROPHILS: 48 % (ref 47–75)
SEGMENTED NEUTROPHILS ABSOLUTE COUNT: 3.6 K/UL (ref 2.5–7)
SODIUM BLD-SCNC: 142 MMOL/L (ref 135–144)
T3 FREE: 0.72 PG/ML (ref 2.02–4.43)
THYROXINE, FREE: 0.14 NG/DL (ref 0.93–1.7)
TOTAL PROTEIN: 8.5 G/DL (ref 6.4–8.3)
TRIGL SERPL-MCNC: 255 MG/DL
TSH SERPL DL<=0.05 MIU/L-ACNC: 21.3 MIU/L (ref 0.3–5)
VLDLC SERPL CALC-MCNC: ABNORMAL MG/DL (ref 1–30)
WBC # BLD: 7.3 K/UL (ref 3.5–11)
WBC # BLD: ABNORMAL 10*3/UL

## 2019-04-30 PROCEDURE — 84481 FREE ASSAY (FT-3): CPT

## 2019-04-30 PROCEDURE — 80061 LIPID PANEL: CPT

## 2019-04-30 PROCEDURE — 82570 ASSAY OF URINE CREATININE: CPT

## 2019-04-30 PROCEDURE — 80053 COMPREHEN METABOLIC PANEL: CPT

## 2019-04-30 PROCEDURE — 83036 HEMOGLOBIN GLYCOSYLATED A1C: CPT

## 2019-04-30 PROCEDURE — 85025 COMPLETE CBC W/AUTO DIFF WBC: CPT

## 2019-04-30 PROCEDURE — 36415 COLL VENOUS BLD VENIPUNCTURE: CPT

## 2019-04-30 PROCEDURE — 82043 UR ALBUMIN QUANTITATIVE: CPT

## 2019-04-30 PROCEDURE — 84439 ASSAY OF FREE THYROXINE: CPT

## 2019-04-30 PROCEDURE — 84443 ASSAY THYROID STIM HORMONE: CPT

## 2019-04-30 NOTE — CARE COORDINATION
Ambulatory Care Coordination Note  4/30/2019  CM Risk Score: 12  Zen Mortality Risk Score:      ACC: Veronica Diallo RN    Summary Note:  Reason For Call Today: Follow up on passport services, any further nausea, cough/congestion, follow up with Dr. Deangelo Johnson office regarding weight gain, labs due today    Patient got labs done today. Follow up passport:.  -Informs, \"that her passport services are actually still continuing in the home with bathing/homemaker services  -Reports, Telma Neves are continuing 3 times per week\". -Passport also, \"has the passport food being delivered 1 time weekly, with the meals on wheels continuing Monday through Friday\"    Chronic Conditions:   COPD: been, \"using her nebulizer at night mostly, and then during the day if she needs it\". -Albuterol if needed up to 4 times daily per Nebulizer  -Ventolin inhaler 2 puffs by mouth every 6 hours as needed for wheezing    Intervention Per Care Coordinator Today:Reviewed COPD precautions    Assessment completed:   COPD Assessment    Does the patient understand envrionmental exposure?:  Yes  Is the patient able to verbalize Rescue vs. Long Acting medications?:  Yes  Does the patient have a nebulizer?:  Yes     No patient-reported symptoms         Symptoms:   None:  Yes      Have you had a recent diagnosis of pneumonia either by PCP or at a hospital?:  No           DIABETES-Denies any new concerns with her blood sugars.  -States, \"they have been good along\"  Lab Results   Component Value Date    LABA1C 7.5 (H) 04/30/2019     Lab Results   Component Value Date     04/30/2019     Last A1C in January 7.2  -Reviewed A1C goal with patient of 7.0  -Informs, 'that she has not been taking the Trulicity regularly? -Reports, Ramon To is taking the Trulicity now though, and is taking this on Mondays\". -patient states, 'she has now taken It the last 3 times of MOndays\". -patient informs, \"that she is taking this without difficulty now\".    -patient states, \"she had issues with the insulin cost through pharmacy, but is now all cleared up\". -patient to notify Dr. Jamel Miles if having any further issues with cost/insurance coverage, or with any issues of nausea\". Intervention Per Care Coordinator Today: Discussed Diabetes control, and make sure to take medications as prescribed. Assessment Completed today:  Diabetes Assessment    Medic Alert ID:  No  Meal Planning:  Avoidance of concentrated sweets   How often do you test your blood sugar?:  Daily   Do you have barriers with adherence to non-pharmacologic self-management interventions? (Nutrition/Exercise/Self-Monitoring):  Yes   Have you ever had to go to the ED for symptoms of low blood sugar?:  No       Increase or Decrease trend in Blood Sugars   Do you have hyperglycemia symptoms?:  No   Do you have hypoglycemia symptoms?:  No   Blood Sugar Monitoring Regimen:  Before Meals   Blood Sugar Trends:  Fluctuating (Comment: states, 'she was off the Trulicity for a short time, due to insurance mix up, and she felt that it was causing her nausea\". Dr. Jamel Miles managing Diabetes. patient to keep Dr. Malvin Klein office update if feels needs to stop this again if can not tolerate\". )            Swelling in her Legs/weight gain:   -StatesAntolin has some swelling in her legs sometimes\". -Reports, \"that she went up from 147 pounds to greater than 160\". -Reports, \"her next appointment is 05/28/19 at 245pm (patient has transportation set up through [employer] -patient statesAntolin never heard back from Dr. Malvin Klein office, but did call them\". -Questioning for sure on when the follow is? States, \"the labs she did today are for him\"    Spoke to Matthieu villalobos at Dr. Malvin Klein office:     -informed that patient did call in and informed of weight gain. -TSH, T3, T4 was ordered to be drawn (those labs completed today.   -per Pedro Barr inform patient that Dr. Jamel Miles will review these,a nd they will call her if changes need made prior to him seeing her on 05/28/19. Phone call back to patient to inform of Dr. Severiano Mesi instructions.   -Dr. Severiano Mesi office to call patient back following receiving lab results, and will inform patient of further instructions at that time. Recent appointments Reviewed (PCP/Specialist):   04/12/19 Dr. Sonam Wu:        Diagnosis Orders   1. Type 2 diabetes mellitus with diabetic autonomic neuropathy, with long-term current use of insulin ([address]   Continue current dose of Basaglar. She has not been taking Trulicity but planning on starting this coming Monday. Follow-up in 3 months  POCT glycosylated hemoglobin (Hb A1C)   2. Essential hypertension   Blood pressure stable on current medications, continue same      3. Pain of both hip joints   Symptoms well controlled, refill Flexeril  cyclobenzaprine (FLEXERIL) 5 MG tablet   4. Asthma with COPD ([address]   Symptoms stable, continue current management      5. Graves' disease   Follow-up with her endocrinologist.  Urszula Mahoneyin the patient to give them a call and let them know about weight gain. I suspect she is hypothyroid after iodine ablation treatment and may need to be started on Synthroid            Any ED visits or Hospitalizations since last Call? · No    Medications Reviewed with  today:  · Patient verbalizes that she has all of her medications. · Patient denies any questions. · Does patient have any cost issues with medications No, \"not currently. Zone Management tool reviewed today: Yes, copd, Diabetes    Reviewed social needs/Home Needs if any:     · Does patient have enough food? Yes, \"still has meals on wheels delivered daily    · Does patient have transportation to appointment/store/pharmacy, etc?Yes, \"uses [facility], and her family/friends transport her    · Does patient need any help in the home?no, \"denies feels she is doing okay without passport\"    ·  Is patient independent with ADL's/IADL'S? yes    Reviewed Upcoming follow up appointments with   Future Appointments   Date Time Provider Diomedes Crespoi   7/11/2019  1:30  [address] [facility]BILE  None   7/11/2019  2:00 PM Charity Rae MD [id number]   12/9/2019  2:00 PM MD Maxine Patiño [facility]   ·   :    Goals reviewed. Patient to continue to work to improve:   Goals        Care Coordination     Conditions and Symptoms      I will schedule office visits, as directed by my provider. I will keep my appointment or reschedule if I have to cancel. I will notify my provider of any barriers to my plan of care. I will notify my provider of any symptoms that indicate a worsening of my condition. Patient to check blood sugars. Patient to follow up endocrinology. Patient to try to keep on carbohydrate controlled diet as best as she can, even though she has difficulties with always being able to afford a wide variety of foods. Patient to follow up with Dr. Clifford Lebron as directed. Patient call and arrange transportation through [email or web address] at [phone], and through [employer] [phone]    Barriers: financial, lack of support, overwhelmed by complexity of regimen and stress  Plan for overcoming my barriers: [facility]; this nurse CC will continue to follow up. Confidence: 6/10  Anticipated Goal Completion Date: 04/01/19    Update:   -patient with new passport assessment, denies further need for home health aides/passport service. 0patient to stay independent with keeping her apartment clean  -patient to stay compliant with medications.  Medications come pre-packaged [facility]  -patient's COPD to stay controlled.   -patient to use nebulizer breathing treatments and inhaler as  Needed.  -Patient to take blood sugars daily, continue Diabetic medications; monitor blood sugars and keep log  -Patient to stay compliant with Dr. Charanjit Lafleur follow up appointments  -patient to continue follow up with dr. Ankit Bocanegra, call ahead for [facility] transportation if needed. Anticipated Goal completion Date: 06/02/19         Nutrition Plan      I will follow a nutritional plan as directed   Calorie Controlled:   1400cals/d carb consistent    Barriers: lack of education  Plan for overcoming my barriers: CC dietitian to educate on reading food labels, measuring out portions and carb counting  Confidence: 7/10  Anticipated Goal Completion Date: 6/30/2019  Updated goal- 2/20/19 continue to work on portion control, learning serving size              Education Reviewed with patient today: See Education Module. · Reviewed plan of care with patient Patient denies any questions today. · Patient to reach out to care coordinator at [phone] with questions. · Reminded patient of walk in care clinic availability/hours; and when to utilize the facility if MD office not available. Follow-up care is a key part of your treatment and safety. Be sure to make and go to all appointments, and call your doctor if you are having problems. It's also a good idea to know your test results and keep a list of the medicines you take. Patient  encouraged to contact PCP office for any questions or concerns. patient verbalizes understanding. Care Coordinator Plan of Care: This nurse CC will plan to follow up wit patient in 1-2 weeks, follow up on Dr. Sara Braswell instructions, further new orders/instructions following lab work completed today per patient. .                 Care Coordination Interventions    Program Enrollment:  Complex Care  Referral from Primary Care Provider:  Yes  Suggested Interventions and Community Resources  Diabetes Education: In Process (Comment: requesting order for Diabetic Ed)  Home Care Waiver: In Process (Comment: faxed passport referral)  Meals on Wheels:   In Process (Comment: contacted [facility], they will start 04/16)  Medi Set or Pill Pack: Completed (Comment: patient has exact [facility] pharmacy that prefills her medications and mails them to her)  Registered Dietician:  Completed  Social Work:  Completed  Transportation Support: In Process           Prior to Admission medications    Medication Sig Start Date End Date Taking?  Authorizing Provider   Multiple Vitamin (MULTI-VITAMINS) TABS TAKE ONE (1) TABLET BY MOUTH ONCE DAILY 4/23/19   Dotty Madison MD   FEROSUL 325 (65 Fe) MG tablet TAKE (1) TABLET BY MOUTH DAILY 4/23/19   Dotty Madison MD   apixaban (ELIQUIS) 5 MG TABS tablet Take 1 tablet by mouth 2 times daily 4/23/19   Dotty Madison MD   loratadine (CLARITIN) 10 MG tablet TAKE (1) TABLET BY MOUTH DAILY 4/23/19   Dotty Madison MD   lisinopril (PRINIVIL;ZESTRIL) 5 MG tablet Take 1 tablet by mouth daily 4/23/19   Dotty Madison MD   atorvastatin (LIPITOR) 40 MG tablet TAKE 1 TABLET BY MOUTH ONCE DAILY 4/12/19   Isaiah Alvarado MD   cyclobenzaprine (FLEXERIL) 5 MG tablet TAKE ONE (1) TABLET BY MOUTH THREE TIMES DAILY AS NEEDED FOR MUSCLE SPASMS 4/12/19   Dotty Madison MD   Cholecalciferol (VITAMIN D3) 1000 units TABS TAKE 1 TABLET BY MOUTH DAILY 4/11/19   Dotty Madison MD   gabapentin (NEURONTIN) 800 MG tablet TAKE 1 TABLET BY MOUTH TWICE DAILY 4/11/19 5/11/19  Dotty Madison MD   Omega-3 Fatty Acids (FISH OIL) 1000 MG CAPS TAKE 2 CAPSULES BY MOUTH DAILY 4/11/19   Dotty Madison MD   methimazole (TAPAZOLE) 5 MG tablet Take 10 mg by mouth 3/4/19   Historical Provider, MD   amLODIPine (NORVASC) 10 MG tablet Take 1 tablet by mouth daily 3/25/19 4/24/19  Dotty Madison MD   atorvastatin (LIPITOR) 40 MG tablet TAKE 1 TABLET BY MOUTH ONCE DAILY 3/25/19   Dotty Madison MD   hydrochlorothiazide (HYDRODIURIL) 50 MG tablet TAKE 1 TABLET BY MOUTH ONCE DAILY 3/25/19   Dotty Madison MD   ondansetron (ZOFRAN) 4 MG tablet Take 1 tablet by mouth 3 times daily as needed for Nausea or Vomiting 3/25/19   Dotty Madison MD   TRUEPLUS PEN NEEDLES 31G X 5 MM MISC USE AS DIRECTED DAILY 1/29/19   Mookie Garcia MD   TRULICITY 8.16 HO/9.3WX SOPN Inject 0.75 mg into the skin once a week 0.75mg/0.5ml 1/15/19   Historical Provider, MD   naproxen (NAPROSYN) 375 MG tablet Take 1 tablet by mouth 2 times daily 1/14/19   Olesya Ring MD   potassium chloride (KLOR-CON M) 20 MEQ extended release tablet Take 1 tablet by mouth daily 12/18/18   Stephanie Mayen MD   oxybutynin (DITROPAN XL) 10 MG extended release tablet Take 1 tablet by mouth daily 12/11/18   Jeremy Millan MD   Insulin Glargine (BASAGLAR KWIKPEN SC) Inject 25 Units into the skin nightly Per Dr. Lopez Media Provider, MD   VENTOLIN  (90 Base) MCG/ACT inhaler INHALE 2 PUFFS BY MOUTH EVERY 6 HOURS AS NEEDED FOR WHEEZING 8/30/18   Mookie Garcia MD   metoprolol tartrate (LOPRESSOR) 25 MG tablet TAKE 1 TABLET BY MOUTH 2 TIMES DAILY 8/30/18   MD CHECO WyattYLE LANCETS MISC TEST BLOOD SUGAR THREE TIMES A DAY 8/28/18   Mookie Garcia MD   albuterol (PROVENTIL) (2.5 MG/3ML) 0.083% nebulizer solution Take 2.5 mg by nebulization 4 times daily    Historical Provider, MD   Blood Glucose Monitoring Suppl SOUTH 1 Units by Does not apply route once for 1 dose PLease dispense a machine that is covered by her insurance  DX E11.9 4/26/18 4/26/18  Mookie Garcia MD   Incontinence Supply Disposable (POISE PAD) PADS Apply 1 Piece topically as needed (incontinence) 11/21/17   Jeremy Millan MD   Blood Glucose Monitoring Suppl SOUTH 1 each by Does not apply route 3 times daily 6/6/17   Florin Magana MD   Alcohol Swabs (EASY TOUCH ALCOHOL PREP MEDIUM) 70 % PADS USE FOUR TIMES DAILY 3/29/17   Florin Magana MD   FREESTYLE LITE strip TEST BLOOD SUGAR THREE TIMES A DAY 3/29/17   Florin Magana MD   Blood Pressure Monitoring (B-D ASSURE BPM/AUTO ARM CUFF) MISC 1 Device by Does not apply route daily as needed (htn) 8/25/16   Florin Magana MD

## 2019-05-01 LAB
CREATININE URINE: 80.5 MG/DL (ref 28–217)
MICROALBUMIN/CREAT 24H UR: <12 MG/L
MICROALBUMIN/CREAT UR-RTO: NORMAL MCG/MG CREAT

## 2019-05-06 NOTE — TELEPHONE ENCOUNTER
Pt's pen needles 31g x 5 mm are not longer covered. Please send in new prescription for Techlite pen needles or store brand pen needle    Health Maintenance   Topic Date Due    Shingles Vaccine (1 of 2) 08/01/2006    Diabetic foot exam  04/26/2019    Colon Cancer Screen FIT/FOBT  07/24/2019    Diabetic retinal exam  08/11/2019    Breast cancer screen  12/20/2019    A1C test (Diabetic or Prediabetic)  04/30/2020    Diabetic microalbuminuria test  04/30/2020    Lipid screen  04/30/2020    TSH testing  04/30/2020    Potassium monitoring  04/30/2020    Creatinine monitoring  04/30/2020    Cervical cancer screen  12/11/2021    DTaP/Tdap/Td vaccine (2 - Td) 08/12/2026    Flu vaccine  Completed    Pneumococcal 0-64 years Vaccine  Completed    Hepatitis C screen  Addressed    HIV screen  Addressed             (applicable per patient's age: Cancer Screenings, Depression Screening, Fall Risk Screening, Immunizations)    Hemoglobin A1C (%)   Date Value   04/30/2019 7.5 (H)   04/12/2019 7.6   01/18/2019 7.2 (H)     Microalb/Crt.  Ratio (mcg/mg creat)   Date Value   04/30/2019 CANNOT BE CALCULATED     LDL Cholesterol (mg/dL)   Date Value   04/30/2019 121     AST (U/L)   Date Value   04/30/2019 29     ALT (U/L)   Date Value   04/30/2019 29     BUN (mg/dL)   Date Value   04/30/2019 19      (goal A1C is < 7)   (goal LDL is <100) need 30-50% reduction from baseline     BP Readings from Last 3 Encounters:   04/12/19 110/80   03/25/19 (!) 126/90   02/05/19 112/70    (goal /80)      All Future Testing planned in [facility]:  Lab Frequency Next Occurrence   MICHAEL DIGITAL SCREEN W CAD BILATERAL Once 07/15/2019   CBC Auto Differential Once 12/18/2019   Comprehensive Metabolic Panel Once 17/46/6223   Vitamin D 25 Hydroxy Once 12/18/2019   Lipid Panel Once 12/18/2019   TSH with Reflex Once 12/18/2019   Magnesium Once 12/18/2019   EKG 12 Lead Once 12/18/2019   XR CHEST STANDARD (2 VW) Once 12/18/2019   Hemoglobin A1C Once

## 2019-05-14 ENCOUNTER — HOSPITAL ENCOUNTER (OUTPATIENT)
Age: 64
Discharge: HOME | End: 2019-05-14
Payer: COMMERCIAL

## 2019-05-14 ENCOUNTER — CARE COORDINATION (OUTPATIENT)
Dept: CARE COORDINATION | Age: 64
End: 2019-05-14

## 2019-05-14 LAB
T3 FREE: 1.79 PG/ML (ref 2.02–4.43)
THYROXINE, FREE: 0.91 NG/DL (ref 0.93–1.7)
TSH SERPL DL<=0.05 MIU/L-ACNC: 15.83 MIU/L (ref 0.3–5)

## 2019-05-14 PROCEDURE — 36415 COLL VENOUS BLD VENIPUNCTURE: CPT

## 2019-05-14 PROCEDURE — 84443 ASSAY THYROID STIM HORMONE: CPT

## 2019-05-14 PROCEDURE — 84439 ASSAY OF FREE THYROXINE: CPT

## 2019-05-14 PROCEDURE — 84481 FREE ASSAY (FT-3): CPT

## 2019-05-20 DIAGNOSIS — E11.43 TYPE 2 DIABETES MELLITUS WITH DIABETIC AUTONOMIC NEUROPATHY, WITH LONG-TERM CURRENT USE OF INSULIN (HCC): ICD-10-CM

## 2019-05-20 DIAGNOSIS — Z79.4 TYPE 2 DIABETES MELLITUS WITH DIABETIC AUTONOMIC NEUROPATHY, WITH LONG-TERM CURRENT USE OF INSULIN (HCC): ICD-10-CM

## 2019-05-20 RX ORDER — PEN NEEDLE, DIABETIC 31 GX3/16"
NEEDLE, DISPOSABLE MISCELLANEOUS
Qty: 100 EACH | Refills: 2 | Status: SHIPPED | OUTPATIENT
Start: 2019-05-20 | End: 2020-01-28 | Stop reason: SDUPTHER

## 2019-05-24 ENCOUNTER — CARE COORDINATION (OUTPATIENT)
Dept: CARE COORDINATION | Age: 64
End: 2019-05-24

## 2019-05-24 NOTE — CARE COORDINATION
Ambulatory Care Coordination Note  5/24/2019  CM Risk Score: 12  Zen Mortality Risk Score:      ACC: Kavin Gonzalez RN    Summary Note:     Date Care Coordination Episode Started: 04/11/2018    Reason For Call Today: Follow up Diabetes, follow up on patient taking Trulicity as instructed, follow up Dr. Britney London appointment, follow up swelling,COPD,  assisting patient with passport.    -Able to speak to patient today.  Informs, \"that she, \"is in [place] visiting with her sister\". Follow up Passport:   -States, 'they took me off services\". -patient reports, \"that she did not appreciate Verta Cuauhtemoc and how she was treating her; and then they took her off Passport services\". Patient Response:   -She feels she is okay right now doing her own thing, and not having the help come into the home\". -States, Galdino Zapata was upset at first about it, but now she feels she is okay\". -  This nurse CC did offer to put in new referral for patient for passport,       Recent appointments Reviewed (PCP/Specialist): 04/12/19 Dr. Gene Mckeon    Reason Patient is in Care coordination: Diabetes, COPD, home needs    Chronic Conditions:   Diabetes: patient states, \"that her blood sugars are controlled\"   -she is, \"happy with how they are doing now\". COPD-denies any new concerns. Leg swelling: States, \"t hat she is having no leg swelling at all now\"  -patient reports, \"that she is taking the Hydrochlorothiazide\". Dr. Britney London appointment? -States, 'she is to call Dr. Britney London when she is done with  the Active Harmone from the [employer] \"burning of her thyroid\"   -States, Galdino Zapata has a couple more weeks left of this, and will call Dr. Britney London then and get scheduled for follow up\".    Assessment completed:   Diabetes Assessment    Medic Alert ID:  No  Meal Planning:  Avoidance of concentrated sweets   How often do you test your blood sugar?:  Daily   Do you have barriers with adherence to non-pharmacologic self-management [facility]   ·   · :    Goals reviewed. Patient to continue to work to improve:   Goals        Care Coordination     Conditions and Symptoms      I will schedule office visits, as directed by my provider. I will keep my appointment or reschedule if I have to cancel. I will notify my provider of any barriers to my plan of care. I will notify my provider of any symptoms that indicate a worsening of my condition. Patient to check blood sugars. Patient to follow up endocrinology. Patient to try to keep on carbohydrate controlled diet as best as she can, even though she has difficulties with always being able to afford a wide variety of foods. Patient to follow up with Dr. Alycia Heath as directed. Patient call and arrange transportation through [employer] at [phone], and through [employer] [phone]    Barriers: financial, lack of support, overwhelmed by complexity of regimen and stress  Plan for overcoming my barriers: patient to continue to follow up with [facility] /; this nurse CC will continue to follow up. Confidence: 6/10  Anticipated Goal Completion Date: 04/01/19    Update:   -patient with new passport assessment, denies further need for home health aides/passport service. 0patient to stay independent with keeping her apartment clean  -patient to stay compliant with medications. Medications come pre[facility]packaged [facility]  -patient's COPD to stay controlled.   -patient to use nebulizer breathing treatments and inhaler as  Needed.  -Patient to take blood sugars daily, continue Diabetic medications; monitor blood sugars and keep log  -Patient to stay compliant with Dr. Iris Cristobal follow up appointments  -patient to continue follow up with dr. James Antonio, call ahead for [facility] transportation if needed.   Anticipated Goal completion Date: 06/02/19         Nutrition Plan      I will follow a nutritional plan as directed   Calorie Controlled:   1400cals/d carb consistent    Barriers: lack of education  Plan for overcoming my barriers: CC dietitian to educate on reading food labels, measuring out portions and carb counting  Confidence: 7/10  Anticipated Goal Completion Date: 6/30/2019  Updated goal- 2/20/19 continue to work on portion control, learning serving size              Education Reviewed with patient today: See Education Module. Interventions completed today:    · Patient to reach out to care coordinator at [phone] or MD office with questions. · Reminded patient of walk in care clinic availability/hours; and when to utilize the facility if MD office not available. Care Coordinator Plan of Care: This nurse CC will plan to follow up with patient in 3-4 weeks.   -This nurse CC had worked with [facility]  with patient,a nd then deborah  discharged patient.  -patient has been                  Care Coordination Interventions    Program Enrollment:  Complex Care  Referral from Primary Care Provider:  Yes  Suggested Interventions and Community Resources  Diabetes Education: In Process (Comment: requesting order for Diabetic Ed)  Home Care Waiver: In Process (Comment: faxed passport referral)  Meals on Wheels: In Process (Comment: contacted [facility], they will start 04/16)  Medi Set or Pill Pack:  Completed (Comment: [facility] that prefills her medications and mails them to her)  Registered Dietician:  Completed  Social Work:  Completed  Transportation Support: In Process         Prior to Admission medications    Medication Sig Start Date End Date Taking?  Authorizing Provider   TRUEPLUS PEN NEEDLES 31G X 5 MM MISC USE AS DIRECTED DAILY 5/20/19   Mookie Garcia MD   Insulin Pen Needle 31G X 5 MM MISC 1 each by Does not apply route daily 5/6/19   Mookie Garcia MD   Multiple Vitamin (MULTI-VITAMINS) TABS TAKE ONE (1) TABLET BY MOUTH ONCE DAILY 4/23/19   MD SREE Wyatt 901 (26 Fe) MG tablet TAKE (1) TABLET BY MOUTH DAILY 4/23/19   Va Vincent MD   apixaban (ELIQUIS) 5 MG TABS tablet Take 1 tablet by mouth 2 times daily 4/23/19   Va Vincent MD   loratadine (CLARITIN) 10 MG tablet TAKE (1) TABLET BY MOUTH DAILY 4/23/19   Va Vincent MD   lisinopril (PRINIVIL;ZESTRIL) 5 MG tablet Take 1 tablet by mouth daily 4/23/19   Va Vincent MD   atorvastatin (LIPITOR) 40 MG tablet TAKE 1 TABLET BY MOUTH ONCE DAILY 4/12/19   Elvin Zapien MD   cyclobenzaprine (FLEXERIL) 5 MG tablet TAKE ONE (1) TABLET BY MOUTH THREE TIMES DAILY AS NEEDED FOR MUSCLE SPASMS 4/12/19   Va Vincent MD   Cholecalciferol (VITAMIN D3) 1000 units TABS TAKE 1 TABLET BY MOUTH DAILY 4/11/19   Va Vincent MD   gabapentin (NEURONTIN) 800 MG tablet TAKE 1 TABLET BY MOUTH TWICE DAILY 4/11/19 5/11/19  Va Vincent MD   Omega-3 Fatty Acids (FISH OIL) 1000 MG CAPS TAKE 2 CAPSULES BY MOUTH DAILY 4/11/19   Va Vincent MD   methimazole (TAPAZOLE) 5 MG tablet Take 10 mg by mouth 3/4/19   Historical Provider, MD   amLODIPine (NORVASC) 10 MG tablet Take 1 tablet by mouth daily 3/25/19 4/24/19  Va Vincent MD   atorvastatin (LIPITOR) 40 MG tablet TAKE 1 TABLET BY MOUTH ONCE DAILY 3/25/19   Va Vincent MD   hydrochlorothiazide (HYDRODIURIL) 50 MG tablet TAKE 1 TABLET BY MOUTH ONCE DAILY 3/25/19   Va Vincent MD   ondansetron (ZOFRAN) 4 MG tablet Take 1 tablet by mouth 3 times daily as needed for Nausea or Vomiting 3/25/19   Va Vincent MD   TRULICITY 3.78 HX/7.4BU SOPN Inject 0.75 mg into the skin once a week 0.75mg/0.5ml 1/15/19   Historical Provider, MD   naproxen (NAPROSYN) 375 MG tablet Take 1 tablet by mouth 2 times daily 1/14/19   Shai Clarke MD   potassium chloride (KLOR-CON M) 20 MEQ extended release tablet Take 1 tablet by mouth daily 12/18/18   Elvin Zapien MD   oxybutynin (DITROPAN XL) 10 MG extended release tablet Take 1 tablet by mouth daily 12/11/18   Carol Rizo MD Luz   Insulin Glargine (BASAGLAR KWIKPEN SC) Inject 25 Units into the skin nightly Per Dr. Tobi Pinto Provider, MD   VENTOLIN  (90 Base) MCG/ACT inhaler INHALE 2 PUFFS BY MOUTH EVERY 6 HOURS AS NEEDED FOR WHEEZING 8/30/18   Charity Rae MD   metoprolol tartrate (LOPRESSOR) 25 MG tablet TAKE 1 TABLET BY MOUTH 2 TIMES DAILY 8/30/18   Charity Rae MD   FREESTYLE LANCETS MISC TEST BLOOD SUGAR THREE TIMES A DAY 8/28/18   Charity Rae MD   albuterol (PROVENTIL) (2.5 MG/3ML) 0.083% nebulizer solution Take 2.5 mg by nebulization 4 times daily    Historical Provider, MD   Blood Glucose Monitoring Suppl SOUTH 1 Units by Does not apply route once for 1 dose PLease dispense a machine that is covered by her insurance  DX E11.9 4/26/18 4/26/18  Charity Rae MD   Incontinence Supply Disposable (POISE PAD) PADS Apply 1 Piece topically as needed (incontinence) 11/21/17   Carlota Riedel, MD   Blood Glucose Monitoring Suppl SOUTH 1 each by Does not apply route 3 times daily 6/6/17   Jany Vance MD   Alcohol Swabs (EASY TOUCH ALCOHOL PREP MEDIUM) 70 % PADS USE FOUR TIMES DAILY 3/29/17   Jany Vance MD   FREESTYLE LITE strip TEST BLOOD SUGAR THREE TIMES A DAY 3/29/17   Jany Vance MD   Blood Pressure Monitoring (B-D ASSURE BPM/AUTO ARM CUFF) MISC 1 Device by Does not apply route daily as needed (htn) 8/25/16   Jayn Vance MD       Future Appointments   Date Time Provider Diomedes Cardenas   7/11/2019  1:30 PM [facility]  None   7/11/2019  2:00 PM Charity Rae MD [facility]   12/9/2019  2:00 PM MD Maxine Patiño [facility]

## 2019-05-29 ENCOUNTER — CARE COORDINATION (OUTPATIENT)
Dept: CARE COORDINATION | Age: 64
End: 2019-05-29

## 2019-05-30 NOTE — CARE COORDINATION
Reviewed goals with patient-  Patient goals set:  1. Reviewed measuring out portions at meals and snacks, what a serving size is and how to estimate a serving using your hand.   Reviewed goal- to limit intake to <1 cup of carb/meal or 45gms of carb/meal, 15gms/snack. 2. Reviewed importance of meal pattern and eating same time of day each day. Goal is 3 meals daily spaced every 4-5 hours. 3. Reviewed using plate method at meals. Goal is to make 1/2 of plate at meals non-starchy vegetables, 1/4 lean proteins, 1/4 carbs.  Patient encouraged to focus on making sure she is eating protein at each meal. Reviewed protein sources- fish, chicken almost daily, discussed eggs, peanut butter and other protein sources. Patient relays blood sugars have been very good AM- 100's, doing much better with diet and has no questions. Patient given contact information to call with nutrition questions any time. Will remove patient from panel.

## 2019-06-03 ENCOUNTER — CARE COORDINATION (OUTPATIENT)
Dept: CARE COORDINATION | Age: 64
End: 2019-06-03

## 2019-06-03 NOTE — CARE COORDINATION
attempted to reach [employer] however phone was stating the customer you are calling has phone restrictions.  will follow up with [employer] when she can use her office phone rather than work cell.

## 2019-06-05 ENCOUNTER — CARE COORDINATION (OUTPATIENT)
Dept: CARE COORDINATION | Age: 64
End: 2019-06-05

## 2019-06-05 NOTE — CARE COORDINATION
attempted to contact [employer] however no answer.  left voicemail with contact information. Plan:    will attempt to contact [employer] again to inquire about PASSPORT services.

## 2019-06-10 ENCOUNTER — CARE COORDINATION (OUTPATIENT)
Dept: CARE COORDINATION | Age: 64
End: 2019-06-10

## 2019-06-10 NOTE — CARE COORDINATION
Ambulatory Care Coordination Note  6/10/2019  CM Risk Score: 12  Zen Mortality Risk Score:      ACC: Lisa Dhillon, RN    Summary Note:     Reason For Call Today:   Diabetes follow up attempt , follow up endocrinologist appointment to be scheduled, follow up passport    Left vm message and requested phone call back. Care Coordination Plan of Care: This nurse Care Coordinator will await call back from patient, and if no return call; will attempt to reach back out to patient this week.     Future Appointments   Date Time Provider Dimoedes Cardenas   7/11/2019  1:30 PM [address] [place] [address]  None   7/11/2019  2:00 PM Dotty Madison MD [facility]   12/9/2019  2:00 PM MD Lesia Patterson [facility]

## 2019-06-11 ENCOUNTER — CARE COORDINATION (OUTPATIENT)
Dept: CARE COORDINATION | Age: 64
End: 2019-06-11

## 2019-06-11 NOTE — CARE COORDINATION
Ambulatory Care Coordination Note  6/11/2019  CM Risk Score: 12  Zen Mortality Risk Score:      ACC: Tammy Wong RN    Summary Note:   Date Care Coordination Episode Started: 4/11/2018    Reason For Call Today:   Follow up on blood sugar log. Follow up on scheduling appointment with Dr. Ana Chaudhary. Follow up on home needs/passport.     -Spoke with patient today  -Patient still in [place] visiting her sister who just had surgery   -Patient leaving [place] on a train at 2:30 am to come home  -Reports \"everything is going smooth\"       Passport   -Patient recently had been removed from passport services  -Patient states, Amanda Reyna is ok now\"  -Denies the need to refer to passport at this time    Endocrinologist Dr. Ana Chaudhary  -Patient suppose to call and schedule appointment when hormone therapy completed following the \"burning of her thyroid\"  -Patient states she has completed this   -Per patient, \"I was planning on calling them to set up an appointment when I get home from [place]"       Reason Patient is in Care coordination: Diabetes, COPD, home needs    Chronic Conditions:   1.) Diabetes  -Monitoring blood sugar before breakfast and before dinner  -Average readings are 130-140 before meals   -Patient very happy with her numbers  - Reviewed insulin and dosing  -Basaglar 25 units at night  -Trulicity 1.34 mg weekly- Patient reports this is done on Monday     2.) COPD  -Denies any changes in cough  -Denies any congestion or increased shortness of breath  - Does have inhaler is she needs it  -Has the albuterol nebulizer, using it two times daily     Assessment completed:   Diabetes Assessment    Medic Alert ID:  No  Meal Planning:  Avoidance of concentrated sweets   How often do you test your blood sugar?:  Daily   Do you have barriers with adherence to non-pharmacologic self-management interventions?  (Nutrition/Exercise/Self-Monitoring):  Yes   Have you ever had to go to the ED for symptoms of low blood sugar?:  No No patient-reported symptoms   Do you have hyperglycemia symptoms?:  No   Do you have hypoglycemia symptoms?:  No   Blood Sugar Monitoring Regimen:  Before Meals   Blood Sugar Trends:  No Change      ,   COPD Assessment    Does the patient understand envrionmental exposure?:  Yes  Is the patient able to verbalize Rescue vs. Long Acting medications?:  Yes  Does the patient have a nebulizer?:  Yes     No patient-reported symptoms         Symptoms:   None:  Yes      Have you had a recent diagnosis of pneumonia either by PCP or at a hospital?:  No      and   General Assessment    Do you have any symptoms that are causing concern?:  No           Any ED visits or Hospitalizations since last Call? · No    Medications Reviewed with  today:  · Patient verbalizes that she has all medications. · Patient denies any questions. · Any cost issues with medications No.    Reviewed social needs/Home Needs if any:     · Does patient have enough food? Yes  · Does patient have transportation to appointment/store/pharmacy, etc?Yes, [facility]   · Does patient need any help in the home?not at this time    · If yes, what type of help? N/a. Did recently have passport services. Reviewed Upcoming follow up appointments with   Future Appointments   Date Time Provider Diomedes Cardenas   7/11/2019  1:30 PM [facility]  None   7/11/2019  2:00 PM Holly Lee MD [facility]   12/9/2019  2:00 PM MD Boris Zhang [facility]   ·     Goals reviewed. Patient to continue to work to improve:   Goals      Conditions and Symptoms      I will schedule office visits, as directed by my provider. I will keep my appointment or reschedule if I have to cancel. I will notify my provider of any barriers to my plan of care. I will notify my provider of any symptoms that indicate a worsening of my condition. Patient to check blood sugars. Patient to follow up endocrinology.   Patient to try to keep on carbohydrate controlled diet as best as she can, even though she has difficulties with always being able to afford a wide variety of foods. Patient to follow up with Dr. Yun Ramirez as directed. Patient call and arrange transportation through [employer] at [phone], and through [employer] [phone]    Barriers: financial, lack of support, overwhelmed by complexity of regimen and stress  Plan for overcoming my barriers: patient to continue to follow up with [facility] /; this nurse CC will continue to follow up. Confidence: 6/10  Anticipated Goal Completion Date: 04/01/19    Update:   -patient with new passport assessment, denies further need for home health aides/passport service. 0patient to stay independent with keeping her apartment clean  -patient to stay compliant with medications. Medications come pre-packaged through [facility]  -patient's COPD to stay controlled.   -patient to use nebulizer breathing treatments and inhaler as  Needed.  -Patient to take blood sugars daily, continue Diabetic medications; monitor blood sugars and keep log  -Patient to stay compliant with Dr. Kasia Couch follow up appointments  -patient to continue follow up with dr. Taisha Diop, call ahead for [facility] transportation if needed. Anticipated Goal completion Date: 06/02/19    Update:  -Patient to let nurse CC know is she would like to get passport set up again  -Patient to follow up with Dr. Jennifer Rios as scheduled  -Patient to continue monitoring blood sugars two times daily  -Patient to continue to utilize [email or web address] for transportation to appointments   -Patient to inform MD of any changes in cough or increased shortness of breath  Anticipated Goal Completion Date: 9/1/2019              Education Reviewed with patient today: See Education Module. Interventions completed today:Reminded patient to make follow up appointment with Dr. Kasia Couch when patient return home from [place]. · Patient to reach out to care coordinator at [phone] or MD office with questions. Care Coordinator Plan of Care: This nurse CC will plan to reach out to patient in 3-4 weeks. Follow up PCP appointment. Follow up on any new orders/instructions. Follow up scheduling with Dr. Mati Cruz. Assess for any new home needs. May consider sending for graduation approval if no new care coordination needs at that time. Care Coordination Interventions    Program Enrollment:  Complex Care  Referral from Primary Care Provider:  Yes  Suggested Interventions and Community Resources  Diabetes Education: In Process (Comment: requesting order for Diabetic Ed)  Home Care Waiver: In Process (Comment: faxed passport referral)  Meals on Wheels: In Process (Comment[facility]  Medi Set or Pill Pack:  Completed (Comment: [facility] that prefills her medications and mails them to her)  Registered Dietician:  Completed  Social Work:  Completed  Transportation Support: In Process           Prior to Admission medications    Medication Sig Start Date End Date Taking?  Authorizing Provider   TRUEPLUS PEN NEEDLES 31G X 5 MM MISC USE AS DIRECTED DAILY 5/20/19   Mookie Garcia MD   Insulin Pen Needle 31G X 5 MM MISC 1 each by Does not apply route daily 5/6/19   Mookie Garcia MD   Multiple Vitamin (MULTI-VITAMINS) TABS TAKE ONE (1) TABLET BY MOUTH ONCE DAILY 4/23/19   Mookie Garcia MD   FEROSUL 325 (65 Fe) MG tablet TAKE (1) TABLET BY MOUTH DAILY 4/23/19   Mookie Garcia MD   apixaban (ELIQUIS) 5 MG TABS tablet Take 1 tablet by mouth 2 times daily 4/23/19   Mookie Garcia MD   loratadine (CLARITIN) 10 MG tablet TAKE (1) TABLET BY MOUTH DAILY 4/23/19   Mookie Garcia MD   lisinopril (PRINIVIL;ZESTRIL) 5 MG tablet Take 1 tablet by mouth daily 4/23/19   Mookie Garcia MD   atorvastatin (LIPITOR) 40 MG tablet TAKE 1 TABLET BY MOUTH ONCE DAILY 4/12/19   Gill Sal Cassandra Bhatti MD   cyclobenzaprine (FLEXERIL) 5 MG tablet TAKE ONE (1) TABLET BY MOUTH THREE TIMES DAILY AS NEEDED FOR MUSCLE SPASMS 4/12/19   Yane Salcedo MD   Cholecalciferol (VITAMIN D3) 1000 units TABS TAKE 1 TABLET BY MOUTH DAILY 4/11/19   Yane Sacledo MD   gabapentin (NEURONTIN) 800 MG tablet TAKE 1 TABLET BY MOUTH TWICE DAILY 4/11/19 5/11/19  Yane Salcedo MD   Omega-3 Fatty Acids (FISH OIL) 1000 MG CAPS TAKE 2 CAPSULES BY MOUTH DAILY 4/11/19   Yane Salcedo MD   methimazole (TAPAZOLE) 5 MG tablet Take 10 mg by mouth 3/4/19   Historical Provider, MD   amLODIPine (NORVASC) 10 MG tablet Take 1 tablet by mouth daily 3/25/19 4/24/19  Yane Salcedo MD   atorvastatin (LIPITOR) 40 MG tablet TAKE 1 TABLET BY MOUTH ONCE DAILY 3/25/19   Yane Salcedo MD   hydrochlorothiazide (HYDRODIURIL) 50 MG tablet TAKE 1 TABLET BY MOUTH ONCE DAILY 3/25/19   Yane Salcedo MD   ondansetron (ZOFRAN) 4 MG tablet Take 1 tablet by mouth 3 times daily as needed for Nausea or Vomiting 3/25/19   Yane Salcedo MD   TRULICITY 2.29 BR/8.6OM SOPN Inject 0.75 mg into the skin once a week 0.75mg/0.5ml 1/15/19   Historical Provider, MD   naproxen (NAPROSYN) 375 MG tablet Take 1 tablet by mouth 2 times daily 1/14/19   Josephine Avalos MD   potassium chloride (KLOR-CON M) 20 MEQ extended release tablet Take 1 tablet by mouth daily 12/18/18   Della Allen MD   oxybutynin (DITROPAN XL) 10 MG extended release tablet Take 1 tablet by mouth daily 12/11/18   Piper Edwards MD   Insulin Glargine (BASAGLAR KWIKPEN SC) Inject 25 Units into the skin nightly Per Dr. Jaylin Mosqueda Provider, MD   VENTOLIN  (90 Base) MCG/ACT inhaler INHALE 2 PUFFS BY MOUTH EVERY 6 HOURS AS NEEDED FOR WHEEZING 8/30/18   Yane Salcedo MD   metoprolol tartrate (LOPRESSOR) 25 MG tablet TAKE 1 TABLET BY MOUTH 2 TIMES DAILY 8/30/18   MD BONI Espinoza LANCETS [facility] TEST BLOOD SUGAR THREE TIMES A DAY 8/28/18   Yane Salcedo, MD   albuterol (PROVENTIL) (2.5 MG/3ML) 0.083% nebulizer solution Take 2.5 mg by nebulization 4 times daily    Historical Provider, MD   Blood Glucose Monitoring Suppl SOUTH 1 Units by Does not apply route once for 1 dose PLease dispense a machine that is covered by her insurance  DX E11.9 4/26/18 4/26/18  Aneta Batres MD   Incontinence Supply Disposable (POISE PAD) PADS Apply 1 Piece topically as needed (incontinence) 11/21/17   Yanely Celis MD   Blood Glucose Monitoring Suppl SOUTH 1 each by Does not apply route 3 times daily 6/6/17   Bonita Ferguson MD   Alcohol Swabs (EASY TOUCH ALCOHOL PREP MEDIUM) 70 % PADS USE FOUR TIMES DAILY 3/29/17   Bonita Ferguson MD   FREESTYLE LITE strip TEST BLOOD SUGAR THREE TIMES A DAY 3/29/17   Bonita Ferguson MD   Blood Pressure Monitoring (B-D ASSURE BPM/AUTO ARM CUFF) MISC 1 Device by Does not apply route daily as needed (htn) 8/25/16   Bonita Ferguson MD

## 2019-06-18 ENCOUNTER — CARE COORDINATION (OUTPATIENT)
Dept: CARE COORDINATION | Age: 64
End: 2019-06-18

## 2019-06-18 NOTE — CARE COORDINATION
called and spoke with [place]. [place] reports that she is no longer on the PASSPORT program and does not receive the box of food or aide service. [place] reports that she is doing ok without the service. [place] reports that she decided to not have a hearing to stay on the program.  [place] reports that is she becomes desperate for food she will start going to food pantries. [place] reports that she is more worried about being evicted.  inquired about being evicted? [place] reports that she does not have enough money to pay June's rent and was behind already in May.  inquired if [place] knew she was behind on rent before she left for her vacation? [place] report yes she is behind on rent because she paid her Utilities.  inquired if [place] is in a income based apartment? Dianna king no. Discussed Dianna's options. [place] called and left message with [employer]. If evicted, Australia will move in with  Brother. [place] will call [employer] 2-1-1.

## 2019-06-25 ENCOUNTER — CARE COORDINATION (OUTPATIENT)
Dept: CARE COORDINATION | Age: 64
End: 2019-06-25

## 2019-06-25 NOTE — CARE COORDINATION
Reason For Call Today:  -patient called this nurse CC this morning to inform that she is for sure being evicted out of her apartment, and is not sure yet where she is going to live. -Currently she is safe and staying with her brother, but she was questioning if she could have help finding a new apartment?   -will forward to  to review. Patient denies any other new concerns today. Diabetes Assessment    Medic Alert ID:  No  Meal Planning:  Avoidance of concentrated sweets   How often do you test your blood sugar?:  Daily   Do you have barriers with adherence to non-pharmacologic self-management interventions? (Nutrition/Exercise/Self-Monitoring):  Yes   Have you ever had to go to the ED for symptoms of low blood sugar?:  No       No patient-reported symptoms   Do you have hyperglycemia symptoms?:  No   Do you have hypoglycemia symptoms?:  No   Blood Sugar Monitoring Regimen:  Before Meals   Blood Sugar Trends:  No Change      ,   COPD Assessment    Does the patient understand envrionmental exposure?:  Yes  Is the patient able to verbalize Rescue vs. Long Acting medications?:  Yes  Does the patient have a nebulizer?:  Yes     No patient-reported symptoms         Symptoms:   None:  Yes      Have you had a recent diagnosis of pneumonia either by PCP or at a hospital?:  No      and   General Assessment    Do you have any symptoms that are causing concern?:  No       Lab Results   Component Value Date    LABA1C 7.5 (H) 04/30/2019     Lab Results   Component Value Date     04/30/2019     Education REviewed: See Module. Goals reviewed:   Goals        Care Coordination     Conditions and Symptoms      I will schedule office visits, as directed by my provider. I will keep my appointment or reschedule if I have to cancel. I will notify my provider of any barriers to my plan of care. I will notify my provider of any symptoms that indicate a worsening of my condition.     Patient to check blood sugars. Patient to follow up endocrinology. Patient to try to keep on carbohydrate controlled diet as best as she can, even though she has difficulties with always being able to afford a wide variety of foods. Patient to follow up with Dr. Wm Carrion as directed. Patient call and arrange transportation through [employer] at [phone], [phone]    Barriers: financial, lack of support, overwhelmed by complexity of regimen and stress  Plan for overcoming my barriers: [facility]; this nurse CC will continue to follow up. Confidence: 6/10  Anticipated Goal Completion Date: 04/01/19    Update:   -patient with new passport assessment, denies further need for home health aides/passport service. 0patient to stay independent with keeping her apartment clean  -patient to stay compliant with medications. Medications come pre-packaged [facility]  -patient's COPD to stay controlled.   -patient to use nebulizer breathing treatments and inhaler as  Needed.  -Patient to take blood sugars daily, continue Diabetic medications; monitor blood sugars and keep log  -Patient to stay compliant with Dr. Keen Clock follow up appointments  -patient to continue follow up with dr. Silvia Dotson, call ahead for [facility] transportation if needed. Anticipated Goal completion Date: 06/02/19    Update:  -Patient to let nurse CC know is she would like to get passport set up again  -Patient to follow up with Dr. Dario Chandra as scheduled  -Patient to continue monitoring blood sugars two times daily  -Patient to continue to utilize [employer] for transportation to appointments   -Patient to inform MD of any changes in cough or increased shortness of breath  Anticipated Goal Completion Date: 9/1/2019            Care Coordination Plan of Care:    This nurse Care Coordinator will forward to [facility]  to inform of patient being evicted from her apartment and requesting assistance in finding a new one.

## 2019-06-26 ENCOUNTER — CARE COORDINATION (OUTPATIENT)
Dept: CARE COORDINATION | Age: 64
End: 2019-06-26

## 2019-06-26 DIAGNOSIS — Z79.4 TYPE 2 DIABETES MELLITUS WITH DIABETIC AUTONOMIC NEUROPATHY, WITH LONG-TERM CURRENT USE OF INSULIN (HCC): ICD-10-CM

## 2019-06-26 DIAGNOSIS — E11.43 TYPE 2 DIABETES MELLITUS WITH DIABETIC AUTONOMIC NEUROPATHY, WITH LONG-TERM CURRENT USE OF INSULIN (HCC): ICD-10-CM

## 2019-06-26 RX ORDER — PEN NEEDLE, DIABETIC 31 GX3/16"
NEEDLE, DISPOSABLE MISCELLANEOUS
Qty: 100 EACH | Refills: 3 | Status: ON HOLD | OUTPATIENT
Start: 2019-06-26 | End: 2019-12-13 | Stop reason: HOSPADM

## 2019-07-10 ENCOUNTER — CARE COORDINATION (OUTPATIENT)
Dept: CARE COORDINATION | Age: 64
End: 2019-07-10

## 2019-07-11 ENCOUNTER — OFFICE VISIT (OUTPATIENT)
Dept: FAMILY MEDICINE CLINIC | Age: 64
End: 2019-07-11
Payer: COMMERCIAL

## 2019-07-11 VITALS
WEIGHT: 161 LBS | SYSTOLIC BLOOD PRESSURE: 106 MMHG | HEART RATE: 83 BPM | DIASTOLIC BLOOD PRESSURE: 80 MMHG | HEIGHT: 64 IN | BODY MASS INDEX: 27.49 KG/M2 | OXYGEN SATURATION: 97 %

## 2019-07-11 DIAGNOSIS — M54.5 CHRONIC MIDLINE LOW BACK PAIN, WITH SCIATICA PRESENCE UNSPECIFIED: ICD-10-CM

## 2019-07-11 DIAGNOSIS — I10 ESSENTIAL HYPERTENSION: ICD-10-CM

## 2019-07-11 DIAGNOSIS — E11.43 TYPE 2 DIABETES MELLITUS WITH DIABETIC AUTONOMIC NEUROPATHY, WITH LONG-TERM CURRENT USE OF INSULIN (HCC): Primary | ICD-10-CM

## 2019-07-11 DIAGNOSIS — I48.0 PAROXYSMAL ATRIAL FIBRILLATION (HCC): ICD-10-CM

## 2019-07-11 DIAGNOSIS — G89.29 CHRONIC MIDLINE LOW BACK PAIN, WITH SCIATICA PRESENCE UNSPECIFIED: ICD-10-CM

## 2019-07-11 DIAGNOSIS — Z79.4 TYPE 2 DIABETES MELLITUS WITH DIABETIC AUTONOMIC NEUROPATHY, WITH LONG-TERM CURRENT USE OF INSULIN (HCC): Primary | ICD-10-CM

## 2019-07-11 DIAGNOSIS — E78.2 MIXED HYPERLIPIDEMIA: ICD-10-CM

## 2019-07-11 PROCEDURE — G8427 DOCREV CUR MEDS BY ELIG CLIN: HCPCS | Performed by: INTERNAL MEDICINE

## 2019-07-11 PROCEDURE — 3017F COLORECTAL CA SCREEN DOC REV: CPT | Performed by: INTERNAL MEDICINE

## 2019-07-11 PROCEDURE — 99214 OFFICE O/P EST MOD 30 MIN: CPT | Performed by: INTERNAL MEDICINE

## 2019-07-11 PROCEDURE — 3045F PR MOST RECENT HEMOGLOBIN A1C LEVEL 7.0-9.0%: CPT | Performed by: INTERNAL MEDICINE

## 2019-07-11 PROCEDURE — 2022F DILAT RTA XM EVC RTNOPTHY: CPT | Performed by: INTERNAL MEDICINE

## 2019-07-11 PROCEDURE — G8417 CALC BMI ABV UP PARAM F/U: HCPCS | Performed by: INTERNAL MEDICINE

## 2019-07-11 PROCEDURE — 1036F TOBACCO NON-USER: CPT | Performed by: INTERNAL MEDICINE

## 2019-07-11 RX ORDER — LEVOTHYROXINE SODIUM 112 UG/1
112 TABLET ORAL DAILY
Qty: 30 TABLET | Refills: 0 | Status: SHIPPED | OUTPATIENT
Start: 2019-07-11 | End: 2019-08-06 | Stop reason: SDUPTHER

## 2019-07-11 ASSESSMENT — ENCOUNTER SYMPTOMS
SHORTNESS OF BREATH: 0
BACK PAIN: 1
NAUSEA: 0
SORE THROAT: 0
COUGH: 0
ABDOMINAL PAIN: 0
BOWEL INCONTINENCE: 0

## 2019-07-11 NOTE — PROGRESS NOTES
ALKPHOS 105 04/30/2019    AST 29 04/30/2019    ALT 29 04/30/2019     Lab Results   Component Value Date    WBC 7.3 04/30/2019    RBC 4.57 04/30/2019    HGB 13.6 04/30/2019    HCT 40.6 04/30/2019    MCV 88.7 04/30/2019    MCH 29.8 04/30/2019    MCHC 33.6 04/30/2019    RDW 17.8 04/30/2019     04/30/2019    MPV NOT REPORTED 04/30/2019     Lab Results   Component Value Date    TSH 15.83 05/14/2019     Lab Results   Component Value Date    CHOL 217 04/30/2019    HDL 45 04/30/2019    LABA1C 7.5 04/30/2019          Assessment & Plan        Diagnosis Orders   1. Type 2 diabetes mellitus with diabetic autonomic neuropathy, with long-term current use of insulin (HCC)   Continue on current regimen, low carb diet. She also follows up with endo   DIABETES FOOT EXAM   2. Essential hypertension   BPstable on current meds, although somewhat on the low side. She is asymptomatic. Will continue same meds      3. Mixed hyperlipidemia  Continue on Lipitor       4. Paroxysmal atrial fibrillation (HCC)   HR stable, on Eliquis    5. Chronic midline low back pain, with sciatica presence unspecified   On Gabapentin, prn Flexeril. Overall doing well. Completed Refills   Requested Prescriptions     Signed Prescriptions Disp Refills    levothyroxine (SYNTHROID) 112 MCG tablet 30 tablet 0     Sig: Take 1 tablet by mouth daily     Return in about 3 months (around 10/11/2019) for diabetes, HTN, copd. Orders Placed This Encounter   Medications    levothyroxine (SYNTHROID) 112 MCG tablet     Sig: Take 1 tablet by mouth daily     Dispense:  30 tablet     Refill:  0     Orders Placed This Encounter   Procedures     DIABETES FOOT EXAM         Patient Instructions     SURVEY:    You may be receiving a survey from [employer] regarding your visit today. Please complete the survey to enable us to provide the highest quality of care to you and your family.     If you cannot score us a very good on any

## 2019-07-13 ASSESSMENT — ENCOUNTER SYMPTOMS: TROUBLE SWALLOWING: 0

## 2019-07-18 ENCOUNTER — CARE COORDINATION (OUTPATIENT)
Dept: CARE COORDINATION | Age: 64
End: 2019-07-18

## 2019-07-26 DIAGNOSIS — E11.43 TYPE 2 DIABETES MELLITUS WITH DIABETIC AUTONOMIC NEUROPATHY, WITH LONG-TERM CURRENT USE OF INSULIN (HCC): ICD-10-CM

## 2019-07-26 DIAGNOSIS — Z79.4 TYPE 2 DIABETES MELLITUS WITH DIABETIC AUTONOMIC NEUROPATHY, WITH LONG-TERM CURRENT USE OF INSULIN (HCC): ICD-10-CM

## 2019-07-26 DIAGNOSIS — M50.90 CERVICAL NECK PAIN WITH EVIDENCE OF DISC DISEASE: ICD-10-CM

## 2019-07-29 RX ORDER — GABAPENTIN 800 MG/1
TABLET ORAL
Qty: 60 TABLET | Refills: 3 | Status: SHIPPED | OUTPATIENT
Start: 2019-07-29 | End: 2019-10-14 | Stop reason: SDUPTHER

## 2019-07-29 RX ORDER — PEN NEEDLE, DIABETIC 31 GX3/16"
NEEDLE, DISPOSABLE MISCELLANEOUS
Qty: 100 EACH | Refills: 5 | Status: ON HOLD | OUTPATIENT
Start: 2019-07-29 | End: 2019-12-13 | Stop reason: HOSPADM

## 2019-07-29 NOTE — TELEPHONE ENCOUNTER
Date:  Future Appointments   Date Time Provider Diomedes Cardenas   10/14/2019  1:30  [facility] MOBILE  None   10/14/2019  2:00 PM Travis Machado MD [facility]   12/9/2019  2:00 PM MD [facility]            Patient Active Problem List:     Cervical neck pain with evidence of disc disease     Paroxysmal atrial fibrillation ([address]     Graves' disease     Asthma with COPD ([address]     Essential hypertension     Type 2 diabetes mellitus without complication, with long-term current use of insulin (HCC)     Iron deficiency anemia     Electromechanical dissociation (EMD) ([address]     Irritable bowel syndrome with both constipation and diarrhea     Mixed hyperlipidemia

## 2019-07-31 ENCOUNTER — CARE COORDINATION (OUTPATIENT)
Dept: CARE COORDINATION | Age: 64
End: 2019-07-31

## 2019-07-31 NOTE — CARE COORDINATION
Value Date    LABA1C 7.5 (H) 04/30/2019     Lab Results   Component Value Date     04/30/2019   Medications REviewed:   1)Basaglar: 25 units daily  2)Trulicity: every Monday  -Denies any questions regarding injecting the medication; when to inject, or how to inject. Assessment completed:   Diabetes Assessment    Medic Alert ID:  No  Meal Planning:  Avoidance of concentrated sweets   How often do you test your blood sugar?:  Daily   Do you have barriers with adherence to non-pharmacologic self-management interventions? (Nutrition/Exercise/Self-Monitoring):  Yes   Have you ever had to go to the ED for symptoms of low blood sugar?:  No       No patient-reported symptoms   Do you have hyperglycemia symptoms?:  No   Do you have hypoglycemia symptoms?:  No   Blood Sugar Monitoring Regimen:  Before Meals   Blood Sugar Trends:  No Change      ,   COPD Assessment    Does the patient understand envrionmental exposure?:  Yes  Is the patient able to verbalize Rescue vs. Long Acting medications?:  Yes  Does the patient have a nebulizer?:  Yes     No patient-reported symptoms         Symptoms:   None:  Yes      Increase use of rapid acting/rescue inhaled medications?:  No  Change in chronic cough?:  No/At Baseline  Have you had a recent diagnosis of pneumonia either by PCP or at a hospital?:  No      and   General Assessment    Do you have any symptoms that are causing concern?:  No           Any ED visits or Hospitalizations since last Call? · No    Medications Reviewed with  today:  · Patient verbalizes that she has all medications. · Patient denies any questions. · Any cost issues with medications No, denies. Zone Management tool reviewed today is applicable: Yes, Diabetes and COPD    Reviewed social needs/Home Needs if any:     · Does patient have enough food? Yes,   · Does patient have transportation to appointment/store/pharmacy, etc?Yes, brother or friends typically transporting, also uses [employer]  · Does and inhaler as  Needed.  -Patient to take blood sugars daily, continue Diabetic medications; monitor blood sugars and keep log  -Patient to stay compliant with Dr. Umm Boston follow up appointments  -patient to continue follow up with dr. Candy Gayle, call ahead for [facility] transportation if needed. Anticipated Goal completion Date: 06/02/19    Update:  -Patient to let nurse CC know is she would like to get passport set up again  -Patient to follow up with Dr. Lianet Tsai as scheduled  -Patient to continue monitoring blood sugars two times daily  -Patient to continue to utilize [employer] for transportation to appointments   -Patient to inform MD of any changes in cough or increased shortness of breath  Anticipated Goal Completion Date: 9/1/2019                Education Reviewed with patient today: See Education Module. Interventions completed today:reviewed Diabetes precautions, medications. Reviewed COPD precautions, medications, and when to notify PCP of increase shortness of breath. · Patient to reach out to care coordinator at [phone] or MD office with questions. · Reminded patient of walk in care clinic availability/hours; and when to utilize the facility if MD office not available. Care Coordinator Plan of Care: This nurse CC will plan to follow up in 2-3 weeks. Follow up on patient getting labs prior to Dr. Juana Tran appointment  -Follow up on patient moving, stable living condition  -follow up on patient getting refill on Levothyroxine filled. .                 Care Coordination Interventions    Program Enrollment:  Complex Care  Referral from Primary Care Provider:  Yes  Suggested Interventions and Community Resources  Diabetes Education: In Process (Comment: requesting order for Diabetic Ed)  Home Care Waiver: In Process (Comment: faxed passport referral)  Meals on Wheels:   In Process (Comment: contacted [facility], they will start 04/16)  Medi Set or Pill Pack:  Completed (Comment: patient [facility] that prefills her medications and mails them to her)  Registered Dietician:  Completed  Social Work:  Completed  Transportation Support: In Process           Prior to Admission medications    Medication Sig Start Date End Date Taking?  Authorizing Provider   gabapentin (NEURONTIN) 800 MG tablet TAKE ONE (1) TABLET BY MOUTH TWICE DAILY 7/29/19 8/28/19  Phu Yadav MD   TRUEPLUS PEN NEEDLES 31G X 5 MM MISC USE AS DIRECTED DAILY 7/29/19   Phu Yadav MD   levothyroxine (SYNTHROID) 112 MCG tablet Take 1 tablet by mouth daily 7/11/19 8/10/19  Phu Yadav MD   TRUEPLUS PEN NEEDLES 31G X 5 MM MISC USE AS DIRECTED DAILY 6/26/19   Phu Yadav MD   TRUEPLUS PEN NEEDLES 31G X 5 MM MISC USE AS DIRECTED DAILY 5/20/19   Phu Yadav MD   Insulin Pen Needle 31G X 5 MM MISC 1 each by Does not apply route daily 5/6/19   Phu Yadav MD   Multiple Vitamin (MULTI-VITAMINS) TABS TAKE ONE (1) TABLET BY MOUTH ONCE DAILY 4/23/19   Phu Yadav MD   FEROSUL 325 (65 Fe) MG tablet TAKE (1) TABLET BY MOUTH DAILY 4/23/19   Phu Yadav MD   apixaban (ELIQUIS) 5 MG TABS tablet Take 1 tablet by mouth 2 times daily 4/23/19   Phu Yadav MD   loratadine (CLARITIN) 10 MG tablet TAKE (1) TABLET BY MOUTH DAILY 4/23/19   Phu Yadav MD   lisinopril (PRINIVIL;ZESTRIL) 5 MG tablet Take 1 tablet by mouth daily 4/23/19   Phu Yadav MD   atorvastatin (LIPITOR) 40 MG tablet TAKE 1 TABLET BY MOUTH ONCE DAILY 4/12/19   Mehul Alas MD   cyclobenzaprine (FLEXERIL) 5 MG tablet TAKE ONE (1) TABLET BY MOUTH THREE TIMES DAILY AS NEEDED FOR MUSCLE SPASMS 4/12/19   Phu Yadav MD   Cholecalciferol (VITAMIN D3) 1000 units TABS TAKE 1 TABLET BY MOUTH DAILY 4/11/19   Phu Yadav MD   Omega-3 Fatty Acids (FISH OIL) 1000 MG CAPS TAKE 2 CAPSULES BY MOUTH DAILY 4/11/19   Phu Yadav MD   amLODIPine (NORVASC) 10 MG tablet Take 1 tablet by mouth daily 3/25/19 4/24/19  Dale

## 2019-08-01 DIAGNOSIS — M25.551 PAIN OF BOTH HIP JOINTS: ICD-10-CM

## 2019-08-01 DIAGNOSIS — M25.552 PAIN OF BOTH HIP JOINTS: ICD-10-CM

## 2019-08-01 RX ORDER — CYCLOBENZAPRINE HCL 5 MG
TABLET ORAL
Qty: 90 TABLET | Refills: 5 | Status: SHIPPED | OUTPATIENT
Start: 2019-08-01 | End: 2020-08-13

## 2019-08-06 RX ORDER — LEVOTHYROXINE SODIUM 112 UG/1
TABLET ORAL
Qty: 30 TABLET | Refills: 2 | Status: SHIPPED | OUTPATIENT
Start: 2019-08-06 | End: 2019-12-12 | Stop reason: ALTCHOICE

## 2019-08-23 DIAGNOSIS — I10 ESSENTIAL HYPERTENSION: ICD-10-CM

## 2019-08-25 RX ORDER — LISINOPRIL 5 MG/1
TABLET ORAL
Qty: 30 TABLET | Refills: 10 | Status: SHIPPED | OUTPATIENT
Start: 2019-08-25 | End: 2020-07-13 | Stop reason: SDUPTHER

## 2019-08-26 ENCOUNTER — CARE COORDINATION (OUTPATIENT)
Dept: CARE COORDINATION | Age: 64
End: 2019-08-26

## 2019-08-26 RX ORDER — HYDROCHLOROTHIAZIDE 50 MG/1
TABLET ORAL
Qty: 30 TABLET | Refills: 10 | Status: SHIPPED | OUTPATIENT
Start: 2019-08-26 | End: 2019-10-14 | Stop reason: DRUGHIGH

## 2019-08-26 NOTE — CARE COORDINATION
Reason For Call Today:  -Patient requested this nurse care coordinator call her per . -patient states, [place] Adrian has a red flag behind her name now\". -INforms, \"that she tried to make an appointment at the hospital, and wanted a ride to get lab work\". Patient sees Dr. Umm Boston again on 19.   -patient is to get lab work done prior to this appointment.  -Patient reports, \"that the [facility] said they can not transport her just for lab work  -This nurse [address] [place] [address] will call Austyn Chase to inquire    Future Appointments   Date Time Provider Diomedes Cardenas   9/3/2019  9:00 AM [facility] Erik[address] Rob Bajo   10/14/2019  1:30 PM [address] Erik[address] Nirmital Bajo   10/14/2019  2:00 PM Jaci Rodriguez MD [address] [place], [place] [address]y   2019  2:00 PM Lairssa Sam MD [facility]     PHone call to Lizsonia Maguireniyah [phone]:   -patient scheduled for 19 at [address] [place] [address] for lab work    Phone call back to patient to inform of date/time scheduled for [facility]:   -Patient given HOLLR Number ([phone])  -patient verbalizes understanding. Out of Town Transportation:   [facility] [address]: [phone] phone number  2)[facility]-Phone number reviewed. Diabetes:   Lab Results   Component Value Date    LABA1C 7.5 (H) 2019     Lab Results   Component Value Date     2019     -Patient reports, \"blood sugars 123 this morning\". -patient, \"checking 2 times daily\". -Informs, \"that most of the time, \"her blood lazo gars are typically lower the 180\". -Typically, [id number]". COPD:   -Denies any new breathing concerns. Exercises:   -States, [place] Adrian is trying to walk more\". -Wants to be more active, and loose some weight\".     Diabetes Assessment    Medic Alert ID:  No  Meal Planning:  Avoidance of concentrated sweets   How often do you test your blood sugar?:  Daily   Do you have barriers with adherence to non-pharmacologic self-management

## 2019-09-04 ENCOUNTER — HOSPITAL ENCOUNTER (OUTPATIENT)
Age: 64
Discharge: HOME | End: 2019-09-04
Payer: COMMERCIAL

## 2019-09-04 LAB
ABSOLUTE EOS #: 0.6 K/UL (ref 0–0.4)
ABSOLUTE IMMATURE GRANULOCYTE: ABNORMAL K/UL (ref 0–0.3)
ABSOLUTE LYMPH #: 1.8 K/UL (ref 1–4.8)
ABSOLUTE MONO #: 0.4 K/UL (ref 0–1)
ALBUMIN SERPL-MCNC: 4.3 G/DL (ref 3.5–5.2)
ALBUMIN/GLOBULIN RATIO: ABNORMAL (ref 1–2.5)
ALP BLD-CCNC: 96 U/L (ref 35–104)
ALT SERPL-CCNC: 17 U/L (ref 5–33)
ANION GAP SERPL CALCULATED.3IONS-SCNC: 12 MMOL/L (ref 9–17)
AST SERPL-CCNC: 17 U/L
BASOPHILS # BLD: 1 % (ref 0–2)
BASOPHILS ABSOLUTE: 0 K/UL (ref 0–0.2)
BILIRUB SERPL-MCNC: 0.21 MG/DL (ref 0.3–1.2)
BUN BLDV-MCNC: 24 MG/DL (ref 8–23)
BUN/CREAT BLD: 20 (ref 9–20)
CALCIUM SERPL-MCNC: 10.2 MG/DL (ref 8.6–10.4)
CHLORIDE BLD-SCNC: 104 MMOL/L (ref 98–107)
CHOLESTEROL/HDL RATIO: 3.5
CHOLESTEROL: 136 MG/DL
CO2: 24 MMOL/L (ref 20–31)
CREAT SERPL-MCNC: 1.19 MG/DL (ref 0.5–0.9)
CREATININE URINE: 16.5 MG/DL (ref 28–217)
DIFFERENTIAL TYPE: YES
EOSINOPHILS RELATIVE PERCENT: 10 % (ref 0–5)
ESTIMATED AVERAGE GLUCOSE: 146 MG/DL
GFR AFRICAN AMERICAN: 56 ML/MIN
GFR NON-AFRICAN AMERICAN: 46 ML/MIN
GFR SERPL CREATININE-BSD FRML MDRD: ABNORMAL ML/MIN/{1.73_M2}
GFR SERPL CREATININE-BSD FRML MDRD: ABNORMAL ML/MIN/{1.73_M2}
GLUCOSE BLD-MCNC: 186 MG/DL (ref 70–99)
HBA1C MFR BLD: 6.7 % (ref 4.8–5.9)
HCT VFR BLD CALC: 34.7 % (ref 36–46)
HDLC SERPL-MCNC: 39 MG/DL
HEMOGLOBIN: 11.9 G/DL (ref 12–16)
IMMATURE GRANULOCYTES: ABNORMAL %
LDL CHOLESTEROL: 59 MG/DL (ref 0–130)
LYMPHOCYTES # BLD: 31 % (ref 15–40)
MCH RBC QN AUTO: 31.5 PG (ref 26–34)
MCHC RBC AUTO-ENTMCNC: 34.3 G/DL (ref 31–37)
MCV RBC AUTO: 91.8 FL (ref 80–100)
MICROALBUMIN/CREAT 24H UR: <12 MG/L
MICROALBUMIN/CREAT UR-RTO: ABNORMAL MCG/MG CREAT
MONOCYTES # BLD: 6 % (ref 4–8)
NRBC AUTOMATED: ABNORMAL PER 100 WBC
PATIENT FASTING?: YES
PDW BLD-RTO: 13.2 % (ref 12.1–15.2)
PLATELET # BLD: 213 K/UL (ref 140–450)
PLATELET ESTIMATE: ABNORMAL
PMV BLD AUTO: ABNORMAL FL (ref 6–12)
POTASSIUM SERPL-SCNC: 3.9 MMOL/L (ref 3.7–5.3)
RBC # BLD: 3.79 M/UL (ref 4–5.2)
RBC # BLD: ABNORMAL 10*6/UL
SEG NEUTROPHILS: 52 % (ref 47–75)
SEGMENTED NEUTROPHILS ABSOLUTE COUNT: 3.1 K/UL (ref 2.5–7)
SODIUM BLD-SCNC: 140 MMOL/L (ref 135–144)
THYROXINE, FREE: 1.84 NG/DL (ref 0.93–1.7)
TOTAL PROTEIN: 8 G/DL (ref 6.4–8.3)
TRIGL SERPL-MCNC: 190 MG/DL
TSH SERPL DL<=0.05 MIU/L-ACNC: <0.01 MIU/L (ref 0.3–5)
VLDLC SERPL CALC-MCNC: ABNORMAL MG/DL (ref 1–30)
WBC # BLD: 5.9 K/UL (ref 3.5–11)
WBC # BLD: ABNORMAL 10*3/UL

## 2019-09-04 PROCEDURE — 80061 LIPID PANEL: CPT

## 2019-09-04 PROCEDURE — 82570 ASSAY OF URINE CREATININE: CPT

## 2019-09-04 PROCEDURE — 84443 ASSAY THYROID STIM HORMONE: CPT

## 2019-09-04 PROCEDURE — 84439 ASSAY OF FREE THYROXINE: CPT

## 2019-09-04 PROCEDURE — 80053 COMPREHEN METABOLIC PANEL: CPT

## 2019-09-04 PROCEDURE — 83036 HEMOGLOBIN GLYCOSYLATED A1C: CPT

## 2019-09-04 PROCEDURE — 36415 COLL VENOUS BLD VENIPUNCTURE: CPT

## 2019-09-04 PROCEDURE — 82043 UR ALBUMIN QUANTITATIVE: CPT

## 2019-09-04 PROCEDURE — 85025 COMPLETE CBC W/AUTO DIFF WBC: CPT

## 2019-09-16 ENCOUNTER — CARE COORDINATION (OUTPATIENT)
Dept: CARE COORDINATION | Age: 64
End: 2019-09-16

## 2019-09-18 DIAGNOSIS — I10 ESSENTIAL HYPERTENSION: ICD-10-CM

## 2019-09-18 DIAGNOSIS — M25.551 PAIN OF BOTH HIP JOINTS: ICD-10-CM

## 2019-09-18 DIAGNOSIS — M25.552 PAIN OF BOTH HIP JOINTS: ICD-10-CM

## 2019-09-19 RX ORDER — ALBUTEROL SULFATE 90 UG/1
AEROSOL, METERED RESPIRATORY (INHALATION)
Qty: 324 G | Refills: 11 | Status: SHIPPED | OUTPATIENT
Start: 2019-09-19 | End: 2020-10-07

## 2019-09-19 RX ORDER — CYCLOBENZAPRINE HCL 5 MG
TABLET ORAL
Qty: 30 TABLET | Refills: 11 | Status: SHIPPED | OUTPATIENT
Start: 2019-09-19 | End: 2019-10-14

## 2019-09-19 RX ORDER — AMLODIPINE BESYLATE 10 MG/1
TABLET ORAL
Qty: 90 TABLET | Refills: 11 | Status: SHIPPED | OUTPATIENT
Start: 2019-09-19 | End: 2019-10-14

## 2019-10-14 ENCOUNTER — OFFICE VISIT (OUTPATIENT)
Dept: FAMILY MEDICINE CLINIC | Age: 64
End: 2019-10-14
Payer: COMMERCIAL

## 2019-10-14 VITALS
HEART RATE: 64 BPM | OXYGEN SATURATION: 95 % | BODY MASS INDEX: 29.02 KG/M2 | HEIGHT: 64 IN | SYSTOLIC BLOOD PRESSURE: 102 MMHG | WEIGHT: 170 LBS | DIASTOLIC BLOOD PRESSURE: 70 MMHG

## 2019-10-14 DIAGNOSIS — Z79.4 TYPE 2 DIABETES MELLITUS WITH DIABETIC AUTONOMIC NEUROPATHY, WITH LONG-TERM CURRENT USE OF INSULIN (HCC): Primary | ICD-10-CM

## 2019-10-14 DIAGNOSIS — E11.43 TYPE 2 DIABETES MELLITUS WITH DIABETIC AUTONOMIC NEUROPATHY, WITH LONG-TERM CURRENT USE OF INSULIN (HCC): Primary | ICD-10-CM

## 2019-10-14 DIAGNOSIS — E78.2 MIXED HYPERLIPIDEMIA: ICD-10-CM

## 2019-10-14 DIAGNOSIS — Z23 NEED FOR INFLUENZA VACCINATION: ICD-10-CM

## 2019-10-14 DIAGNOSIS — I10 ESSENTIAL HYPERTENSION: ICD-10-CM

## 2019-10-14 DIAGNOSIS — M50.90 CERVICAL NECK PAIN WITH EVIDENCE OF DISC DISEASE: ICD-10-CM

## 2019-10-14 PROCEDURE — 90471 IMMUNIZATION ADMIN: CPT | Performed by: INTERNAL MEDICINE

## 2019-10-14 PROCEDURE — 3017F COLORECTAL CA SCREEN DOC REV: CPT | Performed by: INTERNAL MEDICINE

## 2019-10-14 PROCEDURE — 3044F HG A1C LEVEL LT 7.0%: CPT | Performed by: INTERNAL MEDICINE

## 2019-10-14 PROCEDURE — G8427 DOCREV CUR MEDS BY ELIG CLIN: HCPCS | Performed by: INTERNAL MEDICINE

## 2019-10-14 PROCEDURE — 99214 OFFICE O/P EST MOD 30 MIN: CPT | Performed by: INTERNAL MEDICINE

## 2019-10-14 PROCEDURE — G8417 CALC BMI ABV UP PARAM F/U: HCPCS | Performed by: INTERNAL MEDICINE

## 2019-10-14 PROCEDURE — 1036F TOBACCO NON-USER: CPT | Performed by: INTERNAL MEDICINE

## 2019-10-14 PROCEDURE — 90686 IIV4 VACC NO PRSV 0.5 ML IM: CPT | Performed by: INTERNAL MEDICINE

## 2019-10-14 PROCEDURE — 2022F DILAT RTA XM EVC RTNOPTHY: CPT | Performed by: INTERNAL MEDICINE

## 2019-10-14 PROCEDURE — G8482 FLU IMMUNIZE ORDER/ADMIN: HCPCS | Performed by: INTERNAL MEDICINE

## 2019-10-14 RX ORDER — GABAPENTIN 800 MG/1
TABLET ORAL
Qty: 60 TABLET | Refills: 3 | Status: SHIPPED | OUTPATIENT
Start: 2019-10-14 | End: 2020-03-16

## 2019-10-14 RX ORDER — ATORVASTATIN CALCIUM 40 MG/1
TABLET, FILM COATED ORAL
Qty: 30 TABLET | Refills: 5 | Status: SHIPPED | OUTPATIENT
Start: 2019-10-14 | End: 2020-09-17 | Stop reason: SDUPTHER

## 2019-10-14 RX ORDER — HYDROCHLOROTHIAZIDE 25 MG/1
25 TABLET ORAL EVERY MORNING
Qty: 90 TABLET | Refills: 1 | Status: SHIPPED | OUTPATIENT
Start: 2019-10-14 | End: 2020-03-16

## 2019-10-14 ASSESSMENT — ENCOUNTER SYMPTOMS
VISUAL CHANGE: 0
ABDOMINAL PAIN: 0
COUGH: 0
SORE THROAT: 0
NAUSEA: 0
SHORTNESS OF BREATH: 0

## 2019-10-16 ASSESSMENT — ENCOUNTER SYMPTOMS: BACK PAIN: 1

## 2019-10-17 DIAGNOSIS — L50.9 URTICARIA: ICD-10-CM

## 2019-10-17 RX ORDER — LORATADINE 10 MG/1
TABLET ORAL
Qty: 90 TABLET | Refills: 10 | Status: SHIPPED | OUTPATIENT
Start: 2019-10-17 | End: 2020-11-11

## 2019-12-09 ENCOUNTER — OFFICE VISIT (OUTPATIENT)
Dept: CARDIOLOGY CLINIC | Age: 64
End: 2019-12-09
Payer: COMMERCIAL

## 2019-12-09 ENCOUNTER — HOSPITAL ENCOUNTER (OUTPATIENT)
Age: 64
Discharge: HOME | End: 2019-12-09
Payer: COMMERCIAL

## 2019-12-09 ENCOUNTER — HOSPITAL ENCOUNTER (OUTPATIENT)
Dept: GENERAL RADIOLOGY | Age: 64
Discharge: HOME | End: 2019-12-11
Payer: COMMERCIAL

## 2019-12-09 ENCOUNTER — HOSPITAL ENCOUNTER (OUTPATIENT)
Age: 64
Discharge: HOME | End: 2019-12-11
Payer: COMMERCIAL

## 2019-12-09 VITALS
BODY MASS INDEX: 27.81 KG/M2 | HEART RATE: 90 BPM | OXYGEN SATURATION: 98 % | WEIGHT: 162 LBS | DIASTOLIC BLOOD PRESSURE: 70 MMHG | SYSTOLIC BLOOD PRESSURE: 120 MMHG

## 2019-12-09 DIAGNOSIS — I48.20 CHRONIC ATRIAL FIBRILLATION (HCC): ICD-10-CM

## 2019-12-09 DIAGNOSIS — I10 ESSENTIAL HYPERTENSION: Primary | ICD-10-CM

## 2019-12-09 DIAGNOSIS — E55.9 VITAMIN D DEFICIENCY DISEASE: ICD-10-CM

## 2019-12-09 DIAGNOSIS — E11.8 TYPE 2 DIABETES MELLITUS WITH COMPLICATION, WITHOUT LONG-TERM CURRENT USE OF INSULIN (HCC): ICD-10-CM

## 2019-12-09 DIAGNOSIS — I10 ESSENTIAL HYPERTENSION: ICD-10-CM

## 2019-12-09 DIAGNOSIS — E78.2 MIXED HYPERLIPIDEMIA: ICD-10-CM

## 2019-12-09 DIAGNOSIS — I10 ESSENTIAL HYPERTENSION: Chronic | ICD-10-CM

## 2019-12-09 DIAGNOSIS — R80.9 ALBUMINURIA: ICD-10-CM

## 2019-12-09 DIAGNOSIS — R77.9 ELEVATED BLOOD PROTEIN: ICD-10-CM

## 2019-12-09 LAB
ABSOLUTE EOS #: 0.6 K/UL (ref 0–0.4)
ABSOLUTE IMMATURE GRANULOCYTE: ABNORMAL K/UL (ref 0–0.3)
ABSOLUTE LYMPH #: 2.7 K/UL (ref 1–4.8)
ABSOLUTE MONO #: 0.5 K/UL (ref 0–1)
ALBUMIN SERPL-MCNC: 5.4 G/DL (ref 3.5–5.2)
ALBUMIN/GLOBULIN RATIO: ABNORMAL (ref 1–2.5)
ALP BLD-CCNC: 113 U/L (ref 35–104)
ALT SERPL-CCNC: 29 U/L (ref 5–33)
ANION GAP SERPL CALCULATED.3IONS-SCNC: 13 MMOL/L (ref 9–17)
AST SERPL-CCNC: 28 U/L
BASOPHILS # BLD: 1 % (ref 0–2)
BASOPHILS ABSOLUTE: 0.1 K/UL (ref 0–0.2)
BILIRUB SERPL-MCNC: 0.59 MG/DL (ref 0.3–1.2)
BUN BLDV-MCNC: 16 MG/DL (ref 8–23)
BUN/CREAT BLD: 12 (ref 9–20)
CALCIUM SERPL-MCNC: 11.1 MG/DL (ref 8.6–10.4)
CHLORIDE BLD-SCNC: 100 MMOL/L (ref 98–107)
CHOLESTEROL/HDL RATIO: 3
CHOLESTEROL: 150 MG/DL
CO2: 27 MMOL/L (ref 20–31)
CREAT SERPL-MCNC: 1.33 MG/DL (ref 0.5–0.9)
DIFFERENTIAL TYPE: YES
EOSINOPHILS RELATIVE PERCENT: 7 % (ref 0–5)
ESTIMATED AVERAGE GLUCOSE: 137 MG/DL
GFR AFRICAN AMERICAN: 49 ML/MIN
GFR NON-AFRICAN AMERICAN: 40 ML/MIN
GFR SERPL CREATININE-BSD FRML MDRD: ABNORMAL ML/MIN/{1.73_M2}
GFR SERPL CREATININE-BSD FRML MDRD: ABNORMAL ML/MIN/{1.73_M2}
GLUCOSE BLD-MCNC: 168 MG/DL (ref 70–99)
HBA1C MFR BLD: 6.4 % (ref 4.8–5.9)
HCT VFR BLD CALC: 43 % (ref 36–46)
HDLC SERPL-MCNC: 50 MG/DL
HEMOGLOBIN: 14.5 G/DL (ref 12–16)
IMMATURE GRANULOCYTES: ABNORMAL %
LDL CHOLESTEROL: 72 MG/DL (ref 0–130)
LYMPHOCYTES # BLD: 34 % (ref 15–40)
MAGNESIUM: 2.2 MG/DL (ref 1.6–2.6)
MCH RBC QN AUTO: 30.6 PG (ref 26–34)
MCHC RBC AUTO-ENTMCNC: 33.7 G/DL (ref 31–37)
MCV RBC AUTO: 90.9 FL (ref 80–100)
MONOCYTES # BLD: 6 % (ref 4–8)
NRBC AUTOMATED: ABNORMAL PER 100 WBC
PATIENT FASTING?: YES
PDW BLD-RTO: 14 % (ref 12.1–15.2)
PLATELET # BLD: 283 K/UL (ref 140–450)
PLATELET ESTIMATE: ABNORMAL
PMV BLD AUTO: ABNORMAL FL (ref 6–12)
POTASSIUM SERPL-SCNC: 3.9 MMOL/L (ref 3.7–5.3)
RBC # BLD: 4.73 M/UL (ref 4–5.2)
RBC # BLD: ABNORMAL 10*6/UL
SEG NEUTROPHILS: 52 % (ref 47–75)
SEGMENTED NEUTROPHILS ABSOLUTE COUNT: 4 K/UL (ref 2.5–7)
SODIUM BLD-SCNC: 140 MMOL/L (ref 135–144)
TOTAL PROTEIN: 9.2 G/DL (ref 6.4–8.3)
TRIGL SERPL-MCNC: 142 MG/DL
TSH SERPL DL<=0.05 MIU/L-ACNC: <0.01 MIU/L (ref 0.3–5)
VITAMIN D 25-HYDROXY: 50 NG/ML (ref 30–100)
VLDLC SERPL CALC-MCNC: NORMAL MG/DL (ref 1–30)
WBC # BLD: 7.9 K/UL (ref 3.5–11)
WBC # BLD: ABNORMAL 10*3/UL

## 2019-12-09 PROCEDURE — 1036F TOBACCO NON-USER: CPT | Performed by: INTERNAL MEDICINE

## 2019-12-09 PROCEDURE — 93005 ELECTROCARDIOGRAM TRACING: CPT

## 2019-12-09 PROCEDURE — 80053 COMPREHEN METABOLIC PANEL: CPT

## 2019-12-09 PROCEDURE — 85025 COMPLETE CBC W/AUTO DIFF WBC: CPT

## 2019-12-09 PROCEDURE — 80061 LIPID PANEL: CPT

## 2019-12-09 PROCEDURE — 83735 ASSAY OF MAGNESIUM: CPT

## 2019-12-09 PROCEDURE — 83036 HEMOGLOBIN GLYCOSYLATED A1C: CPT

## 2019-12-09 PROCEDURE — 82306 VITAMIN D 25 HYDROXY: CPT

## 2019-12-09 PROCEDURE — 84443 ASSAY THYROID STIM HORMONE: CPT

## 2019-12-09 PROCEDURE — 3017F COLORECTAL CA SCREEN DOC REV: CPT | Performed by: INTERNAL MEDICINE

## 2019-12-09 PROCEDURE — 36415 COLL VENOUS BLD VENIPUNCTURE: CPT

## 2019-12-09 PROCEDURE — G8427 DOCREV CUR MEDS BY ELIG CLIN: HCPCS | Performed by: INTERNAL MEDICINE

## 2019-12-09 PROCEDURE — 3044F HG A1C LEVEL LT 7.0%: CPT | Performed by: INTERNAL MEDICINE

## 2019-12-09 PROCEDURE — 84439 ASSAY OF FREE THYROXINE: CPT

## 2019-12-09 PROCEDURE — 2022F DILAT RTA XM EVC RTNOPTHY: CPT | Performed by: INTERNAL MEDICINE

## 2019-12-09 PROCEDURE — G8417 CALC BMI ABV UP PARAM F/U: HCPCS | Performed by: INTERNAL MEDICINE

## 2019-12-09 PROCEDURE — G8482 FLU IMMUNIZE ORDER/ADMIN: HCPCS | Performed by: INTERNAL MEDICINE

## 2019-12-09 PROCEDURE — 99214 OFFICE O/P EST MOD 30 MIN: CPT | Performed by: INTERNAL MEDICINE

## 2019-12-09 PROCEDURE — 71046 X-RAY EXAM CHEST 2 VIEWS: CPT

## 2019-12-10 LAB
EKG ATRIAL RATE: 73 BPM
EKG P AXIS: 79 DEGREES
EKG P-R INTERVAL: 192 MS
EKG Q-T INTERVAL: 410 MS
EKG QRS DURATION: 76 MS
EKG QTC CALCULATION (BAZETT): 451 MS
EKG R AXIS: 67 DEGREES
EKG T AXIS: 63 DEGREES
EKG VENTRICULAR RATE: 73 BPM
THYROXINE, FREE: 2.06 NG/DL (ref 0.93–1.7)

## 2019-12-10 PROCEDURE — 93010 ELECTROCARDIOGRAM REPORT: CPT | Performed by: INTERNAL MEDICINE

## 2019-12-12 ENCOUNTER — HOSPITAL ENCOUNTER (INPATIENT)
Age: 64
LOS: 1 days | Discharge: HOME | DRG: 201 | End: 2019-12-13
Attending: INTERNAL MEDICINE | Admitting: INTERNAL MEDICINE
Payer: COMMERCIAL

## 2019-12-12 PROBLEM — I48.91 ATRIAL FIBRILLATION WITH RVR (HCC): Status: ACTIVE | Noted: 2019-12-12

## 2019-12-12 LAB
-: ABNORMAL
ABSOLUTE EOS #: 0.1 K/UL (ref 0–0.4)
ABSOLUTE IMMATURE GRANULOCYTE: ABNORMAL K/UL (ref 0–0.3)
ABSOLUTE LYMPH #: 1.7 K/UL (ref 1–4.8)
ABSOLUTE MONO #: 0.5 K/UL (ref 0–1)
ALBUMIN SERPL-MCNC: 5.8 G/DL (ref 3.5–5.2)
ALBUMIN/GLOBULIN RATIO: ABNORMAL (ref 1–2.5)
ALP BLD-CCNC: 110 U/L (ref 35–104)
ALT SERPL-CCNC: 34 U/L (ref 5–33)
AMORPHOUS: ABNORMAL
ANION GAP SERPL CALCULATED.3IONS-SCNC: 23 MMOL/L (ref 9–17)
AST SERPL-CCNC: 34 U/L
BACTERIA: ABNORMAL
BASOPHILS # BLD: 1 % (ref 0–2)
BASOPHILS ABSOLUTE: 0.1 K/UL (ref 0–0.2)
BETA-HYDROXYBUTYRATE: 0.94 MMOL/L (ref 0.02–0.27)
BILIRUB SERPL-MCNC: 0.79 MG/DL (ref 0.3–1.2)
BILIRUBIN URINE: NEGATIVE
BNP INTERPRETATION: NORMAL
BUN BLDV-MCNC: 19 MG/DL (ref 8–23)
BUN/CREAT BLD: 14 (ref 9–20)
CALCIUM SERPL-MCNC: 12.4 MG/DL (ref 8.6–10.4)
CASTS UA: ABNORMAL /LPF
CASTS UA: ABNORMAL /LPF
CHLORIDE BLD-SCNC: 98 MMOL/L (ref 98–107)
CHP ED QC CHECK: YES
CO2: 20 MMOL/L (ref 20–31)
COLOR: YELLOW
COMMENT UA: ABNORMAL
CREAT SERPL-MCNC: 1.39 MG/DL (ref 0.5–0.9)
CRYSTALS, UA: ABNORMAL /HPF
DIFFERENTIAL TYPE: YES
EKG ATRIAL RATE: 174 BPM
EKG Q-T INTERVAL: 290 MS
EKG QRS DURATION: 66 MS
EKG QTC CALCULATION (BAZETT): 451 MS
EKG R AXIS: 63 DEGREES
EKG T AXIS: 17 DEGREES
EKG VENTRICULAR RATE: 146 BPM
EOSINOPHILS RELATIVE PERCENT: 1 % (ref 0–5)
EPITHELIAL CELLS UA: ABNORMAL /HPF
ESTIMATED AVERAGE GLUCOSE: 140 MG/DL
FIO2: ABNORMAL
GFR AFRICAN AMERICAN: 46 ML/MIN
GFR NON-AFRICAN AMERICAN: 38 ML/MIN
GFR SERPL CREATININE-BSD FRML MDRD: ABNORMAL ML/MIN/{1.73_M2}
GFR SERPL CREATININE-BSD FRML MDRD: ABNORMAL ML/MIN/{1.73_M2}
GLUCOSE BLD-MCNC: 117 MG/DL (ref 65–99)
GLUCOSE BLD-MCNC: 177 MG/DL
GLUCOSE BLD-MCNC: 177 MG/DL (ref 65–99)
GLUCOSE BLD-MCNC: 204 MG/DL (ref 70–99)
GLUCOSE BLD-MCNC: 93 MG/DL (ref 65–99)
GLUCOSE URINE: NEGATIVE
HBA1C MFR BLD: 6.5 % (ref 4.8–5.9)
HCO3 VENOUS: 24.6 MMOL/L (ref 24–28)
HCT VFR BLD CALC: 48.6 % (ref 36–46)
HEMOGLOBIN: 16.3 G/DL (ref 12–16)
IMMATURE GRANULOCYTES: ABNORMAL %
KETONES, URINE: ABNORMAL
LACTIC ACID: 1.9 MMOL/L (ref 0.5–2.2)
LEUKOCYTE ESTERASE, URINE: NEGATIVE
LYMPHOCYTES # BLD: 17 % (ref 15–40)
MCH RBC QN AUTO: 30.5 PG (ref 26–34)
MCHC RBC AUTO-ENTMCNC: 33.4 G/DL (ref 31–37)
MCV RBC AUTO: 91.3 FL (ref 80–100)
MONOCYTES # BLD: 5 % (ref 4–8)
MUCUS: ABNORMAL
NEGATIVE BASE EXCESS, VEN: ABNORMAL (ref 0–5)
NITRITE, URINE: NEGATIVE
NRBC AUTOMATED: ABNORMAL PER 100 WBC
O2 DEVICE/FLOW/%: ABNORMAL
O2 SAT, VEN: 71 % (ref 70–75)
OTHER OBSERVATIONS UA: ABNORMAL
PATIENT TEMP: ABNORMAL
PATIENT TEMP: NORMAL
PCO2, VEN: 40 MM HG (ref 41–51)
PDW BLD-RTO: 13.8 % (ref 12.1–15.2)
PH UA: 6.5 (ref 5–8)
PH VENOUS: 7.4 (ref 7.32–7.43)
PH VENOUS: NORMAL (ref 7.32–7.42)
PLATELET # BLD: 275 K/UL (ref 140–450)
PLATELET ESTIMATE: ABNORMAL
PMV BLD AUTO: ABNORMAL FL (ref 6–12)
PO2, VEN: 37 MM HG (ref 25–40)
POC PCO2 TEMP: ABNORMAL MM HG
POC PH TEMP: ABNORMAL
POC PH TEMP: NORMAL
POC PO2 TEMP: ABNORMAL MM HG
POSITIVE BASE EXCESS, VEN: 0 (ref 0–5)
POTASSIUM SERPL-SCNC: 3.9 MMOL/L (ref 3.7–5.3)
PRO-BNP: 187 PG/ML
PROTEIN UA: ABNORMAL
RBC # BLD: 5.33 M/UL (ref 4–5.2)
RBC # BLD: ABNORMAL 10*6/UL
RBC UA: ABNORMAL /HPF (ref 0–2)
RENAL EPITHELIAL, UA: ABNORMAL /HPF
SEG NEUTROPHILS: 76 % (ref 47–75)
SEGMENTED NEUTROPHILS ABSOLUTE COUNT: 7.3 K/UL (ref 2.5–7)
SODIUM BLD-SCNC: 141 MMOL/L (ref 135–144)
SPECIFIC GRAVITY UA: 1.01 (ref 1–1.03)
TOTAL CO2, VENOUS: 26 MMOL/L (ref 21–31)
TOTAL PROTEIN: 9.9 G/DL (ref 6.4–8.3)
TRICHOMONAS: ABNORMAL
TROPONIN INTERP: NORMAL
TROPONIN T: <0.03 NG/ML
TROPONIN, HIGH SENSITIVITY: NORMAL NG/L (ref 0–14)
TSH SERPL DL<=0.05 MIU/L-ACNC: <0.01 MIU/L (ref 0.3–5)
TURBIDITY: CLEAR
URINE HGB: ABNORMAL
UROBILINOGEN, URINE: NORMAL
WBC # BLD: 9.7 K/UL (ref 3.5–11)
WBC # BLD: ABNORMAL 10*3/UL
WBC UA: ABNORMAL /HPF
YEAST: ABNORMAL

## 2019-12-12 PROCEDURE — 83036 HEMOGLOBIN GLYCOSYLATED A1C: CPT

## 2019-12-12 PROCEDURE — 1200000000 HC SEMI PRIVATE

## 2019-12-12 PROCEDURE — 82947 ASSAY GLUCOSE BLOOD QUANT: CPT

## 2019-12-12 PROCEDURE — 85025 COMPLETE CBC W/AUTO DIFF WBC: CPT

## 2019-12-12 PROCEDURE — 84165 PROTEIN E-PHORESIS SERUM: CPT

## 2019-12-12 PROCEDURE — 96366 THER/PROPH/DIAG IV INF ADDON: CPT

## 2019-12-12 PROCEDURE — 2500000003 HC RX 250 WO HCPCS: Performed by: INTERNAL MEDICINE

## 2019-12-12 PROCEDURE — 83605 ASSAY OF LACTIC ACID: CPT

## 2019-12-12 PROCEDURE — 82803 BLOOD GASES ANY COMBINATION: CPT

## 2019-12-12 PROCEDURE — 96376 TX/PRO/DX INJ SAME DRUG ADON: CPT

## 2019-12-12 PROCEDURE — 96374 THER/PROPH/DIAG INJ IV PUSH: CPT

## 2019-12-12 PROCEDURE — 93005 ELECTROCARDIOGRAM TRACING: CPT | Performed by: INTERNAL MEDICINE

## 2019-12-12 PROCEDURE — 83880 ASSAY OF NATRIURETIC PEPTIDE: CPT

## 2019-12-12 PROCEDURE — 80053 COMPREHEN METABOLIC PANEL: CPT

## 2019-12-12 PROCEDURE — 82010 KETONE BODYS QUAN: CPT

## 2019-12-12 PROCEDURE — G0378 HOSPITAL OBSERVATION PER HR: HCPCS

## 2019-12-12 PROCEDURE — 2580000003 HC RX 258: Performed by: INTERNAL MEDICINE

## 2019-12-12 PROCEDURE — 6360000002 HC RX W HCPCS: Performed by: INTERNAL MEDICINE

## 2019-12-12 PROCEDURE — 99285 EMERGENCY DEPT VISIT HI MDM: CPT

## 2019-12-12 PROCEDURE — 84443 ASSAY THYROID STIM HORMONE: CPT

## 2019-12-12 PROCEDURE — 84484 ASSAY OF TROPONIN QUANT: CPT

## 2019-12-12 PROCEDURE — 96368 THER/DIAG CONCURRENT INF: CPT

## 2019-12-12 PROCEDURE — 96361 HYDRATE IV INFUSION ADD-ON: CPT

## 2019-12-12 PROCEDURE — 6370000000 HC RX 637 (ALT 250 FOR IP): Performed by: INTERNAL MEDICINE

## 2019-12-12 PROCEDURE — 81001 URINALYSIS AUTO W/SCOPE: CPT

## 2019-12-12 PROCEDURE — 82800 BLOOD PH: CPT

## 2019-12-12 PROCEDURE — 93010 ELECTROCARDIOGRAM REPORT: CPT | Performed by: INTERNAL MEDICINE

## 2019-12-12 PROCEDURE — 94761 N-INVAS EAR/PLS OXIMETRY MLT: CPT

## 2019-12-12 PROCEDURE — 96365 THER/PROPH/DIAG IV INF INIT: CPT

## 2019-12-12 PROCEDURE — 84155 ASSAY OF PROTEIN SERUM: CPT

## 2019-12-12 PROCEDURE — 96375 TX/PRO/DX INJ NEW DRUG ADDON: CPT

## 2019-12-12 RX ORDER — DILTIAZEM HYDROCHLORIDE 5 MG/ML
20 INJECTION INTRAVENOUS ONCE
Status: COMPLETED | OUTPATIENT
Start: 2019-12-12 | End: 2019-12-12

## 2019-12-12 RX ORDER — ONDANSETRON 2 MG/ML
4 INJECTION INTRAMUSCULAR; INTRAVENOUS ONCE
Status: COMPLETED | OUTPATIENT
Start: 2019-12-12 | End: 2019-12-12

## 2019-12-12 RX ORDER — GABAPENTIN 800 MG/1
TABLET ORAL
Refills: 3 | Status: ON HOLD | COMMUNITY
Start: 2019-11-29 | End: 2019-12-13 | Stop reason: HOSPADM

## 2019-12-12 RX ORDER — 0.9 % SODIUM CHLORIDE 0.9 %
1000 INTRAVENOUS SOLUTION INTRAVENOUS ONCE
Status: COMPLETED | OUTPATIENT
Start: 2019-12-12 | End: 2019-12-12

## 2019-12-12 RX ORDER — SODIUM CHLORIDE 0.9 % (FLUSH) 0.9 %
10 SYRINGE (ML) INJECTION PRN
Status: DISCONTINUED | OUTPATIENT
Start: 2019-12-12 | End: 2019-12-13 | Stop reason: HOSPADM

## 2019-12-12 RX ORDER — POTASSIUM CHLORIDE 750 MG/1
20 TABLET, FILM COATED, EXTENDED RELEASE ORAL DAILY
Status: DISCONTINUED | OUTPATIENT
Start: 2019-12-12 | End: 2019-12-13 | Stop reason: HOSPADM

## 2019-12-12 RX ORDER — ACETAMINOPHEN 325 MG/1
650 TABLET ORAL EVERY 4 HOURS PRN
Status: DISCONTINUED | OUTPATIENT
Start: 2019-12-12 | End: 2019-12-13 | Stop reason: HOSPADM

## 2019-12-12 RX ORDER — CETIRIZINE HYDROCHLORIDE 10 MG/1
10 TABLET ORAL DAILY
Status: DISCONTINUED | OUTPATIENT
Start: 2019-12-12 | End: 2019-12-13 | Stop reason: HOSPADM

## 2019-12-12 RX ORDER — INSULIN GLARGINE 100 [IU]/ML
25 INJECTION, SOLUTION SUBCUTANEOUS NIGHTLY
Status: DISCONTINUED | OUTPATIENT
Start: 2019-12-12 | End: 2019-12-13 | Stop reason: HOSPADM

## 2019-12-12 RX ORDER — HYDROCHLOROTHIAZIDE 25 MG/1
25 TABLET ORAL EVERY MORNING
Status: DISCONTINUED | OUTPATIENT
Start: 2019-12-13 | End: 2019-12-13 | Stop reason: HOSPADM

## 2019-12-12 RX ORDER — DILTIAZEM HYDROCHLORIDE 5 MG/ML
10 INJECTION INTRAVENOUS ONCE
Status: COMPLETED | OUTPATIENT
Start: 2019-12-12 | End: 2019-12-12

## 2019-12-12 RX ORDER — DEXTROSE MONOHYDRATE 50 MG/ML
100 INJECTION, SOLUTION INTRAVENOUS PRN
Status: DISCONTINUED | OUTPATIENT
Start: 2019-12-12 | End: 2019-12-13 | Stop reason: HOSPADM

## 2019-12-12 RX ORDER — LEVOTHYROXINE SODIUM 0.1 MG/1
TABLET ORAL
Refills: 3 | Status: ON HOLD | COMMUNITY
Start: 2019-11-24 | End: 2019-12-13 | Stop reason: HOSPADM

## 2019-12-12 RX ORDER — AMLODIPINE BESYLATE 10 MG/1
TABLET ORAL
Refills: 11 | Status: ON HOLD | COMMUNITY
Start: 2019-11-27 | End: 2019-12-13 | Stop reason: HOSPADM

## 2019-12-12 RX ORDER — OXYBUTYNIN CHLORIDE 5 MG/1
10 TABLET, EXTENDED RELEASE ORAL DAILY
Status: DISCONTINUED | OUTPATIENT
Start: 2019-12-12 | End: 2019-12-13 | Stop reason: HOSPADM

## 2019-12-12 RX ORDER — ATORVASTATIN CALCIUM 20 MG/1
40 TABLET, FILM COATED ORAL DAILY
Status: DISCONTINUED | OUTPATIENT
Start: 2019-12-12 | End: 2019-12-13 | Stop reason: HOSPADM

## 2019-12-12 RX ORDER — LISINOPRIL 5 MG/1
5 TABLET ORAL DAILY
Status: DISCONTINUED | OUTPATIENT
Start: 2019-12-12 | End: 2019-12-13 | Stop reason: HOSPADM

## 2019-12-12 RX ORDER — GABAPENTIN 100 MG/1
800 CAPSULE ORAL 2 TIMES DAILY
Status: DISCONTINUED | OUTPATIENT
Start: 2019-12-12 | End: 2019-12-13 | Stop reason: SDUPTHER

## 2019-12-12 RX ORDER — SODIUM CHLORIDE 0.9 % (FLUSH) 0.9 %
10 SYRINGE (ML) INJECTION EVERY 12 HOURS SCHEDULED
Status: DISCONTINUED | OUTPATIENT
Start: 2019-12-12 | End: 2019-12-13 | Stop reason: HOSPADM

## 2019-12-12 RX ORDER — DEXTROSE MONOHYDRATE 25 G/50ML
12.5 INJECTION, SOLUTION INTRAVENOUS PRN
Status: DISCONTINUED | OUTPATIENT
Start: 2019-12-12 | End: 2019-12-13 | Stop reason: HOSPADM

## 2019-12-12 RX ORDER — VITAMIN B COMPLEX
1000 TABLET ORAL DAILY
Status: DISCONTINUED | OUTPATIENT
Start: 2019-12-12 | End: 2019-12-13 | Stop reason: HOSPADM

## 2019-12-12 RX ORDER — FERROUS SULFATE 325(65) MG
325 TABLET ORAL
Status: DISCONTINUED | OUTPATIENT
Start: 2019-12-13 | End: 2019-12-13 | Stop reason: HOSPADM

## 2019-12-12 RX ORDER — CYCLOBENZAPRINE HCL 10 MG
10 TABLET ORAL 3 TIMES DAILY PRN
Status: DISCONTINUED | OUTPATIENT
Start: 2019-12-12 | End: 2019-12-13 | Stop reason: HOSPADM

## 2019-12-12 RX ORDER — ONDANSETRON 2 MG/ML
4 INJECTION INTRAMUSCULAR; INTRAVENOUS EVERY 6 HOURS PRN
Status: DISCONTINUED | OUTPATIENT
Start: 2019-12-12 | End: 2019-12-13 | Stop reason: HOSPADM

## 2019-12-12 RX ORDER — SODIUM CHLORIDE 9 MG/ML
INJECTION, SOLUTION INTRAVENOUS CONTINUOUS
Status: DISCONTINUED | OUTPATIENT
Start: 2019-12-12 | End: 2019-12-13

## 2019-12-12 RX ORDER — NICOTINE POLACRILEX 4 MG
15 LOZENGE BUCCAL PRN
Status: DISCONTINUED | OUTPATIENT
Start: 2019-12-12 | End: 2019-12-13 | Stop reason: HOSPADM

## 2019-12-12 RX ADMIN — GABAPENTIN 800 MG: 100 CAPSULE ORAL at 20:18

## 2019-12-12 RX ADMIN — SODIUM CHLORIDE 1000 ML: 9 INJECTION, SOLUTION INTRAVENOUS at 14:53

## 2019-12-12 RX ADMIN — DILTIAZEM HYDROCHLORIDE 5 MG/HR: 5 INJECTION INTRAVENOUS at 15:37

## 2019-12-12 RX ADMIN — DILTIAZEM HYDROCHLORIDE 7.5 MG/HR: 5 INJECTION INTRAVENOUS at 17:35

## 2019-12-12 RX ADMIN — DEXTROSE MONOHYDRATE 150 MG: 50 INJECTION, SOLUTION INTRAVENOUS at 18:28

## 2019-12-12 RX ADMIN — APIXABAN 5 MG: 5 TABLET, FILM COATED ORAL at 20:18

## 2019-12-12 RX ADMIN — SODIUM CHLORIDE 1000 ML: 9 INJECTION, SOLUTION INTRAVENOUS at 13:46

## 2019-12-12 RX ADMIN — DILTIAZEM HYDROCHLORIDE 10 MG: 5 INJECTION INTRAVENOUS at 14:09

## 2019-12-12 RX ADMIN — SODIUM CHLORIDE: 9 INJECTION, SOLUTION INTRAVENOUS at 17:23

## 2019-12-12 RX ADMIN — DILTIAZEM HYDROCHLORIDE 5 MG/HR: 5 INJECTION INTRAVENOUS at 17:23

## 2019-12-12 RX ADMIN — ONDANSETRON 4 MG: 2 INJECTION, SOLUTION INTRAMUSCULAR; INTRAVENOUS at 13:46

## 2019-12-12 RX ADMIN — METOPROLOL TARTRATE 25 MG: 25 TABLET ORAL at 20:18

## 2019-12-12 RX ADMIN — DILTIAZEM HYDROCHLORIDE 20 MG: 5 INJECTION INTRAVENOUS at 14:59

## 2019-12-12 ASSESSMENT — PAIN SCALES - GENERAL
PAINLEVEL_OUTOF10: 8
PAINLEVEL_OUTOF10: 0
PAINLEVEL_OUTOF10: 0

## 2019-12-12 ASSESSMENT — PAIN DESCRIPTION - PROGRESSION: CLINICAL_PROGRESSION: NOT CHANGED

## 2019-12-12 ASSESSMENT — PAIN DESCRIPTION - ONSET: ONSET: ON-GOING

## 2019-12-12 ASSESSMENT — PAIN DESCRIPTION - ORIENTATION: ORIENTATION: MID;UPPER

## 2019-12-12 ASSESSMENT — PAIN DESCRIPTION - DESCRIPTORS: DESCRIPTORS: ACHING

## 2019-12-12 ASSESSMENT — PAIN DESCRIPTION - PAIN TYPE: TYPE: CHRONIC PAIN

## 2019-12-12 ASSESSMENT — PAIN DESCRIPTION - LOCATION: LOCATION: BACK;HIP

## 2019-12-12 NOTE — PLAN OF CARE
Problem: Pain:  Goal: Pain level will decrease  Description  Pain level will decrease  Outcome: Ongoing  Goal: Control of chronic pain  Description  Control of chronic pain  Outcome: Ongoing     Problem: Falls - Risk of:  Goal: Will remain free from falls  Description  Will remain free from falls  Outcome: Ongoing     Problem: Risk for Impaired Skin Integrity  Goal: Tissue integrity - skin and mucous membranes  Description  Structural intactness and normal physiological function of skin and  mucous membranes.   Outcome: Ongoing     Problem: Infection:  Goal: Will remain free from infection  Description  Will remain free from infection  Outcome: Ongoing     Problem: Daily Care:  Goal: Daily care needs are met  Description  Daily care needs are met  Outcome: Ongoing     Problem: Cardiac:  Goal: Ability to maintain an adequate cardiac output will improve  Description  Ability to maintain an adequate cardiac output will improve  Outcome: Ongoing     Problem: Coping:  Goal: Level of anxiety will decrease  Description  Level of anxiety will decrease  Outcome: Ongoing

## 2019-12-12 NOTE — ED PROVIDER NOTES
PASTMEDICAL HISTORY     Past Medical History:   Diagnosis Date    Asthma     Atrial fibrillation (HCC)     COPD (chronic obstructive pulmonary disease) (HCC)     DDD (degenerative disc disease)     Diabetes mellitus (HCC)     Hyperlipidemia     Hypertension     Hyperthyroidism     Type II or unspecified type diabetes mellitus without mention of complication, not stated as uncontrolled          SURGICAL HISTORY       Past Surgical History:   Procedure Laterality Date    BACK SURGERY      COLONOSCOPY  04/23/14   [facility] DENTAL SURGERY N/A 3/8/2017    REMOVAL OF BILATERAL MANDIBULAR LELO AND MAXILLARY EDENTULOUS ALVEOPLASTY performed by Percy Renee DDS at [place] [address]      removal    GASTROSTOMY TUBE PLACEMENT      TONSILLECTOMY      TRACHEOSTOMY     [facility] ENDOSCOPY  3/16/2016    Peg tube insertion         CURRENT MEDICATIONS       Current Discharge Medication List      CONTINUE these medications which have NOT CHANGED    Details   amLODIPine (NORVASC) 10 MG tablet Refills: 11      levothyroxine (SYNTHROID) 100 MCG tablet TAKE 1 TABLET BY MOUTH EVERY DAY  Refills: 3      loratadine (CLARITIN) 10 MG tablet TAKE 1 TABLET BY MOUTH EVERY DAY  Qty: 90 tablet, Refills: 10    Associated Diagnoses: Urticaria      atorvastatin (LIPITOR) 40 MG tablet TAKE 1 TABLET BY MOUTH ONCE DAILY  Qty: 30 tablet, Refills: 5    Associated Diagnoses: Mixed hyperlipidemia      hydrochlorothiazide (HYDRODIURIL) 25 MG tablet Take 1 tablet by mouth every morning  Qty: 90 tablet, Refills: 1    Associated Diagnoses: Essential hypertension      NAPROXEN 500 MG EC tablet TAKE ONE (1) TABLET BY MOUTH TWICE DAILY WITH MEALS FOR 10 DAYS  Qty: 20 tablet, Refills: 11    Associated Diagnoses: Pain of both hip joints      metoprolol tartrate (LOPRESSOR) 25 MG tablet TAKE 1 TABLET BY MOUTH 2 TIMES DAILY  Qty: 180 tablet, Refills: 11    Associated Diagnoses: Essential hypertension      albuterol sulfate  (90 Base) MCG/ACT inhaler INHALE 2 PUFFS BY MOUTH EVERY 6 HOURS AS NEEDED FOR WHEEZING  Qty: 324 g, Refills: 11      lisinopril (PRINIVIL;ZESTRIL) 5 MG tablet TAKE 1 TABLET BY MOUTH EVERY DAY  Qty: 30 tablet, Refills: 10      cyclobenzaprine (FLEXERIL) 5 MG tablet TAKE ONE (1) TABLET BY MOUTH THREE TIMES DAILY AS NEEDED FOR MUSCLE SPASMS  Qty: 90 tablet, Refills: 5    Associated Diagnoses: Pain of both hip joints      Multiple Vitamin (MULTI-VITAMINS) TABS TAKE ONE (1) TABLET BY MOUTH ONCE DAILY  Qty: 90 tablet, Refills: 10      FEROSUL 325 (65 Fe) MG tablet TAKE (1) TABLET BY MOUTH DAILY  Qty: 90 tablet, Refills: 10    Associated Diagnoses: Type 2 diabetes mellitus with diabetic autonomic neuropathy, with long-term current use of insulin (HCC)      apixaban (ELIQUIS) 5 MG TABS tablet Take 1 tablet by mouth 2 times daily  Qty: 180 tablet, Refills: 3    Associated Diagnoses: Paroxysmal atrial fibrillation (HCC)      Cholecalciferol (VITAMIN D3) 1000 units TABS TAKE 1 TABLET BY MOUTH DAILY  Qty: 90 tablet, Refills: 11      Omega-3 Fatty Acids (FISH OIL) 1000 MG CAPS TAKE 2 CAPSULES BY MOUTH DAILY  Qty: 180 capsule, Refills: 11      ondansetron (ZOFRAN) 4 MG tablet Take 1 tablet by mouth 3 times daily as needed for Nausea or Vomiting  Qty: 20 tablet, Refills: 0    Associated Diagnoses: Nausea and vomiting, intractability of vomiting not specified, unspecified vomiting type      TRULICITY 9.31 RZ/6.8GG SOPN Inject 0.75 mg into the skin once a week 0.75mg/0.5ml  Refills: 1      potassium chloride (KLOR-CON M) 20 MEQ extended release tablet Take 1 tablet by mouth daily  Qty: 30 tablet, Refills: 11      oxybutynin (DITROPAN XL) 10 MG extended release tablet Take 1 tablet by mouth daily  Qty: 30 tablet, Refills: 12    Associated Diagnoses: Incontinence in female      Insulin Glargine (BASAGLAR KWIKPEN SC) Inject 25 Units into the skin nightly Per Dr. Jaimee Grewal albuterol (PROVENTIL) (2.5 MG/3ML) 0.083% nebulizer solution Take 2.5 mg by nebulization 4 times daily      gabapentin (NEURONTIN) 800 MG tablet Refills: 3      !! TRUEPLUS PEN NEEDLES 31G X 5 MM MISC USE AS DIRECTED DAILY  Qty: 100 each, Refills: 5    Associated Diagnoses: Type 2 diabetes mellitus with diabetic autonomic neuropathy, with long-term current use of insulin ([address]      ! ! TRUEPLUS PEN NEEDLES 31G X 5 MM MISC USE AS DIRECTED DAILY  Qty: 100 each, Refills: 3    Associated Diagnoses: Type 2 diabetes mellitus with diabetic autonomic neuropathy, with long-term current use of insulin ([address]      ! ! TRUEPLUS PEN NEEDLES 31G X 5 MM MISC USE AS DIRECTED DAILY  Qty: 100 each, Refills: 2    Associated Diagnoses: Type 2 diabetes mellitus with diabetic autonomic neuropathy, with long-term current use of insulin ([address]      ! ! Insulin Pen Needle 31G X 5 MM MISC 1 each by Does not apply route daily  Qty: 100 each, Refills: 3      FREESTYLE LANCETS MISC TEST BLOOD SUGAR THREE TIMES A DAY  Qty: 100 each, Refills: 11    Associated Diagnoses: Type 2 diabetes mellitus with complication, unspecified whether long term insulin use      Incontinence Supply Disposable (POISE PAD) PADS Apply 1 Piece topically as needed (incontinence)  Qty: 100 each, Refills: 5    Associated Diagnoses: Incontinence in female      Blood Glucose Monitoring Suppl SOUTH 1 each by Does not apply route 3 times daily  Qty: 1 Device, Refills: 0    Comments: Please dispense new Glucometer - Patient uses Freestyle lite - easy touch. Patient tests 3 x daily.  Thanks      Alcohol Swabs (EASY TOUCH ALCOHOL PREP MEDIUM) 70 % PADS USE FOUR TIMES DAILY  Qty: 100 each, Refills: 5      FREESTYLE LITE strip TEST BLOOD SUGAR THREE TIMES A DAY  Qty: 100 each, Refills: 5      Blood Pressure Monitoring (B-D ASSURE BPM/AUTO ARM CUFF) MISC 1 Device by Does not apply route daily as needed (htn)  Qty: 1 each, Refills: 0    Associated Diagnoses: Essential hypertension !! - Potential duplicate medications found. Please discuss with provider. ALLERGIES     Norco [hydrocodone-acetaminophen];  Oxycodone; and Percocet [oxycodone-acetaminophen]    FAMILY HISTORY       Family History   Problem Relation Age of Onset    Heart Disease Mother     High Cholesterol Mother     No Known Problems Son           SOCIAL HISTORY       Social History     Socioeconomic History    Marital status: Single     Spouse name: None    Number of children: None    Years of education: None    Highest education level: None   Occupational History    None   Social Needs    Financial resource strain: None    Food insecurity:     Worry: None     Inability: None    Transportation needs:     Medical: None     Non-medical: None   Tobacco Use    Smoking status: Former Smoker     Packs/day: 1.00     Years: 20.00     Pack years: 20.00     Types: Cigarettes     Last attempt to quit: 10/23/2013     Years since quittin.1    Smokeless tobacco: Never Used   Substance and Sexual Activity    Alcohol use: No     Comment: beer once a month    Drug use: No     Comment: former marijuana    Sexual activity: None   Lifestyle    Physical activity:     Days per week: None     Minutes per session: None    Stress: None   Relationships    Social connections:     Talks on phone: None     Gets together: None     Attends [employer] service: None     Active member of club or organization: None     Attends meetings of clubs or organizations: None     Relationship status: None    Intimate partner violence:     Fear of current or ex partner: None     Emotionally abused: None     Physically abused: None     Forced sexual activity: None   Other Topics Concern    None   Social History Narrative    None       SCREENINGS    [place] Coma Scale  Eye Opening: Spontaneous  Best Verbal Response: Oriented  Best Motor Response: Obeys commands  [place] Coma Scale Score: 15        PHYSICAL EXAM    (up to 7 for level 4, 8 or more for level 5)     ED Triage Vitals   BP Temp Temp src Pulse Resp SpO2 Height Weight   -- -- -- -- -- -- -- --       Physical Exam  Physical Exam   Constitutional:  Appears well, well-developed and well-nourished. No distress noted. Non toxic in appearance. HENT:     Head: Normocephalic and atraumatic. Right Ear: External ear normal. TM normal pearly light reflex. LeftEar: External ear normal. TM normal pearly light reflex. Nose: Nose normal.     Mouth/Throat: Oropharynx is clear and mucosa dry. No oropharyngeal exudate noted. Posterior pharynx is pink and noninjected. Eyes: Conjunctivae and EOM are normal. Pupils are equal,round, and reactive to light. No scleral icterus. Neck: Normal range of motion. Neck supple. No tracheal deviation present. Cardiovascular: Normal rate, regular rhythm on arrival., normal heartsounds and intact distal pulses. Exam reveals no gallop or friction rub. No murmur heard. Pulmonary/Chest: Effort normal and breath sounds are symmetric and normal. No respiratory distress. There are no wheezes, rales or rhonchi. No tenderness is exhibited upon palpation of the chest wall. Abdominal: Soft. Bowel sounds are normal. No distension or no mass exhibitted. There is no tenderness, rebound, rigidity or guarding. Genitourinary:   No CVA tenderness noted on examination. Musculoskeletal: Normal range of motion. No edema, tenderness or deformity. Lymphadenopathy:  No cervical adenopathy. Neurological:   alert and oriented to person, place, and time. Normal speech, normal comprehension, normal cognition,  Reflexes are normal.  There are no cranial nerve deficits. Normal muscle tone, motor and sensory function exhibitted. Coordination normal and gait normal.    Skin: Skin is warm and dry. No rash noted. No diaphoresis. No erythema. No pallor. Psychiatric: Pleasant and cooperative. Normal mood and affect.  Behavior is  normal. Judgment and thought content normal. All other components within normal limits   BETA-HYDROXYBUTYRATE - Abnormal; Notable for the following components:    Beta-Hydroxybutyrate 0.94 (*)     All other components within normal limits   MICROSCOPIC URINALYSIS - Abnormal; Notable for the following components:    Bacteria, UA RARE (*)     All other components within normal limits   ELECROPHORESIS PROTEIN, SERUM WITHOUT REFLEX TO IMMUNOFIXATION - Abnormal; Notable for the following components: Total Protein 9.4 (*)     All other components within normal limits   POC PANEL (G3)-SIENNA - Abnormal; Notable for the following components:    pCO2, [place] 40 (*)     All other components within normal limits   HEMOGLOBIN A1C - Abnormal; Notable for the following components:    Hemoglobin A1C 6.5 (*)     All other components within normal limits   GLUCOSE, WHOLE BLOOD - Abnormal; Notable for the following components:    POC Glucose 117 (*)     All other components within normal limits   POCT GLUCOSE - Normal   BRAIN NATRIURETIC PEPTIDE   LACTIC ACID   TROPONIN   GLUCOSE, WHOLE BLOOD   PH, VENOUS   PROTEIN, URINE, RANDOM   IMMUNOFIXATION URINE RANDOM PROFILE   CBC WITH AUTO DIFFERENTIAL   COMPREHENSIVE METABOLIC PANEL W/ REFLEX TO MG FOR LOW K   POCT GLUCOSE   POCT GLUCOSE   POCT GLUCOSE       All other labs were within normal range or not returned as of this dictation. EMERGENCY DEPARTMENT COURSE and DIFFERENTIAL DIAGNOSIS/MDM:   Vitals:    Vitals:    12/12/19 2300 12/12/19 2304 12/13/19 0004 12/13/19 0104   BP:  135/74 139/79 135/72   Pulse: 77 77 78 78   Resp: 17 20 22 19   Temp:   98.2 °F (36.8 °C)    TempSrc:   Oral    SpO2: 94% 94% 92% 94%   Weight:       Height:           Noted    MDM    CRITICAL CARE TIME   Total Critical Care time was 0 minutes. [facility]  ED Course as of Dec 13 0302   Thu Dec 12, 2019   1455 I requested consult of cardiology.   Spoke with Dr. Raudel alexandre who reviewed the chart and indicated that this patient could remain in this facility is available for consult. He confirmed that she is on Eliquis, covering her atrial fibrillation    [MS]   1511 I requested that he call be placed to the hospitalist to discuss admission. [MS]   [facility] I spoke with Dr. Concepcion Covington who accepted the patient for admission given that Dr. Garrison Klein is available for consultation. I told him that this patient arrived in normal sinus rhythm and flipped into atrial fibrillation with RVR. She was given to bolus doses of Cardizem then started on a Cardizem drip. I confirmed for him that this patient's Synthroid dose was reduced 1 month ago to 100 mcg/day. [MS]      ED Course User Index  [MS] Zeina Sunshine MD       CONSULTS:  IP CONSULT TO CARDIOLOGY  IP CONSULT TO SOCIAL WORK    PROCEDURES:  Unlessotherwise noted below, none     Procedures      Summation      Patient admitted to this facility in stable condition with improved dehydration related to nausea vomiting diarrhea, subsequent conversion of rhythm, from normal sinus to atrial fibrillation with RVR now on a Cardizem drip. I obtain consultation with cardiology who agreed that she is a candidate to remain at this facility. Hyperthyroidism is managed by her endocrinologist with reduction in Synthroid dosages. She is anticoagulated with Eliquis. Patient Course:   ED Course as of Dec 13 0302   Thu Dec 12, 2019   1455 I requested consult of cardiology. Spoke with Dr. Thania alexandre who reviewed the chart and indicated that this patient could remain in this facility is available for consult. He confirmed that she is on Eliquis, covering her atrial fibrillation    [MS]   1511 I requested that he call be placed to the hospitalist to discuss admission. [MS]   [facility] I spoke with Dr. Concepcion Covington who accepted the patient for admission given that Dr. Garrison Klein is available for consultation. I told him that this patient arrived in normal sinus rhythm and flipped into atrial fibrillation with RVR.   She was given to bolus doses of Cardizem then started on a Cardizem drip. I confirmed for him that this patient's Synthroid dose was reduced 1 month ago to 100 mcg/day.     [MS]      ED Course User Index  [MS] Singh Chapin MD         ED Medicationsadministered this visit:    Medications   diltiazem injection 10 mg (10 mg Intravenous Given 12/12/19 1409)     Followed by   diltiazem 125 mg in dextrose 5 % 125 mL infusion (0 mg/hr Intravenous Stopped 12/12/19 2027)   apixaban (ELIQUIS) tablet 5 mg (5 mg Oral Given 12/12/19 2018)   atorvastatin (LIPITOR) tablet 40 mg (40 mg Oral Not Given 12/12/19 1724)   Vitamin D (CHOLECALCIFEROL) tablet 1,000 Units (1,000 Units Oral Not Given 12/12/19 1725)   cyclobenzaprine (FLEXERIL) tablet 10 mg (has no administration in time range)   ferrous sulfate tablet 325 mg (has no administration in time range)   gabapentin (NEURONTIN) capsule 800 mg (800 mg Oral Given 12/12/19 2018)   hydrochlorothiazide (HYDRODIURIL) tablet 25 mg (has no administration in time range)   insulin glargine (LANTUS) injection vial 25 Units (25 Units Subcutaneous Not Given 12/12/19 2011)   lisinopril (PRINIVIL;ZESTRIL) tablet 5 mg (5 mg Oral Not Given 12/12/19 2019)   cetirizine (ZYRTEC) tablet 10 mg (10 mg Oral Not Given 12/12/19 1724)   metoprolol tartrate (LOPRESSOR) tablet 25 mg (25 mg Oral Given 12/12/19 2018)   oxybutynin (DITROPAN-XL) extended release tablet 10 mg (10 mg Oral Not Given 12/12/19 1725)   potassium chloride (KLOR-CON) extended release tablet 20 mEq (20 mEq Oral Not Given 12/12/19 1725)   sodium chloride flush 0.9 % injection 10 mL (10 mLs Intravenous Not Given 12/12/19 2012)   sodium chloride flush 0.9 % injection 10 mL (has no administration in time range)   magnesium hydroxide (MILK OF MAGNESIA) 400 MG/5ML suspension 30 mL (has no administration in time range)   ondansetron (ZOFRAN) injection 4 mg (has no administration in time range)   acetaminophen (TYLENOL) tablet 650 mg (has no administration in time range)   0.9 % sodium chloride infusion ( Intravenous New Bag 12/13/19 0250)   glucose (GLUTOSE) 40 % oral gel 15 g (has no administration in time range)   dextrose 50 % IV solution (has no administration in time range)   glucagon (rDNA) injection 1 mg (has no administration in time range)   dextrose 5 % solution (has no administration in time range)   insulin lispro (HUMALOG) injection vial 0-6 Units (0 Units Subcutaneous Not Given 12/13/19 0148)   ondansetron (ZOFRAN) injection 4 mg (4 mg Intravenous Given 12/12/19 1346)   0.9 % sodium chloride bolus (0 mLs Intravenous Stopped 12/12/19 1450)   0.9 % sodium chloride bolus (1,000 mLs Intravenous New Bag 12/12/19 1453)   diltiazem injection 20 mg (20 mg Intravenous Given 12/12/19 1459)   amiodarone (CORDARONE) 150 mg in dextrose 5 % 100 mL bolus (150 mg Intravenous New Bag 12/12/19 1828)       New Prescriptions from this visit:    Current Discharge Medication List          Follow-up:  MD Emilia Rutherford [address] [place] [address]  [phone]              Final Impression:   1. Nausea vomiting and diarrhea    2. Atrial fibrillation with RVR (HCC)    3. Dehydration    4. Hyperthyroidism               (Please note that portions of this note werecompleted with a voice recognition program.  Efforts were made to edit the dictations but occasionally words are mis-transcribed.)    FINAL IMPRESSION      1. Nausea vomiting and diarrhea    2. Atrial fibrillation with RVR (HCC)    3. Dehydration    4.  Hyperthyroidism          DISPOSITION/PLAN   DISPOSITION Admitted 12/12/2019 03:56:31 PM      PATIENT REFERRED TO:  MD Lee Rutherford[address]  [phone]            DISCHARGE MEDICATIONS:  Current Discharge Medication List             (Please note that portions of this note were completed with a voice recognition program.  Efforts were made to edit the dictations but occasionally words are mis-transcribed.)    Juliano eHrnandez MD (electronically signed)  Attending Emergency Physician            Zeina Sunshine MD  12/13/19 [address] Israel Burns MD  12/13/19 5315

## 2019-12-13 VITALS
TEMPERATURE: 97.9 F | SYSTOLIC BLOOD PRESSURE: 119 MMHG | BODY MASS INDEX: 27.79 KG/M2 | HEIGHT: 64 IN | RESPIRATION RATE: 16 BRPM | DIASTOLIC BLOOD PRESSURE: 83 MMHG | WEIGHT: 162.8 LBS | OXYGEN SATURATION: 95 % | HEART RATE: 76 BPM

## 2019-12-13 LAB
ABSOLUTE EOS #: 0.3 K/UL (ref 0–0.4)
ABSOLUTE IMMATURE GRANULOCYTE: ABNORMAL K/UL (ref 0–0.3)
ABSOLUTE LYMPH #: 2.4 K/UL (ref 1–4.8)
ABSOLUTE MONO #: 0.4 K/UL (ref 0–1)
ALBUMIN (CALCULATED): 6 G/DL (ref 3.2–5.2)
ALBUMIN PERCENT: 64 % (ref 45–65)
ALBUMIN SERPL-MCNC: 4 G/DL (ref 3.5–5.2)
ALBUMIN/GLOBULIN RATIO: ABNORMAL (ref 1–2.5)
ALP BLD-CCNC: 78 U/L (ref 35–104)
ALPHA 1 PERCENT: 2 % (ref 3–6)
ALPHA 2 PERCENT: 12 % (ref 6–13)
ALPHA-1-GLOBULIN: 0.2 G/DL (ref 0.1–0.4)
ALPHA-2-GLOBULIN: 1.1 G/DL (ref 0.5–0.9)
ALT SERPL-CCNC: 22 U/L (ref 5–33)
ANION GAP SERPL CALCULATED.3IONS-SCNC: 10 MMOL/L (ref 9–17)
AST SERPL-CCNC: 24 U/L
BASOPHILS # BLD: 1 % (ref 0–2)
BASOPHILS ABSOLUTE: 0.1 K/UL (ref 0–0.2)
BETA GLOBULIN: 0.9 G/DL (ref 0.5–1.1)
BETA PERCENT: 9 % (ref 11–19)
BILIRUB SERPL-MCNC: 0.57 MG/DL (ref 0.3–1.2)
BUN BLDV-MCNC: 15 MG/DL (ref 8–23)
BUN/CREAT BLD: ABNORMAL (ref 9–20)
CALCIUM SERPL-MCNC: 9.6 MG/DL (ref 8.6–10.4)
CHLORIDE BLD-SCNC: 107 MMOL/L (ref 98–107)
CO2: 24 MMOL/L (ref 20–31)
CREAT SERPL-MCNC: 1.19 MG/DL (ref 0.5–0.9)
DIFFERENTIAL TYPE: YES
EKG ATRIAL RATE: 87 BPM
EKG P AXIS: 78 DEGREES
EKG P-R INTERVAL: 188 MS
EKG Q-T INTERVAL: 370 MS
EKG QRS DURATION: 64 MS
EKG QTC CALCULATION (BAZETT): 445 MS
EKG R AXIS: 55 DEGREES
EKG T AXIS: 49 DEGREES
EKG VENTRICULAR RATE: 87 BPM
EOSINOPHILS RELATIVE PERCENT: 5 % (ref 0–5)
GAMMA GLOBULIN %: 13 % (ref 9–20)
GAMMA GLOBULIN: 1.2 G/DL (ref 0.5–1.5)
GFR AFRICAN AMERICAN: 56 ML/MIN
GFR NON-AFRICAN AMERICAN: 46 ML/MIN
GFR SERPL CREATININE-BSD FRML MDRD: ABNORMAL ML/MIN/{1.73_M2}
GFR SERPL CREATININE-BSD FRML MDRD: ABNORMAL ML/MIN/{1.73_M2}
GLUCOSE BLD-MCNC: 108 MG/DL (ref 65–99)
GLUCOSE BLD-MCNC: 111 MG/DL (ref 65–99)
GLUCOSE BLD-MCNC: 115 MG/DL (ref 70–99)
GLUCOSE BLD-MCNC: 160 MG/DL (ref 65–99)
HCT VFR BLD CALC: 35.8 % (ref 36–46)
HEMOGLOBIN: 12.1 G/DL (ref 12–16)
IMMATURE GRANULOCYTES: ABNORMAL %
LYMPHOCYTES # BLD: 37 % (ref 15–40)
MCH RBC QN AUTO: 31 PG (ref 26–34)
MCHC RBC AUTO-ENTMCNC: 34 G/DL (ref 31–37)
MCV RBC AUTO: 91.3 FL (ref 80–100)
MONOCYTES # BLD: 7 % (ref 4–8)
NRBC AUTOMATED: ABNORMAL PER 100 WBC
PATHOLOGIST: ABNORMAL
PDW BLD-RTO: 13.6 % (ref 12.1–15.2)
PLATELET # BLD: 190 K/UL (ref 140–450)
PLATELET ESTIMATE: ABNORMAL
PMV BLD AUTO: ABNORMAL FL (ref 6–12)
POTASSIUM SERPL-SCNC: 3.7 MMOL/L (ref 3.7–5.3)
PROTEIN ELECTROPHORESIS, SERUM: ABNORMAL
PTH INTACT: 81.42 PG/ML (ref 15–65)
RBC # BLD: 3.91 M/UL (ref 4–5.2)
RBC # BLD: ABNORMAL 10*6/UL
SEG NEUTROPHILS: 50 % (ref 47–75)
SEGMENTED NEUTROPHILS ABSOLUTE COUNT: 3.2 K/UL (ref 2.5–7)
SODIUM BLD-SCNC: 141 MMOL/L (ref 135–144)
TOTAL PROT. SUM,%: 100 % (ref 98–102)
TOTAL PROT. SUM: 9.4 G/DL (ref 6.3–8.2)
TOTAL PROTEIN: 7 G/DL (ref 6.4–8.3)
TOTAL PROTEIN: 9.4 G/DL (ref 6.4–8.3)
TROPONIN INTERP: NORMAL
TROPONIN T: <0.03 NG/ML
TROPONIN, HIGH SENSITIVITY: NORMAL NG/L (ref 0–14)
WBC # BLD: 6.4 K/UL (ref 3.5–11)
WBC # BLD: ABNORMAL 10*3/UL

## 2019-12-13 PROCEDURE — 80053 COMPREHEN METABOLIC PANEL: CPT

## 2019-12-13 PROCEDURE — 84484 ASSAY OF TROPONIN QUANT: CPT

## 2019-12-13 PROCEDURE — 36415 COLL VENOUS BLD VENIPUNCTURE: CPT

## 2019-12-13 PROCEDURE — 83970 ASSAY OF PARATHORMONE: CPT

## 2019-12-13 PROCEDURE — 93010 ELECTROCARDIOGRAM REPORT: CPT | Performed by: INTERNAL MEDICINE

## 2019-12-13 PROCEDURE — 6370000000 HC RX 637 (ALT 250 FOR IP): Performed by: INTERNAL MEDICINE

## 2019-12-13 PROCEDURE — 82947 ASSAY GLUCOSE BLOOD QUANT: CPT

## 2019-12-13 PROCEDURE — 85025 COMPLETE CBC W/AUTO DIFF WBC: CPT

## 2019-12-13 PROCEDURE — G0378 HOSPITAL OBSERVATION PER HR: HCPCS

## 2019-12-13 PROCEDURE — 94761 N-INVAS EAR/PLS OXIMETRY MLT: CPT

## 2019-12-13 PROCEDURE — 99218 PR INITIAL OBSERVATION CARE/DAY 30 MINUTES: CPT | Performed by: INTERNAL MEDICINE

## 2019-12-13 PROCEDURE — 2580000003 HC RX 258: Performed by: INTERNAL MEDICINE

## 2019-12-13 RX ORDER — LEVOTHYROXINE SODIUM 88 UG/1
88 TABLET ORAL DAILY
Qty: 30 TABLET | Refills: 5 | Status: SHIPPED | OUTPATIENT
Start: 2019-12-13 | End: 2020-02-10 | Stop reason: SDUPTHER

## 2019-12-13 RX ORDER — DILTIAZEM HYDROCHLORIDE 120 MG/1
120 CAPSULE, COATED, EXTENDED RELEASE ORAL DAILY
Status: DISCONTINUED | OUTPATIENT
Start: 2019-12-13 | End: 2019-12-13 | Stop reason: HOSPADM

## 2019-12-13 RX ORDER — DILTIAZEM HYDROCHLORIDE 120 MG/1
120 CAPSULE, COATED, EXTENDED RELEASE ORAL DAILY
Qty: 30 CAPSULE | Refills: 3 | Status: SHIPPED | OUTPATIENT
Start: 2019-12-13 | End: 2020-02-10 | Stop reason: SDUPTHER

## 2019-12-13 RX ADMIN — POTASSIUM CHLORIDE 20 MEQ: 750 TABLET, FILM COATED, EXTENDED RELEASE ORAL at 09:33

## 2019-12-13 RX ADMIN — DILTIAZEM HYDROCHLORIDE 120 MG: 120 CAPSULE, COATED, EXTENDED RELEASE ORAL at 09:33

## 2019-12-13 RX ADMIN — SODIUM CHLORIDE: 9 INJECTION, SOLUTION INTRAVENOUS at 02:50

## 2019-12-13 RX ADMIN — FERROUS SULFATE TAB 325 MG (65 MG ELEMENTAL FE) 325 MG: 325 (65 FE) TAB at 09:33

## 2019-12-13 RX ADMIN — OXYBUTYNIN CHLORIDE 10 MG: 5 TABLET, EXTENDED RELEASE ORAL at 09:33

## 2019-12-13 RX ADMIN — ATORVASTATIN CALCIUM 40 MG: 20 TABLET, FILM COATED ORAL at 09:33

## 2019-12-13 RX ADMIN — CETIRIZINE HYDROCHLORIDE 10 MG: 10 TABLET, FILM COATED ORAL at 09:33

## 2019-12-13 RX ADMIN — HYDROCHLOROTHIAZIDE 25 MG: 25 TABLET ORAL at 09:33

## 2019-12-13 RX ADMIN — METOPROLOL TARTRATE 25 MG: 25 TABLET ORAL at 09:33

## 2019-12-13 RX ADMIN — APIXABAN 5 MG: 5 TABLET, FILM COATED ORAL at 09:33

## 2019-12-13 RX ADMIN — Medication 1000 UNITS: at 09:33

## 2019-12-13 RX ADMIN — GABAPENTIN 800 MG: 100 CAPSULE ORAL at 09:32

## 2019-12-13 RX ADMIN — Medication 10 ML: at 09:33

## 2019-12-13 RX ADMIN — LISINOPRIL 5 MG: 5 TABLET ORAL at 09:33

## 2019-12-13 ASSESSMENT — PAIN SCALES - GENERAL
PAINLEVEL_OUTOF10: 0

## 2019-12-13 NOTE — DISCHARGE SUMMARY
Hospitalist Discharge Summary    Franki Sin  :  1955  MRN:  [id number]    Admit date:  2019  Discharge date:  19    Admitting Physician:  Nelda Rocha MD    Discharge Diagnoses:   Atrial fibrillation with RVR. Intractable nausea / vomiting - resolved. Hypercalcemia. CKD - at baseline. Admission Condition:  poor      Discharged Condition:  good    Hospital Course: The patient is a 62 y.o. female who presents to the ER with nausea and vomiting. According to Rommel Alec, she woke on Wednesday and did not feel well. She felt warm but did not check her temperature. Rommel Michael states she lives with her brother who has been having nausea and vomiting but it was felt this was secondary to him running out of medication. Later in the day she started to develop nausea which turned into vomiting. During this time she noticed some discomfort in her upper stomach which she felt was from the vomiting. No chest pain or SOB. She did have \"a couple\" of loose stools and then it normalized. Thursday, her symptoms continued and she decided to come to the ER. While in the ER she went into atrial fib with RVR which was managed with IV Cardizem followed with a Cardizem drip for rate control. Labs showed a normal CMP other than a creatinine of 1.39, Alk phos 110, ALT 34, AST 34, and total protein of 9.9. Lactic acid was 1.9. Troponin was negative and her BNP was 187. CBC showed a WBC of 9.7, Hgb 16.3, and Plt 275. UA was negative for UTI. TSH was < 0.01. Her dose of Levothyroxine had been adjusted in September. IV Zofran was given in the ER with improvement in her nausea. Rommel Michael was admitted to the ICU on a Cardizem drip for rate control. Dr. Charlette Pacheco in Cardiology was consulted. She was placed on bowel rest with IV hydration and IV Zofran as needed. Rommel Michael remained in atrial fibrillation so Dr. Charlette Pacheco gave her a dose of Amiodarone which converted her to sinus rhythm.   Cardizem drip was albuterol (PROVENTIL) (2.5 MG/3ML) 0.083% nebulizer solution  Take 2.5 mg by nebulization 4 times daily             albuterol sulfate  (90 Base) MCG/ACT inhaler  INHALE 2 PUFFS BY MOUTH EVERY 6 HOURS AS NEEDED FOR WHEEZING             Alcohol Swabs (EASY TOUCH ALCOHOL PREP MEDIUM) 70 % PADS  USE FOUR TIMES DAILY             apixaban (ELIQUIS) 5 MG TABS tablet  Take 1 tablet by mouth 2 times daily             atorvastatin (LIPITOR) 40 MG tablet  TAKE 1 TABLET BY MOUTH ONCE DAILY             Blood Glucose Monitoring Suppl SOUTH  1 each by Does not apply route 3 times daily             Blood Glucose Monitoring Suppl SOUTH  1 Units by Does not apply route once for 1 dose PLease dispense a machine that is covered by her insurance  DX E11.9             Blood Pressure Monitoring (B-D ASSURE BPM/AUTO ARM CUFF) MISC  1 Device by Does not apply route daily as needed (htn)             Cholecalciferol (VITAMIN D3) 1000 units TABS  TAKE 1 TABLET BY MOUTH DAILY             cyclobenzaprine (FLEXERIL) 5 MG tablet  TAKE ONE (1) TABLET BY MOUTH THREE TIMES DAILY AS NEEDED FOR MUSCLE SPASMS             diltiazem (CARDIZEM CD) 120 MG extended release capsule  Take 1 capsule by mouth daily             FEROSUL 325 (65 Fe) MG tablet  TAKE (1) TABLET BY MOUTH DAILY             FREESTYLE LANCETS MISC  TEST BLOOD SUGAR THREE TIMES A DAY             FREESTYLE LITE strip  TEST BLOOD SUGAR THREE TIMES A DAY             gabapentin (NEURONTIN) 800 MG tablet  TAKE ONE (1) TABLET BY MOUTH TWICE DAILY             hydrochlorothiazide (HYDRODIURIL) 25 MG tablet  Take 1 tablet by mouth every morning             Incontinence Supply Disposable (POISE PAD) PADS  Apply 1 Piece topically as needed (incontinence)             Insulin Glargine (BASAGLAR KWIKPEN SC)  Inject 25 Units into the skin nightly Per Dr. Derick Dominique             Insulin Pen Needle 31G X 5 MM MISC  1 each by Does not apply route daily             levothyroxine (SYNTHROID) 88 MCG tablet  Take 1 tablet by mouth daily             lisinopril (PRINIVIL;ZESTRIL) 5 MG tablet  TAKE 1 TABLET BY MOUTH EVERY DAY             loratadine (CLARITIN) 10 MG tablet  TAKE 1 TABLET BY MOUTH EVERY DAY             metoprolol tartrate (LOPRESSOR) 25 MG tablet  TAKE 1 TABLET BY MOUTH 2 TIMES DAILY             Multiple Vitamin (MULTI-VITAMINS) TABS  TAKE ONE (1) TABLET BY MOUTH ONCE DAILY             Omega-3 Fatty Acids (FISH OIL) 1000 MG CAPS  TAKE 2 CAPSULES BY MOUTH DAILY             ondansetron (ZOFRAN) 4 MG tablet  Take 1 tablet by mouth 3 times daily as needed for Nausea or Vomiting             oxybutynin (DITROPAN XL) 10 MG extended release tablet  Take 1 tablet by mouth daily             potassium chloride (KLOR-CON M) 20 MEQ extended release tablet  Take 1 tablet by mouth daily             TRUEPLUS PEN NEEDLES 31G X 5 MM MISC  USE AS DIRECTED DAILY             TRULICITY 2.32 ZD/6.5RP SOPN  Inject 0.75 mg into the skin once a week 0.75mg/0.5ml                 Consults:  Dr. Blanca Meadows (Cardiology)    Significant Diagnostic Studies:  Labs, ECG, UA. Treatments:   IV Fluids with bowel rest, IV Zofran, IV Cardizem converted to oral, one time dose of Amiodarone 150 mg. Disposition:   Home. Discharge InstructionsResume previous home medication but stop Amlodipine and Levothyroxine 100 mcg. Take Levothyroxine 88 mcg daily. Take Cardizem  mg daily. Clear liquid diet. Advance as tolerated to a diabetic diet. Activity:  As tolerated. Follow up with Endocrinology as previously scheduled. Follow up with Abdullahi Mai MD in 1 week. Discharge time =  35 minutes.      Signed:  Sacha Back  12/13/2019, 6:16 AM

## 2019-12-13 NOTE — CONSULTS
[place] [address]                                  CONSULTATION    PATIENT NAME: Beatrice Marcano                    :        1955  MED REC NO:   [id number]                              ROOM:       5371  ACCOUNT NO:   [de-identified]                           ADMIT DATE: 2019  PROVIDER:     Kendall Marte    CONSULT DATE:  2019    CHIEF COMPLAINT:  1. Atrial fibrillation in ER where she presented because of nausea and  vomiting. 2.  History of paroxysmal atrial fibrillation. HISTORY OF PRESENT ILLNESS:  The patient is a pleasant 71-year-old  female who I have been following for paroxysmal atrial fibrillation. I  had just seen her in our office on 2019, at which time she was  doing well. She had a long history of paroxysmal atrial fibrillation  and is anticoagulated with Eliquis 5 mg b.i.d. This was first noted  when she was having colonoscopy on 2014. At that time, she was  also markedly hyperthyroid and was anticoagulated to Coumadin and  eventually converted to sinus rhythm. She was treated with Tapazole. She had another episode of atrial fibrillation on 2015, and was  converted to sinus rhythm. On 2019, she was hospitalized because of nausea. She had atrial  fibrillation with RVR. She converted to sinus rhythm. She did well  since that time and I had actually just seen her in my clinic on  2019. At that time, she was doing well with no chest pain,  shortness of breath, PND, orthopnea or pedal edema. Her blood pressure  was under excellent control. She had had no hospitalizations or  procedures since 2019. On , she came to my office, I did not  make any changes in her medications. I arranged to see her in a year. She developed nausea on , with vomiting.   This continued, and  because of her nausea and vomiting, she could not take any of her  medications. She came to the emergency room and was found to be in  atrial fibrillation with RVR, with heart rates in the 130s to 150s  range. She was given IV Cardizem with decreasing her rate to 100, which  then increased to 130. She was then placed on Cardizem infusion and was  hospitalized for nausea and vomiting and her atrial fibrillation. I saw her in the emergency room. In the emergency room, she had been  given Zofran and was actually feeling much better. She was still in  atrial fibrillation with the rate of 110 in the emergency room when I  saw her. She denies any chest pain or chest discomfort. She has had no unusual  shortness of breath. Again, no PND, orthopnea or pedal edema. She has  had no syncope or near syncope. She really cannot feel that she is in  atrial fibrillation. We gave her a bolus of amiodarone of 150 mg and she converted to sinus  rhythm. Cardizem infusion was stopped last night. She has never had a myocardial infarction, never had a cardiac  catheterization. She is good about taking her medications. She is treated for hyperthyroidism and I did blood work after she left  my office on the night of December. This did show that she was mildly  hyperthyroid. CARDIAC RISK FACTORS:  Known CAD:  Negative. Prior MI:  Negative. Peripheral Vascular Disease:  Negative. Diabetes:  Positive. Hypertension:  Positive. Hyperlipidemia:  Positive. Other Family Members:  Positive. MEDICATIONS AT HOME:  She is on albuterol 2 puffs q.4 hours p.r.n.,  Eliquis 5 mg b.i.d., Lipitor 40 mg daily, vitamin D 1000 units daily,  Flexeril 10 mg p.r.n., gabapentin 800 mg b.i.d., HydroDIURIL 25 mg  daily, Synthroid 100 mcg daily, lisinopril 5 mg daily, Claritin 10 mg  daily, Lopressor 25 mg b.i.d., fish oil 1000 mg 2 tablets daily, Zofran  p.r.n., Ditropan XL 10 mg daily, potassium 20 mEq daily, and Trulicity  1.42 mg weekly.     PAST MEDICAL HISTORY:  1. She has insulin-dependent diabetes. 2.  Hypertension. 3.  COPD. 4.  Asthma. 5.  Tonsillectomy. 6.  Colonoscopy. 7.  Hyperthyroidism, treated by Dr. Dina Boo. 8.  She does have a history of Graves disease, treated with Tapazole. Her levothyroxine was just decreased to 100 mcg daily in September. FAMILY HISTORY:  Mother had heart disease. SOCIAL HISTORY:  She is 62years old. She had a 40-year-old  granddaughter who  in 2018. She stopped smoking 8 years ago. Occasionally drinks alcohol. Lives with her brother. Has severe back  pain, is wheelchair bound. REVIEW OF SYSTEMS:  Cardiac as above. Other systems reviewed including  constitutional, eyes, ears, nose and throat, cardiovascular,  respiratory, GI, , musculoskeletal, integumentary, neurologic,  psychiatric, endocrine, hematologic and allergic/immunologic are  negative except for what is described above. No weight loss or weight  gain. No change in bowel habits, no blood in stools. She again did  develop nausea yesterday. PHYSICAL EXAMINATION:  VITAL SIGNS:  In the emergency room, her blood pressure was 151/80 with  a heart rate of 120 and irregular. Respirations were 16. O2 saturation  was 97%. GENERAL:  She is a pleasant 71-year-old female. Denied pain. Her  nausea was improved after the Zofran. She was oriented to person, place  and time. Answered questions appropriately. Denied any pain. SKIN:  No unusual skin changes. HEENT:  The pupils are equally round and intact. Mucous membranes were  dry. NECK:  No JVD. Good carotid pulses. No carotid bruits. No  lymphadenopathy or thyromegaly. CARDIOVASCULAR EXAM:  S1 and S2 were normal.  No S3 or S4. Soft  systolic blowing type murmur. No diastolic murmur. PMI was normal.  No  lift, thrust, or pericardial friction rub. LUNGS:  Quite clear to auscultation and percussion. ABDOMEN:  Soft and nontender. Good bowel sounds.   EXTREMITIES:  Good

## 2019-12-13 NOTE — H&P
Hospital Medicine  History and Physical    Patient:  Rk Britton  MRN: [id number]    CHIEF COMPLAINT:  Nausea and vomiting    History Obtained From:  patient  PCP: Julianne Reed MD    HISTORY OF PRESENT ILLNESS:   The patient is a 62 y.o. female who presents to the ER with nausea and vomiting. According to Fabichelly Bryan, she woke on Wednesday and did not feel well. She felt warm but did not check her temperature. Ingrid Bryan states she lives with her brother who has been having nausea and vomiting but it was felt this was secondary to him running out of medication. Later in the day she started to develop nausea which turned into vomiting. During this time she noticed some discomfort in her upper stomach which she felt was from the vomiting. No chest pain or SOB. She did have \"a couple\" of loose stools and then it normalized. Thursday, her symptoms continued and she decided to come to the ER. While in the ER she went into atrial fib with RVR which was managed with IV Cardizem followed with a Cardizem drip for rate control. Labs showed a normal CMP other than a creatinine of 1.39, Alk phos 110, ALT 34, AST 34, and total protein of 9.9. Lactic acid was 1.9. Troponin was negative and her BNP was 187. CBC showed a WBC of 9.7, Hgb 16.3, and Plt 275. UA was negative for UTI. TSH was < 0.01. Her dose of Levothyroxine had been adjusted in September. IV Zofran was given in the ER with improvement in her nausea. Ingrid Bryan is admitted to the ICU on a Cardizem drip for rate control and a consult to Cardiology.       Past Medical History:        Diagnosis Date    Asthma     Atrial fibrillation (HCC)     COPD (chronic obstructive pulmonary disease) (HCC)     DDD (degenerative disc disease)     Diabetes mellitus ([facility] [address]     Hyperlipidemia     Hypertension     Hyperthyroidism     Type II or unspecified type diabetes mellitus without mention of complication, not stated as uncontrolled        Past Surgical History: Procedure Laterality Date    BACK SURGERY      COLONOSCOPY  04/23/14   Rayraysean Alcantarjenny DENTAL SURGERY N/A 3/8/2017    REMOVAL OF BILATERAL MANDIBULAR LELO AND MAXILLARY EDENTULOUS ALVEOPLASTY performed by Roula Reynaga DDS at [place] [address]      removal    GASTROSTOMY TUBE PLACEMENT      TONSILLECTOMY      TRACHEOSTOMY      TRACHEOSTOMY CLOSURE      TUBAL LIGATION      UPPER GASTROINTESTINAL ENDOSCOPY  3/16/2016    Peg tube insertion       Medications Prior to Admission:  I obtained, documented, reviewed, and updated the patient's current medications. Prior to Admission medications    Medication Sig Start Date End Date Taking?  Authorizing Provider   amLODIPine (NORVASC) 10 MG tablet  11/27/19  Yes Historical Provider, MD   levothyroxine (SYNTHROID) 100 MCG tablet TAKE 1 TABLET BY MOUTH EVERY DAY 11/24/19  Yes Historical Provider, MD   loratadine (CLARITIN) 10 MG tablet TAKE 1 TABLET BY MOUTH EVERY DAY 10/17/19  Yes Jesenia Granados MD   atorvastatin (LIPITOR) 40 MG tablet TAKE 1 TABLET BY MOUTH ONCE DAILY 10/14/19  Yes Jesenia Granados MD   hydrochlorothiazide (HYDRODIURIL) 25 MG tablet Take 1 tablet by mouth every morning 10/14/19  Yes Jesenia Granados MD   NAPROXEN 500 MG EC tablet TAKE ONE (1) TABLET BY MOUTH TWICE DAILY WITH MEALS FOR 10 DAYS 9/19/19  Yes Jesenia Granados MD   metoprolol tartrate (LOPRESSOR) 25 MG tablet TAKE 1 TABLET BY MOUTH 2 TIMES DAILY 9/19/19  Yes Jesenia Granados MD   albuterol sulfate  (90 Base) MCG/ACT inhaler INHALE 2 PUFFS BY MOUTH EVERY 6 HOURS AS NEEDED FOR WHEEZING 9/19/19  Yes Jesenia Granados MD   lisinopril (PRINIVIL;ZESTRIL) 5 MG tablet TAKE 1 TABLET BY MOUTH EVERY DAY 8/25/19  Yes Jesenia Granados MD   cyclobenzaprine (FLEXERIL) 5 MG tablet TAKE ONE (1) TABLET BY MOUTH THREE TIMES DAILY AS NEEDED FOR MUSCLE SPASMS 8/1/19  Yes Jesenia Granados MD   Multiple Vitamin (MULTI-VITAMINS) TABS TAKE ONE (1) TABLET BY MOUTH ONCE DAILY 4/23/19  Yes Jalil Roland Lila Betts MD   FEROSUL 325 (65 Fe) MG tablet TAKE (1) TABLET BY MOUTH DAILY 4/23/19  Yes Rigoberto Monique MD   apixaban (ELIQUIS) 5 MG TABS tablet Take 1 tablet by mouth 2 times daily 4/23/19  Yes Rigoberto Monique MD   Cholecalciferol (VITAMIN D3) 1000 units TABS TAKE 1 TABLET BY MOUTH DAILY 4/11/19  Yes Rigoberto Monique MD   Omega-3 Fatty Acids (FISH OIL) 1000 MG CAPS TAKE 2 CAPSULES BY MOUTH DAILY 4/11/19  Yes Rigoberto Monique MD   ondansetron (ZOFRAN) 4 MG tablet Take 1 tablet by mouth 3 times daily as needed for Nausea or Vomiting 3/25/19  Yes Rigoberto Monique MD   TRULICITY 2.97 PC/7.6WY SOPN Inject 0.75 mg into the skin once a week 0.75mg/0.5ml 1/15/19  Yes Historical Provider, MD   potassium chloride (KLOR-CON M) 20 MEQ extended release tablet Take 1 tablet by mouth daily 12/18/18  Yes Sabrina Palacios MD   oxybutynin (DITROPAN XL) 10 MG extended release tablet Take 1 tablet by mouth daily 12/11/18  Yes Abdifatah Isaac MD   Insulin Glargine (BASAGLAR KWIKPEN SC) Inject 25 Units into the skin nightly Per Dr. Christian Ambrocio   Yes Historical Provider, MD   albuterol (PROVENTIL) (2.5 MG/3ML) 0.083% nebulizer solution Take 2.5 mg by nebulization 4 times daily   Yes Historical Provider, MD   gabapentin (NEURONTIN) 800 MG tablet  11/29/19   Historical Provider, MD   gabapentin (NEURONTIN) 800 MG tablet TAKE ONE (1) TABLET BY MOUTH TWICE DAILY 10/14/19 11/14/19  Rigoberto Monique MD   TRUEPLUS PEN NEEDLES 31G X 5 MM MISC USE AS DIRECTED DAILY 7/29/19   Rigoberto Monique MD   TRUEPLUS PEN NEEDLES 31G X 5 MM MISC USE AS DIRECTED DAILY 6/26/19   Rigoberto Monique MD   TRUEPLUS PEN NEEDLES 31G X 5 MM MISC USE AS DIRECTED DAILY 5/20/19   Rigoberto Monique MD   Insulin Pen Needle 31G X 5 MM MISC 1 each by Does not apply route daily 5/6/19   Rigoberto Monique MD   FREESTYLE LANCETS MISC TEST BLOOD SUGAR THREE TIMES A DAY 8/28/18   Rigoberto Monique MD   Blood Glucose Monitoring Suppl SOUTH 1 Units by Does not apply route once for 1 dose PLease dispense a machine that is covered by her insurance  DX E11.9 4/26/18 4/26/18  Deborah Church MD   Incontinence Supply Disposable (POISE PAD) PADS Apply 1 Piece topically as needed (incontinence) 11/21/17   Antoni Mac MD   Blood Glucose Monitoring Suppl SOUTH 1 each by Does not apply route 3 times daily 6/6/17   Brock Gotti MD   Alcohol Swabs (EASY TOUCH ALCOHOL PREP MEDIUM) 70 % PADS USE FOUR TIMES DAILY 3/29/17   Brock Gotti MD   FREESTYLE LITE strip TEST BLOOD SUGAR THREE TIMES A DAY 3/29/17   Brock Gotti MD   Blood Pressure Monitoring (B-D ASSURE BPM/AUTO ARM CUFF) MISC 1 Device by Does not apply route daily as needed (htn) 8/25/16   Brock Gotti MD       Allergies:  Norco [hydrocodone-acetaminophen]; Oxycodone; and Percocet [oxycodone-acetaminophen]    Social History:   TOBACCO:   reports that she quit smoking about 6 years ago. Her smoking use included cigarettes. She has a 20.00 pack-year smoking history. She has never used smokeless tobacco.  ETOH:   reports no history of alcohol use.   OCCUPATION:      Family History:       Problem Relation Age of Onset    Heart Disease Mother     High Cholesterol Mother     No Known Problems Son        REVIEW OF SYSTEMS:  Constitutional:  positive for  feverish  negative for  weight loss  HEENT:  positive for  Runny nose  negative for  earaches, nasal congestion and sore throat  Neck:  No lumps or masses  Cardiac:  negative for  chest pain, palpitations, positive for chronic dyspnea  Respiratory:  positive for  Chronic dyspnea  negative for  cough with sputum, wheezing and hemoptysis  Gastrointestinal:  positive for nausea, vomiting, abdominal pain and episodes of loose stools  :  negative for frequency, dysuria and hesitancy  Musculoskeletal:  positive for  muscle weakness  Neuro:  negative      Physical Exam:    Vitals: /72   Pulse 78   Temp 98.2 °F (36.8 °C) (Oral)   Resp 19   Ht 5' 4\" (1.626 m)   Wt 162 lb 6.4 oz (73.7 kg)   SpO2 94%   BMI 27.88 kg/m²   General appearance: alert, appears stated age and cooperative  Skin: Skin color, texture, turgor normal. No rashes or lesions  HEENT: Head: Normocephalic, no lesions, without obvious abnormality. Eye: Normal external eye, conjunctiva, lids cornea, TYRESE. Ears: Normal TM's bilaterally. Normal auditory canals and external ears. Non-tender. Nose: Normal external nose, mucus membranes and septum. Pharynx: Upper and lower dentures are not in, oral mucosa moist, no oral lesions, posterior pharynx without erythema. Neck: no adenopathy, no carotid bruit and supple, symmetrical, trachea midline  Lungs: clear to auscultation bilaterally  Heart: regular rate and rhythm, S1, S2 normal and II/VI systolic murmur. Abdomen: soft, non-tender; bowel sounds normal; no masses,  no organomegaly  Extremities: extremities normal, atraumatic, no cyanosis or edema  Neurologic: Mental status: Alert, oriented, thought content appropriate    CBC:   Recent Labs     12/12/19  1346   WBC 9.7   HGB 16.3*        BMP:    Recent Labs     12/12/19  1346 12/12/19  1349     --    K 3.9  --    CL 98  --    CO2 20  --    BUN 19  --    CREATININE 1.39*  --    GLUCOSE 204* 177     Hepatic:   Recent Labs     12/12/19  1346   AST 34*   ALT 34*   BILITOT 0.79   ALKPHOS 110*     Troponin: No results for input(s): TROPONINI in the last 72 hours. BNP: No results for input(s): BNP in the last 72 hours. Lipids: No results for input(s): CHOL, HDL in the last 72 hours. Invalid input(s): LDLCALCU  ABGs: No results found for: PHART, PO2ART, SAB6JNL  INR: No results for input(s): INR in the last 72 hours. -----------------------------------------------------------------    EKG: Interpreted by me:  Atrial fib with RVR, nonspecific ST - T wave changes. Assessment and Plan   1. Atrial fibrillation with RVR - converted to normal sinus rhythm on Cardizem drip and a dose of Amiodarone.   Started on Oral Cardizem by Dr. Ibis Chaves. On Eliquis. 2. Intractable nausea / vomiting - Likely viral since her brother has the same symptoms. Improved with bowel rest, IV fluids, and IV Zofran. 3. Hyperthyroidism - on Levothyroxine after radioactive iodine treatment for graves. Last adjusted down to 100 mcg in September. 4. CKD - at baseline. 5. Hypercalcemia - may be secondary to dehydration. PTH level pending. 6. Mild elevation in LFT's.  7. DM II - stable. 8. H/O Asthma - stable. 9. Hyperlipidemia - controlled on Lipitor. 10.   HTN - stable. 11.   Vitamin D deficiency - on replacement. Plan:  1. Admitted to the ICU on Cardizem drip. 2.  Bowel rest with IV fluids. 3.  Consult to Dr. Ibis Chaves in Cardiology. 4.  IV Zofran PRN for nausea. 5.  Continue home medication. Levothyroxine held and will need to be adjusted down at discharge. 6.  FSBS with Humalog sliding scale. 7.  Repeat labs this am.  8.  Continue Eliquis for DVT prophylaxis. 9.  Full code. Denita Borja converted to NSR yesterday afternoon after receiving Amiodarone. Her nausea has resolved. Will move to step down with a clear liquid diet and saline lock the IV fluids. Ambulate in borrego this morning. Consider discharge this am if she tolerates a diet. Medical Necessity: In patient is appropriate for this patient secondary to the need for IV Cardizem, IV fluids with bowel rest, IV Zofran, and close monitoring of her condition. Estimated length of stay 2 days but patient has responded faster that what was expected and may be discharged today. The beneficiary may reasonably be expected to be discharged or transferred to a hospital within 96 hours after admission.       Patient Active Problem List   Diagnosis Code    Cervical neck pain with evidence of disc disease M50.90    Paroxysmal atrial fibrillation (HCC) I48.0    Graves' disease E05.00    Asthma with COPD ([facility] [address] J44.9    Essential hypertension I10    Type 2 diabetes

## 2019-12-13 NOTE — PROGRESS NOTES
Discharge instructions given to patient. Verbalizes understanding on all instructions including medications, follow up appointments, diet, activity, and reasons to call the doctor or return to the ED. IV x 2 discontinued with catheter intact, band aid applied. Pt states her brother will be here soon to pick her up. All personal belongings returned to patient and packed in belonging back for patient.
Dr. Chela Guardado called and message left concerning patient.
Dr. Jaida Sanders calls back and Cardizem gtt stopped at this time.
Patient consumes 650ml of clear liquids for breakfast. No complaints voiced. Denies nausea.
Pt arrives to floor from ER. Pt is awake, alert and oriented x 3. Pt is able to transfer self to bed. Pt becomes dyspnic with minimal exertion. Pt states that she can not tell she is in atrial fib with RVR. Pt connected to monitor, BP, and SPO2. Pt remains in atrial fib with rate from . Heart rate also increases with exertion. Pt states that she became nauseated yesterday without accompanying fever. Denies diarrhea. States that she took her medications this morning but did vomit them up. Pt relates chronic pain in her back and left hip that she takes neurotin for. Pt denies any dyspnea, chest pain/pressure or discomfort. IV cardizem moved to right antecub IV site. Admission completed per patient recall. Pt becomes tearful when talking about family and heart rate increases. Pt educated on use of call light, bed mechanics and plan of care with verbalized understanding.
Pt remains in atrial fib with heart rate from . Cardizem drip increased to 7.5 mg/hr. Both IV infusing through right antecub site. Pt relates of chronic pain and asks for neurotin. Pt updated on dose to be given at 2100 tonight, pt states that she is ok with waiting for dose. Pt relates of chronic pain in back and left hip. Call light in reach.
Quality flow rounds held on 12/13/19     Jaren Larson is admitted for  Atrial fibrillation, nausea / vomitting    Length of stay 1. Education:    Needed Education: diet, meds, follow up, disease process      Do you have any questions regarding your plan of care while at the hospital? denies    Planned Disposition:               [x]  Home when able                [] Swing Bed                [] ECF/SNF               [] Other/TBD    Barriers to Discharge:    Can you afford your medications? yes   Do you have transportation to follow up appointments? Ankit Hendricks provides or her brother   Do you need any new equipment at home? none   Current equipment includes   nebulizer, inhalers    Do you have a living will or durable power of  for healthcare? no               If yes do we have a copy on file? n/a    Do you or your family have any questions or concerns we haven't already discussed? John Mcneil and writer present for rounding.
115 (H) 12/13/2019    CALCIUM 9.6 12/13/2019    PROT 7.0 12/13/2019    LABALBU 4.0 12/13/2019    BILITOT 0.57 12/13/2019    ALKPHOS 78 12/13/2019    AST 24 12/13/2019    ALT 22 12/13/2019    LABGLOM 46 (L) 12/13/2019    GFRAA 56 (L) 12/13/2019    GLOB NOT REPORTED 02/24/2016     Lab Results   Component Value Date    LABA1C 6.5 (H) 12/12/2019     Lab Results   Component Value Date     12/12/2019     Nutrition Interventions:   Modify current diet(advance diet as tolerated)  Continued Inpatient Monitoring, Coordination of Care, Education not appropriate at this time    Nutrition Evaluation:   · Evaluation: Goals set   · Goals: PO >75% meals     · Monitoring: Patient/Family Education, Pertinent Labs, Weight, I&O, Meal Intake    Electronically signed by Jordan Gardner RD, LD on 12/13/19 at 11:47 AM    Contact Number: [phone]

## 2019-12-13 NOTE — PLAN OF CARE
Problem: Daily Care:  Goal: Daily care needs are met  Description  Daily care needs are met  Outcome: Ongoing     Problem: Skin Integrity:  Goal: Skin integrity will stabilize  Description  Skin integrity will stabilize  Outcome: Ongoing     Problem: Discharge Planning:  Goal: Patients continuum of care needs are met  Description  Patients continuum of care needs are met  Outcome: Ongoing

## 2019-12-16 ENCOUNTER — TELEPHONE (OUTPATIENT)
Dept: FAMILY MEDICINE CLINIC | Age: 64
End: 2019-12-16

## 2019-12-17 ENCOUNTER — OFFICE VISIT (OUTPATIENT)
Dept: OBGYN CLINIC | Age: 64
End: 2019-12-17
Payer: COMMERCIAL

## 2019-12-17 ENCOUNTER — HOSPITAL ENCOUNTER (OUTPATIENT)
Age: 64
Setting detail: SPECIMEN
Discharge: HOME | End: 2019-12-17
Payer: COMMERCIAL

## 2019-12-17 VITALS
BODY MASS INDEX: 28.85 KG/M2 | SYSTOLIC BLOOD PRESSURE: 103 MMHG | HEIGHT: 64 IN | WEIGHT: 169 LBS | DIASTOLIC BLOOD PRESSURE: 71 MMHG

## 2019-12-17 DIAGNOSIS — Z12.39 SCREENING FOR BREAST CANCER: ICD-10-CM

## 2019-12-17 DIAGNOSIS — Z01.419 WOMEN'S ANNUAL ROUTINE GYNECOLOGICAL EXAMINATION: Primary | ICD-10-CM

## 2019-12-17 DIAGNOSIS — Z01.419 WOMEN'S ANNUAL ROUTINE GYNECOLOGICAL EXAMINATION: ICD-10-CM

## 2019-12-17 DIAGNOSIS — N32.89 UNSTABLE BLADDER: ICD-10-CM

## 2019-12-17 PROCEDURE — G8482 FLU IMMUNIZE ORDER/ADMIN: HCPCS | Performed by: OBSTETRICS & GYNECOLOGY

## 2019-12-17 PROCEDURE — 99396 PREV VISIT EST AGE 40-64: CPT | Performed by: OBSTETRICS & GYNECOLOGY

## 2019-12-17 PROCEDURE — G0145 SCR C/V CYTO,THINLAYER,RESCR: HCPCS

## 2019-12-17 RX ORDER — OXYBUTYNIN CHLORIDE 15 MG/1
15 TABLET, EXTENDED RELEASE ORAL DAILY
Qty: 30 TABLET | Refills: 12 | Status: SHIPPED | OUTPATIENT
Start: 2019-12-17 | End: 2020-12-17

## 2019-12-20 ENCOUNTER — OFFICE VISIT (OUTPATIENT)
Dept: FAMILY MEDICINE CLINIC | Age: 64
End: 2019-12-20
Payer: COMMERCIAL

## 2019-12-20 VITALS
DIASTOLIC BLOOD PRESSURE: 82 MMHG | WEIGHT: 174 LBS | HEART RATE: 78 BPM | HEIGHT: 64 IN | SYSTOLIC BLOOD PRESSURE: 129 MMHG | BODY MASS INDEX: 29.71 KG/M2 | OXYGEN SATURATION: 96 %

## 2019-12-20 DIAGNOSIS — M50.90 CERVICAL NECK PAIN WITH EVIDENCE OF DISC DISEASE: ICD-10-CM

## 2019-12-20 DIAGNOSIS — E83.52 HYPERCALCEMIA: ICD-10-CM

## 2019-12-20 DIAGNOSIS — G89.29 CHRONIC BILATERAL LOW BACK PAIN WITH BILATERAL SCIATICA: ICD-10-CM

## 2019-12-20 DIAGNOSIS — M54.41 CHRONIC BILATERAL LOW BACK PAIN WITH BILATERAL SCIATICA: ICD-10-CM

## 2019-12-20 DIAGNOSIS — I48.0 PAROXYSMAL ATRIAL FIBRILLATION (HCC): Primary | ICD-10-CM

## 2019-12-20 DIAGNOSIS — M54.42 CHRONIC BILATERAL LOW BACK PAIN WITH BILATERAL SCIATICA: ICD-10-CM

## 2019-12-20 DIAGNOSIS — R11.2 INTRACTABLE NAUSEA AND VOMITING: ICD-10-CM

## 2019-12-20 PROCEDURE — 1111F DSCHRG MED/CURRENT MED MERGE: CPT | Performed by: INTERNAL MEDICINE

## 2019-12-20 PROCEDURE — 99214 OFFICE O/P EST MOD 30 MIN: CPT | Performed by: INTERNAL MEDICINE

## 2019-12-20 RX ORDER — LIDOCAINE 4 G/G
1 PATCH TOPICAL DAILY
Qty: 30 PATCH | Refills: 0 | Status: SHIPPED | OUTPATIENT
Start: 2019-12-20 | End: 2020-01-19

## 2019-12-30 LAB — CYTOLOGY REPORT: NORMAL

## 2020-01-02 ENCOUNTER — HOSPITAL ENCOUNTER (OUTPATIENT)
Dept: PHYSICAL THERAPY | Age: 65
Setting detail: THERAPIES SERIES
Discharge: HOME | End: 2020-01-02
Payer: COMMERCIAL

## 2020-01-02 PROCEDURE — 97162 PT EVAL MOD COMPLEX 30 MIN: CPT

## 2020-01-02 PROCEDURE — 97140 MANUAL THERAPY 1/> REGIONS: CPT

## 2020-01-02 ASSESSMENT — PAIN SCALES - GENERAL: PAINLEVEL_OUTOF10: 9

## 2020-01-02 ASSESSMENT — PAIN DESCRIPTION - LOCATION: LOCATION: BACK;NECK

## 2020-01-02 ASSESSMENT — PAIN DESCRIPTION - ORIENTATION: ORIENTATION: RIGHT;LEFT

## 2020-01-02 NOTE — PROGRESS NOTES
Phone: [phone]         Fax: [phone]                      Outpatient Physical Therapy                                                                      Evaluation    Date: 2020  Patient: Chaya Kearns  : 1955  ACCT #: [de-identified]    Referring Practitioner: Dr. Donald Ji    Referral Date : 19    Diagnosis: Cervical Neck Pain with DDD, chronic bilateral low back pain with sciatica    Treatment Diagnosis: neck pain, back pain  Onset Date: 19(Referral)  PT Insurance Information: [facility]  Total # of Visits Approved: 12 Per Physician Order  Total # of Visits to Date: 1     Subjective     Additional Pertinent Hx: Patient reports neck pain 15-20 years with gradually worsening of symptoms. Pain Bilateral neck from base of skull and radites to shlds and mid back. Also pain low back radiating into B hips. Slipped and fell about one year ago which aggrivated low back pain. Pain 8-9/10 in neck and back. Meds- Cyclobenzabrine and gabapentine. Hx- injections in neck several years ago,  diabetes, HBP, asthma/ bronchitis with inhaler, tracheotomy for emmergency air way opening. Xrays- DDD and DJD in spine. Early custodial due to neck / back pain.     Pain Screening  Patient Currently in Pain: Yes  Pain Assessment  Pain Assessment: 0-10  Pain Level: 9  Pain Location: Back, Neck  Pain Orientation: Right, Left  Social/Functional History  Occupation: Retired       Objective        Spine  Cervical: rotation: left 60, right 45 degrees  Lumbar: forward flexion WFL, and B rotation 75% WFL  Joint Mobility  Spine: tightness upper thoracic area  Strength RUE  Strength RUE: WFL  Strength LUE  Strength LUE: WFL    AROM RUE (degrees)  RUE AROM : WFL  AROM LUE (degrees)  LUE AROM : WFL                             Ambulation 1  Quality of Gait: WFL             Assessment  Body structures, Functions, Activity limitations: Decreased ADL status, Decreased ROM, Increased pain, Decreased posture  Assessment: Chronic neck and back pain from DDD and stenosis. Manual therapy per Doc Flow. Educated patient on and issued HEP handout. Prognosis: Fair  Decision Making: Medium Complexity  History: as above  Exam: Neck Disability 18/50; Oswestry 13/50    Clinical Presentation:  Evolving  The Following Comorbities will impact the patients progression and Plan of Care:   Diabetes, Obesity and Previous Orthopedic Injury/Surgery          Education: On POC and HEP handout          Goals  Short term goals  Time Frame for Short term goals: 8  Short term goal 1: Patient to be independent with HEP  Short term goal 2:  Increase cervical ROM R rotation 60 degrees  Short term goal 3: Decrease pain neck and back 6/10 at worst x 3 days    Long term goals  Time Frame for Long term goals : 12  Long term goal 1: Decrease pain neck and back 4/10 at worst x 3 days  Long term goal 2: Improve functional activities with Neck Disability score < 12/50  Long term goal 3: Improve functional mobility with Oswestry score < 10/50    Patient's Goal:    Decrease neck and back pain     Timed Code Treatment Minutes: 20 Minutes  Total Treatment Time: 50     Time In: 13:50  Time Out: 14:40    Latonia Rush, PT Date: 1/2/2020

## 2020-01-02 NOTE — PLAN OF CARE
[facility] KELSEY NAM       Phone: [phone]   Date: 2020                      Outpatient Physical Therapy  Fax: [phone]    ACCT #: [de-identified]                     Plan of Care  [facility]#: [id number]  Patient: Toi Bahena  : 1955    Referring Practitioner: Dr. Aubree Foster    Referral Date : 19    Diagnosis: Cervical Neck Pain with DDD, chronic bilateral low back pain with sciatica  Onset Date: 19(Referral)  Treatment Diagnosis: neck pain, back pain    Assessment  Body structures, Functions, Activity limitations: Decreased ADL status, Decreased ROM, Increased pain, Decreased posture  Assessment: Chronic neck and back pain from DDD and stenosis. Manual therapy per Doc Flow. Educated patient on and issued HEP handout. Prognosis: Fair    Treatment Plan   Days: 2 times per week Weeks: 6 weeks Total # of Visits Approved: 12    Patient Education/HEP, Back Education, Therapeutic Exercise, Manual Therapy: Myofacial Release/Cupping and Manual Therapy: Mobilization/Manipulation     Goals  Short term goals  Time Frame for Short term goals: 8  Short term goal 1: Patient to be independent with HEP  Short term goal 2: Increase cervical ROM R rotation 60 degrees  Short term goal 3: Decrease pain neck and back 6/10 at worst x 3 days  Long term goals  Time Frame for Long term goals : 12  Long term goal 1: Decrease pain neck and back 4/10 at worst x 3 days  Long term goal 2: Improve functional activities with Neck Disability score < 12/50  Long term goal 3: Improve functional mobility with Oswestry score < 10/50     Rony Nicholas, PT   Date: 2020    ______________________________________ Date: 2020  Physician Signature  By signing above or cosigning electronically, I have reviewed this Plan of Care and certify a need for medically necessary rehabilitation services.

## 2020-01-06 ENCOUNTER — CARE COORDINATION (OUTPATIENT)
Dept: CARE COORDINATION | Age: 65
End: 2020-01-06

## 2020-01-09 ENCOUNTER — HOSPITAL ENCOUNTER (OUTPATIENT)
Dept: PHYSICAL THERAPY | Age: 65
Setting detail: THERAPIES SERIES
Discharge: HOME | End: 2020-01-09
Payer: COMMERCIAL

## 2020-01-09 PROCEDURE — 97140 MANUAL THERAPY 1/> REGIONS: CPT

## 2020-01-09 PROCEDURE — 97110 THERAPEUTIC EXERCISES: CPT

## 2020-01-09 ASSESSMENT — PAIN SCALES - GENERAL: PAINLEVEL_OUTOF10: 8

## 2020-01-09 NOTE — PROGRESS NOTES
Phone: [address]      Fax: [phone]                            Outpatient Physical Therapy                                                                            Daily Note    Date: 2020  Patient Name: Blossom Cartwright        MRN: [id number]   ACCT#:  [[id number]]  : 1955  (62 y.o.)    Referring Practitioner: Dr. Dakota Rivers    Referral Date : 19    Diagnosis: Cervical Neck Pain with DDD, chronic bilateral low back pain with sciatica  Treatment Diagnosis: neck pain, back pain    Onset Date: 19(Referral)  PT Insurance Information: [facility]  Total # of Visits Approved: 12 Per Physician Order  Total # of Visits to Date: 2  Plan of Care/Certification Expiration Date: 20    Pre-Treatment Pain:  8/10     Assessment  Assessment: Patient reports neck and LBP is a an 8/10 this afternoon. She also reports increased stiffness in neck and low back. Initiated exercises and manual as outlined. Patient reports completing HEP with good understanding. Following manual patient reports pain decreased to a 7/10. Chart Reviewed: Yes    Plan  Plan: Continue with current plan    Exercises/Modalities/Manual:  See DocFlow Sheet    Education:           Goals  (Total # of Visits to Date: 2)   Short Term Goals - Time Frame for Short term goals: 8  Short term goal 1: Patient to be independent with HEP  Short term goal 2:  Increase cervical ROM R rotation 60 degrees  Short term goal 3: Decrease pain neck and back 6/10 at worst x 3 days          Long Term Goals - Time Frame for Long term goals : 12  Long term goal 1: Decrease pain neck and back 4/10 at worst x 3 days  Long term goal 2: Improve functional activities with Neck Disability score < 12/50  Long term goal 3: Improve functional mobility with Oswestry score < 10/50          Post Treatment Pain:  7/10    Time In: 1350    Time Out : 1425   Timed Code Treatment Minutes: 35 Minutes  Total Treatment Time: 35 Minutes    Nancy Contreras, [place]     Date: 1/9/2020

## 2020-01-14 ENCOUNTER — OFFICE VISIT (OUTPATIENT)
Dept: FAMILY MEDICINE CLINIC | Age: 65
End: 2020-01-14
Payer: COMMERCIAL

## 2020-01-14 VITALS
HEART RATE: 77 BPM | DIASTOLIC BLOOD PRESSURE: 72 MMHG | WEIGHT: 179 LBS | OXYGEN SATURATION: 97 % | SYSTOLIC BLOOD PRESSURE: 120 MMHG | BODY MASS INDEX: 31.21 KG/M2

## 2020-01-14 PROBLEM — I48.91 ATRIAL FIBRILLATION WITH RVR (HCC): Status: RESOLVED | Noted: 2019-12-12 | Resolved: 2020-01-14

## 2020-01-14 PROCEDURE — 2022F DILAT RTA XM EVC RTNOPTHY: CPT | Performed by: INTERNAL MEDICINE

## 2020-01-14 PROCEDURE — G8427 DOCREV CUR MEDS BY ELIG CLIN: HCPCS | Performed by: INTERNAL MEDICINE

## 2020-01-14 PROCEDURE — 3017F COLORECTAL CA SCREEN DOC REV: CPT | Performed by: INTERNAL MEDICINE

## 2020-01-14 PROCEDURE — G8482 FLU IMMUNIZE ORDER/ADMIN: HCPCS | Performed by: INTERNAL MEDICINE

## 2020-01-14 PROCEDURE — 3046F HEMOGLOBIN A1C LEVEL >9.0%: CPT | Performed by: INTERNAL MEDICINE

## 2020-01-14 PROCEDURE — G8417 CALC BMI ABV UP PARAM F/U: HCPCS | Performed by: INTERNAL MEDICINE

## 2020-01-14 PROCEDURE — 99214 OFFICE O/P EST MOD 30 MIN: CPT | Performed by: INTERNAL MEDICINE

## 2020-01-14 PROCEDURE — 1036F TOBACCO NON-USER: CPT | Performed by: INTERNAL MEDICINE

## 2020-01-14 RX ORDER — AMLODIPINE BESYLATE 10 MG/1
TABLET ORAL
COMMUNITY
Start: 2019-12-26 | End: 2020-01-14 | Stop reason: SDUPTHER

## 2020-01-14 RX ORDER — POTASSIUM CHLORIDE 20 MEQ/1
20 TABLET, EXTENDED RELEASE ORAL DAILY
Qty: 90 TABLET | Refills: 2 | Status: SHIPPED | OUTPATIENT
Start: 2020-01-14 | End: 2020-09-17 | Stop reason: SDUPTHER

## 2020-01-14 ASSESSMENT — PATIENT HEALTH QUESTIONNAIRE - PHQ9
SUM OF ALL RESPONSES TO PHQ QUESTIONS 1-9: 0
2. FEELING DOWN, DEPRESSED OR HOPELESS: 0
SUM OF ALL RESPONSES TO PHQ9 QUESTIONS 1 & 2: 0
1. LITTLE INTEREST OR PLEASURE IN DOING THINGS: 0
SUM OF ALL RESPONSES TO PHQ QUESTIONS 1-9: 0

## 2020-01-14 ASSESSMENT — ENCOUNTER SYMPTOMS
VOMITING: 0
BACK PAIN: 1
SORE THROAT: 0
ABDOMINAL PAIN: 0
NAUSEA: 0
SHORTNESS OF BREATH: 0
COUGH: 0

## 2020-01-14 NOTE — PROGRESS NOTES
HPI Notes    Name: Anil Gavin  : 1955         Chief Complaint:     Chief Complaint   Patient presents with    Hypertension    Hyperlipidemia    Diabetes     Last A1C was 6.5 on 19, has eye exam scheduled in Feb, in [place]    Arm Pain     Right arm pain, forearm feels like its bruised. History of Present Illness:        Mrs. Monika Ramirez presents to office to follow up for DM, HTN, Hyperlipidemia. Has a history of paroxysmal atrial fibrillation, anticoagulated with Eliquis. Reports no bleeding sources       She also  has a history of hypercalcemia with calcium level 12.4 on 2019 during her hospitalization at [facility] for A. fib with RVR. PTH level was checked and came back elevated at 81.4. She follows up with endocrinologist Dr. Usha Arreola for history of Graves' disease. Diabetes   She presents for her follow-up diabetic visit. She has type 2 diabetes mellitus. Her disease course has been stable. There are no hypoglycemic associated symptoms. Pertinent negatives for hypoglycemia include no dizziness, headaches, nervousness/anxiousness or tremors. Pertinent negatives for diabetes include no chest pain. Diabetic complications include nephropathy. Pertinent negatives for diabetic complications include no CVA or heart disease. Risk factors for coronary artery disease include diabetes mellitus, dyslipidemia, obesity, tobacco exposure and hypertension. Current diabetic treatment includes insulin injections (trulicity). She is compliant with treatment all of the time. She is following a generally healthy diet. Her overall blood glucose range is 140-180 mg/dl. An ACE inhibitor/angiotensin II receptor blocker is being taken. She does not see a podiatrist.Eye exam is current. Hypertension   This is a chronic problem. The current episode started more than 1 year ago. The problem is controlled. Associated symptoms include neck pain.  Pertinent negatives include no chest pain, ONCE DAILY 4/23/19  Yes Nick Hall MD   FEROSUL 325 (65 Fe) MG tablet TAKE (1) TABLET BY MOUTH DAILY 4/23/19  Yes Nick Hall MD   apixaban (ELIQUIS) 5 MG TABS tablet Take 1 tablet by mouth 2 times daily 4/23/19  Yes Nick Hall MD   Cholecalciferol (VITAMIN D3) 1000 units TABS TAKE 1 TABLET BY MOUTH DAILY 4/11/19  Yes Nick Hall MD   Omega-3 Fatty Acids (FISH OIL) 1000 MG CAPS TAKE 2 CAPSULES BY MOUTH DAILY 4/11/19  Yes Nick Hall MD   TRULICITY 6.46 MC/3.8JA SOPN Inject 0.75 mg into the skin once a week 0.75mg/0.5ml 1/15/19  Yes Historical Provider, MD   Insulin Glargine ([facility]) Inject 25 Units into the skin nightly Per Dr. Elena Rdz   Yes Historical Provider, MD   FREESTYLE LANCETS MISC TEST BLOOD SUGAR THREE TIMES A DAY 8/28/18  Yes Nick Hall MD   albuterol (PROVENTIL) (2.5 MG/3ML) 0.083% nebulizer solution Take 2.5 mg by nebulization 4 times daily   Yes Historical Provider, MD   Incontinence Supply Disposable (POISE PAD) PADS Apply 1 Piece topically as needed (incontinence) 11/21/17  Yes Adrienne Dodd MD   Blood Glucose Monitoring Suppl SOUTH 1 each by Does not apply route 3 times daily 6/6/17  Yes Félix Hernandez MD   Alcohol Swabs (EASY TOUCH ALCOHOL PREP MEDIUM) 70 % PADS USE FOUR TIMES DAILY 3/29/17  Yes Félix Hernandez MD   FREESTYLE LITE strip TEST BLOOD SUGAR THREE TIMES A DAY 3/29/17  Yes Félix Hernandez MD   Blood Pressure Monitoring (B-D ASSURE BPM/AUTO ARM CUFF) MISC 1 Device by Does not apply route daily as needed (htn) 8/25/16  Yes Félix Hernandez MD   amLODIPine (NORVASC) 10 MG tablet  12/26/19   Historical Provider, MD   ondansetron (ZOFRAN) 4 MG tablet Take 1 tablet by mouth 3 times daily as needed for Nausea or Vomiting  Patient not taking: Reported on 12/17/2019 3/25/19   Nick Hall MD   Blood Glucose Monitoring Suppl SOUTH 1 Units by Does not apply route once for 1 dose PLease dispense a machine that is covered by her insurance DX E11.9 4/26/18 4/26/18  Vitaly Bynum MD        Allergies:       Norco [hydrocodone-acetaminophen]; Oxycodone; and Percocet [oxycodone-acetaminophen]    Social History:     Tobacco: reports that she quit smoking about 6 years ago. Her smoking use included cigarettes. She has a 20.00 pack-year smoking history. She has never used smokeless tobacco.  Alcohol:      reports no history of alcohol use. Drug Use:  reports no history of drug use. Family History:     Family History   Problem Relation Age of Onset    Heart Disease Mother     High Cholesterol Mother     No Known Problems Son        Review of Systems:         Review of Systems   Constitutional: Negative for activity change, appetite change, chills, fever and malaise/fatigue. HENT: Negative for congestion and sore throat. Respiratory: Negative for cough and shortness of breath. Cardiovascular: Negative for chest pain and palpitations. Gastrointestinal: Negative for abdominal pain, nausea and vomiting. Genitourinary: Negative for dysuria. Musculoskeletal: Positive for arthralgias, back pain and neck pain. Negative for myalgias. Skin: Negative for rash. Neurological: Negative for dizziness, tremors and headaches. Psychiatric/Behavioral: Negative for dysphoric mood and sleep disturbance. The patient is not nervous/anxious. Physical Exam:     Vitals:  /72 (Site: Left Upper Arm, Position: Sitting, Cuff Size: Medium Adult)   Pulse 77   Wt 179 lb (81.2 kg)   SpO2 97%   BMI 31.21 kg/m²       Physical Exam  Vitals signs reviewed. Constitutional:       General: She is not in acute distress. Appearance: She is well-developed. HENT:      Head: Normocephalic and atraumatic. Right Ear: Tympanic membrane, ear canal and external ear normal. There is no impacted cerumen. Left Ear: Tympanic membrane, ear canal and external ear normal. There is no impacted cerumen. Nose: No congestion or rhinorrhea. Mouth/Throat:      Pharynx: No posterior oropharyngeal erythema. Eyes:      General: No scleral icterus. Right eye: No discharge. Left eye: No discharge. Conjunctiva/sclera: Conjunctivae normal.   Neck:      Thyroid: No thyromegaly. Cardiovascular:      Rate and Rhythm: Normal rate and regular rhythm. Heart sounds: Murmur present. Pulmonary:      Effort: Pulmonary effort is normal.      Breath sounds: Normal breath sounds. No wheezing, rhonchi or rales. Abdominal:      General: Bowel sounds are normal. There is no distension. Palpations: Abdomen is soft. There is no mass. Tenderness: There is no tenderness. Musculoskeletal: Normal range of motion. Right lower leg: No edema. Left lower leg: No edema. Lymphadenopathy:      Cervical: No cervical adenopathy. Skin:     General: Skin is warm and dry. Coloration: Skin is not pale. Findings: No rash. Neurological:      General: No focal deficit present. Mental Status: She is alert and oriented to person, place, and time.    Psychiatric:         Behavior: Behavior normal.         Judgment: Judgment normal.               Data:     Lab Results   Component Value Date     12/13/2019    K 3.7 12/13/2019     12/13/2019    CO2 24 12/13/2019    BUN 15 12/13/2019    CREATININE 1.19 12/13/2019    GLUCOSE 115 12/13/2019    PROT 7.0 12/13/2019    LABALBU 4.0 12/13/2019    BILITOT 0.57 12/13/2019    ALKPHOS 78 12/13/2019    AST 24 12/13/2019    ALT 22 12/13/2019     Lab Results   Component Value Date    WBC 6.4 12/13/2019    RBC 3.91 12/13/2019    HGB 12.1 12/13/2019    HCT 35.8 12/13/2019    MCV 91.3 12/13/2019    MCH 31.0 12/13/2019    MCHC 34.0 12/13/2019    RDW 13.6 12/13/2019     12/13/2019    MPV NOT REPORTED 12/13/2019     Lab Results   Component Value Date    TSH <0.01 12/12/2019     Lab Results   Component Value Date    CHOL 150 12/09/2019    HDL 50 12/09/2019    LABA1C 6.5 12/12/2019 Assessment & Plan        Diagnosis Orders   1. Type 2 diabetes mellitus with diabetic autonomic neuropathy, with long-term current use of insulin ([address]   Diabetes is well controlled, continue on Basaglar 25 units and Trulicity. Reminded to follow-up with an eye doctor for yearly eye examination    2. Hypercalcemia   We will check calcium level for follow-up Calcium   3. Essential hypertension   blood pressure is stable on current  Regimen, continue same    4. Mixed hyperlipidemia   Lipid panel is acceptable, continue on Lipitor    5. Paroxysmal atrial fibrillation (HCC)   Heart rate is stable, on Eliquis for stroke prophylaxis                    Completed Refills   Requested Prescriptions     Signed Prescriptions Disp Refills    potassium chloride (KLOR-CON M) 20 MEQ extended release tablet 90 tablet 2     Sig: Take 1 tablet by mouth daily     No follow-ups on file. Orders Placed This Encounter   Medications    potassium chloride (KLOR-CON M) 20 MEQ extended release tablet     Sig: Take 1 tablet by mouth daily     Dispense:  90 tablet     Refill:  2     Orders Placed This Encounter   Procedures    Calcium     Standing Status:   Future     Standing Expiration Date:   1/14/2021         Patient Instructions   You may be receiving a survey from [employer] regarding your visit today. You may get this in the mail, through your MyChart or in your email. Please complete the survey to enable us to provide the highest quality of care to you and your family. If you cannot score us as very good ( 5 Stars) on any question, please feel free to call the office to discuss how we could have made your experience exceptional.     Thank You!       Dr. Angie Flores        Electronically signed by Nick Hall MD on 1/14/2020 at 1:48 PM           Completed Refills      Requested Prescriptions     Signed Prescriptions Disp Refills    potassium chloride (KLOR-CON M) 20 MEQ extended

## 2020-01-16 ENCOUNTER — HOSPITAL ENCOUNTER (OUTPATIENT)
Dept: PHYSICAL THERAPY | Age: 65
Setting detail: THERAPIES SERIES
Discharge: HOME | End: 2020-01-16
Payer: COMMERCIAL

## 2020-01-16 PROCEDURE — 97140 MANUAL THERAPY 1/> REGIONS: CPT

## 2020-01-16 PROCEDURE — 97110 THERAPEUTIC EXERCISES: CPT

## 2020-01-16 ASSESSMENT — PAIN DESCRIPTION - ORIENTATION: ORIENTATION: RIGHT;LEFT

## 2020-01-16 ASSESSMENT — PAIN DESCRIPTION - LOCATION: LOCATION: BACK

## 2020-01-16 ASSESSMENT — PAIN SCALES - GENERAL: PAINLEVEL_OUTOF10: 6

## 2020-01-16 NOTE — PROGRESS NOTES
Phone: Cory Chambers      Fax: [phone]                            Outpatient Physical Therapy                                                                            Daily Note    Date: 2020  Patient Name: Evelia Castrejon        MRN: [id number]   ACCT#:  [de-identified]  : 1955  (62 y.o.)    Referring Practitioner: Dr. Jonas Alberts    Referral Date : 19    Diagnosis: Cervical Neck Pain with DDD, chronic bilateral low back pain with sciatica  Treatment Diagnosis: neck pain, back pain    Onset Date: 19(Referral)  PT Insurance Information: [employer]  Total # of Visits Approved: 12 Per Physician Order  Total # of Visits to Date: 3  No Show: 1  Plan of Care/Certification Expiration Date: 20    Pre-Treatment Pain:  6/10     Assessment  Assessment: Reviewed HEP, patient independent. Patient report she is doing a little better with pain 6/10 neck and back. Patient going out of town for two weeks to visit family, place PT on hold. Chart Reviewed: Yes    Plan  Plan: Continue with current plan    Exercises/Modalities/Manual:  See DocFlow Sheet    Education:           Goals  (Total # of Visits to Date: 3)   Short Term Goals - Time Frame for Short term goals: 8  Short term goal 1: Patient to be independent with HEP-Met  Short term goal 2:  Increase cervical ROM R rotation 60 degrees  Short term goal 3: Decrease pain neck and back 6/10 at worst x 3 days          Long Term Goals - Time Frame for Long term goals : 12  Long term goal 1: Decrease pain neck and back 4/10 at worst x 3 days  Long term goal 2: Improve functional activities with Neck Disability score < 12/50  Long term goal 3: Improve functional mobility with Oswestry score < 10/50          Post Treatment Pain:  5/10    Time In: 14:55    Time Out : 15:30        Timed Code Treatment Minutes: 35 Minutes  Total Treatment Time: Sirena Rivera, PT     Date: 2020

## 2020-01-28 ENCOUNTER — TELEPHONE (OUTPATIENT)
Dept: FAMILY MEDICINE CLINIC | Age: 65
End: 2020-01-28

## 2020-02-10 ENCOUNTER — TELEPHONE (OUTPATIENT)
Dept: FAMILY MEDICINE CLINIC | Age: 65
End: 2020-02-10

## 2020-02-10 RX ORDER — DILTIAZEM HYDROCHLORIDE 120 MG/1
120 CAPSULE, COATED, EXTENDED RELEASE ORAL DAILY
Qty: 30 CAPSULE | Refills: 0 | Status: SHIPPED | OUTPATIENT
Start: 2020-02-10 | End: 2020-05-14

## 2020-02-10 RX ORDER — LEVOTHYROXINE SODIUM 88 UG/1
88 TABLET ORAL DAILY
Qty: 30 TABLET | Refills: 0 | Status: SHIPPED | OUTPATIENT
Start: 2020-02-10 | End: 2020-09-18 | Stop reason: SDUPTHER

## 2020-02-10 NOTE — TELEPHONE ENCOUNTER
Pt called in because she is down in [place] and has run out of her levothyroxine, does have a couple days of the diltiazem left, will not be able to get back until the 18th. Her brother already picked up the prescriptions for this month, she is wondering if she will be ok not to take these until she gets back,    8 days for the levothyroxine and about 5 for the diltiazem. Please advise. Health Maintenance   Topic Date Due    Hepatitis B vaccine (1 of 3 - Risk 3-dose series) 08/01/1975    Shingles Vaccine (1 of 2) 08/01/2006    Diabetic retinal exam  08/11/2019    Breast cancer screen  12/20/2019    Diabetic foot exam  07/11/2020    Diabetic microalbuminuria test  09/04/2020    Lipid screen  12/09/2020    A1C test (Diabetic or Prediabetic)  12/12/2020    TSH testing  12/12/2020    Potassium monitoring  12/13/2020    Creatinine monitoring  12/13/2020    Cervical cancer screen  12/17/2022    Colon cancer screen colonoscopy  04/23/2024    DTaP/Tdap/Td vaccine (2 - Td) 08/12/2026    Flu vaccine  Completed    Pneumococcal 0-64 years Vaccine  Completed    Hepatitis C screen  Addressed    HIV screen  Addressed    Hepatitis A vaccine  Aged Out    Hib vaccine  Aged Out    Meningococcal (ACWY) vaccine  Aged Out             (applicable per patient's age: Cancer Screenings, Depression Screening, Fall Risk Screening, Immunizations)    Hemoglobin A1C (%)   Date Value   12/12/2019 6.5 (H)   12/09/2019 6.4 (H)   09/04/2019 6.7 (H)     Microalb/Crt.  Ratio (mcg/mg creat)   Date Value   09/04/2019 CANNOT BE CALCULATED     LDL Cholesterol (mg/dL)   Date Value   12/09/2019 72     AST (U/L)   Date Value   12/13/2019 24     ALT (U/L)   Date Value   12/13/2019 22     BUN (mg/dL)   Date Value   12/13/2019 15      (goal A1C is < 7)   (goal LDL is <100) need 30-50% reduction from baseline     BP Readings from Last 3 Encounters:   01/14/20 120/72   12/20/19 129/82   12/17/19 103/71    (goal /80)      All Future Testing planned in [facility]:  Lab Frequency Next Occurrence   XR CHEST STANDARD (2 VW) Once 12/09/2020   CHANEL profile Once 12/12/2019   Electrophoresis Protein, Serum without Reflex to Immunofixation Once 12/12/2019   Immunofixation urine random profile Once 12/12/2019   Calcium Once 04/10/2020   MICHAEL DIGITAL SCREEN W CAD BILATERAL Once 01/14/2020       Next Visit Date:  Future Appointments   Date Time Provider [facility] [place]   3/16/2020  9:30 AM [id number] [facility]   3/16/2020 10:00 AM [address] [facility]   4/14/2020 12:50 PM [id number] [facility]   4/14/2020  1:45 PM Wil Huber MD [id number]   12/3/2020  9:30 AM [facility]            Patient Active Problem List:     Cervical neck pain with evidence of disc disease     Paroxysmal atrial fibrillation ([address]     Graves' disease     Asthma with COPD ([address]     Essential hypertension     Type 2 diabetes mellitus without complication, with long-term current use of insulin ([facility])     Iron deficiency anemia     Electromechanical dissociation (EMD) ([address]     Irritable bowel syndrome with both constipation and diarrhea     Mixed hyperlipidemia

## 2020-02-20 ENCOUNTER — HOSPITAL ENCOUNTER (OUTPATIENT)
Dept: GENERAL RADIOLOGY | Age: 65
Discharge: HOME | End: 2020-02-22
Payer: COMMERCIAL

## 2020-02-20 ENCOUNTER — OFFICE VISIT (OUTPATIENT)
Dept: FAMILY MEDICINE CLINIC | Age: 65
End: 2020-02-20
Payer: COMMERCIAL

## 2020-02-20 ENCOUNTER — HOSPITAL ENCOUNTER (OUTPATIENT)
Age: 65
Discharge: HOME | End: 2020-02-22
Payer: COMMERCIAL

## 2020-02-20 VITALS
OXYGEN SATURATION: 98 % | WEIGHT: 172 LBS | BODY MASS INDEX: 29.99 KG/M2 | HEART RATE: 72 BPM | SYSTOLIC BLOOD PRESSURE: 128 MMHG | DIASTOLIC BLOOD PRESSURE: 80 MMHG

## 2020-02-20 PROCEDURE — G8427 DOCREV CUR MEDS BY ELIG CLIN: HCPCS | Performed by: INTERNAL MEDICINE

## 2020-02-20 PROCEDURE — 1036F TOBACCO NON-USER: CPT | Performed by: INTERNAL MEDICINE

## 2020-02-20 PROCEDURE — 3017F COLORECTAL CA SCREEN DOC REV: CPT | Performed by: INTERNAL MEDICINE

## 2020-02-20 PROCEDURE — G8417 CALC BMI ABV UP PARAM F/U: HCPCS | Performed by: INTERNAL MEDICINE

## 2020-02-20 PROCEDURE — G8482 FLU IMMUNIZE ORDER/ADMIN: HCPCS | Performed by: INTERNAL MEDICINE

## 2020-02-20 PROCEDURE — 74018 RADEX ABDOMEN 1 VIEW: CPT

## 2020-02-20 PROCEDURE — 99213 OFFICE O/P EST LOW 20 MIN: CPT | Performed by: INTERNAL MEDICINE

## 2020-02-20 RX ORDER — GABAPENTIN 800 MG/1
TABLET ORAL
COMMUNITY
Start: 2020-01-27 | End: 2020-02-20 | Stop reason: SDUPTHER

## 2020-02-20 RX ORDER — POLYETHYLENE GLYCOL 3350 17 G/17G
17 POWDER, FOR SOLUTION ORAL DAILY
Qty: 1530 G | Refills: 1 | Status: SHIPPED | OUTPATIENT
Start: 2020-02-20 | End: 2020-03-21

## 2020-02-20 RX ORDER — GREEN TEA/HOODIA GORDONII 315-12.5MG
1 CAPSULE ORAL DAILY
Qty: 30 TABLET | Refills: 0 | Status: SHIPPED | OUTPATIENT
Start: 2020-02-20 | End: 2020-03-21

## 2020-02-20 RX ORDER — AMLODIPINE BESYLATE 10 MG/1
10 TABLET ORAL DAILY
COMMUNITY
Start: 2020-01-27 | End: 2020-08-24 | Stop reason: ALTCHOICE

## 2020-02-20 ASSESSMENT — ENCOUNTER SYMPTOMS
CONSTIPATION: 1
COUGH: 0
BLOATING: 1
SORE THROAT: 0
SHORTNESS OF BREATH: 0
ABDOMINAL PAIN: 1
RECTAL PAIN: 0
NAUSEA: 0

## 2020-02-20 NOTE — PROGRESS NOTES
HPI Notes    Name: Cassie Donohue  : 1955         Chief Complaint:     Chief Complaint   Patient presents with    Constipation     Has been having issues with contipation/stomach pain for the last week. states when she does go it is hard and not a lot. would like to see about maybe going onto align.  Health Maintenance     is due in april for eye exam at [facility] in [place]. History of Present Illness:        Mrs. Errol Pichardo presents to office for evaluation of constipation and lower abdominal discomfort for the past one week. States stools are hard. Last BM was this morning. States it was a small amount of hard stools. No nausea or vomiting. States last colonoscopy was done in 2014. Unremarkable. Constipation   This is a new problem. The current episode started in the past 7 days. The problem is unchanged. Her stool frequency is 1 time per week or less. The stool is described as firm and formed. The patient is on a high fiber diet. She does not exercise regularly. There has been adequate water intake. Associated symptoms include abdominal pain and bloating. Pertinent negatives include no fever, nausea or rectal pain. She has tried diet changes for the symptoms. The treatment provided no relief.            Past Medical History:     Past Medical History:   Diagnosis Date    Asthma     Atrial fibrillation ([address]     COPD (chronic obstructive pulmonary disease) ([address]     DDD (degenerative disc disease)     Diabetes mellitus ([address]     Hyperlipidemia     Hypertension     Hyperthyroidism     Type II or unspecified type diabetes mellitus without mention of complication, not stated as uncontrolled       Reviewed all health maintenance requirements and orderedappropriate tests  Health Maintenance Due   Topic Date Due    Hepatitis B vaccine (1 of 3 - Risk 3-dose series) 1975    Shingles Vaccine (1 of 2) 2006    Diabetic retinal exam  2019    Breast cancer screen 12/20/2019       Past Surgical History:     Past Surgical History:   Procedure Laterality Date    BACK SURGERY      COLONOSCOPY  04/23/14   [place] DENTAL SURGERY N/A 3/8/2017    REMOVAL OF BILATERAL MANDIBULAR LELO AND MAXILLARY EDENTULOUS ALVEOPLASTY performed by Jesse Stephens DDS at [facility]      removal    GASTROSTOMY TUBE PLACEMENT      TONSILLECTOMY      TRACHEOSTOMY      TRACHEOSTOMY CLOSURE      TUBAL LIGATION      UPPER GASTROINTESTINAL ENDOSCOPY  3/16/2016    Peg tube insertion        Medications:       Prior to Admission medications    Medication Sig Start Date End Date Taking?  Authorizing Provider   amLODIPine (NORVASC) 10 MG tablet  1/27/20  Yes Historical Provider, MD   Lactobacillus (PROBIOTIC ACIDOPHILUS) TABS Take 1 tablet by mouth daily 2/20/20 3/21/20 Yes Pk Hammond MD   magnesium citrate solution Take 296 mLs by mouth once for 1 dose 2/20/20 2/20/20 Yes Pk Hammond MD   polyethylene glycol (GLYCOLAX) powder Take 17 g by mouth daily 2/20/20 3/21/20 Yes Pk Hammond MD   diltiazem (CARDIZEM CD) 120 MG extended release capsule Take 1 capsule by mouth daily 2/10/20  Yes Pk Hammond MD   levothyroxine (SYNTHROID) 88 MCG tablet Take 1 tablet by mouth daily 2/10/20  Yes Pk Hammond MD   insulin glargine (LANTUS SOLOSTAR) 100 UNIT/ML injection pen Inject 25 Units into the skin nightly 1/29/20  Yes Pk Hammond MD   Insulin Pen Needle (TRUEPLUS PEN NEEDLES) 31G X 5 MM MISC USE AS DIRECTED DAILY 1/28/20  Yes Pk Hammond MD   potassium chloride (KLOR-CON M) 20 MEQ extended release tablet Take 1 tablet by mouth daily 1/14/20  Yes Pk Hammond MD   oxybutynin (DITROPAN XL) 15 MG extended release tablet Take 1 tablet by mouth daily 12/17/19  Yes Bridgette Johnson MD   loratadine (CLARITIN) 10 MG tablet TAKE 1 TABLET BY MOUTH EVERY DAY 10/17/19  Yes Pk Hammond MD   atorvastatin (LIPITOR) 40 MG tablet TAKE 1 TABLET BY MOUTH ONCE DAILY 10/14/19  Yes Vitaly Bynum MD   gabapentin (NEURONTIN) 800 MG tablet TAKE ONE (1) TABLET BY MOUTH TWICE DAILY 10/14/19 2/20/20 Yes Vitaly Bynum MD   hydrochlorothiazide (HYDRODIURIL) 25 MG tablet Take 1 tablet by mouth every morning 10/14/19  Yes Vitaly Bynum MD   metoprolol tartrate (LOPRESSOR) 25 MG tablet TAKE 1 TABLET BY MOUTH 2 TIMES DAILY 9/19/19  Yes Vitaly Bynum MD   albuterol sulfate  (90 Base) MCG/ACT inhaler INHALE 2 PUFFS BY MOUTH EVERY 6 HOURS AS NEEDED FOR WHEEZING 9/19/19  Yes Vitaly Bynum MD   lisinopril (PRINIVIL;ZESTRIL) 5 MG tablet TAKE 1 TABLET BY MOUTH EVERY DAY 8/25/19  Yes Vitaly Bynum MD   cyclobenzaprine (FLEXERIL) 5 MG tablet TAKE ONE (1) TABLET BY MOUTH THREE TIMES DAILY AS NEEDED FOR MUSCLE SPASMS 8/1/19  Yes Vitaly Bynum MD   Multiple Vitamin (MULTI-VITAMINS) TABS TAKE ONE (1) TABLET BY MOUTH ONCE DAILY 4/23/19  Yes Vitaly Bynum MD   FEROSUL 325 (65 Fe) MG tablet TAKE (1) TABLET BY MOUTH DAILY 4/23/19  Yes Vitaly Bynum MD   apixaban (ELIQUIS) 5 MG TABS tablet Take 1 tablet by mouth 2 times daily 4/23/19  Yes Vitaly Bynum MD   Cholecalciferol (VITAMIN D3) 1000 units TABS TAKE 1 TABLET BY MOUTH DAILY 4/11/19  Yes Vitaly Bynum MD   Omega-3 Fatty Acids (FISH OIL) 1000 MG CAPS TAKE 2 CAPSULES BY MOUTH DAILY 4/11/19  Yes Vitaly Bynum MD   ondansetron (ZOFRAN) 4 MG tablet Take 1 tablet by mouth 3 times daily as needed for Nausea or Vomiting 3/25/19  Yes Vitaly Bynum MD   TRULICITY 3.59 YJ/6.9GL SOPN Inject 0.75 mg into the skin once a week 0.75mg/0.5ml 1/15/19  Yes Historical Provider, MD   FREESTYLE LANCETS MISC TEST BLOOD SUGAR THREE TIMES A DAY 8/28/18  Yes Vitaly Bynum MD   albuterol (PROVENTIL) (2.5 MG/3ML) 0.083% nebulizer solution Take 2.5 mg by nebulization 4 times daily   Yes Historical Provider, MD   Incontinence Supply Disposable (POISE PAD) PADS Apply 1 Piece topically as needed (incontinence) 11/21/17  Yes Naheed Jackson 3.91 12/13/2019    HGB 12.1 12/13/2019    HCT 35.8 12/13/2019    MCV 91.3 12/13/2019    MCH 31.0 12/13/2019    MCHC 34.0 12/13/2019    RDW 13.6 12/13/2019     12/13/2019    MPV NOT REPORTED 12/13/2019     Lab Results   Component Value Date    TSH <0.01 12/12/2019     Lab Results   Component Value Date    CHOL 150 12/09/2019    HDL 50 12/09/2019    LABA1C 6.5 12/12/2019          Assessment & Plan        Diagnosis Orders   1. Lower abdominal pain  XR ABDOMEN (KUB) (SINGLE AP VIEW)   2. Constipation, unspecified constipation type  XR ABDOMEN (KUB) (SINGLE AP VIEW)    Lactobacillus (PROBIOTIC ACIDOPHILUS) TABS    magnesium citrate solution    polyethylene glycol (GLYCOLAX) powder       Ordered XR abdomen for evaluation of abd pain and distention   Advised to try Miralax and if no help then take magnesium citrate. She requested Lactobacillus tabs as it helped her in the past to combat constipation   She is told to go to ER if abdominal pain worsens, or if she develops nausea, vomiting             Completed Refills   Requested Prescriptions     Signed Prescriptions Disp Refills    Lactobacillus (PROBIOTIC ACIDOPHILUS) TABS 30 tablet 0     Sig: Take 1 tablet by mouth daily    magnesium citrate solution 296 mL 0     Sig: Take 296 mLs by mouth once for 1 dose    polyethylene glycol (GLYCOLAX) powder 1530 g 1     Sig: Take 17 g by mouth daily     No follow-ups on file.      Orders Placed This Encounter   Medications    Lactobacillus (PROBIOTIC ACIDOPHILUS) TABS     Sig: Take 1 tablet by mouth daily     Dispense:  30 tablet     Refill:  0    magnesium citrate solution     Sig: Take 296 mLs by mouth once for 1 dose     Dispense:  296 mL     Refill:  0    polyethylene glycol (GLYCOLAX) powder     Sig: Take 17 g by mouth daily     Dispense:  1530 g     Refill:  1     Orders Placed This Encounter   Procedures    XR ABDOMEN (KUB) (SINGLE AP VIEW)     Standing Status:   Future     Standing Expiration Date:   2/20/2021 There are no Patient Instructions on file for this visit.     Electronically signed by Rowdy Ruiz MD on 2/20/2020 at 1:25 PM           Completed Refills      Requested Prescriptions     Signed Prescriptions Disp Refills    Lactobacillus (PROBIOTIC ACIDOPHILUS) TABS 30 tablet 0     Sig: Take 1 tablet by mouth daily    magnesium citrate solution 296 mL 0     Sig: Take 296 mLs by mouth once for 1 dose    polyethylene glycol (GLYCOLAX) powder 1530 g 1     Sig: Take 17 g by mouth daily

## 2020-03-02 ENCOUNTER — HOSPITAL ENCOUNTER (OUTPATIENT)
Dept: PHYSICAL THERAPY | Age: 65
Setting detail: THERAPIES SERIES
Discharge: HOME | End: 2020-03-02
Payer: COMMERCIAL

## 2020-03-02 ENCOUNTER — HOSPITAL ENCOUNTER (OUTPATIENT)
Age: 65
Discharge: HOME | End: 2020-03-02
Payer: COMMERCIAL

## 2020-03-02 LAB
ALBUMIN SERPL-MCNC: 4.7 G/DL (ref 3.5–5.2)
ALBUMIN/GLOBULIN RATIO: ABNORMAL (ref 1–2.5)
ALP BLD-CCNC: 75 U/L (ref 35–104)
ALT SERPL-CCNC: 29 U/L (ref 5–33)
ANION GAP SERPL CALCULATED.3IONS-SCNC: 13 MMOL/L (ref 9–17)
AST SERPL-CCNC: 20 U/L
BILIRUB SERPL-MCNC: 0.26 MG/DL (ref 0.3–1.2)
BUN BLDV-MCNC: 23 MG/DL (ref 8–23)
BUN/CREAT BLD: 18 (ref 9–20)
CALCIUM SERPL-MCNC: 10.4 MG/DL (ref 8.6–10.4)
CHLORIDE BLD-SCNC: 101 MMOL/L (ref 98–107)
CO2: 25 MMOL/L (ref 20–31)
CREAT SERPL-MCNC: 1.3 MG/DL (ref 0.5–0.9)
ESTIMATED AVERAGE GLUCOSE: 177 MG/DL
GFR AFRICAN AMERICAN: 50 ML/MIN
GFR NON-AFRICAN AMERICAN: 41 ML/MIN
GFR SERPL CREATININE-BSD FRML MDRD: ABNORMAL ML/MIN/{1.73_M2}
GFR SERPL CREATININE-BSD FRML MDRD: ABNORMAL ML/MIN/{1.73_M2}
GLUCOSE BLD-MCNC: 165 MG/DL (ref 70–99)
HBA1C MFR BLD: 7.8 % (ref 4.8–5.9)
HCT VFR BLD CALC: 34.2 % (ref 36–46)
HEMOGLOBIN: 11.5 G/DL (ref 12–16)
MCH RBC QN AUTO: 30.7 PG (ref 26–34)
MCHC RBC AUTO-ENTMCNC: 33.6 G/DL (ref 31–37)
MCV RBC AUTO: 91.2 FL (ref 80–100)
NRBC AUTOMATED: ABNORMAL PER 100 WBC
PDW BLD-RTO: 13.6 % (ref 12.1–15.2)
PLATELET # BLD: 202 K/UL (ref 140–450)
PMV BLD AUTO: ABNORMAL FL (ref 6–12)
POTASSIUM SERPL-SCNC: 4.7 MMOL/L (ref 3.7–5.3)
RBC # BLD: 3.74 M/UL (ref 4–5.2)
SODIUM BLD-SCNC: 139 MMOL/L (ref 135–144)
THYROXINE, FREE: 1.55 NG/DL (ref 0.93–1.7)
TOTAL PROTEIN: 7.7 G/DL (ref 6.4–8.3)
TSH SERPL DL<=0.05 MIU/L-ACNC: <0.01 MIU/L (ref 0.3–5)
WBC # BLD: 6.4 K/UL (ref 3.5–11)

## 2020-03-02 PROCEDURE — 36415 COLL VENOUS BLD VENIPUNCTURE: CPT

## 2020-03-02 PROCEDURE — 84439 ASSAY OF FREE THYROXINE: CPT

## 2020-03-02 PROCEDURE — 83036 HEMOGLOBIN GLYCOSYLATED A1C: CPT

## 2020-03-02 PROCEDURE — 97110 THERAPEUTIC EXERCISES: CPT

## 2020-03-02 PROCEDURE — 97140 MANUAL THERAPY 1/> REGIONS: CPT

## 2020-03-02 PROCEDURE — 85027 COMPLETE CBC AUTOMATED: CPT

## 2020-03-02 PROCEDURE — 80053 COMPREHEN METABOLIC PANEL: CPT

## 2020-03-02 PROCEDURE — 84443 ASSAY THYROID STIM HORMONE: CPT

## 2020-03-02 ASSESSMENT — PAIN DESCRIPTION - ORIENTATION: ORIENTATION: RIGHT;LEFT

## 2020-03-02 ASSESSMENT — PAIN SCALES - GENERAL: PAINLEVEL_OUTOF10: 8

## 2020-03-02 ASSESSMENT — PAIN DESCRIPTION - LOCATION: LOCATION: BACK;HIP

## 2020-03-02 NOTE — PROGRESS NOTES
12  Long term goal 1: Decrease pain neck and back 4/10 at worst x 3 days  Long term goal 2: Improve functional activities with Neck Disability score < 12/50  Long term goal 3: Improve functional mobility with Oswestry score < 10/50          Post Treatment Pain:  5/10    Time In: 14:25    Time Out : 15:10        Timed Code Treatment Minutes: 45 Minutes  Total Treatment Time: [address] [facility],      Date: 3/2/2020

## 2020-03-02 NOTE — PLAN OF CARE
[facility] KELSEY NAM  Phone: [phone]   Date: 3/2/2020                  Outpatient Physical Therapy Fax: [phone] #: [de-identified]                  Recertification  [facility]#: [id number]  Patient: Tyrese Stewart  : 1955    Referring Practitioner: Dr. Mychal Blevins    Referral Date : 19    Diagnosis: Cervical Neck Pain with DDD, chronic bilateral low back pain with sciatica  Onset Date: 19(Referral)  Treatment Diagnosis: neck pain, back pain  # PT visits: 4    Assessment  Body structures, Functions, Activity limitations: Decreased ADL status, Decreased ROM, Increased pain, Decreased posture  Assessment: Patient has been in [place] for past month visiting relatives. She returned to resume PT as low back and bilateral hip pain 8/10; neck and shld pain /10. Patient c/o stiffness and pain in hips and low back make it difficulty to transfer sit to stand and walking first couple steps after sitting is painful. Therex per Doc Flow with addition of core strengthening exercises to improve sit to stand transfers. Manual therapy for decreasing stiffness and pain. Plan to complete aquatic exercise next session to exercise with less pain and releive stiffness in multiple joints from arthritis. Prognosis: Fair    Treatment Plan   Days: 2 times per week for additional 4 weeks for Total # of Visits Approved: 12  Patient Education/HEP, Therapeutic Exercise, Manual Therapy: Myofacial Release/Cupping, Manual Therapy: Mobilization/Manipulation and Aquatics    Goals  Short term goals  Time Frame for Short term goals: 8  Short term goal 1: Patient to be independent with HEP-Met  Short term goal 2:  Increase cervical ROM R rotation 60 degrees  Short term goal 3: Decrease pain neck and back 6/10 at worst x 3 days  Long term goals  Time Frame for Long term goals : 12  Long term goal 1: Decrease pain neck and back 4/10 at worst x 3 days  Long term goal 2: Improve functional activities with Neck Disability score < 12/50  Long term goal 3: Improve functional mobility with Oswestry score < 10/50    Pham Irving    Date: 3/2/2020        ______________________________________ Date: 3/2/2020  Physician Signature

## 2020-03-12 ENCOUNTER — HOSPITAL ENCOUNTER (OUTPATIENT)
Dept: PHYSICAL THERAPY | Age: 65
Setting detail: THERAPIES SERIES
Discharge: HOME | End: 2020-03-12
Payer: COMMERCIAL

## 2020-03-12 PROCEDURE — 97140 MANUAL THERAPY 1/> REGIONS: CPT

## 2020-03-12 PROCEDURE — 97110 THERAPEUTIC EXERCISES: CPT

## 2020-03-12 ASSESSMENT — PAIN DESCRIPTION - LOCATION: LOCATION: BACK;HIP;NECK

## 2020-03-12 ASSESSMENT — PAIN SCALES - GENERAL: PAINLEVEL_OUTOF10: 8

## 2020-03-12 ASSESSMENT — PAIN DESCRIPTION - ORIENTATION: ORIENTATION: RIGHT;LEFT

## 2020-03-12 NOTE — PROGRESS NOTES
Phone: Cory Chambers      Fax: [phone]                            Outpatient Physical Therapy                                                                            Daily Note    Date: 3/12/2020  Patient Name: Evelia Castrejon        MRN: [id number]   ACCT#:  [de-identified]  : 1955  (62 y.o.)    Referring Practitioner: Dr. Jonas Alberts    Referral Date : 19    Diagnosis: Cervical Neck Pain with DDD, chronic bilateral low back pain with sciatica  Treatment Diagnosis: neck pain, back pain    Onset Date: 19(Referral)  PT Insurance Information: [employer]  Total # of Visits Approved: 12 Per Physician Order  Total # of Visits to Date: 5  No Show: 3  Canceled Appointment: 1  Plan of Care/Certification Expiration Date: 20    Pre-Treatment Pain:  8/10     Assessment  Assessment: Patient did not bring swim suit; completed therex and manual therapy for neck and back pain. C/o pain neck and low back 8/10. Cervical rotation to left limited 25% less than rotation to left side. THerex and manual therapy to increase ROM and improving posture to improve functional activities. Chart Reviewed: Yes    Plan  Plan: Continue with current plan  Comment: see Pt recert    Exercises/Modalities/Manual:  See DocFlow Sheet    Education:         Goals  (Total # of Visits to Date: 5)   Short Term Goals - Time Frame for Short term goals: 8  Short term goal 1: Patient to be independent with HEP-Met  Short term goal 2:  Increase cervical ROM R rotation 60 degrees-Not Met  Short term goal 3: Decrease pain neck and back 6/10 at worst x 3 days          Long Term Goals - Time Frame for Long term goals : 12  Long term goal 1: Decrease pain neck and back 4/10 at worst x 3 days  Long term goal 2: Improve functional activities with Neck Disability score < 12/50  Long term goal 3: Improve functional mobility with Oswestry score < 10/50          Post Treatment Pain:  6/10    Time In: 9:45    Time Out : 10:25        Timed Code Treatment Minutes: 40 Minutes  Total Treatment Time: [address], PT     Date: 3/12/2020

## 2020-03-16 RX ORDER — HYDROCHLOROTHIAZIDE 25 MG/1
25 TABLET ORAL EVERY MORNING
Qty: 90 TABLET | Refills: 10 | Status: SHIPPED | OUTPATIENT
Start: 2020-03-16 | End: 2021-03-09

## 2020-03-16 RX ORDER — GABAPENTIN 800 MG/1
TABLET ORAL
Qty: 60 TABLET | Refills: 10 | Status: SHIPPED | OUTPATIENT
Start: 2020-03-16 | End: 2021-02-08

## 2020-03-23 ENCOUNTER — APPOINTMENT (OUTPATIENT)
Dept: PHYSICAL THERAPY | Age: 65
End: 2020-03-23
Payer: COMMERCIAL

## 2020-03-27 ENCOUNTER — APPOINTMENT (OUTPATIENT)
Dept: PHYSICAL THERAPY | Age: 65
End: 2020-03-27
Payer: COMMERCIAL

## 2020-03-30 ENCOUNTER — APPOINTMENT (OUTPATIENT)
Dept: PHYSICAL THERAPY | Age: 65
End: 2020-03-30
Payer: COMMERCIAL

## 2020-04-10 ENCOUNTER — HOSPITAL ENCOUNTER (EMERGENCY)
Age: 65
Discharge: HOME | End: 2020-04-10
Attending: FAMILY MEDICINE
Payer: COMMERCIAL

## 2020-04-10 VITALS
HEIGHT: 64 IN | HEART RATE: 107 BPM | BODY MASS INDEX: 27.68 KG/M2 | DIASTOLIC BLOOD PRESSURE: 91 MMHG | OXYGEN SATURATION: 95 % | WEIGHT: 162.1 LBS | TEMPERATURE: 99 F | RESPIRATION RATE: 17 BRPM | SYSTOLIC BLOOD PRESSURE: 143 MMHG

## 2020-04-10 LAB
-: ABNORMAL
ABSOLUTE EOS #: 0.1 K/UL (ref 0–0.4)
ABSOLUTE IMMATURE GRANULOCYTE: ABNORMAL K/UL (ref 0–0.3)
ABSOLUTE LYMPH #: 2 K/UL (ref 1–4.8)
ABSOLUTE MONO #: 0.6 K/UL (ref 0–1)
ALBUMIN SERPL-MCNC: 5.3 G/DL (ref 3.5–5.2)
ALBUMIN/GLOBULIN RATIO: ABNORMAL (ref 1–2.5)
ALP BLD-CCNC: 101 U/L (ref 35–104)
ALT SERPL-CCNC: 35 U/L (ref 5–33)
AMORPHOUS: ABNORMAL
ANION GAP SERPL CALCULATED.3IONS-SCNC: 22 MMOL/L (ref 9–17)
AST SERPL-CCNC: 32 U/L
BACTERIA: ABNORMAL
BASOPHILS # BLD: 0 % (ref 0–2)
BASOPHILS ABSOLUTE: 0 K/UL (ref 0–0.2)
BILIRUB SERPL-MCNC: 0.74 MG/DL (ref 0.3–1.2)
BILIRUBIN URINE: NEGATIVE
BUN BLDV-MCNC: 26 MG/DL (ref 8–23)
BUN/CREAT BLD: 16 (ref 9–20)
CALCIUM SERPL-MCNC: 12.2 MG/DL (ref 8.6–10.4)
CASTS UA: ABNORMAL /LPF
CHLORIDE BLD-SCNC: 91 MMOL/L (ref 98–107)
CO2: 23 MMOL/L (ref 20–31)
COLOR: YELLOW
COMMENT UA: ABNORMAL
CREAT SERPL-MCNC: 1.67 MG/DL (ref 0.5–0.9)
CRYSTALS, UA: ABNORMAL /HPF
DIFFERENTIAL TYPE: YES
EOSINOPHILS RELATIVE PERCENT: 1 % (ref 0–5)
EPITHELIAL CELLS UA: ABNORMAL /HPF
GFR AFRICAN AMERICAN: 38 ML/MIN
GFR NON-AFRICAN AMERICAN: 31 ML/MIN
GFR SERPL CREATININE-BSD FRML MDRD: ABNORMAL ML/MIN/{1.73_M2}
GFR SERPL CREATININE-BSD FRML MDRD: ABNORMAL ML/MIN/{1.73_M2}
GLUCOSE BLD-MCNC: 239 MG/DL (ref 70–99)
GLUCOSE URINE: NEGATIVE
HCT VFR BLD CALC: 44.9 % (ref 36–46)
HEMOGLOBIN: 15 G/DL (ref 12–16)
IMMATURE GRANULOCYTES: ABNORMAL %
KETONES, URINE: ABNORMAL
LACTIC ACID: 2.4 MMOL/L (ref 0.5–2.2)
LEUKOCYTE ESTERASE, URINE: ABNORMAL
LIPASE: 99 U/L (ref 13–60)
LYMPHOCYTES # BLD: 19 % (ref 15–40)
MCH RBC QN AUTO: 30.6 PG (ref 26–34)
MCHC RBC AUTO-ENTMCNC: 33.5 G/DL (ref 31–37)
MCV RBC AUTO: 91.5 FL (ref 80–100)
MONOCYTES # BLD: 6 % (ref 4–8)
MUCUS: ABNORMAL
NITRITE, URINE: NEGATIVE
NRBC AUTOMATED: ABNORMAL PER 100 WBC
OTHER OBSERVATIONS UA: ABNORMAL
PDW BLD-RTO: 13.5 % (ref 12.1–15.2)
PH UA: 7 (ref 5–8)
PLATELET # BLD: 262 K/UL (ref 140–450)
PLATELET ESTIMATE: ABNORMAL
PMV BLD AUTO: ABNORMAL FL (ref 6–12)
POTASSIUM SERPL-SCNC: 3.7 MMOL/L (ref 3.7–5.3)
PROTEIN UA: ABNORMAL
RBC # BLD: 4.91 M/UL (ref 4–5.2)
RBC # BLD: ABNORMAL 10*6/UL
RBC UA: ABNORMAL /HPF (ref 0–2)
RENAL EPITHELIAL, UA: ABNORMAL /HPF
SEG NEUTROPHILS: 74 % (ref 47–75)
SEGMENTED NEUTROPHILS ABSOLUTE COUNT: 7.9 K/UL (ref 2.5–7)
SODIUM BLD-SCNC: 136 MMOL/L (ref 135–144)
SPECIFIC GRAVITY UA: 1.01 (ref 1–1.03)
THYROXINE, FREE: 2.85 NG/DL (ref 0.93–1.7)
TOTAL PROTEIN: 9.1 G/DL (ref 6.4–8.3)
TRICHOMONAS: ABNORMAL
TSH SERPL DL<=0.05 MIU/L-ACNC: <0.01 MIU/L (ref 0.3–5)
TURBIDITY: ABNORMAL
URINE HGB: ABNORMAL
UROBILINOGEN, URINE: NORMAL
WBC # BLD: 10.7 K/UL (ref 3.5–11)
WBC # BLD: ABNORMAL 10*3/UL
WBC UA: ABNORMAL /HPF
YEAST: ABNORMAL

## 2020-04-10 PROCEDURE — 83690 ASSAY OF LIPASE: CPT

## 2020-04-10 PROCEDURE — 93005 ELECTROCARDIOGRAM TRACING: CPT | Performed by: FAMILY MEDICINE

## 2020-04-10 PROCEDURE — 87086 URINE CULTURE/COLONY COUNT: CPT

## 2020-04-10 PROCEDURE — 96361 HYDRATE IV INFUSION ADD-ON: CPT

## 2020-04-10 PROCEDURE — 6370000000 HC RX 637 (ALT 250 FOR IP): Performed by: FAMILY MEDICINE

## 2020-04-10 PROCEDURE — 84439 ASSAY OF FREE THYROXINE: CPT

## 2020-04-10 PROCEDURE — 81001 URINALYSIS AUTO W/SCOPE: CPT

## 2020-04-10 PROCEDURE — 96375 TX/PRO/DX INJ NEW DRUG ADDON: CPT

## 2020-04-10 PROCEDURE — C9113 INJ PANTOPRAZOLE SODIUM, VIA: HCPCS | Performed by: FAMILY MEDICINE

## 2020-04-10 PROCEDURE — 2580000003 HC RX 258: Performed by: FAMILY MEDICINE

## 2020-04-10 PROCEDURE — 84443 ASSAY THYROID STIM HORMONE: CPT

## 2020-04-10 PROCEDURE — 36415 COLL VENOUS BLD VENIPUNCTURE: CPT

## 2020-04-10 PROCEDURE — 96374 THER/PROPH/DIAG INJ IV PUSH: CPT

## 2020-04-10 PROCEDURE — 83605 ASSAY OF LACTIC ACID: CPT

## 2020-04-10 PROCEDURE — 6360000002 HC RX W HCPCS: Performed by: FAMILY MEDICINE

## 2020-04-10 PROCEDURE — 85025 COMPLETE CBC W/AUTO DIFF WBC: CPT

## 2020-04-10 PROCEDURE — 99284 EMERGENCY DEPT VISIT MOD MDM: CPT

## 2020-04-10 PROCEDURE — 80053 COMPREHEN METABOLIC PANEL: CPT

## 2020-04-10 RX ORDER — SULFAMETHOXAZOLE AND TRIMETHOPRIM 800; 160 MG/1; MG/1
1 TABLET ORAL 2 TIMES DAILY
Qty: 10 TABLET | Refills: 0 | Status: SHIPPED | OUTPATIENT
Start: 2020-04-10 | End: 2020-04-12 | Stop reason: SINTOL

## 2020-04-10 RX ORDER — ONDANSETRON 2 MG/ML
4 INJECTION INTRAMUSCULAR; INTRAVENOUS ONCE
Status: COMPLETED | OUTPATIENT
Start: 2020-04-10 | End: 2020-04-10

## 2020-04-10 RX ORDER — ONDANSETRON 4 MG/1
4 TABLET, ORALLY DISINTEGRATING ORAL EVERY 4 HOURS PRN
Qty: 15 TABLET | Refills: 0 | Status: SHIPPED | OUTPATIENT
Start: 2020-04-10 | End: 2020-08-06 | Stop reason: SDUPTHER

## 2020-04-10 RX ORDER — PANTOPRAZOLE SODIUM 40 MG/10ML
40 INJECTION, POWDER, LYOPHILIZED, FOR SOLUTION INTRAVENOUS DAILY
Status: DISCONTINUED | OUTPATIENT
Start: 2020-04-10 | End: 2020-04-10 | Stop reason: HOSPADM

## 2020-04-10 RX ORDER — 0.9 % SODIUM CHLORIDE 0.9 %
1000 INTRAVENOUS SOLUTION INTRAVENOUS ONCE
Status: COMPLETED | OUTPATIENT
Start: 2020-04-10 | End: 2020-04-10

## 2020-04-10 RX ORDER — SODIUM CHLORIDE 9 MG/ML
10 INJECTION INTRAVENOUS DAILY
Status: DISCONTINUED | OUTPATIENT
Start: 2020-04-10 | End: 2020-04-10 | Stop reason: HOSPADM

## 2020-04-10 RX ADMIN — Medication 10 ML: at 16:41

## 2020-04-10 RX ADMIN — PANTOPRAZOLE SODIUM 40 MG: 40 INJECTION, POWDER, FOR SOLUTION INTRAVENOUS at 16:41

## 2020-04-10 RX ADMIN — LIDOCAINE HYDROCHLORIDE: 20 SOLUTION ORAL; TOPICAL at 16:41

## 2020-04-10 RX ADMIN — ONDANSETRON 4 MG: 2 INJECTION INTRAMUSCULAR; INTRAVENOUS at 15:42

## 2020-04-10 RX ADMIN — SODIUM CHLORIDE 1000 ML: 9 INJECTION, SOLUTION INTRAVENOUS at 15:41

## 2020-04-10 ASSESSMENT — ENCOUNTER SYMPTOMS
NAUSEA: 1
VOMITING: 1
DIARRHEA: 0
ABDOMINAL PAIN: 1

## 2020-04-10 ASSESSMENT — PAIN SCALES - GENERAL: PAINLEVEL_OUTOF10: 8

## 2020-04-10 NOTE — ED PROVIDER NOTES
following components:    Bacteria, UA 1+ (*)     All other components within normal limits   CULTURE, URINE   T4, FREE       EMERGENCY DEPARTMENT COURSE:   Vitals:    Vitals:    04/10/20 1650 04/10/20 1704 04/10/20 1720 04/10/20 1736   BP: (!) 148/81 132/75 (!) 156/102 (!) 143/91   Pulse: 100 98 116 107   Resp: 15 18 18 17   Temp:       TempSrc:       SpO2: 95% 97% 98% 95%   Weight:       Height:         Patient history and physical exam taken at bedside, discussed patient symptoms and exam findings, discussed initial plan work-up to include EKG, chest x-ray, IV access, blood and urine studies. Patient placed on cardiac monitor, pulse ox, resting semi-Fowlers in bed. EMR reviewed, noting history atrial fibrillation, Graves' disease, asthma with COPD, diabetes, iron deficiency anemia, IBS with both constipation diarrhea, hyperlipidemia, hypertension    Initial lab work-up reviewed    Discussed with patient her initial lab findings, would still like to get urine sample, will give patient oral fluids in the interim, will also give GI cocktail and reevaluate, patient acknowledges    Patient was able to tolerate oral fluids    Urinalysis reviewed    Discussed with patient urine findings, discussed elevated lipase levels could be due to possible early pancreatitis versus secondary to her vomiting, discussed Zofran ODT for nausea control, antibiotic for presumptive UTI pending culture, discussed importance of hydration with water or non-sugary sports type drinks, discussed clear liquid diet for a few days, will draw outpatient labs in 3 days, patient has telemedicine appointment with her PCP in 4 days, return to ER if symptoms change worsen or other concerns in the interim    FINAL IMPRESSION      1. Non-intractable vomiting with nausea, unspecified vomiting type    2. Elevated lipase    3. Symptoms of dehydration    4. Acute cystitis without hematuria    5.  History of diabetes mellitus          DISPOSITION/PLAN D/c    PATIENT REFERRED TO:  MD Emilia Archuleta [address] [facility]  [phone]      Please keep stated telemedicine appointment 4/14    [facility]  [address] [facility] [address]  [phone]    As needed, If symptoms worsen      DISCHARGE MEDICATIONS:  Discharge Medication List as of 4/10/2020  5:43 PM      START taking these medications    Details   sulfamethoxazole-trimethoprim (BACTRIM DS) 800-160 MG per tablet Take 1 tablet by mouth 2 times daily for 5 days, Disp-10 tablet, R-0Print      ondansetron (ZOFRAN ODT) 4 MG disintegrating tablet Take 1 tablet by mouth every 4 hours as needed for Nausea or Vomiting, Disp-15 tablet, R-0Print                 Summation      Patient Course:  D/c    ED Medications administered this visit:    Medications   pantoprazole (PROTONIX) injection 40 mg (40 mg Intravenous Given 4/10/20 1641)     And   sodium chloride (PF) 0.9 % injection 10 mL (10 mLs Intravenous Given 4/10/20 1641)   0.9 % sodium chloride bolus (0 mLs Intravenous Stopped 4/10/20 1647)   ondansetron (ZOFRAN) injection 4 mg (4 mg Intravenous Given 4/10/20 1542)   aluminum & magnesium hydroxide-simethicone (MAALOX) 30 mL, lidocaine viscous hcl (XYLOCAINE) 5 mL (GI COCKTAIL) ( Oral Given 4/10/20 1641)       New Prescriptions from this visit:    Discharge Medication List as of 4/10/2020  5:43 PM      START taking these medications    Details   sulfamethoxazole-trimethoprim (BACTRIM DS) 800-160 MG per tablet Take 1 tablet by mouth 2 times daily for 5 days, Disp-10 tablet, R-0Print      ondansetron (ZOFRAN ODT) 4 MG disintegrating tablet Take 1 tablet by mouth every 4 hours as needed for Nausea or Vomiting, Disp-15 tablet, R-0Print             Follow-up:  MD Emilia Archuleta [address] [facility]  [phone]      Please keep stated telemedicine appointment 4/14    [facility]  [address]    As

## 2020-04-11 ENCOUNTER — HOSPITAL ENCOUNTER (EMERGENCY)
Age: 65
Discharge: HOME | End: 2020-04-12
Attending: FAMILY MEDICINE
Payer: COMMERCIAL

## 2020-04-11 ENCOUNTER — CARE COORDINATION (OUTPATIENT)
Dept: CARE COORDINATION | Age: 65
End: 2020-04-11

## 2020-04-11 VITALS
RESPIRATION RATE: 16 BRPM | OXYGEN SATURATION: 98 % | DIASTOLIC BLOOD PRESSURE: 74 MMHG | SYSTOLIC BLOOD PRESSURE: 111 MMHG | TEMPERATURE: 98.5 F | HEART RATE: 101 BPM

## 2020-04-11 LAB
ABSOLUTE EOS #: 0.1 K/UL (ref 0–0.4)
ABSOLUTE IMMATURE GRANULOCYTE: ABNORMAL K/UL (ref 0–0.3)
ABSOLUTE LYMPH #: 2.5 K/UL (ref 1–4.8)
ABSOLUTE MONO #: 0.7 K/UL (ref 0–1)
ALBUMIN SERPL-MCNC: 4.7 G/DL (ref 3.5–5.2)
ALBUMIN/GLOBULIN RATIO: ABNORMAL (ref 1–2.5)
ALP BLD-CCNC: 80 U/L (ref 35–104)
ALT SERPL-CCNC: 36 U/L (ref 5–33)
AMYLASE: 73 U/L (ref 28–100)
ANION GAP SERPL CALCULATED.3IONS-SCNC: 19 MMOL/L (ref 9–17)
AST SERPL-CCNC: 38 U/L
BASOPHILS # BLD: 1 % (ref 0–2)
BASOPHILS ABSOLUTE: 0 K/UL (ref 0–0.2)
BILIRUB SERPL-MCNC: 0.49 MG/DL (ref 0.3–1.2)
BUN BLDV-MCNC: 21 MG/DL (ref 8–23)
BUN/CREAT BLD: 9 (ref 9–20)
CALCIUM SERPL-MCNC: 10.9 MG/DL (ref 8.6–10.4)
CHLORIDE BLD-SCNC: 91 MMOL/L (ref 98–107)
CO2: 21 MMOL/L (ref 20–31)
CREAT SERPL-MCNC: 2.25 MG/DL (ref 0.5–0.9)
CULTURE: NORMAL
DIFFERENTIAL TYPE: YES
EKG ATRIAL RATE: 120 BPM
EKG P AXIS: 76 DEGREES
EKG P-R INTERVAL: 158 MS
EKG Q-T INTERVAL: 330 MS
EKG QRS DURATION: 74 MS
EKG QTC CALCULATION (BAZETT): 466 MS
EKG R AXIS: 57 DEGREES
EKG T AXIS: 60 DEGREES
EKG VENTRICULAR RATE: 120 BPM
EOSINOPHILS RELATIVE PERCENT: 2 % (ref 0–5)
GFR AFRICAN AMERICAN: 27 ML/MIN
GFR NON-AFRICAN AMERICAN: 22 ML/MIN
GFR SERPL CREATININE-BSD FRML MDRD: ABNORMAL ML/MIN/{1.73_M2}
GFR SERPL CREATININE-BSD FRML MDRD: ABNORMAL ML/MIN/{1.73_M2}
GLUCOSE BLD-MCNC: 150 MG/DL (ref 70–99)
HCT VFR BLD CALC: 36 % (ref 36–46)
HEMOGLOBIN: 12.2 G/DL (ref 12–16)
IMMATURE GRANULOCYTES: ABNORMAL %
LIPASE: 43 U/L (ref 13–60)
LYMPHOCYTES # BLD: 27 % (ref 15–40)
Lab: NORMAL
MCH RBC QN AUTO: 31 PG (ref 26–34)
MCHC RBC AUTO-ENTMCNC: 33.9 G/DL (ref 31–37)
MCV RBC AUTO: 91.3 FL (ref 80–100)
MONOCYTES # BLD: 7 % (ref 4–8)
NRBC AUTOMATED: ABNORMAL PER 100 WBC
PDW BLD-RTO: 13.3 % (ref 12.1–15.2)
PLATELET # BLD: 233 K/UL (ref 140–450)
PLATELET ESTIMATE: ABNORMAL
PMV BLD AUTO: ABNORMAL FL (ref 6–12)
POTASSIUM SERPL-SCNC: 3.8 MMOL/L (ref 3.7–5.3)
RBC # BLD: 3.94 M/UL (ref 4–5.2)
RBC # BLD: ABNORMAL 10*6/UL
SEG NEUTROPHILS: 63 % (ref 47–75)
SEGMENTED NEUTROPHILS ABSOLUTE COUNT: 5.9 K/UL (ref 2.5–7)
SODIUM BLD-SCNC: 131 MMOL/L (ref 135–144)
SPECIMEN DESCRIPTION: NORMAL
TOTAL PROTEIN: 7.9 G/DL (ref 6.4–8.3)
WBC # BLD: 9.3 K/UL (ref 3.5–11)
WBC # BLD: ABNORMAL 10*3/UL

## 2020-04-11 PROCEDURE — 96374 THER/PROPH/DIAG INJ IV PUSH: CPT

## 2020-04-11 PROCEDURE — 99284 EMERGENCY DEPT VISIT MOD MDM: CPT

## 2020-04-11 PROCEDURE — 96361 HYDRATE IV INFUSION ADD-ON: CPT

## 2020-04-11 PROCEDURE — 82150 ASSAY OF AMYLASE: CPT

## 2020-04-11 PROCEDURE — 6360000002 HC RX W HCPCS: Performed by: FAMILY MEDICINE

## 2020-04-11 PROCEDURE — 2580000003 HC RX 258: Performed by: FAMILY MEDICINE

## 2020-04-11 PROCEDURE — 85025 COMPLETE CBC W/AUTO DIFF WBC: CPT

## 2020-04-11 PROCEDURE — 93010 ELECTROCARDIOGRAM REPORT: CPT | Performed by: INTERNAL MEDICINE

## 2020-04-11 PROCEDURE — 83690 ASSAY OF LIPASE: CPT

## 2020-04-11 PROCEDURE — 80053 COMPREHEN METABOLIC PANEL: CPT

## 2020-04-11 RX ORDER — ONDANSETRON 2 MG/ML
4 INJECTION INTRAMUSCULAR; INTRAVENOUS ONCE
Status: COMPLETED | OUTPATIENT
Start: 2020-04-11 | End: 2020-04-11

## 2020-04-11 RX ORDER — 0.9 % SODIUM CHLORIDE 0.9 %
1000 INTRAVENOUS SOLUTION INTRAVENOUS ONCE
Status: COMPLETED | OUTPATIENT
Start: 2020-04-11 | End: 2020-04-12

## 2020-04-11 RX ADMIN — ONDANSETRON 4 MG: 2 INJECTION INTRAMUSCULAR; INTRAVENOUS at 23:15

## 2020-04-11 RX ADMIN — SODIUM CHLORIDE 1000 ML: 9 INJECTION, SOLUTION INTRAVENOUS at 23:13

## 2020-04-11 ASSESSMENT — PAIN SCALES - GENERAL: PAINLEVEL_OUTOF10: 9

## 2020-04-11 ASSESSMENT — PAIN DESCRIPTION - PAIN TYPE: TYPE: ACUTE PAIN

## 2020-04-11 ASSESSMENT — PAIN DESCRIPTION - ORIENTATION: ORIENTATION: RIGHT;LEFT;MID

## 2020-04-11 ASSESSMENT — PAIN DESCRIPTION - DESCRIPTORS: DESCRIPTORS: SORE

## 2020-04-11 ASSESSMENT — PAIN DESCRIPTION - FREQUENCY: FREQUENCY: CONTINUOUS

## 2020-04-11 ASSESSMENT — PAIN DESCRIPTION - LOCATION: LOCATION: BACK;HIP

## 2020-04-11 NOTE — CARE COORDINATION
other people in your home. Also, you should use a separate bathroom, if available. Animals: You should restrict contact with pets and other animals while you are sick with COVID-19, just like you would around other people. Although there have not been reports of pets or other animals becoming sick with COVID-19, it is still recommended that people sick with COVID-19 limit contact with animals until more information is known about the virus. When possible, have another member of your household care for your animals while you are sick. If you are sick with COVID-19, avoid contact with your pet, including petting, snuggling, being kissed or licked, and sharing food. If you must care for your pet or be around animals while you are sick, wash your hands before and after you interact with pets and wear a facemask. Call ahead before visiting your doctor  If you have a medical appointment, call the healthcare provider and tell them that you have or may have COVID-19. This will help the healthcare providers office take steps to keep other people from getting infected or exposed. Wear a facemask  You should wear a facemask when you are around other people (e.g., sharing a room or vehicle) or pets and before you enter a healthcare providers office. If you are not able to wear a facemask (for example, because it causes trouble breathing), then people who live with you should not stay in the same room with you, or they should wear a facemask if they enter your room. Cover your coughs and sneezes  Cover your mouth and nose with a tissue when you cough or sneeze. Throw used tissues in a lined trash can. Immediately wash your hands with soap and water for at least 20 seconds or, if soap and water are not available, clean your hands with an alcohol-based hand  that contains at least 60% alcohol.   Clean your hands often  Wash your hands often with soap and water for at least 20 seconds, especially after blowing your nose, coughing, or sneezing; going to the bathroom; and before eating or preparing food. If soap and water are not readily available, use an alcohol-based hand  with at least 60% alcohol, covering all surfaces of your hands and rubbing them together until they feel dry. Soap and water are the best option if hands are visibly dirty. Avoid touching your eyes, nose, and mouth with unwashed hands. Avoid sharing personal household items  You should not share dishes, drinking glasses, cups, eating utensils, towels, or bedding with other people or pets in your home. After using these items, they should be washed thoroughly with soap and water. Clean all high-touch surfaces everyday  High touch surfaces include counters, tabletops, doorknobs, bathroom fixtures, toilets, phones, keyboards, tablets, and bedside tables. Also, clean any surfaces that may have blood, stool, or body fluids on them. Use a household cleaning spray or wipe, according to the label instructions. Labels contain instructions for safe and effective use of the cleaning product including precautions you should take when applying the product, such as wearing gloves and making sure you have good ventilation during use of the product. Monitor your symptoms  Seek prompt medical attention if your illness is worsening (e.g., difficulty breathing). Before seeking care, call your healthcare provider and tell them that you have, or are being evaluated for, COVID-19. Put on a facemask before you enter the facility. These steps will help the healthcare providers office to keep other people in the office or waiting room from getting infected or exposed. Ask your healthcare provider to call the local or [facility] health department. Persons who are placed under active monitoring or facilitated self-monitoring should follow instructions provided by their local health department or occupational health professionals, as appropriate.  When working with your local health department check their available hours. If you have a medical emergency and need to call 911, notify the dispatch personnel that you have, or are being evaluated for COVID-19. If possible, put on a facemask before emergency medical services arrive. Discontinuing home isolation  Patients with confirmed COVID-19 should remain under home isolation precautions until the risk of secondary transmission to others is thought to be low. The decision to discontinue home isolation precautions should be made on a case-by-case basis, in consultation with healthcare providers and state and local health departments.      at

## 2020-04-12 ENCOUNTER — APPOINTMENT (OUTPATIENT)
Dept: CT IMAGING | Age: 65
End: 2020-04-12
Payer: COMMERCIAL

## 2020-04-12 PROCEDURE — 6370000000 HC RX 637 (ALT 250 FOR IP): Performed by: FAMILY MEDICINE

## 2020-04-12 PROCEDURE — 74176 CT ABD & PELVIS W/O CONTRAST: CPT

## 2020-04-12 RX ORDER — NITROFURANTOIN 25; 75 MG/1; MG/1
100 CAPSULE ORAL ONCE
Status: COMPLETED | OUTPATIENT
Start: 2020-04-12 | End: 2020-04-12

## 2020-04-12 RX ORDER — NITROFURANTOIN 25; 75 MG/1; MG/1
100 CAPSULE ORAL 2 TIMES DAILY
Qty: 14 CAPSULE | Refills: 0 | Status: SHIPPED | OUTPATIENT
Start: 2020-04-12 | End: 2020-04-19

## 2020-04-12 RX ADMIN — NITROFURANTOIN MONOHYDRATE/MACROCRYSTALLINE 100 MG: 25; 75 CAPSULE ORAL at 01:17

## 2020-04-12 NOTE — ED PROVIDER NOTES
eMERGENCY dEPARTMENT eNCOUnter      [facility]    Chief Complaint   Patient presents with    Emesis     pt states she was here yesterday for the same thing. she states she has been vomiting for 3 days. she states zofran isn't helping and she can't keep her antibiotic down. HPI    Mable Cerda is a 62 y.o. female who presents with nausea and vomiting for 2 days. She describes her symptoms as being moderate in severity. Patient has recently been treated for a UTI with Bactrim DS. She has had a reaction to the Bactrim DS. She has been experiencing hallucinations due to this. Patient has also had generalized abdominal pain for 2 days. Symptoms are described as being moderate in severity. REVIEW OF SYSTEMS    All systems reviewed and positives are in the HPI.     PAST MEDICAL HISTORY    Past Medical History:   Diagnosis Date    Asthma     Atrial fibrillation ([address]     COPD (chronic obstructive pulmonary disease) ([address]     DDD (degenerative disc disease)     Diabetes mellitus ([address]     Hyperlipidemia     Hypertension     Hyperthyroidism     Type II or unspecified type diabetes mellitus without mention of complication, not stated as uncontrolled        SURGICAL HISTORY    Past Surgical History:   Procedure Laterality Date    BACK SURGERY      COLONOSCOPY  04/23/14   [facility] DENTAL SURGERY N/A 3/8/2017    REMOVAL OF BILATERAL MANDIBULAR LELO AND MAXILLARY EDENTULOUS ALVEOPLASTY performed by Lawyer Juan DDS at [facility] 80      removal    GASTROSTOMY TUBE PLACEMENT      TONSILLECTOMY      TRACHEOSTOMY      TRACHEOSTOMY CLOSURE      TUBAL LIGATION      UPPER GASTROINTESTINAL ENDOSCOPY  3/16/2016    Peg tube insertion       CURRENT MEDICATIONS    Current Outpatient Rx   Medication Sig Dispense Refill    nitrofurantoin, macrocrystal-monohydrate, (MACROBID) 100 MG capsule Take 1 capsule by mouth 2 times daily for 7 days 14 capsule 0    ondansetron (ZOFRAN ODT) 4 MG disintegrating tablet Take 1 tablet by mouth every 4 hours as needed for Nausea or Vomiting 15 tablet 0    hydroCHLOROthiazide (HYDRODIURIL) 25 MG tablet TAKE 1 TABLET BY MOUTH EVERY MORNING 90 tablet 10    gabapentin (NEURONTIN) 800 MG tablet TAKE 1 TABLET BY MOUTH TWICE DAILY 60 tablet 10    amLODIPine (NORVASC) 10 MG tablet       diltiazem (CARDIZEM CD) 120 MG extended release capsule Take 1 capsule by mouth daily 30 capsule 0    levothyroxine (SYNTHROID) 88 MCG tablet Take 1 tablet by mouth daily 30 tablet 0    insulin glargine (LANTUS SOLOSTAR) 100 UNIT/ML injection pen Inject 25 Units into the skin nightly 5 pen 3    potassium chloride (KLOR-CON M) 20 MEQ extended release tablet Take 1 tablet by mouth daily 90 tablet 2    oxybutynin (DITROPAN XL) 15 MG extended release tablet Take 1 tablet by mouth daily 30 tablet 12    loratadine (CLARITIN) 10 MG tablet TAKE 1 TABLET BY MOUTH EVERY DAY 90 tablet 10    atorvastatin (LIPITOR) 40 MG tablet TAKE 1 TABLET BY MOUTH ONCE DAILY 30 tablet 5    metoprolol tartrate (LOPRESSOR) 25 MG tablet TAKE 1 TABLET BY MOUTH 2 TIMES DAILY 180 tablet 11    albuterol sulfate  (90 Base) MCG/ACT inhaler INHALE 2 PUFFS BY MOUTH EVERY 6 HOURS AS NEEDED FOR WHEEZING 324 g 11    lisinopril (PRINIVIL;ZESTRIL) 5 MG tablet TAKE 1 TABLET BY MOUTH EVERY DAY 30 tablet 10    cyclobenzaprine (FLEXERIL) 5 MG tablet TAKE ONE (1) TABLET BY MOUTH THREE TIMES DAILY AS NEEDED FOR MUSCLE SPASMS 90 tablet 5    Multiple Vitamin (MULTI-VITAMINS) TABS TAKE ONE (1) TABLET BY MOUTH ONCE DAILY 90 tablet 10    FEROSUL 325 (65 Fe) MG tablet TAKE (1) TABLET BY MOUTH DAILY 90 tablet 10    apixaban (ELIQUIS) 5 MG TABS tablet Take 1 tablet by mouth 2 times daily 180 tablet 3    Cholecalciferol (VITAMIN D3) 1000 units TABS TAKE 1 TABLET BY MOUTH DAILY 90 tablet 11    Omega-3 Fatty Acids (FISH OIL) 1000 MG CAPS TAKE 2 CAPSULES BY MOUTH DAILY 180 capsule 11    ondansetron (ZOFRAN) 4 MG tablet Take 1 tablet by mouth 3 times daily as needed for Nausea or Vomiting 20 tablet 0    TRULICITY 4.96 MM/3.9TO SOPN Inject 0.75 mg into the skin once a week 0.75mg/0.5ml  1    albuterol (PROVENTIL) (2.5 MG/3ML) 0.083% nebulizer solution Take 2.5 mg by nebulization 4 times daily      magnesium citrate solution Take 296 mLs by mouth once for 1 dose 296 mL 0    Insulin Pen Needle (TRUEPLUS PEN NEEDLES) 31G X 5 MM MISC USE AS DIRECTED DAILY 100 each 2    FREESTYLE LANCETS MISC TEST BLOOD SUGAR THREE TIMES A  each 11    Blood Glucose Monitoring Suppl SOUTH 1 Units by Does not apply route once for 1 dose PLease dispense a machine that is covered by her insurance  DX E11.9 1 Device 0    Incontinence Supply Disposable (POISE PAD) PADS Apply 1 Piece topically as needed (incontinence) 100 each 5    Blood Glucose Monitoring Suppl SOUTH 1 each by Does not apply route 3 times daily 1 Device 0    Alcohol Swabs (EASY TOUCH ALCOHOL PREP MEDIUM) 70 % PADS USE FOUR TIMES DAILY 100 each 5    FREESTYLE LITE strip TEST BLOOD SUGAR THREE TIMES A  each 5    Blood Pressure Monitoring (B-D ASSURE BPM/AUTO ARM CUFF) MISC 1 Device by Does not apply route daily as needed (htn) 1 each 0       ALLERGIES    Allergies   Allergen Reactions    Norco [Hydrocodone-Acetaminophen]      Nausea and vomiting    Oxycodone Nausea And Vomiting    Percocet [Oxycodone-Acetaminophen] Nausea And Vomiting    Sulfa Antibiotics Other (See Comments)     Hallucinations.        FAMILY HISTORY    Family History   Problem Relation Age of Onset    Heart Disease Mother     High Cholesterol Mother     No Known Problems Son        SOCIAL HISTORY    Social History     Socioeconomic History    Marital status: Single     Spouse name: None    Number of children: None    Years of education: None    Highest education level: None   Occupational History    None   Social Needs    Financial resource strain: None    Food insecurity     Worry: None Bactrim DS. Treat UTI with Macrobid. Patient had a good response to IV fluids and IV Zofran which she received in the ER. Patient was stable on discharge. FINAL IMPRESSION    1. Nausea and vomiting  2. Abdominal pain  3   UTI    Summation      Patient Course: Discontinue Bactrim DS. Treat with Macrobid. Patient had a good response to treatment she received in the ER. She was stable on discharge. ED Medications administered this visit:     Medications   0.9 % sodium chloride bolus (0 mLs Intravenous Stopped 4/12/20 0013)   ondansetron (ZOFRAN) injection 4 mg (4 mg Intravenous Given 4/11/20 4565)   nitrofurantoin (macrocrystal-monohydrate) (MACROBID) capsule 100 mg (100 mg Oral Given 4/12/20 0117)       New Prescriptions from this visit:    New Prescriptions    NITROFURANTOIN, MACROCRYSTAL-MONOHYDRATE, (MACROBID) 100 MG CAPSULE    Take 1 capsule by mouth 2 times daily for 7 days       Follow-up:  Jessie Segundo MD  [place] [address] [address] [place] [address]  [phone]    Schedule an appointment as soon as possible for a visit in 10 days          Final Impression:   1. Non-intractable vomiting with nausea, unspecified vomiting type    2. Generalized abdominal pain    3.  Urinary tract infection without hematuria, site unspecified               (Please note that portions of this note were completed with a voice recognition program.  Efforts were made to edit the dictations but occasionally words are mis-transcribed.)     Madeleine Morales MD  04/12/20 0493

## 2020-04-14 RX ORDER — CHLORAL HYDRATE 500 MG
CAPSULE ORAL
Qty: 180 CAPSULE | Refills: 10 | Status: SHIPPED | OUTPATIENT
Start: 2020-04-14

## 2020-04-14 RX ORDER — APIXABAN 5 MG/1
TABLET, FILM COATED ORAL
Qty: 180 TABLET | Refills: 10 | Status: ON HOLD | OUTPATIENT
Start: 2020-04-14 | End: 2021-01-27 | Stop reason: SDUPTHER

## 2020-04-14 RX ORDER — MELATONIN
Qty: 90 TABLET | Refills: 10 | Status: SHIPPED | OUTPATIENT
Start: 2020-04-14 | End: 2021-07-01

## 2020-04-14 RX ORDER — PEN NEEDLE, DIABETIC 31 GX3/16"
NEEDLE, DISPOSABLE MISCELLANEOUS
Qty: 100 EACH | Refills: 10 | Status: SHIPPED | OUTPATIENT
Start: 2020-04-14 | End: 2020-12-16 | Stop reason: SDUPTHER

## 2020-04-17 ENCOUNTER — VIRTUAL VISIT (OUTPATIENT)
Dept: FAMILY MEDICINE CLINIC | Age: 65
End: 2020-04-17
Payer: COMMERCIAL

## 2020-04-17 ENCOUNTER — HOSPITAL ENCOUNTER (EMERGENCY)
Age: 65
Discharge: HOME | End: 2020-04-17
Attending: EMERGENCY MEDICINE
Payer: COMMERCIAL

## 2020-04-17 VITALS
WEIGHT: 165 LBS | HEIGHT: 67 IN | RESPIRATION RATE: 20 BRPM | OXYGEN SATURATION: 95 % | DIASTOLIC BLOOD PRESSURE: 69 MMHG | BODY MASS INDEX: 25.9 KG/M2 | HEART RATE: 86 BPM | SYSTOLIC BLOOD PRESSURE: 124 MMHG | TEMPERATURE: 98.8 F

## 2020-04-17 LAB
ABSOLUTE EOS #: 0.4 K/UL (ref 0–0.4)
ABSOLUTE IMMATURE GRANULOCYTE: ABNORMAL K/UL (ref 0–0.3)
ABSOLUTE LYMPH #: 1.9 K/UL (ref 1–4.8)
ABSOLUTE MONO #: 0.4 K/UL (ref 0–1)
ALBUMIN SERPL-MCNC: 4.3 G/DL (ref 3.5–5.2)
ALBUMIN/GLOBULIN RATIO: ABNORMAL (ref 1–2.5)
ALP BLD-CCNC: 76 U/L (ref 35–104)
ALT SERPL-CCNC: 24 U/L (ref 5–33)
ANION GAP SERPL CALCULATED.3IONS-SCNC: 12 MMOL/L (ref 9–17)
AST SERPL-CCNC: 24 U/L
BASOPHILS # BLD: 0 % (ref 0–2)
BASOPHILS ABSOLUTE: 0 K/UL (ref 0–0.2)
BILIRUB SERPL-MCNC: 0.24 MG/DL (ref 0.3–1.2)
BILIRUBIN URINE: NEGATIVE
BUN BLDV-MCNC: 15 MG/DL (ref 8–23)
BUN/CREAT BLD: 12 (ref 9–20)
CALCIUM SERPL-MCNC: 9.7 MG/DL (ref 8.6–10.4)
CHLORIDE BLD-SCNC: 103 MMOL/L (ref 98–107)
CO2: 22 MMOL/L (ref 20–31)
COLOR: YELLOW
COMMENT UA: NORMAL
CREAT SERPL-MCNC: 1.22 MG/DL (ref 0.5–0.9)
DIFFERENTIAL TYPE: YES
EOSINOPHILS RELATIVE PERCENT: 5 % (ref 0–5)
GFR AFRICAN AMERICAN: 54 ML/MIN
GFR NON-AFRICAN AMERICAN: 45 ML/MIN
GFR SERPL CREATININE-BSD FRML MDRD: ABNORMAL ML/MIN/{1.73_M2}
GFR SERPL CREATININE-BSD FRML MDRD: ABNORMAL ML/MIN/{1.73_M2}
GLUCOSE BLD-MCNC: 189 MG/DL (ref 70–99)
GLUCOSE URINE: NEGATIVE
HCT VFR BLD CALC: 35.4 % (ref 36–46)
HEMOGLOBIN: 11.9 G/DL (ref 12–16)
IMMATURE GRANULOCYTES: ABNORMAL %
KETONES, URINE: NEGATIVE
LEUKOCYTE ESTERASE, URINE: NEGATIVE
LIPASE: 48 U/L (ref 13–60)
LYMPHOCYTES # BLD: 25 % (ref 15–40)
MCH RBC QN AUTO: 31.1 PG (ref 26–34)
MCHC RBC AUTO-ENTMCNC: 33.6 G/DL (ref 31–37)
MCV RBC AUTO: 92.5 FL (ref 80–100)
MONOCYTES # BLD: 6 % (ref 4–8)
NITRITE, URINE: NEGATIVE
NRBC AUTOMATED: ABNORMAL PER 100 WBC
PDW BLD-RTO: 13.5 % (ref 12.1–15.2)
PH UA: 7 (ref 5–8)
PLATELET # BLD: 247 K/UL (ref 140–450)
PLATELET ESTIMATE: ABNORMAL
PMV BLD AUTO: ABNORMAL FL (ref 6–12)
POTASSIUM SERPL-SCNC: 4.3 MMOL/L (ref 3.7–5.3)
PROTEIN UA: NEGATIVE
RBC # BLD: 3.82 M/UL (ref 4–5.2)
RBC # BLD: ABNORMAL 10*6/UL
SEG NEUTROPHILS: 64 % (ref 47–75)
SEGMENTED NEUTROPHILS ABSOLUTE COUNT: 5 K/UL (ref 2.5–7)
SODIUM BLD-SCNC: 137 MMOL/L (ref 135–144)
SPECIFIC GRAVITY UA: 1.01 (ref 1–1.03)
TOTAL PROTEIN: 7.2 G/DL (ref 6.4–8.3)
TROPONIN INTERP: NORMAL
TROPONIN T: <0.03 NG/ML
TROPONIN, HIGH SENSITIVITY: NORMAL NG/L (ref 0–14)
TURBIDITY: CLEAR
URINE HGB: NEGATIVE
UROBILINOGEN, URINE: NORMAL
WBC # BLD: 7.7 K/UL (ref 3.5–11)
WBC # BLD: ABNORMAL 10*3/UL

## 2020-04-17 PROCEDURE — 83690 ASSAY OF LIPASE: CPT

## 2020-04-17 PROCEDURE — 81003 URINALYSIS AUTO W/O SCOPE: CPT

## 2020-04-17 PROCEDURE — 96375 TX/PRO/DX INJ NEW DRUG ADDON: CPT

## 2020-04-17 PROCEDURE — 80053 COMPREHEN METABOLIC PANEL: CPT

## 2020-04-17 PROCEDURE — 84484 ASSAY OF TROPONIN QUANT: CPT

## 2020-04-17 PROCEDURE — 99442 PR PHYS/QHP TELEPHONE EVALUATION 11-20 MIN: CPT | Performed by: INTERNAL MEDICINE

## 2020-04-17 PROCEDURE — 99284 EMERGENCY DEPT VISIT MOD MDM: CPT

## 2020-04-17 PROCEDURE — 93005 ELECTROCARDIOGRAM TRACING: CPT | Performed by: EMERGENCY MEDICINE

## 2020-04-17 PROCEDURE — 96374 THER/PROPH/DIAG INJ IV PUSH: CPT

## 2020-04-17 PROCEDURE — 6360000002 HC RX W HCPCS: Performed by: EMERGENCY MEDICINE

## 2020-04-17 PROCEDURE — 85025 COMPLETE CBC W/AUTO DIFF WBC: CPT

## 2020-04-17 PROCEDURE — 2580000003 HC RX 258: Performed by: EMERGENCY MEDICINE

## 2020-04-17 RX ORDER — ONDANSETRON 2 MG/ML
4 INJECTION INTRAMUSCULAR; INTRAVENOUS ONCE
Status: COMPLETED | OUTPATIENT
Start: 2020-04-17 | End: 2020-04-17

## 2020-04-17 RX ORDER — FENTANYL CITRATE 50 UG/ML
100 INJECTION, SOLUTION INTRAMUSCULAR; INTRAVENOUS ONCE
Status: COMPLETED | OUTPATIENT
Start: 2020-04-17 | End: 2020-04-17

## 2020-04-17 RX ORDER — 0.9 % SODIUM CHLORIDE 0.9 %
1000 INTRAVENOUS SOLUTION INTRAVENOUS ONCE
Status: COMPLETED | OUTPATIENT
Start: 2020-04-17 | End: 2020-04-17

## 2020-04-17 RX ADMIN — ONDANSETRON 4 MG: 2 INJECTION INTRAMUSCULAR; INTRAVENOUS at 18:52

## 2020-04-17 RX ADMIN — SODIUM CHLORIDE 1000 ML: 9 INJECTION, SOLUTION INTRAVENOUS at 16:53

## 2020-04-17 RX ADMIN — FENTANYL CITRATE 100 MCG: 50 INJECTION, SOLUTION INTRAMUSCULAR; INTRAVENOUS at 18:24

## 2020-04-17 ASSESSMENT — PAIN SCALES - GENERAL
PAINLEVEL_OUTOF10: 0
PAINLEVEL_OUTOF10: 9
PAINLEVEL_OUTOF10: 8

## 2020-04-17 NOTE — ED PROVIDER NOTES
EMERGENCY DEPARTMENT ENCOUNTER      CHIEF COMPLAINT    Chief Complaint   Patient presents with    Abdominal Pain     ongoning abdominal pain has been seen twice in the last two weeks in the ER for the same thing PCP told her the abdominal pain is probably due to the trulicity injection she is on        HPI    Moy Lopez is a 62 y.o. female who presentsto ED from home. By car. With complaint of LLQ abdominal pain. Onset x 1 week. Patient had 2 prior visits for the same symptoms. Patient was seen emergency room and started on antibiotics several days ago. Intensity of symptoms moderate pain off and on  Location of symptoms left lower quadrant. Also complains of nausea no vomiting. Patient denies diarrhea patient states that he had 2 normal bowel movements today. No blood in the stool. Patient  denies fever, denies chills. Patient denies shortness of breath denies cough or congestion.       PAST MEDICAL HISTORY    Past Medical History:   Diagnosis Date    Asthma     Atrial fibrillation ([address]     COPD (chronic obstructive pulmonary disease) ([address]     DDD (degenerative disc disease)     Diabetes mellitus ([address]     Hyperlipidemia     Hypertension     Hyperthyroidism     Type II or unspecified type diabetes mellitus without mention of complication, not stated as uncontrolled        SURGICAL HISTORY    Past Surgical History:   Procedure Laterality Date    BACK SURGERY      COLONOSCOPY  04/23/14   [facility] DENTAL SURGERY N/A 3/8/2017    REMOVAL OF BILATERAL MANDIBULAR LELO AND MAXILLARY EDENTULOUS ALVEOPLASTY performed by Clem Mcmillan DDS at [facility] 80      removal    GASTROSTOMY TUBE PLACEMENT      TONSILLECTOMY      TRACHEOSTOMY      TRACHEOSTOMY CLOSURE      TUBAL LIGATION      UPPER GASTROINTESTINAL ENDOSCOPY  3/16/2016    Peg tube insertion       CURRENT MEDICATIONS    Current Outpatient Rx   Medication Sig Dispense Refill    Cholecalciferol (VITAMIN D3) 25 MCG worse.  Up with primary care provider on Monday. ED Medications administered this visit:    Medications   0.9 % sodium chloride bolus (0 mLs Intravenous Stopped 4/17/20 1757)   fentaNYL (SUBLIMAZE) injection 100 mcg (100 mcg Intravenous Given 4/17/20 1824)   ondansetron (ZOFRAN) injection 4 mg (4 mg Intravenous Given 4/17/20 1852)       New Prescriptions from this visit:    New Prescriptions    No medications on file       Follow-up:  Esme Magaña MD  [place] [address] [address] [place] [address]  [phone]    In 2 days          Final Impression:   1.  Abdominal pain, unspecified abdominal location               (Please note that portions of this note were completed with a voice recognition program.  Efforts were made to edit the dictations but occasionally words are mis-transcribed.)        Ruth Vitale MD  04/17/20 8168

## 2020-04-17 NOTE — PROGRESS NOTES
Ankit Jacinto is a 62 y.o. female evaluated via telephone on 4/17/2020. Consent:  She and/or health care decision maker is aware that that she may receive a bill for this telephone service, depending on her insurance coverage, and has provided verbal consent to proceed: Yes      Documentation:  I communicated with the patient and/or health care decision maker about Abdominal pain  States went to ER on 4/10/20 for abd. Pain, nausea and vomiting. ER records reviewed. Was diagnosed with UTI and discharged with Bactrim. She also had elevated Lipase level 99. CBC was normal. Her lactic acid was 2.4. She had worsening renal function. States the next day was not feeling better and went to ER again. Was told to stop bactrim due to renal failure. This time was discharged with Macrobid. States still taking it. Urine cx showed no growth. She also had a CT abd/pelvis on 4/12/20 showing no acute process, no renal stone , mild diverticulosis w/o diverticulitis. Efra Dk states she is still having some abdominal pain in the upper abdomen and occasional hausea vomiting. We reviewed her labs. It is noted she is on Trulicity. She confirms that still takes injections once a week on Mondays. She believes she has been on this medication for about one year. Since her ER work was unremarkable except UTI which is being treated, I feel her persisting symptoms are probably related to Trulicity side effects. Advised to stop using it for a month to see if pain gets better. She is also advised to go to ER if symptoms worsen   . Details of this discussion including any medical advice provided:       I affirm this is a Patient Initiated Episode with an Established Patient who has not had a related appointment within my department in the past 7 days or scheduled within the next 24 hours. Total Time: minutes: 11-20 minutes.  Total of 14 minutes spent    Note: not billable if this call serves to triage the patient into an

## 2020-04-18 ENCOUNTER — CARE COORDINATION (OUTPATIENT)
Dept: CARE COORDINATION | Age: 65
End: 2020-04-18

## 2020-04-18 LAB
EKG ATRIAL RATE: 82 BPM
EKG P AXIS: 86 DEGREES
EKG P-R INTERVAL: 222 MS
EKG Q-T INTERVAL: 392 MS
EKG QRS DURATION: 84 MS
EKG QTC CALCULATION (BAZETT): 457 MS
EKG R AXIS: 73 DEGREES
EKG T AXIS: 76 DEGREES
EKG VENTRICULAR RATE: 82 BPM

## 2020-04-18 PROCEDURE — 93010 ELECTROCARDIOGRAM REPORT: CPT | Performed by: INTERNAL MEDICINE

## 2020-04-27 ENCOUNTER — OFFICE VISIT (OUTPATIENT)
Dept: FAMILY MEDICINE CLINIC | Age: 65
End: 2020-04-27
Payer: COMMERCIAL

## 2020-04-27 VITALS
OXYGEN SATURATION: 96 % | HEART RATE: 68 BPM | DIASTOLIC BLOOD PRESSURE: 60 MMHG | BODY MASS INDEX: 28.35 KG/M2 | SYSTOLIC BLOOD PRESSURE: 110 MMHG | WEIGHT: 181 LBS

## 2020-04-27 PROCEDURE — 3017F COLORECTAL CA SCREEN DOC REV: CPT | Performed by: INTERNAL MEDICINE

## 2020-04-27 PROCEDURE — G8417 CALC BMI ABV UP PARAM F/U: HCPCS | Performed by: INTERNAL MEDICINE

## 2020-04-27 PROCEDURE — 99214 OFFICE O/P EST MOD 30 MIN: CPT | Performed by: INTERNAL MEDICINE

## 2020-04-27 PROCEDURE — 1036F TOBACCO NON-USER: CPT | Performed by: INTERNAL MEDICINE

## 2020-04-27 PROCEDURE — G8427 DOCREV CUR MEDS BY ELIG CLIN: HCPCS | Performed by: INTERNAL MEDICINE

## 2020-04-27 RX ORDER — SENNA PLUS 8.6 MG/1
1 TABLET ORAL 2 TIMES DAILY
Qty: 60 TABLET | Refills: 11 | Status: SHIPPED | OUTPATIENT
Start: 2020-04-27 | End: 2021-01-29 | Stop reason: SDUPTHER

## 2020-04-27 RX ORDER — DICYCLOMINE HYDROCHLORIDE 10 MG/1
10 CAPSULE ORAL 4 TIMES DAILY
Qty: 120 CAPSULE | Refills: 3 | Status: SHIPPED | OUTPATIENT
Start: 2020-04-27 | End: 2021-11-03

## 2020-04-27 ASSESSMENT — ENCOUNTER SYMPTOMS
SORE THROAT: 0
HEMATOCHEZIA: 0
BACK PAIN: 1
CONSTIPATION: 1
NAUSEA: 1
ABDOMINAL PAIN: 1
SHORTNESS OF BREATH: 0
BELCHING: 0
VOMITING: 0
COUGH: 0
SINUS PRESSURE: 0

## 2020-04-27 NOTE — PROGRESS NOTES
history of colon cancer, irritable bowel syndrome or PUD. Constipation   This is a chronic problem. Episode onset: about 6 months ago. The problem is unchanged. Her stool frequency is 2 to 3 times per week. The stool is described as firm. The patient is not on a high fiber diet. She does not exercise regularly. Associated symptoms include abdominal pain, back pain and nausea. Pertinent negatives include no difficulty urinating, fever, hematochezia, vomiting or weight loss. Risk factors include dietary change and obesity. She has tried stool softeners for the symptoms. The treatment provided no relief. There is no history of irritable bowel syndrome. Past Medical History:     Past Medical History:   Diagnosis Date    Asthma     Atrial fibrillation ([address]     COPD (chronic obstructive pulmonary disease) ([address]     DDD (degenerative disc disease)     Diabetes mellitus ([address]     Hyperlipidemia     Hypertension     Hyperthyroidism     Type II or unspecified type diabetes mellitus without mention of complication, not stated as uncontrolled       Reviewed all health maintenance requirements and orderedappropriate tests  Health Maintenance Due   Topic Date Due    Shingles Vaccine (1 of 2) 08/01/2006    Diabetic retinal exam  08/11/2019    Breast cancer screen  12/20/2019       Past Surgical History:     Past Surgical History:   Procedure Laterality Date    BACK SURGERY      COLONOSCOPY  04/23/14   [facility] DENTAL SURGERY N/A 3/8/2017    REMOVAL OF BILATERAL MANDIBULAR LELO AND MAXILLARY EDENTULOUS ALVEOPLASTY performed by Holden Galindo DDS at [facility] 80      removal    GASTROSTOMY TUBE PLACEMENT      [facility] GASTROINTESTINAL ENDOSCOPY  3/16/2016    Peg tube insertion        Medications:       Prior to Admission medications    Medication Sig Start Date End Date Taking?  Authorizing Provider senna (SENOKOT) 8.6 MG tablet Take 1 tablet by mouth 2 times daily 4/27/20 4/27/21 Yes Isela Cooley MD   dicyclomine (BENTYL) 10 MG capsule Take 1 capsule by mouth 4 times daily 4/27/20  Yes Isela Cooley MD   Cholecalciferol (VITAMIN D3) 25 MCG (1000 UT) TABS TAKE (1) TABLET BY MOUTH DAILY 4/14/20  Yes Isela Cooley MD   Omega-3 Fatty Acids (FISH OIL) 1000 MG CAPS TAKE TWO (2) CAPSULES BY MOUTH DAILY 4/14/20  Yes Isela Cooley MD   ELIQUIS 5 MG TABS tablet TAKE ONE (1) TABLET BY MOUTH TWICE DAILY 4/14/20  Yes Isela Cooley MD   ondansetron (ZOFRAN ODT) 4 MG disintegrating tablet Take 1 tablet by mouth every 4 hours as needed for Nausea or Vomiting 4/10/20  Yes Anderson Bynum MD   hydroCHLOROthiazide (HYDRODIURIL) 25 MG tablet TAKE 1 TABLET BY MOUTH EVERY MORNING 3/16/20  Yes Isela Cooley MD   amLODIPine (NORVASC) 10 MG tablet  1/27/20  Yes Constantino Provider, MD   diltiazem (CARDIZEM CD) 120 MG extended release capsule Take 1 capsule by mouth daily 2/10/20  Yes Isela Cooley MD   levothyroxine (SYNTHROID) 88 MCG tablet Take 1 tablet by mouth daily 2/10/20  Yes Isela Cooley MD   insulin glargine (LANTUS SOLOSTAR) 100 UNIT/ML injection pen Inject 25 Units into the skin nightly 1/29/20  Yes Isela Cooley MD   potassium chloride (KLOR-CON M) 20 MEQ extended release tablet Take 1 tablet by mouth daily 1/14/20  Yes Isela Cooley MD   oxybutynin (DITROPAN XL) 15 MG extended release tablet Take 1 tablet by mouth daily 12/17/19  Yes Kathleen Hall MD   loratadine (CLARITIN) 10 MG tablet TAKE 1 TABLET BY MOUTH EVERY DAY 10/17/19  Yes Isela Cooley MD   atorvastatin (LIPITOR) 40 MG tablet TAKE 1 TABLET BY MOUTH ONCE DAILY 10/14/19  Yes Isela Cooley MD   metoprolol tartrate (LOPRESSOR) 25 MG tablet TAKE 1 TABLET BY MOUTH 2 TIMES DAILY 9/19/19  Yes Isela Cooley MD   lisinopril (PRINIVIL;ZESTRIL) 5 MG tablet TAKE 1 TABLET BY MOUTH EVERY DAY 8/25/19  Yes Isela Cooley MD cyclobenzaprine (FLEXERIL) 5 MG tablet TAKE ONE (1) TABLET BY MOUTH THREE TIMES DAILY AS NEEDED FOR MUSCLE SPASMS 8/1/19  Yes Xochitl Brown MD   Multiple Vitamin (MULTI-VITAMINS) TABS TAKE ONE (1) TABLET BY MOUTH ONCE DAILY 4/23/19  Yes Xochitl Brown MD   FEROSUL 325 (65 Fe) MG tablet TAKE (1) TABLET BY MOUTH DAILY 4/23/19  Yes Xochitl Brown MD   albuterol (PROVENTIL) (2.5 MG/3ML) 0.083% nebulizer solution Take 2.5 mg by nebulization 4 times daily   Yes Historical Provider, MD   Insulin Pen Needle (TRUEPLUS PEN NEEDLES) 31G X 5 MM MISC USE AS DIRECTED DAILY 4/14/20   Xochitl Brown MD   gabapentin (NEURONTIN) 800 MG tablet TAKE 1 TABLET BY MOUTH TWICE DAILY 3/16/20 4/16/20  Xochitl Brown MD   magnesium citrate solution Take 296 mLs by mouth once for 1 dose 2/20/20 2/20/20  Xochitl Brown MD   albuterol sulfate  (90 Base) MCG/ACT inhaler INHALE 2 PUFFS BY MOUTH EVERY 6 HOURS AS NEEDED FOR WHEEZING 9/19/19   Xochitl Brown MD   FREESTYLE LANCETS MISC TEST BLOOD SUGAR THREE TIMES A DAY 8/28/18   Xochitl Brown MD   Blood Glucose Monitoring Suppl SOUTH 1 Units by Does not apply route once for 1 dose PLease dispense a machine that is covered by her insurance  DX E11.9 4/26/18 4/26/18  Xochitl Brown MD   Incontinence Supply Disposable (POISE PAD) PADS Apply 1 Piece topically as needed (incontinence) 11/21/17   Alexandria Hughes MD   Blood Glucose Monitoring Suppl SOUTH 1 each by Does not apply route 3 times daily 6/6/17   Dary Noriega MD   Alcohol Swabs (EASY TOUCH ALCOHOL PREP MEDIUM) 70 % PADS USE FOUR TIMES DAILY 3/29/17   Dary Noriega MD   FREESTYLE LITE strip TEST BLOOD SUGAR THREE TIMES A DAY 3/29/17   Dary Noriega MD   Blood Pressure Monitoring (B-D ASSURE BPM/AUTO ARM CUFF) MISC 1 Device by Does not apply route daily as needed (htn) 8/25/16   Dary Noriega MD        Allergies:       Norco [hydrocodone-acetaminophen];  Oxycodone; Percocet [oxycodone-acetaminophen]; and Sulfa antibiotics    Social History:     Tobacco: reports that she quit smoking about 6 years ago. Her smoking use included cigarettes. She has a 20.00 pack-year smoking history. She has never used smokeless tobacco.  Alcohol:      reports no history of alcohol use. Drug Use:  reports no history of drug use. Family History:     Family History   Problem Relation Age of Onset    Heart Disease Mother     High Cholesterol Mother     No Known Problems Son        Review of Systems:         Review of Systems   Constitutional: Negative for activity change, appetite change, chills, fatigue, fever, unexpected weight change and weight loss. HENT: Positive for dental problem. Negative for congestion, sinus pressure and sore throat. Respiratory: Negative for cough and shortness of breath. Cardiovascular: Negative for chest pain and palpitations. Gastrointestinal: Positive for abdominal pain, constipation and nausea. Negative for hematochezia and vomiting. Endocrine: Negative for cold intolerance and heat intolerance. Genitourinary: Negative for decreased urine volume, difficulty urinating, dysuria, flank pain, frequency, pelvic pain, vaginal discharge and vaginal pain. Musculoskeletal: Positive for arthralgias and back pain. Skin: Negative for rash. Neurological: Negative for headaches. Psychiatric/Behavioral: The patient is not nervous/anxious. Physical Exam:     Vitals:  /60   Pulse 68   Wt 181 lb (82.1 kg)   SpO2 96%   BMI 28.35 kg/m²       Physical Exam  Vitals signs reviewed. Constitutional:       General: She is not in acute distress. Appearance: She is well-developed. She is not ill-appearing or diaphoretic. HENT:      Head: Normocephalic and atraumatic. Right Ear: External ear normal.      Left Ear: External ear normal.      Nose: Nose normal. No congestion. Mouth/Throat:      Mouth: Mucous membranes are moist.      Pharynx: Oropharynx is clear.  No oropharyngeal exudate or posterior oropharyngeal erythema. Eyes:      General:         Right eye: No discharge. Left eye: No discharge. Conjunctiva/sclera: Conjunctivae normal.   Neck:      Thyroid: No thyromegaly. Cardiovascular:      Rate and Rhythm: Normal rate and regular rhythm. Heart sounds: Normal heart sounds. No murmur. Pulmonary:      Effort: Pulmonary effort is normal.      Breath sounds: Normal breath sounds. No wheezing or rales. Abdominal:      General: Bowel sounds are normal. There is no distension. Palpations: Abdomen is soft. There is no mass. Tenderness: There is abdominal tenderness (periumbilical and lower abdomen). There is no right CVA tenderness, left CVA tenderness, guarding or rebound. Hernia: No hernia is present. Musculoskeletal: Normal range of motion. Right lower leg: No edema. Left lower leg: No edema. Lymphadenopathy:      Cervical: No cervical adenopathy. Skin:     General: Skin is warm and dry. Coloration: Skin is not jaundiced or pale. Findings: No rash. Neurological:      Mental Status: She is alert and oriented to person, place, and time.    Psychiatric:         Mood and Affect: Mood normal.         Behavior: Behavior normal.         Judgment: Judgment normal.               Data:     Lab Results   Component Value Date     04/17/2020    K 4.3 04/17/2020     04/17/2020    CO2 22 04/17/2020    BUN 15 04/17/2020    CREATININE 1.22 04/17/2020    GLUCOSE 189 04/17/2020    PROT 7.2 04/17/2020    LABALBU 4.3 04/17/2020    BILITOT 0.24 04/17/2020    ALKPHOS 76 04/17/2020    AST 24 04/17/2020    ALT 24 04/17/2020     Lab Results   Component Value Date    WBC 7.7 04/17/2020    RBC 3.82 04/17/2020    HGB 11.9 04/17/2020    HCT 35.4 04/17/2020    MCV 92.5 04/17/2020    MCH 31.1 04/17/2020    MCHC 33.6 04/17/2020    RDW 13.5 04/17/2020     04/17/2020    MPV NOT REPORTED 04/17/2020     Lab Results   Component Value Date    TSH <0.01 04/10/2020     Lab Results   Component Value Date    CHOL 150 12/09/2019    HDL 50 12/09/2019    LABA1C 7.8 03/02/2020          Assessment & Plan        Diagnosis Orders   1. Constipation, unspecified constipation type  Prescribed Senokot bid, advised to try OTC Magnesium citrate for persisting constipation. Also advised to use OTC fiber supplement s and drink a lot of fluids. senna (SENOKOT) 8.6 MG tablet   2. Abdominal pain, unspecified abdominal location   Possibly due to chronic constipation. Will address constipation issues, also try Bentyl prn  If no improvement in symptoms will refer to GI for evaluation                     Completed Refills   Requested Prescriptions     Signed Prescriptions Disp Refills    senna (SENOKOT) 8.6 MG tablet 60 tablet 11     Sig: Take 1 tablet by mouth 2 times daily    dicyclomine (BENTYL) 10 MG capsule 120 capsule 3     Sig: Take 1 capsule by mouth 4 times daily     Return in about 2 weeks (around 5/11/2020), or constipation and abdominal pain. Orders Placed This Encounter   Medications    senna (SENOKOT) 8.6 MG tablet     Sig: Take 1 tablet by mouth 2 times daily     Dispense:  60 tablet     Refill:  11    dicyclomine (BENTYL) 10 MG capsule     Sig: Take 1 capsule by mouth 4 times daily     Dispense:  120 capsule     Refill:  3     No orders of the defined types were placed in this encounter. There are no Patient Instructions on file for this visit.     Electronically signed by Samuel Hancock MD on 4/27/2020 at 5:08 PM           Completed Refills      Requested Prescriptions     Signed Prescriptions Disp Refills    senna (SENOKOT) 8.6 MG tablet 60 tablet 11     Sig: Take 1 tablet by mouth 2 times daily    dicyclomine (BENTYL) 10 MG capsule 120 capsule 3     Sig: Take 1 capsule by mouth 4 times daily

## 2020-05-07 RX ORDER — DIPHENOXYLATE HYDROCHLORIDE AND ATROPINE SULFATE 2.5; .025 MG/1; MG/1
TABLET ORAL
Qty: 90 TABLET | Refills: 4 | Status: SHIPPED | OUTPATIENT
Start: 2020-05-07 | End: 2021-11-03

## 2020-05-11 ENCOUNTER — OFFICE VISIT (OUTPATIENT)
Dept: FAMILY MEDICINE CLINIC | Age: 65
End: 2020-05-11
Payer: COMMERCIAL

## 2020-05-11 VITALS
DIASTOLIC BLOOD PRESSURE: 74 MMHG | WEIGHT: 176.6 LBS | BODY MASS INDEX: 30.15 KG/M2 | HEIGHT: 64 IN | SYSTOLIC BLOOD PRESSURE: 119 MMHG | RESPIRATION RATE: 18 BRPM | HEART RATE: 76 BPM | OXYGEN SATURATION: 97 %

## 2020-05-11 PROCEDURE — G8417 CALC BMI ABV UP PARAM F/U: HCPCS | Performed by: INTERNAL MEDICINE

## 2020-05-11 PROCEDURE — 99213 OFFICE O/P EST LOW 20 MIN: CPT | Performed by: INTERNAL MEDICINE

## 2020-05-11 PROCEDURE — 1036F TOBACCO NON-USER: CPT | Performed by: INTERNAL MEDICINE

## 2020-05-11 PROCEDURE — G8427 DOCREV CUR MEDS BY ELIG CLIN: HCPCS | Performed by: INTERNAL MEDICINE

## 2020-05-11 PROCEDURE — 3017F COLORECTAL CA SCREEN DOC REV: CPT | Performed by: INTERNAL MEDICINE

## 2020-05-11 RX ORDER — DULAGLUTIDE 1.5 MG/.5ML
1.5 INJECTION, SOLUTION SUBCUTANEOUS WEEKLY
COMMUNITY
Start: 2020-04-06 | End: 2021-12-22 | Stop reason: SDUPTHER

## 2020-05-11 ASSESSMENT — ENCOUNTER SYMPTOMS
NAUSEA: 0
RECTAL PAIN: 0
CONSTIPATION: 1
SHORTNESS OF BREATH: 0
COUGH: 0
SORE THROAT: 0
DIARRHEA: 0
ABDOMINAL PAIN: 1
VOMITING: 0

## 2020-05-11 NOTE — PROGRESS NOTES
HPI Notes    Name: Chavo Colunga  : 1955         Chief Complaint:     Chief Complaint   Patient presents with    Constipation     states \"has improved and the pain has went away\"       History of Present Illness:        Kb Ospina presents to office to follow up for constipation and abdominal pain. He was seen on 2020 in our office for ER follow-up for abdominal pain. She was seen in ER for abdominal pain and associated nausea/vomiting on 20 and 20. She had CT abd/pelvis w/o contrast on 20 and it did not show acute abnormalities  Labs were unremarkable . The patient was prescribed Senokot twice daily and advised to try OTC magnesium citrate for constipation. States that constipation improved and abdominal pain resolved. States takes Senokot bid and Magnesium citrate only as needed. Has no diarrhea. She is happy her bowels are moving every day  She is eating more salads. Avoids fried and greasy food. Constipation   This is a chronic problem. The current episode started more than 1 month ago. The problem has been gradually improving since onset. Her stool frequency is 1 time per day. The patient is on a high fiber diet. She does not exercise regularly. There has been adequate water intake. Associated symptoms include abdominal pain. Pertinent negatives include no diarrhea, fever, nausea, rectal pain or vomiting. Risk factors include immobility, obesity and dietary change. She has tried laxatives, fiber and stool softeners for the symptoms. The treatment provided significant relief.            Past Medical History:     Past Medical History:   Diagnosis Date    Asthma     Atrial fibrillation ([address]     COPD (chronic obstructive pulmonary disease) ([address]     DDD (degenerative disc disease)     Diabetes mellitus ([address]     Hyperlipidemia     Hypertension     Hyperthyroidism     Type II or unspecified type diabetes mellitus without mention of complication, not stated as uncontrolled       Reviewed all health maintenance requirements and orderedappropriate tests  Health Maintenance Due   Topic Date Due    Diabetic retinal exam  08/11/2019    Breast cancer screen  12/20/2019       Past Surgical History:     Past Surgical History:   Procedure Laterality Date    BACK SURGERY      COLONOSCOPY  04/23/14   [facility] DENTAL SURGERY N/A 3/8/2017    REMOVAL OF BILATERAL MANDIBULAR LELO AND MAXILLARY EDENTULOUS ALVEOPLASTY performed by Eliane Wesley DDS at [facility] 80      removal    GASTROSTOMY TUBE PLACEMENT      TONSILLECTOMY      TRACHEOSTOMY      TRACHEOSTOMY CLOSURE      TUBAL LIGATION      UPPER GASTROINTESTINAL ENDOSCOPY  3/16/2016    Peg tube insertion        Medications:       Prior to Admission medications    Medication Sig Start Date End Date Taking?  Authorizing Provider   Multiple Vitamin (MULTI-VITAMINS) TABS TAKE 1 TABLET BY MOUTH EVERY DAY 5/7/20  Yes Esme Magaña MD   senna (SENOKOT) 8.6 MG tablet Take 1 tablet by mouth 2 times daily 4/27/20 4/27/21 Yes Esme Magaña MD   dicyclomine (BENTYL) 10 MG capsule Take 1 capsule by mouth 4 times daily 4/27/20  Yes Esme Magaña MD   Cholecalciferol (VITAMIN D3) 25 MCG (1000 UT) TABS TAKE (1) TABLET BY MOUTH DAILY 4/14/20  Yes Esme Magaña MD   Omega-3 Fatty Acids (FISH OIL) 1000 MG CAPS TAKE TWO (2) CAPSULES BY MOUTH DAILY 4/14/20  Yes Esme Magaña MD   ELIQUIS 5 MG TABS tablet TAKE ONE (1) TABLET BY MOUTH TWICE DAILY 4/14/20  Yes Esme Magaña MD   Insulin Pen Needle (TRUEPLUS PEN NEEDLES) 31G X 5 MM MISC USE AS DIRECTED DAILY 4/14/20  Yes Esme Magaña MD   ondansetron (ZOFRAN ODT) 4 MG disintegrating tablet Take 1 tablet by mouth every 4 hours as needed for Nausea or Vomiting 4/10/20  Yes Sadaf Obrien MD   hydroCHLOROthiazide (HYDRODIURIL) 25 MG tablet TAKE 1 TABLET BY MOUTH EVERY MORNING 3/16/20  Yes Esme Magaña MD   amLODIPine (NORVASC) 10 MG tablet  1/27/20  Yes headaches. Psychiatric/Behavioral: The patient is not nervous/anxious. Physical Exam:     Vitals:  /74 (Site: Right Upper Arm, Position: Sitting, Cuff Size: Large Adult)   Pulse 76   Resp 18   Ht 5' 4\" (1.626 m)   Wt 176 lb 9.6 oz (80.1 kg)   SpO2 97%   BMI 30.31 kg/m²       Physical Exam  Vitals signs reviewed. Constitutional:       General: She is not in acute distress. Appearance: She is well-developed. HENT:      Head: Normocephalic and atraumatic. Nose: Nose normal. No congestion. Mouth/Throat:      Mouth: Mucous membranes are moist.   Neck:      Thyroid: No thyromegaly. Cardiovascular:      Rate and Rhythm: Normal rate and regular rhythm. Heart sounds: Normal heart sounds. No murmur. Pulmonary:      Effort: Pulmonary effort is normal.      Breath sounds: Normal breath sounds. No wheezing or rales. Abdominal:      General: Bowel sounds are normal. There is no distension. Palpations: Abdomen is soft. There is no mass. Tenderness: There is no abdominal tenderness. There is no right CVA tenderness, left CVA tenderness, guarding or rebound. Musculoskeletal: Normal range of motion. Right lower leg: No edema. Left lower leg: No edema. Lymphadenopathy:      Cervical: No cervical adenopathy. Skin:     General: Skin is warm and dry. Coloration: Skin is not jaundiced or pale. Findings: No rash. Neurological:      Mental Status: She is alert and oriented to person, place, and time.    Psychiatric:         Behavior: Behavior normal.         Judgment: Judgment normal.               Data:     Lab Results   Component Value Date     04/17/2020    K 4.3 04/17/2020     04/17/2020    CO2 22 04/17/2020    BUN 15 04/17/2020    CREATININE 1.22 04/17/2020    GLUCOSE 189 04/17/2020    PROT 7.2 04/17/2020    LABALBU 4.3 04/17/2020    BILITOT 0.24 04/17/2020    ALKPHOS 76 04/17/2020    AST 24 04/17/2020    ALT 24 04/17/2020     Lab

## 2020-05-11 NOTE — PATIENT INSTRUCTIONS
SURVEY:    You may be receiving a survey from [employer] regarding your visit today. Please complete the survey to enable us to provide the highest quality of care to you and your family. If you cannot score us a very good on any question, please call the office to discuss how we could have made your experience a very good one. Thank you.

## 2020-05-14 ENCOUNTER — HOSPITAL ENCOUNTER (OUTPATIENT)
Dept: PHYSICAL THERAPY | Age: 65
Setting detail: THERAPIES SERIES
Discharge: HOME | End: 2020-05-14
Payer: COMMERCIAL

## 2020-05-14 PROCEDURE — 97110 THERAPEUTIC EXERCISES: CPT

## 2020-05-14 PROCEDURE — 97140 MANUAL THERAPY 1/> REGIONS: CPT

## 2020-05-14 RX ORDER — DILTIAZEM HYDROCHLORIDE 120 MG/1
CAPSULE, COATED, EXTENDED RELEASE ORAL
Qty: 30 CAPSULE | Refills: 5 | Status: SHIPPED | OUTPATIENT
Start: 2020-05-14 | End: 2020-11-16 | Stop reason: SDUPTHER

## 2020-05-14 ASSESSMENT — PAIN SCALES - GENERAL: PAINLEVEL_OUTOF10: 6

## 2020-05-14 ASSESSMENT — PAIN DESCRIPTION - LOCATION: LOCATION: BACK;LEG

## 2020-05-14 ASSESSMENT — PAIN DESCRIPTION - ORIENTATION: ORIENTATION: RIGHT;LEFT

## 2020-05-14 NOTE — TELEPHONE ENCOUNTER
Health Maintenance   Topic Date Due    Diabetic retinal exam  08/11/2019    Breast cancer screen  12/20/2019    Shingles Vaccine (1 of 2) 05/11/2021 (Originally 8/1/2006)    Diabetic foot exam  07/11/2020    Diabetic microalbuminuria test  09/04/2020    Lipid screen  12/09/2020    A1C test (Diabetic or Prediabetic)  03/02/2021    TSH testing  04/10/2021    Potassium monitoring  04/17/2021    Creatinine monitoring  04/17/2021    Cervical cancer screen  12/17/2022    Colon cancer screen colonoscopy  04/23/2024    DTaP/Tdap/Td vaccine (2 - Td) 08/12/2026    Flu vaccine  Completed    Pneumococcal 0-64 years Vaccine  Completed    Hepatitis C screen  Addressed    HIV screen  Addressed    Hepatitis A vaccine  Aged Out    Hib vaccine  Aged Out    Meningococcal (ACWY) vaccine  Aged Out             (applicable per patient's age: Cancer Screenings, Depression Screening, Fall Risk Screening, Immunizations)    Hemoglobin A1C (%)   Date Value   03/02/2020 7.8 (H)   12/12/2019 6.5 (H)   12/09/2019 6.4 (H)     Microalb/Crt.  Ratio (mcg/mg creat)   Date Value   09/04/2019 CANNOT BE CALCULATED     LDL Cholesterol (mg/dL)   Date Value   12/09/2019 72     AST (U/L)   Date Value   04/17/2020 24     ALT (U/L)   Date Value   04/17/2020 24     BUN (mg/dL)   Date Value   04/17/2020 15      (goal A1C is < 7)   (goal LDL is <100) need 30-50% reduction from baseline     BP Readings from Last 3 Encounters:   05/11/20 119/74   04/27/20 110/60   04/17/20 124/69    (goal /80)      All Future Testing planned in [facility]:  Lab Frequency Next Occurrence   XR CHEST STANDARD (2 VW) Once 12/09/2020   CHANEL profile Once 12/12/2019   Electrophoresis Protein, Serum without Reflex to Immunofixation Once 12/12/2019   Immunofixation urine random profile Once 12/12/2019   Calcium Once 05/20/2020   MICHAEL DIGITAL SCREEN W CAD BILATERAL Once 01/14/2020       Next Visit Date:  Future Appointments   Date Time Provider Diomedes Cardenas

## 2020-05-14 NOTE — PROGRESS NOTES
Phone: [address]      Fax: [phone]                            Outpatient Physical Therapy                                                                            Daily Note    Date: 2020  Patient Name: Nelly Orellana        MRN: [id number]   ACCT#:  [de-identified]  : 1955  (62 y.o.)    Referring Practitioner: Dr. Ute Mendosa    Referral Date : 19    Diagnosis: Cervical Neck Pain with DDD, chronic bilateral low back pain with sciatica  Treatment Diagnosis: neck pain, back pain    Onset Date: 19(Referral)  PT Insurance Information: [facility]  Total # of Visits Approved: 16 Per Physician Order  Total # of Visits to Date: 6    Pre-Treatment Pain:  6/10     Assessment  Assessment: PT was on hold for 2 months due to coronavirus outbreak. Pain 6/10 low back and into legs; neck pain 4/10. Main c/o difficulty walking due to legs weak and painful. Strength B hip flexion 4- /5, B hip abd 4-/5, Knee extension 4/5. Oswestry score 17/45,  Patient c/o difficulty doing housework and taking care of herself due to weakness and pain. Resumed PT with strengthening exercises and manual therapy back and neck. See PT recertification for details. Chart Reviewed: Yes    Plan  Plan: Continue with current plan  Comment: see Pt recert    Exercises/Modalities/Manual:  See DocFlow Sheet    Education:           Goals  (Total # of Visits to Date: 6)   Short Term Goals -             Long Term Goals - Time Frame for Long term goals : 12  Long term goal 1: Decrease pain neck and back 4/10 at worst x 3 days  Long term goal 2:  Increase strength B hip flexion 4+/5 to squat and lift with good form  Long term goal 3: Improve functional mobility with Oswestry score < 10/50          Post Treatment Pain:  4/10    Time In: 13:00    Time Out : 13:45        Timed Code Treatment Minutes: 45 Minutes  Total Treatment Time: [address] PT     Date: 2020

## 2020-05-18 ENCOUNTER — APPOINTMENT (OUTPATIENT)
Dept: PHYSICAL THERAPY | Age: 65
End: 2020-05-18
Payer: COMMERCIAL

## 2020-05-21 ENCOUNTER — HOSPITAL ENCOUNTER (OUTPATIENT)
Dept: PHYSICAL THERAPY | Age: 65
Setting detail: THERAPIES SERIES
End: 2020-05-21
Payer: COMMERCIAL

## 2020-05-22 ENCOUNTER — APPOINTMENT (OUTPATIENT)
Dept: PHYSICAL THERAPY | Age: 65
End: 2020-05-22
Payer: COMMERCIAL

## 2020-05-27 ENCOUNTER — HOSPITAL ENCOUNTER (OUTPATIENT)
Dept: PHYSICAL THERAPY | Age: 65
Setting detail: THERAPIES SERIES
Discharge: HOME | End: 2020-05-27
Payer: COMMERCIAL

## 2020-05-27 PROCEDURE — 97140 MANUAL THERAPY 1/> REGIONS: CPT

## 2020-05-27 PROCEDURE — 97110 THERAPEUTIC EXERCISES: CPT

## 2020-05-27 ASSESSMENT — PAIN SCALES - GENERAL: PAINLEVEL_OUTOF10: 9

## 2020-05-29 ENCOUNTER — HOSPITAL ENCOUNTER (OUTPATIENT)
Dept: PHYSICAL THERAPY | Age: 65
Setting detail: THERAPIES SERIES
Discharge: HOME | End: 2020-05-29
Payer: COMMERCIAL

## 2020-05-29 PROCEDURE — 97110 THERAPEUTIC EXERCISES: CPT

## 2020-05-29 PROCEDURE — 97140 MANUAL THERAPY 1/> REGIONS: CPT

## 2020-05-29 ASSESSMENT — PAIN SCALES - GENERAL: PAINLEVEL_OUTOF10: 9

## 2020-06-02 ENCOUNTER — HOSPITAL ENCOUNTER (OUTPATIENT)
Dept: PHYSICAL THERAPY | Age: 65
Setting detail: THERAPIES SERIES
Discharge: HOME | End: 2020-06-02
Payer: COMMERCIAL

## 2020-06-02 PROCEDURE — 97140 MANUAL THERAPY 1/> REGIONS: CPT

## 2020-06-02 PROCEDURE — 97110 THERAPEUTIC EXERCISES: CPT

## 2020-06-02 ASSESSMENT — PAIN DESCRIPTION - LOCATION: LOCATION: NECK

## 2020-06-02 ASSESSMENT — PAIN SCALES - GENERAL: PAINLEVEL_OUTOF10: 6

## 2020-06-08 ENCOUNTER — HOSPITAL ENCOUNTER (OUTPATIENT)
Age: 65
Discharge: HOME | End: 2020-06-08
Payer: COMMERCIAL

## 2020-06-08 ENCOUNTER — HOSPITAL ENCOUNTER (OUTPATIENT)
Dept: PHYSICAL THERAPY | Age: 65
Setting detail: THERAPIES SERIES
Discharge: HOME | End: 2020-06-08
Payer: COMMERCIAL

## 2020-06-08 LAB
ABSOLUTE EOS #: 0.49 K/UL (ref 0–0.4)
ABSOLUTE IMMATURE GRANULOCYTE: ABNORMAL K/UL (ref 0–0.3)
ABSOLUTE LYMPH #: 1.47 K/UL (ref 1–4.8)
ABSOLUTE MONO #: 0.63 K/UL (ref 0–1)
ALBUMIN SERPL-MCNC: 4.8 G/DL (ref 3.5–5.2)
ALBUMIN/GLOBULIN RATIO: ABNORMAL (ref 1–2.5)
ALP BLD-CCNC: 89 U/L (ref 35–104)
ALT SERPL-CCNC: 28 U/L (ref 5–33)
ANION GAP SERPL CALCULATED.3IONS-SCNC: 9 MMOL/L (ref 9–17)
AST SERPL-CCNC: 25 U/L
BASOPHILS # BLD: 4 % (ref 0–2)
BASOPHILS ABSOLUTE: 0.28 K/UL (ref 0–0.2)
BILIRUB SERPL-MCNC: 0.42 MG/DL (ref 0.3–1.2)
BUN BLDV-MCNC: 19 MG/DL (ref 8–23)
BUN/CREAT BLD: 13 (ref 9–20)
CALCIUM SERPL-MCNC: 10.5 MG/DL (ref 8.6–10.4)
CHLORIDE BLD-SCNC: 102 MMOL/L (ref 98–107)
CHOLESTEROL/HDL RATIO: 3.1
CHOLESTEROL: 115 MG/DL
CO2: 28 MMOL/L (ref 20–31)
CREAT SERPL-MCNC: 1.47 MG/DL (ref 0.5–0.9)
DIFFERENTIAL TYPE: ABNORMAL
EOSINOPHILS RELATIVE PERCENT: 7 % (ref 0–5)
ESTIMATED AVERAGE GLUCOSE: 186 MG/DL
GFR AFRICAN AMERICAN: 44 ML/MIN
GFR NON-AFRICAN AMERICAN: 36 ML/MIN
GFR SERPL CREATININE-BSD FRML MDRD: ABNORMAL ML/MIN/{1.73_M2}
GFR SERPL CREATININE-BSD FRML MDRD: ABNORMAL ML/MIN/{1.73_M2}
GLUCOSE BLD-MCNC: 260 MG/DL (ref 70–99)
HBA1C MFR BLD: 8.1 % (ref 4–6)
HCT VFR BLD CALC: 35.3 % (ref 36–46)
HDLC SERPL-MCNC: 37 MG/DL
HEMOGLOBIN: 12.4 G/DL (ref 12–16)
IMMATURE GRANULOCYTES: ABNORMAL %
LDL CHOLESTEROL: 45 MG/DL (ref 0–130)
LYMPHOCYTES # BLD: 21 % (ref 15–40)
MCH RBC QN AUTO: 31.3 PG (ref 26–34)
MCHC RBC AUTO-ENTMCNC: 35.1 G/DL (ref 31–37)
MCV RBC AUTO: 89.1 FL (ref 80–100)
MONOCYTES # BLD: 9 % (ref 4–8)
MORPHOLOGY: ABNORMAL
NRBC AUTOMATED: ABNORMAL PER 100 WBC
PATIENT FASTING?: YES
PDW BLD-RTO: 12 % (ref 12.1–15.2)
PLATELET # BLD: 222 K/UL (ref 140–450)
PLATELET ESTIMATE: ABNORMAL
PMV BLD AUTO: ABNORMAL FL
POTASSIUM SERPL-SCNC: 4.8 MMOL/L (ref 3.7–5.3)
RBC # BLD: 3.96 M/UL (ref 4–5.2)
RBC # BLD: ABNORMAL 10*6/UL
SEG NEUTROPHILS: 59 % (ref 47–75)
SEGMENTED NEUTROPHILS ABSOLUTE COUNT: 4.13 K/UL (ref 2.5–7)
SODIUM BLD-SCNC: 139 MMOL/L (ref 135–144)
T3 FREE: 3.41 PG/ML (ref 2.02–4.43)
THYROXINE, FREE: 1.91 NG/DL (ref 0.93–1.7)
TOTAL PROTEIN: 7.8 G/DL (ref 6.4–8.3)
TRIGL SERPL-MCNC: 165 MG/DL
TSH SERPL DL<=0.05 MIU/L-ACNC: <0.01 MIU/L (ref 0.3–5)
VLDLC SERPL CALC-MCNC: ABNORMAL MG/DL (ref 1–30)
WBC # BLD: 7 K/UL (ref 3.5–11)
WBC # BLD: ABNORMAL 10*3/UL

## 2020-06-08 PROCEDURE — 80053 COMPREHEN METABOLIC PANEL: CPT

## 2020-06-08 PROCEDURE — 84481 FREE ASSAY (FT-3): CPT

## 2020-06-08 PROCEDURE — 84443 ASSAY THYROID STIM HORMONE: CPT

## 2020-06-08 PROCEDURE — 97140 MANUAL THERAPY 1/> REGIONS: CPT

## 2020-06-08 PROCEDURE — 83036 HEMOGLOBIN GLYCOSYLATED A1C: CPT

## 2020-06-08 PROCEDURE — 36415 COLL VENOUS BLD VENIPUNCTURE: CPT

## 2020-06-08 PROCEDURE — 80061 LIPID PANEL: CPT

## 2020-06-08 PROCEDURE — 85025 COMPLETE CBC W/AUTO DIFF WBC: CPT

## 2020-06-08 PROCEDURE — 97110 THERAPEUTIC EXERCISES: CPT

## 2020-06-08 PROCEDURE — 84439 ASSAY OF FREE THYROXINE: CPT

## 2020-06-15 ENCOUNTER — HOSPITAL ENCOUNTER (OUTPATIENT)
Dept: PHYSICAL THERAPY | Age: 65
Setting detail: THERAPIES SERIES
Discharge: HOME | End: 2020-06-15
Payer: COMMERCIAL

## 2020-06-15 PROCEDURE — 97110 THERAPEUTIC EXERCISES: CPT

## 2020-06-15 PROCEDURE — 97140 MANUAL THERAPY 1/> REGIONS: CPT

## 2020-06-15 NOTE — PROGRESS NOTES
Phone: [address]      Fax: [phone]                            Outpatient Physical Therapy                                                                            Daily Note    Date: 6/15/2020  Patient Name: Renuka Ho        MRN: [id number]   ACCT#:  [de-identified]  : 1955  (62 y.o.)    Referring Practitioner: Dr. Gopal Clarke    Referral Date : 19    Diagnosis: Cervical Neck Pain with DDD, chronic bilateral low back pain with sciatica  Treatment Diagnosis: neck pain, back pain    Onset Date: 19(Referral)  PT Insurance Information: [facility]  Total # of Visits Approved: 16 Per Physician Order  Total # of Visits to Date: 6  Canceled Appointment: 3    Pre-Treatment Pain:  0/10     Assessment  Assessment: Patient reports 0/10 pain in neck or back since took meds this morning and rubbed medicated lotion on neck on neck. Main c/o right knee sore and swollen, probably from arthritis per patient. Therex and manual therapy per Doc Flow. Held squat and lifts due to knee pain. Chart Reviewed: Yes    Plan  Plan: Continue with current plan    Exercises/Modalities/Manual:  See DocFlow Sheet    Education:         Goals  (Total # of Visits to Date: 6)   Short Term Goals -          Long Term Goals - Time Frame for Long term goals : 12  Long term goal 1: Decrease pain neck and back 4/10 at worst x 3 days  Long term goal 2:  Increase strength B hip flexion 4+/5 to squat and lift with good form  Long term goal 3: Improve functional mobility with Oswestry score < 10/50          Post Treatment Pain:  0/10    Time In: 13:00    Time Out : 13:40        Timed Code Treatment Minutes: 40 Minutes  Total Treatment Time: 2 Sukh Rivera PT     Date: 6/15/2020

## 2020-06-18 ENCOUNTER — HOSPITAL ENCOUNTER (OUTPATIENT)
Dept: PHYSICAL THERAPY | Age: 65
Setting detail: THERAPIES SERIES
Discharge: HOME | End: 2020-06-18
Payer: COMMERCIAL

## 2020-06-18 PROCEDURE — 97140 MANUAL THERAPY 1/> REGIONS: CPT

## 2020-06-18 PROCEDURE — 97110 THERAPEUTIC EXERCISES: CPT

## 2020-06-18 ASSESSMENT — PAIN SCALES - GENERAL: PAINLEVEL_OUTOF10: 9

## 2020-06-18 NOTE — PROGRESS NOTES
Phone: [address]      Fax: [phone]                            Outpatient Physical Therapy                                                                            Daily Note    Date: 2020  Patient Name: Gayatri Covington        MRN: [id number]   ACCT#:  [de-identified]  : 1955  (62 y.o.)    Referring Practitioner: Dr. Boo Quick    Referral Date : 19    Diagnosis: Cervical Neck Pain with DDD, chronic bilateral low back pain with sciatica  Treatment Diagnosis: neck pain, back pain    Onset Date: 19(Referral)  PT Insurance Information: [facility]  Total # of Visits Approved: 16 Per Physician Order  Total # of Visits to Date: 12  No Show: 3  Canceled Appointment: 3    Pre-Treatment Pain:  9/10     Assessment  Assessment: Patient reports upper neck pain is a 9/10 this afternoon. Overall she notes there hasn't been much improvement with neck and low back pain since beginning therapy. She usually has relief for the rest of the day following therapy, but then returns the next morning. Completed exercises and manual for neck and low back. Following session patient reports neck pain decreased to a 6/10. Plan to continue x2 visits and if no sig improvement will D/C and have patient return to physician. Chart Reviewed: Yes    Plan  Plan: Continue with current plan    Exercises/Modalities/Manual:  See DocFlow Sheet    Education:           Goals  (Total # of Visits to Date: 15)   Short Term Goals -                     Long Term Goals - Time Frame for Long term goals : 12  Long term goal 1: Decrease pain neck and back 4/10 at worst x 3 days  Long term goal 2:  Increase strength B hip flexion 4+/5 to squat and lift with good form  Long term goal 3: Improve functional mobility with Oswestry score < 10/50          Post Treatment Pain:  6/10    Time In: 1450    Time Out : 1530   Timed Code Treatment Minutes: 40 Minutes  Total Treatment Time: 36 Minutes    [place], [place] Date: 6/18/2020

## 2020-06-19 RX ORDER — GLUCOSAMINE HCL/CHONDROITIN SU 500-400 MG
CAPSULE ORAL
Qty: 100 STRIP | Refills: 3 | Status: SHIPPED | OUTPATIENT
Start: 2020-06-19 | End: 2020-11-19

## 2020-06-19 RX ORDER — BLOOD-GLUCOSE METER
1 KIT MISCELLANEOUS DAILY PRN
Qty: 1 KIT | Refills: 0 | Status: SHIPPED | OUTPATIENT
Start: 2020-06-19 | End: 2020-11-19

## 2020-06-19 RX ORDER — LANCETS 30 GAUGE
1 EACH MISCELLANEOUS DAILY
Qty: 100 EACH | Refills: 3 | Status: SHIPPED | OUTPATIENT
Start: 2020-06-19 | End: 2021-11-03

## 2020-06-19 NOTE — TELEPHONE ENCOUNTER
[place]   6/22/2020  2:00 PM Damian Ortiz, PT [facility] PT Dagmar Coreen   6/22/2020  2:00 PM [address] MOBILE VAN UNIT [facility] MOBILE  Thomas Barthel   6/25/2020  3:00  [facility] PT Dagmar Samsonar   6/25/2020  3:00 PM [facility]  Sebastian Davisel   8/10/2020 10:30 AM Esme Magaña MD [facility]   8/10/2020 10:30 AM [facility]  Sebastian Davisel   12/3/2020  9:30 AM Shobha Timmons MD [facility]            Patient Active Problem List:     Cervical neck pain with evidence of disc disease     Paroxysmal atrial fibrillation ([address]     Graves' disease     Asthma with COPD ([address]     Essential hypertension     Type 2 diabetes mellitus without complication, with long-term current use of insulin (HCC)     Iron deficiency anemia     Electromechanical dissociation (EMD) ([facility])     Irritable bowel syndrome with both constipation and diarrhea     Mixed hyperlipidemia

## 2020-06-25 ENCOUNTER — HOSPITAL ENCOUNTER (OUTPATIENT)
Dept: PHYSICAL THERAPY | Age: 65
Setting detail: THERAPIES SERIES
Discharge: HOME | End: 2020-06-25
Payer: COMMERCIAL

## 2020-06-25 PROCEDURE — 97110 THERAPEUTIC EXERCISES: CPT

## 2020-06-25 PROCEDURE — 97140 MANUAL THERAPY 1/> REGIONS: CPT

## 2020-06-25 ASSESSMENT — PAIN SCALES - GENERAL: PAINLEVEL_OUTOF10: 5

## 2020-07-13 RX ORDER — LISINOPRIL 5 MG/1
TABLET ORAL
Qty: 30 TABLET | Refills: 10 | Status: SHIPPED | OUTPATIENT
Start: 2020-07-13 | End: 2021-07-22

## 2020-07-13 NOTE — TELEPHONE ENCOUNTER
Medication pending    . Health Maintenance   Topic Date Due    Diabetic retinal exam  08/11/2019    Breast cancer screen  12/20/2019    Diabetic foot exam  07/11/2020    Shingles Vaccine (1 of 2) 05/11/2021 (Originally 8/1/2006)    Flu vaccine (1) 09/01/2020    Diabetic microalbuminuria test  09/04/2020    A1C test (Diabetic or Prediabetic)  06/08/2021    Lipid screen  06/08/2021    TSH testing  06/08/2021    Potassium monitoring  06/08/2021    Creatinine monitoring  06/08/2021    Cervical cancer screen  12/17/2022    Colon cancer screen colonoscopy  04/23/2024    DTaP/Tdap/Td vaccine (2 - Td) 08/12/2026    Pneumococcal 0-64 years Vaccine  Completed    Hepatitis C screen  Addressed    HIV screen  Addressed    Hepatitis A vaccine  Aged Out    Hib vaccine  Aged Out    Meningococcal (ACWY) vaccine  Aged Out             (applicable per patient's age: Cancer Screenings, Depression Screening, Fall Risk Screening, Immunizations)    Hemoglobin A1C (%)   Date Value   06/08/2020 8.1 (H)   03/02/2020 7.8 (H)   12/12/2019 6.5 (H)     Microalb/Crt.  Ratio (mcg/mg creat)   Date Value   09/04/2019 CANNOT BE CALCULATED     LDL Cholesterol (mg/dL)   Date Value   06/08/2020 45     AST (U/L)   Date Value   06/08/2020 25     ALT (U/L)   Date Value   06/08/2020 28     BUN (mg/dL)   Date Value   06/08/2020 19      (goal A1C is < 7)   (goal LDL is <100) need 30-50% reduction from baseline     BP Readings from Last 3 Encounters:   05/11/20 119/74   04/27/20 110/60   04/17/20 124/69    (goal /80)      All Future Testing planned in [facility]:  Lab Frequency Next Occurrence   XR CHEST STANDARD (2 VW) Once 12/09/2020   CHANEL profile Once 12/12/2019   Electrophoresis Protein, Serum without Reflex to Immunofixation Once 12/12/2019   Immunofixation urine random profile Once 12/12/2019   Calcium Once 07/07/2020   MICHAEL DIGITAL SCREEN W CAD BILATERAL Once 01/14/2020       Next Visit Date:  Future Appointments   Date Time Provider Diomedes Carednas   8/10/2020 10:30 AM Katerin Watt MD [facility]   8/10/2020 10:30 AM [facility]  Mary Kyleigh   12/3/2020  9:30 AM [place] Swtai Woodard [facility]            Patient Active Problem List:     Cervical neck pain with evidence of disc disease     Paroxysmal atrial fibrillation ([address]     Graves' disease     Asthma with COPD ([address]     Essential hypertension     Type 2 diabetes mellitus without complication, with long-term current use of insulin (HCC)     Iron deficiency anemia     Electromechanical dissociation (EMD) (HCC)     Irritable bowel syndrome with both constipation and diarrhea     Mixed hyperlipidemia

## 2020-07-23 RX ORDER — ALBUTEROL SULFATE 2.5 MG/3ML
2.5 SOLUTION RESPIRATORY (INHALATION) 4 TIMES DAILY
Qty: 120 EACH | Refills: 2 | Status: SHIPPED | OUTPATIENT
Start: 2020-07-23 | End: 2022-07-21 | Stop reason: SDUPTHER

## 2020-07-23 NOTE — TELEPHONE ENCOUNTER
Medication pending    Health Maintenance   Topic Date Due    Diabetic retinal exam  08/11/2019    Breast cancer screen  12/20/2019    Diabetic foot exam  07/11/2020    Shingles Vaccine (1 of 2) 05/11/2021 (Originally 8/1/2006)    Flu vaccine (1) 09/01/2020    Diabetic microalbuminuria test  09/04/2020    A1C test (Diabetic or Prediabetic)  06/08/2021    Lipid screen  06/08/2021    TSH testing  06/08/2021    Potassium monitoring  06/08/2021    Creatinine monitoring  06/08/2021    Cervical cancer screen  12/17/2022    Colon cancer screen colonoscopy  04/23/2024    DTaP/Tdap/Td vaccine (2 - Td) 08/12/2026    Pneumococcal 0-64 years Vaccine  Completed    Hepatitis C screen  Addressed    HIV screen  Addressed    Hepatitis A vaccine  Aged Out    Hib vaccine  Aged Out    Meningococcal (ACWY) vaccine  Aged Out             (applicable per patient's age: Cancer Screenings, Depression Screening, Fall Risk Screening, Immunizations)    Hemoglobin A1C (%)   Date Value   06/08/2020 8.1 (H)   03/02/2020 7.8 (H)   12/12/2019 6.5 (H)     Microalb/Crt.  Ratio (mcg/mg creat)   Date Value   09/04/2019 CANNOT BE CALCULATED     LDL Cholesterol (mg/dL)   Date Value   06/08/2020 45     AST (U/L)   Date Value   06/08/2020 25     ALT (U/L)   Date Value   06/08/2020 28     BUN (mg/dL)   Date Value   06/08/2020 19      (goal A1C is < 7)   (goal LDL is <100) need 30-50% reduction from baseline     BP Readings from Last 3 Encounters:   05/11/20 119/74   04/27/20 110/60   04/17/20 124/69    (goal /80)      All Future Testing planned in [facility]:  Lab Frequency Next Occurrence   XR CHEST STANDARD (2 VW) Once 12/09/2020   CHANEL profile Once 12/12/2019   Electrophoresis Protein, Serum without Reflex to Immunofixation Once 12/12/2019   Immunofixation urine random profile Once 12/12/2019   Calcium Once 08/12/2020   MICHAEL DIGITAL SCREEN W CAD BILATERAL Once 01/14/2020       Next Visit Date:  Future Appointments   Date Time Provider Diomedes Cardenas   8/10/2020 10:30 AM Yen Lemos MD [facility]   8/10/2020 10:30 AM [facility] MOBILE [facility] UNIT [facility]BI[facility]  Jose Guadalupe Sharma   12/3/2020  9:30 AM MD Sparkle Chau [facility]            Patient Active Problem List:     Cervical neck pain with evidence of disc disease     Paroxysmal atrial fibrillation ([address]     Graves' disease     Asthma with COPD ([address]     Essential hypertension     Type 2 diabetes mellitus without complication, with long-term current use of insulin (HCC)     Iron deficiency anemia     Electromechanical dissociation (EMD) (HCC)     Irritable bowel syndrome with both constipation and diarrhea     Mixed hyperlipidemia

## 2020-08-06 ENCOUNTER — HOSPITAL ENCOUNTER (EMERGENCY)
Age: 65
Discharge: HOME | End: 2020-08-06
Attending: INTERNAL MEDICINE
Payer: COMMERCIAL

## 2020-08-06 ENCOUNTER — APPOINTMENT (OUTPATIENT)
Dept: CT IMAGING | Age: 65
End: 2020-08-06
Payer: COMMERCIAL

## 2020-08-06 ENCOUNTER — OFFICE VISIT (OUTPATIENT)
Dept: FAMILY MEDICINE CLINIC | Age: 65
End: 2020-08-06
Payer: COMMERCIAL

## 2020-08-06 VITALS
WEIGHT: 158 LBS | DIASTOLIC BLOOD PRESSURE: 64 MMHG | BODY MASS INDEX: 29.83 KG/M2 | SYSTOLIC BLOOD PRESSURE: 139 MMHG | TEMPERATURE: 98.5 F | OXYGEN SATURATION: 95 % | HEIGHT: 61 IN | RESPIRATION RATE: 18 BRPM | HEART RATE: 99 BPM

## 2020-08-06 VITALS
BODY MASS INDEX: 27.14 KG/M2 | SYSTOLIC BLOOD PRESSURE: 134 MMHG | OXYGEN SATURATION: 98 % | HEART RATE: 109 BPM | HEIGHT: 64 IN | WEIGHT: 159 LBS | DIASTOLIC BLOOD PRESSURE: 82 MMHG | RESPIRATION RATE: 18 BRPM

## 2020-08-06 LAB
-: ABNORMAL
ABSOLUTE EOS #: 0.1 K/UL (ref 0–0.4)
ABSOLUTE IMMATURE GRANULOCYTE: NORMAL K/UL (ref 0–0.3)
ABSOLUTE LYMPH #: 1.7 K/UL (ref 1–4.8)
ABSOLUTE MONO #: 0.5 K/UL (ref 0–1)
ALBUMIN SERPL-MCNC: 4.9 G/DL (ref 3.5–5.2)
ALBUMIN/GLOBULIN RATIO: ABNORMAL (ref 1–2.5)
ALP BLD-CCNC: 85 U/L (ref 35–104)
ALT SERPL-CCNC: 19 U/L (ref 5–33)
AMORPHOUS: ABNORMAL
AMYLASE: 60 U/L (ref 28–100)
ANION GAP SERPL CALCULATED.3IONS-SCNC: 17 MMOL/L (ref 9–17)
AST SERPL-CCNC: 22 U/L
BACTERIA: ABNORMAL
BASOPHILS # BLD: 1 % (ref 0–2)
BASOPHILS ABSOLUTE: 0.1 K/UL (ref 0–0.2)
BILIRUB SERPL-MCNC: 0.5 MG/DL (ref 0.3–1.2)
BILIRUBIN URINE: NEGATIVE
BUN BLDV-MCNC: 23 MG/DL (ref 8–23)
BUN/CREAT BLD: 19 (ref 9–20)
CALCIUM SERPL-MCNC: 11.2 MG/DL (ref 8.6–10.4)
CASTS UA: ABNORMAL /LPF
CHLORIDE BLD-SCNC: 97 MMOL/L (ref 98–107)
CO2: 24 MMOL/L (ref 20–31)
COLOR: YELLOW
COMMENT UA: ABNORMAL
CREAT SERPL-MCNC: 1.23 MG/DL (ref 0.5–0.9)
CRYSTALS, UA: ABNORMAL /HPF
DIFFERENTIAL TYPE: YES
EOSINOPHILS RELATIVE PERCENT: 2 % (ref 0–5)
EPITHELIAL CELLS UA: ABNORMAL /HPF
GFR AFRICAN AMERICAN: 53 ML/MIN
GFR NON-AFRICAN AMERICAN: 44 ML/MIN
GFR SERPL CREATININE-BSD FRML MDRD: ABNORMAL ML/MIN/{1.73_M2}
GFR SERPL CREATININE-BSD FRML MDRD: ABNORMAL ML/MIN/{1.73_M2}
GLUCOSE BLD-MCNC: 127 MG/DL (ref 70–99)
GLUCOSE URINE: NEGATIVE
HCT VFR BLD CALC: 41.6 % (ref 36–46)
HEMOGLOBIN: 14.4 G/DL (ref 12–16)
IMMATURE GRANULOCYTES: NORMAL %
KETONES, URINE: NEGATIVE
LACTIC ACID: 1.2 MMOL/L (ref 0.5–2.2)
LEUKOCYTE ESTERASE, URINE: ABNORMAL
LIPASE: 40 U/L (ref 13–60)
LYMPHOCYTES # BLD: 22 % (ref 15–40)
MCH RBC QN AUTO: 31.2 PG (ref 26–34)
MCHC RBC AUTO-ENTMCNC: 34.7 G/DL (ref 31–37)
MCV RBC AUTO: 89.8 FL (ref 80–100)
MONOCYTES # BLD: 6 % (ref 4–8)
MUCUS: ABNORMAL
NITRITE, URINE: NEGATIVE
NRBC AUTOMATED: NORMAL PER 100 WBC
OTHER OBSERVATIONS UA: ABNORMAL
PDW BLD-RTO: 14.1 % (ref 12.1–15.2)
PH UA: 7 (ref 5–8)
PLATELET # BLD: 373 K/UL (ref 140–450)
PLATELET ESTIMATE: NORMAL
PMV BLD AUTO: NORMAL FL (ref 6–12)
POTASSIUM SERPL-SCNC: 3.6 MMOL/L (ref 3.7–5.3)
PROTEIN UA: ABNORMAL
RBC # BLD: 4.63 M/UL (ref 4–5.2)
RBC # BLD: NORMAL 10*6/UL
RBC UA: ABNORMAL /HPF (ref 0–2)
RENAL EPITHELIAL, UA: ABNORMAL /HPF
SEG NEUTROPHILS: 69 % (ref 47–75)
SEGMENTED NEUTROPHILS ABSOLUTE COUNT: 5.3 K/UL (ref 2.5–7)
SODIUM BLD-SCNC: 138 MMOL/L (ref 135–144)
SPECIFIC GRAVITY UA: 1.01 (ref 1–1.03)
TOTAL PROTEIN: 8.8 G/DL (ref 6.4–8.3)
TRICHOMONAS: ABNORMAL
TROPONIN INTERP: NORMAL
TROPONIN T: <0.03 NG/ML
TROPONIN, HIGH SENSITIVITY: NORMAL NG/L (ref 0–14)
TSH SERPL DL<=0.05 MIU/L-ACNC: <0.01 MIU/L (ref 0.3–5)
TURBIDITY: ABNORMAL
URINE HGB: ABNORMAL
UROBILINOGEN, URINE: NORMAL
WBC # BLD: 7.6 K/UL (ref 3.5–11)
WBC # BLD: NORMAL 10*3/UL
WBC UA: ABNORMAL /HPF
YEAST: ABNORMAL

## 2020-08-06 PROCEDURE — G8417 CALC BMI ABV UP PARAM F/U: HCPCS | Performed by: INTERNAL MEDICINE

## 2020-08-06 PROCEDURE — 36415 COLL VENOUS BLD VENIPUNCTURE: CPT

## 2020-08-06 PROCEDURE — 74176 CT ABD & PELVIS W/O CONTRAST: CPT

## 2020-08-06 PROCEDURE — 1036F TOBACCO NON-USER: CPT | Performed by: INTERNAL MEDICINE

## 2020-08-06 PROCEDURE — 83605 ASSAY OF LACTIC ACID: CPT

## 2020-08-06 PROCEDURE — 87086 URINE CULTURE/COLONY COUNT: CPT

## 2020-08-06 PROCEDURE — 81001 URINALYSIS AUTO W/SCOPE: CPT

## 2020-08-06 PROCEDURE — 3017F COLORECTAL CA SCREEN DOC REV: CPT | Performed by: INTERNAL MEDICINE

## 2020-08-06 PROCEDURE — 85025 COMPLETE CBC W/AUTO DIFF WBC: CPT

## 2020-08-06 PROCEDURE — 82150 ASSAY OF AMYLASE: CPT

## 2020-08-06 PROCEDURE — 99284 EMERGENCY DEPT VISIT MOD MDM: CPT

## 2020-08-06 PROCEDURE — 2580000003 HC RX 258: Performed by: INTERNAL MEDICINE

## 2020-08-06 PROCEDURE — 99213 OFFICE O/P EST LOW 20 MIN: CPT | Performed by: INTERNAL MEDICINE

## 2020-08-06 PROCEDURE — 6370000000 HC RX 637 (ALT 250 FOR IP): Performed by: INTERNAL MEDICINE

## 2020-08-06 PROCEDURE — 6360000002 HC RX W HCPCS: Performed by: INTERNAL MEDICINE

## 2020-08-06 PROCEDURE — G8427 DOCREV CUR MEDS BY ELIG CLIN: HCPCS | Performed by: INTERNAL MEDICINE

## 2020-08-06 PROCEDURE — 84443 ASSAY THYROID STIM HORMONE: CPT

## 2020-08-06 PROCEDURE — 96374 THER/PROPH/DIAG INJ IV PUSH: CPT

## 2020-08-06 PROCEDURE — 84484 ASSAY OF TROPONIN QUANT: CPT

## 2020-08-06 PROCEDURE — 93005 ELECTROCARDIOGRAM TRACING: CPT | Performed by: INTERNAL MEDICINE

## 2020-08-06 PROCEDURE — 83690 ASSAY OF LIPASE: CPT

## 2020-08-06 PROCEDURE — 80053 COMPREHEN METABOLIC PANEL: CPT

## 2020-08-06 RX ORDER — ONDANSETRON 2 MG/ML
4 INJECTION INTRAMUSCULAR; INTRAVENOUS ONCE
Status: COMPLETED | OUTPATIENT
Start: 2020-08-06 | End: 2020-08-06

## 2020-08-06 RX ORDER — CIPROFLOXACIN 500 MG/1
500 TABLET, FILM COATED ORAL ONCE
Status: COMPLETED | OUTPATIENT
Start: 2020-08-06 | End: 2020-08-06

## 2020-08-06 RX ORDER — 0.9 % SODIUM CHLORIDE 0.9 %
1000 INTRAVENOUS SOLUTION INTRAVENOUS ONCE
Status: COMPLETED | OUTPATIENT
Start: 2020-08-06 | End: 2020-08-06

## 2020-08-06 RX ORDER — CIPROFLOXACIN 500 MG/1
500 TABLET, FILM COATED ORAL 2 TIMES DAILY
Qty: 14 TABLET | Refills: 0 | Status: SHIPPED | OUTPATIENT
Start: 2020-08-06 | End: 2020-08-13

## 2020-08-06 RX ORDER — ONDANSETRON 4 MG/1
4 TABLET, ORALLY DISINTEGRATING ORAL EVERY 8 HOURS PRN
Qty: 9 TABLET | Refills: 0 | Status: SHIPPED | OUTPATIENT
Start: 2020-08-06 | End: 2021-01-21

## 2020-08-06 RX ADMIN — CIPROFLOXACIN HYDROCHLORIDE 500 MG: 500 TABLET, FILM COATED ORAL at 16:33

## 2020-08-06 RX ADMIN — ONDANSETRON 4 MG: 2 INJECTION INTRAMUSCULAR; INTRAVENOUS at 14:58

## 2020-08-06 RX ADMIN — SODIUM CHLORIDE 1000 ML: 9 INJECTION, SOLUTION INTRAVENOUS at 14:58

## 2020-08-06 ASSESSMENT — ENCOUNTER SYMPTOMS
BLOOD IN STOOL: 0
VOMITING: 1
TROUBLE SWALLOWING: 0
NAUSEA: 1
ABDOMINAL PAIN: 1
CONSTIPATION: 0
COUGH: 0
SHORTNESS OF BREATH: 0
SORE THROAT: 0

## 2020-08-06 NOTE — ED PROVIDER NOTES
[facility] ED  EMERGENCY DEPARTMENT ENCOUNTER      Pt Name: Clovis Barrios  MRN: [id number]  [place] 1955  Date of evaluation: 8/6/2020  Provider: Kimberlee Luciano MD    [address] [place] [address]       Chief Complaint   Patient presents with    Nausea & Vomiting     3xs today- symptoms x2 days - abdominal pain in the left quadrant that comes and goes         HISTORY OF PRESENT ILLNESS   (Location/Symptom, Timing/Onset, Context/Setting, Quality, Duration, Modifying Factors, Severity)  Note limiting factors. Clovis Barrios is a 60 y.o. female who has a history of diabetes mellitus, pancreatitis, hypertension, atrial fibrillation, Graves' disease, asthma with COPD, irritable bowel syndrome, presents to the emergency department for evaluation and management of intermittent nausea for 1 month. She is also follow-up vomiting and left-sided abdominal pain yesterday. Primary care provider Dr. Mariano Wang called and reported she was concerned about her abdominal pain and the possibility of pancreatitis because of her medications including Trulicity. Last meal was yesterday. When she last ate it made her throw up. Last bowel movement which was normal was 2 days ago. She was evaluated in this emergency department on several occasions for non-intractable vomiting with nausea and abdominal pain in April. She reports that she tolerates morphine for pain control. This patient is being evaluated during the COVID-19 pandemic. Her last test was negative. HPI    Nursing Notes were reviewed. REVIEW OF SYSTEMS    (2-9 systems for level 4, 10 or more for level 5)       REVIEW OF SYSTEMS    Constitutional: Negative for fatigue and fever. Respiratory: Negative for cough, chest tightness and shortness of breath. Cardiovascular: Negative for chest pain, palpitations and leg swelling.    Gastrointestinal: Positive for nausea, vomiting, left-sided abdominal pain, negative for abdominal distention, diarrhea, constipation  Genitourinary: Negative for difficulty urinating, dysuria, hematuria and urgency. Musculoskeletal: Negative for arthralgias, back pain and neck pain. Except as noted above the remainder of the review of systems was reviewed and negative. PASTMEDICAL HISTORY     Past Medical History:   Diagnosis Date    Asthma     Atrial fibrillation (HCC)     [facility]     DDD (degenerative disc disease)     Diabetes mellitus ([facility]     Hyperlipidemia     Hypertension     Hyperthyroidism     Type II or unspecified type diabetes mellitus without mention of complication, not stated as uncontrolled          SURGICAL HISTORY       Past Surgical History:   Procedure Laterality Date    BACK SURGERY      COLONOSCOPY  04/23/14   [facility] DENTAL SURGERY N/A 3/8/2017    REMOVAL OF BILATERAL MANDIBULAR LELO AND MAXILLARY EDENTULOUS ALVEOPLASTY performed by Lois Collins DDS at [place] [address]      removal    GASTROSTOMY TUBE PLACEMENT      TONSILLECTOMY      TRACHEOSTOMY      TRACHEOSTOMY CLOSURE      TUBAL LIGATION      UPPER GASTROINTESTINAL ENDOSCOPY  3/16/2016    Peg tube insertion         CURRENT MEDICATIONS       Previous Medications    ALBUTEROL (PROVENTIL) (2.5 MG/3ML) 0.083% NEBULIZER SOLUTION    Take 3 mLs by nebulization 4 times daily    ALBUTEROL SULFATE  (90 BASE) MCG/ACT INHALER    INHALE 2 PUFFS BY MOUTH EVERY 6 HOURS AS NEEDED FOR WHEEZING    ALCOHOL SWABS (EASY TOUCH ALCOHOL PREP MEDIUM) 70 % PADS    USE FOUR TIMES DAILY    AMLODIPINE (NORVASC) 10 MG TABLET    Take 10 mg by mouth daily     ATORVASTATIN (LIPITOR) 40 MG TABLET    TAKE 1 TABLET BY MOUTH ONCE DAILY    BLOOD GLUCOSE MONITOR STRIPS    Test 3 times a day & as needed for symptoms of irregular blood glucose.  Please dispense what [place] will cover to go with her new Glucometer    BLOOD GLUCOSE MONITORING SUPPL SOUTH    1 each by Does not apply route 3 times daily    BLOOD GLUCOSE MONITORING SUPPL SOUTH    1 Units by Does not apply route once for 1 dose PLease dispense a machine that is covered by her insurance  DX E11.9    BLOOD PRESSURE MONITORING (B-D ASSURE BPM/AUTO ARM CUFF) MISC    1 Device by Does not apply route daily as needed (htn)    CHOLECALCIFEROL (VITAMIN D3) 25 MCG (1000 UT) TABS    TAKE (1) TABLET BY MOUTH DAILY    CYCLOBENZAPRINE (FLEXERIL) 5 MG TABLET    TAKE ONE (1) TABLET BY MOUTH THREE TIMES DAILY AS NEEDED FOR MUSCLE SPASMS    DICYCLOMINE (BENTYL) 10 MG CAPSULE    Take 1 capsule by mouth 4 times daily    DILTIAZEM (CARDIZEM CD) 120 MG EXTENDED RELEASE CAPSULE    TAKE 1 CAPSULE BY MOUTH EVERY DAY    ELIQUIS 5 MG TABS TABLET    TAKE ONE (1) TABLET BY MOUTH TWICE DAILY    FEROSUL 325 (65 FE) MG TABLET    TAKE (1) TABLET BY MOUTH DAILY    FREESTYLE LANCETS MISC    TEST BLOOD SUGAR THREE TIMES A DAY    FREESTYLE LITE STRIP    TEST BLOOD SUGAR THREE TIMES A DAY    GABAPENTIN (NEURONTIN) 800 MG TABLET    TAKE 1 TABLET BY MOUTH TWICE DAILY    GLUCOSE MONITORING KIT (FREESTYLE) MONITORING KIT    1 kit by Does not apply route daily as needed (Glucometer) Please dispense what [employer] will cover    HYDROCHLOROTHIAZIDE (HYDRODIURIL) 25 MG TABLET    TAKE 1 TABLET BY MOUTH EVERY MORNING    INCONTINENCE SUPPLY DISPOSABLE (POISE PAD) PADS    Apply 1 Piece topically as needed (incontinence)    INSULIN GLARGINE (LANTUS SOLOSTAR) 100 UNIT/ML INJECTION PEN    Inject 25 Units into the skin nightly    INSULIN PEN NEEDLE (TRUEPLUS PEN NEEDLES) 31G X 5 MM MISC    USE AS DIRECTED DAILY    LANCETS MISC    1 each by Does not apply route daily Tests 3 times a day/PRN.  Please dispense what [employer] covers to go with her new Glucometer    LEVOTHYROXINE (SYNTHROID) 88 MCG TABLET    Take 1 tablet by mouth daily    LISINOPRIL (PRINIVIL;ZESTRIL) 5 MG TABLET    TAKE 1 TABLET BY MOUTH EVERY DAY    LORATADINE (CLARITIN) 10 MG TABLET    TAKE 1 TABLET BY MOUTH EVERY DAY    MAGNESIUM CITRATE SOLUTION    Take 296 mLs by mouth once for 1 dose    METOPROLOL TARTRATE (LOPRESSOR) 25 MG TABLET    TAKE 1 TABLET BY MOUTH 2 TIMES DAILY    MULTIPLE VITAMIN (MULTI-VITAMINS) TABS    TAKE 1 TABLET BY MOUTH EVERY DAY    OMEGA-3 FATTY ACIDS (FISH OIL) 1000 MG CAPS    TAKE TWO (2) CAPSULES BY MOUTH DAILY    ONDANSETRON (ZOFRAN ODT) 4 MG DISINTEGRATING TABLET    Take 1 tablet by mouth every 4 hours as needed for Nausea or Vomiting    OXYBUTYNIN (DITROPAN XL) 15 MG EXTENDED RELEASE TABLET    Take 1 tablet by mouth daily    POTASSIUM CHLORIDE (KLOR-CON M) 20 MEQ EXTENDED RELEASE TABLET    Take 1 tablet by mouth daily    SENNA (SENOKOT) 8.6 MG TABLET    Take 1 tablet by mouth 2 times daily    TRULICITY 1.5 TT/7.2ZX SOPN    1.5 mg once a week Monday's       ALLERGIES     Norco [hydrocodone-acetaminophen];  Oxycodone; Percocet [oxycodone-acetaminophen]; and Sulfa antibiotics    FAMILY HISTORY       Family History   Problem Relation Age of Onset    Heart Disease Mother     High Cholesterol Mother     No Known Problems Son           SOCIAL HISTORY       Social History     Socioeconomic History    Marital status: Single     Spouse name: None    Number of children: None    Years of education: None    Highest education level: None   Occupational History    None   Social Needs    Financial resource strain: None    Food insecurity     Worry: None     Inability: None    Transportation needs     Medical: None     Non-medical: None   Tobacco Use    Smoking status: Former Smoker     Packs/day: 1.00     Years: 20.00     Pack years: 20.00     Types: Cigarettes     Last attempt to quit: 10/23/2013     Years since quittin.7    Smokeless tobacco: Never Used   Substance and Sexual Activity    Alcohol use: No     Comment: beer once a month    Drug use: No     Comment: former marijuana    Sexual activity: None   Lifestyle    Physical activity     Days per week: None     Minutes per session: None    Stress: None Relationships    Social connections     Talks on phone: None     Gets together: None     Attends Zoroastrian service: None     Active member of club or organization: None     Attends meetings of clubs or organizations: None     Relationship status: None    Intimate partner violence     Fear of current or ex partner: None     Emotionally abused: None     Physically abused: None     Forced sexual activity: None   Other Topics Concern    None   Social History Narrative    None       SCREENINGS    [place] Coma Scale  Eye Opening: Spontaneous  Best Verbal Response: Oriented  Best Motor Response: Obeys commands  [place] Coma Scale Score: 15        PHYSICAL EXAM    (up to 7 for level 4, 8 or more for level 5)     ED Triage Vitals [08/06/20 1441]   BP Temp Temp Source Pulse Resp SpO2 Height Weight   139/64 98.5 °F (36.9 °C) Oral 99 18 95 % 5' 1\" (1.549 m) 158 lb (71.7 kg)       Physical Exam  Physical Exam   Constitutional:  Appears well, well-developed and well-nourished. No distress noted. Non toxic in appearance. HENT:     Head: Normocephalic and atraumatic. Mouth/Throat: Oropharynx is clear and mucosa moist. No oropharyngeal exudate noted. Posterior pharynx is pink and noninjected. Eyes: Conjunctivae and EOM are normal. Pupils are equal, round, and reactive to light. No scleral icterus. There is no tearing or drainage. Examination of the fundi show crisp optic cups without any is evidence of papilledema. Disc to cup ratio is normal.  Neck: Normal range of motion. Neck supple. No tracheal deviation present. Cardiovascular: Normal rate, regular rhythm, normal heartsounds and intact distal pulses. Exam reveals no gallop or friction rub. No murmur heard. Pulmonary/Chest: Effort normal and breath sounds are symmetric and normal. No respiratory distress. There are no wheezes, rales or rhonchi. Abdominal: Soft. Bowel sounds are normal. No distension or no mass exhibitted.  There is mild to moderate left-lower quadrant tenderness, without rebound, rigidity or guarding. Genitourinary:   No CVA tenderness noted on examination. Musculoskeletal: Normal range of motion. No edema, tenderness or deformity. Lymphadenopathy:  No cervical adenopathy. Neurological:   alert and oriented to person, place, and time. Normal speech, normal comprehension, normal cognition,  Reflexes are normal.  There are no cranial nerve deficits. Normal muscle tone, motor and sensory function including SILT exhibited. Coordination normal and gait normal.    Skin: Skin is warm and dry. No rash noted. No diaphoresis. No erythema. No pallor. Psychiatric: Pleasant and cooperative. Normal mood and affect. Behavior is  normal. Judgment and thought content normal.     DIAGNOSTIC RESULTS     EKG: All EKG's are interpreted by the Emergency Department Physician who either signs or Co-signs this chart in the absence of a cardiologist.    A twelve-lead EKG was performed at 1513. There is a normal sinus rhythm with a ventricular rate 87 bpm.  There is a normal parable, QRS duration, QT, QTC and axis no acute ST segment or T wave changes are identified. Normal EKG. RADIOLOGY:   Non-plain film images such as CT, Ultrasoundand MRI are read by the radiologist. Plain radiographic images are visualized and preliminarily interpreted by the emergency physician with the below findings:    Not indicated. Interpretation per the Radiologist below, if available at the time of this note:    CT ABDOMEN PELVIS WO CONTRAST Additional Contrast? None   Final Result      No acute process. No abnormality is seen to clearly account for the symptoms    within the limitations of a noncontrast exam.      Stable hepatosplenomegaly with diffuse fatty change in the liver. Other stable findings as above.                      ED BEDSIDE ULTRASOUND:   Performed by ED Physician - none    LABS:  Labs Reviewed   COMPREHENSIVE METABOLIC PANEL W/ REFLEX TO MG FOR LOW K - Abnormal; Notable for the following components:       Result Value    Glucose 127 (*)     CREATININE 1.23 (*)     Calcium 11.2 (*)     Potassium 3.6 (*)     Chloride 97 (*)     Total Protein 8.8 (*)     GFR Non- 44 (*)     GFR  53 (*)     All other components within normal limits   TSH WITHOUT REFLEX - Abnormal; Notable for the following components:    TSH <0.01 (*)     All other components within normal limits   URINALYSIS - Abnormal; Notable for the following components:    Turbidity UA HAZY (*)     Urine Hgb TRACE (*)     Protein, UA TRACE (*)     Leukocyte Esterase, Urine 3+ (*)     All other components within normal limits   MICROSCOPIC URINALYSIS - Abnormal; Notable for the following components:    Bacteria, UA 2+ (*)     All other components within normal limits   CULTURE, URINE   CBC WITH AUTO DIFFERENTIAL   AMYLASE   LIPASE   LACTIC ACID   TROPONIN       All other labs were within normal range or not returned as of this dictation. EMERGENCY DEPARTMENT COURSE and DIFFERENTIAL DIAGNOSIS/MDM:   Vitals:    Vitals:    08/06/20 1441   BP: 139/64   Pulse: 99   Resp: 18   Temp: 98.5 °F (36.9 °C)   TempSrc: Oral   SpO2: 95%   Weight: 158 lb (71.7 kg)   Height: 5' 1\" (1.549 m)       Noted    MDM    CRITICAL CARE TIME   Total Critical Care time was 0 minutes. [facility]  ED Course as of Aug 06 1631   Thu Aug 06, 2020   1629 Bacteria, UA(!): 2+ [MS]      ED Course User Index  [MS] Mendy Moffett MD       CONSULTS:  None    PROCEDURES:  Unless otherwise noted below, none     Procedures      Summation    Vini Morales is a 60 y.o. female who has a history of diabetes mellitus, pancreatitis, hypertension, atrial fibrillation, Graves' disease, asthma with COPD, irritable bowel syndrome, presented with intermittent nausea, which progressed to vomiting and left-sided abdominal pain. No abdominal pathology was identified on imaging.   She has an acute urinary tract infection which will be

## 2020-08-06 NOTE — PROGRESS NOTES
HPI Notes    Name: Vini Morales  : 1955         Chief Complaint:     Chief Complaint   Patient presents with    Nausea & Vomiting     x 1 week       History of Present Illness:        Rey Carbajal presents to office for evaluation of nausea and vomiting and left sided abdominal pain  Symptoms started 2 weeks ago but became worse in the last 2 days. Unable to keep down anything even water. Feels nauseous most of the time. Did not take medications since yesterday. She vomits yellowish liquid, no blood in it. Reports no diarrhea. Has abdominal apin in th left lower abdomen. The pain is stabbing , comes and goes, woke her up this morning. The pain is 6 out of 10 in intensity. States had similar problem before.  Has a h/o  Pancreatitis in the past.             Past Medical History:     Past Medical History:   Diagnosis Date    Asthma     Atrial fibrillation ([address]     COPD (chronic obstructive pulmonary disease) ([address]     DDD (degenerative disc disease)     Diabetes mellitus ([address]     Hyperlipidemia     Hypertension     Hyperthyroidism     Type II or unspecified type diabetes mellitus without mention of complication, not stated as uncontrolled       Reviewed all health maintenance requirements and orderedappropriate tests  Health Maintenance Due   Topic Date Due    Diabetic retinal exam  2019    Breast cancer screen  2019    Diabetic foot exam  2020       Past Surgical History:     Past Surgical History:   Procedure Laterality Date    BACK SURGERY      COLONOSCOPY  14   [facility] DENTAL SURGERY N/A 3/8/2017    REMOVAL OF BILATERAL MANDIBULAR LELO AND MAXILLARY EDENTULOUS ALVEOPLASTY performed by Juan Jose Laughlin DDS at [facility]      removal   [facility] ENDOSCOPY  3/16/2016    Peg tube insertion        Medications: Fatty Acids (FISH OIL) 1000 MG CAPS TAKE TWO (2) CAPSULES BY MOUTH DAILY  Patient not taking: Reported on 8/6/2020 4/14/20   Danica Hernandez MD   ELIQUIS 5 MG TABS tablet TAKE ONE (1) TABLET BY MOUTH TWICE DAILY  Patient not taking: Reported on 8/6/2020 4/14/20   Danica Hernandez MD   Insulin Pen Needle (TRUEPLUS PEN NEEDLES) 31G X 5 MM MISC USE AS DIRECTED DAILY  Patient not taking: Reported on 8/6/2020 4/14/20   Danica Hernandez MD   ondansetron (ZOFRAN ODT) 4 MG disintegrating tablet Take 1 tablet by mouth every 4 hours as needed for Nausea or Vomiting  Patient not taking: Reported on 8/6/2020 4/10/20   Felisa Talbert MD   hydroCHLOROthiazide (HYDRODIURIL) 25 MG tablet TAKE 1 TABLET BY MOUTH EVERY MORNING  Patient not taking: Reported on 8/6/2020 3/16/20   Danica Hernandez MD   gabapentin (NEURONTIN) 800 MG tablet TAKE 1 TABLET BY MOUTH TWICE DAILY 3/16/20 4/16/20  Danica Hernandez MD   amLODIPine (NORVASC) 10 MG tablet  1/27/20   Constantino Cisneros MD   magnesium citrate solution Take 296 mLs by mouth once for 1 dose 2/20/20 2/20/20  Danica Hernandez MD   levothyroxine (SYNTHROID) 88 MCG tablet Take 1 tablet by mouth daily  Patient not taking: Reported on 8/6/2020 2/10/20   Danica Hernandez MD   insulin glargine (LANTUS SOLOSTAR) 100 UNIT/ML injection pen Inject 25 Units into the skin nightly  Patient not taking: Reported on 8/6/2020 1/29/20   Danica Hernandez MD   potassium chloride (KLOR-CON M) 20 MEQ extended release tablet Take 1 tablet by mouth daily  Patient not taking: Reported on 8/6/2020 1/14/20   Danica Hernandez MD   oxybutynin (DITROPAN XL) 15 MG extended release tablet Take 1 tablet by mouth daily  Patient not taking: Reported on 8/6/2020 12/17/19   Mingo Marie MD   loratadine (CLARITIN) 10 MG tablet TAKE 1 TABLET BY MOUTH EVERY DAY  Patient not taking: Reported on 8/6/2020 10/17/19   Danica Hernandez MD   atorvastatin (LIPITOR) 40 MG tablet TAKE 1 TABLET BY MOUTH ONCE DAILY  Patient not taking: Reported on 8/6/2020 10/14/19   Hunter Klein MD   metoprolol tartrate (LOPRESSOR) 25 MG tablet TAKE 1 TABLET BY MOUTH 2 TIMES DAILY  Patient not taking: Reported on 8/6/2020 9/19/19   Hunter Klien MD   albuterol sulfate  (90 Base) MCG/ACT inhaler INHALE 2 PUFFS BY MOUTH EVERY 6 HOURS AS NEEDED FOR WHEEZING  Patient not taking: Reported on 8/6/2020 9/19/19   Hunter Klein MD   cyclobenzaprine (FLEXERIL) 5 MG tablet TAKE ONE (1) TABLET BY MOUTH THREE TIMES DAILY AS NEEDED FOR MUSCLE SPASMS  Patient not taking: Reported on 8/6/2020 8/1/19   Hunter Klein MD   FEROSUL 325 (65 Fe) MG tablet TAKE (1) TABLET BY MOUTH DAILY  Patient not taking: Reported on 8/6/2020 4/23/19   Hunter Klein MD   FREESTYLE LANCETS [address] [facility] TEST BLOOD SUGAR THREE TIMES A DAY  Patient not taking: Reported on 8/6/2020 8/28/18   Hunter Klein MD   Blood Glucose Monitoring Suppl SOUTH 1 Units by Does not apply route once for 1 dose PLease dispense a machine that is covered by her insurance  DX E11.9 4/26/18 4/26/18  Hunter Klein MD   Incontinence Supply Disposable (POISE PAD) PADS Apply 1 Piece topically as needed (incontinence)  Patient not taking: Reported on 8/6/2020 11/21/17   Nettie Morfin MD   Blood Glucose Monitoring Suppl SOUTH 1 each by Does not apply route 3 times daily  Patient not taking: Reported on 8/6/2020 6/6/17   Fritz Jernigan MD   Alcohol Swabs (EASY TOUCH ALCOHOL PREP MEDIUM) 70 % PADS USE FOUR TIMES DAILY  Patient not taking: Reported on 8/6/2020 3/29/17   Fritz Jernigan MD   FREESTYLE LITE strip TEST BLOOD SUGAR THREE TIMES A DAY  Patient not taking: Reported on 8/6/2020 3/29/17   Fritz Jernigan MD   Blood Pressure Monitoring (B-D ASSURE BPM/AUTO ARM CUFF) MISC 1 Device by Does not apply route daily as needed (htn)  Patient not taking: Reported on 8/6/2020 8/25/16   Fritz Jernigan MD        Allergies:       Norco [hydrocodone-acetaminophen];  Oxycodone; Percocet No murmur. Pulmonary:      Effort: Pulmonary effort is normal.      Breath sounds: Normal breath sounds. No wheezing or rales. Abdominal:      General: Bowel sounds are normal. There is no distension. Palpations: Abdomen is soft. There is no mass. Tenderness: There is abdominal tenderness (periumbilical area). There is no right CVA tenderness, left CVA tenderness, guarding or rebound. Musculoskeletal: Normal range of motion. Right lower leg: No edema. Left lower leg: No edema. Lymphadenopathy:      Cervical: No cervical adenopathy. Skin:     General: Skin is warm and dry. Coloration: Skin is not jaundiced or pale. Findings: No rash. Neurological:      General: No focal deficit present. Mental Status: She is alert and oriented to person, place, and time. Psychiatric:         Mood and Affect: Mood normal.         Behavior: Behavior normal.         Thought Content: Thought content normal.         Judgment: Judgment normal.               Data:     Lab Results   Component Value Date     06/08/2020    K 4.8 06/08/2020     06/08/2020    CO2 28 06/08/2020    BUN 19 06/08/2020    CREATININE 1.47 06/08/2020    GLUCOSE 260 06/08/2020    PROT 7.8 06/08/2020    LABALBU 4.8 06/08/2020    BILITOT 0.42 06/08/2020    ALKPHOS 89 06/08/2020    AST 25 06/08/2020    ALT 28 06/08/2020     Lab Results   Component Value Date    WBC 7.0 06/08/2020    RBC 3.96 06/08/2020    HGB 12.4 06/08/2020    HCT 35.3 06/08/2020    MCV 89.1 06/08/2020    MCH 31.3 06/08/2020    MCHC 35.1 06/08/2020    RDW 12.0 06/08/2020     06/08/2020    MPV NOT REPORTED 06/08/2020     Lab Results   Component Value Date    TSH <0.01 06/08/2020     Lab Results   Component Value Date    CHOL 115 06/08/2020    HDL 37 06/08/2020    LABA1C 8.1 06/08/2020          Assessment & Plan        Diagnosis Orders   1. Lower abdominal pain     2.  Nausea and vomiting, intractability of vomiting not specified, unspecified vomiting type       Patient advised to go to the emergency room immediately for work-up. Need to rule out acute pancreatitis, colitis, diverticulitis, kidney stone etc.  Also will need IV fluids for hydration and IV antiemetics to help with intractable nausea and vomiting. Patient agreeable with this plan. Completed Refills   Requested Prescriptions      No prescriptions requested or ordered in this encounter     No follow-ups on file. No orders of the defined types were placed in this encounter. No orders of the defined types were placed in this encounter. There are no Patient Instructions on file for this visit.     Electronically signed by Mila Pruitt MD on 8/6/2020 at 2:16 PM           Completed Refills      Requested Prescriptions      No prescriptions requested or ordered in this encounter

## 2020-08-07 ENCOUNTER — CARE COORDINATION (OUTPATIENT)
Dept: FAMILY MEDICINE CLINIC | Age: 65
End: 2020-08-07

## 2020-08-07 LAB
CULTURE: NORMAL
EKG ATRIAL RATE: 87 BPM
EKG P AXIS: 85 DEGREES
EKG P-R INTERVAL: 200 MS
EKG Q-T INTERVAL: 396 MS
EKG QRS DURATION: 82 MS
EKG QTC CALCULATION (BAZETT): 476 MS
EKG R AXIS: 62 DEGREES
EKG T AXIS: 70 DEGREES
EKG VENTRICULAR RATE: 87 BPM
Lab: NORMAL
SPECIMEN DESCRIPTION: NORMAL

## 2020-08-07 PROCEDURE — 93010 ELECTROCARDIOGRAM REPORT: CPT | Performed by: INTERNAL MEDICINE

## 2020-08-07 NOTE — CARE COORDINATION
ACM attempted outreach for ED follow up, UTI, COVID 19 Protocol    No answer and LVM requesting update with symptoms, and COVID 19 preventions/precautions.

## 2020-08-08 ENCOUNTER — CARE COORDINATION (OUTPATIENT)
Dept: FAMILY MEDICINE CLINIC | Age: 65
End: 2020-08-08

## 2020-08-08 NOTE — CARE COORDINATION
ACM attempted 2nd outreach for ED follow up, UTI, COVID 19 Protocol    No answer and [facility] for updates with symptoms, and COVID 19 prevention/precaution

## 2020-08-13 RX ORDER — CYCLOBENZAPRINE HCL 5 MG
TABLET ORAL
Qty: 30 TABLET | Refills: 10 | Status: SHIPPED | OUTPATIENT
Start: 2020-08-13 | End: 2022-06-29 | Stop reason: DRUGHIGH

## 2020-08-24 ENCOUNTER — OFFICE VISIT (OUTPATIENT)
Dept: FAMILY MEDICINE CLINIC | Age: 65
End: 2020-08-24
Payer: COMMERCIAL

## 2020-08-24 VITALS
BODY MASS INDEX: 27.31 KG/M2 | DIASTOLIC BLOOD PRESSURE: 76 MMHG | HEART RATE: 53 BPM | RESPIRATION RATE: 18 BRPM | HEIGHT: 64 IN | WEIGHT: 160 LBS | SYSTOLIC BLOOD PRESSURE: 124 MMHG | OXYGEN SATURATION: 96 %

## 2020-08-24 PROCEDURE — 3017F COLORECTAL CA SCREEN DOC REV: CPT | Performed by: INTERNAL MEDICINE

## 2020-08-24 PROCEDURE — 99214 OFFICE O/P EST MOD 30 MIN: CPT | Performed by: INTERNAL MEDICINE

## 2020-08-24 PROCEDURE — 3052F HG A1C>EQUAL 8.0%<EQUAL 9.0%: CPT | Performed by: INTERNAL MEDICINE

## 2020-08-24 PROCEDURE — G8417 CALC BMI ABV UP PARAM F/U: HCPCS | Performed by: INTERNAL MEDICINE

## 2020-08-24 PROCEDURE — G8427 DOCREV CUR MEDS BY ELIG CLIN: HCPCS | Performed by: INTERNAL MEDICINE

## 2020-08-24 PROCEDURE — 2022F DILAT RTA XM EVC RTNOPTHY: CPT | Performed by: INTERNAL MEDICINE

## 2020-08-24 PROCEDURE — 1036F TOBACCO NON-USER: CPT | Performed by: INTERNAL MEDICINE

## 2020-08-24 RX ORDER — INSULIN GLARGINE 100 [IU]/ML
30 INJECTION, SOLUTION SUBCUTANEOUS NIGHTLY
Qty: 5 PEN | Refills: 3 | Status: SHIPPED
Start: 2020-08-24 | End: 2021-11-03

## 2020-08-24 ASSESSMENT — ENCOUNTER SYMPTOMS
NAUSEA: 0
CONSTIPATION: 1
SINUS PRESSURE: 0
SORE THROAT: 0
BLOOD IN STOOL: 0
CHEST TIGHTNESS: 0
BACK PAIN: 1
ABDOMINAL PAIN: 0
VOMITING: 0
COUGH: 0
BLURRED VISION: 0
SHORTNESS OF BREATH: 0

## 2020-08-24 NOTE — PROGRESS NOTES
breath. Past treatments include ACE inhibitors, calcium channel blockers, diuretics and beta blockers. The current treatment provides significant improvement. There are no compliance problems. Hypertensive end-organ damage includes kidney disease. There is no history of angina, CAD/MI or CVA. Hyperlipidemia   This is a chronic problem. The current episode started more than 1 year ago. The problem is controlled. Recent lipid tests were reviewed and are variable. Exacerbating diseases include diabetes. Pertinent negatives include no chest pain, myalgias or shortness of breath. Current antihyperlipidemic treatment includes statins. The current treatment provides significant improvement of lipids. There are no compliance problems.             Past Medical History:     Past Medical History:   Diagnosis Date    Asthma     [facility]     COPD (chronic obstructive pulmonary disease) ([facility]     DDD (degenerative disc disease)     Diabetes mellitus ([facility]     Hyperlipidemia     Hypertension     Hyperthyroidism     Type II or unspecified type diabetes mellitus without mention of complication, not stated as uncontrolled       Reviewed all health maintenance requirements and orderedappropriate tests  Health Maintenance Due   Topic Date Due    Diabetic retinal exam  08/11/2019    Breast cancer screen  12/20/2019    Diabetic foot exam  07/11/2020    Diabetic microalbuminuria test  09/04/2020       Past Surgical History:     Past Surgical History:   Procedure Laterality Date    BACK SURGERY      COLONOSCOPY  04/23/14   [facility] DENTAL SURGERY N/A 3/8/2017    REMOVAL OF BILATERAL MANDIBULAR LELO AND MAXILLARY EDENTULOUS ALVEOPLASTY performed by Juan Jose Laughlin DDS at [facility]      removal    GASTROSTOMY TUBE PLACEMENT      TONSILLECTOMY      TRACHEOSTOMY      TRACHEOSTOMY CLOSURE      TUBAL LIGATION      UPPER GASTROINTESTINAL ENDOSCOPY  3/16/2016    Peg tube insertion Medications:       Prior to Admission medications    Medication Sig Start Date End Date Taking?  Authorizing Provider   insulin glargine (LANTUS SOLOSTAR) 100 UNIT/ML injection pen Inject 30 Units into the skin nightly 8/24/20  Yes Katerin Watt MD   cyclobenzaprine (FLEXERIL) 5 MG tablet TAKE 1 TABLET BY MOUTH THREE TIMES DAILY AS NEEDED FOR MUSCLE SPASMS 8/13/20  Yes Katerin Watt MD   ondansetron (ZOFRAN ODT) 4 MG disintegrating tablet Take 1 tablet by mouth every 8 hours as needed for Nausea or Vomiting 8/6/20  Yes Sheila Self MD   albuterol (PROVENTIL) (2.5 MG/3ML) 0.083% nebulizer solution Take 3 mLs by nebulization 4 times daily 7/23/20  Yes Katerin Watt MD   lisinopril (PRINIVIL;ZESTRIL) 5 MG tablet TAKE 1 TABLET BY MOUTH EVERY DAY 7/13/20  Yes Katerin Watt MD   dilTIAZem (CARDIZEM CD) 120 MG extended release capsule TAKE 1 CAPSULE BY MOUTH EVERY DAY 5/14/20  Yes Sacha Ruiz MD   TRULICITY 1.5 RY/2.6DI SOPN 1.5 mg once a week Monday's 4/6/20  Yes Historical Provider, MD   Multiple Vitamin (MULTI-VITAMINS) TABS TAKE 1 TABLET BY MOUTH EVERY DAY 5/7/20  Yes Katerin Watt MD   senna (SENOKOT) 8.6 MG tablet Take 1 tablet by mouth 2 times daily 4/27/20 4/27/21 Yes Katerin Watt MD   dicyclomine (BENTYL) 10 MG capsule Take 1 capsule by mouth 4 times daily 4/27/20  Yes Katerin Watt MD   Cholecalciferol (VITAMIN D3) 25 MCG (1000 UT) TABS TAKE (1) TABLET BY MOUTH DAILY 4/14/20  Yes Katerin Watt MD   Omega-3 Fatty Acids (FISH OIL) 1000 MG CAPS TAKE TWO (2) CAPSULES BY MOUTH DAILY 4/14/20  Yes Katerin Watt MD   ELIQUIS 5 MG TABS tablet TAKE ONE (1) TABLET BY MOUTH TWICE DAILY 4/14/20  Yes Katerin Watt MD   hydroCHLOROthiazide (HYDRODIURIL) 25 MG tablet TAKE 1 TABLET BY MOUTH EVERY MORNING 3/16/20  Yes Katerin Watt MD   gabapentin (NEURONTIN) 800 MG tablet TAKE 1 TABLET BY MOUTH TWICE DAILY 3/16/20 8/24/20 Yes Katerin Free, MD   levothyroxine (SYNTHROID) 88 MCG tablet Take 1 tablet by mouth daily 2/10/20  Yes Shasta Redman MD   potassium chloride (KLOR-CON M) 20 MEQ extended release tablet Take 1 tablet by mouth daily 1/14/20  Yes Shasta Redman MD   oxybutynin (DITROPAN XL) 15 MG extended release tablet Take 1 tablet by mouth daily 12/17/19  Yes Marlin Mccrary MD   loratadine (CLARITIN) 10 MG tablet TAKE 1 TABLET BY MOUTH EVERY DAY 10/17/19  Yes Shasta Redman MD   atorvastatin (LIPITOR) 40 MG tablet TAKE 1 TABLET BY MOUTH ONCE DAILY 10/14/19  Yes Shasta Redman MD   metoprolol tartrate (LOPRESSOR) 25 MG tablet TAKE 1 TABLET BY MOUTH 2 TIMES DAILY 9/19/19  Yes Shasta Redman MD   albuterol sulfate  (90 Base) MCG/ACT inhaler INHALE 2 PUFFS BY MOUTH EVERY 6 HOURS AS NEEDED FOR WHEEZING 9/19/19  Yes Shasta Redman MD   FEROSUL 325 (65 Fe) MG tablet TAKE (1) TABLET BY MOUTH DAILY 4/23/19  Yes Shasta Redman MD   Blood Glucose Monitoring Suppl SOUTH 1 Units by Does not apply route once for 1 dose PLease dispense a machine that is covered by her insurance  DX E11.9 4/26/18 8/24/20 Yes Shasta Redman MD   glucose monitoring kit (FREESTYLE) monitoring kit 1 kit by Does not apply route daily as needed (Glucometer) Please dispense what [employer] will cover  Patient not taking: Reported on 8/6/2020 6/19/20   Shasta Redman MD   Lancets MISC 1 each by Does not apply route daily Tests 3 times a day/PRN. Please dispense what [employer] covers to go with her new Glucometer  Patient not taking: Reported on 8/6/2020 6/19/20   Shasta Redman MD   blood glucose monitor strips Test 3 times a day & as needed for symptoms of irregular blood glucose.  Please dispense what [employer] will cover to go with her new Glucometer  Patient not taking: Reported on 8/6/2020 6/19/20   Shasta Redman MD   Insulin Pen Needle (TRUEPLUS PEN NEEDLES) 31G X 5 MM MISC USE AS DIRECTED DAILY  Patient not taking: Reported on 8/6/2020 4/14/20   Shasta Redman MD   magnesium citrate solution Take mg/day    3. Essential hypertension   Bp controlled. Stop amlodipine since taking Cardizem                     Completed Refills   Requested Prescriptions     Signed Prescriptions Disp Refills    insulin glargine (LANTUS SOLOSTAR) 100 UNIT/ML injection pen 5 pen 3     Sig: Inject 30 Units into the skin nightly     Return in about 3 months (around 11/24/2020) for diabetes, hyperlipidemia, HTN. Orders Placed This Encounter   Medications    insulin glargine (LANTUS SOLOSTAR) 100 UNIT/ML injection pen     Sig: Inject 30 Units into the skin nightly     Dispense:  5 pen     Refill:  3     Orders Placed This Encounter   Procedures    HM DIABETES FOOT EXAM         There are no Patient Instructions on file for this visit.     Electronically signed by Lori Leija MD on 8/24/2020 at 8:09 PM           Completed Refills      Requested Prescriptions     Signed Prescriptions Disp Refills    insulin glargine (LANTUS SOLOSTAR) 100 UNIT/ML injection pen 5 pen 3     Sig: Inject 30 Units into the skin nightly

## 2020-09-17 RX ORDER — POTASSIUM CHLORIDE 20 MEQ/1
20 TABLET, EXTENDED RELEASE ORAL DAILY
Qty: 90 TABLET | Refills: 2 | Status: SHIPPED | OUTPATIENT
Start: 2020-09-17 | End: 2021-07-22

## 2020-09-17 RX ORDER — ATORVASTATIN CALCIUM 40 MG/1
TABLET, FILM COATED ORAL
Qty: 90 TABLET | Refills: 1 | Status: SHIPPED | OUTPATIENT
Start: 2020-09-17 | End: 2021-03-09

## 2020-09-17 NOTE — TELEPHONE ENCOUNTER
Medication pending    Health Maintenance   Topic Date Due    Diabetic retinal exam  08/11/2019    Breast cancer screen  12/20/2019    Flu vaccine (1) 09/01/2020    Diabetic microalbuminuria test  09/04/2020    Shingles Vaccine (1 of 2) 05/11/2021 (Originally 8/1/2006)    A1C test (Diabetic or Prediabetic)  06/08/2021    Lipid screen  06/08/2021    TSH testing  08/06/2021    Potassium monitoring  08/06/2021    Creatinine monitoring  08/06/2021    Diabetic foot exam  08/24/2021    Cervical cancer screen  12/17/2022    Colon cancer screen colonoscopy  04/23/2024    DTaP/Tdap/Td vaccine (2 - Td) 08/12/2026    Pneumococcal 0-64 years Vaccine  Completed    Hepatitis C screen  Addressed    HIV screen  Addressed    Hepatitis A vaccine  Aged Out    Hib vaccine  Aged Out    Meningococcal (ACWY) vaccine  Aged Out             (applicable per patient's age: Cancer Screenings, Depression Screening, Fall Risk Screening, Immunizations)    Hemoglobin A1C (%)   Date Value   06/08/2020 8.1 (H)   03/02/2020 7.8 (H)   12/12/2019 6.5 (H)     Microalb/Crt.  Ratio (mcg/mg creat)   Date Value   09/04/2019 CANNOT BE CALCULATED     LDL Cholesterol (mg/dL)   Date Value   06/08/2020 45     AST (U/L)   Date Value   08/06/2020 22     ALT (U/L)   Date Value   08/06/2020 19     BUN (mg/dL)   Date Value   08/06/2020 23      (goal A1C is < 7)   (goal LDL is <100) need 30-50% reduction from baseline     BP Readings from Last 3 Encounters:   08/24/20 124/76   08/06/20 139/64   08/06/20 134/82    (goal /80)      All Future Testing planned in [facility]:  Lab Frequency Next Occurrence   XR CHEST STANDARD (2 VW) Once 12/09/2020   CHANEL profile Once 12/12/2019   Electrophoresis Protein, Serum without Reflex to Immunofixation Once 12/12/2019   Immunofixation urine random profile Once 12/12/2019   Calcium Once 09/24/2020   MICHAEL DIGITAL SCREEN W CAD BILATERAL Once 01/14/2020       Next Visit Date:  Future Appointments   Date Time Provider [place]   11/19/2020  2:00 PM [address] UNIT [facility] MOBILE  Era Laws   11/19/2020  2:30 PM Henok Hagan MD [facility]   12/3/2020  9:30 AM MD Aaron Cooper [facility]   12/22/2020  1:00 PM [facility]            Patient Active Problem List:     Cervical neck pain with evidence of disc disease     Paroxysmal atrial fibrillation ([address]     Graves' disease     Asthma with COPD ([address]     Essential hypertension     Type 2 diabetes mellitus without complication, with long-term current use of insulin (HCC)     Iron deficiency anemia     Irritable bowel syndrome with both constipation and diarrhea     Mixed hyperlipidemia

## 2020-09-18 RX ORDER — LEVOTHYROXINE SODIUM 88 UG/1
88 TABLET ORAL DAILY
Qty: 30 TABLET | Refills: 3 | Status: SHIPPED | OUTPATIENT
Start: 2020-09-18 | End: 2021-01-18

## 2020-10-07 RX ORDER — ALBUTEROL SULFATE 90 UG/1
AEROSOL, METERED RESPIRATORY (INHALATION)
Qty: 324 G | Refills: 10 | Status: SHIPPED | OUTPATIENT
Start: 2020-10-07 | End: 2022-07-21 | Stop reason: SDUPTHER

## 2020-11-11 RX ORDER — AMLODIPINE BESYLATE 10 MG/1
TABLET ORAL
Qty: 30 TABLET | Refills: 11 | Status: SHIPPED | OUTPATIENT
Start: 2020-11-11 | End: 2020-12-03

## 2020-11-11 RX ORDER — LORATADINE 10 MG/1
TABLET ORAL
Qty: 30 TABLET | Refills: 10 | Status: SHIPPED | OUTPATIENT
Start: 2020-11-11

## 2020-11-11 NOTE — TELEPHONE ENCOUNTER
Refill request for loratadine 10mg and amlodipine 10mg  Last OV: 8/24/2020  Last RX: 10/17/19   Next scheduled apt: 11/19/2020  Both medications are pended    Amlodipine 10mg was ended on 8/24/20, discontinue reason was therapy completed. I tried calling patient to see if she is taking that medication and if she really does need a refill but she was not able to answer so I lvm asking her to return my call. Health Maintenance   Topic Date Due    Diabetic retinal exam  08/11/2019    Breast cancer screen  12/20/2019    Flu vaccine (1) 09/01/2020    Diabetic microalbuminuria test  09/04/2020    Shingles Vaccine (1 of 2) 05/11/2021 (Originally 8/1/2006)    A1C test (Diabetic or Prediabetic)  06/08/2021    Lipid screen  06/08/2021    TSH testing  08/06/2021    Potassium monitoring  08/06/2021    Creatinine monitoring  08/06/2021    Diabetic foot exam  08/24/2021    Cervical cancer screen  12/17/2022    Colon cancer screen colonoscopy  04/23/2024    DTaP/Tdap/Td vaccine (2 - Td) 08/12/2026    Pneumococcal 0-64 years Vaccine  Completed    Hepatitis C screen  Addressed    HIV screen  Addressed    Hepatitis A vaccine  Aged Out    Hib vaccine  Aged Out    Meningococcal (ACWY) vaccine  Aged Out             (applicable per patient's age: Cancer Screenings, Depression Screening, Fall Risk Screening, Immunizations)    Hemoglobin A1C (%)   Date Value   06/08/2020 8.1 (H)   03/02/2020 7.8 (H)   12/12/2019 6.5 (H)     Microalb/Crt.  Ratio (mcg/mg creat)   Date Value   09/04/2019 CANNOT BE CALCULATED     LDL Cholesterol (mg/dL)   Date Value   06/08/2020 45     AST (U/L)   Date Value   08/06/2020 22     ALT (U/L)   Date Value   08/06/2020 19     BUN (mg/dL)   Date Value   08/06/2020 23      (goal A1C is < 7)   (goal LDL is <100) need 30-50% reduction from baseline     BP Readings from Last 3 Encounters:   08/24/20 124/76   08/06/20 139/64   08/06/20 134/82    (goal /80)      All Future Testing planned in [facility]:  Lab Frequency Next Occurrence   XR CHEST STANDARD (2 VW) Once 12/09/2020   CHANEL profile Once 12/12/2019   Electrophoresis Protein, Serum without Reflex to Immunofixation Once 12/12/2019   Immunofixation urine random profile Once 12/12/2019   Calcium Once 01/14/2021   MICHAEL DIGITAL SCREEN W CAD BILATERAL Once 01/14/2020       Next Visit Date:  Future Appointments   Date Time Provider Diomedes Cardenas   11/19/2020  2:00 PM [facility] MOBILE VAN UNIT [facility]   11/19/2020  2:30 PM [facility]   12/3/2020  9:30 AM MD Naomi Bland [facility]   12/22/2020  1:00 PM MD sofia Muniz obgy [facility]            Patient Active Problem List:     Cervical neck pain with evidence of disc disease     Paroxysmal atrial fibrillation ([address]     Graves' disease     Asthma with COPD ([address]     Essential hypertension     Type 2 diabetes mellitus without complication, with long-term current use of insulin (HCC)     Iron deficiency anemia     Irritable bowel syndrome with both constipation and diarrhea     Mixed hyperlipidemia

## 2020-11-11 NOTE — CARE COORDINATION
0.083% nebulizer solution Take 2.5 mg by nebulization 4 times daily    Historical Provider, MD   guaiFENesin (MUCINEX) 600 MG extended release tablet Take 1,200 mg by mouth 2 times daily    Historical Provider, MD   EASY TOUCH PEN NEEDLES 31G X 5 MM MISC USE AS DIRECTED DAILY 6/25/18   Ayah Bedolla MD   loratadine (CLARITIN) 10 MG tablet TAKE (1) TABLET BY MOUTH DAILY 4/26/18   Ayah Bedolla MD   Blood Glucose Monitoring Suppl SOUTH 1 Units by Does not apply route once for 1 dose PLease dispense a machine that is covered by her insurance  DX E11.9 4/26/18 4/26/18  Ayah Bedolla MD   apixaban (ELIQUIS) 5 MG TABS tablet Take 1 tablet by mouth 2 times daily 4/26/18   Ayah Bedolla MD   atorvastatin (LIPITOR) 40 MG tablet TAKE 1 TABLET BY MOUTH ONCE DAILY 3/29/18   Willem Cameron MD   ferrous sulfate 325 (65 Fe) MG tablet Take 1 tablet by mouth daily 2/1/18   Ayah Bedolla MD   methimazole (TAPAZOLE) 5 MG tablet TAKE ONE (1) TABLET BY MOUTH TWICE DAILY 2/1/18   Ayah Bedolla MD   Multiple Vitamin (MULTI-VITAMINS) TABS TAKE ONE (1) TABLET BY MOUTH ONCE DAILY 11/30/17   Ayah Bedolla MD   Incontinence Supply Disposable (POISE PAD) PADS Apply 1 Piece topically as needed (incontinence) 11/21/17   Sukhwinder Oleary MD   oxybutynin (DITROPAN XL) 10 MG extended release tablet Take 1 tablet by mouth daily 11/21/17   Sukhwinder Oleary MD   Blood Glucose Monitoring Suppl SOUTH 1 each by Does not apply route 3 times daily 6/6/17   Cali Chavarria MD   Alcohol Swabs (EASY TOUCH ALCOHOL PREP MEDIUM) 70 % PADS USE FOUR TIMES DAILY 3/29/17   Cali Chavarria MD   FREESTYLE LITE strip TEST BLOOD SUGAR THREE TIMES A DAY 3/29/17   Cali Chavarria MD   Blood Pressure Monitoring (B-D ASSURE BPM/AUTO ARM CUFF) MISC 1 Device by Does not apply route daily as needed (htn) 8/25/16   Cali Chavarria MD       Future Appointments  Date Time Provider Diomedes Cardenas   10/22/2018 3:00 PM MD Chuckie Portillo [facility]   11/27/2018 10:40 AM MD Jesse Hargrove ROSIE Oriented - self; Oriented - place; Oriented - time

## 2020-11-13 ENCOUNTER — TELEPHONE (OUTPATIENT)
Dept: FAMILY MEDICINE CLINIC | Age: 65
End: 2020-11-13

## 2020-11-13 NOTE — TELEPHONE ENCOUNTER
Pt is asking if she can start taking Collagen? Health Maintenance   Topic Date Due    Diabetic retinal exam  08/11/2019    Breast cancer screen  12/20/2019    Flu vaccine (1) 09/01/2020    Diabetic microalbuminuria test  09/04/2020    Shingles Vaccine (1 of 2) 05/11/2021 (Originally 8/1/2006)    A1C test (Diabetic or Prediabetic)  06/08/2021    Lipid screen  06/08/2021    TSH testing  08/06/2021    Potassium monitoring  08/06/2021    Creatinine monitoring  08/06/2021    Diabetic foot exam  08/24/2021    Cervical cancer screen  12/17/2022    Colon cancer screen colonoscopy  04/23/2024    DTaP/Tdap/Td vaccine (2 - Td) 08/12/2026    Pneumococcal 0-64 years Vaccine  Completed    Hepatitis C screen  Addressed    HIV screen  Addressed    Hepatitis A vaccine  Aged Out    Hib vaccine  Aged Out    Meningococcal (ACWY) vaccine  Aged Out             (applicable per patient's age: Cancer Screenings, Depression Screening, Fall Risk Screening, Immunizations)    Hemoglobin A1C (%)   Date Value   06/08/2020 8.1 (H)   03/02/2020 7.8 (H)   12/12/2019 6.5 (H)     Microalb/Crt.  Ratio (mcg/mg creat)   Date Value   09/04/2019 CANNOT BE CALCULATED     LDL Cholesterol (mg/dL)   Date Value   06/08/2020 45     AST (U/L)   Date Value   08/06/2020 22     ALT (U/L)   Date Value   08/06/2020 19     BUN (mg/dL)   Date Value   08/06/2020 23      (goal A1C is < 7)   (goal LDL is <100) need 30-50% reduction from baseline     BP Readings from Last 3 Encounters:   08/24/20 124/76   08/06/20 139/64   08/06/20 134/82    (goal /80)      All Future Testing planned in [facility]:  Lab Frequency Next Occurrence   XR CHEST STANDARD (2 VW) Once 12/09/2020   CHANEL profile Once 12/12/2019   Electrophoresis Protein, Serum without Reflex to Immunofixation Once 12/12/2019   Immunofixation urine random profile Once 12/12/2019   Calcium Once 01/14/2021   MICHAEL DIGITAL SCREEN W CAD BILATERAL Once 01/14/2020       Next Visit Date:  Future Appointments   Date Time Provider Diomedes Crespoi   11/19/2020  2:00 PM [address] UNIT [facility] MOBILE  Eric Olivares   11/19/2020  [facility]   12/3/2020  9:30 AM MD Anna Bai [facility]   12/22/2020  1:00 PM MD sofia Kaplan obgy [facility]            Patient Active Problem List:     Cervical neck pain with evidence of disc disease     Paroxysmal atrial fibrillation ([address]     Graves' disease     Asthma with COPD ([address]     Essential hypertension     Type 2 diabetes mellitus without complication, with long-term current use of insulin (HCC)     Iron deficiency anemia     Irritable bowel syndrome with both constipation and diarrhea     Mixed hyperlipidemia

## 2020-11-16 ENCOUNTER — TELEPHONE (OUTPATIENT)
Dept: FAMILY MEDICINE CLINIC | Age: 65
End: 2020-11-16

## 2020-11-16 RX ORDER — DILTIAZEM HYDROCHLORIDE 120 MG/1
CAPSULE, COATED, EXTENDED RELEASE ORAL
Qty: 30 CAPSULE | Refills: 5 | Status: SHIPPED | OUTPATIENT
Start: 2020-11-16 | End: 2022-05-19 | Stop reason: SDUPTHER

## 2020-11-16 NOTE — TELEPHONE ENCOUNTER
Otto Jara asked if you want her to have labs done before her appt Thursday? Health Maintenance   Topic Date Due    Diabetic retinal exam  08/11/2019    Breast cancer screen  12/20/2019    Flu vaccine (1) 09/01/2020    Diabetic microalbuminuria test  09/04/2020    Shingles Vaccine (1 of 2) 05/11/2021 (Originally 8/1/2006)    A1C test (Diabetic or Prediabetic)  06/08/2021    Lipid screen  06/08/2021    TSH testing  08/06/2021    Potassium monitoring  08/06/2021    Creatinine monitoring  08/06/2021    Diabetic foot exam  08/24/2021    Cervical cancer screen  12/17/2022    Colon cancer screen colonoscopy  04/23/2024    DTaP/Tdap/Td vaccine (2 - Td) 08/12/2026    Pneumococcal 0-64 years Vaccine  Completed    Hepatitis C screen  Addressed    HIV screen  Addressed    Hepatitis A vaccine  Aged Out    Hib vaccine  Aged Out    Meningococcal (ACWY) vaccine  Aged Out             (applicable per patient's age: Cancer Screenings, Depression Screening, Fall Risk Screening, Immunizations)    Hemoglobin A1C (%)   Date Value   06/08/2020 8.1 (H)   03/02/2020 7.8 (H)   12/12/2019 6.5 (H)     Microalb/Crt.  Ratio (mcg/mg creat)   Date Value   09/04/2019 CANNOT BE CALCULATED     LDL Cholesterol (mg/dL)   Date Value   06/08/2020 45     AST (U/L)   Date Value   08/06/2020 22     ALT (U/L)   Date Value   08/06/2020 19     BUN (mg/dL)   Date Value   08/06/2020 23      (goal A1C is < 7)   (goal LDL is <100) need 30-50% reduction from baseline     BP Readings from Last 3 Encounters:   08/24/20 124/76   08/06/20 139/64   08/06/20 134/82    (goal /80)      All Future Testing planned in [facility]:  Lab Frequency Next Occurrence   XR CHEST STANDARD (2 VW) Once 12/09/2020   CHANEL profile Once 12/12/2019   Electrophoresis Protein, Serum without Reflex to Immunofixation Once 12/12/2019   Immunofixation urine random profile Once 12/12/2019   Calcium Once 01/14/2021   MICHAEL DIGITAL SCREEN W CAD BILATERAL Once 01/14/2020       Next Visit Date:  Future Appointments   Date Time Provider Diomedes [place]   11/19/2020  2:00 PM [facility] MOBILE VAN UNIT [facility]BILE  Mariajose Eastman   11/19/2020  2:30 PM [facility]des [facility]   12/3/2020  9:30 AM MD Amada Davey [facility]   [facility]22/2020  [facility]            Patient Active Problem List:     Cervical neck pain with evidence of disc disease     Paroxysmal atrial fibrillation ([facility]     Graves' disease     Asthma with COPD ([facility] [facility]     Essential hypertension     Type 2 diabetes mellitus without complication, with long-term current use of insulin ([facility])     Iron deficiency anemia     Irritable bowel syndrome with both constipation and diarrhea     Mixed hyperlipidemia

## 2020-11-16 NOTE — TELEPHONE ENCOUNTER
[place]   11/19/2020  2:00 PM [address] UNIT [facility] MOBILE  Shira Channel   11/19/2020  2:30 PM Gilmer Menon MD [facility]   12/3/2020  9:30 AM MD Yaneth Cedillo [facility]   12/22/2020  1:00 PM MD tadeo Couchard obgy [facility]            Patient Active Problem List:     Cervical neck pain with evidence of disc disease     Paroxysmal atrial fibrillation ([address]     Graves' disease     Asthma with COPD ([facility]     Essential hypertension     Type 2 diabetes mellitus without complication, with long-term current use of insulin (HCC)     Iron deficiency anemia     Irritable bowel syndrome with both constipation and diarrhea     Mixed hyperlipidemia

## 2020-11-18 ENCOUNTER — HOSPITAL ENCOUNTER (OUTPATIENT)
Age: 65
Discharge: HOME | End: 2020-11-18
Payer: COMMERCIAL

## 2020-11-18 ENCOUNTER — HOSPITAL ENCOUNTER (OUTPATIENT)
Age: 65
Discharge: HOME | End: 2020-11-20
Payer: COMMERCIAL

## 2020-11-18 ENCOUNTER — HOSPITAL ENCOUNTER (OUTPATIENT)
Dept: GENERAL RADIOLOGY | Age: 65
Discharge: HOME | End: 2020-11-20
Payer: COMMERCIAL

## 2020-11-18 LAB
ABSOLUTE EOS #: 0.5 K/UL (ref 0–0.4)
ABSOLUTE IMMATURE GRANULOCYTE: ABNORMAL K/UL (ref 0–0.3)
ABSOLUTE LYMPH #: 1.8 K/UL (ref 1–4.8)
ABSOLUTE MONO #: 0.4 K/UL (ref 0–1)
ALBUMIN SERPL-MCNC: 4.7 G/DL (ref 3.5–5.2)
ALBUMIN/GLOBULIN RATIO: ABNORMAL (ref 1–2.5)
ALP BLD-CCNC: 99 U/L (ref 35–104)
ALT SERPL-CCNC: 20 U/L (ref 5–33)
ANION GAP SERPL CALCULATED.3IONS-SCNC: 10 MMOL/L (ref 9–17)
ANION GAP SERPL CALCULATED.3IONS-SCNC: 11 MMOL/L (ref 9–17)
AST SERPL-CCNC: 21 U/L
BASOPHILS # BLD: 1 % (ref 0–2)
BASOPHILS ABSOLUTE: 0.1 K/UL (ref 0–0.2)
BILIRUB SERPL-MCNC: 0.26 MG/DL (ref 0.3–1.2)
BUN BLDV-MCNC: 15 MG/DL (ref 8–23)
BUN BLDV-MCNC: 16 MG/DL (ref 8–23)
BUN/CREAT BLD: 12 (ref 9–20)
BUN/CREAT BLD: 13 (ref 9–20)
CALCIUM SERPL-MCNC: 10.6 MG/DL (ref 8.6–10.4)
CALCIUM SERPL-MCNC: 10.7 MG/DL (ref 8.6–10.4)
CHLORIDE BLD-SCNC: 102 MMOL/L (ref 98–107)
CHLORIDE BLD-SCNC: 102 MMOL/L (ref 98–107)
CHOLESTEROL/HDL RATIO: 4.2
CHOLESTEROL: 150 MG/DL
CO2: 27 MMOL/L (ref 20–31)
CO2: 28 MMOL/L (ref 20–31)
CREAT SERPL-MCNC: 1.2 MG/DL (ref 0.5–0.9)
CREAT SERPL-MCNC: 1.24 MG/DL (ref 0.5–0.9)
DIFFERENTIAL TYPE: YES
EOSINOPHILS RELATIVE PERCENT: 7 % (ref 0–5)
GFR AFRICAN AMERICAN: 53 ML/MIN
GFR AFRICAN AMERICAN: 55 ML/MIN
GFR NON-AFRICAN AMERICAN: 44 ML/MIN
GFR NON-AFRICAN AMERICAN: 45 ML/MIN
GFR SERPL CREATININE-BSD FRML MDRD: ABNORMAL ML/MIN/{1.73_M2}
GLUCOSE BLD-MCNC: 188 MG/DL (ref 70–99)
GLUCOSE BLD-MCNC: 191 MG/DL (ref 70–99)
HCT VFR BLD CALC: 37.1 % (ref 36–46)
HDLC SERPL-MCNC: 36 MG/DL
HEMOGLOBIN: 12.6 G/DL (ref 12–16)
IMMATURE GRANULOCYTES: ABNORMAL %
LDL CHOLESTEROL: 74 MG/DL (ref 0–130)
LYMPHOCYTES # BLD: 28 % (ref 15–40)
MAGNESIUM: 1.8 MG/DL (ref 1.6–2.6)
MCH RBC QN AUTO: 31 PG (ref 26–34)
MCHC RBC AUTO-ENTMCNC: 33.8 G/DL (ref 31–37)
MCV RBC AUTO: 91.7 FL (ref 80–100)
MONOCYTES # BLD: 7 % (ref 4–8)
NRBC AUTOMATED: ABNORMAL PER 100 WBC
PATIENT FASTING?: YES
PDW BLD-RTO: 14 % (ref 12.1–15.2)
PLATELET # BLD: 232 K/UL (ref 140–450)
PLATELET ESTIMATE: ABNORMAL
PMV BLD AUTO: ABNORMAL FL (ref 6–12)
POTASSIUM SERPL-SCNC: 4.7 MMOL/L (ref 3.7–5.3)
POTASSIUM SERPL-SCNC: 4.8 MMOL/L (ref 3.7–5.3)
RBC # BLD: 4.05 M/UL (ref 4–5.2)
RBC # BLD: ABNORMAL 10*6/UL
SEG NEUTROPHILS: 57 % (ref 47–75)
SEGMENTED NEUTROPHILS ABSOLUTE COUNT: 3.7 K/UL (ref 2.5–7)
SODIUM BLD-SCNC: 140 MMOL/L (ref 135–144)
SODIUM BLD-SCNC: 140 MMOL/L (ref 135–144)
TOTAL PROTEIN: 8 G/DL (ref 6.4–8.3)
TRIGL SERPL-MCNC: 201 MG/DL
TSH SERPL DL<=0.05 MIU/L-ACNC: <0.01 MIU/L (ref 0.3–5)
VITAMIN D 25-HYDROXY: 50.5 NG/ML (ref 30–100)
VLDLC SERPL CALC-MCNC: ABNORMAL MG/DL (ref 1–30)
WBC # BLD: 6.5 K/UL (ref 3.5–11)
WBC # BLD: ABNORMAL 10*3/UL

## 2020-11-18 PROCEDURE — 84439 ASSAY OF FREE THYROXINE: CPT

## 2020-11-18 PROCEDURE — 71046 X-RAY EXAM CHEST 2 VIEWS: CPT

## 2020-11-18 PROCEDURE — 82306 VITAMIN D 25 HYDROXY: CPT

## 2020-11-18 PROCEDURE — 80061 LIPID PANEL: CPT

## 2020-11-18 PROCEDURE — 85025 COMPLETE CBC W/AUTO DIFF WBC: CPT

## 2020-11-18 PROCEDURE — 83735 ASSAY OF MAGNESIUM: CPT

## 2020-11-18 PROCEDURE — 84443 ASSAY THYROID STIM HORMONE: CPT

## 2020-11-18 PROCEDURE — 93005 ELECTROCARDIOGRAM TRACING: CPT

## 2020-11-18 PROCEDURE — 80048 BASIC METABOLIC PNL TOTAL CA: CPT

## 2020-11-18 PROCEDURE — 36415 COLL VENOUS BLD VENIPUNCTURE: CPT

## 2020-11-18 PROCEDURE — 80053 COMPREHEN METABOLIC PANEL: CPT

## 2020-11-19 ENCOUNTER — OFFICE VISIT (OUTPATIENT)
Dept: FAMILY MEDICINE CLINIC | Age: 65
End: 2020-11-19
Payer: COMMERCIAL

## 2020-11-19 VITALS
HEART RATE: 80 BPM | OXYGEN SATURATION: 92 % | SYSTOLIC BLOOD PRESSURE: 125 MMHG | DIASTOLIC BLOOD PRESSURE: 80 MMHG | WEIGHT: 165.4 LBS | BODY MASS INDEX: 28.39 KG/M2 | TEMPERATURE: 97.7 F

## 2020-11-19 LAB
EKG ATRIAL RATE: 69 BPM
EKG P AXIS: 74 DEGREES
EKG P-R INTERVAL: 208 MS
EKG Q-T INTERVAL: 400 MS
EKG QRS DURATION: 78 MS
EKG QTC CALCULATION (BAZETT): 428 MS
EKG R AXIS: 63 DEGREES
EKG T AXIS: 66 DEGREES
EKG VENTRICULAR RATE: 69 BPM
HBA1C MFR BLD: 7.2 %
THYROXINE, FREE: 1.31 NG/DL (ref 0.93–1.7)

## 2020-11-19 PROCEDURE — 83036 HEMOGLOBIN GLYCOSYLATED A1C: CPT | Performed by: INTERNAL MEDICINE

## 2020-11-19 PROCEDURE — 3017F COLORECTAL CA SCREEN DOC REV: CPT | Performed by: INTERNAL MEDICINE

## 2020-11-19 PROCEDURE — 3051F HG A1C>EQUAL 7.0%<8.0%: CPT | Performed by: INTERNAL MEDICINE

## 2020-11-19 PROCEDURE — 1036F TOBACCO NON-USER: CPT | Performed by: INTERNAL MEDICINE

## 2020-11-19 PROCEDURE — 93010 ELECTROCARDIOGRAM REPORT: CPT | Performed by: INTERNAL MEDICINE

## 2020-11-19 PROCEDURE — 99214 OFFICE O/P EST MOD 30 MIN: CPT | Performed by: INTERNAL MEDICINE

## 2020-11-19 PROCEDURE — G8417 CALC BMI ABV UP PARAM F/U: HCPCS | Performed by: INTERNAL MEDICINE

## 2020-11-19 PROCEDURE — 2022F DILAT RTA XM EVC RTNOPTHY: CPT | Performed by: INTERNAL MEDICINE

## 2020-11-19 PROCEDURE — G8484 FLU IMMUNIZE NO ADMIN: HCPCS | Performed by: INTERNAL MEDICINE

## 2020-11-19 PROCEDURE — G8427 DOCREV CUR MEDS BY ELIG CLIN: HCPCS | Performed by: INTERNAL MEDICINE

## 2020-11-19 ASSESSMENT — ENCOUNTER SYMPTOMS
CHEST TIGHTNESS: 0
VOMITING: 0
SHORTNESS OF BREATH: 0
COUGH: 0
BLURRED VISION: 0
VISUAL CHANGE: 0
SORE THROAT: 0
ABDOMINAL PAIN: 0
CONSTIPATION: 0
SINUS PRESSURE: 0
NAUSEA: 0

## 2020-11-19 NOTE — PROGRESS NOTES
HPI Notes    Name: Kelvin Moffett  : 1955         Chief Complaint:     Chief Complaint   Patient presents with    Diabetes     Patient presents today for 3 month check up. Patient states she is feeling good.  Hyperlipidemia    Hypertension       History of Present Illness:        Boo Diggs presents to office to follow-up for diabetes, hypertension, hyperlipidemia. Has no complaints. States doing well  She had labs done on 20. We reviewed the results together. BUN/Cr .. supposed to follow up with DR. Win Roberts . Hemoglobin A1c 7.2% in our office today  Follows up with endocrinologist Dr. Dwayne Riley for h/o Graves disease. Has a h/o paroxysmal Afib. On Eliquis. Reports no bleeding or bruising. Had no CP, no palpitations, no SOB . Diabetes   She presents for her follow-up diabetic visit. She has type 2 diabetes mellitus. Her disease course has been stable. There are no hypoglycemic associated symptoms. Pertinent negatives for hypoglycemia include no dizziness, headaches or nervousness/anxiousness. Pertinent negatives for diabetes include no blurred vision, no chest pain, no fatigue and no visual change. There are no hypoglycemic complications. Symptoms are stable. Diabetic complications include nephropathy. Pertinent negatives for diabetic complications include no CVA, heart disease or PVD. Risk factors for coronary artery disease include diabetes mellitus, dyslipidemia, obesity and post-menopausal. Current diabetic treatment includes insulin injections (trulicity). She is compliant with treatment all of the time. She is following a generally healthy diet. Meal planning includes avoidance of concentrated sweets. An ACE inhibitor/angiotensin II receptor blocker is being taken. Eye exam is current. Hyperlipidemia   This is a chronic problem. The current episode started more than 1 year ago. The problem is controlled. Exacerbating diseases include diabetes and obesity.  Pertinent negatives include no chest pain or shortness of breath. Current antihyperlipidemic treatment includes statins. There are no compliance problems. Hypertension   This is a chronic problem. The current episode started more than 1 year ago. The problem is unchanged. The problem is controlled. Pertinent negatives include no anxiety, blurred vision, chest pain, headaches, palpitations or shortness of breath. Past treatments include ACE inhibitors, calcium channel blockers, beta blockers and diuretics. The current treatment provides significant improvement. Hypertensive end-organ damage includes kidney disease. There is no history of CAD/MI, CVA, heart failure or PVD.            Past Medical History:     Past Medical History:   Diagnosis Date    Asthma     [facility]     COPD [facility]chronic obstructive pulmonary disease) [facility]     DDD (degenerative disc disease)     Diabetes mellitus ([facility]     Hyperlipidemia     Hypertension     Hyperthyroidism     Type II or unspecified type diabetes mellitus without mention of complication, not stated as uncontrolled       Reviewed all health maintenance requirements and orderedappropriate tests  Health Maintenance Due   Topic Date Due    Diabetic retinal exam  08/11/2019    Breast cancer screen  12/20/2019    Flu vaccine (1) 09/01/2020    Diabetic microalbuminuria test  09/04/2020       Past Surgical History:     Past Surgical History:   Procedure Laterality Date    BACK SURGERY      COLONOSCOPY  04/23/14   Floydene Ada DENTAL SURGERY N/A 3/8/2017    REMOVAL OF BILATERAL MANDIBULAR LELO AND MAXILLARY EDENTULOUS ALVEOPLASTY performed by Fritzi Sacks, DDS at [facility]      removal    GASTROSTOMY TUBE PLACEMENT      [facility] GASTROINTEST[facility]AL [facility]OSCOPY  3/16/2016    Peg tube insertion        Medications:       Prior to Admission medications    Medication Sig Start Date End Date Taking? Authorizing Provider   dilTIAZem (CARDIZEM CD) 120 MG extended release capsule TAKE 1 CAPSULE BY MOUTH EVERY DAY 11/16/20  Yes Carlos Sevilla MD   loratadine (CLARITIN) 10 MG tablet TAKE 1 TABLET BY MOUTH EVERY DAY 11/11/20  Yes Carlos Sevilla MD   albuterol sulfate  (90 Base) MCG/ACT inhaler INHALE TWO (2) PUFFS BY MOUTH EVERY 6 HOURS AS NEEDED FOR WHEEZING 10/7/20  Yes Carlos Sevilla MD   metoprolol tartrate (LOPRESSOR) 25 MG tablet TAKE ONE (1) TABLET BY MOUTH TWICE DAILY 10/7/20  Yes Carlos Sevilla MD   levothyroxine (SYNTHROID) 88 MCG tablet Take 1 tablet by mouth daily 9/18/20  Yes Carlos Sevilla MD   atorvastatin (LIPITOR) 40 MG tablet TAKE 1 TABLET BY MOUTH ONCE DAILY 9/17/20  Yes Carlos Sevilla MD   potassium chloride (KLOR-CON M) 20 MEQ extended release tablet Take 1 tablet by mouth daily 9/17/20  Yes Carlos Sevilla MD   insulin glargine (LANTUS SOLOSTAR) 100 UNIT/ML injection pen Inject 30 Units into the skin nightly 8/24/20  Yes Carlos Sevilla MD   cyclobenzaprine (FLEXERIL) 5 MG tablet TAKE 1 TABLET BY MOUTH THREE TIMES DAILY AS NEEDED FOR MUSCLE SPASMS 8/13/20  Yes Carlos Sevilla MD   ondansetron (ZOFRAN ODT) 4 MG disintegrating tablet Take 1 tablet by mouth every 8 hours as needed for Nausea or Vomiting 8/6/20  Yes Hebert Carter MD   albuterol (PROVENTIL) (2.5 MG/3ML) 0.083% nebulizer solution Take 3 mLs by nebulization 4 times daily 7/23/20  Yes Carlos Sevilla MD   lisinopril (PRINIVIL;ZESTRIL) 5 MG tablet TAKE 1 TABLET BY MOUTH EVERY DAY 7/13/20  Yes Carlos Sevilla MD   Lancets MISC 1 each by Does not apply route daily Tests 3 times a day/PRN.  Please dispense what [employer] covers to go with her new Glucometer 6/19/20  Yes Carlos Sevilla MD   TRULICITY 1.5 KA/8.3FO SOPN 1.5 mg once a week Monday's 4/6/20  Yes Historical Provider, MD   Multiple Vitamin (MULTI-VITAMINS) TABS TAKE 1 TABLET BY MOUTH EVERY DAY 5/7/20  Yes Carlos Sevilla MD   dicyclomine (BENTYL) 10 MG capsule Take 1 capsule by mouth 4 times daily 4/27/20  Yes Zuleika Lara MD   Cholecalciferol (VITAMIN D3) 25 MCG (1000 UT) TABS TAKE (1) TABLET BY MOUTH DAILY 4/14/20  Yes Zuleika Lara MD   Omega-3 Fatty Acids (FISH OIL) 1000 MG CAPS TAKE TWO (2) CAPSULES BY MOUTH DAILY 4/14/20  Yes Zuleika Lara MD   ELIQUIS 5 MG TABS tablet TAKE ONE (1) TABLET BY MOUTH TWICE DAILY 4/14/20  Yes Zuleika Lara MD   Insulin Pen Needle (TRUEPLUS PEN NEEDLES) 31G X 5 MM MISC USE AS DIRECTED DAILY 4/14/20  Yes Zuleika Lara MD   gabapentin (NEURONTIN) 800 MG tablet TAKE 1 TABLET BY MOUTH TWICE DAILY 3/16/20 11/19/20 Yes Zuleika Lara MD   magnesium citrate solution Take 296 mLs by mouth once for 1 dose 2/20/20 11/19/20 Yes Zuleika Lara MD   oxybutynin (DITROPAN XL) 15 MG extended release tablet Take 1 tablet by mouth daily 12/17/19  Yes Charlene Huang MD   FEROSUL 325 (65 Fe) MG tablet TAKE (1) TABLET BY MOUTH DAILY 4/23/19  Yes Zuleika Lara MD   Incontinence Supply Disposable (POISE PAD) PADS Apply 1 Piece topically as needed (incontinence) 11/21/17  Yes Charlene Huang MD   amLODIPine (NORVASC) 10 MG tablet TAKE 1 TABLET BY MOUTH EVERY DAY  Patient not taking: Reported on 11/19/2020 11/11/20   Zuleika Lara MD   senna (SENOKOT) 8.6 MG tablet Take 1 tablet by mouth 2 times daily  Patient not taking: Reported on 11/19/2020 4/27/20 4/27/21  Zuleika Lara MD   hydroCHLOROthiazide (HYDRODIURIL) 25 MG tablet TAKE 1 TABLET BY MOUTH EVERY MORNING 3/16/20   Zuleika Lara MD   Blood Glucose Monitoring Suppl SOUTH 1 Units by Does not apply route once for 1 dose PLease dispense a machine that is covered by her insurance  DX E11.9 4/26/18 8/24/20  Zuleika Lara MD   Alcohol Swabs (EASY TOUCH ALCOHOL PREP MEDIUM) 70 % PADS USE FOUR TIMES DAILY  Patient not taking: Reported on 8/6/2020 3/29/17   Anuradha Keating MD   Blood Pressure Monitoring (B-D ASSURE BPM/AUTO ARM CUFF) MISC 1 Device by Does not apply route daily as needed (htn)  Patient not taking: Reported on 8/6/2020 8/25/16   Deonna Baptiste MD        Allergies:       Norco [hydrocodone-acetaminophen]; Oxycodone; Percocet [oxycodone-acetaminophen]; and Sulfa antibiotics    Social History:     Tobacco: reports that she quit smoking about 7 years ago. Her smoking use included cigarettes. She has a 20.00 pack-year smoking history. She has never used smokeless tobacco.  Alcohol:      reports no history of alcohol use. Drug Use:  reports no history of drug use. Family History:     Family History   Problem Relation Age of Onset    Heart Disease Mother     High Cholesterol Mother     No Known Problems Son        Review of Systems:         Review of Systems   Constitutional: Negative for activity change, appetite change, chills, fatigue and fever. HENT: Negative for congestion, sinus pressure and sore throat. Eyes: Negative for blurred vision. Respiratory: Negative for cough, chest tightness and shortness of breath. Cardiovascular: Negative for chest pain, palpitations and leg swelling. Gastrointestinal: Negative for abdominal pain, constipation, nausea and vomiting. Genitourinary: Negative for dysuria. Skin: Negative for rash. Neurological: Negative for dizziness and headaches. Psychiatric/Behavioral: Negative for dysphoric mood. The patient is not nervous/anxious. Physical Exam:     Vitals:  /80 (Site: Right Upper Arm, Position: Sitting, Cuff Size: Medium Adult)   Pulse 80   Temp 97.7 °F (36.5 °C)   Wt 165 lb 6.4 oz (75 kg)   SpO2 92%   BMI 28.39 kg/m²       Physical Exam  Vitals signs reviewed. Constitutional:       General: She is not in acute distress. Appearance: She is well-developed. HENT:      Head: Normocephalic and atraumatic.       Right Ear: Tympanic membrane, ear canal and external ear normal.      Left Ear: Tympanic membrane, ear canal and external ear normal.      Nose: Nose normal. No congestion. Mouth/Throat:      Mouth: Mucous membranes are moist.      Pharynx: Oropharynx is clear. Eyes:      General: No scleral icterus. Right eye: No discharge. Left eye: No discharge. Conjunctiva/sclera: Conjunctivae normal.      Pupils: Pupils are equal, round, and reactive to light. Neck:      Thyroid: No thyromegaly. Cardiovascular:      Rate and Rhythm: Normal rate and regular rhythm. Heart sounds: Normal heart sounds. No murmur. Pulmonary:      Effort: Pulmonary effort is normal.      Breath sounds: Normal breath sounds. No wheezing or rales. Abdominal:      General: Bowel sounds are normal. There is no distension. Palpations: Abdomen is soft. There is no mass. Tenderness: There is no abdominal tenderness. Musculoskeletal: Normal range of motion. Right lower leg: No edema. Left lower leg: No edema. Lymphadenopathy:      Cervical: No cervical adenopathy. Skin:     General: Skin is warm and dry. Findings: No rash. Neurological:      General: No focal deficit present. Mental Status: She is alert and oriented to person, place, and time. Mental status is at baseline. Psychiatric:         Mood and Affect: Mood normal.         Behavior: Behavior normal.         Thought Content:  Thought content normal.         Judgment: Judgment normal.               Data:     Lab Results   Component Value Date     11/18/2020    K 4.8 11/18/2020     11/18/2020    CO2 28 11/18/2020    BUN 16 11/18/2020    CREATININE 1.20 11/18/2020    GLUCOSE 191 11/18/2020    PROT 8.0 11/18/2020    LABALBU 4.7 11/18/2020    BILITOT 0.26 11/18/2020    ALKPHOS 99 11/18/2020    AST 21 11/18/2020    ALT 20 11/18/2020     Lab Results   Component Value Date    WBC 6.5 11/18/2020    RBC 4.05 11/18/2020    HGB 12.6 11/18/2020    HCT 37.1 11/18/2020    MCV 91.7 11/18/2020    MCH 31.0 11/18/2020    MCHC 33.8 11/18/2020    RDW 14.0 11/18/2020     11/18/2020    MPV NOT REPORTED 11/18/2020     Lab Results   Component Value Date    TSH <0.01 11/18/2020     Lab Results   Component Value Date    CHOL 150 11/18/2020    HDL 36 11/18/2020    LABA1C 7.2 11/19/2020          Assessment & Plan        Diagnosis Orders   1. Type 2 diabetes mellitus without complication, with long-term current use of insulin (HCC)   Improving, hemoglobin A1c 7.2%, continue on Lantus 30 units nightly and Trulicity POCT glycosylated hemoglobin (Hb A1C)   2. Essential hypertension   Blood pressure is well controlled, continue on current regimen    3. Mixed hyperlipidemia   Reasonably controlled, LDL is less than 100, continue Lipitor    4. Paroxysmal atrial fibrillation (HCC)   Heart rate is stable on Cardizem and Lopressor, anticoagulated with Eliquis. Follows up with Dr. Shala Castle    5. Graves' disease   Was treated with radioactive iodine in January 2019, post ablation hypothyroidism, on levothyroxine, follows up with endocrinologist Dr. Jaylin Kelley                      Completed Refills   Requested Prescriptions      No prescriptions requested or ordered in this encounter     Return in about 3 months (around 2/19/2021) for diabetes, HTN, hyperlipidemia. No orders of the defined types were placed in this encounter. Orders Placed This Encounter   Procedures    POCT glycosylated hemoglobin (Hb A1C)         Patient Instructions   SURVEY:    You may be receiving a survey from [employer] regarding your visit today. Please complete the survey to enable us to provide the highest quality of care to you and your family. If you cannot score us a very good on any question, please call the office to discuss how we could of made your experience a very good one. Thank you. You may be receiving a survey from [employer] regarding your visit today. You may get this in the mail, through your MyChart or in your email.      Please complete the survey to enable us to provide the highest quality of care to you and your family. If you cannot score us as very good ( 5 Stars) on any question, please feel free to call the office to discuss how we could have made your experience exceptional.     Thank You!       MD Connor Wooten sean, [address] [place] [address]        Electronically signed by Kamran Groves MD on 11/19/2020 at 3:19 PM           Completed Refills      Requested Prescriptions      No prescriptions requested or ordered in this encounter

## 2020-11-19 NOTE — PATIENT INSTRUCTIONS
SURVEY:    You may be receiving a survey from [employer] regarding your visit today. Please complete the survey to enable us to provide the highest quality of care to you and your family. If you cannot score us a very good on any question, please call the office to discuss how we could of made your experience a very good one. Thank you. You may be receiving a survey from [employer] regarding your visit today. You may get this in the mail, through your MyChart or in your email. Please complete the survey to enable us to provide the highest quality of care to you and your family. If you cannot score us as very good ( 5 Stars) on any question, please feel free to call the office to discuss how we could have made your experience exceptional.     Thank You!       MD Anderson Vieira, JOVANY Loaiza, PCA

## 2020-12-03 ENCOUNTER — OFFICE VISIT (OUTPATIENT)
Dept: CARDIOLOGY CLINIC | Age: 65
End: 2020-12-03
Payer: COMMERCIAL

## 2020-12-03 VITALS
BODY MASS INDEX: 28.34 KG/M2 | DIASTOLIC BLOOD PRESSURE: 60 MMHG | HEIGHT: 64 IN | SYSTOLIC BLOOD PRESSURE: 130 MMHG | WEIGHT: 166 LBS | HEART RATE: 88 BPM | OXYGEN SATURATION: 97 %

## 2020-12-03 PROCEDURE — 1036F TOBACCO NON-USER: CPT | Performed by: INTERNAL MEDICINE

## 2020-12-03 PROCEDURE — 2022F DILAT RTA XM EVC RTNOPTHY: CPT | Performed by: INTERNAL MEDICINE

## 2020-12-03 PROCEDURE — 3017F COLORECTAL CA SCREEN DOC REV: CPT | Performed by: INTERNAL MEDICINE

## 2020-12-03 PROCEDURE — 3051F HG A1C>EQUAL 7.0%<8.0%: CPT | Performed by: INTERNAL MEDICINE

## 2020-12-03 PROCEDURE — G8484 FLU IMMUNIZE NO ADMIN: HCPCS | Performed by: INTERNAL MEDICINE

## 2020-12-03 PROCEDURE — G8427 DOCREV CUR MEDS BY ELIG CLIN: HCPCS | Performed by: INTERNAL MEDICINE

## 2020-12-03 PROCEDURE — G8417 CALC BMI ABV UP PARAM F/U: HCPCS | Performed by: INTERNAL MEDICINE

## 2020-12-03 PROCEDURE — 99214 OFFICE O/P EST MOD 30 MIN: CPT | Performed by: INTERNAL MEDICINE

## 2020-12-03 NOTE — PATIENT INSTRUCTIONS
Survey: You may be receiving a survey from [employer] regarding your visit today. You may get this in the mail, through your MyChart or in your email. Please complete the survey to enable us to provide the highest quality of care to you and your family. Please also, mention our names. If you cannot score us as very good (5 Stars) on any question, please feel free to call the office to discuss how we could have made your experience exceptional.      Thank You!     Dr. Shala Hayes [address]

## 2020-12-03 NOTE — LETTER
Guilherme Paez M.D. [address] [place] [address]  [phone]        December 3, 2020        Rob Roe MD  [address] [facility], [address] [place] [address]    RE:   Hernán Payne  :  1955    Dear Dr. Krysta Colon:    CHIEF COMPLAINT:  1. Paroxysmal atrial fibrillation. 2.  Hyperlipidemia. HISTORY OF PRESENT ILLNESS:  I had the pleasure of seeing Ms. Raghu Hassan in our office on 2020. She is a pleasant 41-year-old female who has a long history of paroxysmal atrial fibrillation and is anticoagulated with Eliquis. She first developed atrial fibrillation during a colonoscopy on 2014. She was also markedly hyperthyroid and was anticoagulated with Coumadin and she eventually converted to sinus rhythm. She was treated with Tapazole with her free thyroxine normalizing. She had another episode of atrial fibrillation on 2015, and was converted to sinus rhythm. She was hospitalized on 2019 with nausea and vomiting and was also in atrial fibrillation at that time and converted spontaneously again to sinus rhythm. She has had several hospitalizations because of dehydration. She, however, has had no cardiac issues since I last saw her in December. She has had no chest pain or chest discomfort. No unusual shortness of breath. Energy level has been good. Her thyroid and her diabetes are managed by Dr. Orville Panda, who she is seeing at 11:30 today. She last saw Dr. Krysta Colon several weeks ago and her hemoglobin A1c at that time was 7.2. She has had no palpitations. CARDIAC RISK FACTORS:  Known CAD: Negative. Prior MI:  Negative. Peripheral Vascular Disease:  Negative. Diabetes:  Positive. Hypertension:  Positive. Hyperlipidemia:  Positive. Other Family Members:  Positive.     MEDICATIONS AT THIS TIME:  She is on Proventil inhaler p.r.n., Lipitor 40 mg daily, vitamin D 1000 units daily, Flexeril 5 mg t.i.d. p.r.n., Cardizem  mg daily, Eliquis 5 mg b.i.d., iron 5 grains daily, Neurontin 800 mg b.i.d., HydroDIURIL 25 mg daily, glargine 30 units nightly, Synthroid 88 mcg daily, lisinopril 5 mg daily, Claritin 10 mg daily, magnesium daily, Lopressor 25 mg daily, multivitamin daily, fish oil 1000 mg 2 tablets daily, Ditropan XL 15 mg daily, potassium 20 mEq daily, Trulicity daily. PAST MEDICAL AND SURGICAL HISTORY:  1. She has insulin-dependent diabetes, last hemoglobin A1c of 7.2.  2.  Hypertension. 3.  COPD. 4.  Asthma. 5.  Tonsillectomy. 6.  Colonoscopy. 7.  Hyperthyroidism, treated by Dr. Robbi Denise. FAMILY HISTORY:  Mother had heart disease. SOCIAL HISTORY:  She is 60years old. Had a 80-year-old granddaughter who  in 2018. She stopped smoking 9 years ago. Occasionally drinks alcohol. Lives with her brother, Rakel Beasley, along with 3 cats. REVIEW OF SYSTEMS:  Cardiac as above. Other systems reviewed including constitutional, eyes, ears, nose and throat, cardiovascular, respiratory, GI, , musculoskeletal, integumentary, neurologic, endocrine, hematologic and allergic/immunologic are negative except for what is described above. No weight loss or weight gain. No change in bowel habits. No blood in stool. No fevers, sweats or chills. PHYSICAL EXAMINATION:  VITAL SIGNS:  Her blood pressure was 130/60 with a heart rate of 80 and regular. Respiratory rate 18. O2 sat 97%. Weight 166 pounds. GENERAL:  She is a pleasant 80-year-old female. Denied pain. She was oriented to person, place and time. Answered questions appropriately. SKIN:  No unusual skin changes. HEENT:  The pupils are equally round and intact. Mucous membranes were dry. NECK:  No JVD. Good carotid pulses. No carotid bruits. No lymphadenopathy or thyromegaly. CARDIOVASCULAR EXAM:  S1 and S2 were normal.  No S3 or S4.   Soft systolic blowing type murmur. No diastolic murmur. PMI was normal.  No lift, thrust, or pericardial friction rub. LUNGS:  Quite clear to auscultation and percussion. ABDOMEN:  Soft and nontender. Good bowel sounds. EXTREMITIES:  Good femoral pulses. Good pedal pulses. No pedal edema. Skin was warm and dry. No calf tenderness. Nail beds pink. Good cap refill. PULSES:  Bilateral symmetrical radial, brachial and carotid pulses. No carotid bruits. Good femoral and pedal pulses. NEUROLOGIC EXAM:  Within normal limits. PSYCHIATRIC EXAM:  Within normal limits. LABORATORY DATA:  Her sodium was 140, potassium 4.8, BUN 16, creatinine 1.20, GFR was 45, which is about her baseline. Cholesterol 150, HDL 36, LDL 74, triglycerides 201. ALT was 20, AST was 21. TSH was less than 0.01. Free thyroxine was normal at 1.31. Vitamin D 50.5. White count 6.5, hemoglobin 12.6, with a platelet count of [phone],021. EKG showed normal sinus rhythm, was normal.    Chest x-ray was unremarkable. IMPRESSION:  1. Paroxysmal atrial fibrillation, well controlled with Lopressor and Cardizem, with her anticoagulated with Eliquis, with no episodes for 1 year. 2.  Atrial fibrillation, first discovered in 2014 during a colonoscopy, with again on 06/26/2015, and on 01/18/2019, as well as 12/12/2019.  3.  History of elevated liver function tests, which are now normal.  4.  Insulin-dependent diabetes, with her hemoglobin A1c of 7.2.  5.  Hypertension, well controlled. 6.  Hyperlipidemia, well controlled. 7.  Status post hyperthyroidism, treated with Tapazole, with her now having a normal T4. PLAN:  1. No change in medications. 2.  See in 1 year. DISCUSSION:  Ms. Umang Castle overall is doing well. She has had no chest pain or chest discomfort. No unusual shortness of breath. Her EKG looks good and she is tolerating Eliquis without difficulty. Her risks are nicely modified.   I will plan on seeing her in 1 year unless a problem would develop. Thank you very much for allowing me the privilege of seeing Ms. Roca Perry. If you have any questions on my thoughts, please do not hesitate to contact me.      Sincerely,        Antonia Houston    D: 12/03/2020 9:42:52     T: 12/03/2020 9:51:17     [email or web address]  Job#: [id number]   Doc#: [id number]

## 2020-12-03 NOTE — PROGRESS NOTES
Pt presents today for 1 year check up  No CP or SOB  No Dizziness or HA  No Hospitalizations or procedures  No Edema    Pt states overall doing well  Had check up appt with   Dr. Justa Tyler a couple weeks  Ago. Hgba1c was 7.2    Pt's brother Brandon Robledo) lives with her  She states he needs a hip  Surgery.    Pt has 3 cats    Will see back in 1 year  No changes at his time

## 2020-12-07 NOTE — PROGRESS NOTES
Salvador Soares M.D. [address] [place] [address]  [phone]        December 3, 2020        Flor Bello MD  [address] [facility], [address] [place] [address]    RE:   Brian Richards  :  1955    Dear Dr. Yara Diaz:    CHIEF COMPLAINT:  1. Paroxysmal atrial fibrillation. 2.  Hyperlipidemia. HISTORY OF PRESENT ILLNESS:  I had the pleasure of seeing Ms. Sandi Chavarria in our office on 2020. She is a pleasant 61-year-old female who has a long history of paroxysmal atrial fibrillation and is anticoagulated with Eliquis. She first developed atrial fibrillation during a colonoscopy on 2014. She was also markedly hyperthyroid and was anticoagulated with Coumadin and she eventually converted to sinus rhythm. She was treated with Tapazole with her free thyroxine normalizing. She had another episode of atrial fibrillation on 2015, and was converted to sinus rhythm. She was hospitalized on 2019 with nausea and vomiting and was also in atrial fibrillation at that time and converted spontaneously again to sinus rhythm. She has had several hospitalizations because of dehydration. She, however, has had no cardiac issues since I last saw her in December. She has had no chest pain or chest discomfort. No unusual shortness of breath. Energy level has been good. Her thyroid and her diabetes are managed by Dr. Cain Singh, who she is seeing at 11:30 today. She last saw Dr. Yara Diaz several weeks ago and her hemoglobin A1c at that time was 7.2. She has had no palpitations. CARDIAC RISK FACTORS:  Known CAD: Negative. Prior MI:  Negative. Peripheral Vascular Disease:  Negative. Diabetes:  Positive. Hypertension:  Positive. Hyperlipidemia:  Positive. Other Family Members:  Positive.     MEDICATIONS AT THIS TIME:  She is on Proventil inhaler p.r.n., Lipitor 40 mg daily, vitamin D 1000 units daily, Flexeril 5 mg t.i.d. p.r.n., Cardizem  mg daily, Eliquis 5 mg b.i.d., iron 5 grains daily, Neurontin 800 mg b.i.d., HydroDIURIL 25 mg daily, glargine 30 units nightly, Synthroid 88 mcg daily, lisinopril 5 mg daily, Claritin 10 mg daily, magnesium daily, Lopressor 25 mg daily, multivitamin daily, fish oil 1000 mg 2 tablets daily, Ditropan XL 15 mg daily, potassium 20 mEq daily, Trulicity daily. PAST MEDICAL AND SURGICAL HISTORY:  1. She has insulin-dependent diabetes, last hemoglobin A1c of 7.2.  2.  Hypertension. 3.  COPD. 4.  Asthma. 5.  Tonsillectomy. 6.  Colonoscopy. 7.  Hyperthyroidism, treated by Dr. Cosmo Lopez. FAMILY HISTORY:  Mother had heart disease. SOCIAL HISTORY:  She is 60years old. Had a 80-year-old granddaughter who  in 2018. She stopped smoking 9 years ago. Occasionally drinks alcohol. Lives with her brother, Gabby Paul, along with 3 cats. REVIEW OF SYSTEMS:  Cardiac as above. Other systems reviewed including constitutional, eyes, ears, nose and throat, cardiovascular, respiratory, GI, , musculoskeletal, integumentary, neurologic, endocrine, hematologic and allergic/immunologic are negative except for what is described above. No weight loss or weight gain. No change in bowel habits. No blood in stool. No fevers, sweats or chills. PHYSICAL EXAMINATION:  VITAL SIGNS:  Her blood pressure was 130/60 with a heart rate of 80 and regular. Respiratory rate 18. O2 sat 97%. Weight 166 pounds. GENERAL:  She is a pleasant 23-year-old female. Denied pain. She was oriented to person, place and time. Answered questions appropriately. SKIN:  No unusual skin changes. HEENT:  The pupils are equally round and intact. Mucous membranes were dry. NECK:  No JVD. Good carotid pulses. No carotid bruits. No lymphadenopathy or thyromegaly. CARDIOVASCULAR EXAM:  S1 and S2 were normal.  No S3 or S4. Soft systolic blowing type murmur. No diastolic murmur.   PMI was normal.  No lift, thrust, or pericardial friction rub. LUNGS:  Quite clear to auscultation and percussion. ABDOMEN:  Soft and nontender. Good bowel sounds. EXTREMITIES:  Good femoral pulses. Good pedal pulses. No pedal edema. Skin was warm and dry. No calf tenderness. Nail beds pink. Good cap refill. PULSES:  Bilateral symmetrical radial, brachial and carotid pulses. No carotid bruits. Good femoral and pedal pulses. NEUROLOGIC EXAM:  Within normal limits. PSYCHIATRIC EXAM:  Within normal limits. LABORATORY DATA:  Her sodium was 140, potassium 4.8, BUN 16, creatinine 1.20, GFR was 45, which is about her baseline. Cholesterol 150, HDL 36, LDL 74, triglycerides 201. ALT was 20, AST was 21. TSH was less than 0.01. Free thyroxine was normal at 1.31. Vitamin D 50.5. White count 6.5, hemoglobin 12.6, with a platelet count of 537,498. EKG showed normal sinus rhythm, was normal.    Chest x-ray was unremarkable. IMPRESSION:  1. Paroxysmal atrial fibrillation, well controlled with Lopressor and Cardizem, with her anticoagulated with Eliquis, with no episodes for 1 year. 2.  Atrial fibrillation, first discovered in 2014 during a colonoscopy, with again on 06/26/2015, and on 01/18/2019, as well as 12/12/2019.  3.  History of elevated liver function tests, which are now normal.  4.  Insulin-dependent diabetes, with her hemoglobin A1c of 7.2.  5.  Hypertension, well controlled. 6.  Hyperlipidemia, well controlled. 7.  Status post hyperthyroidism, treated with Tapazole, with her now having a normal T4. PLAN:  1. No change in medications. 2.  See in 1 year. DISCUSSION:  Ms. Dipti Reinoso overall is doing well. She has had no chest pain or chest discomfort. No unusual shortness of breath. Her EKG looks good and she is tolerating Eliquis without difficulty. Her risks are nicely modified. I will plan on seeing her in 1 year unless a problem would develop.     Thank you very much for allowing me the privilege of seeing MsRahul Jared Councilman. If you have any questions on my thoughts, please do not hesitate to contact me.      Sincerely,        Carolyn Falcon    D: 12/03/2020 9:42:52     T: 12/03/2020 9:51:17     [email or web address]  Job#: [id number]   Doc#: [id number]

## 2020-12-16 RX ORDER — PEN NEEDLE, DIABETIC 31 GX3/16"
NEEDLE, DISPOSABLE MISCELLANEOUS
Qty: 100 EACH | Refills: 10 | Status: SHIPPED | OUTPATIENT
Start: 2020-12-16 | End: 2021-01-29 | Stop reason: SDUPTHER

## 2020-12-16 NOTE — TELEPHONE ENCOUNTER
Medication pending    Health Maintenance   Topic Date Due    Diabetic retinal exam  08/11/2019    Breast cancer screen  12/20/2019    Flu vaccine (1) 09/01/2020    Diabetic microalbuminuria test  09/04/2020    Shingles Vaccine (1 of 2) 05/11/2021 (Originally 8/1/2006)    Diabetic foot exam  08/24/2021    Lipid screen  11/18/2021    TSH testing  11/18/2021    Potassium monitoring  11/18/2021    Creatinine monitoring  11/18/2021    A1C test (Diabetic or Prediabetic)  11/19/2021    Cervical cancer screen  12/17/2022    Colon cancer screen colonoscopy  04/23/2024    DTaP/Tdap/Td vaccine (2 - Td) 08/12/2026    Pneumococcal 0-64 years Vaccine  Completed    Hepatitis C screen  Addressed    HIV screen  Addressed    Hepatitis A vaccine  Aged Out    Hib vaccine  Aged Out    Meningococcal (ACWY) vaccine  Aged Out             (applicable per patient's age: Cancer Screenings, Depression Screening, Fall Risk Screening, Immunizations)    Hemoglobin A1C (%)   Date Value   11/19/2020 7.2   06/08/2020 8.1 (H)   03/02/2020 7.8 (H)     Microalb/Crt.  Ratio (mcg/mg creat)   Date Value   09/04/2019 CANNOT BE CALCULATED     LDL Cholesterol (mg/dL)   Date Value   11/18/2020 74     AST (U/L)   Date Value   11/18/2020 21     ALT (U/L)   Date Value   11/18/2020 20     BUN (mg/dL)   Date Value   11/18/2020 16      (goal A1C is < 7)   (goal LDL is <100) need 30-50% reduction from baseline     BP Readings from Last 3 Encounters:   12/03/20 130/60   11/19/20 125/80   08/24/20 124/76    (goal /80)      All Future Testing planned in [facility]:  Lab Frequency Next Occurrence   Calcium Once 01/14/2021   MICHAEL DIGITAL SCREEN W CAD BILATERAL Once 01/14/2020   XR CHEST (2 VW) Once 12/31/2021   CBC Auto Differential Once 12/31/2021   Comprehensive Metabolic Panel Once 22/31/0633   EKG 12 Lead Once 12/31/2021   Lipid Panel Once 12/31/2021   Magnesium Once 12/31/2021   TSH without Reflex Once 12/31/2021   Vitamin D 25 Hydroxy Once 12/31/2021       Next Visit Date:  Future Appointments   Date Time Provider Diomedes Crespoi   1/12/2021  4:00 PM MD sofia Shi [facility]   2/19/2021  1:30 PM [id number]   2/19/2021  2:00 PM Nato Barone MD [facility]   12/6/2021 11:00 AM MD Samantha Espinoza [facility]            Patient Active Problem List:     Cervical neck pain with evidence of disc disease     Paroxysmal atrial fibrillation ([facility]     Graves' disease     Asthma with COPD ([facility]     Essential hypertension     Type 2 diabetes mellitus without complication, with long-term current use of insulin (HCC)     Iron deficiency anemia     Irritable bowel syndrome with both constipation and diarrhea     Mixed hyperlipidemia

## 2021-01-05 ENCOUNTER — HOSPITAL ENCOUNTER (EMERGENCY)
Age: 66
Discharge: HOME | End: 2021-01-06
Attending: FAMILY MEDICINE
Payer: COMMERCIAL

## 2021-01-05 VITALS
DIASTOLIC BLOOD PRESSURE: 100 MMHG | SYSTOLIC BLOOD PRESSURE: 182 MMHG | OXYGEN SATURATION: 99 % | HEART RATE: 94 BPM | WEIGHT: 160 LBS | BODY MASS INDEX: 27.46 KG/M2 | RESPIRATION RATE: 16 BRPM | TEMPERATURE: 98 F

## 2021-01-05 DIAGNOSIS — R19.7 NAUSEA VOMITING AND DIARRHEA: Primary | ICD-10-CM

## 2021-01-05 DIAGNOSIS — R11.2 NAUSEA VOMITING AND DIARRHEA: Primary | ICD-10-CM

## 2021-01-05 LAB
-: NORMAL
ABSOLUTE EOS #: 0.1 K/UL (ref 0–0.4)
ABSOLUTE IMMATURE GRANULOCYTE: ABNORMAL K/UL (ref 0–0.3)
ABSOLUTE LYMPH #: 2 K/UL (ref 1–4.8)
ABSOLUTE MONO #: 0.5 K/UL (ref 0–1)
ALBUMIN SERPL-MCNC: 5.2 G/DL (ref 3.5–5.2)
ALBUMIN/GLOBULIN RATIO: ABNORMAL (ref 1–2.5)
ALP BLD-CCNC: 112 U/L (ref 35–104)
ALT SERPL-CCNC: 24 U/L (ref 5–33)
AMORPHOUS: NORMAL
ANION GAP SERPL CALCULATED.3IONS-SCNC: 19 MMOL/L (ref 9–17)
AST SERPL-CCNC: 26 U/L
BACTERIA: NORMAL
BASOPHILS # BLD: 0 % (ref 0–2)
BASOPHILS ABSOLUTE: 0 K/UL (ref 0–0.2)
BILIRUB SERPL-MCNC: 0.62 MG/DL (ref 0.3–1.2)
BILIRUBIN URINE: NEGATIVE
BUN BLDV-MCNC: 20 MG/DL (ref 8–23)
BUN/CREAT BLD: 17 (ref 9–20)
CALCIUM SERPL-MCNC: 11.1 MG/DL (ref 8.6–10.4)
CASTS UA: NORMAL /LPF
CHLORIDE BLD-SCNC: 96 MMOL/L (ref 98–107)
CO2: 24 MMOL/L (ref 20–31)
COLOR: YELLOW
COMMENT UA: ABNORMAL
CREAT SERPL-MCNC: 1.18 MG/DL (ref 0.5–0.9)
CRYSTALS, UA: NORMAL /HPF
DIFFERENTIAL TYPE: YES
EOSINOPHILS RELATIVE PERCENT: 1 % (ref 0–5)
EPITHELIAL CELLS UA: NORMAL /HPF
GFR AFRICAN AMERICAN: 56 ML/MIN
GFR NON-AFRICAN AMERICAN: 46 ML/MIN
GFR SERPL CREATININE-BSD FRML MDRD: ABNORMAL ML/MIN/{1.73_M2}
GFR SERPL CREATININE-BSD FRML MDRD: ABNORMAL ML/MIN/{1.73_M2}
GLUCOSE BLD-MCNC: 177 MG/DL (ref 70–99)
GLUCOSE URINE: NEGATIVE
HCT VFR BLD CALC: 44 % (ref 36–46)
HEMOGLOBIN: 14.9 G/DL (ref 12–16)
IMMATURE GRANULOCYTES: ABNORMAL %
KETONES, URINE: ABNORMAL
LACTIC ACID: 1.7 MMOL/L (ref 0.5–2.2)
LEUKOCYTE ESTERASE, URINE: NEGATIVE
LIPASE: 38 U/L (ref 13–60)
LYMPHOCYTES # BLD: 18 % (ref 15–40)
MCH RBC QN AUTO: 31.2 PG (ref 26–34)
MCHC RBC AUTO-ENTMCNC: 33.9 G/DL (ref 31–37)
MCV RBC AUTO: 92.1 FL (ref 80–100)
MONOCYTES # BLD: 4 % (ref 4–8)
MUCUS: NORMAL
NITRITE, URINE: NEGATIVE
NRBC AUTOMATED: ABNORMAL PER 100 WBC
OTHER OBSERVATIONS UA: NORMAL
PDW BLD-RTO: 14.5 % (ref 12.1–15.2)
PH UA: 6.5 (ref 5–8)
PLATELET # BLD: 326 K/UL (ref 140–450)
PLATELET ESTIMATE: ABNORMAL
PMV BLD AUTO: ABNORMAL FL (ref 6–12)
POTASSIUM SERPL-SCNC: 3.8 MMOL/L (ref 3.7–5.3)
PROTEIN UA: ABNORMAL
RBC # BLD: 4.78 M/UL (ref 4–5.2)
RBC # BLD: ABNORMAL 10*6/UL
RBC UA: NORMAL /HPF (ref 0–2)
RENAL EPITHELIAL, UA: NORMAL /HPF
SEG NEUTROPHILS: 77 % (ref 47–75)
SEGMENTED NEUTROPHILS ABSOLUTE COUNT: 8.4 K/UL (ref 2.5–7)
SODIUM BLD-SCNC: 139 MMOL/L (ref 135–144)
SPECIFIC GRAVITY UA: 1.01 (ref 1–1.03)
TOTAL PROTEIN: 9.1 G/DL (ref 6.4–8.3)
TRICHOMONAS: NORMAL
TURBIDITY: CLEAR
URINE HGB: ABNORMAL
UROBILINOGEN, URINE: NORMAL
WBC # BLD: 11 K/UL (ref 3.5–11)
WBC # BLD: ABNORMAL 10*3/UL
WBC UA: NORMAL /HPF
YEAST: NORMAL

## 2021-01-05 PROCEDURE — 80053 COMPREHEN METABOLIC PANEL: CPT

## 2021-01-05 PROCEDURE — 6370000000 HC RX 637 (ALT 250 FOR IP): Performed by: FAMILY MEDICINE

## 2021-01-05 PROCEDURE — 2580000003 HC RX 258: Performed by: FAMILY MEDICINE

## 2021-01-05 PROCEDURE — 96374 THER/PROPH/DIAG INJ IV PUSH: CPT

## 2021-01-05 PROCEDURE — 83690 ASSAY OF LIPASE: CPT

## 2021-01-05 PROCEDURE — 99284 EMERGENCY DEPT VISIT MOD MDM: CPT

## 2021-01-05 PROCEDURE — 83605 ASSAY OF LACTIC ACID: CPT

## 2021-01-05 PROCEDURE — 96361 HYDRATE IV INFUSION ADD-ON: CPT

## 2021-01-05 PROCEDURE — 85025 COMPLETE CBC W/AUTO DIFF WBC: CPT

## 2021-01-05 PROCEDURE — 81001 URINALYSIS AUTO W/SCOPE: CPT

## 2021-01-05 PROCEDURE — 6360000002 HC RX W HCPCS: Performed by: FAMILY MEDICINE

## 2021-01-05 PROCEDURE — 96376 TX/PRO/DX INJ SAME DRUG ADON: CPT

## 2021-01-05 RX ORDER — ONDANSETRON 2 MG/ML
4 INJECTION INTRAMUSCULAR; INTRAVENOUS ONCE
Status: COMPLETED | OUTPATIENT
Start: 2021-01-05 | End: 2021-01-05

## 2021-01-05 RX ORDER — 0.9 % SODIUM CHLORIDE 0.9 %
1000 INTRAVENOUS SOLUTION INTRAVENOUS ONCE
Status: COMPLETED | OUTPATIENT
Start: 2021-01-05 | End: 2021-01-05

## 2021-01-05 RX ORDER — ONDANSETRON 4 MG/1
8 TABLET, ORALLY DISINTEGRATING ORAL ONCE
Status: COMPLETED | OUTPATIENT
Start: 2021-01-05 | End: 2021-01-05

## 2021-01-05 RX ORDER — ONDANSETRON 4 MG/1
4 TABLET, ORALLY DISINTEGRATING ORAL EVERY 4 HOURS PRN
Qty: 15 TABLET | Refills: 0 | Status: SHIPPED | OUTPATIENT
Start: 2021-01-05 | End: 2021-01-21

## 2021-01-05 RX ADMIN — ONDANSETRON 4 MG: 2 INJECTION, SOLUTION INTRAMUSCULAR; INTRAVENOUS at 21:29

## 2021-01-05 RX ADMIN — SODIUM CHLORIDE 1000 ML: 9 INJECTION, SOLUTION INTRAVENOUS at 21:29

## 2021-01-05 RX ADMIN — LIDOCAINE HYDROCHLORIDE: 20 SOLUTION ORAL; TOPICAL at 22:31

## 2021-01-05 RX ADMIN — ONDANSETRON 4 MG: 2 INJECTION, SOLUTION INTRAMUSCULAR; INTRAVENOUS at 23:53

## 2021-01-05 RX ADMIN — ONDANSETRON 8 MG: 4 TABLET, ORALLY DISINTEGRATING ORAL at 23:53

## 2021-01-05 ASSESSMENT — ENCOUNTER SYMPTOMS
DIARRHEA: 1
VOMITING: 1
NAUSEA: 1

## 2021-01-05 ASSESSMENT — PAIN SCALES - GENERAL: PAINLEVEL_OUTOF10: 0

## 2021-01-06 NOTE — ED PROVIDER NOTES
[address] [facility]  eMERGENCY dEPARTMENT eNCOUnter          [address] [facility]       Chief Complaint   Patient presents with    Illness     Pt C/O nausea, vomiting, and diarrhea x 1 day. Nurses Notes reviewed and I agree except as noted in the HPI. HISTORY OF PRESENT ILLNESS    Deshawn Maxwell is a 60 y.o. female who presents to the emergency room via EMS from home, patient reports nausea vomiting diarrhea that began this morning upon waking, describes \"got up this morning and got straight into it\", patient describing mostly gagging issues not had much food intake, describes the vomitus product as \"just bile\". Patient denies any fever has had occasional chills, denies any alcohol ingestion, denies any history of abdominal surgery save tubal ligation. Patient denies any alcohol use, denies any drug use including marijuana    REVIEW OF SYSTEMS     Review of Systems   Constitutional: Positive for chills. Negative for fever. Gastrointestinal: Positive for diarrhea, nausea and vomiting. All other systems reviewed and are negative. PAST MEDICAL HISTORY    has a past medical history of Asthma, Atrial fibrillation ([address] COPD (chronic obstructive pulmonary disease) ([address] DDD (degenerative disc disease), Diabetes mellitus ([address] Hyperlipidemia, Hypertension, Hyperthyroidism, and Type II or unspecified type diabetes mellitus without mention of complication, not stated as uncontrolled. SURGICAL HISTORY      has a past surgical history that includes Tubal ligation; Tonsillectomy; Colonoscopy (04/23/14); back surgery; Upper gastrointestinal endoscopy (3/16/2016); tracheostomy; tracheostomy closure; Gastrostomy tube placement; gastrostomy tube change; and Dental surgery (N/A, 3/8/2017).     CURRENT MEDICATIONS       Previous Medications    ALBUTEROL (PROVENTIL) (2.5 MG/3ML) 0.083% NEBULIZER SOLUTION    Take 3 mLs by nebulization 4 times daily    ALBUTEROL SULFATE  (90 BASE) MCG/ACT INHALER    INHALE TWO (2) PUFFS BY MOUTH EVERY 6 HOURS AS NEEDED FOR WHEEZING    ALCOHOL SWABS (EASY TOUCH ALCOHOL PREP MEDIUM) 70 % PADS    USE FOUR TIMES DAILY    ATORVASTATIN (LIPITOR) 40 MG TABLET    TAKE 1 TABLET BY MOUTH ONCE DAILY    BLOOD GLUCOSE MONITORING SUPPL SOUTH    1 Units by Does not apply route once for 1 dose PLease dispense a machine that is covered by her insurance  DX E11.9    BLOOD PRESSURE MONITORING (B-D ASSURE BPM/AUTO ARM CUFF) MISC    1 Device by Does not apply route daily as needed (htn)    CHOLECALCIFEROL (VITAMIN D3) 25 MCG (1000 UT) TABS    TAKE (1) TABLET BY MOUTH DAILY    CYCLOBENZAPRINE (FLEXERIL) 5 MG TABLET    TAKE 1 TABLET BY MOUTH THREE TIMES DAILY AS NEEDED FOR MUSCLE SPASMS    DICYCLOMINE (BENTYL) 10 MG CAPSULE    Take 1 capsule by mouth 4 times daily    DILTIAZEM (CARDIZEM CD) 120 MG EXTENDED RELEASE CAPSULE    TAKE 1 CAPSULE BY MOUTH EVERY DAY    ELIQUIS 5 MG TABS TABLET    TAKE ONE (1) TABLET BY MOUTH TWICE DAILY    FEROSUL 325 (65 FE) MG TABLET    TAKE (1) TABLET BY MOUTH DAILY    GABAPENTIN (NEURONTIN) 800 MG TABLET    TAKE 1 TABLET BY MOUTH TWICE DAILY    HYDROCHLOROTHIAZIDE (HYDRODIURIL) 25 MG TABLET    TAKE 1 TABLET BY MOUTH EVERY MORNING    INCONTINENCE SUPPLY DISPOSABLE (POISE PAD) PADS    Apply 1 Piece topically as needed (incontinence)    INSULIN GLARGINE (LANTUS SOLOSTAR) 100 UNIT/ML INJECTION PEN    Inject 30 Units into the skin nightly    INSULIN PEN NEEDLE (TRUEPLUS PEN NEEDLES) 31G X 5 MM MISC    USE AS DIRECTED DAILY    LANCETS MISC    1 each by Does not apply route daily Tests 3 times a day/PRN.  [facility] with her new Glucometer    LEVOTHYROXINE (SYNTHROID) 88 MCG TABLET    Take 1 tablet by mouth daily    LISINOPRIL (PRINIVIL;ZESTRIL) 5 MG TABLET    TAKE 1 TABLET BY MOUTH EVERY DAY    LORATADINE (CLARITIN) 10 MG TABLET    TAKE 1 TABLET BY MOUTH EVERY DAY    MAGNESIUM CITRATE SOLUTION    Take 296 mLs by mouth once for 1 dose    METOPROLOL TARTRATE (LOPRESSOR) 25 MG TABLET    TAKE ONE (1) TABLET BY MOUTH TWICE DAILY    MULTIPLE VITAMIN (MULTI-VITAMINS) TABS    TAKE 1 TABLET BY MOUTH EVERY DAY    OMEGA-3 FATTY ACIDS (FISH OIL) 1000 MG CAPS    TAKE TWO (2) CAPSULES BY MOUTH DAILY    ONDANSETRON (ZOFRAN ODT) 4 MG DISINTEGRATING TABLET    Take 1 tablet by mouth every 8 hours as needed for Nausea or Vomiting    OXYBUTYNIN (DITROPAN XL) 15 MG EXTENDED RELEASE TABLET    TAKE 1 TABLET BY MOUTH DAILY    POTASSIUM CHLORIDE (KLOR-CON M) 20 MEQ EXTENDED RELEASE TABLET    Take 1 tablet by mouth daily    SENNA (SENOKOT) 8.6 MG TABLET    Take 1 tablet by mouth 2 times daily    TRULICITY 1.5 DE/8.8RN SOPN    1.5 mg once a week Monday's       ALLERGIES     is allergic to norco [hydrocodone-acetaminophen]; oxycodone; percocet [oxycodone-acetaminophen]; and sulfa antibiotics. FAMILY HISTORY     She indicated that her mother is alive. She indicated that her father is . She indicated that her brother is alive. She indicated that her son is alive. She indicated that her maternal aunt is . family history includes Heart Disease in her mother; High Cholesterol in her mother; No Known Problems in her son. SOCIAL HISTORY      reports that she quit smoking about 7 years ago. Her smoking use included cigarettes. She has a 20.00 pack-year smoking history. She has never used smokeless tobacco. She reports that she does not drink alcohol or use drugs. PHYSICAL EXAM     INITIAL VITALS:  weight is 160 lb (72.6 kg). Her oral temperature is 98 °F (36.7 °C). Her blood pressure is 182/100 (abnormal) and her pulse is 94. Her respiration is 16 and oxygen saturation is 99%. Physical Exam   Constitutional: Patient is oriented to person, place, and time. Patient appears well-developed and well-nourished. Patient is active and cooperative. HENT:   Head: Normocephalic and atraumatic. Head is without contusion.    Right Ear: Hearing and external ear normal. No drainage. Left Ear: Hearing and external ear normal. No drainage. Nose: Nose normal. No nasal deformity. No epistaxis. Mouth/Throat: Mucous membranes are slight dry. Eyes: EOMI. Conjunctivae, sclera, and lids are normal. Right eye exhibits no discharge. Left eye exhibits no discharge. Neck: Full passive range of motion without pain and phonation normal.   Cardiovascular:  Normal rate, regular rhythm and intact distal pulses. Pulses: Right radial pulse  2+   Pulmonary/Chest: Effort normal. No tachypnea and no bradypnea. Abdominal: BMI 27.4, soft, mild tenderness to mild to moderate palpation primarily in the upper abdominal area without rigidity rebound or guarding  Musculoskeletal:   Negative acute trauma or deformity,  apparent full range of motion and normal strength all extremities appropriate to age. Neurological: Patient is alert and oriented to person, place, and time. patient displays no tremor. Patient displays no seizure activity. .     Skin: Skin is warm and dry. Patient is not diaphoretic. Psychiatric: Patient has a normal mood and pleasant affect.  Patient speech is normal and behavior is normal. Cognition and memory are normal.    DIFFERENTIAL DIAGNOSIS:   AGE, SHAHRAM, MAURA, dehydration, electrolyte abnormality    DIAGNOSTIC RESULTS           RADIOLOGY: non-plain film images(s) such as CT, Ultrasound and MRI are read by the radiologist.  No orders to display       LABS:   Labs Reviewed   CBC WITH AUTO DIFFERENTIAL - Abnormal; Notable for the following components:       Result Value    Seg Neutrophils 77 (*)     Segs Absolute 8.40 (*)     All other components within normal limits   COMPREHENSIVE METABOLIC PANEL W/ REFLEX TO MG FOR LOW K - Abnormal; Notable for the following components:    Glucose 177 (*)     CREATININE 1.18 (*)     Calcium 11.1 (*)     Chloride 96 (*)     Anion Gap 19 (*)     Alkaline Phosphatase 112 (*)     Total Protein 9.1 (*)     GFR Non- American 46 (*)     GFR  56 (*)     All other components within normal limits   URINALYSIS - Abnormal; Notable for the following components:    Ketones, Urine TRACE (*)     Urine Hgb TRACE (*)     Protein, UA 3+ (*)     All other components within normal limits   LIPASE   LACTIC ACID   MICROSCOPIC URINALYSIS       EMERGENCY DEPARTMENT COURSE:   Vitals:    Vitals:    01/05/21 2059   BP: (!) 182/100   Pulse: 94   Resp: 16   Temp: 98 °F (36.7 °C)   TempSrc: Oral   SpO2: 99%   Weight: 160 lb (72.6 kg)     Patient history and physical exam taken at bedside, discussed patient symptoms and exam findings, discussed initial work-up to include blood and urine studies, IV access with IV fluids, will give IV Zofran for nausea. Patient sitting bed semi-Fowlers, acknowledges    EMR reviewed, noting patient's been the emergency room several times for abdominal pain, nausea vomiting, including admissions, many of them seem to be tied to having urinary tract infection    Initial lab work-up reviewed, noting WBC 11.0 with 7% PMNs no reported bandemia, lactic acid, SCr 1.18 BUN 20, normal/normal electrolytes, normal lipase, normal liver enzymes and bilirubin awaiting urine sample from patient    Follow-up with patient, we discussed her initial labs, would still get urine sample from patient noting her past history also having some similar symptoms with UTIs, patient acknowledges. Urinalysis reviewed    Discussed the patient overall work-up including urine findings, patient nausea has significant improved, with active patient oral challenge with water or Gatorade Zero (Sugar Free) to make sure she can keep fluids down, and if she is able to will discharge home with Zofran ODT from emergency room, prescription same, patient acknowledges. FINAL IMPRESSION      1.  Nausea vomiting and diarrhea          DISPOSITION/PLAN   Discharge    PATIENT REFERRED TO:  Hailey Solorio MD  [address] [place] [address] 3 [facility]  [address] [facility] [address]  [phone]    As needed, If symptoms worsen      DISCHARGE MEDICATIONS:  New Prescriptions    ONDANSETRON (ZOFRAN ODT) 4 MG DISINTEGRATING TABLET    Take 1 tablet by mouth every 4 hours as needed for Nausea or Vomiting           Summation      Patient Course: Discharge    ED Medications administered this visit:    Medications   0.9 % sodium chloride bolus (0 mLs Intravenous Stopped 1/5/21 2235)   ondansetron (ZOFRAN) injection 4 mg (4 mg Intravenous Given 1/5/21 2129)   aluminum & magnesium hydroxide-simethicone (MAALOX) 30 mL, lidocaine viscous hcl (XYLOCAINE) 5 mL (GI COCKTAIL) ( Oral Given 1/5/21 2231)   ondansetron (ZOFRAN-ODT) disintegrating tablet 8 mg (8 mg Oral Given 1/5/21 2353)   ondansetron (ZOFRAN) injection 4 mg (4 mg Intravenous Given 1/5/21 2353)       New Prescriptions from this visit:    New Prescriptions    ONDANSETRON (ZOFRAN ODT) 4 MG DISINTEGRATING TABLET    Take 1 tablet by mouth every 4 hours as needed for Nausea or Vomiting       Follow-up:  Rhett Ybarra MD  [address] [place] [address]  [phone]    Call       [facility]  [address] [facility] [address] [phone]    As needed, If symptoms worsen        Final Impression:   1.  Nausea vomiting and diarrhea               (Please note that portions of this note were completed with a voice recognition program.  Efforts were made to edit the dictations but occasionally words are mis-transcribed.)    MD Katie Kilgore MD  01/06/21 0004

## 2021-01-08 DIAGNOSIS — Z79.4 TYPE 2 DIABETES MELLITUS WITH DIABETIC AUTONOMIC NEUROPATHY, WITH LONG-TERM CURRENT USE OF INSULIN (HCC): ICD-10-CM

## 2021-01-08 DIAGNOSIS — E11.43 TYPE 2 DIABETES MELLITUS WITH DIABETIC AUTONOMIC NEUROPATHY, WITH LONG-TERM CURRENT USE OF INSULIN (HCC): ICD-10-CM

## 2021-01-08 RX ORDER — PEN NEEDLE, DIABETIC 31 G X1/4"
NEEDLE, DISPOSABLE MISCELLANEOUS
Qty: 100 EACH | Refills: 10 | Status: SHIPPED | OUTPATIENT
Start: 2021-01-08 | End: 2021-11-03

## 2021-01-18 RX ORDER — LEVOTHYROXINE SODIUM 88 UG/1
TABLET ORAL
Qty: 30 TABLET | Refills: 3 | Status: SHIPPED | OUTPATIENT
Start: 2021-01-18 | End: 2022-05-19 | Stop reason: SDUPTHER

## 2021-01-21 ENCOUNTER — APPOINTMENT (OUTPATIENT)
Dept: CT IMAGING | Age: 66
End: 2021-01-21
Payer: COMMERCIAL

## 2021-01-21 ENCOUNTER — APPOINTMENT (OUTPATIENT)
Dept: CT IMAGING | Age: 66
DRG: 253 | End: 2021-01-21
Payer: COMMERCIAL

## 2021-01-21 ENCOUNTER — APPOINTMENT (OUTPATIENT)
Dept: GENERAL RADIOLOGY | Age: 66
DRG: 253 | End: 2021-01-21
Payer: COMMERCIAL

## 2021-01-21 ENCOUNTER — HOSPITAL ENCOUNTER (EMERGENCY)
Age: 66
Discharge: ANOTHER HOSPITAL | End: 2021-01-21
Attending: INTERNAL MEDICINE
Payer: COMMERCIAL

## 2021-01-21 ENCOUNTER — HOSPITAL ENCOUNTER (INPATIENT)
Age: 66
LOS: 6 days | Discharge: HOME | DRG: 253 | End: 2021-01-27
Attending: EMERGENCY MEDICINE | Admitting: INTERNAL MEDICINE
Payer: COMMERCIAL

## 2021-01-21 VITALS
SYSTOLIC BLOOD PRESSURE: 103 MMHG | HEART RATE: 109 BPM | TEMPERATURE: 97.8 F | BODY MASS INDEX: 27.49 KG/M2 | DIASTOLIC BLOOD PRESSURE: 65 MMHG | RESPIRATION RATE: 24 BRPM | HEIGHT: 64 IN | WEIGHT: 161 LBS | OXYGEN SATURATION: 93 %

## 2021-01-21 DIAGNOSIS — R73.9 HYPERGLYCEMIA: ICD-10-CM

## 2021-01-21 DIAGNOSIS — N17.9 AKI (ACUTE KIDNEY INJURY) (HCC): ICD-10-CM

## 2021-01-21 DIAGNOSIS — E87.20 METABOLIC ACIDOSIS: ICD-10-CM

## 2021-01-21 DIAGNOSIS — I10 ESSENTIAL HYPERTENSION: ICD-10-CM

## 2021-01-21 DIAGNOSIS — I62.9 INTRACRANIAL HEMORRHAGE (HCC): ICD-10-CM

## 2021-01-21 DIAGNOSIS — K62.5 GASTROINTESTINAL HEMORRHAGE ASSOCIATED WITH ANORECTAL SOURCE: ICD-10-CM

## 2021-01-21 DIAGNOSIS — E86.0 DEHYDRATION: Primary | ICD-10-CM

## 2021-01-21 DIAGNOSIS — K59.00 CONSTIPATION, UNSPECIFIED CONSTIPATION TYPE: ICD-10-CM

## 2021-01-21 DIAGNOSIS — R10.30 LOWER ABDOMINAL PAIN: Primary | ICD-10-CM

## 2021-01-21 DIAGNOSIS — I48.0 PAROXYSMAL ATRIAL FIBRILLATION (HCC): ICD-10-CM

## 2021-01-21 DIAGNOSIS — K92.2 GASTROINTESTINAL HEMORRHAGE, UNSPECIFIED GASTROINTESTINAL HEMORRHAGE TYPE: ICD-10-CM

## 2021-01-21 DIAGNOSIS — R40.4 TRANSIENT ALTERATION OF AWARENESS: ICD-10-CM

## 2021-01-21 DIAGNOSIS — Z53.1 REFUSAL OF BLOOD TRANSFUSIONS AS PATIENT IS JEHOVAH'S WITNESS: ICD-10-CM

## 2021-01-21 DIAGNOSIS — R10.84 GENERALIZED ABDOMINAL PAIN: ICD-10-CM

## 2021-01-21 LAB
-: NORMAL
-: NORMAL
ABSOLUTE EOS #: 0.4 K/UL (ref 0–0.4)
ABSOLUTE EOS #: <0.03 K/UL (ref 0–0.44)
ABSOLUTE IMMATURE GRANULOCYTE: 0.13 K/UL (ref 0–0.3)
ABSOLUTE IMMATURE GRANULOCYTE: ABNORMAL K/UL (ref 0–0.3)
ABSOLUTE LYMPH #: 1.18 K/UL (ref 1.1–3.7)
ABSOLUTE LYMPH #: 3.43 K/UL (ref 1–4.8)
ABSOLUTE MONO #: 0.81 K/UL (ref 0–1)
ABSOLUTE MONO #: 1.1 K/UL (ref 0.1–1.2)
ACETAMINOPHEN LEVEL: <5 UG/ML (ref 10–30)
ALBUMIN SERPL-MCNC: 3.5 G/DL (ref 3.5–5.2)
ALBUMIN SERPL-MCNC: 4.1 G/DL (ref 3.5–5.2)
ALBUMIN/GLOBULIN RATIO: 1.5 (ref 1–2.5)
ALBUMIN/GLOBULIN RATIO: ABNORMAL (ref 1–2.5)
ALLEN TEST: ABNORMAL
ALP BLD-CCNC: 210 U/L (ref 35–104)
ALP BLD-CCNC: 217 U/L (ref 35–104)
ALT SERPL-CCNC: 21 U/L (ref 5–33)
ALT SERPL-CCNC: 26 U/L (ref 5–33)
AMORPHOUS: NORMAL
AMORPHOUS: NORMAL
AMPHETAMINE SCREEN URINE: POSITIVE
AMYLASE: 356 U/L (ref 28–100)
ANION GAP SERPL CALCULATED.3IONS-SCNC: 14 MMOL/L (ref 9–17)
ANION GAP SERPL CALCULATED.3IONS-SCNC: 15 MMOL/L (ref 9–17)
ANION GAP: 13 MMOL/L (ref 7–16)
AST SERPL-CCNC: 25 U/L
AST SERPL-CCNC: 30 U/L
BACTERIA: NORMAL
BACTERIA: NORMAL
BARBITURATE SCREEN URINE: NEGATIVE
BASOPHILS # BLD: 0 % (ref 0–2)
BASOPHILS # BLD: ABNORMAL % (ref 0–2)
BASOPHILS ABSOLUTE: 0.06 K/UL (ref 0–0.2)
BASOPHILS ABSOLUTE: ABNORMAL K/UL (ref 0–0.2)
BENZODIAZEPINE SCREEN, URINE: NEGATIVE
BETA-HYDROXYBUTYRATE: 0.12 MMOL/L (ref 0.02–0.27)
BILIRUB SERPL-MCNC: 0.44 MG/DL (ref 0.3–1.2)
BILIRUB SERPL-MCNC: 1.01 MG/DL (ref 0.3–1.2)
BILIRUBIN URINE: ABNORMAL
BILIRUBIN URINE: ABNORMAL
BNP INTERPRETATION: NORMAL
BUN BLDV-MCNC: 21 MG/DL (ref 8–23)
BUN BLDV-MCNC: 26 MG/DL (ref 8–23)
BUN/CREAT BLD: 15 (ref 9–20)
BUN/CREAT BLD: ABNORMAL (ref 9–20)
BUPRENORPHINE URINE: ABNORMAL
C DIFF AG + TOXIN: NEGATIVE
C-REACTIVE PROTEIN: <3 MG/L (ref 0–5)
CALCIUM SERPL-MCNC: 10.7 MG/DL (ref 8.6–10.4)
CALCIUM SERPL-MCNC: 9.5 MG/DL (ref 8.6–10.4)
CANNABINOID SCREEN URINE: POSITIVE
CASTS UA: NORMAL /LPF
CASTS UA: NORMAL /LPF (ref 0–8)
CHLORIDE BLD-SCNC: 101 MMOL/L (ref 98–107)
CHLORIDE BLD-SCNC: 99 MMOL/L (ref 98–107)
CO2: 15 MMOL/L (ref 20–31)
CO2: 18 MMOL/L (ref 20–31)
COCAINE METABOLITE, URINE: NEGATIVE
COLOR: ABNORMAL
COLOR: ABNORMAL
COMMENT UA: ABNORMAL
COMMENT UA: ABNORMAL
CREAT SERPL-MCNC: 1.43 MG/DL (ref 0.5–0.9)
CREAT SERPL-MCNC: 1.46 MG/DL (ref 0.5–0.9)
CRYSTALS, UA: NORMAL /HPF
CRYSTALS, UA: NORMAL /HPF
DIFFERENTIAL TYPE: ABNORMAL
DIFFERENTIAL TYPE: ABNORMAL
EOSINOPHILS RELATIVE PERCENT: 0 % (ref 1–4)
EOSINOPHILS RELATIVE PERCENT: 2 % (ref 0–5)
EPITHELIAL CELLS UA: NORMAL /HPF
EPITHELIAL CELLS UA: NORMAL /HPF (ref 0–5)
ETHANOL PERCENT: <0.01 %
ETHANOL: <10 MG/DL
FIO2: ABNORMAL
GFR AFRICAN AMERICAN: 44 ML/MIN
GFR AFRICAN AMERICAN: 45 ML/MIN
GFR NON-AFRICAN AMERICAN: 36 ML/MIN
GFR NON-AFRICAN AMERICAN: 37 ML/MIN
GFR NON-AFRICAN AMERICAN: 43 ML/MIN
GFR SERPL CREATININE-BSD FRML MDRD: 52 ML/MIN
GFR SERPL CREATININE-BSD FRML MDRD: ABNORMAL ML/MIN/{1.73_M2}
GLUCOSE BLD-MCNC: 278 MG/DL (ref 70–99)
GLUCOSE BLD-MCNC: 293 MG/DL (ref 65–99)
GLUCOSE BLD-MCNC: 386 MG/DL (ref 70–99)
GLUCOSE BLD-MCNC: 401 MG/DL (ref 74–100)
GLUCOSE URINE: ABNORMAL
GLUCOSE URINE: ABNORMAL
HCO3 VENOUS: 16.4 MMOL/L (ref 22–29)
HCT VFR BLD CALC: 47.5 % (ref 36–46)
HCT VFR BLD CALC: 48.8 % (ref 36.3–47.1)
HEMOGLOBIN: 15.7 G/DL (ref 12–16)
HEMOGLOBIN: 16.7 G/DL (ref 11.9–15.1)
HEMOGLOBIN: 17.2 G/DL (ref 12–16)
IMMATURE GRANULOCYTES: 1 %
IMMATURE GRANULOCYTES: ABNORMAL %
INR BLD: 1
KETONES, URINE: ABNORMAL
KETONES, URINE: ABNORMAL
LACTIC ACID, SEPSIS WHOLE BLOOD: 5.8 MMOL/L (ref 0.5–1.9)
LACTIC ACID, SEPSIS WHOLE BLOOD: ABNORMAL MMOL/L (ref 0.5–1.9)
LACTIC ACID, SEPSIS WHOLE BLOOD: ABNORMAL MMOL/L (ref 0.5–1.9)
LACTIC ACID, SEPSIS: 3 MMOL/L (ref 0.5–1.9)
LACTIC ACID, SEPSIS: 3.1 MMOL/L (ref 0.5–1.9)
LACTIC ACID, SEPSIS: ABNORMAL MMOL/L (ref 0.5–1.9)
LACTIC ACID, WHOLE BLOOD: ABNORMAL MMOL/L (ref 0.7–2.1)
LACTIC ACID: 3.7 MMOL/L (ref 0.5–2.2)
LEUKOCYTE ESTERASE, URINE: ABNORMAL
LEUKOCYTE ESTERASE, URINE: ABNORMAL
LIPASE: 41 U/L (ref 13–60)
LIPASE: 71 U/L (ref 13–60)
LYMPHOCYTES # BLD: 17 % (ref 15–40)
LYMPHOCYTES # BLD: 6 % (ref 24–43)
MCH RBC QN AUTO: 30.8 PG (ref 25.2–33.5)
MCH RBC QN AUTO: 30.8 PG (ref 26–34)
MCHC RBC AUTO-ENTMCNC: 33 G/DL (ref 31–37)
MCHC RBC AUTO-ENTMCNC: 34.2 G/DL (ref 28.4–34.8)
MCV RBC AUTO: 90 FL (ref 82.6–102.9)
MCV RBC AUTO: 93.3 FL (ref 80–100)
MDMA URINE: ABNORMAL
METHADONE SCREEN, URINE: NEGATIVE
METHAMPHETAMINE, URINE: NEGATIVE
MODE: ABNORMAL
MONOCYTES # BLD: 4 % (ref 4–8)
MONOCYTES # BLD: 6 % (ref 3–12)
MORPHOLOGY: ABNORMAL
MUCUS: NORMAL
MUCUS: NORMAL
MYOGLOBIN: 27 NG/ML (ref 25–58)
NEGATIVE BASE EXCESS, VEN: 12 (ref 0–2)
NITRITE, URINE: NEGATIVE
NITRITE, URINE: NEGATIVE
NRBC AUTOMATED: 0 PER 100 WBC
NRBC AUTOMATED: ABNORMAL PER 100 WBC
O2 DEVICE/FLOW/%: ABNORMAL
O2 SAT, VEN: 66 % (ref 60–85)
OPIATES, URINE: POSITIVE
OTHER OBSERVATIONS UA: NORMAL
OTHER OBSERVATIONS UA: NORMAL
OXYCODONE SCREEN URINE: NEGATIVE
PARTIAL THROMBOPLASTIN TIME: 21.4 SEC (ref 20.5–30.5)
PATIENT TEMP: ABNORMAL
PCO2, VEN: 43.7 MM HG (ref 41–51)
PDW BLD-RTO: 13.2 % (ref 11.8–14.4)
PDW BLD-RTO: 14.6 % (ref 12.1–15.2)
PH UA: 6 (ref 5–8)
PH UA: 6 (ref 5–8)
PH VENOUS: 7.18 (ref 7.32–7.43)
PHENCYCLIDINE, URINE: NEGATIVE
PLATELET # BLD: 276 K/UL (ref 138–453)
PLATELET # BLD: 293 K/UL (ref 140–450)
PLATELET ESTIMATE: ABNORMAL
PLATELET ESTIMATE: ABNORMAL
PMV BLD AUTO: 10.6 FL (ref 8.1–13.5)
PMV BLD AUTO: ABNORMAL FL (ref 6–12)
PO2, VEN: 42.8 MM HG (ref 30–50)
POC CHLORIDE: 103 MMOL/L (ref 98–107)
POC CREATININE: 1.26 MG/DL (ref 0.51–1.19)
POC HEMATOCRIT: 48 % (ref 36–46)
POC HEMOGLOBIN: 16.2 G/DL (ref 12–16)
POC IONIZED CALCIUM: 0.86 MMOL/L (ref 1.15–1.33)
POC LACTIC ACID: 5.99 MMOL/L (ref 0.56–1.39)
POC PCO2 TEMP: ABNORMAL MM HG
POC PH TEMP: ABNORMAL
POC PO2 TEMP: ABNORMAL MM HG
POC POTASSIUM: 4.1 MMOL/L (ref 3.5–4.5)
POC SODIUM: 132 MMOL/L (ref 138–146)
POSITIVE BASE EXCESS, VEN: ABNORMAL (ref 0–3)
POTASSIUM SERPL-SCNC: 3.9 MMOL/L (ref 3.7–5.3)
POTASSIUM SERPL-SCNC: 4.2 MMOL/L (ref 3.7–5.3)
PRO-BNP: 242 PG/ML
PROPOXYPHENE, URINE: NEGATIVE
PROTEIN UA: ABNORMAL
PROTEIN UA: NEGATIVE
PROTHROMBIN TIME: 11 SEC (ref 9–12)
RBC # BLD: 5.09 M/UL (ref 4–5.2)
RBC # BLD: 5.42 M/UL (ref 3.95–5.11)
RBC # BLD: ABNORMAL 10*6/UL
RBC # BLD: ABNORMAL 10*6/UL
RBC UA: NORMAL /HPF (ref 0–2)
RBC UA: NORMAL /HPF (ref 0–4)
RENAL EPITHELIAL, UA: NORMAL /HPF
RENAL EPITHELIAL, UA: NORMAL /HPF
SALICYLATE LEVEL: <1 MG/DL (ref 3–10)
SAMPLE SITE: ABNORMAL
SARS-COV-2, RAPID: NOT DETECTED
SARS-COV-2: NORMAL
SARS-COV-2: NORMAL
SEDIMENTATION RATE, ERYTHROCYTE: 15 MM (ref 0–30)
SEG NEUTROPHILS: 77 % (ref 47–75)
SEG NEUTROPHILS: 87 % (ref 36–65)
SEGMENTED NEUTROPHILS ABSOLUTE COUNT: 15.56 K/UL (ref 2.5–7)
SEGMENTED NEUTROPHILS ABSOLUTE COUNT: 17.35 K/UL (ref 1.5–8.1)
SODIUM BLD-SCNC: 131 MMOL/L (ref 135–144)
SODIUM BLD-SCNC: 131 MMOL/L (ref 135–144)
SOURCE: NORMAL
SPECIFIC GRAVITY UA: 1.01 (ref 1–1.03)
SPECIFIC GRAVITY UA: 1.03 (ref 1–1.03)
SPECIMEN DESCRIPTION: NORMAL
TEST INFORMATION: ABNORMAL
THYROXINE, FREE: 1.02 NG/DL (ref 0.93–1.7)
TOTAL CK: 38 U/L (ref 26–192)
TOTAL CO2, VENOUS: 18 MMOL/L (ref 23–30)
TOTAL PROTEIN: 5.9 G/DL (ref 6.4–8.3)
TOTAL PROTEIN: 7.1 G/DL (ref 6.4–8.3)
TOXIC TRICYCLIC SC,BLOOD: NEGATIVE
TRICHOMONAS: NORMAL
TRICHOMONAS: NORMAL
TRICYCLIC ANTIDEPRESSANTS, UR: POSITIVE
TROPONIN INTERP: NORMAL
TROPONIN T: <0.03 NG/ML
TROPONIN, HIGH SENSITIVITY: NORMAL NG/L (ref 0–14)
TURBIDITY: ABNORMAL
TURBIDITY: CLEAR
URINE HGB: ABNORMAL
URINE HGB: NEGATIVE
UROBILINOGEN, URINE: NORMAL
UROBILINOGEN, URINE: NORMAL
WBC # BLD: 19.8 K/UL (ref 3.5–11.3)
WBC # BLD: 20.2 K/UL (ref 3.5–11)
WBC # BLD: ABNORMAL 10*3/UL
WBC # BLD: ABNORMAL 10*3/UL
WBC UA: NORMAL /HPF
WBC UA: NORMAL /HPF (ref 0–5)
YEAST: NORMAL
YEAST: NORMAL

## 2021-01-21 PROCEDURE — 83605 ASSAY OF LACTIC ACID: CPT

## 2021-01-21 PROCEDURE — 2580000003 HC RX 258: Performed by: STUDENT IN AN ORGANIZED HEALTH CARE EDUCATION/TRAINING PROGRAM

## 2021-01-21 PROCEDURE — 82150 ASSAY OF AMYLASE: CPT

## 2021-01-21 PROCEDURE — 74176 CT ABD & PELVIS W/O CONTRAST: CPT

## 2021-01-21 PROCEDURE — 2000000000 HC ICU R&B

## 2021-01-21 PROCEDURE — 2500000003 HC RX 250 WO HCPCS: Performed by: STUDENT IN AN ORGANIZED HEALTH CARE EDUCATION/TRAINING PROGRAM

## 2021-01-21 PROCEDURE — 81001 URINALYSIS AUTO W/SCOPE: CPT

## 2021-01-21 PROCEDURE — 85610 PROTHROMBIN TIME: CPT

## 2021-01-21 PROCEDURE — 86920 COMPATIBILITY TEST SPIN: CPT

## 2021-01-21 PROCEDURE — 74174 CTA ABD&PLVS W/CONTRAST: CPT

## 2021-01-21 PROCEDURE — 93005 ELECTROCARDIOGRAM TRACING: CPT | Performed by: INTERNAL MEDICINE

## 2021-01-21 PROCEDURE — 87086 URINE CULTURE/COLONY COUNT: CPT

## 2021-01-21 PROCEDURE — 85652 RBC SED RATE AUTOMATED: CPT

## 2021-01-21 PROCEDURE — 84132 ASSAY OF SERUM POTASSIUM: CPT

## 2021-01-21 PROCEDURE — 80179 DRUG ASSAY SALICYLATE: CPT

## 2021-01-21 PROCEDURE — 85025 COMPLETE CBC W/AUTO DIFF WBC: CPT

## 2021-01-21 PROCEDURE — 87449 NOS EACH ORGANISM AG IA: CPT

## 2021-01-21 PROCEDURE — C9132 KCENTRA, PER I.U.: HCPCS

## 2021-01-21 PROCEDURE — 82947 ASSAY GLUCOSE BLOOD QUANT: CPT

## 2021-01-21 PROCEDURE — G0480 DRUG TEST DEF 1-7 CLASSES: HCPCS

## 2021-01-21 PROCEDURE — 96375 TX/PRO/DX INJ NEW DRUG ADDON: CPT

## 2021-01-21 PROCEDURE — 99285 EMERGENCY DEPT VISIT HI MDM: CPT

## 2021-01-21 PROCEDURE — U0002 COVID-19 LAB TEST NON-CDC: HCPCS

## 2021-01-21 PROCEDURE — 80053 COMPREHEN METABOLIC PANEL: CPT

## 2021-01-21 PROCEDURE — 82550 ASSAY OF CK (CPK): CPT

## 2021-01-21 PROCEDURE — 84439 ASSAY OF FREE THYROXINE: CPT

## 2021-01-21 PROCEDURE — 83874 ASSAY OF MYOGLOBIN: CPT

## 2021-01-21 PROCEDURE — 85730 THROMBOPLASTIN TIME PARTIAL: CPT

## 2021-01-21 PROCEDURE — 84484 ASSAY OF TROPONIN QUANT: CPT

## 2021-01-21 PROCEDURE — C9113 INJ PANTOPRAZOLE SODIUM, VIA: HCPCS | Performed by: STUDENT IN AN ORGANIZED HEALTH CARE EDUCATION/TRAINING PROGRAM

## 2021-01-21 PROCEDURE — 86901 BLOOD TYPING SEROLOGIC RH(D): CPT

## 2021-01-21 PROCEDURE — 6360000002 HC RX W HCPCS: Performed by: STUDENT IN AN ORGANIZED HEALTH CARE EDUCATION/TRAINING PROGRAM

## 2021-01-21 PROCEDURE — 96365 THER/PROPH/DIAG IV INF INIT: CPT

## 2021-01-21 PROCEDURE — 80306 DRUG TEST PRSMV INSTRMNT: CPT

## 2021-01-21 PROCEDURE — 6360000002 HC RX W HCPCS

## 2021-01-21 PROCEDURE — 74018 RADEX ABDOMEN 1 VIEW: CPT

## 2021-01-21 PROCEDURE — 85018 HEMOGLOBIN: CPT

## 2021-01-21 PROCEDURE — 86140 C-REACTIVE PROTEIN: CPT

## 2021-01-21 PROCEDURE — 82565 ASSAY OF CREATININE: CPT

## 2021-01-21 PROCEDURE — 6370000000 HC RX 637 (ALT 250 FOR IP): Performed by: EMERGENCY MEDICINE

## 2021-01-21 PROCEDURE — 86850 RBC ANTIBODY SCREEN: CPT

## 2021-01-21 PROCEDURE — 82803 BLOOD GASES ANY COMBINATION: CPT

## 2021-01-21 PROCEDURE — 96376 TX/PRO/DX INJ SAME DRUG ADON: CPT

## 2021-01-21 PROCEDURE — 83690 ASSAY OF LIPASE: CPT

## 2021-01-21 PROCEDURE — 70450 CT HEAD/BRAIN W/O DYE: CPT

## 2021-01-21 PROCEDURE — 83880 ASSAY OF NATRIURETIC PEPTIDE: CPT

## 2021-01-21 PROCEDURE — 86900 BLOOD TYPING SEROLOGIC ABO: CPT

## 2021-01-21 PROCEDURE — C9803 HOPD COVID-19 SPEC COLLECT: HCPCS

## 2021-01-21 PROCEDURE — 85014 HEMATOCRIT: CPT

## 2021-01-21 PROCEDURE — 80143 DRUG ASSAY ACETAMINOPHEN: CPT

## 2021-01-21 PROCEDURE — 87040 BLOOD CULTURE FOR BACTERIA: CPT

## 2021-01-21 PROCEDURE — 6360000002 HC RX W HCPCS: Performed by: INTERNAL MEDICINE

## 2021-01-21 PROCEDURE — 87324 CLOSTRIDIUM AG IA: CPT

## 2021-01-21 PROCEDURE — 84295 ASSAY OF SERUM SODIUM: CPT

## 2021-01-21 PROCEDURE — 82330 ASSAY OF CALCIUM: CPT

## 2021-01-21 PROCEDURE — 93005 ELECTROCARDIOGRAM TRACING: CPT | Performed by: EMERGENCY MEDICINE

## 2021-01-21 PROCEDURE — 6360000004 HC RX CONTRAST MEDICATION: Performed by: EMERGENCY MEDICINE

## 2021-01-21 PROCEDURE — 36415 COLL VENOUS BLD VENIPUNCTURE: CPT

## 2021-01-21 PROCEDURE — 2580000003 HC RX 258: Performed by: INTERNAL MEDICINE

## 2021-01-21 PROCEDURE — 82010 KETONE BODYS QUAN: CPT

## 2021-01-21 PROCEDURE — 80307 DRUG TEST PRSMV CHEM ANLYZR: CPT

## 2021-01-21 PROCEDURE — 82435 ASSAY OF BLOOD CHLORIDE: CPT

## 2021-01-21 PROCEDURE — 84443 ASSAY THYROID STIM HORMONE: CPT

## 2021-01-21 PROCEDURE — 2580000003 HC RX 258: Performed by: EMERGENCY MEDICINE

## 2021-01-21 RX ORDER — ONDANSETRON 2 MG/ML
4 INJECTION INTRAMUSCULAR; INTRAVENOUS EVERY 6 HOURS PRN
Status: DISCONTINUED | OUTPATIENT
Start: 2021-01-21 | End: 2021-01-27 | Stop reason: HOSPADM

## 2021-01-21 RX ORDER — SODIUM PHOSPHATE, DIBASIC AND SODIUM PHOSPHATE, MONOBASIC 7; 19 G/133ML; G/133ML
1 ENEMA RECTAL
Status: COMPLETED | OUTPATIENT
Start: 2021-01-21 | End: 2021-01-21

## 2021-01-21 RX ORDER — PROMETHAZINE HYDROCHLORIDE 12.5 MG/1
12.5 TABLET ORAL EVERY 6 HOURS PRN
Status: DISCONTINUED | OUTPATIENT
Start: 2021-01-21 | End: 2021-01-27 | Stop reason: HOSPADM

## 2021-01-21 RX ORDER — LORAZEPAM 0.5 MG/1
2 TABLET ORAL
Status: DISCONTINUED | OUTPATIENT
Start: 2021-01-21 | End: 2021-01-26

## 2021-01-21 RX ORDER — LORAZEPAM 2 MG/ML
3 INJECTION INTRAMUSCULAR
Status: DISCONTINUED | OUTPATIENT
Start: 2021-01-21 | End: 2021-01-26

## 2021-01-21 RX ORDER — SODIUM CHLORIDE, SODIUM LACTATE, POTASSIUM CHLORIDE, CALCIUM CHLORIDE 600; 310; 30; 20 MG/100ML; MG/100ML; MG/100ML; MG/100ML
INJECTION, SOLUTION INTRAVENOUS CONTINUOUS
Status: DISCONTINUED | OUTPATIENT
Start: 2021-01-21 | End: 2021-01-22

## 2021-01-21 RX ORDER — NICOTINE POLACRILEX 4 MG
15 LOZENGE BUCCAL PRN
Status: DISCONTINUED | OUTPATIENT
Start: 2021-01-21 | End: 2021-01-27 | Stop reason: HOSPADM

## 2021-01-21 RX ORDER — LORAZEPAM 2 MG/ML
4 INJECTION INTRAMUSCULAR
Status: DISCONTINUED | OUTPATIENT
Start: 2021-01-21 | End: 2021-01-26

## 2021-01-21 RX ORDER — SODIUM CHLORIDE 0.9 % (FLUSH) 0.9 %
10 SYRINGE (ML) INJECTION EVERY 12 HOURS SCHEDULED
Status: DISCONTINUED | OUTPATIENT
Start: 2021-01-21 | End: 2021-01-27 | Stop reason: HOSPADM

## 2021-01-21 RX ORDER — AMLODIPINE BESYLATE 10 MG/1
10 TABLET ORAL DAILY
COMMUNITY
Start: 2020-12-04 | End: 2021-04-07

## 2021-01-21 RX ORDER — SODIUM CHLORIDE 0.9 % (FLUSH) 0.9 %
10 SYRINGE (ML) INJECTION PRN
Status: DISCONTINUED | OUTPATIENT
Start: 2021-01-21 | End: 2021-01-27 | Stop reason: HOSPADM

## 2021-01-21 RX ORDER — LORAZEPAM 2 MG/ML
2 INJECTION INTRAMUSCULAR
Status: DISCONTINUED | OUTPATIENT
Start: 2021-01-21 | End: 2021-01-26

## 2021-01-21 RX ORDER — SODIUM CHLORIDE 9 MG/ML
1000 INJECTION, SOLUTION INTRAVENOUS CONTINUOUS
Status: DISCONTINUED | OUTPATIENT
Start: 2021-01-21 | End: 2021-01-21 | Stop reason: HOSPADM

## 2021-01-21 RX ORDER — DEXTROSE MONOHYDRATE 50 MG/ML
100 INJECTION, SOLUTION INTRAVENOUS PRN
Status: DISCONTINUED | OUTPATIENT
Start: 2021-01-21 | End: 2021-01-27 | Stop reason: HOSPADM

## 2021-01-21 RX ORDER — SODIUM CHLORIDE, SODIUM LACTATE, POTASSIUM CHLORIDE, AND CALCIUM CHLORIDE .6; .31; .03; .02 G/100ML; G/100ML; G/100ML; G/100ML
30 INJECTION, SOLUTION INTRAVENOUS ONCE
Status: COMPLETED | OUTPATIENT
Start: 2021-01-21 | End: 2021-01-21

## 2021-01-21 RX ORDER — ALBUTEROL SULFATE 2.5 MG/3ML
2.5 SOLUTION RESPIRATORY (INHALATION) 4 TIMES DAILY
Status: DISCONTINUED | OUTPATIENT
Start: 2021-01-21 | End: 2021-01-27 | Stop reason: HOSPADM

## 2021-01-21 RX ORDER — ONDANSETRON 2 MG/ML
4 INJECTION INTRAMUSCULAR; INTRAVENOUS ONCE
Status: COMPLETED | OUTPATIENT
Start: 2021-01-21 | End: 2021-01-21

## 2021-01-21 RX ORDER — ONDANSETRON 2 MG/ML
INJECTION INTRAMUSCULAR; INTRAVENOUS
Status: COMPLETED
Start: 2021-01-21 | End: 2021-01-21

## 2021-01-21 RX ORDER — TRANEXAMIC ACID 10 MG/ML
1000 INJECTION, SOLUTION INTRAVENOUS ONCE
Status: COMPLETED | OUTPATIENT
Start: 2021-01-21 | End: 2021-01-21

## 2021-01-21 RX ORDER — LORAZEPAM 0.5 MG/1
1 TABLET ORAL
Status: DISCONTINUED | OUTPATIENT
Start: 2021-01-21 | End: 2021-01-26

## 2021-01-21 RX ORDER — SODIUM CHLORIDE 9 MG/ML
INJECTION, SOLUTION INTRAVENOUS PRN
Status: DISCONTINUED | OUTPATIENT
Start: 2021-01-21 | End: 2021-01-21 | Stop reason: HOSPADM

## 2021-01-21 RX ORDER — DEXTROSE MONOHYDRATE 25 G/50ML
12.5 INJECTION, SOLUTION INTRAVENOUS PRN
Status: DISCONTINUED | OUTPATIENT
Start: 2021-01-21 | End: 2021-01-27 | Stop reason: HOSPADM

## 2021-01-21 RX ORDER — ALBUTEROL SULFATE 90 UG/1
2 AEROSOL, METERED RESPIRATORY (INHALATION) EVERY 4 HOURS PRN
Status: DISCONTINUED | OUTPATIENT
Start: 2021-01-21 | End: 2021-01-27 | Stop reason: HOSPADM

## 2021-01-21 RX ORDER — TRANEXAMIC ACID 100 MG/ML
15 INJECTION, SOLUTION INTRAVENOUS ONCE
Status: DISCONTINUED | OUTPATIENT
Start: 2021-01-21 | End: 2021-01-21

## 2021-01-21 RX ORDER — 0.9 % SODIUM CHLORIDE 0.9 %
1000 INTRAVENOUS SOLUTION INTRAVENOUS ONCE
Status: COMPLETED | OUTPATIENT
Start: 2021-01-21 | End: 2021-01-21

## 2021-01-21 RX ORDER — MORPHINE SULFATE 4 MG/ML
4 INJECTION, SOLUTION INTRAMUSCULAR; INTRAVENOUS ONCE
Status: COMPLETED | OUTPATIENT
Start: 2021-01-21 | End: 2021-01-21

## 2021-01-21 RX ORDER — LORAZEPAM 2 MG/ML
1 INJECTION INTRAMUSCULAR
Status: DISCONTINUED | OUTPATIENT
Start: 2021-01-21 | End: 2021-01-26

## 2021-01-21 RX ORDER — LORAZEPAM 2 MG/1
4 TABLET ORAL
Status: DISCONTINUED | OUTPATIENT
Start: 2021-01-21 | End: 2021-01-26

## 2021-01-21 RX ORDER — VANCOMYCIN HYDROCHLORIDE 1 G/200ML
15 INJECTION, SOLUTION INTRAVENOUS ONCE
Status: COMPLETED | OUTPATIENT
Start: 2021-01-21 | End: 2021-01-21

## 2021-01-21 RX ADMIN — ONDANSETRON 4 MG: 2 INJECTION, SOLUTION INTRAMUSCULAR; INTRAVENOUS at 16:59

## 2021-01-21 RX ADMIN — SODIUM CHLORIDE, POTASSIUM CHLORIDE, SODIUM LACTATE AND CALCIUM CHLORIDE 2190 ML: 600; 310; 30; 20 INJECTION, SOLUTION INTRAVENOUS at 22:39

## 2021-01-21 RX ADMIN — MORPHINE SULFATE 4 MG: 4 INJECTION, SOLUTION INTRAMUSCULAR; INTRAVENOUS at 07:43

## 2021-01-21 RX ADMIN — ONDANSETRON 4 MG: 2 INJECTION, SOLUTION INTRAMUSCULAR; INTRAVENOUS at 07:43

## 2021-01-21 RX ADMIN — PIPERACILLIN SODIUM AND TAZOBACTAM SODIUM 3375 MG: 3; .375 INJECTION, POWDER, LYOPHILIZED, FOR SOLUTION INTRAVENOUS at 09:03

## 2021-01-21 RX ADMIN — TRANEXAMIC ACID 1000 MG: 10 INJECTION, SOLUTION INTRAVENOUS at 18:47

## 2021-01-21 RX ADMIN — SODIUM CHLORIDE 1000 ML: 9 INJECTION, SOLUTION INTRAVENOUS at 07:43

## 2021-01-21 RX ADMIN — VANCOMYCIN HYDROCHLORIDE 1000 MG: 1 INJECTION, SOLUTION INTRAVENOUS at 22:45

## 2021-01-21 RX ADMIN — PROTHROMBIN, COAGULATION FACTOR VII HUMAN, COAGULATION FACTOR IX HUMAN, COAGULATION FACTOR X HUMAN, PROTEIN C, PROTEIN S HUMAN, AND WATER 3650 UNITS: KIT at 16:40

## 2021-01-21 RX ADMIN — SODIUM PHOSPHATE 1 ENEMA: 7; 19 ENEMA RECTAL at 11:29

## 2021-01-21 RX ADMIN — SODIUM CHLORIDE 1000 ML: 9 INJECTION, SOLUTION INTRAVENOUS at 14:41

## 2021-01-21 RX ADMIN — ONDANSETRON 4 MG: 2 INJECTION INTRAMUSCULAR; INTRAVENOUS at 16:59

## 2021-01-21 RX ADMIN — PANTOPRAZOLE SODIUM 8 MG/HR: 40 INJECTION, POWDER, FOR SOLUTION INTRAVENOUS at 18:51

## 2021-01-21 RX ADMIN — PIPERACILLIN AND TAZOBACTAM 4500 MG: 4; .5 INJECTION, POWDER, LYOPHILIZED, FOR SOLUTION INTRAVENOUS; PARENTERAL at 19:07

## 2021-01-21 RX ADMIN — IOPAMIDOL 75 ML: 755 INJECTION, SOLUTION INTRAVENOUS at 14:01

## 2021-01-21 ASSESSMENT — PAIN DESCRIPTION - FREQUENCY: FREQUENCY: CONTINUOUS

## 2021-01-21 ASSESSMENT — PAIN SCALES - GENERAL
PAINLEVEL_OUTOF10: 3
PAINLEVEL_OUTOF10: 10
PAINLEVEL_OUTOF10: 10

## 2021-01-21 ASSESSMENT — PAIN DESCRIPTION - LOCATION: LOCATION: ABDOMEN

## 2021-01-21 NOTE — ED PROVIDER NOTES
[facility] ED  EMERGENCY DEPARTMENT ENCOUNTER      Pt Name: Gabby Tijerina  MRN: [id number]  [place] 1955  Date of evaluation: 1/21/2021  Provider: Lissa Pittman MD    CHIEF COMPLAINT       Chief Complaint   Patient presents with    Abdominal Pain     woke up this morning with abdominal pain - states last BM was two days ago          HISTORY OF PRESENT ILLNESS   (Location/Symptom, Timing/Onset, Context/Setting, Quality, Duration, Modifying Factors, Severity)  Note limiting factors. Gabby Tijerina is a 60 y.o. female who is a history of hypertension, atrial fibrillation (on Eliquis), Graves' disease, asthma, COPD, diabetes mellitus, iron deficiency anemia, irritable bowel syndrome with constipation diarrhea, hyperlipidemia, presents to the emergency department for evaluation and management of abdominal pain which started this morning upon awakening. She states that her last bowel movement was 2 days ago. When I asked her again later what time she ate she said that she had breakfast this morning with oatmeal and toast.  Subsequently she clarified that she did not wake up with the pain but the pain started after breakfast.  She did not take any medication specifically for her pain but she did take all of her medications as prescribed including her insulin. Prior surgeries include colonoscopy, gastrostomy tube, tracheostomy with closure, tubal ligation. Last Colonoscopy results were excellent and she was instructed to return in 10 years for repeat. This patient is being evaluated during the COVID-19 pandemic. HPI    Nursing Notes were reviewed. REVIEW OF SYSTEMS    (2-9 systems for level 4, 10 or more for level 5)       REVIEW OF SYSTEMS    Constitutional: Negative for fatigue and fever. Respiratory: Positive for mild intermittent shortness of breath, negative for cough, chest tightness   Cardiovascular: Negative for chest pain, palpitations and leg swelling.    Gastrointestinal: Positive for abdominal distention, abdominal pain, nausea, negative for diarrhea, and vomiting. Genitourinary: Positive for dysuria, negative for difficulty urinating, hematuria and urgency. Musculoskeletal: Negative for arthralgias, back pain and neck pain. Neurological: Negative for dizziness, speech difficulty, weakness, distal tingling/numbness and headaches. Except as noted above the remainder of the review of systems was reviewed and negative.        PASTMEDICAL HISTORY     Past Medical History:   Diagnosis Date    Asthma     Atrial fibrillation (HCC)     [facility]     DDD (degenerative disc disease)     Diabetes mellitus ([facility]     Hyperlipidemia     Hypertension     Hyperthyroidism     Type II or unspecified type diabetes mellitus without mention of complication, not stated as uncontrolled          SURGICAL HISTORY       Past Surgical History:   Procedure Laterality Date    BACK SURGERY      COLONOSCOPY  04/23/14   [facility] DENTAL SURGERY N/A 3/8/2017    REMOVAL OF BILATERAL MANDIBULAR LELO AND MAXILLARY EDENTULOUS ALVEOPLASTY performed by Addy Jasso DDS at [facility]      removal    GASTROSTOMY TUBE PLACEMENT      TONSILLECTOMY      TRACHEOSTOMY      TRACHEOSTOMY CLOSURE      TUBAL LIGATION      UPPER GASTROINTESTINAL ENDOSCOPY  3/16/2016    Peg tube insertion         CURRENT MEDICATIONS       Previous Medications    ALBUTEROL (PROVENTIL) (2.5 MG/3ML) 0.083% NEBULIZER SOLUTION    Take 3 mLs by nebulization 4 times daily    ALBUTEROL SULFATE  (90 BASE) MCG/ACT INHALER    INHALE TWO (2) PUFFS BY MOUTH EVERY 6 HOURS AS NEEDED FOR WHEEZING    ALCOHOL SWABS (EASY TOUCH ALCOHOL PREP MEDIUM) 70 % PADS    USE FOUR TIMES DAILY    AMLODIPINE (NORVASC) 10 MG TABLET    Take 10 mg by mouth daily    ATORVASTATIN (LIPITOR) 40 MG TABLET    TAKE 1 TABLET BY MOUTH ONCE DAILY    BLOOD GLUCOSE MONITORING SUPPL SOUTH    1 Units by Does not apply route once for 1 dose PLease dispense a machine that is covered by her insurance  DX E11.9    BLOOD PRESSURE MONITORING (B-D ASSURE BPM/AUTO ARM CUFF) MISC    1 Device by Does not apply route daily as needed (htn)    CHOLECALCIFEROL (VITAMIN D3) 25 MCG (1000 UT) TABS    TAKE (1) TABLET BY MOUTH DAILY    CYCLOBENZAPRINE (FLEXERIL) 5 MG TABLET    TAKE 1 TABLET BY MOUTH THREE TIMES DAILY AS NEEDED FOR MUSCLE SPASMS    DICYCLOMINE (BENTYL) 10 MG CAPSULE    Take 1 capsule by mouth 4 times daily    DILTIAZEM (CARDIZEM CD) 120 MG EXTENDED RELEASE CAPSULE    TAKE 1 CAPSULE BY MOUTH EVERY DAY    ELIQUIS 5 MG TABS TABLET    TAKE ONE (1) TABLET BY MOUTH TWICE DAILY    FEROSUL 325 (65 FE) MG TABLET    TAKE (1) TABLET BY MOUTH DAILY    GABAPENTIN (NEURONTIN) 800 MG TABLET    TAKE 1 TABLET BY MOUTH TWICE DAILY    HYDROCHLOROTHIAZIDE (HYDRODIURIL) 25 MG TABLET    TAKE 1 TABLET BY MOUTH EVERY MORNING    INCONTINENCE SUPPLY DISPOSABLE (POISE PAD) PADS    Apply 1 Piece topically as needed (incontinence)    INSULIN GLARGINE (LANTUS SOLOSTAR) 100 UNIT/ML INJECTION PEN    Inject 30 Units into the skin nightly    INSULIN PEN NEEDLE (TRUEPLUS 5-BEVEL PEN NEEDLES) 31G X 6 MM MISC    USE AS DIRECTED DAILY    INSULIN PEN NEEDLE (TRUEPLUS PEN NEEDLES) 31G X 5 MM MISC    USE AS DIRECTED DAILY    LANCETS MISC    1 each by Does not apply route daily Tests 3 times a day/PRN.  Please dispense what [facility] covers to go with her new Glucometer    LEVOTHYROXINE (SYNTHROID) 88 MCG TABLET    TAKE 1 TABLET BY MOUTH EVERY DAY    LISINOPRIL (PRINIVIL;ZESTRIL) 5 MG TABLET    TAKE 1 TABLET BY MOUTH EVERY DAY    LORATADINE (CLARITIN) 10 MG TABLET    TAKE 1 TABLET BY MOUTH EVERY DAY    METOPROLOL TARTRATE (LOPRESSOR) 25 MG TABLET    TAKE ONE (1) TABLET BY MOUTH TWICE DAILY    MULTIPLE VITAMIN (MULTI-VITAMINS) TABS    TAKE 1 TABLET BY MOUTH EVERY DAY    OMEGA-3 FATTY ACIDS (FISH OIL) 1000 MG CAPS    TAKE TWO (2) CAPSULES BY MOUTH DAILY OXYBUTYNIN (DITROPAN XL) 15 MG EXTENDED RELEASE TABLET    TAKE 1 TABLET BY MOUTH DAILY    POTASSIUM CHLORIDE (KLOR-CON M) 20 MEQ EXTENDED RELEASE TABLET    Take 1 tablet by mouth daily    SENNA (SENOKOT) 8.6 MG TABLET    Take 1 tablet by mouth 2 times daily    TRULICITY 1.5 BJ/4.2NB SOPN    1.5 mg once a week Monday's       ALLERGIES     Norco [hydrocodone-acetaminophen], Oxycodone, Percocet [oxycodone-acetaminophen], and Sulfa antibiotics    FAMILY HISTORY       Family History   Problem Relation Age of Onset    Heart Disease Mother     High Cholesterol Mother     No Known Problems Son           SOCIAL HISTORY       Social History     Socioeconomic History    Marital status: Single     Spouse name: None    Number of children: None    Years of education: None    Highest education level: None   Occupational History    None   Social Needs    Financial resource strain: None    Food insecurity     Worry: None     Inability: None    Transportation needs     Medical: None     Non-medical: None   Tobacco Use    Smoking status: Former Smoker     Packs/day: 1.00     Years: 20.00     Pack years: 20.00     Types: Cigarettes     Quit date: 10/23/2013     Years since quittin.2    Smokeless tobacco: Never Used   Substance and Sexual Activity    Alcohol use: No     Comment: beer once a month    Drug use: No     Comment: former marijuana    Sexual activity: None   Lifestyle    Physical activity     Days per week: None     Minutes per session: None    Stress: None   Relationships    Social connections     Talks on phone: None     Gets together: None     Attends Rastafari service: None     Active member of club or organization: None     Attends meetings of clubs or organizations: None     Relationship status: None    Intimate partner violence     Fear of current or ex partner: None     Emotionally abused: None     Physically abused: None     Forced sexual activity: None   Other Topics Concern    None   Social History Narrative    None       SCREENINGS    [place] Coma Scale  Eye Opening: Spontaneous  Best Verbal Response: Oriented  Best Motor Response: Obeys commands  [place] Coma Scale Score: 15        PHYSICAL EXAM    (up to 7 for level 4, 8 or more for level 5)     ED Triage Vitals   BP Temp Temp src Pulse Resp SpO2 Height Weight   -- -- -- -- -- -- -- --       Physical Exam  Physical Exam   Constitutional:  Appears well-developed and well-nourished. Mild distress noted during cramping. Non toxic in appearance. HENT:     Head: Normocephalic and atraumatic. Mouth/Throat: Oropharynx is clear and mucosa moist. No oropharyngeal exudate noted. Posterior pharynx is pink and noninjected. Eyes: Conjunctivae and EOM are normal. Pupils are equal, round, and reactive to light. No scleral icterus. There is no tearing or drainage. Neck: Normal range of motion. Neck supple. No tracheal deviation present. Cardiovascular: Normal rate, regular rhythm, normal heartsounds and intact distal pulses. Exam reveals no gallop or friction rub. No murmur heard. Pulmonary/Chest: Effort normal and breath sounds are symmetric and normal. No respiratory distress. There are no wheezes, rales or rhonchi. No tenderness is exhibited upon palpation of the chest wall. Abdominal: Mildly distended in upper regions. Bowel sounds are hyperactive in lower quadrants. No mass exhibitted. There is diffuse moderate tenderness,  rebound, rigidity or guarding. Genitourinary:   No CVA tenderness noted on examination. Musculoskeletal: Normal range of motion. No edema, tenderness or deformity. Lymphadenopathy:  No cervical adenopathy. Neurological:   alert and oriented to person, place, and time. Normal speech, normal comprehension, normal cognition,  Reflexes are normal.  There are no cranial nerve deficits. Normal muscle tone, motor and sensory function including SILT exhibited.   Coordination normal and gait normal.     Skin: Skin is warm and dry. No rash noted. No diaphoresis. No erythema. No pallor. Psychiatric: Pleasant and cooperative. Normal mood and affect. Behavior is  normal. Judgment and thought content normal.     DIAGNOSTIC RESULTS     EKG: All EKG's are interpreted by the Emergency Department Physician who either signs or Co-signs this chart in the absence of a cardiologist.    A 12-lead EKG was performed at 749. There is sinus rhythm with a ventricular rate 78 bpm.  There is a first-degree AV block with a MS interval of 212 ms. There is a normal QRS duration, QT, QTC and axis. No acute ST segment or T wave changes are identified. RADIOLOGY:   Non-plain film images such as CT, Ultrasoundand MRI are read by the radiologist. Plain radiographic images are visualized and preliminarily interpreted by the emergency physician with the below findings:    Not indicated. Interpretation per the Radiologist below, if available at the time of this note:     [facility]   Final Result      The colon remains obstructed by impacted stool in the distal sigmoid and rectum    with a large amount of stool from rectum to cecum. Increasing third space fluid in the lower abdomen and pelvis. No pneumatosis or evidence of high-grade arterial obstruction. Normal bowel    wall enhancement. The findings were discussed with Dr. Catherine Brink in the ED at 2:35 PM.                        CT ABDOMEN PELVIS WO CONTRAST Additional Contrast? None   Final Result      Impacted stool throughout the sigmoid colon and rectum causing dilatation of    the more proximal colon to the cecum. The cecum measures 7 cm in diameter. Small amount of fluid in the cul-de-sac.                         ED BEDSIDE ULTRASOUND:   Performed by ED Physician - none    LABS:  Labs Reviewed   CBC WITH AUTO DIFFERENTIAL - Abnormal; Notable for the following components:       Result Value    WBC 20.2 (*)     Hematocrit 47.5 (*)     Seg Neutrophils 77 (*)     Segs Absolute 15.56 (*)     All other components within normal limits   COMPREHENSIVE METABOLIC PANEL W/ REFLEX TO MG FOR LOW K - Abnormal; Notable for the following components:    Glucose 278 (*)     CREATININE 1.43 (*)     Calcium 10.7 (*)     Sodium 131 (*)     CO2 18 (*)     Alkaline Phosphatase 217 (*)     GFR Non- 37 (*)     GFR  45 (*)     All other components within normal limits   GLUCOSE, WHOLE BLOOD - Abnormal; Notable for the following components:    POC Glucose 293 (*)     All other components within normal limits   AMYLASE - Abnormal; Notable for the following components:    Amylase 356 (*)     All other components within normal limits   LIPASE - Abnormal; Notable for the following components:    Lipase 71 (*)     All other components within normal limits   LACTATE, SEPSIS - Abnormal; Notable for the following components:    Lactic Acid, Sepsis 3.0 (*)     All other components within normal limits   LACTATE, SEPSIS - Abnormal; Notable for the following components:    Lactic Acid, Sepsis 3.1 (*)     All other components within normal limits   LACTIC ACID, PLASMA - Abnormal; Notable for the following components:    Lactic Acid 3.7 (*)     All other components within normal limits   C DIFF TOXIN/ANTIGEN   CULTURE, BLOOD 1   CULTURE, BLOOD 2   COVID-19   TROPONIN   SEDIMENTATION RATE   URINALYSIS   URINE DRUG SCREEN   C-REACTIVE PROTEIN   HEMOGLOBIN   TYPE AND SCREEN   TYPE AND SCREEN   PREPARE RBC (CROSSMATCH)       All other labs were within normal range or not returned as of this dictation.     EMERGENCY DEPARTMENT COURSE and DIFFERENTIAL DIAGNOSIS/MDM:   Vitals:    Vitals:    01/21/21 1422 01/21/21 1433 01/21/21 1442 01/21/21 1444   BP: (!) 161/93 (!) 152/96     Pulse:   113    Resp:   15    Temp:       TempSrc:       SpO2:   96%    Weight:       Height:    5' 4\" (1.626 m)       Noted    MDM    CRITICAL CARE TIME   Total Critical Care time was 0 minutes      [facility] Course as of Jan 21 1602   Thu Jan 21, 2021   8783 The lab called to report a white cell count of 20.2. [MS]      ED Course User Index  [MS] Rosetta Coffey MD       CONSULTS:  None    PROCEDURES:  Unless otherwise noted below, none     Procedures      Summation    Salvatore Hoyt is a 60 y.o. female who has a history of hypertension, atrial fibrillation, Graves' disease, asthma, COPD, diabetes mellitus, iron deficiency anemia, irritable bowel syndrome with constipation diarrhea, hyperlipidemia, presented with acute onset lower abdominal pain and distention in the upper regions. She is being evaluated for obstruction and other intra-abdominal pathology. She is also complaining of dysuria and urinalysis is pending. Care was transferred to Dr. Mandy Anderson at 0800. CT abdomen pelvis with constipation, large amount of fluid in the large bowel. No bowel obstruction. Patient was manually disimpacted. After disimpaction patient had multiple large liquidy bloody stools. CTA abdomen with no acute findings. No mesenteric ischemia. No signs of ischemic bowel. Patient is on Eliquis. Last dose was last night. Patient is given Kcentra. Discussed the patient with . Patient is accepted for transfer to [facility]. [facility] was called to transfer the patient. Prior to transfer patient is in stable condition. Patient remained drowsy but arousable. Patient Course:   ED Course as of Jan 21 1602   Thu Jan 21, 2021   4286 The lab called to report a white cell count of 20.2.     [MS]      ED Course User Index  [MS] Rosetta Coffey MD         ED Medicationsadministered this visit:    Medications   0.9 % sodium chloride infusion (0 mLs Intravenous Stopped 1/21/21 1442)   0.9 % sodium chloride infusion (has no administration in time range)   ondansetron (ZOFRAN) injection 4 mg (4 mg Intravenous Given 1/21/21 0743)   morphine sulfate (PF) injection 4 mg (4 mg Intravenous Given 1/21/21 0743) piperacillin-tazobactam (ZOSYN) 3,375 mg in dextrose 5 % 50 mL IVPB (mini-bag) (0 mg Intravenous Stopped 1/21/21 0924)   fleet rectal enema 1 enema (1 enema Rectal Given 1/21/21 1129)   0.9 % sodium chloride bolus (0 mLs Intravenous Stopped 1/21/21 1538)   iopamidol (ISOVUE-370) 76 % injection 75 mL (75 mLs Intravenous Given 1/21/21 1401)       New Prescriptions from this visit:    New Prescriptions    No medications on file       Follow-up:  No follow-up provider specified. Final Impression:   1. Lower abdominal pain    2. Essential hypertension    3. Hyperglycemia    4. Gastrointestinal hemorrhage associated with anorectal source               (Please note that portions of this note werecompleted with a voice recognition program.  Efforts were made to edit the dictations but occasionally words are mis-transcribed.)    FINAL IMPRESSION      1. Lower abdominal pain    2. Essential hypertension    3. Hyperglycemia    4. Gastrointestinal hemorrhage associated with anorectal source          DISPOSITION/PLAN   DISPOSITION        PATIENT REFERRED TO:  No follow-up provider specified.     DISCHARGE MEDICATIONS:  New Prescriptions    No medications on file          (Please note that portions of this note were completed with a voice recognition program.  Efforts were made to edit the dictations but occasionally words are mis-transcribed.)    Charissa Roberts MD (electronically signed)  Attending Emergency Physician            Charissa Roberts MD  01/22/21 0592

## 2021-01-21 NOTE — ED NOTES
Bed: 12  Expected date:   Expected time:   Means of arrival:   Comments:  Jeanette Stallworth RN  01/21/21 3599

## 2021-01-21 NOTE — PROGRESS NOTES
[facility] was contacted for interdepartmental transport from [facility] to [facility]. Viets for concern of a 29-year-old female with a gastrointestinal bleed. On arrival to [facility], patient was intermittently confused however aware of person place and time but did not recall how she got to the hospital but does state that she lives with her brother. Nursing staff states that she was brought in by EMS for concern of abdominal pain, and subsequently was found to have active bright red/stool colored bleeding per rectum times multiple episodes. Patient had a CT abdomen pelvis without contrast due to GFR concerns that showed significant stool burden and dilatation of cecum at 7 cm. Patient was positive for opioids, amphetamines, tricyclic antidepressants, and marijuana on drug screen. Nursing staff commented that patient does take a heavy amount of opioid prescriptions at home. Patient does admit to alcohol use in the past, however denies it in the recent past.  Blood was ordered for patient at [facility], however patient states that she is practicing Yazidism. Patient states that she has 1 child, lives with her brother and her mother still alive. Prior to her departure for transport and attempt to reach out to contact was made regarding patient's choices, brother stated that her and him have been practicing [employer] for 2 years. Patient understood that if this was a life or death situation that she would not want blood products prior to her transport. Patient did not receive any blood products prior to transport. Patient does take Eliquis for concern of pulmonary embolus, however patient does not have this on her chart but does have atrial fibrillation. Patient was found to be in a normal sinus rhythm today with a first-degree AV block on EKG. Patient received prothrombin complex concentrate, Kcentra, at [facility] prior to departure.   Patient also received

## 2021-01-21 NOTE — ED NOTES
This nurse changed patient brief and performed pericare. Pt continues to have liquid stool brownish red in color with strong odor. Pt c/o severe abdominal pain at this time. Dr. Lexus De La Rosa made aware.       Marlee Clark RN  01/21/21 4772

## 2021-01-21 NOTE — ED NOTES
Mani Christianson with [facility] talked with patient and pt refuses to receive any blood or blood products. Pt states, \"my Religious says I can't have any of that. \"  Mani Christianson explained to patient that it could be a life or death situation and pt still refuses. Pt is alert and oriented x4.  Juventino Kirkland DO with [facility] also talked with pt brother Gloria Charles. Pt brother states that the patient and him have been practicing [employer] for past 2 years as patient does not wish to receive any blood or blood products. Juventino Kirkland DO with [employer] updates pt brother on patient condition. Juventino Kirkland DO with [facility] also attempted to call patient son Tanner Sanz but no answer, per pt brother Manual, pt has not spoken to son if over 2 years.       Larissa Ramos, RN  01/21/21 6635

## 2021-01-21 NOTE — ED NOTES
Pt transferred from [facility] for GI bleed. Pt went to [facility] this am for abdominal pain and constipation. Pt found to have large stool burden on ct scan. Pt was disimpacted, enema was given. Pt had large bowel movement with then followed with gross amount of blood. [facility] [place] [facility] [place] [facility] Pt is Wilbern Goon witness and is declining any blood products. Pt oriented x4, slightly confused. Pt awakes to verbal stimuli. Pt brother was notified at [facility] with whom she lives. Pt arrives with large amount of dark/bloody stool. Cal[place] in place with dark urine, iv x2. Pt is cold to the touch.        Sarina Bellamy, CLIFTON  01/21/21 0787

## 2021-01-21 NOTE — ED NOTES
Report given to Tess Boone [facility] and RN at bedside.       Nurys Pressley, CLIFTON  01/21/21 5158

## 2021-01-21 NOTE — ED NOTES
Report called to [facility] ED.       Henny Parks RN  01/21/21 0274

## 2021-01-21 NOTE — ED NOTES
Placed on bedpan to have BM. No stool noted, but moderate amount dark red liquid noted to bed alves. Dr. Tricia Mckeon made aware. Pt now leaves to go to radiology.      Henny Praks RN  01/21/21 1640

## 2021-01-21 NOTE — ED PROVIDER NOTES
[address]  Emergency Department Encounter  Emergency Medicine Resident         This patient was evaluated in the Emergency Department for symptoms described in the history of present illness. He/she was evaluated in the context of the global COVID-19 pandemic, which necessitated consideration that the patient might be at risk for infection with the SARS-CoV-2 virus that causes COVID-19. Institutional protocols and algorithms that pertain to the evaluation of patients at risk for COVID-19 are in a state of rapid change based on information released by regulatory bodies including the CDC and federal and state organizations. These policies and algorithms were followed during the patient's care in the ED. Pt Name: Mariah Benitez  MRN: [id number]  [place] 1955  Date of evaluation: 1/21/21  PCP:  Marsha Mullins MD    [address] [place] [address]       Chief Complaint   Patient presents with    GI Bleeding       HISTORY OF PRESENT ILLNESS  (Location/Symptom, Timing/Onset, Context/Setting, Quality, Duration, Modifying Factors, Severity.)    Mariah Benitez is a 60 y.o. female who presents with GIB. 76-year-old female history alcoholism hypertension hyperlipidemia hypothyroidism diabetes as well as atrial fibrillation (on Eliquis) transferred from [facility] due to GI bleed. Patient is a [employer]. She received 3 L of fluid in transport as well as Kcentra. She has refused blood products however she is open to EPO and iron. Intermittently responsive to questions multiple contradictions very poor historian. Per chart review, had abd pain this AM with last BM 2 days ago. Appears that she was more awake and responsive.      UDS positive for amphetamines oxycodone TCA Cannibus WBC 20 LA 3.1 -> 3.7          PAST MEDICAL / SURGICAL / SOCIAL / FAMILY HISTORY    has a past medical history of Asthma, Atrial fibrillation ([address] COPD (chronic obstructive pulmonary disease) ([address] DDD (degenerative sexual activity: Not on file   Other Topics Concern    Not on file   Social History Narrative    Not on file       Family History   Problem Relation Age of Onset    Heart Disease Mother     High Cholesterol Mother     No Known Problems Son        Allergies:    Norco [hydrocodone-acetaminophen], Oxycodone, Percocet [oxycodone-acetaminophen], and Sulfa antibiotics    Home Medications:  Prior to Admission medications    Medication Sig Start Date End Date Taking?  Authorizing Provider   amLODIPine (NORVASC) 10 MG tablet Take 10 mg by mouth daily 12/4/20   Historical Provider, MD   levothyroxine (SYNTHROID) 88 MCG tablet TAKE 1 TABLET BY MOUTH EVERY DAY 1/18/21   Terry Borden MD   Insulin Pen Needle (TRUEPLUS 5-BEVEL PEN NEEDLES) 31G X 6 MM MISC USE AS DIRECTED DAILY 1/8/21   Terry Borden MD   oxybutynin (DITROPAN XL) 15 MG extended release tablet TAKE 1 TABLET BY MOUTH DAILY 12/17/20   RAJNI Acevedo CNM   Insulin Pen Needle (TRUEPLUS PEN NEEDLES) 31G X 5 MM MISC USE AS DIRECTED DAILY 12/16/20   Terry Borden MD   dilTIAZem (CARDIZEM CD) 120 MG extended release capsule TAKE 1 CAPSULE BY MOUTH EVERY DAY 11/16/20   Terry Borden MD   loratadine (CLARITIN) 10 MG tablet TAKE 1 TABLET BY MOUTH EVERY DAY 11/11/20   Terry Borden MD   albuterol sulfate  (90 Base) MCG/ACT inhaler INHALE TWO (2) PUFFS BY MOUTH EVERY 6 HOURS AS NEEDED FOR WHEEZING 10/7/20   Terry Borden MD   metoprolol tartrate (LOPRESSOR) 25 MG tablet TAKE ONE (1) TABLET BY MOUTH TWICE DAILY 10/7/20   Terry Borden MD   atorvastatin (LIPITOR) 40 MG tablet TAKE 1 TABLET BY MOUTH ONCE DAILY 9/17/20   Terry Borden MD   potassium chloride (KLOR-CON M) 20 MEQ extended release tablet Take 1 tablet by mouth daily 9/17/20   Terry Borden MD   insulin glargine (LANTUS SOLOSTAR) 100 UNIT/ML injection pen Inject 30 Units into the skin nightly  Patient taking differently: Inject 25 Units into the skin nightly  8/24/20 Caio Stern MD   cyclobenzaprine (FLEXERIL) 5 MG tablet TAKE 1 TABLET BY MOUTH THREE TIMES DAILY AS NEEDED FOR MUSCLE SPASMS 8/13/20   Caio Stern MD   albuterol (PROVENTIL) (2.5 MG/3ML) 0.083% nebulizer solution Take 3 mLs by nebulization 4 times daily 7/23/20   Caio Stern MD   lisinopril (PRINIVIL;ZESTRIL) 5 MG tablet TAKE 1 TABLET BY MOUTH EVERY DAY 7/13/20   Caio Stern MD   Lancets MISC 1 each by Does not apply route daily Tests 3 times a day/PRN.  Please dispense what [facility] covers to go with her new Glucometer 6/19/20   Caio Stern MD   TRULICITY 1.5 CE/5.7AL SOPN 1.5 mg once a week Monday's 4/6/20   Historical Provider, MD   Multiple Vitamin (MULTI-VITAMINS) TABS TAKE 1 TABLET BY MOUTH EVERY DAY 5/7/20   Caio Stern MD   senna (SENOKOT) 8.6 MG tablet Take 1 tablet by mouth 2 times daily 4/27/20 4/27/21  Caio Stern MD   dicyclomine (BENTYL) 10 MG capsule Take 1 capsule by mouth 4 times daily 4/27/20   Caio Stern MD   Cholecalciferol (VITAMIN D3) 25 MCG (1000 UT) TABS TAKE (1) TABLET BY MOUTH DAILY 4/14/20   Caio Stern MD   Omega-3 Fatty Acids (FISH OIL) 1000 MG CAPS TAKE TWO (2) CAPSULES BY MOUTH DAILY 4/14/20   Caio Stern MD   ELIQUIS 5 MG TABS tablet TAKE ONE (1) TABLET BY MOUTH TWICE DAILY 4/14/20   Caio Stern MD   hydroCHLOROthiazide (HYDRODIURIL) 25 MG tablet TAKE 1 TABLET BY MOUTH EVERY MORNING 3/16/20   Caio Stern MD   gabapentin (NEURONTIN) 800 MG tablet TAKE 1 TABLET BY MOUTH TWICE DAILY 3/16/20 1/21/21  Caio Stern MD   FEROSUL 325 (65 Fe) MG tablet TAKE (1) TABLET BY MOUTH DAILY 4/23/19   Caio Stern MD   Blood Glucose Monitoring Suppl SOUTH 1 Units by Does not apply route once for 1 dose PLease dispense a machine that is covered by her insurance  DX E11.9 4/26/18 8/24/20  Caio Stern MD   Incontinence Supply Disposable (POISE PAD) PADS Apply 1 Piece topically as needed (incontinence) 11/21/17   Oksana Hutson MD Alcohol Swabs (EASY TOUCH ALCOHOL PREP MEDIUM) 70 % PADS USE FOUR TIMES DAILY  Patient not taking: Reported on 8/6/2020 3/29/17   Lena Carpenter MD   Blood Pressure Monitoring (B-D ASSURE BPM/AUTO ARM CUFF) MISC 1 Device by Does not apply route daily as needed (htn)  Patient not taking: Reported on 8/6/2020 8/25/16   Lena Carpenter MD       REVIEW OF SYSTEMS    (2-9 systems for level 4, 10 or more for level 5)    A 10 point review of systems was performed and negative unless otherwise specified in HPI  Gen: No Fever, No chills  EYES: No blurry visiion, no double vision  HENT: No sore throat, No runny nose. No cough  CV: No CP , No palpitation  RESP: No SOB, No respiratory distress  GI: No N/V, No Abdm pain  : No dysuria  SKIN: No rash  MSK: No back pain, no joint pain  NEURO: No HA, no weakness  PSYCH: No SI/HI        PHYSICAL EXAM   (up to 7 for level 4, 8 or more for level 5)     INITIAL VITALS:     BP (!) 164/88   Pulse 63   Temp 98.5 °F (36.9 °C) (Oral)   Resp 16   Ht 5' 4\" (1.626 m)   Wt 161 lb 8 oz (73.3 kg)   SpO2 95%   BMI 27.72 kg/m²     Physical Exam  Patient is ill-appearing without conjunctival. Poor skin turgor. Dry mucosal membranes. She is intermittently responsive and requires multiple repeated attempts but is responsive to voice. Maintaining her airway at this time. She is normocephalic. Neck supple no jvd. Trach midline. Chest tachycardic to auscultation without MRG. No WRR. Obese. Abdom diffusely ttp all fields with associated rebound/guarding. No CVA tenderness. Rectal examination demonstrates gross hematochezia intermixed with brownish bleeding. No hemorroids, thrombosed hemorrhoids, mucus perianal fissures, or rectal tone abnormalities. FOBT is positive. No stool impaction. Skin warm not mottled. Moves all extremities gross motor strength intact in the bilateral upper and lower extremities to major muscle groups.         DIFFERENTIAL  DIAGNOSIS/ MDM   PLAN (LABS / IMAGING / EKG):  Orders Placed This Encounter   Procedures    Culture, Urine    Culture, Blood 1    Culture, Blood 2    CT HEAD WO CONTRAST    XR ABDOMEN FOR NG/OG/NE TUBE PLACEMENT    XR ABDOMEN (KUB) (SINGLE AP VIEW)    XR ABDOMEN (KUB) (SINGLE AP VIEW)    CT ABDOMEN PELVIS W IV CONTRAST Additional Contrast? None    CBC auto differential    Comprehensive Metabolic Panel w/ Reflex to MG    Urinalysis    Lactate, Sepsis    APTT    Protime-INR    Lipase    Troponin    Brain Natriuretic Peptide    Hemoglobin and hematocrit, blood    SODIUM (POC)    POTASSIUM (POC)    CHLORIDE (POC)    CALCIUM, IONIC (POC)    Microscopic Urinalysis    TOX SCR, BLD, ED    BETA-HYDROXYBUTYRATE    CK    T4, Free    Myoglobin, Serum    TSH with Reflex    CBC WITH AUTO DIFFERENTIAL    BLOOD GAS, ARTERIAL    BASIC METABOLIC PANEL    Hemoglobin and hematocrit, blood    SODIUM (POC)    POTASSIUM (POC)    CHLORIDE (POC)    CALCIUM, IONIC (POC)    MAGNESIUM    LACTIC ACID, WHOLE BLOOD    Procalcitonin    CALCIUM, IONIZED    CBC WITH AUTO DIFFERENTIAL    MRSA by PCR    AMMONIA    SPECIMEN REJECTION    Basic Metabolic Panel w/ Reflex to MG    CBC WITH AUTO DIFFERENTIAL    LACTIC ACID, WHOLE BLOOD    Magnesium    Basic Metabolic Panel w/ Reflex to MG    CALCIUM, IONIZED    HEMOGLOBIN AND HEMATOCRIT, BLOOD    Lactic Acid, Whole Blood    Magnesium    Magnesium    Basic Metabolic Panel w/ Reflex to MG    Surgical Pathology    Surgical Pathology    Surgical Pathology    Surgical Pathology    Surgical Pathology    Surgical Pathology    DIET FULL LIQUID;    Nasogastric tube insertion    Soap suds enema    Vital signs per unit routine    Daily weights    Intake and output    Place intermittent pneumatic compression device    Notify physician    Up as tolerated    HYPOGLYCEMIA TREATMENT: blood glucose less than 50 mg/dL and patient  ALERT and TOLERATING PO    HYPOGLYCEMIA TREATMENT: blood Glucose Fingerstick    POC Glucose Fingerstick    POC Glucose Fingerstick    POC Glucose Fingerstick    POC Glucose Fingerstick    POC Glucose Fingerstick    POC Glucose Fingerstick    POC Glucose Fingerstick    POC Glucose Fingerstick    POC Glucose Fingerstick    POC Glucose Fingerstick    POC Glucose Fingerstick    POC Glucose Fingerstick    POC Glucose Fingerstick    EKG 12 Lead    EKG REPORT    PATIENT STATUS (FROM ED OR OR/PROCEDURAL) Inpatient    Transfer patient    Transfer Patient    Alcohol and or drug assessment    Seizure precautions    Fall precautions    Aspiration precautions       MEDICATIONS ORDERED:  Orders Placed This Encounter   Medications    DISCONTD: pantoprazole (PROTONIX) 80 mg in sodium chloride 0.9 % 100 mL infusion    DISCONTD: tranexamic acid (CYKLOKAPRON) injection 15 mg/kg    tranexamic acid-NaCl IVPB premix 1,000 mg    piperacillin-tazobactam (ZOSYN) 4,500 mg in dextrose 5 % 100 mL IVPB (mini-bag)     Order Specific Question:   Antimicrobial Indications     Answer: Other     Order Specific Question:   Other Abx Indication     Answer:    Suspected Sepsis of Abdominal Origin    vancomycin (VANCOCIN) 1000 mg in dextrose 5% 200 mL IVPB     Order Specific Question:   Antimicrobial Indications     Answer:   Sepsis of Unknown Etiology    lactated ringers bolus    polyethylene glycol (GoLYTELY) solution 200 mL    sodium chloride flush 0.9 % injection 10 mL    sodium chloride flush 0.9 % injection 10 mL    OR Linked Order Group     promethazine (PHENERGAN) tablet 12.5 mg     ondansetron (ZOFRAN) injection 4 mg    DISCONTD: famotidine (PEPCID) injection 20 mg    albuterol (PROVENTIL) nebulizer solution 2.5 mg    albuterol sulfate  (90 Base) MCG/ACT inhaler 2 puff    DISCONTD: insulin regular (HUMULIN R;NOVOLIN R) 100 Units in sodium chloride 0.9 % 100 mL infusion    glucose (GLUTOSE) 40 % oral gel 15 g    dextrose 50 % IV solution    glucagon (rDNA) injection 1 mg    dextrose 5 % solution    sodium chloride flush 0.9 % injection 10 mL    sodium chloride flush 0.9 % injection 10 mL    DISCONTD: LORazepam (ATIVAN) tablet 1 mg    DISCONTD: LORazepam (ATIVAN) injection 1 mg    DISCONTD: LORazepam (ATIVAN) tablet 2 mg    DISCONTD: LORazepam (ATIVAN) injection 2 mg    DISCONTD: LORazepam (ATIVAN) tablet 3 mg    DISCONTD: LORazepam (ATIVAN) injection 3 mg    DISCONTD: LORazepam (ATIVAN) tablet 4 mg    DISCONTD: LORazepam (ATIVAN) injection 4 mg    DISCONTD: lactated ringers infusion    DISCONTD: cefepime (MAXIPIME) 2000 mg IVPB minibag     Order Specific Question:   Antimicrobial Indications     Answer:   Sepsis of Unknown Etiology    DISCONTD: metronidazole (FLAGYL) 500 mg in NaCl 100 mL IVPB premix     Order Specific Question:   Antimicrobial Indications     Answer:   Sepsis of Unknown Etiology    0.9 % sodium chloride bolus    DISCONTD: sodium bicarbonate 50 mEq in dextrose 5 % 1,000 mL infusion    lactated ringers bolus    DISCONTD: polyethylene glycol (GoLYTELY) solution 4,000 mL    metoprolol (LOPRESSOR) injection 5 mg    metoprolol (LOPRESSOR) injection 5 mg    DISCONTD: sodium bicarbonate 150 mEq in dextrose 5 % 1,000 mL infusion    DISCONTD: bisacodyl (DULCOLAX) suppository 10 mg    labetalol (NORMODYNE;TRANDATE) injection 10 mg    DISCONTD: 0.9 % sodium chloride infusion    DISCONTD: potassium bicarb-citric acid (EFFER-K) effervescent tablet 40 mEq    potassium chloride 10 mEq/100 mL IVPB (Peripheral Line)    magnesium sulfate 2000 mg in 50 mL IVPB premix    DISCONTD: dextrose 5 % and 0.45 % sodium chloride infusion    DISCONTD: insulin glargine (LANTUS) injection vial 10 Units    insulin lispro (HUMALOG) injection vial 0-12 Units    insulin glargine (LANTUS) injection vial 10 Units    calcium gluconate 1000 mg in sodium chloride 50 mL    DISCONTD: amLODIPine (NORVASC) tablet 5 mg    levothyroxine (SYNTHROID) tablet 88 mcg    DISCONTD: 0.45 % sodium chloride infusion    0.9 % sodium chloride infusion    DISCONTD: bisacodyl (DULCOLAX) suppository 10 mg    milk and molasses enema 240 mL    iopamidol (ISOVUE-370) 76 % injection 75 mL    metoclopramide (REGLAN) injection 10 mg    methylnaltrexone (RELISTOR) injection 12 mg    promethazine (PHENERGAN) injection 6.25 mg    polyethylene glycol (GoLYTELY) solution 4,000 mL    metoprolol tartrate (LOPRESSOR) tablet 25 mg    DISCONTD: potassium bicarb-citric acid (EFFER-K) effervescent tablet 40 mEq    DISCONTD: amLODIPine (NORVASC) tablet 10 mg    atorvastatin (LIPITOR) tablet 40 mg    dilTIAZem (CARDIZEM CD) extended release capsule 120 mg    DISCONTD: potassium chloride 10 mEq/100 mL IVPB (Peripheral Line)    magnesium sulfate 2000 mg in 50 mL IVPB premix    potassium bicarb-citric acid (EFFER-K) effervescent tablet 40 mEq    labetalol (NORMODYNE;TRANDATE) injection 10 mg    potassium bicarb-citric acid (EFFER-K) effervescent tablet 40 mEq    pantoprazole (PROTONIX) tablet 40 mg         MDM:    Oliva Willard is a 60 y.o. female who presents with altered mental status and GI bleed from [facility]. .  Patient has extremely complex medical history and is unable to provide further history at this time as she is acutely altered. Labs were reviewed as well as imaging from outlying facility. Patient has elevation of lactic acid of nearly 3 with associated received 20 and large stool burden and sigmoid with gross lower GI bleed. She was given Kcentra by flight as patient is on Eliquis for atrial fibrillation and is [facility]. Initial examination patient is tachycardic and altered with inability to provide history. Rectal examination did demonstrate gross bleeding without hemorrhoids or stool impaction. Abdomen was distended with tenderness in all quadrants significant concern for peritoneal findings. General surgery as well as GI were consulted. Work-up was expanded to include altered mental status evaluation patient to complete CT head as well as ammonia CK/myoglobin, sepsis and TSH/T4. Lower suspicion the patient has TCA overdose as she has moist axilla, no EKG findings that would be suggestive that other than tachycardia. Not having prominent R wave in his right axis deviation or widened QRS. Additionally not hypotensive. Lower suspicion for thyroid etiology patient symptoms of thyroid storm versus myxedema as she is not febrile or hypothermic with associated bradycardia. Patient's pupils are reactive bilaterally not pinpoint no mydriasis or ptosis. Patient did receive 3 L of IV fluid prior to arrival however on repeat her glucose remains significantly elevated she is in metabolic acidosis with associated tachypnea, blackish colored urine with significant dehydration hemoconcentration, lactic acid elevation from initial 3.0 to 5.8. Patient's SHAHRAM appears to be prerenal in origin. Additionally patient's hyponatremia is likely secondary to practice secondary to glucose elevation. EMERGENCY DEPARTMENT COURSE:  ED Course as of Jan 26 2048   Thu Jan 21, 2021   1824 Discussed case with Dr. Kobe Boyd, GI attending. Informed patient having rectal bleeding with associated decrease in hemoglobin and tachycardia abdominal pain and blood on rectal examination. Additionally informed him that patient is a Yars[employer] and has refused any blood products. She is also on Eliquis and had Shae Solitario continues to bleed. He does not believe that this is a GI emergency he believes the abdominal distension and bleeding is secondary to bleeding hemorroids and chronic constipation despite no obvious hemorrhoids on rectal exam. He is recommending medicine admission, no further GI interventions. [RB]   2155 D/w GS, will eval.     [RB]   2027 Patient reassessed, she still remains altered, moving all extremities with mild compliance neurological examination. She is able to lift her lower extremities 5 seconds with flexed. She has a good hand grasp bilaterally. She is more reluctant to follow commands however maintained airway at this time. [RB]   2037 Patient signed out to Dr. Gala Blankenship    [RB]   2120 I spoke with [facility] radiology, they do not believe there is an acute bleed and think that the hyperdensity noted as seen on image 25 is not blood or an acute bleed.     [JG]   2121 Discussed patient with critical care, to be admitted    [JG]      ED Course User Index  [JG] Renata Scott DO  [RB] Shirley Plunkett DO         DIAGNOSTIC RESULTS / EMERGENCYDEPARTMENT COURSE   LABS:  Labs Reviewed   CBC WITH AUTO DIFFERENTIAL - Abnormal; Notable for the following components:       Result Value    WBC 19.8 (*)     RBC 5.42 (*)     Hemoglobin 16.7 (*)     Hematocrit 48.8 (*)     Seg Neutrophils 87 (*)     Lymphocytes 6 (*)     Eosinophils % 0 (*)     Immature Granulocytes 1 (*)     Segs Absolute 17.35 (*)     All other components within normal limits   COMPREHENSIVE METABOLIC PANEL W/ REFLEX TO MG FOR LOW K - Abnormal; Notable for the following components:    Glucose 386 (*)     BUN 26 (*)     CREATININE 1.46 (*)     Sodium 131 (*)     CO2 15 (*)     Alkaline Phosphatase 210 (*)     Total Protein 5.9 (*)     GFR Non- 36 (*)     GFR  44 (*)     All other components within normal limits   URINALYSIS - Abnormal; Notable for the following components:    Color, UA DARK YELLOW (*)     Glucose, Ur 1+ (*)     Bilirubin Urine NEGATIVE  Verified by ictotest. (*)     Ketones, Urine TRACE (*)     Specific Gravity, UA 1.031 (*)     Leukocyte Esterase, Urine TRACE (*)     All other components within normal limits   LACTATE, SEPSIS - Abnormal; Notable for the following components:    Lactic Acid, Sepsis, Whole Blood 5.8 (*)     All other components within normal limits   HGB/HCT - Abnormal; Notable for the following components:    POC Hemoglobin 16.2 (*)     POC Hematocrit 48 (*)     All other components within normal limits   SODIUM (POC) - Abnormal; Notable for the following components:    POC Sodium 132 (*)     All other components within normal limits   CALCIUM, IONIC (POC) - Abnormal; Notable for the following components:    POC Ionized Calcium 0.86 (*)     All other components within normal limits   TOX SCR, BLD, ED - Abnormal; Notable for the following components:    Acetaminophen Level <5 (*)     Salicylate Lvl <1 (*)     All other components within normal limits   TSH WITH REFLEX - Abnormal; Notable for the following components:    TSH 0.01 (*)     All other components within normal limits   CBC WITH AUTO DIFFERENTIAL - Abnormal; Notable for the following components:    WBC 17.2 (*)     Seg Neutrophils 92 (*)     Lymphocytes 7 (*)     Eosinophils % 0 (*)     Segs Absolute 15.83 (*)     All other components within normal limits   BASIC METABOLIC PANEL - Abnormal; Notable for the following components:    Glucose 472 (*)     BUN 28 (*)     CREATININE 1.60 (*)     Sodium 132 (*)     CO2 13 (*)     GFR Non- 32 (*)     GFR  39 (*)     All other components within normal limits   SODIUM (POC) - Abnormal; Notable for the following components:    POC Sodium 133 (*)     All other components within normal limits   LACTIC ACID, WHOLE BLOOD - Abnormal; Notable for the following components:    Lactic Acid, Whole Blood 4.3 (*)     All other components within normal limits   LACTIC ACID, WHOLE BLOOD - Abnormal; Notable for the following components:    Lactic Acid, Whole Blood 5.0 (*)     All other components within normal limits   LACTIC ACID, WHOLE BLOOD - Abnormal; Notable for the following components:    Lactic Acid, Whole Blood 2.4 (*)     All other components within normal limits   PROCALCITONIN - Abnormal; Notable for the following components:    Procalcitonin 78.27 (*)     All other components within normal limits   CBC WITH AUTO DIFFERENTIAL - Abnormal; Notable for the following components:    WBC 19.4 (*)     Immature Granulocytes 1 (*)     Seg Neutrophils 82 (*)     Lymphocytes 9 (*)     Segs Absolute 15.92 (*)     All other components within normal limits   BASIC METABOLIC PANEL W/ REFLEX TO MG FOR LOW K - Abnormal; Notable for the following components:    Glucose 141 (*)     CREATININE 1.12 (*)     Calcium 7.9 (*)     Sodium 134 (*)     Potassium 3.1 (*)     Chloride 97 (*)     GFR Non- 49 (*)     GFR  59 (*)     All other components within normal limits   CBC WITH AUTO DIFFERENTIAL - Abnormal; Notable for the following components:    RBC 3.44 (*)     Hemoglobin 10.7 (*)     Hematocrit 31.7 (*)     Seg Neutrophils 76 (*)     Lymphocytes 16 (*)     All other components within normal limits   LACTIC ACID, WHOLE BLOOD - Abnormal; Notable for the following components:    Lactic Acid, Whole Blood 3.1 (*)     All other components within normal limits   LACTIC ACID, WHOLE BLOOD - Abnormal; Notable for the following components:    Lactic Acid, Whole Blood 2.8 (*)     All other components within normal limits   BASIC METABOLIC PANEL W/ REFLEX TO MG FOR LOW K - Abnormal; Notable for the following components:    Glucose 158 (*)     CREATININE 1.09 (*)     Calcium 8.3 (*)     Potassium 3.0 (*)     Chloride 95 (*)     GFR Non- 51 (*)     All other components within normal limits   CALCIUM, IONIZED - Abnormal; Notable for the following components:    Calcium, Ion 1.01 (*)     All other components within normal limits   HEMOGLOBIN AND HEMATOCRIT, BLOOD - Abnormal; Notable for the following components:    Hemoglobin 11.6 (*)     Hematocrit 34.0 (*)     All other components within normal limits   LACTIC ACID, WHOLE BLOOD - Abnormal; Notable for the following components:    Lactic Acid, Whole Blood 2.7 (*)     All other components within normal limits   MAGNESIUM - Abnormal; Notable for the following components:    Magnesium 1.5 (*)     All other components within normal limits   BASIC METABOLIC PANEL W/ REFLEX TO MG FOR LOW K - Abnormal; Notable for the following components:    Glucose 201 (*)     CREATININE 1.06 (*)     Sodium 130 (*)     Chloride 92 (*)     GFR Non- 52 (*)     All other components within normal limits   CBC WITH AUTO DIFFERENTIAL - Abnormal; Notable for the following components:    RBC 3.65 (*)     Hemoglobin 11.1 (*)     Hematocrit 33.3 (*)     Seg Neutrophils 79 (*)     Lymphocytes 14 (*)     Eosinophils % 0 (*)     All other components within normal limits   BASIC METABOLIC PANEL W/ REFLEX TO MG FOR LOW K - Abnormal; Notable for the following components:    Glucose 152 (*)     CREATININE 0.92 (*)     Calcium 8.3 (*)     Potassium 3.1 (*)     All other components within normal limits   CBC WITH AUTO DIFFERENTIAL - Abnormal; Notable for the following components:    RBC 3.52 (*)     Hemoglobin 10.7 (*)     Hematocrit 32.9 (*)     Seg Neutrophils 67 (*)     Lymphocytes 21 (*)     Immature Granulocytes 1 (*)     All other components within normal limits   BASIC METABOLIC PANEL W/ REFLEX TO MG FOR LOW K - Abnormal; Notable for the following components:    Glucose 187 (*)     Anion Gap 7 (*)     All other components within normal limits   BASIC METABOLIC PANEL W/ REFLEX TO MG FOR LOW K - Abnormal; Notable for the following components:    Glucose 142 (*)     BUN 7 (*)     Calcium 8.3 (*)     Potassium 3.6 (*)     All other components within normal limits   CBC WITH AUTO DIFFERENTIAL - Abnormal; Notable for the following components:    RBC 3.72 (*)     Hemoglobin 11.2 (*)     Hematocrit 34.7 (*)     Lymphocytes 22 (*)     Immature Granulocytes 2 (*)     All other components within normal limits   VENOUS BLOOD GAS, POINT OF CARE - Abnormal; Notable for the following components:    pH, Leobardo 7.182 (*)     HCO3, Venous 16.4 (*)     Total CO2, Venous 18 (*)     Negative Base Excess, Leobardo 12 (*)     All other components within normal limits   CREATININE W/GFR POINT OF CARE - Abnormal; Notable for the following components:    POC Creatinine 1.26 (*)     GFR Comment 52 (*)     GFR Non- 43 (*)     All other components within normal limits   LACTIC ACID,POINT OF CARE - Abnormal; Notable for the following components:    POC Lactic Acid 5.99 (*)     All other components within normal limits   POCT GLUCOSE - Abnormal; Notable for the following components:    POC Glucose 401 (*)     All other components within normal limits   POC GLUCOSE FINGERSTICK - Abnormal; Notable for the following components:    POC Glucose 411 (*)     All other components within normal limits   ARTERIAL BLOOD GAS, POC - Abnormal; Notable for the following components:    POC pH 7.318 (*)     POC pCO2 34.6 (*)     POC PO2 63.8 (*)     POC HCO3 17.7 (*)     TCO2 (calc), Art 19 (*)     Negative Base Excess, Art 8 (*)     POC O2 SAT 90 (*)     All other components within normal limits   CREATININE W/GFR POINT OF CARE - Abnormal; Notable for the following components:    POC Creatinine 1.69 (*)     GFR Comment 37 (*)     GFR Non- 31 (*)     All other components within normal limits   LACTIC ACID,POINT OF CARE - Abnormal; Notable for the following components:    POC Lactic Acid 4.62 (*)     All other components within normal limits   POCT GLUCOSE - Abnormal; Notable for the following components:    POC Glucose 461 (*)     All other components within normal limits   POC GLUCOSE FINGERSTICK - Abnormal; Notable for the following components:    POC Glucose 464 (*)     All other components within normal limits   POC GLUCOSE FINGERSTICK - Abnormal; Notable for the following components:    POC Glucose 338 (*)     All other components within normal limits   POC GLUCOSE FINGERSTICK - Abnormal; Notable for the following components:    POC Glucose 263 (*)     All other components within normal limits   POC GLUCOSE FINGERSTICK - Abnormal; Notable for the following components:    POC Glucose 237 (*)     All other components within normal limits   POC GLUCOSE FINGERSTICK - Abnormal; Notable for the following components:    POC Glucose 190 (*)     All other components within normal limits   POC GLUCOSE FINGERSTICK - Abnormal; Notable for the following components:    POC Glucose 196 (*)     All other components within normal limits   POC GLUCOSE FINGERSTICK - Abnormal; Notable for the following components:    POC Glucose 167 (*)     All other components within normal limits   POC GLUCOSE FINGERSTICK - Abnormal; Notable for the following components:    POC Glucose 140 (*)     All other components within normal limits   POC GLUCOSE FINGERSTICK - Abnormal; Notable for the following components:    POC Glucose 197 (*)     All other components within normal limits   POC GLUCOSE FINGERSTICK - Abnormal; Notable for the following components:    POC Glucose 111 (*)     All other components within normal limits   POC GLUCOSE FINGERSTICK - Abnormal; Notable for the following components:    POC Glucose 127 (*)     All other components within normal limits   POC GLUCOSE FINGERSTICK - Abnormal; Notable for the following components:    POC Glucose 110 (*)     All other components within normal limits   POC GLUCOSE FINGERSTICK - Abnormal; Notable for the following components:    POC Glucose 149 (*)     All other components within normal limits   POC GLUCOSE FINGERSTICK - Abnormal; Notable for the following components:    POC Glucose 165 (*)     All other components within normal limits   POC GLUCOSE FINGERSTICK - Abnormal; Notable for the following components:    POC Glucose 163 (*)     All other components within normal limits   POC GLUCOSE FINGERSTICK - Abnormal; Notable for the following components:    POC Glucose 154 (*)     All other components within normal limits   POC GLUCOSE FINGERSTICK - Abnormal; Notable for the following components: limits   POC GLUCOSE FINGERSTICK - Abnormal; Notable for the following components:    POC Glucose 135 (*)     All other components within normal limits   POC GLUCOSE FINGERSTICK - Abnormal; Notable for the following components:    POC Glucose 106 (*)     All other components within normal limits   POC GLUCOSE FINGERSTICK - Abnormal; Notable for the following components:    POC Glucose 137 (*)     All other components within normal limits   POC GLUCOSE FINGERSTICK - Abnormal; Notable for the following components:    POC Glucose 151 (*)     All other components within normal limits   POC GLUCOSE FINGERSTICK - Abnormal; Notable for the following components:    POC Glucose 138 (*)     All other components within normal limits   POC GLUCOSE FINGERSTICK - Abnormal; Notable for the following components:    POC Glucose 186 (*)     All other components within normal limits   POC GLUCOSE FINGERSTICK - Abnormal; Notable for the following components:    POC Glucose 204 (*)     All other components within normal limits   POC GLUCOSE FINGERSTICK - Abnormal; Notable for the following components:    POC Glucose 178 (*)     All other components within normal limits   POC GLUCOSE FINGERSTICK - Abnormal; Notable for the following components:    POC Glucose 166 (*)     All other components within normal limits   POC GLUCOSE FINGERSTICK - Abnormal; Notable for the following components:    POC Glucose 168 (*)     All other components within normal limits   POC GLUCOSE FINGERSTICK - Abnormal; Notable for the following components:    POC Glucose 168 (*)     All other components within normal limits   POC GLUCOSE FINGERSTICK - Abnormal; Notable for the following components:    POC Glucose 131 (*)     All other components within normal limits   POC GLUCOSE FINGERSTICK - Abnormal; Notable for the following components:    POC Glucose 151 (*)     All other components within normal limits   POC GLUCOSE FINGERSTICK - Abnormal; Notable for the following components:    POC Glucose 135 (*)     All other components within normal limits   POC GLUCOSE FINGERSTICK - Abnormal; Notable for the following components:    POC Glucose 160 (*)     All other components within normal limits   POC GLUCOSE FINGERSTICK - Abnormal; Notable for the following components:    POC Glucose 169 (*)     All other components within normal limits   POC GLUCOSE FINGERSTICK - Abnormal; Notable for the following components:    POC Glucose 132 (*)     All other components within normal limits   POC GLUCOSE FINGERSTICK - Abnormal; Notable for the following components:    POC Glucose 218 (*)     All other components within normal limits   CULTURE, URINE   CULTURE, BLOOD 1   CULTURE, BLOOD 2   APTT   PROTIME-INR   LIPASE   TROPONIN   BRAIN NATRIURETIC PEPTIDE   POTASSIUM (POC)   CHLORIDE (POC)   MICROSCOPIC URINALYSIS   BETA-HYDROXYBUTYRATE   CK   T4, FREE   MYOGLOBIN, SERUM   BLOOD GAS, ARTERIAL   HGB/HCT   POTASSIUM (POC)   CHLORIDE (POC)   CALCIUM, IONIC (POC)   MAGNESIUM   CALCIUM, IONIZED   MRSA BY PCR   AMMONIA   SPECIMEN REJECTION   MAGNESIUM   LACTIC ACID, WHOLE BLOOD   LACTIC ACID, WHOLE BLOOD   MAGNESIUM   SURGICAL PATHOLOGY   SURGICAL PATHOLOGY   SURGICAL PATHOLOGY   SURGICAL PATHOLOGY   SURGICAL PATHOLOGY   SURGICAL PATHOLOGY   BASIC METABOLIC PANEL W/ REFLEX TO MG FOR LOW K   CBC WITH AUTO DIFFERENTIAL   ANION GAP (CALC) POC   ANION GAP (CALC) POC   POCT GLUCOSE   POCT GLUCOSE   POCT GLUCOSE   POCT GLUCOSE   POCT GLUCOSE   POCT GLUCOSE   POCT GLUCOSE   POCT GLUCOSE   POCT GLUCOSE   POCT GLUCOSE   POCT GLUCOSE   POCT GLUCOSE   POCT GLUCOSE   POCT GLUCOSE   POCT GLUCOSE   POCT GLUCOSE       RADIOLOGY:  No results found. CONSULTS:  IP CONSULT TO GI  IP CONSULT TO GENERAL SURGERY  IP CONSULT TO CRITICAL CARE  IP CONSULT TO SOCIAL WORK  IP CONSULT TO IV TEAM    CRITICAL CARE:    Critical care time > 90 mins    FINAL IMPRESSION     1. Dehydration    2.  Transient alteration of awareness    3. Intracranial hemorrhage ([facility] [address]    4. Refusal of blood transfusions as patient is [place]    5. Gastrointestinal hemorrhage, unspecified gastrointestinal hemorrhage type    6. SHAHRAM (acute kidney injury) ([facility] [address]    7. Metabolic acidosis    8. Constipation, unspecified constipation type    9. Generalized abdominal pain    10. Paroxysmal atrial fibrillation ([facility] [address]          DISPOSITION / PLAN   DISPOSITION Admitted 01/21/2021 09:54:42 PM        PATIENT REFERRED TO:  No follow-up provider specified. DISCHARGE MEDICATIONS:  Current Discharge Medication List          Dr. Candace Greenwood.  1967 [facility]  Emergency Medicine Resident Physician, PGY-3    (Please note that portions of this note were completed with a voice recognition program.  Efforts were made to edit the dictations but occasionally words are mis-transcribed.)         Evi Lake DO  Resident  01/26/21 2048

## 2021-01-22 ENCOUNTER — APPOINTMENT (OUTPATIENT)
Dept: GENERAL RADIOLOGY | Age: 66
DRG: 253 | End: 2021-01-22
Payer: COMMERCIAL

## 2021-01-22 LAB
-: NORMAL
ABO/RH: NORMAL
ABSOLUTE EOS #: 0 K/UL (ref 0–0.4)
ABSOLUTE EOS #: 0.19 K/UL (ref 0–0.44)
ABSOLUTE IMMATURE GRANULOCYTE: 0 K/UL (ref 0–0.3)
ABSOLUTE IMMATURE GRANULOCYTE: 0.19 K/UL (ref 0–0.3)
ABSOLUTE LYMPH #: 1.2 K/UL (ref 1–4.8)
ABSOLUTE LYMPH #: 1.75 K/UL (ref 1.1–3.7)
ABSOLUTE MONO #: 0.17 K/UL (ref 0.1–0.8)
ABSOLUTE MONO #: 1.16 K/UL (ref 0.1–1.2)
ALLEN TEST: ABNORMAL
AMMONIA: 34 UMOL/L (ref 11–51)
ANION GAP SERPL CALCULATED.3IONS-SCNC: 15 MMOL/L (ref 9–17)
ANION GAP: 11 MMOL/L (ref 7–16)
ANTIBODY SCREEN: NEGATIVE
ARM BAND NUMBER: NORMAL
BASOPHILS # BLD: 0 % (ref 0–2)
BASOPHILS # BLD: 1 % (ref 0–2)
BASOPHILS ABSOLUTE: 0 K/UL (ref 0–0.2)
BASOPHILS ABSOLUTE: 0.19 K/UL (ref 0–0.2)
BLD PROD TYP BPU: NORMAL
BUN BLDV-MCNC: 28 MG/DL (ref 8–23)
BUN/CREAT BLD: ABNORMAL (ref 9–20)
CALCIUM IONIZED: 1.2 MMOL/L (ref 1.13–1.33)
CALCIUM SERPL-MCNC: 9.1 MG/DL (ref 8.6–10.4)
CHLORIDE BLD-SCNC: 104 MMOL/L (ref 98–107)
CO2: 13 MMOL/L (ref 20–31)
CREAT SERPL-MCNC: 1.6 MG/DL (ref 0.5–0.9)
CROSSMATCH RESULT: NORMAL
CULTURE: NO GROWTH
CULTURE: NO GROWTH
DIFFERENTIAL TYPE: ABNORMAL
DIFFERENTIAL TYPE: ABNORMAL
DIRECT EXAM: NORMAL
DISPENSE STATUS BLOOD BANK: NORMAL
EKG ATRIAL RATE: 112 BPM
EKG ATRIAL RATE: 78 BPM
EKG P AXIS: 74 DEGREES
EKG P AXIS: 79 DEGREES
EKG P-R INTERVAL: 190 MS
EKG P-R INTERVAL: 212 MS
EKG Q-T INTERVAL: 318 MS
EKG Q-T INTERVAL: 404 MS
EKG QRS DURATION: 72 MS
EKG QRS DURATION: 80 MS
EKG QTC CALCULATION (BAZETT): 434 MS
EKG QTC CALCULATION (BAZETT): 460 MS
EKG R AXIS: 27 DEGREES
EKG R AXIS: 55 DEGREES
EKG T AXIS: 48 DEGREES
EKG T AXIS: 74 DEGREES
EKG VENTRICULAR RATE: 112 BPM
EKG VENTRICULAR RATE: 78 BPM
EOSINOPHILS RELATIVE PERCENT: 0 % (ref 1–4)
EOSINOPHILS RELATIVE PERCENT: 1 % (ref 1–4)
EXPIRATION DATE: NORMAL
FIO2: 21
GFR AFRICAN AMERICAN: 39 ML/MIN
GFR NON-AFRICAN AMERICAN: 31 ML/MIN
GFR NON-AFRICAN AMERICAN: 32 ML/MIN
GFR SERPL CREATININE-BSD FRML MDRD: 37 ML/MIN
GFR SERPL CREATININE-BSD FRML MDRD: ABNORMAL ML/MIN/{1.73_M2}
GLUCOSE BLD-MCNC: 110 MG/DL (ref 65–105)
GLUCOSE BLD-MCNC: 111 MG/DL (ref 65–105)
GLUCOSE BLD-MCNC: 127 MG/DL (ref 65–105)
GLUCOSE BLD-MCNC: 140 MG/DL (ref 65–105)
GLUCOSE BLD-MCNC: 141 MG/DL (ref 65–105)
GLUCOSE BLD-MCNC: 149 MG/DL (ref 65–105)
GLUCOSE BLD-MCNC: 154 MG/DL (ref 65–105)
GLUCOSE BLD-MCNC: 161 MG/DL (ref 65–105)
GLUCOSE BLD-MCNC: 163 MG/DL (ref 65–105)
GLUCOSE BLD-MCNC: 164 MG/DL (ref 65–105)
GLUCOSE BLD-MCNC: 165 MG/DL (ref 65–105)
GLUCOSE BLD-MCNC: 167 MG/DL (ref 65–105)
GLUCOSE BLD-MCNC: 190 MG/DL (ref 65–105)
GLUCOSE BLD-MCNC: 196 MG/DL (ref 65–105)
GLUCOSE BLD-MCNC: 197 MG/DL (ref 65–105)
GLUCOSE BLD-MCNC: 237 MG/DL (ref 65–105)
GLUCOSE BLD-MCNC: 263 MG/DL (ref 65–105)
GLUCOSE BLD-MCNC: 338 MG/DL (ref 65–105)
GLUCOSE BLD-MCNC: 411 MG/DL (ref 65–105)
GLUCOSE BLD-MCNC: 461 MG/DL (ref 74–100)
GLUCOSE BLD-MCNC: 464 MG/DL (ref 65–105)
GLUCOSE BLD-MCNC: 472 MG/DL (ref 70–99)
HCT VFR BLD CALC: 44.5 % (ref 36.3–47.1)
HCT VFR BLD CALC: 46.3 % (ref 36.3–47.1)
HEMOGLOBIN: 14.1 G/DL (ref 11.9–15.1)
HEMOGLOBIN: 14.5 G/DL (ref 11.9–15.1)
IMMATURE GRANULOCYTES: 0 %
IMMATURE GRANULOCYTES: 1 %
LACTIC ACID, WHOLE BLOOD: 2.4 MMOL/L (ref 0.7–2.1)
LACTIC ACID, WHOLE BLOOD: 4.3 MMOL/L (ref 0.7–2.1)
LACTIC ACID, WHOLE BLOOD: 5 MMOL/L (ref 0.7–2.1)
LYMPHOCYTES # BLD: 7 % (ref 24–44)
LYMPHOCYTES # BLD: 9 % (ref 24–43)
Lab: NORMAL
MAGNESIUM: 2.2 MG/DL (ref 1.6–2.6)
MCH RBC QN AUTO: 30.9 PG (ref 25.2–33.5)
MCH RBC QN AUTO: 31.1 PG (ref 25.2–33.5)
MCHC RBC AUTO-ENTMCNC: 31.3 G/DL (ref 28.4–34.8)
MCHC RBC AUTO-ENTMCNC: 31.7 G/DL (ref 28.4–34.8)
MCV RBC AUTO: 98 FL (ref 82.6–102.9)
MCV RBC AUTO: 98.7 FL (ref 82.6–102.9)
MODE: ABNORMAL
MONOCYTES # BLD: 1 % (ref 1–7)
MONOCYTES # BLD: 6 % (ref 3–12)
MORPHOLOGY: NORMAL
MORPHOLOGY: NORMAL
NEGATIVE BASE EXCESS, ART: 8 (ref 0–2)
NRBC AUTOMATED: 0 PER 100 WBC
NRBC AUTOMATED: 0 PER 100 WBC
O2 DEVICE/FLOW/%: ABNORMAL
PATIENT TEMP: ABNORMAL
PDW BLD-RTO: 13.2 % (ref 11.8–14.4)
PDW BLD-RTO: 13.4 % (ref 11.8–14.4)
PLATELET # BLD: 222 K/UL (ref 138–453)
PLATELET # BLD: 228 K/UL (ref 138–453)
PLATELET ESTIMATE: ABNORMAL
PLATELET ESTIMATE: ABNORMAL
PMV BLD AUTO: 10.8 FL (ref 8.1–13.5)
PMV BLD AUTO: 10.8 FL (ref 8.1–13.5)
POC CHLORIDE: 104 MMOL/L (ref 98–107)
POC CREATININE: 1.69 MG/DL (ref 0.51–1.19)
POC HCO3: 17.7 MMOL/L (ref 21–28)
POC HEMATOCRIT: 43 % (ref 36–46)
POC HEMOGLOBIN: 14.5 G/DL (ref 12–16)
POC IONIZED CALCIUM: 1.31 MMOL/L (ref 1.15–1.33)
POC LACTIC ACID: 4.62 MMOL/L (ref 0.56–1.39)
POC O2 SATURATION: 90 % (ref 94–98)
POC PCO2 TEMP: ABNORMAL MM HG
POC PCO2: 34.6 MM HG (ref 35–48)
POC PH TEMP: ABNORMAL
POC PH: 7.32 (ref 7.35–7.45)
POC PO2 TEMP: ABNORMAL MM HG
POC PO2: 63.8 MM HG (ref 83–108)
POC POTASSIUM: 4.1 MMOL/L (ref 3.5–4.5)
POC SODIUM: 133 MMOL/L (ref 138–146)
POSITIVE BASE EXCESS, ART: ABNORMAL (ref 0–3)
POTASSIUM SERPL-SCNC: 4.6 MMOL/L (ref 3.7–5.3)
PROCALCITONIN: 78.27 NG/ML
RBC # BLD: 4.54 M/UL (ref 3.95–5.11)
RBC # BLD: 4.69 M/UL (ref 3.95–5.11)
RBC # BLD: ABNORMAL 10*6/UL
RBC # BLD: ABNORMAL 10*6/UL
REASON FOR REJECTION: NORMAL
SAMPLE SITE: ABNORMAL
SEG NEUTROPHILS: 82 % (ref 36–65)
SEG NEUTROPHILS: 92 % (ref 36–66)
SEGMENTED NEUTROPHILS ABSOLUTE COUNT: 15.83 K/UL (ref 1.8–7.7)
SEGMENTED NEUTROPHILS ABSOLUTE COUNT: 15.92 K/UL (ref 1.5–8.1)
SODIUM BLD-SCNC: 132 MMOL/L (ref 135–144)
SPECIMEN DESCRIPTION: NORMAL
TCO2 (CALC), ART: 19 MMOL/L (ref 22–29)
TRANSFUSION STATUS: NORMAL
TROPONIN INTERP: NORMAL
TROPONIN T: NORMAL NG/ML
TROPONIN, HIGH SENSITIVITY: 7 NG/L (ref 0–14)
TSH SERPL DL<=0.05 MIU/L-ACNC: 0.01 MIU/L (ref 0.3–5)
UNIT DIVISION: 0
UNIT NUMBER: NORMAL
WBC # BLD: 17.2 K/UL (ref 3.5–11.3)
WBC # BLD: 19.4 K/UL (ref 3.5–11.3)
WBC # BLD: ABNORMAL 10*3/UL
WBC # BLD: ABNORMAL 10*3/UL
ZZ NTE CLEAN UP: ORDERED TEST: NORMAL
ZZ NTE WITH NAME CLEAN UP: SPECIMEN SOURCE: NORMAL

## 2021-01-22 PROCEDURE — 85610 PROTHROMBIN TIME: CPT

## 2021-01-22 PROCEDURE — 2580000003 HC RX 258: Performed by: STUDENT IN AN ORGANIZED HEALTH CARE EDUCATION/TRAINING PROGRAM

## 2021-01-22 PROCEDURE — 93010 ELECTROCARDIOGRAM REPORT: CPT | Performed by: INTERNAL MEDICINE

## 2021-01-22 PROCEDURE — 85025 COMPLETE CBC W/AUTO DIFF WBC: CPT

## 2021-01-22 PROCEDURE — 82803 BLOOD GASES ANY COMBINATION: CPT

## 2021-01-22 PROCEDURE — 2500000003 HC RX 250 WO HCPCS: Performed by: STUDENT IN AN ORGANIZED HEALTH CARE EDUCATION/TRAINING PROGRAM

## 2021-01-22 PROCEDURE — 6360000002 HC RX W HCPCS: Performed by: STUDENT IN AN ORGANIZED HEALTH CARE EDUCATION/TRAINING PROGRAM

## 2021-01-22 PROCEDURE — 74018 RADEX ABDOMEN 1 VIEW: CPT

## 2021-01-22 PROCEDURE — 36569 INSJ PICC 5 YR+ W/O IMAGING: CPT

## 2021-01-22 PROCEDURE — 85730 THROMBOPLASTIN TIME PARTIAL: CPT

## 2021-01-22 PROCEDURE — 99254 IP/OBS CNSLTJ NEW/EST MOD 60: CPT | Performed by: NURSE PRACTITIONER

## 2021-01-22 PROCEDURE — 82140 ASSAY OF AMMONIA: CPT

## 2021-01-22 PROCEDURE — 99291 CRITICAL CARE FIRST HOUR: CPT | Performed by: INTERNAL MEDICINE

## 2021-01-22 PROCEDURE — 82330 ASSAY OF CALCIUM: CPT

## 2021-01-22 PROCEDURE — 83735 ASSAY OF MAGNESIUM: CPT

## 2021-01-22 PROCEDURE — 6370000000 HC RX 637 (ALT 250 FOR IP): Performed by: INTERNAL MEDICINE

## 2021-01-22 PROCEDURE — 6370000000 HC RX 637 (ALT 250 FOR IP): Performed by: STUDENT IN AN ORGANIZED HEALTH CARE EDUCATION/TRAINING PROGRAM

## 2021-01-22 PROCEDURE — 94761 N-INVAS EAR/PLS OXIMETRY MLT: CPT

## 2021-01-22 PROCEDURE — 84295 ASSAY OF SERUM SODIUM: CPT

## 2021-01-22 PROCEDURE — 84145 PROCALCITONIN (PCT): CPT

## 2021-01-22 PROCEDURE — 80048 BASIC METABOLIC PNL TOTAL CA: CPT

## 2021-01-22 PROCEDURE — 82565 ASSAY OF CREATININE: CPT

## 2021-01-22 PROCEDURE — 82435 ASSAY OF BLOOD CHLORIDE: CPT

## 2021-01-22 PROCEDURE — 76937 US GUIDE VASCULAR ACCESS: CPT

## 2021-01-22 PROCEDURE — 2000000000 HC ICU R&B

## 2021-01-22 PROCEDURE — 85014 HEMATOCRIT: CPT

## 2021-01-22 PROCEDURE — 87641 MR-STAPH DNA AMP PROBE: CPT

## 2021-01-22 PROCEDURE — 05HC33Z INSERTION OF INFUSION DEVICE INTO LEFT BASILIC VEIN, PERCUTANEOUS APPROACH: ICD-10-PCS | Performed by: INTERNAL MEDICINE

## 2021-01-22 PROCEDURE — 84132 ASSAY OF SERUM POTASSIUM: CPT

## 2021-01-22 PROCEDURE — 82947 ASSAY GLUCOSE BLOOD QUANT: CPT

## 2021-01-22 PROCEDURE — 2700000000 HC OXYGEN THERAPY PER DAY

## 2021-01-22 PROCEDURE — 94640 AIRWAY INHALATION TREATMENT: CPT

## 2021-01-22 PROCEDURE — C1751 CATH, INF, PER/CENT/MIDLINE: HCPCS

## 2021-01-22 PROCEDURE — 36415 COLL VENOUS BLD VENIPUNCTURE: CPT

## 2021-01-22 PROCEDURE — 83605 ASSAY OF LACTIC ACID: CPT

## 2021-01-22 RX ORDER — 0.9 % SODIUM CHLORIDE 0.9 %
1000 INTRAVENOUS SOLUTION INTRAVENOUS ONCE
Status: COMPLETED | OUTPATIENT
Start: 2021-01-22 | End: 2021-01-22

## 2021-01-22 RX ORDER — METOPROLOL TARTRATE 5 MG/5ML
5 INJECTION INTRAVENOUS ONCE
Status: COMPLETED | OUTPATIENT
Start: 2021-01-22 | End: 2021-01-22

## 2021-01-22 RX ORDER — BISACODYL 10 MG
10 SUPPOSITORY, RECTAL RECTAL DAILY PRN
Status: DISCONTINUED | OUTPATIENT
Start: 2021-01-22 | End: 2021-01-24

## 2021-01-22 RX ORDER — SODIUM CHLORIDE, SODIUM LACTATE, POTASSIUM CHLORIDE, AND CALCIUM CHLORIDE .6; .31; .03; .02 G/100ML; G/100ML; G/100ML; G/100ML
1000 INJECTION, SOLUTION INTRAVENOUS ONCE
Status: COMPLETED | OUTPATIENT
Start: 2021-01-22 | End: 2021-01-22

## 2021-01-22 RX ADMIN — POLYETHYLENE GLYCOL 3350, SODIUM SULFATE ANHYDROUS, SODIUM BICARBONATE, SODIUM CHLORIDE, POTASSIUM CHLORIDE 200 ML: 236; 22.74; 6.74; 5.86; 2.97 POWDER, FOR SOLUTION ORAL at 06:45

## 2021-01-22 RX ADMIN — CEFEPIME HYDROCHLORIDE 2000 MG: 2 INJECTION, POWDER, FOR SOLUTION INTRAVENOUS at 22:30

## 2021-01-22 RX ADMIN — ALBUTEROL SULFATE 2.5 MG: 2.5 SOLUTION RESPIRATORY (INHALATION) at 11:43

## 2021-01-22 RX ADMIN — SODIUM CHLORIDE 12.12 UNITS/HR: 9 INJECTION, SOLUTION INTRAVENOUS at 01:17

## 2021-01-22 RX ADMIN — CEFEPIME HYDROCHLORIDE 2000 MG: 2 INJECTION, POWDER, FOR SOLUTION INTRAVENOUS at 11:14

## 2021-01-22 RX ADMIN — BISACODYL 10 MG: 10 SUPPOSITORY RECTAL at 17:37

## 2021-01-22 RX ADMIN — ALBUTEROL SULFATE 2.5 MG: 2.5 SOLUTION RESPIRATORY (INHALATION) at 15:52

## 2021-01-22 RX ADMIN — SODIUM CHLORIDE, POTASSIUM CHLORIDE, SODIUM LACTATE AND CALCIUM CHLORIDE 1000 ML: 600; 310; 30; 20 INJECTION, SOLUTION INTRAVENOUS at 08:49

## 2021-01-22 RX ADMIN — METOPROLOL TARTRATE 5 MG: 1 INJECTION, SOLUTION INTRAVENOUS at 14:51

## 2021-01-22 RX ADMIN — METRONIDAZOLE 500 MG: 500 INJECTION, SOLUTION INTRAVENOUS at 08:35

## 2021-01-22 RX ADMIN — Medication: at 15:10

## 2021-01-22 RX ADMIN — SODIUM CHLORIDE, PRESERVATIVE FREE 10 ML: 5 INJECTION INTRAVENOUS at 21:00

## 2021-01-22 RX ADMIN — SODIUM CHLORIDE 10.88 UNITS/HR: 9 INJECTION, SOLUTION INTRAVENOUS at 09:06

## 2021-01-22 RX ADMIN — Medication: at 06:30

## 2021-01-22 RX ADMIN — METOPROLOL TARTRATE 5 MG: 1 INJECTION, SOLUTION INTRAVENOUS at 11:10

## 2021-01-22 RX ADMIN — FAMOTIDINE 20 MG: 10 INJECTION INTRAVENOUS at 09:08

## 2021-01-22 RX ADMIN — ALBUTEROL SULFATE 2.5 MG: 2.5 SOLUTION RESPIRATORY (INHALATION) at 20:42

## 2021-01-22 RX ADMIN — ALBUTEROL SULFATE 2.5 MG: 2.5 SOLUTION RESPIRATORY (INHALATION) at 08:51

## 2021-01-22 RX ADMIN — METRONIDAZOLE 500 MG: 500 INJECTION, SOLUTION INTRAVENOUS at 22:30

## 2021-01-22 RX ADMIN — SODIUM CHLORIDE 1000 ML: 9 INJECTION, SOLUTION INTRAVENOUS at 02:30

## 2021-01-22 RX ADMIN — METRONIDAZOLE 500 MG: 500 INJECTION, SOLUTION INTRAVENOUS at 14:55

## 2021-01-22 ASSESSMENT — PAIN DESCRIPTION - DESCRIPTORS: DESCRIPTORS: CRAMPING

## 2021-01-22 ASSESSMENT — PAIN SCALES - GENERAL: PAINLEVEL_OUTOF10: 5

## 2021-01-22 ASSESSMENT — PAIN DESCRIPTION - ORIENTATION: ORIENTATION: LOWER

## 2021-01-22 ASSESSMENT — PAIN DESCRIPTION - LOCATION: LOCATION: ABDOMEN

## 2021-01-22 NOTE — ED PROVIDER NOTES
of the patient as recorded by the APC.     Rashaad Curtis MD  Attending Emergency  Physician       Priyanka Mosquera MD  01/21/21 3921

## 2021-01-22 NOTE — ED PROVIDER NOTES
[facility] [address] [facility]  Emergency Department  Emergency Medicine Resident Sign-out     Care of Pricila Slocumb was assumed from Dr. Trinity Becker and is being seen for GI Bleeding  . The patient's initial evaluation and plan have been discussed with the prior provider who initially evaluated the patient. EMERGENCY DEPARTMENT COURSE / MEDICAL DECISION MAKING:       MEDICATIONS GIVEN:  Orders Placed This Encounter   Medications    pantoprazole (PROTONIX) 80 mg in sodium chloride 0.9 % 100 mL infusion    DISCONTD: tranexamic acid (CYKLOKAPRON) injection 15 mg/kg    tranexamic acid-NaCl IVPB premix 1,000 mg    piperacillin-tazobactam (ZOSYN) 4,500 mg in dextrose 5 % 100 mL IVPB (mini-bag)     Order Specific Question:   Antimicrobial Indications     Answer: Other     Order Specific Question:   Other Abx Indication     Answer:    Suspected Sepsis of Abdominal Origin    vancomycin (VANCOCIN) 15 mg/kg in dextrose 5 % 250 mL IVPB     Order Specific Question:   Antimicrobial Indications     Answer:   Sepsis of Unknown Etiology    lactated ringers bolus    polyethylene glycol (GoLYTELY) solution 200 mL       LABS / RADIOLOGY:     Labs Reviewed   CBC WITH AUTO DIFFERENTIAL - Abnormal; Notable for the following components:       Result Value    WBC 19.8 (*)     RBC 5.42 (*)     Hemoglobin 16.7 (*)     Hematocrit 48.8 (*)     Seg Neutrophils 87 (*)     Lymphocytes 6 (*)     Eosinophils % 0 (*)     Immature Granulocytes 1 (*)     Segs Absolute 17.35 (*)     All other components within normal limits   COMPREHENSIVE METABOLIC PANEL W/ REFLEX TO MG FOR LOW K - Abnormal; Notable for the following components:    Glucose 386 (*)     BUN 26 (*)     CREATININE 1.46 (*)     Sodium 131 (*)     CO2 15 (*)     Alkaline Phosphatase 210 (*)     Total Protein 5.9 (*)     GFR Non- 36 (*)     GFR  44 (*)     All other components within normal limits   URINALYSIS - Abnormal; Notable for the following components:    Color, UA DARK YELLOW (*)     Glucose, Ur 1+ (*)     Bilirubin Urine NEGATIVE  Verified by ictotest. (*)     Ketones, Urine TRACE (*)     Specific Gravity, UA 1.031 (*)     Leukocyte Esterase, Urine TRACE (*)     All other components within normal limits   LACTATE, SEPSIS - Abnormal; Notable for the following components:    Lactic Acid, Sepsis, Whole Blood 5.8 (*)     All other components within normal limits   HGB/HCT - Abnormal; Notable for the following components:    POC Hemoglobin 16.2 (*)     POC Hematocrit 48 (*)     All other components within normal limits   SODIUM (POC) - Abnormal; Notable for the following components:    POC Sodium 132 (*)     All other components within normal limits   CALCIUM, IONIC (POC) - Abnormal; Notable for the following components:    POC Ionized Calcium 0.86 (*)     All other components within normal limits   VENOUS BLOOD GAS, POINT OF CARE - Abnormal; Notable for the following components:    pH, Leobardo 7.182 (*)     HCO3, Venous 16.4 (*)     Total CO2, Venous 18 (*)     Negative Base Excess, Leobardo 12 (*)     All other components within normal limits   CREATININE W/GFR POINT OF CARE - Abnormal; Notable for the following components:    POC Creatinine 1.26 (*)     GFR Comment 52 (*)     GFR Non- 43 (*)     All other components within normal limits   LACTIC ACID,POINT OF CARE - Abnormal; Notable for the following components:    POC Lactic Acid 5.99 (*)     All other components within normal limits   POCT GLUCOSE - Abnormal; Notable for the following components:    POC Glucose 401 (*)     All other components within normal limits   CULTURE, URINE   CULTURE, BLOOD 1   CULTURE, BLOOD 2   APTT   PROTIME-INR   LIPASE   TROPONIN   BRAIN NATRIURETIC PEPTIDE   POTASSIUM (POC)   CHLORIDE (POC)   MICROSCOPIC URINALYSIS   LACTATE, SEPSIS   TOX SCR, BLD, ED   BETA-HYDROXYBUTYRATE   LACTIC ACID, WHOLE BLOOD   LACTIC ACID, WHOLE BLOOD   CK   T4, FREE MYOGLOBIN, SERUM   TSH WITH REFLEX   ANION GAP (CALC) POC       Ct Abdomen Pelvis Wo Contrast Additional Contrast? None    Result Date: 1/21/2021  EXAMINATION: CT ABDOMEN PELVIS WO CONTRAST HISTORY: Reason for exam:->lower abdominal pain 61-year-old female. Contrast withheld due to low GFR. COMPARISON: CT abdomen pelvis 8/6/2020. TECHNIQUE: CT examination of the abdomen and pelvis without IV contrast. Coronal and sagittal reformations were performed. Dose reduction techniques were achieved by using automated exposure control and/or adjustment of mA and/or kV according to patient size and/or use of iterative reconstruction technique. FINDINGS: Lung bases/lung windows: Lung bases are clear. No free air. Upper abdomen/lower chest: Right coronary artery calcifications. Small hiatal hernia, 2.7 cm. Liver has a normal Hounsfield attenuation today. Normal size spleen. Pancreas and gallbladder are normal. Retroperitoneum: Normal adrenal glands and kidneys. Moderate plaque aorta without aneurysm. No retroperitoneal adenopathy. The bowel shows prominent dilatation of colon with a long air-fluid level in the transverse colon. This dilatation is followed down to impacted stool in the sigmoid and rectum without other mechanical obstruction. Pelvis: Normal appendix. Small postmenopausal uterus. Bladder contour normal. Minimal fluid in the cul-de-sac. The abdominal wall intact. Bone windows: Degenerative changes L4-L5, moderate. Impacted stool throughout the sigmoid colon and rectum causing dilatation of the more proximal colon to the cecum. The cecum measures 7 cm in diameter. Small amount of fluid in the cul-de-sac. Cta Abdomen Pelvis W Wo Contrast    Result Date: 1/21/2021  EXAMINATION: CTA ABDOMEN PELVIS W WO CONTRAST HISTORY: Reason for exam:->Abdominal pain, recently passed bloody stool. Possible ischemic bowel. Elevating lactic acid. COMPARISON: CT scan without contrast 0841 hours same date.  TECHNIQUE: Isovue 370, 75 mL intravenously. Arterial and venous phases were performed. MIP (maximum intensity projection) images were performed. Dose reduction techniques were achieved by using automated exposure control and/or adjustment of mA and/or kV according to patient size and/or use of iterative reconstruction technique. FINDINGS: There is somewhat less stool in the sigmoid colon after the patient recently passed a bloody stool, however the distal sigmoid and rectum still are impacted by stool and the colon remains markedly dilated with fluid without bowel wall thickening. There is normal enhancement of the bowel wall. No pneumatosis. The celiac axis and the SMA are patent. There is no aortic dissection. Normal enhancement of the kidneys, adrenal glands, pancreas, spleen and liver. There is increasing free fluid in the right lower quadrant and the pelvis compared to earlier today. The lung bases remain clear. There is no free air. The bone windows are unchanged. The colon remains obstructed by impacted stool in the distal sigmoid and rectum with a large amount of stool from rectum to cecum. Increasing third space fluid in the lower abdomen and pelvis. No pneumatosis or evidence of high-grade arterial obstruction. Normal bowel wall enhancement. The findings were discussed with Dr. Efren Oates in the ED at 2:35 PM.       RECENT VITALS:     Temp: 98.1 °F (36.7 °C),  Pulse: 104, Resp: 23, BP: 111/77, SpO2: 95 %    This patient is a 60 y.o. Female with 67-year-old female presents as transfer from [facility] with concern for GI bleed. She had bright red blood there was initial concern for bleeding was given Kcentra in route. Additionally she had a white count of 20 there with lactic acid elevated to 3.0 received 3 L in route. Here she became altered yet responsive of 2 simple commands. She is a [employer] and has refused any blood products she is on Eliquis for her atrial fibrillation.   Labs are still pending to follow-up CT head shows possibility of intra parenchymal hemorrhage. Additionally GI as well as general surgery was consulted due to constipation and sigmoid area with peritoneal abdomen. They recommend placing NG tube for MiraLAX no further interventions at this time from their standpoint. Follow-up on results admit. ED Course as of Jan 21 2344   Thu Jan 21, 2021 1824 Discussed case with Dr. Mary Beth Garcia, GI attending. Informed patient having rectal bleeding with associated decrease in hemoglobin and tachycardia abdominal pain and blood on rectal examination. 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 She is also on Eliquis and had Ligia Binet continues to bleed. He does not believe that this is a GI emergency he believes the abdominal distension and bleeding is secondary to bleeding hemorroids and chronic constipation despite no obvious hemorrhoids on rectal exam. He is recommending medicine admission, no further GI interventions. [RB]   2771 D/w GS, will eval.     [RB]   2027 Patient reassessed, she still remains altered, moving all extremities with mild compliance neurological examination. She is able to lift her lower extremities 5 seconds with flexed. She has a good hand grasp bilaterally. She is more reluctant to follow commands however maintained airway at this time. [RB]   2037 Patient signed out to Dr. Alex Hines    [RB]   2120 I spoke with [facility], they do not believe there is an acute bleed and think that the hyperdensity noted as seen on image 25 is not blood or an acute bleed. [JG]   2121 Discussed patient with critical care, to be admitted    [JG]      ED Course User Index  [JG] Amparo Gates DO  [RB] Polly Chavarria DO         OUTSTANDING TASKS / RECOMMENDATIONS:    1. F/u CTh  2. Possible consult NS  3. Admit MICU     FINAL IMPRESSION:   No diagnosis found.     DISPOSITION:         DISPOSITION:  []  Discharge   [] Transfer -    [x]  Admission -   MICU   []  Against Medical Advice   []  Eloped   FOLLOW-UP: No follow-up provider specified.    DISCHARGE MEDICATIONS: New Prescriptions    No medications on file           Lukas Thornton DO  Emergency Medicine Resident  [address] [place]  Resident  01/21/21 1457

## 2021-01-22 NOTE — ED NOTES
Pt changed into new brief. Pt able to follow verbal commands and state her name. Pt VSS. Report given to MICU. All questions answered.      Zeyad Valdez RN  01/21/21 1605

## 2021-01-22 NOTE — CONSULTS
resultant from irritation of the sigmoid and rectal walls, and upper GI source is not excluded. 4. Recommend patient be admitted to intensive care unit under medicine service for correction of severe dehydration and hyperglycemia. 5. Trend lactic acid every 6 hours  6. May need insulin drip due to hyperglycemia and history of DKA  7. Place NG tube for GoLYTELY administration  8. Administer GoLYTELY 200 mL every 4 hours x5 doses  9. Soapsuds enema 1 time now  10. Recommend obtaining blood cultures, urine culture, and initiate antibiotics per primary team  11. Recommend full lab work-up including thyroid panels due to patient history hypothyroidism  12. Medical and symptom management per primary, surgery will follow along with you      HISTORY:   History of Chief Complaint:    Stephanie Otero is a 60 y.o. female known to the trauma surgery service from previous trach and PEG x2 placements for severe obtundation and inability to wean from vent or tolerate p.o. due to respiratory and DKA complications, that presents as a transfer from [facility] with complaints of gastrointestinal bleed and abdominal pain. Patient originally presented to [facility] earlier in the day with complaints of abdominal pain and and pain with bowel movements beginning 2 days ago. Patient currently extremely somnolent and difficult to arouse, poor historian and unable to answer questions briskly or accurately. Chart review and resident/RN information used to fill in patient's history. Per resident and review of medical records the patient had complaints of dark and tarry stools while at [facility], lab work revealed severe dehydration, hyperglycemia, leukocytosis, and lactic acidosis. CT scan was performed along with CTA of the abdomen and pelvis which showed a large stool burden with stercoral colitis in the rectosigmoid colon, follow-up CTA without evidence of acute ischemic change or flow restriction throughout the celiac, SMA, and PERRY. Stool burden mildly reduced on second scan due to enema received at [facility]. Per records there was dark tarry stool and small amounts of dark red blood after enema. Patient is a recorded Rastafarian with refusal of blood product but will accept EPO and iron per record. Patient has history of A. fib, on Eliquis, did receive reversal with [facility] on transport to this facility. Patient has no history of significant abdominal surgery apart from tubal ligation and PEG tube x2 placed 4 and 5 years ago due to dysphagia status post DKA complications. On exam, patient extremely somnolent, appears to have tenderness diffusely throughout the abdomen however this is transient in nature as the patient will be resting comfortably one moment and then complained of abdominal pain on palpation in the next. KVNG with evidence of dark stool, no bright red blood. Firm stool palpated in the proximal mid rectum. Vital signs on exam patient noted to be sinus tach on monitor 100 to 110 bpm, blood pressure maintaining normal to hypertensive. Calero in place, ana urine collected, initial urine output cola colored. Past Medical History   has a past medical history of Asthma, Atrial fibrillation ([address] COPD (chronic obstructive pulmonary disease) ([address] DDD (degenerative disc disease), Diabetes mellitus ([address] Hyperlipidemia, Hypertension, Hyperthyroidism, and Type II or unspecified type diabetes mellitus without mention of complication, not stated as uncontrolled. Past Surgical History   has a past surgical history that includes Tubal ligation; Tonsillectomy; Colonoscopy (04/23/14); back surgery; Upper gastrointestinal endoscopy (3/16/2016); tracheostomy; tracheostomy closure; Gastrostomy tube placement; gastrostomy tube change; and Dental surgery (N/A, 3/8/2017). Medications  Prior to Admission medications    Medication Sig Start Date End Date Taking?  Authorizing Provider   amLODIPine (NORVASC) 10 MG tablet Take 10 Glucometer 6/19/20   Daphne Vieira MD   TRULICITY 1.5 FD/7.9BX SOPN 1.5 mg once a week Monday's 4/6/20   Historical Provider, MD   Multiple Vitamin (MULTI-VITAMINS) TABS TAKE 1 TABLET BY MOUTH EVERY DAY 5/7/20   Daphne Vieira MD   senna (SENOKOT) 8.6 MG tablet Take 1 tablet by mouth 2 times daily 4/27/20 4/27/21  Daphne Vieira MD   dicyclomine (BENTYL) 10 MG capsule Take 1 capsule by mouth 4 times daily 4/27/20   Daphne Vieira MD   Cholecalciferol (VITAMIN D3) 25 MCG (1000 UT) TABS TAKE (1) TABLET BY MOUTH DAILY 4/14/20   Daphne Vieira MD   Omega-3 Fatty Acids (FISH OIL) 1000 MG CAPS TAKE TWO (2) CAPSULES BY MOUTH DAILY 4/14/20   Daphne Vieira MD   ELIQUIS 5 MG TABS tablet TAKE ONE (1) TABLET BY MOUTH TWICE DAILY 4/14/20   Daphne Vieira MD   hydroCHLOROthiazide (HYDRODIURIL) 25 MG tablet TAKE 1 TABLET BY MOUTH EVERY MORNING 3/16/20   Daphne Vieira MD   gabapentin (NEURONTIN) 800 MG tablet TAKE 1 TABLET BY MOUTH TWICE DAILY 3/16/20 1/21/21  Daphne Vieira MD   FEROSUL 325 (65 Fe) MG tablet TAKE (1) TABLET BY MOUTH DAILY 4/23/19   Daphne Vieira MD   Blood Glucose Monitoring Suppl SOUTH 1 Units by Does not apply route once for 1 dose PLease dispense a machine that is covered by her insurance  DX E11.9 4/26/18 8/24/20  Daphne Vieira MD   Incontinence Supply Disposable (POISE PAD) PADS Apply 1 Piece topically as needed (incontinence) 11/21/17   Vidhi Daniels MD   Alcohol Swabs (EASY TOUCH ALCOHOL PREP MEDIUM) 70 % PADS USE FOUR TIMES DAILY  Patient not taking: Reported on 8/6/2020 3/29/17   Lucinda Mcgrath MD   Blood Pressure Monitoring (B-D ASSURE BPM/AUTO ARM CUFF) MISC 1 Device by Does not apply route daily as needed (htn)  Patient not taking: Reported on 8/6/2020 8/25/16   Lucinda Mcgrath MD    Scheduled Meds:   vancomycin  15 mg/kg Intravenous Once    lactated ringers bolus  30 mL/kg Intravenous Once    polyethylene glycol  200 mL Per NG tube Q4H     Continuous Infusions:   pantoprozole (PROTONIX) infusion 8 mg/hr (01/21/21 1851)     PRN Meds:. Allergies  is allergic to norco [hydrocodone-acetaminophen]; oxycodone; percocet [oxycodone-acetaminophen]; and sulfa antibiotics. Family History  family history includes Heart Disease in her mother; High Cholesterol in her mother; No Known Problems in her son. Social History   reports that she quit smoking about 7 years ago. Her smoking use included cigarettes. She has a 20.00 pack-year smoking history. She has never used smokeless tobacco.   reports no history of alcohol use. reports no history of drug use. Review of Systems  Unable to accurately obtain due to patient's condition    PHYSICAL:   VITALS:  oral temperature is 98.1 °F (36.7 °C). Her blood pressure is 111/77 and her pulse is 104. Her respiration is 23 and oxygen saturation is 95%.    CONSTITUTIONAL: Somnolent, oriented to self, pale  HEENT: Head is normocephalic, atraumatic, disconjugate gaze, pupils 3 mm and sluggish  NECK: Soft, trachea midline and straight  LUNGS: Chest expands equally bilaterally, no audible stridor or wheeze  CARDIOVASCULAR: Sinus tachycardia on monitor, pulses 2+ radial femoral, dp  ABDOMEN: Soft, nondistended, obese, tender to palpation generalized, without rebound, guarding, or peritoneal signs, KVNG with evidence dark stool, no dark red or bright red blood noted, palpable stool burden  NEUROLOGIC: Sluggish to follow commands, moves all 4 extremities spontaneously  EXTREMITIES: no cyanosis, clubbing or edema    LABS:     Recent Labs     01/21/21  0742 01/21/21  1600 01/21/21  1600 01/21/21  1758 01/21/21  1800 01/21/21  1816   WBC 20.2*  --   --   --   --  19.8*   HGB 15.7 17.2*  --   --   --  16.7*   HCT 47.5*  --   --   --   --  48.8*     --   --   --   --  276   *  --   --   --   --  131*   K 3.9  --   --   --   --  4.2   CL 99  --   --   --   --  101   CO2 18*  --   --   --   --  15*   BUN 21  --   --   --   --  26*   CREATININE 1.43* --   --  1.26*  --  1.46*   CALCIUM 10.7*  --   --   --   --  9.5   INR  --   --   --   --   --  1.0   AST 25  --   --   --   --  30   ALT 21  --   --   --   --  26   BILITOT 0.44  --   --   --   --  1.01   NITRU  --   --    < >  --  NEGATIVE  --    COLORU  --   --    < >  --  DARK YELLOW*  --    BACTERIA  --   --    < >  --  NOT REPORTED  --     < > = values in this interval not displayed.      Recent Labs     01/21/21  0742 01/21/21  1816   ALKPHOS 217* 210*   ALT 21 26   AST 25 30   BILITOT 0.44 1.01   LABALBU 4.1 3.5   AMYLASE 356*  --    LIPASE 71* 41     CBC with Differential:    Lab Results   Component Value Date    WBC 19.8 01/21/2021    RBC 5.42 01/21/2021    HGB 16.7 01/21/2021    HCT 48.8 01/21/2021     01/21/2021    MCV 90.0 01/21/2021    MCH 30.8 01/21/2021    MCHC 34.2 01/21/2021    RDW 13.2 01/21/2021    LYMPHOPCT 6 01/21/2021    MONOPCT 6 01/21/2021    BASOPCT 0 01/21/2021    MONOSABS 1.10 01/21/2021    LYMPHSABS 1.18 01/21/2021    EOSABS <0.03 01/21/2021    BASOSABS 0.06 01/21/2021    DIFFTYPE NOT REPORTED 01/21/2021     CMP:    Lab Results   Component Value Date     01/21/2021    K 4.2 01/21/2021     01/21/2021    CO2 15 01/21/2021    BUN 26 01/21/2021    CREATININE 1.46 01/21/2021    GFRAA 44 01/21/2021    LABGLOM 36 01/21/2021    GLUCOSE 386 01/21/2021    PROT 5.9 01/21/2021    LABALBU 3.5 01/21/2021    CALCIUM 9.5 01/21/2021    BILITOT 1.01 01/21/2021    ALKPHOS 210 01/21/2021    AST 30 01/21/2021    ALT 26 01/21/2021     Hepatic Function Panel:    Lab Results   Component Value Date    ALKPHOS 210 01/21/2021    ALT 26 01/21/2021    AST 30 01/21/2021    PROT 5.9 01/21/2021    BILITOT 1.01 01/21/2021    BILIDIR 0.42 02/24/2016    IBILI 0.57 02/24/2016    LABALBU 3.5 01/21/2021     Ionized Calcium:  No results found for: IONCA  Magnesium:    Lab Results   Component Value Date    MG 1.8 11/18/2020     Phosphorus:    Lab Results   Component Value Date    PHOS 3.7 08/10/2018 Troponin:    Lab Results   Component Value Date    TROPONINI NOT REPORTED 04/24/2014     TSH:    Lab Results   Component Value Date    TSH <0.01 11/18/2020     LIPASE:    Lab Results   Component Value Date    LIPASE 41 01/21/2021         RADIOLOGY:   Ct Abdomen Pelvis Wo Contrast Additional Contrast? None    Result Date: 1/21/2021  Impacted stool throughout the sigmoid colon and rectum causing dilatation of the more proximal colon to the cecum. The cecum measures 7 cm in diameter. Small amount of fluid in the cul-de-sac. Ct Head Wo Contrast    Result Date: 1/21/2021  No acute intracranial abnormality. Cta Abdomen Pelvis W Wo Contrast    Result Date: 1/21/2021  The colon remains obstructed by impacted stool in the distal sigmoid and rectum with a large amount of stool from rectum to cecum. Increasing third space fluid in the lower abdomen and pelvis. No pneumatosis or evidence of high-grade arterial obstruction. Normal bowel wall enhancement. The findings were discussed with Dr. Joselyn Bravo in the ED at 2:35 PM.     Thank you for the interesting evaluation. Further recommendations to follow.     Lg Davis  1/21/2021, 8:25 PM

## 2021-01-22 NOTE — FLOWSHEET NOTE
01/22/21 1315   Encounter Summary   Services provided to: Patient   Referral/Consult From: Multi-disciplinary team   Support System Parent; Children;Family members   Place of [employer] No   Continue Visiting   (1/22/21)   Complexity of Encounter Low   Length of Encounter 15 minutes   Spiritual Assessment Completed Yes   Routine   Type Initial   Assessment Calm; Approachable;Coping   Intervention Active listening;Explored feelings, thoughts, concerns;Nurtured hope;Prayer;Sustaining presence/ Ministry of presence; Discussed belief system/Uatsdin practices/jamel   Outcome Acceptance;Comfort;Expressed gratitude

## 2021-01-22 NOTE — PROGRESS NOTES
PROGRESS NOTE      PATIENT NAME: Samime Zheng RECORD NO. [id number]  DATE: 1/22/2021  SURGEON: Silvio Aguilar  PRIMARY CARE PHYSICIAN: Laura Kang MD    HD: # 1    ASSESSMENT    Patient Active Problem List   Diagnosis    Cervical neck pain with evidence of disc disease    [facility]    Graves' disease    Asthma with COPD ([facility]    Essential hypertension    Type 2 diabetes mellitus without complication, with long-term current use of insulin ([facility]    Iron deficiency anemia    Irritable bowel syndrome with both constipation and diarrhea    Mixed hyperlipidemia    GI bleed       MEDICAL DECISION MAKING AND PLAN    Stercoral colitis  Leukocytosis   Currently afebrile and hemodynamically stable    Urine negative   No plan for surgical intervention at this time   Cefepime   Flagyl   We will follow     Obstipation   Bowel protocol   Discharge on bowel protocol when appropriate   Minimize narcotic   Optimize fluids    Current fluids @ 50/hr   Optimize electrolytes    k 4.6    Mg 2.2   Treat underlying medical conditions    Cr 1.26->1.69    Thyroid TSH 0.01     Treat underlying disease    GI bleeding   Hgb stable, no active bleeding   Recommend GI consult for UGI consideration   Lactate 5.0    Dehydrated    IBW ->54.7     Recommend IVF resuscitation as appropriate    Hx afib, DM. COPD, asthma, post trach, post PEG    Hold eliquis   Diabetes management per primary  [employer]ov[employer]- no blood products   Discussions with family by primary services and confirmed wish no blood   products if indicated   Confirm and document goals of care and surrogate recommended    Recommend continued bowel medications, enemas and once bowel start moving upper miralax. Flex sig per GI  Recommend confirmation of her surrogate decision maker and goals of care  preferences (she has DNR CCA documentation in chart and ?  Estranged  from her legal surrogate -son) to assure concordant care (palliative and spiritual care)        SUBJECTIVE    Dorys Almeida is is unchanged since yesterday. OBJECTIVE  VITALS: Temp: Temp: 98.1 °F (36.7 °C)Temp  Av.1 °F (36.7 °C)  Min: 98.1 °F (36.7 °C)  Max: 98.1 °F (82.9 °C) BP Systolic (11HOM), CH , Min:99 , HKX:494   Diastolic (72CWL), YEC:37, Min:53, Max:96   Pulse Pulse  Av  Min: 77  Max: 116 Resp Resp  Av.4  Min: 15  Max: 25 Pulse ox SpO2  Av.6 %  Min: 76 %  Max: 98 %    Physical Exam  HENT:      Head: Normocephalic. Nose: Nose normal.   Neck:      Musculoskeletal: Normal range of motion. Cardiovascular:      Rate and Rhythm: Regular rhythm. Tachycardia present. Pulses: Normal pulses. Pulmonary:      Effort: Pulmonary effort is normal.      Breath sounds: No wheezing. Abdominal:      Tenderness: There is no abdominal tenderness. Comments: Rectum normal, liquid brown stool on bed, skin, floor. No blood noted, no solid stool noted, rectum was not entered  Abdomen was not painful,   Abdomen was soft  NG with minimal bile output   Genitourinary:     Rectum: Normal.   Musculoskeletal: Normal range of motion. Skin:     Capillary Refill: Capillary refill takes 2 to 3 seconds. Coloration: Skin is not jaundiced. Comments: Cool, dry   Neurological:      General: No focal deficit present. Mental Status: She is disoriented. Comments: Somnolent, follows when awake but not fully         No intake/output data recorded. Drain/tube output:  No intake/output data recorded.     LAB:  CBC:   Recent Labs     21  18121  0130 21  0449   WBC 19.8* 17.2* 19.4*   HGB 16.7* 14.5 14.1   HCT 48.8* 46.3 44.5   MCV 90.0 98.7 98.0    222 228     BMP:   Recent Labs     21  0742 21  0742 21  1816 21  0130 21  0210   *  --  131* 132*  --    K 3.9  --  4.2 4.6  --    CL 99  --  101 104  --    CO2 18*  --  15* 13*  --    BUN 21  --  26* 28*  --    CREATININE 1.43*   < > 1.46* 1.60* 1.69*   GLUCOSE 278*  --  386* 472*  --     < > = values in this interval not displayed. COAGS:   Recent Labs     01/21/21  0742 01/21/21  1816   APTT  --  21.4   PROT 7.1 5.9*   INR  --  1.0       RADIOLOGY:  CXR:   XR ABDOMEN (KUB) (SINGLE AP VIEW)   Final Result   Somewhat motion degraded. No obvious free air. Enteric tube not well seen,   but possibly pulled back somewhat, as above. Nonspecific gas-filled small bowel loops in the mid abdomen. No definite   obstruction. XR ABDOMEN FOR NG/OG/NE TUBE PLACEMENT   Preliminary Result   Enteric tube tip and side port are below the level of the diaphragm, in the   expected location of the stomach. CT HEAD WO CONTRAST   Final Result   No acute intracranial abnormality. STEVE HURLEY  1/22/21, 7:53 AM               Trauma Attending Teo Graves      I have reviewed the above [facility] note(s) and confirmed the key elements of the medical history and physical exam. I have seen and examined the pt. I have discussed the findings, established the care plan and recommendations with Resident, [facility]CS RN, bedside nurse.   Serial exams   Follow lactic acid and wbc       Kath Geronimo DO  1/22/2021  10:05 PM

## 2021-01-22 NOTE — PROGRESS NOTES
Pharmacy Note     Renal Dose Adjustment    Pricila Spivey is a 60 y.o. female. Pharmacist assessment of renally cleared medications. Recent Labs     01/21/21  0742 01/21/21 1816   BUN 21 26*       Recent Labs     01/21/21  1758 01/21/21 1816   CREATININE 1.26* 1.46*       CrCl cannot be calculated (Unknown ideal weight.). Estimated CrCl using Ideal Body Weight: 33.64 mL/min (based on IBW 54.74 kg)    Height:   Ht Readings from Last 1 Encounters:   01/21/21 5' 4\" (1.626 m)     Weight:  Wt Readings from Last 1 Encounters:   01/21/21 161 lb (73 kg)       The following medication dose has been adjusted based upon renal function per P&T Guidelines:             Famotidine 20 mg IV twice daily changed to 20 mg IV once daily.     [facility]  1/21/2021 9:57 PM

## 2021-01-22 NOTE — PROGRESS NOTES
[facility] [K92.2] . Vitals:    01/21/21 2245   BP: 104/73   Pulse: 101   Resp: 22   Temp:    SpO2: (!) 89%   . Patients home meds are   Prior to Admission medications    Medication Sig Start Date End Date Taking?  Authorizing Provider   amLODIPine (NORVASC) 10 MG tablet Take 10 mg by mouth daily 12/4/20   Historical Provider, MD   levothyroxine (SYNTHROID) 88 MCG tablet TAKE 1 TABLET BY MOUTH EVERY DAY 1/18/21   José Miguel Rodriguez MD   Insulin Pen Needle (TRUEPLUS 5-BEVEL PEN NEEDLES) 31G X 6 MM MISC USE AS DIRECTED DAILY 1/8/21   José Miguel Rodriguez MD   oxybutynin (DITROPAN XL) 15 MG extended release tablet TAKE 1 TABLET BY MOUTH DAILY 12/17/20   RAJNI Stewart CNM   Insulin Pen Needle (TRUEPLUS PEN NEEDLES) 31G X 5 MM MISC USE AS DIRECTED DAILY 12/16/20   José Miguel Rodriguez MD   dilTIAZem (CARDIZEM CD) 120 MG extended release capsule TAKE 1 CAPSULE BY MOUTH EVERY DAY 11/16/20   José Miguel Rodriguez MD   loratadine (CLARITIN) 10 MG tablet TAKE 1 TABLET BY MOUTH EVERY DAY 11/11/20   José Miguel Rodriguez MD   albuterol sulfate  (90 Base) MCG/ACT inhaler INHALE TWO (2) PUFFS BY MOUTH EVERY 6 HOURS AS NEEDED FOR WHEEZING 10/7/20   José Miguel Rodriguez MD   metoprolol tartrate (LOPRESSOR) 25 MG tablet TAKE ONE (1) TABLET BY MOUTH TWICE DAILY 10/7/20   José Miguel Rodriguez MD   atorvastatin (LIPITOR) 40 MG tablet TAKE 1 TABLET BY MOUTH ONCE DAILY 9/17/20   José Miguel Rodriguez MD   potassium chloride (KLOR-CON M) 20 MEQ extended release tablet Take 1 tablet by mouth daily 9/17/20   José Miguel Rodriguez MD   insulin glargine (LANTUS SOLOSTAR) 100 UNIT/ML injection pen Inject 30 Units into the skin nightly  Patient taking differently: Inject 25 Units into the skin nightly  8/24/20   José Miguel Rodriguez MD   cyclobenzaprine (FLEXERIL) 5 MG tablet TAKE 1 TABLET BY MOUTH THREE TIMES DAILY AS NEEDED FOR MUSCLE SPASMS 8/13/20   José Miguel Rodriguez MD   albuterol (PROVENTIL) (2.5 MG/3ML) 0.083% ARM CUFF) MISC 1 Device by Does not apply route daily as needed (htn)  Patient not taking: Reported on 8/6/2020 8/25/16   Demetrius Alarcon MD   .    Assessment   RR 20  Breath Sounds: clear diminished     From chart, patient refusing to answer questions  HX of COPD  Patient takes albuterol neb tx 4x a day   Patient has albuterol inhaler as needed    · Bronchodilator assessment at level   1 but 3 is home meds  · Hyperinflation assessment at level   · Secretion Management assessment at level    ·   · [x]    Bronchodilator Assessment  BRONCHODILATOR ASSESSMENT SCORE  Score 0 1 2 3 4 5   Breath Sounds   [x]  Patient Baseline []  No Wheeze good aeration []  Faint, scattered wheezing, good aeration []  Expiratory Wheezing and or moderately diminished []  Insp/Exp wheeze and/or very diminished []  Insp/Exp and/ or marked distress   Respiratory Rate   []  Patient Baseline [x]  Less than 20 []  Less than 20 []  20-25 []  Greater than 25 []  Greater than 25   Peak flow % of Pred or PB [x]  NA   []  Greater than 90%  []  81-90% []  71-80% []  Less than or equal to 70%  or unable to perform []  Unable due to Respiratory Distress   Dyspnea re [x]  Patient Baseline []  No SOB []  No SOB []  SOB on exertion []  SOB min activity []  At rest/acute   e FEV% Predicted       [x]  NA []  Above 69%  []  Unable []  Above 60-69%  []  Unable []  Above 50-59%  []  Unable []  Above 35-49%  []  Unable []  Less than 35%  []  Unable                 []  Hyperinflation Assessment  Score 1 2 3   CXR and Breath Sounds   []  Clear []  No atelectasis  Basilar aeration []  Atelectasis or absent basilar breath sounds   Incentive Spirometry Volume  (Per IBW)   []  Greater than or equal to 15ml/Kg []  less than 15ml/Kg []  less than 15ml/Kg   Surgery within last 2 weeks []  None or general   []  Abdominal or thoracic surgery  []  Abdominal or thoracic   Chronic Pulmonary Historyre []  No []  Yes []  Yes     []  Secretion Management Assessment  Score 1 2 996 044 260 566 906 690 640 896  59 53 06 91 50 22 33 62 74   37 578 874 268 504 283 647 343 176 69 577 185 108 721 226 527 764 898 873   69 238 954 813 956 561 531 153 134 10 839 092 639 521 416 269 473 210 998   11 925 353 797 675 262 048 770 662 78 907 143 989 914 449 234 427 086 550   84 296 194 869 385 956 960 900 995 75 481 705 403 682 881 922 710 339 940   36 014 973 541 465 330 925 364 452 67 261 605 181 173 620 046 896 899 755   32 851 263 752 144 626 645 100 470 09 507 656 696 780 787 834 417 123 546   76 711 736 577 075 192 267 036 602 91 629 286 136 026 682 903 594 105 922   74 407 344 577 547 178 962 139 224 30 155 000 268 269 465 063 558 838 859   40 385 148 524 644 460 918 662 335 44 916 064 072 229 447 834 684 796 594   70 269 284 299 314 329 344 359 374 70 353 382 410 439 468 496 525 554 583   75 261 274 289 305 319 334 348 364 75 344 372 400 429 458 487 515 544 573   80 253 266 282 296 312 327 342 356 80 335 362 390 419 448 476 [phone]

## 2021-01-22 NOTE — H&P
Critical Care - History and Physical Examination    Patient's name:  Virgil Huang  Medical Record Number: [id number]  Patient's account/billing number: [de-identified]  Patient's YOB: 1955  Age: 60 y.o. Date of Admission: 1/21/2021  5:45 PM  Reason of ICU admission:   Date of History and Physical Examination: 1/21/2021      Primary Care Physician: Bairon Reddy MD  Attending Physician: Dr. Leah Cee    Code Status: Prior    Chief complaint: Abdominal pain, GI bleed    Reason for ICU admission: Hyperglycemia, concerns for decompensation, SHAHRAM, dehydration      History Of Present Illness:   History was obtained from Patient, Chart Review     Virgil Huang is a 60 y.o. female presents with a history of paroxysmal atrial fibrillation, COPD, essential hypertension, type 2 diabetes, iron deficiency anemia, Graves' disease, polysubstance abuse as well as alcohol abuse, esophagitis, gastroparesis secondary to diabetes mellitus presents via [facility] transport from [facility] with concerns for intermittent confusion, GI bleed. As per nursing staff at that time as per [facility] note, patient was brought in by EMS for concerns for generalized abdominal pain, was subsequently found to have active bright red stool, bleeding per rectum multiple times. Patient has CT abdomen pelvis without contrast secondary to concerns for GFR, at that time it showed significant stool burden as well as dilation of the cecum up to 7 cm. CHANDA at that time was positive for opioids, amphetamines, tricyclic antidepressants, marijuana drug screen.   Patient does admit to alcohol use in the past, denies seizure-like activity secondary to alcohol withdrawal.  Patient was ordered blood at an outside facility however does state that she is approximate Mormon and is deferring blood products at this time, as per note does state that patient understood that if there was a life or death situation that she would require need blood products that she would not accept them. .  Patient does take Eliquis for Pulmonary Embolism, was given Kcentra at outlying facility as well as prothrombin complex concentrate. At outside facility patient was noted to have pale conjunctiva, pinpoint pupils, difficulty tracking extraocular movement. Patient denied any chest pain, normal rate and rhythm, abdomen was diffusely tender without rebound or guarding. Patient received 3 L of fluid in route as well as the previously mentioned Kcentra. Patient did state that she is open to EPO as well as iron. Repeat labs in facility do show concerns for elevating lactic acid with patient's lactic acid going from 3.1-5.8, as well as an elevation of blood glucose with the patient going from 2 78-3 86. Patient CK and myoglobin within normal limits, patient's troponin is pending at this time. High-sensitivity serotonin notably was within normal limits at outside facility. Patient's x-ray was negative for acetaminophen or aspirin toxicity at that time. Patient notably did have an elevated white count outside facility with a WBC of 20.2, downtrending with fluid bolus to 19.8, patient's hemoglobin was notably elevated at 16.7 with a repeat pending at this time. Patient notably did have a negative Covid, her PT was 11, INR 1, PTT 21.4 after receiving Kcentra, blood cultures and urine cultures are pending at this time. Patient's urinalysis was negative for acute pathology, as infective process. Patient notably has a pH of 7.182, PCO2 of 43.7, PO2 of 42.8, bicarb of 16.4 calculated. Patient is notably a poor historian and physical examination, states that she is unable to state her name or where she is at, does state that her stomach hurts, denies nausea or vomiting at this time, denies any blood per rectum.   Patient will in the emergency department got a CT scan of the head which not show any acute intracranial pathology, patient does not have signs of intracranial hemorrhage, ED resident spoke with the radiologist with concerns for a suspicious looking sinus however radiologist was confident that there is no acute intracranial bleed at this time. CT the abdomen pelvis with and without contrast showed the following: the colon remains obstructed by impacted stool in the distal sigmoid and rectum with a large amount of stool from rectum to cecum. Increasing third space fluid in the lower abdomen and pelvis. No pneumatosis or evidence of high-grade arterial obstruction. Normal bowel wall enhancement. General surgery was consulted on the emergency department with concerns for worsening lactic acid, evaluated the patient, gave recommendations for GI consultation secondary to patient having history of severe esophagitis, pancreatitis, EtOH abuse. They recommended an NG be placed for GoLYTELY administration, recommending GoLYTELY at 200 mL every 4 hours every 5 doses, soapsuds enema 1 time now, recommend full lab work-up including thyroid panel due to history of hypothyroidism, recommend medical management as per ICU team with concerns for hyperglycemia, altered mentation, GI bleed.               Past Medical History:        Diagnosis Date    Asthma     Atrial fibrillation (HCC)     COPD (chronic obstructive pulmonary disease) (HCC)     DDD (degenerative disc disease)     Diabetes mellitus ([facility] [address]     Hyperlipidemia     Hypertension     Hyperthyroidism     Type II or unspecified type diabetes mellitus without mention of complication, not stated as uncontrolled        Past Surgical History:        Procedure Laterality Date    BACK SURGERY      COLONOSCOPY  [facility]/23/14   [facility] D[facility] SURG[facility] N/A 3/8/2017    REMOVAL OF BILATERAL MANDIBULAR LELO AND MAXILLARY EDENTULOUS ALVEOPLASTY performed by Vinnie Talavera DDS at [facility]      removal    GASTROSTOMY TUBE PLACEMENT      TONSILLECTOMY      TRACHEOSTOMY      TRACHEOSTOMY CLOSURE      TUBAL LIGATION      UPPER GASTROINTESTINAL ENDOSCOPY  3/16/2016    Peg tube insertion       Allergies: Allergies   Allergen Reactions    Norco [Hydrocodone-Acetaminophen]      Nausea and vomiting    Oxycodone Nausea And Vomiting    Percocet [Oxycodone-Acetaminophen] Nausea And Vomiting    Sulfa Antibiotics Other (See Comments)     Hallucinations. Home Medications:   Prior to Admission medications    Medication Sig Start Date End Date Taking?  Authorizing Provider   amLODIPine (NORVASC) 10 MG tablet Take 10 mg by mouth daily 12/4/20   Historical Provider, MD   levothyroxine (SYNTHROID) 88 MCG tablet TAKE 1 TABLET BY MOUTH EVERY DAY 1/18/21   Radha Rizvi MD   Insulin Pen Needle (TRUEPLUS 5-BEVEL PEN NEEDLES) 31G X 6 MM MISC USE AS DIRECTED DAILY 1/8/21   Radha Rizvi MD   oxybutynin (DITROPAN XL) 15 MG extended release tablet TAKE 1 TABLET BY MOUTH DAILY 12/17/20   RAJNI Joe CNM   Insulin Pen Needle (TRUEPLUS PEN NEEDLES) 31G X 5 MM MISC USE AS DIRECTED DAILY 12/16/20   Radha Rizvi MD   dilTIAZem (CARDIZEM CD) 120 MG extended release capsule TAKE 1 CAPSULE BY MOUTH EVERY DAY 11/16/20   Radha Rizvi MD   loratadine (CLARITIN) 10 MG tablet TAKE 1 TABLET BY MOUTH EVERY DAY 11/11/20   Radha Rizvi MD   albuterol sulfate  (90 Base) MCG/ACT inhaler INHALE TWO (2) PUFFS BY MOUTH EVERY 6 HOURS AS NEEDED FOR WHEEZING 10/7/20   Radha Rizvi MD   metoprolol tartrate (LOPRESSOR) 25 MG tablet TAKE ONE (1) TABLET BY MOUTH TWICE DAILY 10/7/20   Radha Rizvi MD   atorvastatin (LIPITOR) 40 MG tablet TAKE 1 TABLET BY MOUTH ONCE DAILY 9/17/20   Radha Rizvi MD   potassium chloride (KLOR-CON M) 20 MEQ extended release tablet Take 1 tablet by mouth daily 9/17/20   Radha Rizvi MD   insulin glargine (LANTUS SOLOSTAR) 100 UNIT/ML injection pen Inject 30 Units into the skin nightly  Patient taking differently: Inject 25 Units into the skin nightly  8/24/20   Radha Rizvi MD   cyclobenzaprine (FLEXERIL) 5 MG tablet TAKE 1 TABLET BY MOUTH THREE TIMES DAILY AS NEEDED FOR MUSCLE SPASMS 8/13/20   Rhett Ybarra MD   albuterol (PROVENTIL) (2.5 MG/3ML) 0.083% nebulizer solution Take 3 mLs by nebulization 4 times daily 7/23/20   Rhett Ybarra MD   lisinopril (PRINIVIL;ZESTRIL) 5 MG tablet TAKE 1 TABLET BY MOUTH EVERY DAY 7/13/20   Rhett Ybarra MD   Lancets MISC 1 each by Does not apply route daily Tests 3 times a day/PRN.  Please dispense [facility] to go with her new Glucometer 6/19/20   Rhett Ybarra MD   TRULICITY 1.5 FX/4.3TG SOPN 1.5 mg once a week Monday's 4/6/20   Historical Provider, MD   Multiple Vitamin (MULTI-VITAMINS) TABS TAKE 1 TABLET BY MOUTH EVERY DAY 5/7/20   Rhett Ybarra MD   senna (SENOKOT) 8.6 MG tablet Take 1 tablet by mouth 2 times daily 4/27/20 4/27/21  Rhett Ybarra MD   dicyclomine (BENTYL) 10 MG capsule Take 1 capsule by mouth 4 times daily 4/27/20   Rhett Ybarra MD   Cholecalciferol (VITAMIN D3) 25 MCG (1000 UT) TABS TAKE (1) TABLET BY MOUTH DAILY 4/14/20   Rhett Ybarra MD   Omega-3 Fatty Acids (FISH OIL) 1000 MG CAPS TAKE TWO (2) CAPSULES BY MOUTH DAILY 4/14/20   Rhett Ybarra MD   ELIQUIS 5 MG TABS tablet TAKE ONE (1) TABLET BY MOUTH TWICE DAILY 4/14/20   Rhett Ybarra MD   hydroCHLOROthiazide (HYDRODIURIL) 25 MG tablet TAKE 1 TABLET BY MOUTH EVERY MORNING 3/16/20   Rhett Ybarra MD   gabapentin (NEURONTIN) 800 MG tablet TAKE 1 TABLET BY MOUTH TWICE DAILY 3/16/20 1/21/21  Rhett Ybarra MD   FEROSUL 325 (65 Fe) MG tablet TAKE (1) TABLET BY MOUTH DAILY 4/23/19   Rhett Ybarra MD   Blood Glucose Monitoring Suppl SOUTH 1 Units by Does not apply route once for 1 dose PLease dispense a machine that is covered by her insurance  DX E11.9 4/26/18 8/24/20  Rhett Ybarra MD   Incontinence Supply Disposable (POISE PAD) PADS Apply 1 Piece topically as needed (incontinence) 11/21/17   Arlyn Leblanc MD   Alcohol Swabs (EASY TOUCH ALCOHOL PREP MEDIUM) 70 % PADS USE FOUR TIMES DAILY  Patient not taking: Reported on 8/6/2020 3/29/17   Lizeth Vidal MD   Blood Pressure Monitoring (B-D ASSURE BPM/AUTO ARM CUFF) MISC 1 Device by Does not apply route daily as needed (htn)  Patient not taking: Reported on 8/6/2020 8/25/16   Lizeth Vidal MD       Social History:   TOBACCO:   reports that she quit smoking about 7 years ago. Her smoking use included cigarettes. She has a 20.00 pack-year smoking history. She has never used smokeless tobacco.  ETOH:   reports no history of alcohol use. DRUGS:  reports no history of drug use. OCCUPATION:      Family History:       Problem Relation Age of Onset    Heart Disease Mother     High Cholesterol Mother     No Known Problems Son            REVIEW OF SYSTEMS (ROS):  Unable to assess secondary to mental status. Physical Exam:    Vitals: BP 99/67   Pulse 107   Temp 98.1 °F (36.7 °C) (Oral)   Resp 21   Wt 161 lb (73 kg)   SpO2 91%   BMI 27.64 kg/m²     Body weight:   Wt Readings from Last 3 Encounters:   01/21/21 161 lb (73 kg)   01/21/21 161 lb (73 kg)   01/05/21 160 lb (72.6 kg)       Body Mass Index : Body mass index is 27.64 kg/m². PHYSICAL EXAMINATION :  Constitutional: Appears well, in no distress, tachycardic  EENT: PERRLA, EOMI, sclera clear, anicteric, oropharynx clear, no lesions, neck supple with midline trachea. Neck: Supple, symmetrical, trachea midline, no adenopathy, thyroid symmetric, no jvd skin normal  Respiratory: clear to auscultation, no wheezes or rales and unlabored breathing. No intercostal tenderness  Cardiovascular: Tachycardic, no murmurs gallops or rubs  Abdomen: Soft, grossly tender without rebound or guarding  Neurological: Pupils 2 mm equally reactive to light, patient is ANO x0, exhibits with bilateral upper extremities, bilateral lower extremity wiggling the toes, normoreflexic.   Extremities:  peripheral pulses normal, no pedal edema, no clubbing or cyanosis      Laboratory findings:-    CBC:   Recent Labs     01/21/21  1816   WBC 19.8*   HGB 16.7*        BMP:    Recent Labs     01/21/21  0742 01/21/21  1758 01/21/21  1816   *  --  131*   K 3.9  --  4.2   CL 99  --  101   CO2 18*  --  15*   BUN 21  --  26*   CREATININE 1.43* 1.26* 1.46*   GLUCOSE 278*  --  386*     S. Calcium:  Recent Labs     01/21/21  1816   CALCIUM 9.5     S. Ionized Calcium:No results for input(s): IONCA in the last 72 hours. S. Magnesium:No results for input(s): MG in the last 72 hours. S. Phosphorus:No results for input(s): PHOS in the last 72 hours. S. Glucose:  Recent Labs     01/21/21  0730 01/21/21  1758   POCGLU 293* 401*     Glycosylated hemoglobin A1C: No results for input(s): LABA1C in the last 72 hours. INR:   Recent Labs     01/21/21  1816   INR 1.0     Hepatic functions:   Recent Labs     01/21/21  1816   ALKPHOS 210*   ALT 26   AST 30   PROT 5.9*   BILITOT 1.01   LABALBU 3.5     Pancreatic functions:  Recent Labs     01/21/21  0742 01/21/21  1132   LACTA  --  3.7*   AMYLASE 356*  --      S. Lactic Acid:   Recent Labs     01/21/21  1132   LACTA 3.7*     Cardiac enzymes:  Recent Labs     01/21/21 2007   CKTOTAL 38     BNP:No results for input(s): BNP in the last 72 hours. Lipid profile: No results for input(s): CHOL, TRIG, HDL, LDLCALC in the last 72 hours.     Invalid input(s): LDL  Blood Gases: No results found for: PH, PCO2, PO2, HCO3, O2SAT  Thyroid functions:   Lab Results   Component Value Date    TSH <0.01 11/18/2020        Urinalysis:     Microbiology:  Cultures during this admission:     Blood cultures:                 [] None drawn      [] Negative             [x]  Positive (Details:  )  Urine Culture:                   [] None drawn      [] Negative             [x]  Positive (Details:  )  Sputum Culture:               [] None drawn       [] Negative             [x]  Positive (Details:  )   Endotracheal aspirate:     [] None drawn [x] Negative             []  Positive (Details:  )         -----------------------------------------------------------------  Radiological reports:    Ct Abdomen Pelvis Wo Contrast Additional Contrast? None    Result Date: 1/21/2021  EXAMINATION: CT ABDOMEN PELVIS WO CONTRAST HISTORY: Reason for exam:->lower abdominal pain 79-year-old female. Contrast withheld due to low GFR. COMPARISON: CT abdomen pelvis 8/6/2020. TECHNIQUE: CT examination of the abdomen and pelvis without IV contrast. Coronal and sagittal reformations were performed. Dose reduction techniques were achieved by using automated exposure control and/or adjustment of mA and/or kV according to patient size and/or use of iterative reconstruction technique. FINDINGS: Lung bases/lung windows: Lung bases are clear. No free air. Upper abdomen/lower chest: Right coronary artery calcifications. Small hiatal hernia, 2.7 cm. Liver has a normal Hounsfield attenuation today. Normal size spleen. Pancreas and gallbladder are normal. Retroperitoneum: Normal adrenal glands and kidneys. Moderate plaque aorta without aneurysm. No retroperitoneal adenopathy. The bowel shows prominent dilatation of colon with a long air-fluid level in the transverse colon. This dilatation is followed down to impacted stool in the sigmoid and rectum without other mechanical obstruction. Pelvis: Normal appendix. Small postmenopausal uterus. Bladder contour normal. Minimal fluid in the cul-de-sac. The abdominal wall intact. Bone windows: Degenerative changes L4-L5, moderate. Impacted stool throughout the sigmoid colon and rectum causing dilatation of the more proximal colon to the cecum. The cecum measures 7 cm in diameter. Small amount of fluid in the cul-de-sac.      Ct Head Wo Contrast    Result Date: 1/21/2021  EXAMINATION: CT OF THE HEAD WITHOUT CONTRAST  1/21/2021 8:09 pm TECHNIQUE: CT of the head was performed without the administration of intravenous contrast. Dose modulation, iterative reconstruction, and/or weight based adjustment of the mA/kV was utilized to reduce the radiation dose to as low as reasonably achievable. COMPARISON: 03/27/2016 HISTORY: ORDERING SYSTEM PROVIDED HISTORY: AMS Decision Support Exception->Emergency Medical Condition (MA) Reason for Exam: Altered Mental Status - GI bleed. Acuity: Acute Type of Exam: Initial Relevant Medical/Surgical History: Hx - HBP, Graves, COPD and Diabetes (Type II). FINDINGS: BRAIN/VENTRICLES: No acute intracranial hemorrhage, mass effect or midline shift. No abnormal extra-axial fluid collection. The gray-white differentiation is maintained without evidence of an acute infarct. No evidence of hydrocephalus. ORBITS: The visualized portion of the orbits demonstrate no acute abnormality. SINUSES: The visualized paranasal sinuses and mastoid air cells demonstrate no acute abnormality. SOFT TISSUES/SKULL:  No acute abnormality of the visualized skull or soft tissues. No acute intracranial abnormality. Xr Abdomen For Ng/og/ne Tube Placement    Enteric tube tip and side port are below the level of the diaphragm, in the expected location of the stomach. Cta Abdomen Pelvis W Wo Contrast    Result Date: 1/21/2021  EXAMINATION: CTA ABDOMEN PELVIS W WO CONTRAST HISTORY: Reason for exam:->Abdominal pain, recently passed bloody stool. Possible ischemic bowel. Elevating lactic acid. COMPARISON: CT scan without contrast 0841 hours same date. TECHNIQUE: Isovue 370, 75 mL intravenously. Arterial and venous phases were performed. MIP (maximum intensity projection) images were performed. Dose reduction techniques were achieved by using automated exposure control and/or adjustment of mA and/or kV according to patient size and/or use of iterative reconstruction technique.  FINDINGS: There is somewhat less stool in the sigmoid colon after the patient recently passed a bloody stool, however the distal sigmoid and rectum still are impacted by stool and the colon remains markedly dilated with fluid without bowel wall thickening. There is normal enhancement of the bowel wall. No pneumatosis. The celiac axis and the SMA are patent. There is no aortic dissection. Normal enhancement of the kidneys, adrenal glands, pancreas, spleen and liver. There is increasing free fluid in the right lower quadrant and the pelvis compared to earlier today. The lung bases remain clear. There is no free air. The bone windows are unchanged. The colon remains obstructed by impacted stool in the distal sigmoid and rectum with a large amount of stool from rectum to cecum. Increasing third space fluid in the lower abdomen and pelvis. No pneumatosis or evidence of high-grade arterial obstruction. Normal bowel wall enhancement. The findings were discussed with Dr. Rosana Chowdhury in the ED at 2:35 PM.         Assessment and Plan     Patient Active Problem List   Diagnosis    Cervical neck pain with evidence of disc disease    Paroxysmal atrial fibrillation ([address]    Graves' disease    Asthma with COPD ([address]    Essential hypertension    Type 2 diabetes mellitus without complication, with long-term current use of insulin ([facility])    Iron deficiency anemia    Irritable bowel syndrome with both constipation and diarrhea    Mixed hyperlipidemia         Additional assessment/Plan    1. Altered mentation of uncertain etiology  CT head negative for acute pathology, continue to monitor while in the ICU     2. Active GI bleed with a history of A. fib on Eliquis. -Reversed with Kcentra, prothrombin complex concentrate at outside facility  -Hemoglobins have been at or above normal limit, continue to trend, no gross blood per rectum as per evaluation in the emergency department. -GI consultation for potential scope secondary to history of severe esophagitis, pancreatitis, EtOH abuse. Potential upper or lower GI for cause of potential bleed.   -Patient started on Protonix drip, Pepcid twice daily 20 mg    3. Hyperglycemia, secondary to history of type 2 diabetes mellitus  -Starting patient on an insulin infusion, patient's most recent potassium is 4.2  -Plan to treat patient with lactated Ringer's    4.0, lactic acidosis of uncertain etiology, thought to be secondary to hypoperfusion state  -Plan to treat patient with lactated Ringer's, 30 cc/kg bolus with concerns for elevated LR, elevated white count in the setting of potential septic etiology as patient is SIRS criteria with tachycardia, elevated lactic, elevated white count while in the emergency department. -General surgery consulted, continue to follow. 5.  EtOH abuse, polysubstance abuse  -Patient placed on CIWA protocol    6. History of hypothyroidism  -TSH with reflex to culture pending. 7.  History of atrial fibrillation, on Eliquis  -Reversed on Kcentra, continue to monitor. 8.  Buddhist, refusing blood products, okay for EPO and iron  -Do not give patient blood products, if patient becomes anemic plan to give EPO and iron.         Dixon Hodges MD   Emergency Medicine Resident   1/21/2021, 9:48 PM

## 2021-01-22 NOTE — CONSULTS
[facility]  Consultation Note     . REASON FOR CONSULTATION:    LGIB on Eliquis, Alcohol use    HISTORY OF PRESENT ILLNESS:       Information taken from chart, pt and staff as pt is poor historian    This is a 60 y.o. female was transferred from [facility] via [address] due to concerns for intermittent confusion, and GI bleeding. History of paroxysmal A. Fib-on Eliquis, COPD, hypertension, diabetes, Graves', polysubstance abuse and alcohol abuse. Also with history of esophagitis and gastroparesis secondary to diabetes. Pt found to have active bright red stool and bleeding per rectum multiple times. CT Abdo pelvis showed concerns for significant stool burden and dilation of cecum up to 7 cm. CHANDA positive for opioids, vitamins, TCA and marijuana. Patient is a [employer] and is deferring blood products at this time, however open to iron and EPO. Pt was given Kcentra and [facility] at outlying facility, also received 3 L of fluid in route to ED. Currently pt denies any issues-only c/o being cold. Brown stool on rectum-no visible blood  Pt is drowsy, but oriented to name, place, and day of the week. Denies any abdominal pain. Kapil any drug use and drinking-but states she drinks \"4 glasses a day\"-would not be more specific    Pt has couple of bloody bm's so CTA was completed    CTA the abdomen pelvis with and without contrast showed the following: the colon remains obstructed by impacted stool in the distal sigmoid and rectum with a large amount of stool from rectum to cecum. Increasing third space fluid in the lower abdomen and pelvis. No pneumatosis or evidence of high-grade arterial obstruction. Normal bowel wall enhancement.     Lactic acid elevated 3.1-5.8-5.0  WBC's 20.2-19.8 after fluids  Hgb was 16.7-14.1  INR 1 after Kcentra,   Negative C-diff    Previous GI history:   No EGD or colonoscopy on file    Past Medical/Social/Family History:  Past Medical History: Diagnosis Date    Asthma     Atrial fibrillation (HCC)     COPD (chronic obstructive pulmonary disease) (HCC)     DDD (degenerative disc disease)     Diabetes mellitus ([facility] [address]     Hyperlipidemia     Hypertension     Hyperthyroidism     Type II or unspecified type diabetes mellitus without mention of complication, not stated as uncontrolled      Past Surgical History:   Procedure Laterality Date    BACK SURGERY      COLONOSCOPY  04/23/14   [employer] DENTAL SURGERY N/A 3/8/2017    REMOVAL OF BILATERAL MANDIBULAR LELO AND MAXILLARY EDENTULOUS ALVEOPLASTY performed by Melly Payan DDS at [facility]      removal    GASTROSTOMY TUBE PLACEMENT     [facility]      UPPER GASTROINTESTINAL ENDOSCOPY  3/16/2016    Peg tube insertion     Family History   Problem Relation Age of Onset    Heart Disease Mother     High Cholesterol Mother     No Known Problems Son        Allergies: Allergies   Allergen Reactions    Norco [Hydrocodone-Acetaminophen]      Nausea and vomiting    Oxycodone Nausea And Vomiting    Percocet [Oxycodone-Acetaminophen] Nausea And Vomiting    Sulfa Antibiotics Other (See Comments)     Hallucinations. Home medications:  Prior to Admission medications    Medication Sig Start Date End Date Taking?  Authorizing Provider   amLODIPine (NORVASC) 10 MG tablet Take 10 mg by mouth daily 12/4/20   Historical Provider, MD   levothyroxine (SYNTHROID) 88 MCG tablet TAKE 1 TABLET BY MOUTH EVERY DAY 1/18/21   Karen Cano MD   Insulin Pen Needle (TRUEPLUS 5-BEVEL PEN NEEDLES) 31G X 6 MM MISC USE AS DIRECTED DAILY 1/8/21   Karen Cano MD   oxybutynin (DITROPAN XL) 15 MG extended release tablet TAKE 1 TABLET BY MOUTH DAILY 12/17/20   RAJNI Duke CNM   Insulin Pen Needle (TRUEPLUS PEN NEEDLES) 31G X 5 MM MISC USE AS DIRECTED DAILY 12/16/20   Karen Cano MD   dilTIAZem (CARDIZEM CD) 120 MG extended release capsule TAKE 1 CAPSULE BY MOUTH EVERY DAY 11/16/20   Hailey Solorio MD   loratadine (CLARITIN) 10 MG tablet TAKE 1 TABLET BY MOUTH EVERY DAY 11/11/20   Hailey Solorio MD   albuterol sulfate  (90 Base) MCG/ACT inhaler INHALE TWO (2) PUFFS BY MOUTH EVERY 6 HOURS AS NEEDED FOR WHEEZING 10/7/20   Hailey Solorio MD   metoprolol tartrate (LOPRESSOR) 25 MG tablet TAKE ONE (1) TABLET BY MOUTH TWICE DAILY 10/7/20   Hailey Solorio MD   atorvastatin (LIPITOR) 40 MG tablet TAKE 1 TABLET BY MOUTH ONCE DAILY 9/17/20   Hailey Solorio MD   potassium chloride (KLOR-CON M) 20 MEQ extended release tablet Take 1 tablet by mouth daily 9/17/20   Hailey Solorio MD   insulin glargine (LANTUS SOLOSTAR) 100 UNIT/ML injection pen Inject 30 Units into the skin nightly  Patient taking differently: Inject 25 Units into the skin nightly  8/24/20   Hailey Solorio MD   cyclobenzaprine (FLEXERIL) 5 MG tablet TAKE 1 TABLET BY MOUTH THREE TIMES DAILY AS NEEDED FOR MUSCLE SPASMS 8/13/20   Hailey Solorio MD   albuterol (PROVENTIL) (2.5 MG/3ML) 0.083% nebulizer solution Take 3 mLs by nebulization 4 times daily 7/23/20   Hailey Solorio MD   lisinopril (PRINIVIL;ZESTRIL) 5 MG tablet TAKE 1 TABLET BY MOUTH EVERY DAY 7/13/20   Hailey Solorio MD   Lancets MISC 1 each by Does not apply route daily Tests 3 times a day/PRN.  Please dispense what [employer] covers to go with her new Glucometer 6/19/20   Hailey Solorio MD   TRULICITY 1.5 KX/2.5FN SOPN 1.5 mg once a week Monday's 4/6/20   Historical Provider, MD   Multiple Vitamin (MULTI-VITAMINS) TABS TAKE 1 TABLET BY MOUTH EVERY DAY 5/7/20   Hailey Solorio MD   senna (SENOKOT) 8.6 MG tablet Take 1 tablet by mouth 2 times daily 4/27/20 4/27/21  Hailey Solorio MD   dicyclomine (BENTYL) 10 MG capsule Take 1 capsule by mouth 4 times daily 4/27/20   Hailey Solorio MD   Cholecalciferol (VITAMIN D3) 25 MCG (1000 UT) TABS TAKE (1) TABLET BY MOUTH DAILY 4/14/20   Dale Jorge A Izaguirre MD   Omega-3 Fatty Acids (FISH OIL) 1000 MG CAPS TAKE TWO (2) CAPSULES BY MOUTH DAILY 4/14/20   Laura Kang MD   ELIQUIS 5 MG TABS tablet TAKE ONE (1) TABLET BY MOUTH TWICE DAILY 4/14/20   Laura Kang MD   hydroCHLOROthiazide (HYDRODIURIL) 25 MG tablet TAKE 1 TABLET BY MOUTH EVERY MORNING 3/16/20   Laura Kang MD   gabapentin (NEURONTIN) 800 MG tablet TAKE 1 TABLET BY MOUTH TWICE DAILY 3/16/20 1/21/21  Laura Kang MD   FEROSUL 325 (65 Fe) MG tablet TAKE (1) TABLET BY MOUTH DAILY 4/23/19   Laura Kang MD   Blood Glucose Monitoring Suppl SOUTH 1 Units by Does not apply route once for 1 dose PLease dispense a machine that is covered by her insurance  DX E11.9 4/26/18 8/24/20  Laura Kang MD   Incontinence Supply Disposable (POISE PAD) PADS Apply 1 Piece topically as needed (incontinence) 11/21/17   Rhonda Jc MD   Alcohol Swabs (EASY TOUCH ALCOHOL PREP MEDIUM) 70 % PADS USE FOUR TIMES DAILY  Patient not taking: Reported on 8/6/2020 3/29/17   Ashley Ochoa MD   Blood Pressure Monitoring (B-D ASSURE BPM/AUTO ARM CUFF) MISC 1 Device by Does not apply route daily as needed (htn)  Patient not taking: Reported on 8/6/2020 8/25/16   Ashley Ochoa MD     .  Current Medications:  Scheduled Meds:   sodium chloride  1,000 mL Intravenous Once    lactated ringers bolus  1,000 mL Intravenous Once    polyethylene glycol  200 mL Per NG tube Q4H    sodium chloride flush  10 mL Intravenous 2 times per day    famotidine (PEPCID) injection  20 mg Intravenous Daily    albuterol  2.5 mg Nebulization 4x Daily    sodium chloride flush  10 mL Intravenous 2 times per day    cefepime  2,000 mg Intravenous Q12H    metroNIDAZOLE  500 mg Intravenous Q8H     . Continuous Infusions:   sodium bicarbonate infusion      insulin 10.15 Units/hr (01/22/21 0519)    dextrose       .   PRN Meds:sodium chloride flush, promethazine **OR** ondansetron, albuterol sulfate HFA, glucose, dextrose, glucagon (rDNA), dextrose, sodium chloride flush, LORazepam **OR** LORazepam **OR** LORazepam **OR** LORazepam **OR** LORazepam **OR** LORazepam **OR** LORazepam **OR** LORazepam    REVIEW OF SYSTEMS:     Unable to assess    PHYSICAL EXAM:    /73   Pulse 101   Temp 98.1 °F (36.7 °C) (Oral)   Resp 20   Wt 161 lb (73 kg)   SpO2 97%   BMI 27.64 kg/m²   . TMAX[24]    General: Chronically ill appearing  Head:  Normocephalic, Atraumatic  EENT: EOMI, Sclera not icteric, Oropharynx moist  Neck:  Supple, Trachea midline  Lungs:DM in bases  Heart: RRR, No murmur, No rub, No gallop, PMI nondisplaced. Abdomen:Soft, Non tender, Not distended, BS WNL,  No masses. No hepatomegalia   Ext:No clubbing. No cyanosis. No edema. Skin: No rashes. No jaundice. No stigmata of liver disease. Neuro:  A&O x Two    Imagin2021 CTA A/P:  FINDINGS:        There is somewhat less stool in the sigmoid colon after the patient recently    passed a bloody stool, however the distal sigmoid and rectum still are impacted    by stool and the colon remains markedly dilated with fluid without bowel wall    thickening. There is normal enhancement of the bowel wall. No pneumatosis. The    celiac axis and the SMA are patent. There is no aortic dissection.       Normal enhancement of the kidneys, adrenal glands, pancreas, spleen and liver.       There is increasing free fluid in the right lower quadrant and the pelvis    compared to earlier today.       The lung bases remain clear. There is no free air. The bone windows are    unchanged.               Impression       The colon remains obstructed by impacted stool in the distal sigmoid and rectum    with a large amount of stool from rectum to cecum.       Increasing third space fluid in the lower abdomen and pelvis.       No pneumatosis or evidence of high-grade arterial obstruction.  Normal bowel    wall enhancement.       The findings were discussed with Dr. Lynn Bhardwaj in the ED at 2:35 PM.       Hemotological labs: Anemia studies:  No results for input(s): LABIRON, TIBC, FERRITIN, [id number], FOLATE, OCCULTBLD in the last 72 hours. CBC:  Recent Labs     01/21/21  0742 01/21/21  1600 01/21/21  1816 01/22/21  0130 01/22/21  0449   WBC 20.2*  --  19.8* 17.2* 19.4*   HGB 15.7 17.2* 16.7* 14.5 14.1   MCV 93.3  --  90.0 98.7 98.0   RDW 14.6  --  13.2 13.2 13.4     --  276 222 228       PT/INR:  Recent Labs     01/21/21 1816   PROTIME 11.0   INR 1.0       BMP:  Recent Labs     01/21/21  0742 01/21/21  0742 01/21/21  1816 01/22/21  0130 01/22/21  0210   *  --  131* 132*  --    K 3.9  --  4.2 4.6  --    CL 99  --  101 104  --    CO2 18*  --  15* 13*  --    BUN 21  --  26* 28*  --    CREATININE 1.43*   < > 1.46* 1.60* 1.69*   GLUCOSE 278*  --  386* 472*  --    CALCIUM 10.7*  --  9.5 9.1  --     < > = values in this interval not displayed. Liver work up:  Hepatitis Functional Panel:  Recent Labs     01/21/21 1816   ALKPHOS 210*   ALT 26   AST 30   PROT 5.9*   BILITOT 1.01   LABALBU 3.5       Amylase/Lipase/Ammonia:  Recent Labs     01/21/21  0742 01/21/21 1816   AMYLASE 356*  --    LIPASE 71* 41       Acute Hepatitis Panel:  No results found for: HEPBSAG, HEPCAB, HEPBIGM, HEPAIGM    Cancer Markers:  CEA:    No results for input(s): CEA in the last 72 hours. Ca 125:   No results for input(s):  in the last 72 hours. Ca 19-9:     Invalid input(s):   AFP: No results for input(s): AFP in the last 72 hours. Active Problems:    GI bleed  Resolved Problems:    * No resolved hospital problems. *       GI Impression:    60 yr old female admitted with AMS, BRBPR, constipation with cecal dilation of 7 cm, ?? ETOH use, polysubstance abuse, Elevated Alk Phos, Leukocytosis, SHAHRAM, hx of DM with gastroparesis. No rectal bleeding since admission.     -Suspect rectal bleeding was due to severe constipation causing stercoral ulcerations and/or pressure if hemorrhoids presents. Rectal varices less likes because th bleeding has stopped  -Suspect constipation is due to polysubstance abuse in the setting of gastroparesis, limited motility/deconditioning leading to cecal dialation  -AMS could be from alcohol withdrawal, elevated Ammonia (hepatic encephalopathy although LFT's are WNL)      Plan and Recommendations:    1. Tap water enema q 2 hrs until having large amount of stools then start 4000 ml Golytely via NGT with pt sitting up to prevent aspiration  2. Aspiration precautions  3. Monitor for active bleeding  4. At some point pt will benefit from colonoscopy to assess for abnormalities contributing to constipation  5. Stat Ammonia due to AMS  6. Monitor for Alcohol with drawls  7. Appreciate recs from other specialist/ICU team  8.  Will follow      This plan was formulated in collaboration with Dr. Brittaney Franco MD    Electronically signed by:  Felecia Argueta [address] [place] [address] Gastro[facility]  1/22/2021    6:40 AM

## 2021-01-22 NOTE — CARE COORDINATION
Met with pt after receiving a social work consult for \"consideration of rehab\". Pt stated that she lives with her brother in [place], [place]. She states that she gave up \"all that stuff\" 7 years ago. When asked what all that stuff was she stated \"alcohol, weed and cigarettes\". Pt stated that she was \"pretty wild\" in her younger days. She stated that she turned to God and he helped her to stop using. Pt states that she does not drink or use any drugs now at all. Pt talked about how important God is in her life. Asked pt if she would like to have a [place] visit to pray with her and she stated she would really appreciate that. Called and [facility] to [facility] [place] [facility]. and they stated that they would see pt today.

## 2021-01-22 NOTE — CARE COORDINATION
Case Management Initial Discharge Plan  Sudhir Mann,             Met with:patient to discuss discharge plans. Information verified: address, contacts, phone number, , insurance Yes    Emergency Contact/Next of Kin name & number: Britta Dorado brother, [phone]    PCP: Rhett Ybarra MD  Date of last visit: one month ago    Insurance Provider: [place]    Discharge Planning    Living Arrangements:  Other (Comment)(Lives with brother Britta Dorado)   Support Systems:  Family Members    Home has 1 stories  2 stairs to climb to get into front door, 0 stairs to climb to reach second floor  Location of bedroom/bathroom in home main level    Patient able to perform ADL's:Independent    Current Services (outpatient & in home) none  DME equipment: rollator  DME provider:      Receiving oral anticoagulation therapy? Yes, eliquis    If indicated:   Physician managing anticoagulation treatment:    Where does patient obtain lab work for ATC treatment? Potential Assistance Needed:  [facility]    Patient agreeable to home care: discussed, possibly  Freedom of choice provided:  yes    Prior SNF/Rehab Placement and Facility: no  Agreeable to SNF/Rehab: Yes, left SNF for [facility] at bedside  [place] of choice provided: yes     Evaluation: yes    Expected Discharge date:       Patient expects to be discharged to:  home  Follow Up Appointment: Best Day/ Time:      Transportation provider: not sure  Transportation arrangements needed for discharge: Yes     Readmission Risk              Risk of Unplanned Readmission:        24             Does patient have a readmission risk score greater than 14?: not calculated  If yes, follow-up appointment must be made within 7 days of discharge. Goals of Care:       Discharge Plan: goal is home, lives with her brother in [facility], open to SNF if needed, left list at bedside.           Electronically signed by Dayna Teixeira RN on 21 at 5:42 PM EST

## 2021-01-22 NOTE — ED NOTES
1606  65 yof  Blood stool  CT obstipation  Lactate 3.1 initially, now 3.7 after fluids  CTA done, no significant changes from initial CT  Bleeding has continued estimated 1L, on Eliquis for Afib  H+H normal  Becoming more tachycardic, now 111, BP stable  Considering K-centra  Flight    Accepted by Aure Granados RN  01/21/21 1941

## 2021-01-22 NOTE — PROCEDURES
Product type Arrowguard Blue Advance Midline   History/Labs/Allergies Reviewed  Placed By Devora Pool RN  Assisted By N/A  Time out Performed using Two Identifiers  Lot # [id number]  Expiration date 9/30/2021  Catheter size 4.5  [place]  Trimmed at 15 cm  Total length 16 cm  External catheter length 0 cm  Location L BV  Number of attempts 1  Estimated blood loss 1 ml  Placement verified by- positive blood return & flushes easily  Special equipment used- ultrasound & micro-introducer technique   Catheter secured with statlock  Dressing applied- Tegaderm CHG  Lidocaine administered intradermally conc.1% 1 mL  Midline education:   [ X ] Discussed with patient/Family or POA prior to procedure. Risks and Benefits along with reason for procedure were discussed and teaching was reinforced with an education handout on Midline insertion. [facility] FAQ Catheter Associated Blood Stream Infections and [address] [id number] REV. 7/13 Nursing and Booklet left at bedside or in chart. Patient (Family or POA) acknowledged understanding of information taught and agreed to procedure. [  ] Was not discussed with patient/family or POA due to pts medical status at time of procedure. pts family or POA not available to discuss Midline education.  [facility] FAQ Catheter Associated Blood Stream Infections and [address] [id number] REV. 7/13 Nursing and Booklet left at bedside or in chart

## 2021-01-22 NOTE — PROGRESS NOTES
INTENSIVE CARE UNIT  Resident Physician Progress Note    Patient - Javad Tavares  Date of Admission -  1/21/2021  5:45 PM  Date of Evaluation -  1/22/2021  Room and Bed Number -  0126/0126-01   Hospital Day - 1    SUBJECTIVE:     HISTORY OF PRESENT ILLNESS:    Patient was transferred from [facility] via [facility] due to concerns for intermittent confusion, and GI bleeding. History of paroxysmal A. fib, COPD, hypertension, diabetes, Graves', polysubstance abuse and alcohol abuse. Also with history of esophagitis and gastroparesis secondary to diabetes. Patient also endorsing abdominal pain at the time, subsequently found to have active bright red stool and bleeding per rectum multiple times. CT Abdo pelvis showed concerns for significant stool burden and dilation of cecum up to 7 cm. CHANDA positive for opioids, vitamins, TCA and marijuana. 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 Patient is on Eliquis, and was given [facility] and [facility] at outlying facility, also received 3 L of fluid in route to ED. Repeat labs concerning for elevated lactic acid and elevated blood glucose. CK myoglobin within normal limits. Elevated white count 20.2, downtrending. Hemoglobin elevated at 16.7. Negative Covid. Patient is a poor historian and was unable to state name or location of where she was at. CT head showed no acute processes in [facility] ED. Repeat CT Abdo pelvis in [facility] ED concerning for obstructed colon due to impacted stool in distal sigmoid and rectum. Patient also experiencing increasing in third space fluid diffusely. General surgery consulted for worsening lactic acid. NG tube placed.      OVERNIGHT EVENTS:        Persistently tachycardic, mildly tachypneic, intermittent desaturations that resolved spontaneously  On nasal cannula 3 L  POC VBG 7.32/34.6/63.8    Insulin drip  NaHCO3 in D5 50 ml/hr maintenance fluids    Cefepime 2g q12 hrs  Flagyl 500mg q8 hrs    Continuing to monitor urine output, no current data    Lactate 4.3 > 5.0  WBC 19.8 > 17.2 > 19.4  K 4.6  Mg 2.2  Na 131 > 132  Follow procalcitonin      TODAY:     AWAKE & FOLLOWING COMMANDS: []   No  []   Yes                           SECRETIONS                 Amount:  []   Small []   Moderate []   Large []   None        Color: []   White []   Colored []   Bloody                         SEDATION:                 RAAS Score: []   +1 to -1 []   -2 []   -3 []   -4        Sedation Agent: []   Propofol gtt []   Versed gtt  []   Ativan gtt  []   No Sedation        Paralytic Agent: []   Precedex []   Norcuron []   Nimbex []                         VASOPRESSORS:  []   No  []   Yes            Vasopressor Agent []   Levophed []   Dopamine []   Vasopressin []   Dobutamine  []   Phenylephrine  []   Epinephrine     OBJECTIVE:     VITAL SIGNS:  Patient Vitals for the past 8 hrs:   Resp SpO2   21 0849 20 100 %   21 0341 20 97 %       Last Body weight:   Wt Readings from Last 3 Encounters:   21 161 lb (73 kg)   21 161 lb (73 kg)   21 160 lb (72.6 kg)       Body Mass Index : Body mass index is 27.64 kg/m². Tmax over 24 hours: Temp (24hrs), Av.1 °F (36.7 °C), Min:98.1 °F (36.7 °C), Max:98.1 °F (36.7 °C)      Ins/Outs:   No intake/output data recorded. PHYSICAL EXAM:  Constitutional: Appears well, no distress  EENT: PERRLA, EOMI, sclera clear, anicteric, oropharynx clear, no lesions, neck supple with midline trachea. Neck: Supple, symmetrical, trachea midline, no adenopathy, thyroid symmetric, no jvd skin normal  Respiratory: clear to auscultation, no wheezes or rales and unlabored breathing.  No intercostal tenderness  Cardiovascular: regular rate and rhythm, normal S1, S2, no murmur noted and 2+ pulses throughout  Abdomen: soft, nontender, nondistended, no masses or organomegaly  Extremities:  peripheral pulses normal, no pedal edema, 10.6 10.8 10.8      Last 3 Blood Glucose:   Recent Labs     21  0742 21  1816 21  0130   GLUCOSE 278* 386* 472*      PT/INR:    Lab Results   Component Value Date    PROTIME 11.0 2021    INR 1.0 2021     PTT:    Lab Results   Component Value Date    APTT 21.4 2021     Basic Metabolic Profile:   Recent Labs     21  0742 21  0742 21  1816 21  0130 21  0210   *  --  131* 132*  --    K 3.9  --  4.2 4.6  --    CL 99  --  101 104  --    CO2 18*  --  15* 13*  --    BUN 21  --  26* 28*  --    CREATININE 1.43*   < > 1.46* 1.60* 1.69*   GLUCOSE 278*  --  386* 472*  --     < > = values in this interval not displayed. TSH:    Lab Results   Component Value Date    TSH 0.01 2021     Lactic Acid:   Lab Results   Component Value Date    LACTA 3.7 2021    LACTA 1.7 2021    LACTA 1.2 2020      Radiology/Imaging:  XR ABDOMEN FOR NG/OG/NE TUBE PLACEMENT   Preliminary Result   Enteric tube tip and side port are below the level of the diaphragm, in the   expected location of the stomach. CT HEAD WO CONTRAST   Final Result   No acute intracranial abnormality. XR ABDOMEN (KUB) (SINGLE AP VIEW)    (Results Pending)       ASSESSMENT:     Patient Active Problem List    Diagnosis Date Noted    Cervical neck pain with evidence of disc disease 2013     Priority: High    GI bleed 2021    Mixed hyperlipidemia 2017    Irritable bowel syndrome with both constipation and diarrhea 2017    Essential hypertension 2016    Type 2 diabetes mellitus without complication, with long-term current use of insulin ([facility] 2016    Iron deficiency anemia 2016    Asthma with COPD ([facility] 10/21/2015    Graves' disease 2014    Paroxysmal atrial fibrillation ([facility] 2014        PLAN:     PLAN/MEDICAL DECISION MAKIN.  AMS of uncertain etiology  - CT head unremarkable  - Electrolytes within normal limits  - Continue to monitor in ICU    2. Active GI bleed  - On eliquis, reversed with [employer] and [address]  - Hgb stable  - On protonix drip and pepcid  - GI consulted for potential scope  - Gen surg following, recommended laxatives at this time     3. Lactic acidosis  - Lactate 4.3 > 5.0  - pH 7.18 > 7.32  - Continue IV hydration, 30ml/kg bolus given, maintenance fluid NaHCO3 in D5 at 50 ml/hr  - Trend lactate     4. Hyperglycemia  - Insulin drip  - Hypoglycemia protocols   - K 4.2    5. EtOH and polysubstance abuse  - CIWA protocol    6. Hypothyroidism  - TSH 0.01, T4 1.02  - Consider replacement    7. A-fib  - On eliquis, reversed on [employer] and [address]  - Continue to monitor  - Holding anticoagulation due to GI bleed    8.  Baptism  - Refusing blood products  - Accepting EPO and iron      CODE STATUS: Full Code    DISPOSITION:  [x] To remain ICU  [] OK for out of ICU from [place] MD Clive  Emergency Medicine Resident  [address]  1/22/2021, 7:41 AM EST

## 2021-01-23 ENCOUNTER — APPOINTMENT (OUTPATIENT)
Dept: GENERAL RADIOLOGY | Age: 66
DRG: 253 | End: 2021-01-23
Payer: COMMERCIAL

## 2021-01-23 LAB
ABSOLUTE EOS #: 0.08 K/UL (ref 0–0.44)
ABSOLUTE IMMATURE GRANULOCYTE: 0 K/UL (ref 0–0.3)
ABSOLUTE LYMPH #: 1.33 K/UL (ref 1.1–3.7)
ABSOLUTE MONO #: 0.5 K/UL (ref 0.1–1.2)
ANION GAP SERPL CALCULATED.3IONS-SCNC: 10 MMOL/L (ref 9–17)
ANION GAP SERPL CALCULATED.3IONS-SCNC: 10 MMOL/L (ref 9–17)
BASOPHILS # BLD: 1 % (ref 0–2)
BASOPHILS ABSOLUTE: 0.08 K/UL (ref 0–0.2)
BUN BLDV-MCNC: 17 MG/DL (ref 8–23)
BUN BLDV-MCNC: 18 MG/DL (ref 8–23)
BUN/CREAT BLD: ABNORMAL (ref 9–20)
BUN/CREAT BLD: ABNORMAL (ref 9–20)
CALCIUM IONIZED: 1.01 MMOL/L (ref 1.13–1.33)
CALCIUM SERPL-MCNC: 7.9 MG/DL (ref 8.6–10.4)
CALCIUM SERPL-MCNC: 8.3 MG/DL (ref 8.6–10.4)
CHLORIDE BLD-SCNC: 95 MMOL/L (ref 98–107)
CHLORIDE BLD-SCNC: 97 MMOL/L (ref 98–107)
CO2: 27 MMOL/L (ref 20–31)
CO2: 30 MMOL/L (ref 20–31)
CREAT SERPL-MCNC: 1.09 MG/DL (ref 0.5–0.9)
CREAT SERPL-MCNC: 1.12 MG/DL (ref 0.5–0.9)
DIFFERENTIAL TYPE: ABNORMAL
EOSINOPHILS RELATIVE PERCENT: 1 % (ref 1–4)
GFR AFRICAN AMERICAN: 59 ML/MIN
GFR AFRICAN AMERICAN: >60 ML/MIN
GFR NON-AFRICAN AMERICAN: 49 ML/MIN
GFR NON-AFRICAN AMERICAN: 51 ML/MIN
GFR SERPL CREATININE-BSD FRML MDRD: ABNORMAL ML/MIN/{1.73_M2}
GLUCOSE BLD-MCNC: 106 MG/DL (ref 65–105)
GLUCOSE BLD-MCNC: 135 MG/DL (ref 65–105)
GLUCOSE BLD-MCNC: 137 MG/DL (ref 65–105)
GLUCOSE BLD-MCNC: 138 MG/DL (ref 65–105)
GLUCOSE BLD-MCNC: 138 MG/DL (ref 65–105)
GLUCOSE BLD-MCNC: 139 MG/DL (ref 65–105)
GLUCOSE BLD-MCNC: 141 MG/DL (ref 70–99)
GLUCOSE BLD-MCNC: 146 MG/DL (ref 65–105)
GLUCOSE BLD-MCNC: 147 MG/DL (ref 65–105)
GLUCOSE BLD-MCNC: 149 MG/DL (ref 65–105)
GLUCOSE BLD-MCNC: 149 MG/DL (ref 65–105)
GLUCOSE BLD-MCNC: 151 MG/DL (ref 65–105)
GLUCOSE BLD-MCNC: 152 MG/DL (ref 65–105)
GLUCOSE BLD-MCNC: 153 MG/DL (ref 65–105)
GLUCOSE BLD-MCNC: 158 MG/DL (ref 70–99)
GLUCOSE BLD-MCNC: 161 MG/DL (ref 65–105)
GLUCOSE BLD-MCNC: 166 MG/DL (ref 65–105)
GLUCOSE BLD-MCNC: 166 MG/DL (ref 65–105)
GLUCOSE BLD-MCNC: 186 MG/DL (ref 65–105)
GLUCOSE BLD-MCNC: 204 MG/DL (ref 65–105)
HCT VFR BLD CALC: 31.7 % (ref 36.3–47.1)
HCT VFR BLD CALC: 34 % (ref 36.3–47.1)
HEMOGLOBIN: 10.7 G/DL (ref 11.9–15.1)
HEMOGLOBIN: 11.6 G/DL (ref 11.9–15.1)
IMMATURE GRANULOCYTES: 0 %
LACTIC ACID, WHOLE BLOOD: 1.6 MMOL/L (ref 0.7–2.1)
LACTIC ACID, WHOLE BLOOD: 2.7 MMOL/L (ref 0.7–2.1)
LACTIC ACID, WHOLE BLOOD: 2.8 MMOL/L (ref 0.7–2.1)
LACTIC ACID, WHOLE BLOOD: 3.1 MMOL/L (ref 0.7–2.1)
LYMPHOCYTES # BLD: 16 % (ref 24–43)
MAGNESIUM: 1.5 MG/DL (ref 1.6–2.6)
MAGNESIUM: 1.6 MG/DL (ref 1.6–2.6)
MCH RBC QN AUTO: 31.1 PG (ref 25.2–33.5)
MCHC RBC AUTO-ENTMCNC: 33.8 G/DL (ref 28.4–34.8)
MCV RBC AUTO: 92.2 FL (ref 82.6–102.9)
MONOCYTES # BLD: 6 % (ref 3–12)
MORPHOLOGY: ABNORMAL
NRBC AUTOMATED: 0 PER 100 WBC
PDW BLD-RTO: 13.3 % (ref 11.8–14.4)
PLATELET # BLD: 189 K/UL (ref 138–453)
PLATELET ESTIMATE: ABNORMAL
PMV BLD AUTO: 11.2 FL (ref 8.1–13.5)
POTASSIUM SERPL-SCNC: 3 MMOL/L (ref 3.7–5.3)
POTASSIUM SERPL-SCNC: 3.1 MMOL/L (ref 3.7–5.3)
RBC # BLD: 3.44 M/UL (ref 3.95–5.11)
RBC # BLD: ABNORMAL 10*6/UL
SEG NEUTROPHILS: 76 % (ref 36–65)
SEGMENTED NEUTROPHILS ABSOLUTE COUNT: 6.31 K/UL (ref 1.5–8.1)
SODIUM BLD-SCNC: 134 MMOL/L (ref 135–144)
SODIUM BLD-SCNC: 135 MMOL/L (ref 135–144)
WBC # BLD: 8.3 K/UL (ref 3.5–11.3)
WBC # BLD: ABNORMAL 10*3/UL

## 2021-01-23 PROCEDURE — 2500000003 HC RX 250 WO HCPCS: Performed by: STUDENT IN AN ORGANIZED HEALTH CARE EDUCATION/TRAINING PROGRAM

## 2021-01-23 PROCEDURE — 2580000003 HC RX 258: Performed by: STUDENT IN AN ORGANIZED HEALTH CARE EDUCATION/TRAINING PROGRAM

## 2021-01-23 PROCEDURE — 94640 AIRWAY INHALATION TREATMENT: CPT

## 2021-01-23 PROCEDURE — 85014 HEMATOCRIT: CPT

## 2021-01-23 PROCEDURE — 6360000002 HC RX W HCPCS: Performed by: STUDENT IN AN ORGANIZED HEALTH CARE EDUCATION/TRAINING PROGRAM

## 2021-01-23 PROCEDURE — APPSS30 APP SPLIT SHARED TIME 16-30 MINUTES: Performed by: INTERNAL MEDICINE

## 2021-01-23 PROCEDURE — 82330 ASSAY OF CALCIUM: CPT

## 2021-01-23 PROCEDURE — 83735 ASSAY OF MAGNESIUM: CPT

## 2021-01-23 PROCEDURE — 80048 BASIC METABOLIC PNL TOTAL CA: CPT

## 2021-01-23 PROCEDURE — 82947 ASSAY GLUCOSE BLOOD QUANT: CPT

## 2021-01-23 PROCEDURE — 85018 HEMOGLOBIN: CPT

## 2021-01-23 PROCEDURE — 6370000000 HC RX 637 (ALT 250 FOR IP): Performed by: STUDENT IN AN ORGANIZED HEALTH CARE EDUCATION/TRAINING PROGRAM

## 2021-01-23 PROCEDURE — 2000000000 HC ICU R&B

## 2021-01-23 PROCEDURE — 74018 RADEX ABDOMEN 1 VIEW: CPT

## 2021-01-23 PROCEDURE — 99291 CRITICAL CARE FIRST HOUR: CPT | Performed by: INTERNAL MEDICINE

## 2021-01-23 PROCEDURE — 36415 COLL VENOUS BLD VENIPUNCTURE: CPT

## 2021-01-23 PROCEDURE — 83605 ASSAY OF LACTIC ACID: CPT

## 2021-01-23 PROCEDURE — 85025 COMPLETE CBC W/AUTO DIFF WBC: CPT

## 2021-01-23 PROCEDURE — 99232 SBSQ HOSP IP/OBS MODERATE 35: CPT | Performed by: INTERNAL MEDICINE

## 2021-01-23 RX ORDER — DEXTROSE AND SODIUM CHLORIDE 5; .45 G/100ML; G/100ML
INJECTION, SOLUTION INTRAVENOUS CONTINUOUS
Status: DISCONTINUED | OUTPATIENT
Start: 2021-01-23 | End: 2021-01-24

## 2021-01-23 RX ORDER — LABETALOL HYDROCHLORIDE 5 MG/ML
10 INJECTION, SOLUTION INTRAVENOUS ONCE
Status: COMPLETED | OUTPATIENT
Start: 2021-01-23 | End: 2021-01-23

## 2021-01-23 RX ORDER — SODIUM CHLORIDE 450 MG/100ML
INJECTION, SOLUTION INTRAVENOUS CONTINUOUS
Status: DISCONTINUED | OUTPATIENT
Start: 2021-01-23 | End: 2021-01-24

## 2021-01-23 RX ORDER — POTASSIUM CHLORIDE 7.45 MG/ML
10 INJECTION INTRAVENOUS
Status: COMPLETED | OUTPATIENT
Start: 2021-01-23 | End: 2021-01-23

## 2021-01-23 RX ORDER — INSULIN GLARGINE 100 [IU]/ML
10 INJECTION, SOLUTION SUBCUTANEOUS NIGHTLY
Status: DISCONTINUED | OUTPATIENT
Start: 2021-01-23 | End: 2021-01-23

## 2021-01-23 RX ORDER — LEVOTHYROXINE SODIUM 88 UG/1
88 TABLET ORAL DAILY
Status: DISCONTINUED | OUTPATIENT
Start: 2021-01-23 | End: 2021-01-27 | Stop reason: HOSPADM

## 2021-01-23 RX ORDER — MAGNESIUM SULFATE IN WATER 40 MG/ML
2000 INJECTION, SOLUTION INTRAVENOUS ONCE
Status: COMPLETED | OUTPATIENT
Start: 2021-01-23 | End: 2021-01-23

## 2021-01-23 RX ORDER — CALCIUM GLUCONATE 20 MG/ML
1000 INJECTION, SOLUTION INTRAVENOUS ONCE
Status: COMPLETED | OUTPATIENT
Start: 2021-01-23 | End: 2021-01-23

## 2021-01-23 RX ORDER — SODIUM CHLORIDE 9 MG/ML
INJECTION, SOLUTION INTRAVENOUS CONTINUOUS
Status: DISCONTINUED | OUTPATIENT
Start: 2021-01-23 | End: 2021-01-23

## 2021-01-23 RX ORDER — INSULIN GLARGINE 100 [IU]/ML
10 INJECTION, SOLUTION SUBCUTANEOUS DAILY
Status: DISCONTINUED | OUTPATIENT
Start: 2021-01-23 | End: 2021-01-27 | Stop reason: HOSPADM

## 2021-01-23 RX ORDER — AMLODIPINE BESYLATE 5 MG/1
5 TABLET ORAL DAILY
Status: DISCONTINUED | OUTPATIENT
Start: 2021-01-23 | End: 2021-01-25

## 2021-01-23 RX ADMIN — ALBUTEROL SULFATE 2.5 MG: 2.5 SOLUTION RESPIRATORY (INHALATION) at 12:54

## 2021-01-23 RX ADMIN — INSULIN GLARGINE 10 UNITS: 100 INJECTION, SOLUTION SUBCUTANEOUS at 15:49

## 2021-01-23 RX ADMIN — POTASSIUM CHLORIDE 10 MEQ: 10 INJECTION, SOLUTION INTRAVENOUS at 15:07

## 2021-01-23 RX ADMIN — SODIUM CHLORIDE: 4.5 INJECTION, SOLUTION INTRAVENOUS at 21:41

## 2021-01-23 RX ADMIN — FAMOTIDINE 20 MG: 10 INJECTION INTRAVENOUS at 09:22

## 2021-01-23 RX ADMIN — SODIUM CHLORIDE, PRESERVATIVE FREE 10 ML: 5 INJECTION INTRAVENOUS at 09:22

## 2021-01-23 RX ADMIN — DEXTROSE AND SODIUM CHLORIDE: 5; 450 INJECTION, SOLUTION INTRAVENOUS at 15:09

## 2021-01-23 RX ADMIN — Medication 10 MG: at 03:17

## 2021-01-23 RX ADMIN — POTASSIUM CHLORIDE 10 MEQ: 10 INJECTION, SOLUTION INTRAVENOUS at 11:47

## 2021-01-23 RX ADMIN — ALBUTEROL SULFATE 2.5 MG: 2.5 SOLUTION RESPIRATORY (INHALATION) at 08:16

## 2021-01-23 RX ADMIN — INSULIN LISPRO 4 UNITS: 100 INJECTION, SOLUTION INTRAVENOUS; SUBCUTANEOUS at 21:04

## 2021-01-23 RX ADMIN — SODIUM CHLORIDE, PRESERVATIVE FREE 10 ML: 5 INJECTION INTRAVENOUS at 21:05

## 2021-01-23 RX ADMIN — CEFEPIME HYDROCHLORIDE 2000 MG: 2 INJECTION, POWDER, FOR SOLUTION INTRAVENOUS at 09:21

## 2021-01-23 RX ADMIN — ONDANSETRON 4 MG: 2 INJECTION INTRAMUSCULAR; INTRAVENOUS at 17:25

## 2021-01-23 RX ADMIN — ALBUTEROL SULFATE 2.5 MG: 2.5 SOLUTION RESPIRATORY (INHALATION) at 16:54

## 2021-01-23 RX ADMIN — ONDANSETRON 4 MG: 2 INJECTION INTRAMUSCULAR; INTRAVENOUS at 10:32

## 2021-01-23 RX ADMIN — POTASSIUM CHLORIDE 10 MEQ: 10 INJECTION, SOLUTION INTRAVENOUS at 17:12

## 2021-01-23 RX ADMIN — LEVOTHYROXINE SODIUM 88 MCG: 88 TABLET ORAL at 17:23

## 2021-01-23 RX ADMIN — AMLODIPINE BESYLATE 5 MG: 5 TABLET ORAL at 17:23

## 2021-01-23 RX ADMIN — Medication: at 00:05

## 2021-01-23 RX ADMIN — SODIUM CHLORIDE, PRESERVATIVE FREE 10 ML: 5 INJECTION INTRAVENOUS at 21:09

## 2021-01-23 RX ADMIN — SODIUM CHLORIDE 6.06 UNITS/HR: 9 INJECTION, SOLUTION INTRAVENOUS at 00:02

## 2021-01-23 RX ADMIN — POTASSIUM CHLORIDE 10 MEQ: 10 INJECTION, SOLUTION INTRAVENOUS at 16:39

## 2021-01-23 RX ADMIN — CALCIUM GLUCONATE 1000 MG: 20 INJECTION, SOLUTION INTRAVENOUS at 16:10

## 2021-01-23 RX ADMIN — POTASSIUM CHLORIDE 10 MEQ: 10 INJECTION, SOLUTION INTRAVENOUS at 13:55

## 2021-01-23 RX ADMIN — METRONIDAZOLE 500 MG: 500 INJECTION, SOLUTION INTRAVENOUS at 06:30

## 2021-01-23 RX ADMIN — POTASSIUM CHLORIDE 10 MEQ: 10 INJECTION, SOLUTION INTRAVENOUS at 12:48

## 2021-01-23 RX ADMIN — MAGNESIUM SULFATE IN WATER 2000 MG: 40 INJECTION, SOLUTION INTRAVENOUS at 16:12

## 2021-01-23 RX ADMIN — SODIUM CHLORIDE: 9 INJECTION, SOLUTION INTRAVENOUS at 09:29

## 2021-01-23 ASSESSMENT — PAIN SCALES - GENERAL: PAINLEVEL_OUTOF10: 3

## 2021-01-23 ASSESSMENT — PAIN DESCRIPTION - LOCATION: LOCATION: ABDOMEN

## 2021-01-23 NOTE — PROGRESS NOTES
KUB today does show decreased stool burden. Leukocytosis and lactic acid improved. General surgery was consulted due to possibility of mesenteric ischemia. CTAs again reviewed, no concerns for mesenteric ischemia. Patient does have a small caliber inferior mesenteric artery without evidence of occlusion. No acute surgical interventions planned. Believe her abdominal pain is due to constipation. General surgery to sign off.

## 2021-01-23 NOTE — PROGRESS NOTES
INTENSIVE CARE UNIT  Resident Physician Progress Note    Patient - Josr Stallworth  Date of Admission -  1/21/2021  5:45 PM  Date of Evaluation -  1/23/2021  Room and Bed Number -  0126/0126-01   Hospital Day - 2    SUBJECTIVE:     HISTORY OF PRESENT ILLNESS:    Patient was transferred from [facility] via [facility] due to concerns for intermittent confusion, and GI bleeding. History of paroxysmal A. fib, COPD, hypertension, diabetes, Graves', polysubstance abuse and alcohol abuse. Also with history of esophagitis and gastroparesis secondary to diabetes. Patient also endorsing abdominal pain at the time, subsequently found to have active bright red stool and bleeding per rectum multiple times. CT Abdo pelvis showed concerns for significant stool burden and dilation of cecum up to 7 cm. CHANDA positive for opioids, vitamins, TCA and marijuana. 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 Patient is on Eliquis, and was given [facility] and  at outlying facility, also received 3 L of fluid in route to ED. Repeat labs concerning for elevated lactic acid and elevated blood glucose. CK myoglobin within normal limits. Elevated white count 20.2, downtrending. Hemoglobin elevated at 16.7. Negative Covid. Patient is a poor historian and was unable to state name or location of where she was at. CT head showed no acute processes in [facility] ED. Repeat CT Abdo pelvis in [facility] ED concerning for obstructed colon due to impacted stool in distal sigmoid and rectum. Patient also experiencing increasing in third space fluid diffusely. General surgery consulted for worsening lactic acid. NG tube placed. OVERNIGHT EVENTS:        No acute overnight events. Patient reports improvement in abdominal soreness, improvement in abdominal tenderness on examination. Not distended.  Patient had brown stool this morning with undigested food after receiving tapwater enema. GoLYTELY was then initiated via NG tube. Patient currently having liquid brown stool with hematochezia    Persistently tachycardic, mildly tachypneic. On nasal cannula 3 L  POC VBG 7.32/34.6/63.8      Currently on insulin drip. Anion gap closed bicarb improved on 2 consecutive BMPs. Bridge with Lantus 10 units daily, start medium dose insulin sliding scale. Discontinue insulin drip 2 hours later. Replacing potassium, magnesium. Discontinue NaHCO3 in D5 50 ml/hr maintenance fluids. Switch to D5 and half-normal saline IV at 100 mL's per hour. Currently n.p.o. having slight nausea. Continue cefepime, Flagyl.      Continuing to monitor urine output    Lactate 3.1 > 2.8  WBC 19.8 > 17.2 > 19.4 > 8.3  K 4.6 > 3.0 (replacing )  Mg 1.5 ( replacing )  Na 131 > 132  > 135   procalcitonin 78.27      TODAY:     AWAKE & FOLLOWING COMMANDS: []   No  [x]   Yes                           SECRETIONS                 Amount:  []   Small []   Moderate []   Large []   None        Color: []   White []   Colored []   Bloody                         SEDATION:                 RAAS Score: []   +1 to -1 []   -2 []   -3 []   -4        Sedation Agent: []   Propofol gtt []   Versed gtt  []   Ativan gtt  [x]   No Sedation        Paralytic Agent: []   Precedex []   Norcuron []   Nimbex []                         VASOPRESSORS:  [x]   No  []   Yes            Vasopressor Agent []   Levophed []   Dopamine []   Vasopressin []   Dobutamine  []   Phenylephrine  []   Epinephrine     OBJECTIVE:     VITAL SIGNS:  Patient Vitals for the past 8 hrs:   BP Temp Temp src Pulse Resp SpO2   01/23/21 1200 (!) 161/75 -- -- 103 16 95 %   01/23/21 1100 (!) 155/73 -- -- 103 19 92 %   01/23/21 0900 (!) 164/68 -- -- 101 20 --   01/23/21 0818 -- -- -- -- 20 97 %   01/23/21 0815 (!) 157/76 97.7 °F (36.5 °C) -- 105 19 --   01/23/21 0800 -- 98.2 °F (36.8 °C) Oral 101 21 97 %   01/23/21 0700 (!) 160/71 -- -- 98 17 --       Last Body weight:   Wt Readings from Last 3 Encounters:   21 161 lb (73 kg)   21 161 lb (73 kg)   21 160 lb (72.6 kg)       Body Mass Index : Body mass index is 27.64 kg/m². Tmax over 24 hours: Temp (24hrs), Av °F (36.7 °C), Min:97.7 °F (36.5 °C), Max:98.2 °F (36.8 °C)      Ins/Outs: In: 5023.3 [P.O.:1500; I.V.:3523.3]  Out:  [Urine:]    PHYSICAL EXAM:  Constitutional: Appears well, no distress  EENT: PERRLA, EOMI, sclera clear, anicteric, oropharynx clear, no lesions, neck supple with midline trachea. Neck: Supple, symmetrical, trachea midline, no adenopathy, thyroid symmetric, no jvd skin normal  Respiratory: clear to auscultation, no wheezes or rales and unlabored breathing. No intercostal tenderness  Cardiovascular: regular rate and rhythm, normal S1, S2, no murmur noted and 2+ pulses throughout  Abdomen: soft, not distended, tenderness improving.    Extremities:  peripheral pulses normal, no pedal edema, no clubbing or cyanosis    MEDICATIONS:  Scheduled Meds:   potassium chloride  10 mEq Intravenous Q1H    magnesium sulfate  2,000 mg Intravenous Once    insulin glargine  10 Units Subcutaneous Nightly    insulin lispro  0-12 Units Subcutaneous 4x Daily    polyethylene glycol  4,000 mL Oral Once    sodium chloride flush  10 mL Intravenous 2 times per day    famotidine (PEPCID) injection  20 mg Intravenous Daily    albuterol  2.5 mg Nebulization 4x Daily    sodium chloride flush  10 mL Intravenous 2 times per day       Continuous Infusions:   dextrose 5 % and 0.45 % NaCl      insulin 1.98 Units/hr (21 1257)    dextrose         PRN Meds:       bisacodyl, 10 mg, Daily PRN      sodium chloride flush, 10 mL, PRN      promethazine, 12.5 mg, Q6H PRN    Or      ondansetron, 4 mg, Q6H PRN      albuterol sulfate HFA, 2 puff, Q4H PRN      glucose, 15 g, PRN      dextrose, 12.5 g, PRN      glucagon (rDNA), 1 mg, PRN      dextrose, 100 mL/hr, PRN      sodium chloride (KUB) (SINGLE AP VIEW)   Final Result   1. Interval decrease in stool burden with minimal remaining in the distal   colon. 2.  Colonic wall edema is noted, most pronounced in the transverse colon. 3.  Mild small bowel distension, favored to represent ileus. 4.  Enteric tube terminates at the level of the body of the stomach. XR ABDOMEN (KUB) (SINGLE AP VIEW)   Final Result   Somewhat motion degraded. No obvious free air. Enteric tube not well seen,   but possibly pulled back somewhat, as above. Nonspecific gas-filled small bowel loops in the mid abdomen. No definite   obstruction. XR ABDOMEN FOR NG/OG/NE TUBE PLACEMENT   Final Result   Enteric tube tip and side port are below the level of the diaphragm, in the   expected location of the stomach. CT HEAD WO CONTRAST   Final Result   No acute intracranial abnormality. ASSESSMENT:     Patient Active Problem List    Diagnosis Date Noted    Cervical neck pain with evidence of disc disease 2013     Priority: High    Patient is [place] 2021    Dehydration     GI bleed 2021    Mixed hyperlipidemia 2017    Irritable bowel syndrome with both constipation and diarrhea 2017    Essential hypertension 2016    Type 2 diabetes mellitus without complication, with long-term current use of insulin ([facility] 2016    Iron deficiency anemia 2016    Asthma with COPD ([facility] 10/21/2015    Graves' disease 2014    Paroxysmal atrial fibrillation ([facility] 2014        PLAN:     PLAN/MEDICAL DECISION MAKIN. Acute toxic metabolic encephalopathy due to unknown etiology  - Resolved  - CT head unremarkable  - Electrolytes within normal limits on admission  - Continue to monitor    2.  Active GI bleed likely secondary to Stercoral colitis  - On eliquis at home, reversed with entra and [facility]  - Hgb 14.1 > 10.7 >11.6  - Trend H&H daily  - Discontinue protonix drip and continue IV pepcid  20 DAILY  - GI following. Recommend to continue GoLYTELY for constipation  - Plan for colonoscopy +/- EGD early next week    - Gen surg - no surgical interventions at this time. Will sign off.     3. Lactic acidosis  - Lactate 4.3 > 2.8  - Bicarb improved  - Continue IV hydration, 30ml/kg bolus given, maintenance fluid D5 with Half NS @100 ML /HR  - Trend lactate q12 for 4 occurences    4. Hyperglycemia  -  Bridge with Lantus 10 units daily, start medium dose insulin sliding scale. Discontinue insulin drip 2 hours later. Replacing potassium, magnesium.  - Hypoglycemia protocols   - K 3.0 ( Replacing IV, Not tolerating Effer k)  - Replacing Mg  - Monitor POCT Glucose 4 x daily  -Currently npo. Advance diet as tolerated. 5. EtOH and polysubstance abuse  - CIWA protocol    6. Hypothyroidism  - TSH 0.01, T4 1.02  - Start levothyroxine 88 mcg PO daily (Start when patient tolerating PO diet)    7. A-fib  - On eliquis at home, reversed on [facility]  - Continue to monitor  - Holding anticoagulation due to GI bleed    8.  Yazdanism  - Refusing blood products  - Accepting EPO and iron      CODE STATUS: Full Code    DISPOSITION:  [x] To remain ICU  [] OK for out of ICU from [address] standpoint      Carrie Arriaza MD  Emergency Medicine Resident  [address]  1/23/2021, 7:41 AM EST

## 2021-01-23 NOTE — FLOWSHEET NOTE
01/23/21 0308   Provider Notification   Reason for Communication Evaluate  (elevated BP)   Provider Name Dr. Kiarra Mooney   Provider Notification Resident   Method of Communication Face to face   Response See orders   Notification Time 0308   Writer spoke with resident, see orders. Will re-assess the need for continued treatment.

## 2021-01-23 NOTE — PROGRESS NOTES
PROGRESS NOTE      PATIENT NAME: Sammie Zheng RECORD NO. [id number]  DATE: 1/23/2021  SURGEON: John Montoya  PRIMARY CARE PHYSICIAN: Aliya Dasilva MD    HD: # 2    ASSESSMENT    Patient Active Problem List   Diagnosis    Cervical neck pain with evidence of disc disease    [facility] fibrillation ([facility]    Graves' disease    Asthma with COPD ([facility]    Essential hypertension    Type 2 diabetes mellitus without complication, with long-term current use of insulin ([facility]    Iron deficiency anemia    Irritable bowel syndrome with both constipation and diarrhea    Mixed hyperlipidemia    GI bleed    Patient is [place]    Dehydration       MEDICAL DECISION MAKING AND PLAN    Stercoral colitis  Leukocytosis - resolved   afebrile and hemodynamically stable    Urine negative, BCx negative   No plan for surgical intervention at this time   Cefepime   Flagyl   We will follow     Obstipation   No formed BM overnight; may need to switch enemas or scope to  disimpact   GI Following   Bowel protocol   Discharge on bowel protocol when appropriate   Minimize narcotic   Optimize fluids    Current fluids @ 50/hr   Optimize electrolytes     k 3.1   Treat underlying medical conditions    Cr 1.26->1.69->1.12    Thyroid TSH 0.01     Treat underlying disease    GI bleeding   Hgb stable, no active bleeding   Recommend GI consult for UGI consideration   Lactate 5.0>2.4>3.1     Repeat LA today    IBW ->54.7     Recommend IVF resuscitation as appropriate    Hx afib, DM. COPD, asthma, post trach, post PEG    Hold eliquis   Diabetes management per primary  Jehovah Witness- no blood products   Discussions with family by primary services and confirmed wish no blood   products if indicated   Confirm and document goals of care and surrogate recommended    SUBJECTIVE    Gladys Payne has slightly improved pain from yesterday. No significant overnight events. Remains afebrile. Enemas failed per nursing report. Unable to keep enema in rectum. OBJECTIVE  VITALS: Temp: Temp: 98.2 °F (36.8 °C)Temp  Av.1 °F (36.7 °C)  Min: 98 °F (36.7 °C)  Max: 98.2 °F (75.6 °C) BP Systolic (07NDE), SSB:594 , Min:116 , QLX:324   Diastolic (63TMT), IRO:67, Min:67, Max:139   Pulse Pulse  Av.2  Min: 89  Max: 136 Resp Resp  Av.5  Min: 15  Max: 38 Pulse ox SpO2  Av.8 %  Min: 79 %  Max: 100 %    Physical Exam  Constitutional:       Appearance: She is not ill-appearing, toxic-appearing or diaphoretic. HENT:      Head: Normocephalic and atraumatic. Nose: Nose normal.   Neck:      Musculoskeletal: Normal range of motion. Cardiovascular:      Rate and Rhythm: Normal rate and regular rhythm. Pulses: Normal pulses. Pulmonary:      Effort: Pulmonary effort is normal.      Breath sounds: No wheezing. Abdominal:      Tenderness: There is abdominal tenderness. Comments: Abdomen was soft, mild diffuse tenderness,   NG with minimal bile output   Genitourinary:     Rectum: Normal.   Musculoskeletal: Normal range of motion. Skin:     Capillary Refill: Capillary refill takes less than 2 seconds. Coloration: Skin is not jaundiced. Comments: Cool, dry   Neurological:      General: No focal deficit present. Mental Status: She is oriented to person, place, and time. Comments: Somnolent, follows when awake but not fully         I/O last 3 completed shifts:   In: 6527.4 [I.V.:6527.4]  Out: 2102 [Urine:2102]    Drain/tube output:  In: 2278.3 [I.V.:2278.3]  Out: 1410 [Urine:1410]    LAB:  CBC:   Recent Labs     21  0130 21  0449 21  0621   WBC 17.2* 19.4* 8.3   HGB 14.5 14.1 10.7*   HCT 46.3 44.5 31.7*   MCV 98.7 98.0 92.2    228 189     BMP:   Recent Labs     21  1816 21  0130 21  0210 21  0621   * 132*  --  134*   K 4.2 4.6  --  3.1*    104  --  97*   CO2 15* 13*  --  27   BUN 26* 28*  --  18   CREATININE 1.46* 1.60* 1.69* 1.12*   GLUCOSE 386* 472*  --  141*     COAGS:   Recent Labs     01/21/21  0742 01/21/21  1816   APTT  --  21.4   PROT 7.1 5.9*   INR  --  1.0       RADIOLOGY:  CXR:   XR ABDOMEN (KUB) (SINGLE AP VIEW)   Final Result   Somewhat motion degraded. No obvious free air. Enteric tube not well seen,   but possibly pulled back somewhat, as above. Nonspecific gas-filled small bowel loops in the mid abdomen. No definite   obstruction. XR ABDOMEN FOR NG/OG/NE TUBE PLACEMENT   Final Result   Enteric tube tip and side port are below the level of the diaphragm, in the   expected location of the stomach. CT HEAD WO CONTRAST   Final Result   No acute intracranial abnormality. Jose Francisco Womack  1/22/21, 8:05 AM               Trauma Attending Renea Brown      I have reviewed the above [facility] note(s) and confirmed the key elements of the medical history and physical exam. I have seen and examined the pt. I have discussed the findings, established the care plan and recommendations with Resident, [facility] RN, bedside nurse.   Pt up in chair RN brushing hair  Pt with fecal impaction and abdominal pain   GI following - May need C-Scope with irrigation per GI      Norah Wakefield DO  1/23/2021  6:09 PM

## 2021-01-23 NOTE — PROGRESS NOTES
[facility]. [facility] Progress Note    Marvel Conn is a 60 y.o. female patient. Hospitalization Day:2      Chief consult reason: LGIB on Eliquis, alcohol use      Subjective: Patient seen and examined. Patient sitting up in chair at bedside. Patient had brown stool this morning with undigested food after receiving tapwater enema. GoLYTELY was then initiated via NG tube. Patient currently having liquid brown stool with hematochezia. Objective:   VITALS:  /67   Pulse 89   Temp 98.2 °F (36.8 °C) (Oral)   Resp 18   Wt 161 lb (73 kg)   SpO2 100%   BMI 27.64 kg/m²   TEMPERATURE:  Current - Temp: 98.2 °F (36.8 °C);  Max - Temp  Av °F (36.7 °C)  Min: 97.9 °F (36.6 °C)  Max: 98.2 °F (36.8 °C)    Physical Assessment:  General appearance:  alert, cooperative and no distress  Mental Status:  oriented to person, place and time and falt affect  Lungs:  clear to auscultation bilaterally, normal effort  Heart:  regular rate and rhythm, no murmur  Abdomen:  soft, nontender, mildly distended, + bowel sounds, no masses  Extremities:  no edema, no redness, No clubbing  Skin:  warm, dry, no gross lesions or rashes    CURRENT MEDICATIONS:  Scheduled Meds:   polyethylene glycol  4,000 mL Oral Once    sodium chloride flush  10 mL Intravenous 2 times per day    famotidine (PEPCID) injection  20 mg Intravenous Daily    albuterol  2.5 mg Nebulization 4x Daily    sodium chloride flush  10 mL Intravenous 2 times per day    cefepime  2,000 mg Intravenous Q12H    metroNIDAZOLE  500 mg Intravenous Q8H     Continuous Infusions:   sodium bicarbonate infusion 125 mL/hr at 21 0005    insulin 3.9 Units/hr (21 0658)    dextrose       PRN Meds:bisacodyl, sodium chloride flush, promethazine **OR** ondansetron, albuterol sulfate HFA, glucose, dextrose, glucagon (rDNA), dextrose, sodium chloride flush, LORazepam **OR** LORazepam **OR** LORazepam **OR** LORazepam **OR** LORazepam **OR** LORazepam **OR** LORazepam **OR** LORazepam      Data Review:  LABS and IMAGING:     CBC  Recent Labs     01/21/21  0742 01/21/21  1600 01/21/21  1816 01/22/21  0130 01/22/21  0449 01/23/21  0621   WBC 20.2*  --  19.8* 17.2* 19.4* 8.3   HGB 15.7 17.2* 16.7* 14.5 14.1 10.7*   HCT 47.5*  --  48.8* 46.3 44.5 31.7*   MCV 93.3  --  90.0 98.7 98.0 92.2   MCHC 33.0  --  34.2 31.3 31.7 33.8   RDW 14.6  --  13.2 13.2 13.4 13.3     --  276 222 228 189       Immature PLTs   No results found for: PLTFLUORE    ANEMIA STUDIES  No results for input(s): LABIRON, TIBC, IRON, FERRITIN, [id number], FOLATE, OCCULTBLD in the last 72 hours. BMP  Recent Labs     01/21/21  0742 01/21/21  0742 01/21/21  1816 01/22/21  0130 01/22/21  0210   *  --  131* 132*  --    K 3.9  --  4.2 4.6  --    CL 99  --  101 104  --    CO2 18*  --  15* 13*  --    BUN 21  --  26* 28*  --    CREATININE 1.43*   < > 1.46* 1.60* 1.69*   GLUCOSE 278*  --  386* 472*  --    CALCIUM 10.7*  --  9.5 9.1  --     < > = values in this interval not displayed.        LFTS  Recent Labs     01/21/21  0742 01/21/21 1816   ALKPHOS 217* 210*   ALT 21 26   AST 25 30   BILITOT 0.44 1.01   LABALBU 4.1 3.5       AMYLASE/LIPASE/AMMONIA  Recent Labs     01/21/21  0742 01/21/21  1816 01/22/21  1158   AMYLASE 356*  --   --    LIPASE 71* 41  --    AMMONIA  --   --  34       Acute Hepatitis Panel   No results found for: HEPBSAG, HEPCAB, HEPBIGM, HEPAIGM    HCV Genotype   No results found for: HEPATITISCGENOTYPE    HCV Quantitative   No results found for: HCVQNT    LIVER WORK UP:    AFP  No results found for: AFP    Alpha 1 antitrypsin   No results found for: A1A    Anti - Liver/Kidney Ab  No results found for: LIVER-KIDNEYMICROSOMALAB    CHANEL  Lab Results   Component Value Date    CHANEL NEGATIVE 08/10/2018       AMA  No results found for: MITOAB    ASMA  No results found for: SMOOTHMUSCAB    Ceruloplasmin  No results found for: CERULOPLSM    Celiac panel  No results found for: TISSTRNTIIGG, TTGIGA, IGA    IgG  Lab Results   Component Value Date    IGG 1423 02/25/2016       IgM  No results found for: IGM    GGT   No results found for: LABGGT    PT/INR  Recent Labs     01/21/21  1816   PROTIME 11.0   INR 1.0       Cancer Markers:  CEA:  No results for input(s): CEA in the last 72 hours. Ca 125:  No results for input(s):  in the last 72 hours. Ca 19-9:   No results for input(s):  in the last 72 hours. AFP: No results for input(s): AFP in the last 72 hours. Lactic acid:  Recent Labs     01/22/21  0712 01/22/21  1003 01/22/21  2345   LACTACIDWB 5.0* 2.4* 3.1*         Radiology Review:    Ct Abdomen Pelvis Wo Contrast Additional Contrast? None    Result Date: 1/21/2021  EXAMINATION: CT ABDOMEN PELVIS WO CONTRAST HISTORY: Reason for exam:->lower abdominal pain 71-year-old female. Contrast withheld due to low GFR. COMPARISON: CT abdomen pelvis 8/6/2020. TECHNIQUE: CT examination of the abdomen and pelvis without IV contrast. Coronal and sagittal reformations were performed. Dose reduction techniques were achieved by using automated exposure control and/or adjustment of mA and/or kV according to patient size and/or use of iterative reconstruction technique. FINDINGS: Lung bases/lung windows: Lung bases are clear. No free air. Upper abdomen/lower chest: Right coronary artery calcifications. Small hiatal hernia, 2.7 cm. Liver has a normal Hounsfield attenuation today. Normal size spleen. Pancreas and gallbladder are normal. Retroperitoneum: Normal adrenal glands and kidneys. Moderate plaque aorta without aneurysm. No retroperitoneal adenopathy. The bowel shows prominent dilatation of colon with a long air-fluid level in the transverse colon. This dilatation is followed down to impacted stool in the sigmoid and rectum without other mechanical obstruction. Pelvis: Normal appendix. Small postmenopausal uterus. Bladder contour normal. Minimal fluid in the cul-de-sac.  The abdominal wall intact. Bone windows: Degenerative changes L4-L5, moderate. Impacted stool throughout the sigmoid colon and rectum causing dilatation of the more proximal colon to the cecum. The cecum measures 7 cm in diameter. Small amount of fluid in the cul-de-sac. Xr Abdomen (kub) (single Ap View)    Result Date: 1/22/2021  EXAMINATION: ONE SUPINE XRAY VIEW(S) OF THE ABDOMEN 1/22/2021 10:13 am COMPARISON: KUB from 01/21/2020 HISTORY: ORDERING SYSTEM PROVIDED HISTORY: upright as possible TECHNOLOGIST PROVIDED HISTORY: upright as possible Reason for Exam: upright abdomen Acuity: Unknown Type of Exam: Unknown FINDINGS: Somewhat degraded by motion artifact. Enteric tube poorly visualized, and may be retracted somewhat, possibly with the side hole at or even above the EG junction. No obvious free air. Gas-filled small bowel loops in the mid abdomen without air-fluid levels. Visualized lung bases demonstrate mild atelectatic changes. Somewhat motion degraded. No obvious free air. Enteric tube not well seen, but possibly pulled back somewhat, as above. Nonspecific gas-filled small bowel loops in the mid abdomen. No definite obstruction. Ct Head Wo Contrast    Result Date: 1/21/2021  EXAMINATION: CT OF THE HEAD WITHOUT CONTRAST  1/21/2021 8:09 pm TECHNIQUE: CT of the head was performed without the administration of intravenous contrast. Dose modulation, iterative reconstruction, and/or weight based adjustment of the mA/kV was utilized to reduce the radiation dose to as low as reasonably achievable. COMPARISON: 03/27/2016 HISTORY: ORDERING SYSTEM PROVIDED HISTORY: AMS Decision Support Exception->Emergency Medical Condition (MA) Reason for Exam: Altered Mental Status - GI bleed. Acuity: Acute Type of Exam: Initial Relevant Medical/Surgical History: Hx - HBP, Graves, COPD and Diabetes (Type II). FINDINGS: BRAIN/VENTRICLES: No acute intracranial hemorrhage, mass effect or midline shift.   No abnormal extra-axial fluid collection. The gray-white differentiation is maintained without evidence of an acute infarct. No evidence of hydrocephalus. ORBITS: The visualized portion of the orbits demonstrate no acute abnormality. SINUSES: The visualized paranasal sinuses and mastoid air cells demonstrate no acute abnormality. SOFT TISSUES/SKULL:  No acute abnormality of the visualized skull or soft tissues. No acute intracranial abnormality. Xr Abdomen For Ng/og/ne Tube Placement    Result Date: 1/22/2021  EXAMINATION: ONE SUPINE XRAY VIEW(S) OF THE ABDOMEN 1/21/2021 9:13 pm COMPARISON: CT abdomen pelvis from the same day HISTORY: ORDERING SYSTEM PROVIDED HISTORY: Confirmation of course of NG/OG/NE tube and location of tip of tube TECHNOLOGIST PROVIDED HISTORY: Confirmation of course of NG/OG/NE tube and location of tip of tube Portable? ->Yes Reason for Exam: supine port, NG placement FINDINGS: Enteric tube tip and side port are below the level of the diaphragm, in the expected location of the stomach. Suboptimal assessment for free air on a supine radiograph. No evidence of portal venous gas. Nonspecific bowel gas pattern. Enteric tube tip and side port are below the level of the diaphragm, in the expected location of the stomach. Cta Abdomen Pelvis W Wo Contrast    Result Date: 1/21/2021  EXAMINATION: CTA ABDOMEN PELVIS W WO CONTRAST HISTORY: Reason for exam:->Abdominal pain, recently passed bloody stool. Possible ischemic bowel. Elevating lactic acid. COMPARISON: CT scan without contrast 0841 hours same date. TECHNIQUE: Isovue 370, 75 mL intravenously. Arterial and venous phases were performed. MIP (maximum intensity projection) images were performed. Dose reduction techniques were achieved by using automated exposure control and/or adjustment of mA and/or kV according to patient size and/or use of iterative reconstruction technique.  FINDINGS: There is somewhat less stool in the sigmoid colon after the patient recently passed a bloody stool, however the distal sigmoid and rectum still are impacted by stool and the colon remains markedly dilated with fluid without bowel wall thickening. There is normal enhancement of the bowel wall. No pneumatosis. The celiac axis and the SMA are patent. There is no aortic dissection. Normal enhancement of the kidneys, adrenal glands, pancreas, spleen and liver. There is increasing free fluid in the right lower quadrant and the pelvis compared to earlier today. The lung bases remain clear. There is no free air. The bone windows are unchanged. The colon remains obstructed by impacted stool in the distal sigmoid and rectum with a large amount of stool from rectum to cecum. Increasing third space fluid in the lower abdomen and pelvis. No pneumatosis or evidence of high-grade arterial obstruction. Normal bowel wall enhancement. The findings were discussed with Dr. Yuli Hendrix in the ED at 2:35 PM.         ENDOSCOPY     Active Problems:    GI bleed    Patient is [employer]    Dehydration  Resolved Problems:    * No resolved hospital problems. *       GI Assessment:    1. Rectal bleeding - likely secondary to stercoral colitis related to constipation/fecal impaction vs hemorrhoids vs mass  2. Constipation -will need bowel regimen upon discharge  3. History of polysubstance abuse      Plan of care:  1. Continue with GoLYTELY  2. Plan for colonoscopy +/- EGD early next week    3. Trend H/H  4. Serial abdominal examination    Please feel free to contact me with any questions or concerns. Thank you for allowing me to participate in the care of your patient. Ashlie Sinha, RAJNI - CNP on 1/23/2021 at 7:30 AM   THE [facility]    Please note that this note was generated using a voice recognition dictation software. Although every effort was made to ensure the accuracy of this automated transcription, some errors in transcription may have occurred.

## 2021-01-24 ENCOUNTER — APPOINTMENT (OUTPATIENT)
Dept: CT IMAGING | Age: 66
DRG: 253 | End: 2021-01-24
Payer: COMMERCIAL

## 2021-01-24 LAB
ABSOLUTE EOS #: 0 K/UL (ref 0–0.44)
ABSOLUTE IMMATURE GRANULOCYTE: 0 K/UL (ref 0–0.3)
ABSOLUTE LYMPH #: 1.25 K/UL (ref 1.1–3.7)
ABSOLUTE MONO #: 0.53 K/UL (ref 0.1–1.2)
ANION GAP SERPL CALCULATED.3IONS-SCNC: 12 MMOL/L (ref 9–17)
BASOPHILS # BLD: 1 % (ref 0–2)
BASOPHILS ABSOLUTE: 0.09 K/UL (ref 0–0.2)
BUN BLDV-MCNC: 14 MG/DL (ref 8–23)
BUN/CREAT BLD: ABNORMAL (ref 9–20)
CALCIUM SERPL-MCNC: 8.8 MG/DL (ref 8.6–10.4)
CHLORIDE BLD-SCNC: 92 MMOL/L (ref 98–107)
CO2: 26 MMOL/L (ref 20–31)
CREAT SERPL-MCNC: 1.06 MG/DL (ref 0.5–0.9)
DIFFERENTIAL TYPE: ABNORMAL
EOSINOPHILS RELATIVE PERCENT: 0 % (ref 1–4)
GFR AFRICAN AMERICAN: >60 ML/MIN
GFR NON-AFRICAN AMERICAN: 52 ML/MIN
GFR SERPL CREATININE-BSD FRML MDRD: ABNORMAL ML/MIN/{1.73_M2}
GFR SERPL CREATININE-BSD FRML MDRD: ABNORMAL ML/MIN/{1.73_M2}
GLUCOSE BLD-MCNC: 131 MG/DL (ref 65–105)
GLUCOSE BLD-MCNC: 166 MG/DL (ref 65–105)
GLUCOSE BLD-MCNC: 168 MG/DL (ref 65–105)
GLUCOSE BLD-MCNC: 168 MG/DL (ref 65–105)
GLUCOSE BLD-MCNC: 178 MG/DL (ref 65–105)
GLUCOSE BLD-MCNC: 201 MG/DL (ref 70–99)
HCT VFR BLD CALC: 33.3 % (ref 36.3–47.1)
HEMOGLOBIN: 11.1 G/DL (ref 11.9–15.1)
IMMATURE GRANULOCYTES: 0 %
LACTIC ACID, WHOLE BLOOD: 1.5 MMOL/L (ref 0.7–2.1)
LYMPHOCYTES # BLD: 14 % (ref 24–43)
MCH RBC QN AUTO: 30.4 PG (ref 25.2–33.5)
MCHC RBC AUTO-ENTMCNC: 33.3 G/DL (ref 28.4–34.8)
MCV RBC AUTO: 91.2 FL (ref 82.6–102.9)
MONOCYTES # BLD: 6 % (ref 3–12)
MORPHOLOGY: ABNORMAL
NRBC AUTOMATED: 0 PER 100 WBC
PDW BLD-RTO: 13.3 % (ref 11.8–14.4)
PLATELET # BLD: 194 K/UL (ref 138–453)
PLATELET ESTIMATE: ABNORMAL
PMV BLD AUTO: 10.2 FL (ref 8.1–13.5)
POTASSIUM SERPL-SCNC: 3.7 MMOL/L (ref 3.7–5.3)
RBC # BLD: 3.65 M/UL (ref 3.95–5.11)
RBC # BLD: ABNORMAL 10*6/UL
SEG NEUTROPHILS: 79 % (ref 36–65)
SEGMENTED NEUTROPHILS ABSOLUTE COUNT: 7.03 K/UL (ref 1.5–8.1)
SODIUM BLD-SCNC: 130 MMOL/L (ref 135–144)
WBC # BLD: 8.9 K/UL (ref 3.5–11.3)
WBC # BLD: ABNORMAL 10*3/UL

## 2021-01-24 PROCEDURE — 2500000003 HC RX 250 WO HCPCS: Performed by: STUDENT IN AN ORGANIZED HEALTH CARE EDUCATION/TRAINING PROGRAM

## 2021-01-24 PROCEDURE — 85025 COMPLETE CBC W/AUTO DIFF WBC: CPT

## 2021-01-24 PROCEDURE — 82947 ASSAY GLUCOSE BLOOD QUANT: CPT

## 2021-01-24 PROCEDURE — 2580000003 HC RX 258: Performed by: STUDENT IN AN ORGANIZED HEALTH CARE EDUCATION/TRAINING PROGRAM

## 2021-01-24 PROCEDURE — 99232 SBSQ HOSP IP/OBS MODERATE 35: CPT | Performed by: INTERNAL MEDICINE

## 2021-01-24 PROCEDURE — 6360000002 HC RX W HCPCS: Performed by: STUDENT IN AN ORGANIZED HEALTH CARE EDUCATION/TRAINING PROGRAM

## 2021-01-24 PROCEDURE — 36415 COLL VENOUS BLD VENIPUNCTURE: CPT

## 2021-01-24 PROCEDURE — 94761 N-INVAS EAR/PLS OXIMETRY MLT: CPT

## 2021-01-24 PROCEDURE — 1200000000 HC SEMI PRIVATE

## 2021-01-24 PROCEDURE — 6370000000 HC RX 637 (ALT 250 FOR IP): Performed by: INTERNAL MEDICINE

## 2021-01-24 PROCEDURE — 94640 AIRWAY INHALATION TREATMENT: CPT

## 2021-01-24 PROCEDURE — 74177 CT ABD & PELVIS W/CONTRAST: CPT

## 2021-01-24 PROCEDURE — 6370000000 HC RX 637 (ALT 250 FOR IP): Performed by: STUDENT IN AN ORGANIZED HEALTH CARE EDUCATION/TRAINING PROGRAM

## 2021-01-24 PROCEDURE — 80048 BASIC METABOLIC PNL TOTAL CA: CPT

## 2021-01-24 PROCEDURE — 99223 1ST HOSP IP/OBS HIGH 75: CPT | Performed by: INTERNAL MEDICINE

## 2021-01-24 PROCEDURE — 83605 ASSAY OF LACTIC ACID: CPT

## 2021-01-24 PROCEDURE — 6360000004 HC RX CONTRAST MEDICATION: Performed by: STUDENT IN AN ORGANIZED HEALTH CARE EDUCATION/TRAINING PROGRAM

## 2021-01-24 PROCEDURE — APPSS45 APP SPLIT SHARED TIME 31-45 MINUTES: Performed by: INTERNAL MEDICINE

## 2021-01-24 RX ORDER — BISACODYL 10 MG
10 SUPPOSITORY, RECTAL RECTAL DAILY PRN
Status: DISCONTINUED | OUTPATIENT
Start: 2021-01-24 | End: 2021-01-26

## 2021-01-24 RX ORDER — SODIUM CHLORIDE 9 MG/ML
INJECTION, SOLUTION INTRAVENOUS CONTINUOUS
Status: DISCONTINUED | OUTPATIENT
Start: 2021-01-24 | End: 2021-01-27

## 2021-01-24 RX ORDER — PROMETHAZINE HYDROCHLORIDE 25 MG/ML
6.25 INJECTION, SOLUTION INTRAMUSCULAR; INTRAVENOUS EVERY 4 HOURS PRN
Status: DISCONTINUED | OUTPATIENT
Start: 2021-01-24 | End: 2021-01-27 | Stop reason: HOSPADM

## 2021-01-24 RX ORDER — METOCLOPRAMIDE HYDROCHLORIDE 5 MG/ML
10 INJECTION INTRAMUSCULAR; INTRAVENOUS EVERY 6 HOURS
Status: DISCONTINUED | OUTPATIENT
Start: 2021-01-24 | End: 2021-01-27

## 2021-01-24 RX ADMIN — SODIUM CHLORIDE, PRESERVATIVE FREE 10 ML: 5 INJECTION INTRAVENOUS at 20:52

## 2021-01-24 RX ADMIN — SODIUM CHLORIDE, PRESERVATIVE FREE 10 ML: 5 INJECTION INTRAVENOUS at 20:46

## 2021-01-24 RX ADMIN — INSULIN LISPRO 2 UNITS: 100 INJECTION, SOLUTION INTRAVENOUS; SUBCUTANEOUS at 09:03

## 2021-01-24 RX ADMIN — FAMOTIDINE 20 MG: 10 INJECTION INTRAVENOUS at 08:55

## 2021-01-24 RX ADMIN — METOCLOPRAMIDE 10 MG: 5 INJECTION, SOLUTION INTRAMUSCULAR; INTRAVENOUS at 11:52

## 2021-01-24 RX ADMIN — BISACODYL 10 MG: 10 SUPPOSITORY RECTAL at 16:36

## 2021-01-24 RX ADMIN — ALBUTEROL SULFATE 2.5 MG: 2.5 SOLUTION RESPIRATORY (INHALATION) at 11:32

## 2021-01-24 RX ADMIN — INSULIN LISPRO 2 UNITS: 100 INJECTION, SOLUTION INTRAVENOUS; SUBCUTANEOUS at 16:48

## 2021-01-24 RX ADMIN — METOCLOPRAMIDE 10 MG: 5 INJECTION, SOLUTION INTRAMUSCULAR; INTRAVENOUS at 16:36

## 2021-01-24 RX ADMIN — ONDANSETRON 4 MG: 2 INJECTION INTRAMUSCULAR; INTRAVENOUS at 01:00

## 2021-01-24 RX ADMIN — SODIUM CHLORIDE: 9 INJECTION, SOLUTION INTRAVENOUS at 18:19

## 2021-01-24 RX ADMIN — SODIUM CHLORIDE, PRESERVATIVE FREE 10 ML: 5 INJECTION INTRAVENOUS at 20:44

## 2021-01-24 RX ADMIN — ALBUTEROL SULFATE 2.5 MG: 2.5 SOLUTION RESPIRATORY (INHALATION) at 07:56

## 2021-01-24 RX ADMIN — METOCLOPRAMIDE 10 MG: 5 INJECTION, SOLUTION INTRAMUSCULAR; INTRAVENOUS at 20:55

## 2021-01-24 RX ADMIN — ALBUTEROL SULFATE 2.5 MG: 2.5 SOLUTION RESPIRATORY (INHALATION) at 15:34

## 2021-01-24 RX ADMIN — Medication 240 ML: at 09:05

## 2021-01-24 RX ADMIN — IOPAMIDOL 75 ML: 755 INJECTION, SOLUTION INTRAVENOUS at 10:47

## 2021-01-24 RX ADMIN — ONDANSETRON 4 MG: 2 INJECTION INTRAMUSCULAR; INTRAVENOUS at 10:20

## 2021-01-24 RX ADMIN — POLYETHYLENE GLYCOL 3350, SODIUM SULFATE ANHYDROUS, SODIUM BICARBONATE, SODIUM CHLORIDE, POTASSIUM CHLORIDE 4000 ML: 236; 22.74; 6.74; 5.86; 2.97 POWDER, FOR SOLUTION ORAL at 18:18

## 2021-01-24 RX ADMIN — SODIUM CHLORIDE: 9 INJECTION, SOLUTION INTRAVENOUS at 05:58

## 2021-01-24 RX ADMIN — INSULIN GLARGINE 10 UNITS: 100 INJECTION, SOLUTION SUBCUTANEOUS at 08:59

## 2021-01-24 RX ADMIN — SODIUM CHLORIDE, PRESERVATIVE FREE 10 ML: 5 INJECTION INTRAVENOUS at 09:06

## 2021-01-24 RX ADMIN — INSULIN LISPRO 2 UNITS: 100 INJECTION, SOLUTION INTRAVENOUS; SUBCUTANEOUS at 14:35

## 2021-01-24 RX ADMIN — METHYLNALTREXONE BROMIDE 12 MG: 12 INJECTION, SOLUTION SUBCUTANEOUS at 12:45

## 2021-01-24 ASSESSMENT — PAIN SCALES - GENERAL
PAINLEVEL_OUTOF10: 7
PAINLEVEL_OUTOF10: 10

## 2021-01-24 NOTE — PROGRESS NOTES
receiving tapwater enema. GoLYTELY was then initiated via NG tube. Patient currently having liquid brown stool with hematochezia    Persistently tachycardic, mildly tachypneic. On room air    Insulin drip off. Bridged with Lantus 10 units daily, continue medium dose insulin sliding scale. Continue NS at 100 mL's per hour. Currently n.p.o. having nausea, vomiting , vomitus of 1.4L with the GoLYTELY bowel prep. Repeat CT abdomen today suggestive of ileus. Not able to hold the tap water enema very well. No stool formation yet . Milk of molasses enema today with limited success. Liquid stools. GI recommend Continue with GoLYTELY at slow pace of ~ 200 Ml/hr. Start Reglan 10 mg IV every 6 hours. Recommend Relistor 12 mg sub Q x 1 dose ( opioid antagonist). Discontinue cefepime, Flagyl.      Continuing to monitor urine output    Lactate 2.8 > 1.6  WBC 19.8 > 17.2 > 19.4 > 8.9 > 8.3  K 3.7  Mg 1.5 ( replaced )  Na 131 > 132  > 135 >130    procalcitonin 78.27      TODAY:     AWAKE & FOLLOWING COMMANDS: []   No  [x]   Yes                           SECRETIONS                 Amount:  []   Small []   Moderate []   Large []   None        Color: []   White []   Colored []   Bloody                         SEDATION:                 RAAS Score: []   +1 to -1 []   -2 []   -3 []   -4        Sedation Agent: []   Propofol gtt []   Versed gtt  []   Ativan gtt  [x]   No Sedation        Paralytic Agent: []   Precedex []   Norcuron []   Nimbex []                         VASOPRESSORS:  [x]   No  []   Yes            Vasopressor Agent []   Levophed []   Dopamine []   Vasopressin []   Dobutamine  []   Phenylephrine  []   Epinephrine     OBJECTIVE:     VITAL SIGNS:  Patient Vitals for the past 8 hrs:   BP Temp Pulse Resp SpO2   01/24/21 1300 (!) 159/77 -- 97 19 92 %   01/24/21 1200 (!) 180/81 97.9 °F (36.6 °C) 92 13 99 %   01/24/21 1132 -- -- -- 11 94 %   01/24/21 1000 (!) 157/90 -- 101 16 93 %   01/24/21 0900 (!) 154/77 -- 94 18 91 % 21 0800 -- 98.1 °F (36.7 °C) 81 17 100 %   21 0756 -- -- -- 18 94 %   21 0700 -- -- 92 23 --       Last Body weight:   Wt Readings from Last 3 Encounters:   21 169 lb 8.5 oz (76.9 kg)   21 161 lb (73 kg)   21 160 lb (72.6 kg)       Body Mass Index : Body mass index is 29.1 kg/m². Tmax over 24 hours: Temp (24hrs), Av.4 °F (36.9 °C), Min:97.9 °F (36.6 °C), Max:99.3 °F (37.4 °C)      Ins/Outs:   In:  [I.V.:]  Out: 5425 [Urine:8915]    PHYSICAL EXAM:  Constitutional: Appears well, no distress  EENT: PERRLA, EOMI, sclera clear, anicteric, oropharynx clear, no lesions, neck supple with midline trachea. Neck: Supple, symmetrical, trachea midline, no adenopathy, thyroid symmetric, no jvd skin normal  Respiratory: clear to auscultation, no wheezes or rales and unlabored breathing. No intercostal tenderness  Cardiovascular: regular rate and rhythm, normal S1, S2, no murmur noted and 2+ pulses throughout  Abdomen: soft, distended, tenderness improving.    Extremities:  peripheral pulses normal, no pedal edema, no clubbing or cyanosis    MEDICATIONS:  Scheduled Meds:   metoclopramide  10 mg Intravenous Q6H    insulin lispro  0-12 Units Subcutaneous 4x Daily    insulin glargine  10 Units Subcutaneous Daily    amLODIPine  5 mg Per NG tube Daily    levothyroxine  88 mcg Oral Daily    polyethylene glycol  4,000 mL Oral Once    sodium chloride flush  10 mL Intravenous 2 times per day    famotidine (PEPCID) injection  20 mg Intravenous Daily    albuterol  2.5 mg Nebulization 4x Daily    sodium chloride flush  10 mL Intravenous 2 times per day       Continuous Infusions:   sodium chloride 100 mL/hr at 21 0558    dextrose         PRN Meds:       bisacodyl, 10 mg, Daily PRN      promethazine, 6.25 mg, Q4H PRN      sodium chloride flush, 10 mL, PRN      promethazine, 12.5 mg, Q6H PRN    Or      ondansetron, 4 mg, Q6H PRN      albuterol sulfate HFA, 2 puff, Q4H PRN      glucose, 15 g, PRN      dextrose, 12.5 g, PRN      glucagon (rDNA), 1 mg, PRN      dextrose, 100 mL/hr, PRN      sodium chloride flush, 10 mL, PRN      LORazepam, 1 mg, Q1H PRN    Or      LORazepam, 1 mg, Q1H PRN    Or      LORazepam, 2 mg, Q1H PRN    Or      LORazepam, 2 mg, Q1H PRN    Or      LORazepam, 3 mg, Q1H PRN    Or      LORazepam, 3 mg, Q1H PRN    Or      LORazepam, 4 mg, Q1H PRN    Or      LORazepam, 4 mg, Q1H PRN      SUPPORT DEVICES: [] Ventilator [] BIPAP  [] Nasal Cannula [x] Room Air    POC VBG:  pH 7.318, pCO2 34.6, pO2 63.8    VENT SETTINGS (Comprehensive) (if applicable):  N/A  Vent Information  Skin Assessment: Clean, dry, & intact  FiO2 : 35 %  SpO2: 92 %  SpO2/FiO2 ratio: 277.14  Additional Respiratory  Assessments  Pulse: 97  Resp: 19  SpO2: 92 %  Lab Results   Component Value Date    MODE NOT REPORTED 01/22/2021     DATA:  Complete Blood Count:   Recent Labs     01/22/21  0449 01/23/21  0621 01/23/21  1220 01/24/21  0505   WBC 19.4* 8.3  --  8.9   RBC 4.54 3.44*  --  3.65*   HGB 14.1 10.7* 11.6* 11.1*   HCT 44.5 31.7* 34.0* 33.3*   MCV 98.0 92.2  --  91.2   MCH 31.1 31.1  --  30.4   MCHC 31.7 33.8  --  33.3   RDW 13.4 13.3  --  13.3    189  --  194   MPV 10.8 11.2  --  10.2      Last 3 Blood Glucose:   Recent Labs     01/21/21  1816 01/22/21  0130 01/23/21  0621 01/23/21  0921 01/24/21  0505   GLUCOSE 386* 472* 141* 158* 201*      PT/INR:    Lab Results   Component Value Date    PROTIME 11.0 01/21/2021    INR 1.0 01/21/2021     PTT:    Lab Results   Component Value Date    APTT 21.4 01/21/2021     Basic Metabolic Profile:   Recent Labs     01/23/21  0621 01/23/21  0921 01/24/21  0505   * 135 130*   K 3.1* 3.0* 3.7   CL 97* 95* 92*   CO2 27 30 26   BUN 18 17 14   CREATININE 1.12* 1.09* 1.06*   GLUCOSE 141* 158* 201*       TSH:    Lab Results   Component Value Date    TSH 0.01 01/21/2021     Lactic Acid:   Lab Results   Component Value Date    LACTA 3.7 MAKIN. Acute toxic metabolic encephalopathy due to unknown etiology  - Resolved  - CT head unremarkable  - Electrolytes within normal limits on admission  - Continue to monitor    2. Active GI bleed likely secondary to Stercoral colitis  - On eliquis at home, reversed with Kcentra and [address]  - Hgb 14.1 > 10.7 >11.6 >11.1  - Trend H&H daily  - Discontinue protonix drip and continue IV pepcid  20 DAILY  -  Not able to hold the tap water enema very well. No stool formation yet . Milk of molasses enema today with limited success. Liquid stools. - GI recommend GoLYTELY at slow pace of ~ 200 Ml/hr  - Start Reglan 10 mg IV every 6 hours. - Recommend Relistor 12 mg sub Q x 1 dose ( opioid antagonist). - Plan for colonoscopy +/- EGD early next week    - Gen surg - no surgical interventions at this time. Will sign off.   - IM phenergan PRN q 6 hours for 3 doses. 3. Lactic acidosis  - Resolving  - Lactate 4.3 > 2.8 >1.6  - Bicarb improved  - Continue IV hydration NS @100ml/hr    4. Hyperglycemia  -  Bridged with Lantus 10 units daily, start medium dose insulin sliding scale. - Hypoglycemia protocols   - Electrolytes replaced  - Monitor POCT Glucose 4 x daily  -Currently npo. Advance diet as tolerated. 5. EtOH and polysubstance abuse  - CIWA protocol    6. Hypothyroidism  - TSH 0.01, T4 1.02  - Start levothyroxine 88 mcg PO daily (Start when patient tolerating PO diet)    7. A-fib  - On eliquis at home, reversed on Kcentra and [address]  - Continue to monitor  - Holding anticoagulation due to GI bleed    8.  Buddhist  - Refusing blood products  - Accepting EPO and iron  - Minimize blood draws      CODE STATUS: Full Code    DISPOSITION:  [x] To remain ICU  [] OK for out of ICU from [facility], MD  Emergency Medicine Resident  [address]  2021, 7:41 AM EST

## 2021-01-24 NOTE — PROGRESS NOTES
Dr. Kylee Sanchez called to notify of large emesis and 450 of NG output of bile. Start bowel prep slowly in four hours after 4hrs of LIWS.    Will see in am

## 2021-01-24 NOTE — PROGRESS NOTES
First 4000ml Darion completed. 850 ml given over 4hrs. Patient tolerated without emesis. 3 emergent, unmeasured stools passed. Stools mixed with urine in commode.

## 2021-01-24 NOTE — FLOWSHEET NOTE
Dr. Abilio Judd notified of pt vomiting dark green bile, at request of critical care resident. Advised not to call because event was not urgent. RN notified Dr. Joshua Raza.    Electronically signed by Devi Miles RN on 1/24/2021 at 7:19 AM

## 2021-01-24 NOTE — PROGRESS NOTES
**OR** LORazepam **OR** LORazepam **OR** LORazepam **OR** LORazepam **OR** LORazepam      Data Review:  LABS and IMAGING:     CBC  Recent Labs     01/21/21  1816 01/22/21  0130 01/22/21  0449 01/23/21  0621 01/23/21  1220 01/24/21  0505   WBC 19.8* 17.2* 19.4* 8.3  --  8.9   HGB 16.7* 14.5 14.1 10.7* 11.6* 11.1*   HCT 48.8* 46.3 44.5 31.7* 34.0* 33.3*   MCV 90.0 98.7 98.0 92.2  --  91.2   MCHC 34.2 31.3 31.7 33.8  --  33.3   RDW 13.2 13.2 13.4 13.3  --  13.3    222 228 189  --  194       Immature PLTs   No results found for: PLTFLUORE    ANEMIA STUDIES  No results for input(s): LABIRON, TIBC, IRON, FERRITIN, DWFIXUOQ88, FOLATE, OCCULTBLD in the last 72 hours.     BMP  Recent Labs     01/23/21  0621 01/23/21  0921 01/24/21  0505   * 135 130*   K 3.1* 3.0* 3.7   CL 97* 95* 92*   CO2 27 30 26   BUN 18 17 14   CREATININE 1.12* 1.09* 1.06*   GLUCOSE 141* 158* 201*   CALCIUM 7.9* 8.3* 8.8       LFTS  Recent Labs     01/21/21  1816   ALKPHOS 210*   ALT 26   AST 30   BILITOT 1.01   LABALBU 3.5       AMYLASE/LIPASE/AMMONIA  Recent Labs     01/21/21  1816 01/22/21  1158   LIPASE 41  --    AMMONIA  --  34       Acute Hepatitis Panel   No results found for: HEPBSAG, HEPCAB, HEPBIGM, HEPAIGM    HCV Genotype   No results found for: HEPATITISCGENOTYPE    HCV Quantitative   No results found for: HCVQNT    LIVER WORK UP:    AFP  No results found for: AFP    Alpha 1 antitrypsin   No results found for: A1A    Anti - Liver/Kidney Ab  No results found for: LIVER-KIDNEYMICROSOMALAB    CHANEL  Lab Results   Component Value Date    CHANEL NEGATIVE 08/10/2018       AMA  No results found for: Felton Mirtha    ASMA  No results found for: SMOOTHMUSCAB    Ceruloplasmin  No results found for: CERULOPLSM    Celiac panel  No results found for: TISSTRNTIIGG, TTGIGA, IGA    IgG  Lab Results   Component Value Date    IGG 1423 02/25/2016       IgM  No results found for: IGM    GGT   No results found for: LABGGT    PT/INR  Recent Labs     01/21/21  1816 PROTIME 11.0   INR 1.0       Cancer Markers:  CEA:  No results for input(s): CEA in the last 72 hours. Ca 125:  No results for input(s):  in the last 72 hours. Ca 19-9:   No results for input(s):  in the last 72 hours. AFP: No results for input(s): AFP in the last 72 hours. Lactic acid:  Recent Labs     01/23/21  0921 01/23/21  1220 01/23/21  2339   LACTACIDWB 2.7* 2.8* 1.6         Radiology Review:     Ct Abdomen Pelvis Wo Contrast Additional Contrast? None    Result Date: 1/21/2021  EXAMINATION: CT ABDOMEN PELVIS WO CONTRAST HISTORY: Reason for exam:->lower abdominal pain 52-year-old female. Contrast withheld due to low GFR. COMPARISON: CT abdomen pelvis 8/6/2020. TECHNIQUE: CT examination of the abdomen and pelvis without IV contrast. Coronal and sagittal reformations were performed. Dose reduction techniques were achieved by using automated exposure control and/or adjustment of mA and/or kV according to patient size and/or use of iterative reconstruction technique. FINDINGS: Lung bases/lung windows: Lung bases are clear. No free air. Upper abdomen/lower chest: Right coronary artery calcifications. Small hiatal hernia, 2.7 cm. Liver has a normal Hounsfield attenuation today. Normal size spleen. Pancreas and gallbladder are normal. Retroperitoneum: Normal adrenal glands and kidneys. Moderate plaque aorta without aneurysm. No retroperitoneal adenopathy. The bowel shows prominent dilatation of colon with a long air-fluid level in the transverse colon. This dilatation is followed down to impacted stool in the sigmoid and rectum without other mechanical obstruction. Pelvis: Normal appendix. Small postmenopausal uterus. Bladder contour normal. Minimal fluid in the cul-de-sac. The abdominal wall intact. Bone windows: Degenerative changes L4-L5, moderate. Impacted stool throughout the sigmoid colon and rectum causing dilatation of the more proximal colon to the cecum.  The cecum measures 7 cm in diameter. Small amount of fluid in the cul-de-sac. Cta Abdomen Pelvis W Wo Contrast    Result Date: 1/21/2021  EXAMINATION: CTA ABDOMEN PELVIS W WO CONTRAST HISTORY: Reason for exam:->Abdominal pain, recently passed bloody stool. Possible ischemic bowel. Elevating lactic acid. COMPARISON: CT scan without contrast 0841 hours same date. TECHNIQUE: Isovue 370, 75 mL intravenously. Arterial and venous phases were performed. MIP (maximum intensity projection) images were performed. Dose reduction techniques were achieved by using automated exposure control and/or adjustment of mA and/or kV according to patient size and/or use of iterative reconstruction technique. FINDINGS: There is somewhat less stool in the sigmoid colon after the patient recently passed a bloody stool, however the distal sigmoid and rectum still are impacted by stool and the colon remains markedly dilated with fluid without bowel wall thickening. There is normal enhancement of the bowel wall. No pneumatosis. The celiac axis and the SMA are patent. There is no aortic dissection. Normal enhancement of the kidneys, adrenal glands, pancreas, spleen and liver. There is increasing free fluid in the right lower quadrant and the pelvis compared to earlier today. The lung bases remain clear. There is no free air. The bone windows are unchanged. The colon remains obstructed by impacted stool in the distal sigmoid and rectum with a large amount of stool from rectum to cecum. Increasing third space fluid in the lower abdomen and pelvis. No pneumatosis or evidence of high-grade arterial obstruction. Normal bowel wall enhancement. The findings were discussed with Dr. Lolly Chan in the ED at 2:35 PM.         ENDOSCOPY     Active Problems:    GI bleed    Patient is [employer]    Dehydration  Resolved Problems:    * No resolved hospital problems. *       GI Assessment:    1.  Rectal bleeding - likely secondary to stercoral colitis related to constipation/fecal impaction vs hemorrhoids vs mass  2. Constipation   3. History of polysubstance abuse      Plan of care:  1. Continue with GoLYTELY at slow pace of ~ 200 Ml/hr  2. Start Reglan 10 mg IV every 6 hours  3. Recommend Relistor 12 mg sub Q x 1 dose  4. Plan for colonoscopy +/- EGD when constipation resolved  5. Trend H/H  6. Serial abdominal examination      Please feel free to contact me with any questions or concerns. Thank you for allowing me to participate in the care of your patient. Romel Camarena, APRN - CNP on 1/24/2021 at 11:30 [address] [place] [address] Gastroenterology    Please note that this note was generated using a voice recognition dictation software. Although every effort was made to ensure the accuracy of this automated transcription, some errors in transcription may have occurred.

## 2021-01-24 NOTE — PROGRESS NOTES
04/06/2016    Asthma with COPD ([facility] 10/21/2015    Graves' disease 04/30/2014    Paroxysmal atrial fibrillation ([facility] 04/23/2014       Assessment:    1. Acute toxic metabolic encephalopathy due to unknown etiology  - Resolved  - CT head unremarkable  - Electrolytes within normal limits on admission  - Continue to monitor     2. Active GI bleed likely secondary to Stercoral colitis  - On eliquis at home, reversed with Kcentra and [address]  - Hgb 14.1 > 10.7 >11.6 >11.1  - Trend H&H daily  - Discontinue protonix drip and continue IV pepcid  20 DAILY  -  Not able to hold the tap water enema very well. No stool formation yet . Milk of molasses enema today with limited success. Liquid stools. - GI recommend GoLYTELY at slow pace of ~ 200 Ml/hr  - Start Reglan 10 mg IV every 6 hours. - Recommend Relistor 12 mg sub Q x 1 dose ( opioid antagonist). - Plan for colonoscopy +/- EGD early next week    - Gen surg - no surgical interventions at this time. Will sign off.   - IM phenergan PRN q 6 hours for 3 doses.      3. Lactic acidosis  - Resolving  - Lactate 4.3 > 2.8 >1.6  - Bicarb improved  - Continue IV hydration NS @100ml/hr     4. Hyperglycemia  -  Bridged with Lantus 10 units daily, start medium dose insulin sliding scale. - Hypoglycemia protocols   - Electrolytes replaced  - Monitor POCT Glucose 4 x daily  -Currently npo. Advance diet as tolerated.     5. EtOH and polysubstance abuse  - CIWA protocol     6. Hypothyroidism  - TSH 0.01, T4 1.02  - Start levothyroxine 88 mcg PO daily (Start when patient tolerating PO diet)     7. A-fib  - On eliquis at home, reversed on Kcentra and [address]  - Continue to monitor  - Holding anticoagulation due to GI bleed     8. [employer]  - Refusing blood products  - Accepting EPO and iron  - Minimize blood draws       Recommended Follow-up:     1. Follow up on electrolyte panel, considering her active vomiting  2. Monitor off abx  3. Follow up on bowel movement, ileus  4.  Follow up on GI recs        Above mentioned assessment and plan was discussed by me with the admitting medicine resident. The medicine team assigned to the patient by medicine admitting resident will be following up the patient from now onwards on the floor. Jacque Mcleod M.D.   Internal Medicine PGY1  1/24/2021, 3:36 PM

## 2021-01-25 LAB
ABSOLUTE EOS #: 0.13 K/UL (ref 0–0.44)
ABSOLUTE IMMATURE GRANULOCYTE: 0.04 K/UL (ref 0–0.3)
ABSOLUTE LYMPH #: 1.5 K/UL (ref 1.1–3.7)
ABSOLUTE MONO #: 0.6 K/UL (ref 0.1–1.2)
ANION GAP SERPL CALCULATED.3IONS-SCNC: 12 MMOL/L (ref 9–17)
ANION GAP SERPL CALCULATED.3IONS-SCNC: 7 MMOL/L (ref 9–17)
BASOPHILS # BLD: 1 % (ref 0–2)
BASOPHILS ABSOLUTE: 0.06 K/UL (ref 0–0.2)
BUN BLDV-MCNC: 10 MG/DL (ref 8–23)
BUN BLDV-MCNC: 10 MG/DL (ref 8–23)
BUN/CREAT BLD: ABNORMAL (ref 9–20)
BUN/CREAT BLD: ABNORMAL (ref 9–20)
CALCIUM SERPL-MCNC: 8.3 MG/DL (ref 8.6–10.4)
CALCIUM SERPL-MCNC: 8.8 MG/DL (ref 8.6–10.4)
CHLORIDE BLD-SCNC: 101 MMOL/L (ref 98–107)
CHLORIDE BLD-SCNC: 102 MMOL/L (ref 98–107)
CO2: 25 MMOL/L (ref 20–31)
CO2: 27 MMOL/L (ref 20–31)
CREAT SERPL-MCNC: 0.86 MG/DL (ref 0.5–0.9)
CREAT SERPL-MCNC: 0.92 MG/DL (ref 0.5–0.9)
DIFFERENTIAL TYPE: ABNORMAL
EOSINOPHILS RELATIVE PERCENT: 2 % (ref 1–4)
GFR AFRICAN AMERICAN: >60 ML/MIN
GFR AFRICAN AMERICAN: >60 ML/MIN
GFR NON-AFRICAN AMERICAN: >60 ML/MIN
GFR NON-AFRICAN AMERICAN: >60 ML/MIN
GFR SERPL CREATININE-BSD FRML MDRD: ABNORMAL ML/MIN/{1.73_M2}
GLUCOSE BLD-MCNC: 135 MG/DL (ref 65–105)
GLUCOSE BLD-MCNC: 151 MG/DL (ref 65–105)
GLUCOSE BLD-MCNC: 152 MG/DL (ref 70–99)
GLUCOSE BLD-MCNC: 160 MG/DL (ref 65–105)
GLUCOSE BLD-MCNC: 169 MG/DL (ref 65–105)
GLUCOSE BLD-MCNC: 187 MG/DL (ref 70–99)
HCT VFR BLD CALC: 32.9 % (ref 36.3–47.1)
HEMOGLOBIN: 10.7 G/DL (ref 11.9–15.1)
IMMATURE GRANULOCYTES: 1 %
LYMPHOCYTES # BLD: 21 % (ref 24–43)
MAGNESIUM: 1.9 MG/DL (ref 1.6–2.6)
MCH RBC QN AUTO: 30.4 PG (ref 25.2–33.5)
MCHC RBC AUTO-ENTMCNC: 32.5 G/DL (ref 28.4–34.8)
MCV RBC AUTO: 93.5 FL (ref 82.6–102.9)
MONOCYTES # BLD: 8 % (ref 3–12)
NRBC AUTOMATED: 0 PER 100 WBC
PDW BLD-RTO: 13.7 % (ref 11.8–14.4)
PLATELET # BLD: 226 K/UL (ref 138–453)
PLATELET ESTIMATE: ABNORMAL
PMV BLD AUTO: 10.3 FL (ref 8.1–13.5)
POTASSIUM SERPL-SCNC: 3.1 MMOL/L (ref 3.7–5.3)
POTASSIUM SERPL-SCNC: 5.3 MMOL/L (ref 3.7–5.3)
RBC # BLD: 3.52 M/UL (ref 3.95–5.11)
RBC # BLD: ABNORMAL 10*6/UL
SEG NEUTROPHILS: 67 % (ref 36–65)
SEGMENTED NEUTROPHILS ABSOLUTE COUNT: 4.87 K/UL (ref 1.5–8.1)
SODIUM BLD-SCNC: 135 MMOL/L (ref 135–144)
SODIUM BLD-SCNC: 139 MMOL/L (ref 135–144)
WBC # BLD: 7.2 K/UL (ref 3.5–11.3)
WBC # BLD: ABNORMAL 10*3/UL

## 2021-01-25 PROCEDURE — 6360000002 HC RX W HCPCS: Performed by: STUDENT IN AN ORGANIZED HEALTH CARE EDUCATION/TRAINING PROGRAM

## 2021-01-25 PROCEDURE — 6370000000 HC RX 637 (ALT 250 FOR IP): Performed by: STUDENT IN AN ORGANIZED HEALTH CARE EDUCATION/TRAINING PROGRAM

## 2021-01-25 PROCEDURE — 99232 SBSQ HOSP IP/OBS MODERATE 35: CPT | Performed by: INTERNAL MEDICINE

## 2021-01-25 PROCEDURE — 97161 PT EVAL LOW COMPLEX 20 MIN: CPT

## 2021-01-25 PROCEDURE — 1200000000 HC SEMI PRIVATE

## 2021-01-25 PROCEDURE — 97530 THERAPEUTIC ACTIVITIES: CPT

## 2021-01-25 PROCEDURE — APPSS45 APP SPLIT SHARED TIME 31-45 MINUTES: Performed by: INTERNAL MEDICINE

## 2021-01-25 PROCEDURE — 82947 ASSAY GLUCOSE BLOOD QUANT: CPT

## 2021-01-25 PROCEDURE — 97535 SELF CARE MNGMENT TRAINING: CPT

## 2021-01-25 PROCEDURE — 83735 ASSAY OF MAGNESIUM: CPT

## 2021-01-25 PROCEDURE — 97166 OT EVAL MOD COMPLEX 45 MIN: CPT

## 2021-01-25 PROCEDURE — 80048 BASIC METABOLIC PNL TOTAL CA: CPT

## 2021-01-25 PROCEDURE — 2500000003 HC RX 250 WO HCPCS: Performed by: STUDENT IN AN ORGANIZED HEALTH CARE EDUCATION/TRAINING PROGRAM

## 2021-01-25 PROCEDURE — 94760 N-INVAS EAR/PLS OXIMETRY 1: CPT

## 2021-01-25 PROCEDURE — 2580000003 HC RX 258: Performed by: STUDENT IN AN ORGANIZED HEALTH CARE EDUCATION/TRAINING PROGRAM

## 2021-01-25 PROCEDURE — 85025 COMPLETE CBC W/AUTO DIFF WBC: CPT

## 2021-01-25 PROCEDURE — 36415 COLL VENOUS BLD VENIPUNCTURE: CPT

## 2021-01-25 PROCEDURE — 94640 AIRWAY INHALATION TREATMENT: CPT

## 2021-01-25 RX ORDER — LABETALOL HYDROCHLORIDE 5 MG/ML
10 INJECTION, SOLUTION INTRAVENOUS EVERY 4 HOURS PRN
Status: DISCONTINUED | OUTPATIENT
Start: 2021-01-25 | End: 2021-01-27 | Stop reason: HOSPADM

## 2021-01-25 RX ORDER — MAGNESIUM SULFATE IN WATER 40 MG/ML
2000 INJECTION, SOLUTION INTRAVENOUS ONCE
Status: COMPLETED | OUTPATIENT
Start: 2021-01-25 | End: 2021-01-25

## 2021-01-25 RX ORDER — POTASSIUM CHLORIDE 7.45 MG/ML
10 INJECTION INTRAVENOUS
Status: DISCONTINUED | OUTPATIENT
Start: 2021-01-25 | End: 2021-01-25

## 2021-01-25 RX ORDER — AMLODIPINE BESYLATE 10 MG/1
10 TABLET ORAL DAILY
Status: DISCONTINUED | OUTPATIENT
Start: 2021-01-25 | End: 2021-01-25

## 2021-01-25 RX ORDER — ATORVASTATIN CALCIUM 40 MG/1
40 TABLET, FILM COATED ORAL DAILY
Status: DISCONTINUED | OUTPATIENT
Start: 2021-01-25 | End: 2021-01-27 | Stop reason: HOSPADM

## 2021-01-25 RX ORDER — DILTIAZEM HYDROCHLORIDE 120 MG/1
120 CAPSULE, COATED, EXTENDED RELEASE ORAL DAILY
Status: DISCONTINUED | OUTPATIENT
Start: 2021-01-25 | End: 2021-01-27 | Stop reason: HOSPADM

## 2021-01-25 RX ADMIN — POTASSIUM BICARBONATE 40 MEQ: 782 TABLET, EFFERVESCENT ORAL at 13:44

## 2021-01-25 RX ADMIN — MAGNESIUM SULFATE 2000 MG: 2 INJECTION INTRAVENOUS at 13:44

## 2021-01-25 RX ADMIN — FAMOTIDINE 20 MG: 10 INJECTION INTRAVENOUS at 09:05

## 2021-01-25 RX ADMIN — DILTIAZEM HYDROCHLORIDE 120 MG: 120 CAPSULE, COATED, EXTENDED RELEASE ORAL at 09:06

## 2021-01-25 RX ADMIN — METOPROLOL TARTRATE 25 MG: 25 TABLET ORAL at 00:26

## 2021-01-25 RX ADMIN — INSULIN GLARGINE 10 UNITS: 100 INJECTION, SOLUTION SUBCUTANEOUS at 09:06

## 2021-01-25 RX ADMIN — ALBUTEROL SULFATE 2.5 MG: 2.5 SOLUTION RESPIRATORY (INHALATION) at 16:16

## 2021-01-25 RX ADMIN — SODIUM CHLORIDE: 9 INJECTION, SOLUTION INTRAVENOUS at 06:16

## 2021-01-25 RX ADMIN — POTASSIUM BICARBONATE 40 MEQ: 782 TABLET, EFFERVESCENT ORAL at 09:05

## 2021-01-25 RX ADMIN — ALBUTEROL SULFATE 2.5 MG: 2.5 SOLUTION RESPIRATORY (INHALATION) at 21:14

## 2021-01-25 RX ADMIN — METOCLOPRAMIDE 10 MG: 5 INJECTION, SOLUTION INTRAMUSCULAR; INTRAVENOUS at 13:44

## 2021-01-25 RX ADMIN — METOPROLOL TARTRATE 25 MG: 25 TABLET ORAL at 21:37

## 2021-01-25 RX ADMIN — METOPROLOL TARTRATE 25 MG: 25 TABLET ORAL at 09:06

## 2021-01-25 RX ADMIN — METOCLOPRAMIDE 10 MG: 5 INJECTION, SOLUTION INTRAMUSCULAR; INTRAVENOUS at 23:32

## 2021-01-25 RX ADMIN — METOCLOPRAMIDE 10 MG: 5 INJECTION, SOLUTION INTRAMUSCULAR; INTRAVENOUS at 06:14

## 2021-01-25 RX ADMIN — METOCLOPRAMIDE 10 MG: 5 INJECTION, SOLUTION INTRAMUSCULAR; INTRAVENOUS at 18:11

## 2021-01-25 RX ADMIN — ALBUTEROL SULFATE 2.5 MG: 2.5 SOLUTION RESPIRATORY (INHALATION) at 12:22

## 2021-01-25 RX ADMIN — LEVOTHYROXINE SODIUM 88 MCG: 88 TABLET ORAL at 09:05

## 2021-01-25 RX ADMIN — DESMOPRESSIN ACETATE 40 MG: 0.2 TABLET ORAL at 09:06

## 2021-01-25 RX ADMIN — ALBUTEROL SULFATE 2.5 MG: 2.5 SOLUTION RESPIRATORY (INHALATION) at 08:52

## 2021-01-25 ASSESSMENT — PAIN - FUNCTIONAL ASSESSMENT
PAIN_FUNCTIONAL_ASSESSMENT: ACTIVITIES ARE NOT PREVENTED
PAIN_FUNCTIONAL_ASSESSMENT: ACTIVITIES ARE NOT PREVENTED

## 2021-01-25 ASSESSMENT — PAIN DESCRIPTION - LOCATION
LOCATION: BACK
LOCATION: BACK

## 2021-01-25 ASSESSMENT — PAIN DESCRIPTION - ORIENTATION: ORIENTATION: LOWER;UPPER;MID

## 2021-01-25 ASSESSMENT — PAIN DESCRIPTION - DESCRIPTORS: DESCRIPTORS: ACHING

## 2021-01-25 NOTE — PROGRESS NOTES
(degenerative disc disease), Diabetes mellitus ([facility] [address] Hyperlipidemia, Hypertension, Hyperthyroidism, and Type II or unspecified type diabetes mellitus without mention of complication, not stated as uncontrolled. has a past surgical history that includes Tubal ligation; Tonsillectomy; Colonoscopy (04/23/14); back surgery; Upper gastrointestinal endoscopy (3/16/2016); tracheostomy; tracheostomy closure; Gastrostomy tube placement; gastrostomy tube change; Dental surgery (N/A, 3/8/2017); and Insert Midline Catheter (1/22/2021). Restrictions  Restrictions/Precautions  Restrictions/Precautions: Fall Risk, Seizure, Up as Tolerated  Required Braces or Orthoses?: No    Subjective   General  Patient assessed for rehabilitation services?: Yes  Family / Caregiver Present: No  General Comment  Comments: RN ok'd pt for OT/PT eval this date.  Pt agreeable to session and pleasant/cooperative throughout   Patient Currently in Pain: Yes  Pain Assessment  Pain Assessment: 0-10  Pain Level: 8  Pain Type: Chronic pain  Pain Location: Back  Pain Descriptors: Aching  Functional Pain Assessment: Activities are not prevented  Response to Pain Intervention: Patient Satisfied  Pre Treatment Pain Screening  Intervention List: Patient able to continue with treatment  Vital Signs  Patient Currently in Pain: Yes     Social/Functional History  Social/Functional History  Lives With: Family(Brother; pt reports brother is not able to assist pt and is not home at all times)  Type of Home: Apartment  Home Layout: One level  Home Access: Stairs to enter without rails  Entrance Stairs - Number of Steps: 2  Bathroom Shower/Tub: Tub/Shower unit  Bathroom Toilet: Standard  Bathroom Equipment: Shower chair, Grab bars in shower  Home Equipment: (no DME use at baseline)  ADL Assistance: Independent  Homemaking Assistance: Independent(pt reports brother completes heavy homemaking tasks; pt reports can complete own cooking, cleaning, laundry)  Homemaking Responsibilities: Yes  Meal Prep Responsibility: Primary  Laundry Responsibility: Primary  Cleaning Responsibility: Primary  Bill Paying/Finance Responsibility: Primary  Shopping Responsibility: Primary  Health Care Management: Primary  Ambulation Assistance: Independent  Transfer Assistance: Independent  Active : No  Patient's  Info: Brother  Occupation: Retired  Type of occupation: Retired   Leisure & Hobbies: pt enjoys reading, go sightseeing, walking       Objective   Vision: Impaired  Vision Exceptions: Wears glasses for reading  Hearing: Within functional limits         Balance  Sitting Balance: Stand by assistance  Standing Balance: Stand by assistance(SBA static, CGA dynamic)  Standing Balance  Time: ~2 minutes  Activity: hand hygiene, toileting tasks, conversing with therapist  Functional Mobility  Functional - Mobility Device: No device  Activity: To/from bathroom; Other  Assist Level: Contact guard assistance  Functional Mobility Comments: Pt completed functional mobility within hospital room, to/from bathroom, and within hallway with CGA with occasional Min A required d/t unsteadiness and balance deficits.  2 minor LOB noted, self corrected  Toilet Transfers  Toilet - Technique: Stand step  Equipment Used: Standard toilet  Toilet Transfer: Contact guard assistance     ADL  Feeding: Independent;Setup  Grooming: Stand by assistance;Setup(SBA while pt completed hand hygiene and wiped down counter standing sinkside)  UE Bathing: Contact guard assistance;Setup  LE Bathing: Contact guard assistance;Setup  UE Dressing: Setup;Contact guard assistance  LE Dressing: Contact guard assistance;Setup(CGA to don socks seated EOB)  Toileting: Contact guard assistance;Setup(CGA for toileting task; setup for pericare items)  Tone RUE  RUE Tone: Normotonic  Tone LUE  LUE Tone: Normotonic  Coordination  Movements Are Fluid And Coordinated: Yes    Bed mobility  Supine to Sit: Contact guard assistance  Sit to Supine: Contact guard assistance  Scooting: Stand by assistance  Comment: HOB elevated ~40 degrees  Transfers  Sit to stand: Contact guard assistance  Stand to sit: Contact guard assistance    Cognition  Overall Cognitive Status: Exceptions  Safety Judgement: Decreased awareness of need for assistance;Decreased awareness of need for safety  Insights: Decreased awareness of deficits       Sensation  Overall Sensation Status: WFL        LUE AROM (degrees)  LUE AROM : WFL  Left Hand AROM (degrees)  Left Hand AROM: WFL  RUE AROM (degrees)  RUE AROM : WFL  Right Hand AROM (degrees)  Right Hand AROM: WFL  LUE Strength  Gross LUE Strength: WFL  L Hand General: 4+/5  LUE Strength Comment: grossly 4+/5  RUE Strength  Gross RUE Strength: WFL  R Hand General: 4+/5  RUE Strength Comment: grossly 4+/5                   Plan   Plan  Times per week: 3-4 x/wk  Current Treatment Recommendations: Balance Training, Functional Mobility Training, Safety Education & Training, Self-Care / ADL, Equipment Evaluation, Education, & procurement, Patient/Caregiver Education & Training, Home Management Training    AM-PAC Score        -[facility] Inpatient Daily Activity Raw Score: 19 (01/25/21 1524)  -PAC Inpatient ADL T-Scale Score : 40.22 (01/25/21 1524)  ADL Inpatient CMS 0-100% Score: 42.8 (01/25/21 1524)  ADL Inpatient CMS G-Code Modifier : CK (01/25/21 1524)    Goals  Short term goals  Time Frame for Short term goals: By discharge, pt will:  Short term goal 1: Demo Mod I with functional transfers/mobility with use of LRD PRN  Short term goal 2: Demo I with setup for UB ADLs and grooming tasks  Short term goal 3: Demo I with setup for LB ADLs and toileting tasks  Short term goal 4: Demo 10+ minutes dynamic standing and reaching task to increase balance for safe ADL/IADL performance  Short term goal 5: Demo good safety awareness throughout therapy session with <2 VCs       Therapy Time   Individual Concurrent Group Co-treatment   Time In 0946         Time Out 1012         Minutes 26         Timed Code Treatment Minutes: [address]       Dianna Tucker OTR/L

## 2021-01-25 NOTE — PROGRESS NOTES
Physical Therapy    Facility/Department: [facility] RENAL//MED SURG  Initial Assessment    NAME: Josr Stallworth  : 1955  MRN: [id number]    Date of Service: 2021  Chief Complaint   Patient presents with    GI Bleeding     Discharge Recommendations:    No further therapy required at discharge. Assessment   Assessment: The pt ambulated 300 ft without a device x SBA. She ambulated with no significant difficulties and with no reported increase in pain. No further PT intervention is needed at this time  Prognosis: Good  Decision Making: Medium Complexity  PT Education: Goals;PT Role;Plan of Care  REQUIRES PT FOLLOW UP: No  Activity Tolerance  Activity Tolerance: Patient Tolerated treatment well   Patient Diagnosis(es): The primary encounter diagnosis was Dehydration. Diagnoses of Transient alteration of awareness, Intracranial hemorrhage ([address] Refusal of blood transfusions as patient is L[employer], Gastrointestinal hemorrhage, unspecified gastrointestinal hemorrhage type, SHAHRAM (acute kidney injury) ([address] Metabolic acidosis, Constipation, unspecified constipation type, and Generalized abdominal pain were also pertinent to this visit. has a past medical history of Asthma, Atrial fibrillation ([address] COPD (chronic obstructive pulmonary disease) ([address] DDD (degenerative disc disease), Diabetes mellitus ([address] Hyperlipidemia, Hypertension, Hyperthyroidism, and Type II or unspecified type diabetes mellitus without mention of complication, not stated as uncontrolled. has a past surgical history that includes Tubal ligation; Tonsillectomy; Colonoscopy (14); back surgery; Upper gastrointestinal endoscopy (3/16/2016); tracheostomy; tracheostomy closure; Gastrostomy tube placement; gastrostomy tube change; Dental surgery (N/A, 3/8/2017); and Insert Midline Catheter (2021).     Restrictions  Restrictions/Precautions  Restrictions/Precautions: Fall Risk, Seizure, Up as Tolerated  Required Braces or Orthoses?: No  Vision/Hearing  Vision: Impaired  Vision Exceptions: Wears glasses for reading  Hearing: Within functional limits     Subjective  General  Patient assessed for rehabilitation services?: Yes  Response To Previous Treatment: Not applicable  Family / Caregiver Present: No  Follows Commands: Within Functional Limits  Subjective  Subjective: RN and pt agreeable to PT eval  Pain Screening  Patient Currently in Pain: Yes  Pain Assessment  Pain Assessment: 0-10  Pain Level: 8  Pain Location: Back  Vital Signs  Patient Currently in Pain: Yes     Orientation  Orientation  Overall Orientation Status: Within Normal Limits  Social/Functional History  Social/Functional History  Lives With: Family(Brother; pt reports brother is not able to assist pt and is not home at all times)  Type of Home: Apartment  Home Layout: One level  Home Access: Stairs to enter without rails  Entrance Stairs - Number of Steps: 2  Bathroom Shower/Tub: Tub/Shower unit  Bathroom Toilet: Standard  Bathroom Equipment: Shower chair, Grab bars in shower  Home Equipment: (no DME use at baseline)  ADL Assistance: Independent  Homemaking Assistance: Independent(pt reports brother completes heavy homemaking tasks; pt reports can complete own cooking, cleaning, laundry)  Homemaking Responsibilities: Yes  Meal Prep Responsibility: Primary  Laundry Responsibility: Primary  Cleaning Responsibility: Primary  Bill Paying/Finance Responsibility: Primary  Shopping Responsibility: Primary  Health Care Management: Primary  Ambulation Assistance: Independent  Transfer Assistance: Independent  Active : No  Patient's  Info: Brother  Occupation: Retired  Type of occupation: Retired   Leisure & Hobbies: pt enjoys reading, go sightseeing, walking  Cognition      Objective     AROM RLE (degrees)  RLE AROM: WNL  AROM LLE (degrees)  LLE AROM : WNL  AROM RUE (degrees)  RUE AROM : WNL  AROM LUE (degrees)  LUE AROM : WNL  Strength RLE  Strength

## 2021-01-25 NOTE — PROGRESS NOTES
[facility] ED. Repeat CT Abdo pelvis in [facility] ED concerning for obstructed colon due to impacted stool in distal sigmoid and rectum. Patient also experiencing increasing in third space fluid diffusely. General surgery consulted for worsening lactic acid. NG tube placed. General surgery signed off. GI was consulted and they recommended GoLYTELY bowel prep for constipation, tapwater enemas every 4 hours. Patient was not able to hold tapwater enemas and abdomen was distended and had profuse vomiting of 1.4 L. Cefepime and Flagyl were discontinued. WBC count, lactic acid levels were not in normal limits. Repeat CT abdomen revealed signs of ileus and GI recommended bowel prep regimen at a slower rate along with Reglan IV 10 mg every 6 hours and recommended a dose of methylnaltrexone 12 mg subcutaneous today. GI planning to do EGD colonoscopy next week if the patient is able to clear her bowel.     Currently patient was seen on the floor. On my evaluation patient states that she had very little bowel movement, and is still complaining of pain in the abdomen. We will continue to monitor. Lactate 2.8 > 1.6  WBC 19.8 > 17.2 > 19.4 > 8.9 > 8.3  K 3.7  Mg 1.5 ( replaced )  Na 131 > 132  > 135 >130     Physical Exam:   Vitals: BP (!) 170/80   Pulse 105   Temp 98.6 °F (37 °C) (Oral)   Resp 20   Wt 169 lb 8.5 oz (76.9 kg)   SpO2 96%   BMI 29.10 kg/m²   24 hour intake/output:    Intake/Output Summary (Last 24 hours) at 1/24/2021 2305  Last data filed at 1/24/2021 1600  Gross per 24 hour   Intake 1937 ml   Output 2975 ml   Net -1038 ml     Last 3 weights: Wt Readings from Last 3 Encounters:   01/24/21 169 lb 8.5 oz (76.9 kg)   01/21/21 161 lb (73 kg)   01/05/21 160 lb (72.6 kg)       PHYSICAL EXAM:  Constitutional: Appears well, no distress  EENT: PERRLA, EOMI, sclera clear, anicteric, oropharynx clear, no lesions, neck supple with midline trachea.   Neck: Supple, symmetrical, trachea midline, no adenopathy, thyroid symmetric, no jvd skin normal  Respiratory: clear to auscultation, no wheezes or rales and unlabored breathing. No intercostal tenderness  Cardiovascular: regular rate and rhythm, normal S1, S2, no murmur noted and 2+ pulses throughout  Abdomen: soft, distended, tenderness improving. Extremities:  peripheral pulses normal, no pedal edema, no clubbing or cyanosis      Medications:Current Inpatient  Scheduled Meds:   metoclopramide  10 mg Intravenous Q6H    insulin lispro  0-12 Units Subcutaneous 4x Daily    insulin glargine  10 Units Subcutaneous Daily    amLODIPine  5 mg Per NG tube Daily    levothyroxine  88 mcg Oral Daily    polyethylene glycol  4,000 mL Oral Once    sodium chloride flush  10 mL Intravenous 2 times per day    famotidine (PEPCID) injection  20 mg Intravenous Daily    albuterol  2.5 mg Nebulization 4x Daily    sodium chloride flush  10 mL Intravenous 2 times per day     Continuous Infusions:   sodium chloride 100 mL/hr at 01/24/21 1819    dextrose       PRN Meds:bisacodyl, promethazine, sodium chloride flush, promethazine **OR** ondansetron, albuterol sulfate HFA, glucose, dextrose, glucagon (rDNA), dextrose, sodium chloride flush, LORazepam **OR** LORazepam **OR** LORazepam **OR** LORazepam **OR** LORazepam **OR** LORazepam **OR** LORazepam **OR** LORazepam    Objective:    CBC:   Recent Labs     01/22/21  0449 01/23/21  0621 01/23/21  1220 01/24/21  0505   WBC 19.4* 8.3  --  8.9   HGB 14.1 10.7* 11.6* 11.1*    189  --  194     BMP:    Recent Labs     01/23/21  0621 01/23/21  0921 01/24/21  0505   * 135 130*   K 3.1* 3.0* 3.7   CL 97* 95* 92*   CO2 27 30 26   BUN 18 17 14   CREATININE 1.12* 1.09* 1.06*   GLUCOSE 141* 158* 201*     Calcium:  Recent Labs     01/24/21  0505   CALCIUM 8.8     Ionized Calcium:No results for input(s): IONCA in the last 72 hours.   Magnesium:  Recent Labs     01/23/21  0921   MG 1.5*     Phosphorus:No results for input(s): PHOS in the last 72 hours. BNP:No results for input(s): BNP in the last 72 hours. Glucose:  Recent Labs     01/24/21  1432 01/24/21  1632 01/24/21 2013   POCGLU 168* 168* 131*     HgbA1C: No results for input(s): LABA1C in the last 72 hours. INR: No results for input(s): INR in the last 72 hours. Hepatic: No results for input(s): ALKPHOS, ALT, AST, PROT, BILITOT, BILIDIR, LABALBU in the last 72 hours. Amylase and Lipase:No results for input(s): LACTA, AMYLASE in the last 72 hours. Lactic Acid: No results for input(s): LACTA in the last 72 hours. CARDIAC ENZYMES:No results for input(s): CKTOTAL, CKMB, CKMBINDEX, TROPONINI in the last 72 hours. BNP: No results for input(s): BNP in the last 72 hours. Lipids: No results for input(s): CHOL, TRIG, HDL, LDLCALC in the last 72 hours. Invalid input(s): LDL  ABGs: No results found for: PH, PCO2, PO2, HCO3, O2SAT  Thyroid:   Lab Results   Component Value Date    TSH 0.01 01/21/2021        Urinalysis: No results for input(s): BACTERIA, BLOODU, CLARITYU, COLORU, PHUR, PROTEINU, RBCUA, SPECGRAV, BILIRUBINUR, NITRU, WBCUA, LEUKOCYTESUR, GLUCOSEU in the last 72 hours. Assessment:  Active Problems:    GI bleed    Patient is Catholic    Dehydration  Resolved Problems:    * No resolved hospital problems. *          Plan:    1. Acute toxic metabolic encephalopathy due to unknown etiology  - Resolved  - CT head unremarkable  - Electrolytes within normal limits on admission  - Continue to monitor     2. Active GI bleed likely secondary to Stercoral colitis  - On eliquis at home, reversed with [employer] and [address]  - Hgb 14.1 > 10.7 >11.6 >11.1  - Trend H&H daily  - Discontinue protonix drip and continue IV pepcid  20 DAILY  -  Not able to hold the tap water enema very well. No stool formation yet . Milk of molasses enema today with limited success. Liquid stools. - GI recommend GoLYTELY at slow pace of ~ 200 Ml/hr  - Start Reglan 10 mg IV every 6 hours.    - Recommend Relistor 12 mg sub Q x 1 dose ( opioid antagonist). - Plan for colonoscopy +/- EGD early next week    - Gen surg - no surgical interventions at this time. Will sign off.   - IM phenergan PRN q 6 hours for 3 doses.      3. Lactic acidosis  - Resolving  - Lactate 4.3 > 2.8 >1.6>1.5  - Bicarb improved  - Continue IV hydration NS @100ml/hr     4. Hyperglycemia  -  Bridged with Lantus 10 units daily, start medium dose insulin sliding scale. - Hypoglycemia protocols   - Electrolytes replaced  - Monitor POCT Glucose 4 x daily  -Currently npo. Advance diet as tolerated.     5. EtOH and polysubstance abuse  - CIWA protocol     6. Hypothyroidism  - TSH 0.01, T4 1.02  - Start levothyroxine 88 mcg PO daily (Start when patient tolerating PO diet)     7. A-fib  - On eliquis at home, reversed on [facility] and [facility]  - Continue to monitor  - Holding anticoagulation due to GI bleed     8. Sabianist  - Refusing blood products  - Accepting EPO and iron  - Minimize blood draws                   DVT prophylaxis: EPC cuffs  GI prophylaxis: Tasha Edwards MD             Department of Internal Medicine, PGY-1  [facility]           1/24/2021, 11:05 PM      I have discussed the care of Sudhir Mann , including pertinent history and exam findings,    today with the resident. I have seen and examined the patient and the key elements of all parts of the encounter have been performed by me . I agree with the assessment, plan and orders as documented by the resident. Active Problems:    GI bleed    Patient is Scientology    Dehydration  Resolved Problems:    * No resolved hospital problems. *        Overall  course ;                                   are improving over time.         Patient admitted with lower GI bleed  Was on Eliquis with a history of A. fib  Plan for EGD/colonoscopy tomorrow  Discussed case with GI team  Patient passing stools, liquid on Reglan Electronically signed by Tyra Antonio MD

## 2021-01-25 NOTE — PROGRESS NOTES
[facility]  Internal Medicine Teaching Residency Program  Inpatient Daily Progress Note  ______________________________________________________________________________    Patient: Pricila Spivey  YOB: 1955   [id number]    Acct: [de-identified]     Room: [address] [place] [address]  Admit date: 2021  Today's date: 21  Number of days in the hospital: 4    SUBJECTIVE   Admitting Diagnosis: GI bleed, abdominal obstruction  CC: Abdominal pain, GI bleed  Pt examined at bedside. Chart & results reviewed. Hemodynamically stable. Afebrile. No complaints overnight. Patient had multiple bowel movements overnight. Patient started on clear liquid diet. Will observe and GI to plan colonoscopy plus minus EGD tomorrow. ROS:  Constitutional:  negative for chills, fevers, sweats  Respiratory:  negative for cough, dyspnea on exertion, hemoptysis, shortness of breath, wheezing  Cardiovascular:  negative for chest pain, chest pressure/discomfort, lower extremity edema, palpitations  Gastrointestinal:  negative for abdominal pain, constipation, diarrhea, nausea, vomiting  Neurological:  negative for dizziness, headache    OBJECTIVE     Vital Signs:  BP (!) 158/90   Pulse 82   Temp 98.5 °F (36.9 °C) (Oral)   Resp 20   Wt 169 lb 8.5 oz (76.9 kg)   SpO2 95%   BMI 29.10 kg/m²     Temp (24hrs), Av.4 °F (36.9 °C), Min:98.1 °F (36.7 °C), Max:98.6 °F (37 °C)    In: 3128   Out: -     Physical Exam:  Constitutional: Appears well, no distress  EENT: PERRLA, EOMI, sclera clear, anicteric, oropharynx clear, no lesions, neck supple with midline trachea. Neck: Supple, symmetrical, trachea midline, no adenopathy, thyroid symmetric, no jvd skin normal  Respiratory: clear to auscultation, no wheezes or rales and unlabored breathing.  No intercostal tenderness  Cardiovascular: regular rate and rhythm, normal S1, S2, no murmur noted and 2+ pulses throughout  Abdomen: soft, distended, 189  --  194 226     BMP:   Recent Labs     01/23/21  0921 01/24/21  0505 01/25/21  0516    130* 139   K 3.0* 3.7 3.1*   CL 95* 92* 102   CO2 30 26 25   BUN 17 14 10   CREATININE 1.09* 1.06* 0.92*     BNP: No results for input(s): BNP in the last 72 hours. PT/INR: No results for input(s): PROTIME, INR in the last 72 hours. APTT: No results for input(s): APTT in the last 72 hours. CARDIAC ENZYMES: No results for input(s): CKMB, CKMBINDEX, TROPONINI in the last 72 hours. Invalid input(s): CKTOTAL;3  FASTING LIPID PANEL:  Lab Results   Component Value Date    CHOL 150 11/18/2020    HDL 36 (L) 11/18/2020    TRIG 201 (H) 11/18/2020     LIVER PROFILE: No results for input(s): AST, ALT, ALB, BILIDIR, BILITOT, ALKPHOS in the last 72 hours. MICROBIOLOGY:   Lab Results   Component Value Date/Time    CULTURE NO GROWTH 4 DAYS 01/21/2021 08:15 PM    CULTURE NO GROWTH 4 DAYS 01/21/2021 08:15 PM       Imaging:    Ct Abdomen Pelvis Wo Contrast Additional Contrast? None    Result Date: 1/21/2021  Impacted stool throughout the sigmoid colon and rectum causing dilatation of the more proximal colon to the cecum. The cecum measures 7 cm in diameter. Small amount of fluid in the cul-de-sac. Xr Abdomen (kub) (single Ap View)    Result Date: 1/23/2021  1. Interval decrease in stool burden with minimal remaining in the distal colon. 2.  Colonic wall edema is noted, most pronounced in the transverse colon. 3.  Mild small bowel distension, favored to represent ileus. 4.  Enteric tube terminates at the level of the body of the stomach. Xr Abdomen (kub) (single Ap View)    Result Date: 1/22/2021  Somewhat motion degraded. No obvious free air. Enteric tube not well seen, but possibly pulled back somewhat, as above. Nonspecific gas-filled small bowel loops in the mid abdomen. No definite obstruction. Ct Head Wo Contrast    Result Date: 1/21/2021  No acute intracranial abnormality.      Ct Abdomen Pelvis W Iv Contrast Additional Contrast? None    Result Date: 1/24/2021  1. Findings compatible with diffuse colitis. Liquid stool is noted throughout the colon. 2.  Mild small bowel distension fluid, suggestive of ileus. 3.  Trace pelvic free fluid. Xr Abdomen For Ng/og/ne Tube Placement    Result Date: 1/22/2021  Enteric tube tip and side port are below the level of the diaphragm, in the expected location of the stomach. Cta Abdomen Pelvis W Wo Contrast    Result Date: 1/21/2021  The colon remains obstructed by impacted stool in the distal sigmoid and rectum with a large amount of stool from rectum to cecum. Increasing third space fluid in the lower abdomen and pelvis. No pneumatosis or evidence of high-grade arterial obstruction. Normal bowel wall enhancement. The findings were discussed with Dr. Lolly Chan in the ED at 2:35 PM.       ASSESSMENT & PLAN     ASSESSMENT / PLAN:     1. Acute toxic metabolic encephalopathy due to unknown etiology  - Resolved  - CT head unremarkable  - Electrolytes within normal limits on admission  - Continue to monitor     2. Active GI bleed likely secondary to Stercoral colitis  - On eliquis at home, reversed with [facility] and [facility]  - Hgb 14.1 > 10.7 >11.6 >11.1  - Trend H&H daily  - Discontinue protonix drip and continue IV pepcid  20 DAILY  -  Not able to hold the tap water enema very well. No stool formation yet . Milk of molasses enema today with limited success. Liquid stools. - GI recommend GoLYTELY at slow pace of ~ 200 Ml/hr  - Start Reglan 10 mg IV every 6 hours. - Recommend Relistor 12 mg sub Q x 1 dose ( opioid antagonist). - Plan for colonoscopy +/- EGD early next week    - Gen surg - no surgical interventions at this time. Will sign off.   - IM phenergan PRN q 6 hours for 3 doses.   -Patient had bowel movements overnight. Started on clear liquids.   GI to plan colonoscopy plus minus EGD tomorrow.     3. Lactic acidosis  - Resolving  - Lactate 4.3 > 2.8 >1.6>1.5  - Bicarb improved  - Continue IV hydration NS @100ml/hr     4. Hyperglycemia  -  Bridged with Lantus 10 units daily, start medium dose insulin sliding scale. - Hypoglycemia protocols   - Electrolytes replaced  - Monitor POCT Glucose 4 x daily  -Currently npo. Advance diet as tolerated.     5. EtOH and polysubstance abuse  - CIWA protocol     6. Hypothyroidism  - TSH 0.01, T4 1.02  - Start levothyroxine 88 mcg PO daily (Start when patient tolerating PO diet)     7. A-fib  - On eliquis at home, reversed on [facility] and [facility]  - Continue to monitor  - Holding anticoagulation due to GI bleed     8. Yarsani  - Refusing blood products  - Accepting EPO and iron  - Minimize blood draws                    DVT prophylaxis: EPC cuffs  GI prophylaxis: Tasha Poole MD  Internal Medicine Resident, PGY-1  [address] [facility]  1/25/2021, 12:05 PM

## 2021-01-25 NOTE — PROGRESS NOTES
[facility] Gastroenterology Progress Note    Salvatore Hoyt is a 60 y.o. female patient. Hospitalization Day:4      Chief consult reason: LGIB on Eliquis, alcohol use      Subjective: Patient seen and examined. Patient has been moved out of the ICU. She was reported to have several episodes of liquid brown stool overnight which cleared after receiving GoLYTELY. No hematochezia. Patient denies any further nausea or vomiting. Reports abdomin feels less distended and overall she feels well. NG remains in place. Objective:   VITALS:  BP (!) 158/90   Pulse 82   Temp 98.5 °F (36.9 °C) (Oral)   Resp 20   Wt 169 lb 8.5 oz (76.9 kg)   SpO2 95%   BMI 29.10 kg/m²   TEMPERATURE:  Current - Temp: 98.5 °F (36.9 °C);  Max - Temp  Av.3 °F (36.8 °C)  Min: 97.9 °F (36.6 °C)  Max: 98.6 °F (37 °C)    Physical Assessment:  General appearance:  alert, cooperative and no distress  Mental Status:  oriented to person, place and time and falt affect  Lungs:  clear to auscultation bilaterally, normal effort  Heart:  regular rate and rhythm, no murmur  Abdomen:  soft, nontender, mildly distended, + bowel sounds, N/G tube clamped   Extremities:  no edema, no redness, No clubbing  Skin:  warm, dry, no gross lesions or rashes    CURRENT MEDICATIONS:  Scheduled Meds:   metoprolol tartrate  25 mg Oral BID    potassium bicarb-citric acid  40 mEq Oral Daily    amLODIPine  10 mg Oral Daily    atorvastatin  40 mg Oral Daily    dilTIAZem  120 mg Oral Daily    metoclopramide  10 mg Intravenous Q6H    insulin lispro  0-12 Units Subcutaneous 4x Daily    insulin glargine  10 Units Subcutaneous Daily    levothyroxine  88 mcg Oral Daily    polyethylene glycol  4,000 mL Oral Once    sodium chloride flush  10 mL Intravenous 2 times per day    famotidine (PEPCID) injection  20 mg Intravenous Daily    albuterol  2.5 mg Nebulization 4x Daily    sodium chloride flush  10 mL Intravenous 2 times per day     Continuous Infusions:   sodium chloride 100 mL/hr at 01/25/21 0616    dextrose       PRN Meds:bisacodyl, promethazine, sodium chloride flush, promethazine **OR** ondansetron, albuterol sulfate HFA, glucose, dextrose, glucagon (rDNA), dextrose, sodium chloride flush, LORazepam **OR** LORazepam **OR** LORazepam **OR** LORazepam **OR** LORazepam **OR** LORazepam **OR** LORazepam **OR** LORazepam      Data Review:  LABS and IMAGING:     CBC  Recent Labs     01/23/21  0621 01/23/21  1220 01/24/21  0505 01/25/21  0516   WBC 8.3  --  8.9 7.2   HGB 10.7* 11.6* 11.1* 10.7*   HCT 31.7* 34.0* 33.3* 32.9*   MCV 92.2  --  91.2 93.5   MCHC 33.8  --  33.3 32.5   RDW 13.3  --  13.3 13.7     --  194 226       Immature PLTs   No results found for: PLTFLUORE    ANEMIA STUDIES  No results for input(s): LABIRON, TIBC, IRON, FERRITIN, ABBSDRSU81, FOLATE, OCCULTBLD in the last 72 hours. BMP  Recent Labs     01/23/21  0921 01/24/21  0505 01/25/21  0516    130* 139   K 3.0* 3.7 3.1*   CL 95* 92* 102   CO2 30 26 25   BUN 17 14 10   CREATININE 1.09* 1.06* 0.92*   GLUCOSE 158* 201* 152*   CALCIUM 8.3* 8.8 8.3*       LFTS  No results for input(s): ALKPHOS, ALT, AST, BILITOT, BILIDIR, LABALBU in the last 72 hours.     AMYLASE/LIPASE/AMMONIA  Recent Labs     01/22/21  1158   AMMONIA 34       Acute Hepatitis Panel   No results found for: HEPBSAG, HEPCAB, HEPBIGM, HEPAIGM    HCV Genotype   No results found for: HEPATITISCGENOTYPE    HCV Quantitative   No results found for: HCVQNT    LIVER WORK UP:    AFP  No results found for: AFP    Alpha 1 antitrypsin   No results found for: A1A    Anti - Liver/Kidney Ab  No results found for: LIVER-KIDNEYMICROSOMALAB    CHANEL  Lab Results   Component Value Date    CHANEL NEGATIVE 08/10/2018       AMA  No results found for: MITOAB    ASMA  No results found for: SMOOTHMUSCAB    Ceruloplasmin  No results found for: CERULOPLSM    Celiac panel  No results found for: TISSTRNTIIGG, TTGIGA, IGA    IgG  Lab Results Component Value Date    IGG 1423 02/25/2016       IgM  No results found for: IGM    GGT   No results found for: LABGGT    PT/INR  No results for input(s): PROTIME, INR in the last 72 hours. Cancer Markers:  CEA:  No results for input(s): CEA in the last 72 hours. Ca 125:  No results for input(s):  in the last 72 hours. Ca 19-9:   No results for input(s):  in the last 72 hours. AFP: No results for input(s): AFP in the last 72 hours. Lactic acid:  Recent Labs     01/23/21  1220 01/23/21  2339 01/24/21  1239   LACTACIDWB 2.8* 1.6 1.5         Radiology Review:     Ct Abdomen Pelvis Wo Contrast Additional Contrast? None    Result Date: 1/21/2021  EXAMINATION: CT ABDOMEN PELVIS WO CONTRAST HISTORY: Reason for exam:->lower abdominal pain 28-year-old female. Contrast withheld due to low GFR. COMPARISON: CT abdomen pelvis 8/6/2020. TECHNIQUE: CT examination of the abdomen and pelvis without IV contrast. Coronal and sagittal reformations were performed. Dose reduction techniques were achieved by using automated exposure control and/or adjustment of mA and/or kV according to patient size and/or use of iterative reconstruction technique. FINDINGS: Lung bases/lung windows: Lung bases are clear. No free air. Upper abdomen/lower chest: Right coronary artery calcifications. Small hiatal hernia, 2.7 cm. Liver has a normal Hounsfield attenuation today. Normal size spleen. Pancreas and gallbladder are normal. Retroperitoneum: Normal adrenal glands and kidneys. Moderate plaque aorta without aneurysm. No retroperitoneal adenopathy. The bowel shows prominent dilatation of colon with a long air-fluid level in the transverse colon. This dilatation is followed down to impacted stool in the sigmoid and rectum without other mechanical obstruction. Pelvis: Normal appendix. Small postmenopausal uterus. Bladder contour normal. Minimal fluid in the cul-de-sac. The abdominal wall intact.  Bone windows: Degenerative changes L4-L5, moderate. Impacted stool throughout the sigmoid colon and rectum causing dilatation of the more proximal colon to the cecum. The cecum measures 7 cm in diameter. Small amount of fluid in the cul-de-sac. Cta Abdomen Pelvis W Wo Contrast    Result Date: 1/21/2021  EXAMINATION: CTA ABDOMEN PELVIS W WO CONTRAST HISTORY: Reason for exam:->Abdominal pain, recently passed bloody stool. Possible ischemic bowel. Elevating lactic acid. COMPARISON: CT scan without contrast 0841 hours same date. TECHNIQUE: Isovue 370, 75 mL intravenously. Arterial and venous phases were performed. MIP (maximum intensity projection) images were performed. Dose reduction techniques were achieved by using automated exposure control and/or adjustment of mA and/or kV according to patient size and/or use of iterative reconstruction technique. FINDINGS: There is somewhat less stool in the sigmoid colon after the patient recently passed a bloody stool, however the distal sigmoid and rectum still are impacted by stool and the colon remains markedly dilated with fluid without bowel wall thickening. There is normal enhancement of the bowel wall. No pneumatosis. The celiac axis and the SMA are patent. There is no aortic dissection. Normal enhancement of the kidneys, adrenal glands, pancreas, spleen and liver. There is increasing free fluid in the right lower quadrant and the pelvis compared to earlier today. The lung bases remain clear. There is no free air. The bone windows are unchanged. The colon remains obstructed by impacted stool in the distal sigmoid and rectum with a large amount of stool from rectum to cecum. Increasing third space fluid in the lower abdomen and pelvis. No pneumatosis or evidence of high-grade arterial obstruction. Normal bowel wall enhancement.  The findings were discussed with Dr. Janell Goodrich in the ED at 2:35 PM.         ENDOSCOPY     Active Problems:    GI bleed    Patient is Jainism Dehydration  Resolved Problems:    * No resolved hospital problems. *       GI Assessment:    1. Rectal bleeding - Resolved. Hemoglobin remained stable  -  likely secondary to stercoral colitis related to constipation/fecal impaction vs hemorrhoids vs mass  2. Constipation   3. History of polysubstance abuse      Plan of care:  1. Start and Observe on Cl diet. Maintain N/G tube - clamped   2. Continue Reglan 10 mg IV every 6 hours  3. Plan for colonoscopy +/- EGD tomorrow  4. Trend H/H  5. Serial abdominal examination      Please feel free to contact me with any questions or concerns. Thank you for allowing me to participate in the care of your patient. Mart Rendon, RAJNI - CNP on 1/25/2021 at 10:38 AM   [facility] [place] [facility]    Please note that this note was generated using a voice recognition dictation software. Although every effort was made to ensure the accuracy of this automated transcription, some errors in transcription may have occurred.

## 2021-01-26 ENCOUNTER — ANESTHESIA (OUTPATIENT)
Dept: ENDOSCOPY | Age: 66
DRG: 253 | End: 2021-01-26
Payer: COMMERCIAL

## 2021-01-26 ENCOUNTER — ANESTHESIA EVENT (OUTPATIENT)
Dept: ENDOSCOPY | Age: 66
DRG: 253 | End: 2021-01-26
Payer: COMMERCIAL

## 2021-01-26 VITALS
OXYGEN SATURATION: 96 % | SYSTOLIC BLOOD PRESSURE: 144 MMHG | DIASTOLIC BLOOD PRESSURE: 132 MMHG | RESPIRATION RATE: 19 BRPM

## 2021-01-26 LAB
ABSOLUTE EOS #: 0.24 K/UL (ref 0–0.44)
ABSOLUTE IMMATURE GRANULOCYTE: 0.17 K/UL (ref 0–0.3)
ABSOLUTE LYMPH #: 1.88 K/UL (ref 1.1–3.7)
ABSOLUTE MONO #: 0.84 K/UL (ref 0.1–1.2)
ANION GAP SERPL CALCULATED.3IONS-SCNC: 9 MMOL/L (ref 9–17)
BASOPHILS # BLD: 1 % (ref 0–2)
BASOPHILS ABSOLUTE: 0.05 K/UL (ref 0–0.2)
BUN BLDV-MCNC: 7 MG/DL (ref 8–23)
BUN/CREAT BLD: ABNORMAL (ref 9–20)
CALCIUM SERPL-MCNC: 8.3 MG/DL (ref 8.6–10.4)
CHLORIDE BLD-SCNC: 102 MMOL/L (ref 98–107)
CO2: 24 MMOL/L (ref 20–31)
CREAT SERPL-MCNC: 0.84 MG/DL (ref 0.5–0.9)
DIFFERENTIAL TYPE: ABNORMAL
EOSINOPHILS RELATIVE PERCENT: 3 % (ref 1–4)
GFR AFRICAN AMERICAN: >60 ML/MIN
GFR NON-AFRICAN AMERICAN: >60 ML/MIN
GFR SERPL CREATININE-BSD FRML MDRD: ABNORMAL ML/MIN/{1.73_M2}
GFR SERPL CREATININE-BSD FRML MDRD: ABNORMAL ML/MIN/{1.73_M2}
GLUCOSE BLD-MCNC: 101 MG/DL (ref 65–105)
GLUCOSE BLD-MCNC: 132 MG/DL (ref 65–105)
GLUCOSE BLD-MCNC: 142 MG/DL (ref 70–99)
GLUCOSE BLD-MCNC: 218 MG/DL (ref 65–105)
HCT VFR BLD CALC: 34.7 % (ref 36.3–47.1)
HEMOGLOBIN: 11.2 G/DL (ref 11.9–15.1)
IMMATURE GRANULOCYTES: 2 %
LYMPHOCYTES # BLD: 22 % (ref 24–43)
MCH RBC QN AUTO: 30.1 PG (ref 25.2–33.5)
MCHC RBC AUTO-ENTMCNC: 32.3 G/DL (ref 28.4–34.8)
MCV RBC AUTO: 93.3 FL (ref 82.6–102.9)
MONOCYTES # BLD: 10 % (ref 3–12)
NRBC AUTOMATED: 0 PER 100 WBC
PDW BLD-RTO: 13.5 % (ref 11.8–14.4)
PLATELET # BLD: 270 K/UL (ref 138–453)
PLATELET ESTIMATE: ABNORMAL
PMV BLD AUTO: 10 FL (ref 8.1–13.5)
POTASSIUM SERPL-SCNC: 3.6 MMOL/L (ref 3.7–5.3)
RBC # BLD: 3.72 M/UL (ref 3.95–5.11)
RBC # BLD: ABNORMAL 10*6/UL
SEG NEUTROPHILS: 62 % (ref 36–65)
SEGMENTED NEUTROPHILS ABSOLUTE COUNT: 5.47 K/UL (ref 1.5–8.1)
SODIUM BLD-SCNC: 135 MMOL/L (ref 135–144)
WBC # BLD: 8.7 K/UL (ref 3.5–11.3)
WBC # BLD: ABNORMAL 10*3/UL

## 2021-01-26 PROCEDURE — 88305 TISSUE EXAM BY PATHOLOGIST: CPT

## 2021-01-26 PROCEDURE — 6370000000 HC RX 637 (ALT 250 FOR IP): Performed by: STUDENT IN AN ORGANIZED HEALTH CARE EDUCATION/TRAINING PROGRAM

## 2021-01-26 PROCEDURE — 6360000002 HC RX W HCPCS: Performed by: STUDENT IN AN ORGANIZED HEALTH CARE EDUCATION/TRAINING PROGRAM

## 2021-01-26 PROCEDURE — 6370000000 HC RX 637 (ALT 250 FOR IP): Performed by: INTERNAL MEDICINE

## 2021-01-26 PROCEDURE — 3700000001 HC ADD 15 MINUTES (ANESTHESIA): Performed by: INTERNAL MEDICINE

## 2021-01-26 PROCEDURE — 36415 COLL VENOUS BLD VENIPUNCTURE: CPT

## 2021-01-26 PROCEDURE — 1200000000 HC SEMI PRIVATE

## 2021-01-26 PROCEDURE — 2500000003 HC RX 250 WO HCPCS: Performed by: STUDENT IN AN ORGANIZED HEALTH CARE EDUCATION/TRAINING PROGRAM

## 2021-01-26 PROCEDURE — 45385 COLONOSCOPY W/LESION REMOVAL: CPT | Performed by: INTERNAL MEDICINE

## 2021-01-26 PROCEDURE — 85025 COMPLETE CBC W/AUTO DIFF WBC: CPT

## 2021-01-26 PROCEDURE — 99232 SBSQ HOSP IP/OBS MODERATE 35: CPT | Performed by: INTERNAL MEDICINE

## 2021-01-26 PROCEDURE — 2580000003 HC RX 258: Performed by: NURSE ANESTHETIST, CERTIFIED REGISTERED

## 2021-01-26 PROCEDURE — 45380 COLONOSCOPY AND BIOPSY: CPT | Performed by: INTERNAL MEDICINE

## 2021-01-26 PROCEDURE — 7100000011 HC PHASE II RECOVERY - ADDTL 15 MIN: Performed by: INTERNAL MEDICINE

## 2021-01-26 PROCEDURE — 43235 EGD DIAGNOSTIC BRUSH WASH: CPT | Performed by: INTERNAL MEDICINE

## 2021-01-26 PROCEDURE — 6360000002 HC RX W HCPCS: Performed by: INTERNAL MEDICINE

## 2021-01-26 PROCEDURE — 3609010300 HC COLONOSCOPY W/BIOPSY SINGLE/MULTIPLE: Performed by: INTERNAL MEDICINE

## 2021-01-26 PROCEDURE — 6360000002 HC RX W HCPCS: Performed by: NURSE ANESTHETIST, CERTIFIED REGISTERED

## 2021-01-26 PROCEDURE — 3609010600 HC COLONOSCOPY POLYPECTOMY SNARE/COLD BIOPSY: Performed by: INTERNAL MEDICINE

## 2021-01-26 PROCEDURE — 7100000010 HC PHASE II RECOVERY - FIRST 15 MIN: Performed by: INTERNAL MEDICINE

## 2021-01-26 PROCEDURE — 3700000000 HC ANESTHESIA ATTENDED CARE: Performed by: INTERNAL MEDICINE

## 2021-01-26 PROCEDURE — 0DJ08ZZ INSPECTION OF UPPER INTESTINAL TRACT, VIA NATURAL OR ARTIFICIAL OPENING ENDOSCOPIC: ICD-10-PCS | Performed by: INTERNAL MEDICINE

## 2021-01-26 PROCEDURE — 82947 ASSAY GLUCOSE BLOOD QUANT: CPT

## 2021-01-26 PROCEDURE — 80048 BASIC METABOLIC PNL TOTAL CA: CPT

## 2021-01-26 PROCEDURE — 3609017100 HC EGD: Performed by: INTERNAL MEDICINE

## 2021-01-26 PROCEDURE — 0DBE8ZX EXCISION OF LARGE INTESTINE, VIA NATURAL OR ARTIFICIAL OPENING ENDOSCOPIC, DIAGNOSTIC: ICD-10-PCS | Performed by: INTERNAL MEDICINE

## 2021-01-26 PROCEDURE — 0DBN8ZZ EXCISION OF SIGMOID COLON, VIA NATURAL OR ARTIFICIAL OPENING ENDOSCOPIC: ICD-10-PCS | Performed by: INTERNAL MEDICINE

## 2021-01-26 PROCEDURE — 0DBG8ZX EXCISION OF LEFT LARGE INTESTINE, VIA NATURAL OR ARTIFICIAL OPENING ENDOSCOPIC, DIAGNOSTIC: ICD-10-PCS | Performed by: INTERNAL MEDICINE

## 2021-01-26 PROCEDURE — 2709999900 HC NON-CHARGEABLE SUPPLY: Performed by: INTERNAL MEDICINE

## 2021-01-26 PROCEDURE — 94640 AIRWAY INHALATION TREATMENT: CPT

## 2021-01-26 RX ORDER — SODIUM CHLORIDE 9 MG/ML
INJECTION, SOLUTION INTRAVENOUS CONTINUOUS PRN
Status: DISCONTINUED | OUTPATIENT
Start: 2021-01-26 | End: 2021-01-26 | Stop reason: SDUPTHER

## 2021-01-26 RX ORDER — PROPOFOL 10 MG/ML
INJECTION, EMULSION INTRAVENOUS CONTINUOUS PRN
Status: DISCONTINUED | OUTPATIENT
Start: 2021-01-26 | End: 2021-01-26 | Stop reason: SDUPTHER

## 2021-01-26 RX ORDER — PROPOFOL 10 MG/ML
INJECTION, EMULSION INTRAVENOUS PRN
Status: DISCONTINUED | OUTPATIENT
Start: 2021-01-26 | End: 2021-01-26 | Stop reason: SDUPTHER

## 2021-01-26 RX ORDER — PANTOPRAZOLE SODIUM 40 MG/1
40 TABLET, DELAYED RELEASE ORAL
Status: DISCONTINUED | OUTPATIENT
Start: 2021-01-26 | End: 2021-01-27 | Stop reason: HOSPADM

## 2021-01-26 RX ADMIN — PROPOFOL 40 MG: 10 INJECTION, EMULSION INTRAVENOUS at 11:08

## 2021-01-26 RX ADMIN — FAMOTIDINE 20 MG: 10 INJECTION INTRAVENOUS at 09:16

## 2021-01-26 RX ADMIN — SODIUM CHLORIDE: 9 INJECTION, SOLUTION INTRAVENOUS at 11:00

## 2021-01-26 RX ADMIN — Medication 10 MG: at 10:16

## 2021-01-26 RX ADMIN — METOCLOPRAMIDE 10 MG: 5 INJECTION, SOLUTION INTRAMUSCULAR; INTRAVENOUS at 17:05

## 2021-01-26 RX ADMIN — PROPOFOL 100 MCG/KG/MIN: 10 INJECTION, EMULSION INTRAVENOUS at 11:08

## 2021-01-26 RX ADMIN — DESMOPRESSIN ACETATE 40 MG: 0.2 TABLET ORAL at 13:14

## 2021-01-26 RX ADMIN — PANTOPRAZOLE SODIUM 40 MG: 40 TABLET, DELAYED RELEASE ORAL at 17:05

## 2021-01-26 RX ADMIN — METOPROLOL TARTRATE 25 MG: 25 TABLET ORAL at 20:59

## 2021-01-26 RX ADMIN — METOCLOPRAMIDE 10 MG: 5 INJECTION, SOLUTION INTRAMUSCULAR; INTRAVENOUS at 05:27

## 2021-01-26 RX ADMIN — LEVOTHYROXINE SODIUM 88 MCG: 88 TABLET ORAL at 05:27

## 2021-01-26 RX ADMIN — ALBUTEROL SULFATE 2.5 MG: 2.5 SOLUTION RESPIRATORY (INHALATION) at 09:21

## 2021-01-26 RX ADMIN — PROPOFOL 80 MG: 10 INJECTION, EMULSION INTRAVENOUS at 11:04

## 2021-01-26 RX ADMIN — PROPOFOL 40 MG: 10 INJECTION, EMULSION INTRAVENOUS at 11:18

## 2021-01-26 RX ADMIN — POTASSIUM BICARBONATE 40 MEQ: 782 TABLET, EFFERVESCENT ORAL at 13:12

## 2021-01-26 RX ADMIN — INSULIN GLARGINE 10 UNITS: 100 INJECTION, SOLUTION SUBCUTANEOUS at 09:17

## 2021-01-26 RX ADMIN — INSULIN LISPRO 4 UNITS: 100 INJECTION, SOLUTION INTRAVENOUS; SUBCUTANEOUS at 17:05

## 2021-01-26 RX ADMIN — METOPROLOL TARTRATE 25 MG: 25 TABLET ORAL at 13:12

## 2021-01-26 RX ADMIN — DILTIAZEM HYDROCHLORIDE 120 MG: 120 CAPSULE, COATED, EXTENDED RELEASE ORAL at 13:12

## 2021-01-26 ASSESSMENT — PAIN - FUNCTIONAL ASSESSMENT: PAIN_FUNCTIONAL_ASSESSMENT: 0-10

## 2021-01-26 ASSESSMENT — PAIN DESCRIPTION - PAIN TYPE: TYPE: ACUTE PAIN

## 2021-01-26 ASSESSMENT — PAIN DESCRIPTION - LOCATION: LOCATION: ABDOMEN

## 2021-01-26 ASSESSMENT — PAIN SCALES - GENERAL
PAINLEVEL_OUTOF10: 2
PAINLEVEL_OUTOF10: 0
PAINLEVEL_OUTOF10: 8

## 2021-01-26 ASSESSMENT — COPD QUESTIONNAIRES: CAT_SEVERITY: MODERATE

## 2021-01-26 ASSESSMENT — PAIN DESCRIPTION - DESCRIPTORS: DESCRIPTORS: CRAMPING

## 2021-01-26 NOTE — ANESTHESIA POSTPROCEDURE EVALUATION
Department of Anesthesiology  Postprocedure Note    Patient: Andres Patel  MRN: [id number]  YOB: 1955  Date of evaluation: 1/26/2021  Time:  3:12 PM     Procedure Summary     Date: 01/26/21 Room / Location: [address]    Anesthesia Start: 1100 Anesthesia Stop: 9964    Procedures:       COLONOSCOPY WITH BIOPSY (N/A )      EGD ESOPHAGOGASTRODUODENOSCOPY (N/A ) Diagnosis: (Anemia, Rectal Bleed)    Surgeons: Susan Hall MD Responsible Provider: Marlen Salcido MD    Anesthesia Type: MAC, TIVA ASA Status: 3          Anesthesia Type: MAC, TIVA    Nathaniel Phase I: Nathaniel Score: 10    Nathaniel Phase II: Nathaniel Score: 10    Last vitals: Reviewed and per EMR flowsheets.        Anesthesia Post Evaluation    Patient location during evaluation: PACU  Patient participation: complete - patient participated  Level of consciousness: awake and alert  Pain score: 0  Airway patency: patent  Nausea & Vomiting: no nausea and no vomiting  Complications: no  Cardiovascular status: hemodynamically stable  Respiratory status: room air  Hydration status: euvolemic

## 2021-01-26 NOTE — PROGRESS NOTES
[facility]  Internal Medicine Teaching Residency Program  Inpatient Daily Progress Note  ______________________________________________________________________________    Patient: Shahla Toledo  YOB: 1955   ELY:[id number]    Acct: [de-identified]     Room: [facility] RM/NONE  Admit date: 2021  Today's date: 21  Number of days in the hospital: 5    SUBJECTIVE   Admitting Diagnosis: GI bleed, abdominal obstruction  CC: Abdominal pain, GI bleed  No acute events overnight. Afebrile. SBP in 170s. Patient refused Norvasc for her blood pressure when it was added yesterday. To continue on Cardizem and Lopressor. Labetalol as needed IV. Plan for EGD/colonoscopy today. Patient denies any nausea, emesis, abdominal pain. ROS:  Constitutional:  negative for chills, fevers, sweats  Respiratory:  negative for cough, dyspnea on exertion, hemoptysis, shortness of breath, wheezing  Cardiovascular:  negative for chest pain, chest pressure/discomfort, lower extremity edema, palpitations  Gastrointestinal:  negative for abdominal pain, constipation, diarrhea, nausea, vomiting  Neurological:  negative for dizziness, headache    OBJECTIVE     Vital Signs:  BP (!) 165/98   Pulse 61   Temp 96.6 °F (35.9 °C) (Infrared)   Resp 14   Ht 5' 4\" (1.626 m)   Wt 161 lb 8 oz (73.3 kg)   SpO2 97%   BMI 27.72 kg/m²     Temp (24hrs), Av °F (36.7 °C), Min:96.6 °F (35.9 °C), Max:98.9 °F (37.2 °C)    In: 2750   Out: -     Physical Exam:  Constitutional: Appears well, no distress  EENT: PERRLA, EOMI, sclera clear, anicteric, oropharynx clear, no lesions, neck supple with midline trachea. Neck: Supple, symmetrical, trachea midline, no adenopathy, thyroid symmetric, no jvd skin normal  Respiratory: clear to auscultation, no wheezes or rales and unlabored breathing.  No intercostal tenderness  Cardiovascular: regular rate and rhythm, normal S1, S2, no murmur noted and 2+ pulses throughout  Abdomen: soft, distended, tenderness improving.    Extremities:  peripheral pulses normal, no pedal edema, no clubbing or cyanosis    Medications:  Scheduled Medications:    [MAR Hold] potassium bicarb-citric acid  40 mEq Oral Once    [MAR Hold] metoprolol tartrate  25 mg Oral BID    [MAR Hold] atorvastatin  40 mg Oral Daily    [MAR Hold] dilTIAZem  120 mg Oral Daily    [MAR Hold] metoclopramide  10 mg Intravenous Q6H    [MAR Hold] insulin lispro  0-12 Units Subcutaneous 4x Daily    [MAR Hold] insulin glargine  10 Units Subcutaneous Daily    [MAR Hold] levothyroxine  88 mcg Oral Daily    [MAR Hold] polyethylene glycol  4,000 mL Oral Once    [MAR Hold] sodium chloride flush  10 mL Intravenous 2 times per day    [MAR Hold] famotidine (PEPCID) injection  20 mg Intravenous Daily    [MAR Hold] albuterol  2.5 mg Nebulization 4x Daily    [MAR Hold] sodium chloride flush  10 mL Intravenous 2 times per day     Continuous Infusions:    [MAR Hold] sodium chloride 100 mL/hr at 01/25/21 0616    [MAR Hold] dextrose       PRN Medications    [MAR Hold] labetalol, 10 mg, Q4H PRN      [MAR Hold] bisacodyl, 10 mg, Daily PRN      [MAR Hold] promethazine, 6.25 mg, Q4H PRN      [MAR Hold] sodium chloride flush, 10 mL, PRN      [MAR Hold] promethazine, 12.5 mg, Q6H PRN    Or      [MAR Hold] ondansetron, 4 mg, Q6H PRN      [MAR Hold] albuterol sulfate HFA, 2 puff, Q4H PRN      [MAR Hold] glucose, 15 g, PRN      [MAR Hold] dextrose, 12.5 g, PRN      [MAR Hold] glucagon (rDNA), 1 mg, PRN      [MAR Hold] dextrose, 100 mL/hr, PRN      [MAR Hold] sodium chloride flush, 10 mL, PRN        Diagnostic Labs:  CBC:   Recent Labs     01/24/21  0505 01/25/21  0516 01/26/21  0526   WBC 8.9 7.2 8.7   RBC 3.65* 3.52* 3.72*   HGB 11.1* 10.7* 11.2*   HCT 33.3* 32.9* 34.7*   MCV 91.2 93.5 93.3   RDW 13.3 13.7 13.5    226 270     BMP:   Recent Labs     01/25/21  0516 01/25/21  1419 01/26/21  0526    135 135   K 3.1* 5.3 3.6*    101 102   CO2 25 27 24   BUN 10 10 7*   CREATININE 0.92* 0.86 0.84     BNP: No results for input(s): BNP in the last 72 hours. PT/INR: No results for input(s): PROTIME, INR in the last 72 hours. APTT: No results for input(s): APTT in the last 72 hours. CARDIAC ENZYMES: No results for input(s): CKMB, CKMBINDEX, TROPONINI in the last 72 hours. Invalid input(s): CKTOTAL;3  FASTING LIPID PANEL:  Lab Results   Component Value Date    CHOL 150 11/18/2020    HDL 36 (L) 11/18/2020    TRIG 201 (H) 11/18/2020     LIVER PROFILE: No results for input(s): AST, ALT, ALB, BILIDIR, BILITOT, ALKPHOS in the last 72 hours. MICROBIOLOGY:   Lab Results   Component Value Date/Time    CULTURE NO GROWTH 5 DAYS 01/21/2021 08:15 PM    CULTURE NO GROWTH 5 DAYS 01/21/2021 08:15 PM       Imaging:    Ct Abdomen Pelvis Wo Contrast Additional Contrast? None    Result Date: 1/21/2021  Impacted stool throughout the sigmoid colon and rectum causing dilatation of the more proximal colon to the cecum. The cecum measures 7 cm in diameter. Small amount of fluid in the cul-de-sac. Xr Abdomen (kub) (single Ap View)    Result Date: 1/23/2021  1. Interval decrease in stool burden with minimal remaining in the distal colon. 2.  Colonic wall edema is noted, most pronounced in the transverse colon. 3.  Mild small bowel distension, favored to represent ileus. 4.  Enteric tube terminates at the level of the body of the stomach. Xr Abdomen (kub) (single Ap View)    Result Date: 1/22/2021  Somewhat motion degraded. No obvious free air. Enteric tube not well seen, but possibly pulled back somewhat, as above. Nonspecific gas-filled small bowel loops in the mid abdomen. No definite obstruction. Ct Head Wo Contrast    Result Date: 1/21/2021  No acute intracranial abnormality. Ct Abdomen Pelvis W Iv Contrast Additional Contrast? None    Result Date: 1/24/2021  1. Findings compatible with diffuse colitis. Liquid stool is noted throughout the colon. 2.  Mild small bowel distension fluid, suggestive of ileus. 3.  Trace pelvic free fluid. Xr Abdomen For Ng/og/ne Tube Placement    Result Date: 1/22/2021  Enteric tube tip and side port are below the level of the diaphragm, in the expected location of the stomach. Cta Abdomen Pelvis W Wo Contrast    Result Date: 1/21/2021  The colon remains obstructed by impacted stool in the distal sigmoid and rectum with a large amount of stool from rectum to cecum. Increasing third space fluid in the lower abdomen and pelvis. No pneumatosis or evidence of high-grade arterial obstruction. Normal bowel wall enhancement. The findings were discussed with Dr. Mayela Booker in the ED at 2:35 PM.       ASSESSMENT & PLAN     ASSESSMENT / PLAN:     1. Acute toxic metabolic encephalopathy due to unknown etiology  - Resolved  - CT head unremarkable  - Electrolytes within normal limits on admission  - Continue to monitor     2. Active GI bleed likely secondary to Stercoral colitis  - On eliquis at home, reversed with [facility]  - Hgb stable at 11.2  - Trend H&H daily  - Discontinue protonix drip and continue IV pepcid  20 DAILY  -   Start Reglan 10 mg IV every 6 hours. Plan for colonoscopy and /EGD today  - Gen surg - no surgical interventions at this time.        3. Lactic acidosis- resolved       4. Hyperglycemia  -  Bridged with Lantus 10 units daily, start medium dose insulin sliding scale. - Hypoglycemia protocols   - Electrolytes replaced  - Monitor POCT Glucose 4 x daily  Advance diet as tolerated     5. EtOH and polysubstance abuse  - [facility] protocol     6. Hypothyroidism  - TSH 0.01, T4 1.02  - Start levothyroxine 88 mcg PO daily     7. A-fib  - On eliquis at home, reversed on [facility]  - Continue to monitor  - Holding anticoagulation due to GI bleed.   Plan to resume Eliquis today after colonoscopy if okay with GI     8. Druze  - Refusing blood products  - Accepting EPO and iron  - Minimize blood draws                    DVT prophylaxis: EPC cuffs  GI prophylaxis: Anne Nageotte, MD  Internal Medicine Resident, PGY-3  [facility];  [place], [place]  1/26/2021, 12:36 PM

## 2021-01-26 NOTE — CARE COORDINATION
Transitional planning. Pt plans to return home with brother when released from hospital. Brother will transport, declines HC.

## 2021-01-26 NOTE — PLAN OF CARE
Nutrition Problem #1: Inadequate protein intake  Intervention: Food and/or Nutrient Delivery: Continue NPO(Start diet vs nutrition support as able)  Nutritional Goals: Start diet vs nutrition support within 24-72 hrs

## 2021-01-26 NOTE — OP NOTE
[id number] [facility]      Patient:   Stephanie Otero    :    1955    Referring/PCP: Axel Archibald MD  Procedure:   Colonoscopy with biopsy, polypectomy (cold snare); Esophagogastroduodenoscopy  --diagnostic  Facility:  [facility]  Date:     2021  Endoscopist:  Jhoana Rojas MD, Mora Trammell    Indication:  hematochezia. Anemia of acute blood loss. Abnormal CT showing colitis    Postprocedure diagnosis: Severe colitis, mild gastritis, sigmoid colon polyp    Anesthesia:  MAC    Complications: None    EBL: None from the procedure    Specimen: Sent to the lab-colon biopsies for colitis, sigmoid colon polyp    Description of Procedure:  Informed consent was obtained from the patient after explanation of the procedure including indications, description of the procedure,  benefits and possible risks and complications of the procedure, and alternatives. Questions were answered. The patient's history was reviewed and a directed physical examination was performed prior to the procedure. Patient was monitored throughout the procedure with pulse oximetry and periodic assessment of vital signs. Patient was sedated as noted above. With the patient initially in the left lateral decubitus position, a digital rectal examination was performed and revealed negative without mass or lesions. The Olympus video colonoscope was placed in the patient's rectum and advanced without difficulty  to the terminal ileum. The prep was excellent. Examination of the mucosa was performed during both introduction and withdrawal of the colonoscope. Retroflexed view of the rectum was performed. Findings:   1. Normal terminal ileum  2. Severe colitis in the left side of the colon associated with pseudomembranes, ulcerations suggestive of ischemic colitis. Biopsied with cold biopsy forceps. 3.  5 mm sessile polyp in the sigmoid colon. Resected with cold snare.   Resection and retrieval was complete. 4.  Patchy colitis throughout the rest of the colon which was mild to moderate characterized by erythema. This was also biopsied and segmental fashion with cold biopsy forcep. 5.  Normal retroflexion    With the patient in the left lateral decubitus position, the Olympus videoendoscope was placed in the patient's mouth and under direct visualization passed into the esophagus. Visualization of the esophagus, stomach, and duodenum was performed during both introduction and withdrawal of the endoscope and retroflexed view of the proximal stomach was obtained. The scope was passed to the second portion of the duodenum. The patient tolerated the procedure well and was taken to the recovery area in good condition. Findings[de-identified]   Esophagus: normal.   Stomach: Moderate gastritis in the cardia caused by gastric erythema. The rest of the stomach was otherwise normal.  Duodenum: normal       Recommendations:  Await pathology. Repeat colonoscopy in 5 years. -Acid suppression with a proton pump inhibitor twice daily for 4 weeks and then once daily. Full liquid diet today.       Electronically signed by Keely Sanchez MD, FACG on 1/26/2021 at 11:27 AM

## 2021-01-26 NOTE — ANESTHESIA PRE PROCEDURE
Department of Anesthesiology  Preprocedure Note       Name:  Africa Ruiz   Age:  60 y.o.  :  1955                                          MRN:  [id number]         Date:  2021      Surgeon: Simin Newman):  Arthur Rojas MD    Procedure: Procedure(s):  COLONOSCOPY CONTROL HEMORRHAGE  EGD ESOPHAGOGASTRODUODENOSCOPY    Medications prior to admission:   Prior to Admission medications    Medication Sig Start Date End Date Taking?  Authorizing Provider   amLODIPine (NORVASC) 10 MG tablet Take 10 mg by mouth daily 20   Historical Provider, MD   levothyroxine (SYNTHROID) 88 MCG tablet TAKE 1 TABLET BY MOUTH EVERY DAY 21   Arcenio Rodriguez MD   Insulin Pen Needle (TRUEPLUS 5-BEVEL PEN NEEDLES) 31G X 6 MM MISC USE AS DIRECTED DAILY 21   Arcenio Rodriguez MD   oxybutynin (DITROPAN XL) 15 MG extended release tablet TAKE 1 TABLET BY MOUTH DAILY 20   RAJNI Huang CNM   Insulin Pen Needle (TRUEPLUS PEN NEEDLES) 31G X 5 MM MISC USE AS DIRECTED DAILY 20   Arcenio Rodriguez MD   dilTIAZem (CARDIZEM CD) 120 MG extended release capsule TAKE 1 CAPSULE BY MOUTH EVERY DAY 20   Arcenio Rodriguez MD   loratadine (CLARITIN) 10 MG tablet TAKE 1 TABLET BY MOUTH EVERY DAY 20   Arcenio Rodriguez MD   albuterol sulfate  (90 Base) MCG/ACT inhaler INHALE TWO (2) PUFFS BY MOUTH EVERY 6 HOURS AS NEEDED FOR WHEEZING 10/7/20   Arcenio Rodriguez MD   metoprolol tartrate (LOPRESSOR) 25 MG tablet TAKE ONE (1) TABLET BY MOUTH TWICE DAILY 10/7/20   Arcenio Rodriguez MD   atorvastatin (LIPITOR) 40 MG tablet TAKE 1 TABLET BY MOUTH ONCE DAILY 20   Arcenio Rodriguez MD   potassium chloride (KLOR-CON M) 20 MEQ extended release tablet Take 1 tablet by mouth daily 20   Arcenio Rodriguez MD   insulin glargine (LANTUS SOLOSTAR) 100 UNIT/ML injection pen Inject 30 Units into the skin nightly  Patient taking differently: Inject 25 Units into the skin nightly  20   Arcenio Rodriguez MD cyclobenzaprine (FLEXERIL) 5 MG tablet TAKE 1 TABLET BY MOUTH THREE TIMES DAILY AS NEEDED FOR MUSCLE SPASMS 8/13/20   Kathy Daniels MD   albuterol (PROVENTIL) (2.5 MG/3ML) 0.083% nebulizer solution Take 3 mLs by nebulization 4 times daily 7/23/20   Kathy Daniels MD   lisinopril (PRINIVIL;ZESTRIL) 5 MG tablet TAKE 1 TABLET BY MOUTH EVERY DAY 7/13/20   Kathy Daniels MD   Lancets MISC 1 each by Does not apply route daily Tests 3 times a day/PRN.  Please dispense what [facility] covers to go with her new Glucometer 6/19/20   Kathy Daniels MD   TRULICITY 1.5 JH/3.3SX SOPN 1.5 mg once a week Monday's 4/6/20   Historical Provider, MD   Multiple Vitamin (MULTI-VITAMINS) TABS TAKE 1 TABLET BY MOUTH EVERY DAY 5/7/20   Kathy Daniels MD   senna (SENOKOT) 8.6 MG tablet Take 1 tablet by mouth 2 times daily 4/27/20 4/27/21  Kathy Daniels MD   dicyclomine (BENTYL) 10 MG capsule Take 1 capsule by mouth 4 times daily 4/27/20   Kathy Daniels MD   Cholecalciferol (VITAMIN D3) 25 MCG (1000 UT) TABS TAKE (1) TABLET BY MOUTH DAILY 4/14/20   Kathy Daniels MD   Omega-3 Fatty Acids (FISH OIL) 1000 MG CAPS TAKE TWO (2) CAPSULES BY MOUTH DAILY 4/14/20   Kathy Daniels MD   ELIQUIS 5 MG TABS tablet TAKE ONE (1) TABLET BY MOUTH TWICE DAILY 4/14/20   Kathy Daniels MD   hydroCHLOROthiazide (HYDRODIURIL) 25 MG tablet TAKE 1 TABLET BY MOUTH EVERY MORNING 3/16/20   Kathy Daniels MD   gabapentin (NEURONTIN) 800 MG tablet TAKE 1 TABLET BY MOUTH TWICE DAILY 3/16/20 1/21/21  Kathy Daniels MD   FEROSUL 325 (65 Fe) MG tablet TAKE (1) TABLET BY MOUTH DAILY 4/23/19   Kathy Daniels MD   Blood Glucose Monitoring Suppl SOUTH 1 Units by Does not apply route once for 1 dose PLease dispense a machine that is covered by her insurance  DX E11.9 4/26/18 8/24/20  Kathy Daniels MD   Incontinence Supply Disposable (POISE PAD) PADS Apply 1 Piece topically as needed (incontinence) 11/21/17   Rajani Sanchez MD  [MAR Hold] levothyroxine (SYNTHROID) tablet 88 mcg  88 mcg Oral Daily Yifan Dickerson MD   88 mcg at 01/26/21 0527    [MAR Hold] polyethylene glycol (GoLYTELY) solution 4,000 mL  4,000 mL Oral Once Mychal Garcia, APRN - CNP   Stopped at 01/22/21 1220    [MAR Hold] sodium chloride flush 0.9 % injection 10 mL  10 mL Intravenous 2 times per day E Sahara Rubin MD   10 mL at 01/24/21 2046    [MAR Hold] sodium chloride flush 0.9 % injection 10 mL  10 mL Intravenous PRN Reyna Gold MD        [facility] Hold] promethazine (PHENERGAN) tablet 12.5 mg  12.5 mg Oral Q6H PRN E Sahara Rubin MD        Or   [facility] Hold] [facility]etron [facility]) injection 4 mg  4 mg Intravenous Q6H PRN E Sahara Rubin MD   4 mg at 01/24/21 1020    [MAR Hold] famotidine (PEPCID) injection 20 mg  20 mg Intravenous Daily E Sahara Rubin MD   20 mg at 01/26/21 0916    [MAR Hold] albuterol (PROVENTIL) nebulizer solution 2.5 mg  2.5 mg Nebulization 4x Daily E Sahara Rubin MD   2.5 mg at 01/26/21 0921    [MAR Hold] albuterol sulfate  (90 Base) MCG/ACT inhaler 2 puff  2 puff Inhalation Q4H PRN Reyna Gold MD        [facility] Hold] glucose (GLUTOSE) 40 % oral gel 15 g  15 g Oral PRN Reyna Gold MD        [facility] Hold] dextrose 50 % IV solution  12.5 g Intravenous PRN Reyna Gold MD        [facility] Hold] glucagon (rDNA) injection 1 mg  1 mg Intramuscular PRN Reyna Gold MD        [facility] Hold] dextrose 5 % solution  100 mL/hr Intravenous PRN Reyna Gold MD        [facility] Hold] sodium chloride flush 0.9 % injection 10 mL  10 mL Intravenous 2 times per day Reyna Gold MD   10 mL at 01/24/21 2052    [MAR Hold] sodium chloride flush 0.9 % injection 10 mL  10 mL Intravenous PRN Reyna Gold MD           Allergies: Allergies   Allergen Reactions    Norco [Hydrocodone-Acetaminophen]      Nausea and vomiting    Oxycodone Nausea And Vomiting    Percocet [Oxycodone-Acetaminophen] Nausea And Vomiting    Sulfa Antibiotics Other (See Comments)     Hallucinations. Problem List:    Patient Active Problem List   Diagnosis Code    Cervical neck pain with evidence of disc disease M50.90    Paroxysmal atrial fibrillation (HCC) I48.0    Graves' disease E05.00    Asthma with COPD ([address] J44.9    Essential hypertension I10    Type 2 diabetes mellitus without complication, with long-term current use of insulin ([address] E11.9, Z79.4    Iron deficiency anemia D50.9    Irritable bowel syndrome with both constipation and diarrhea K58.2    Mixed hyperlipidemia E78.2    GI bleed K92.2    Patient is Yazidism Z78.9    Dehydration E86.0       Past Medical History:        Diagnosis Date    Asthma     Atrial fibrillation ([address]     COPD (chronic obstructive pulmonary disease) ([address]     DDD (degenerative disc disease)     Diabetes mellitus ([address]     Hyperlipidemia     Hypertension     Hyperthyroidism     Type II or unspecified type diabetes mellitus without mention of complication, not stated as uncontrolled        Past Surgical History:        Procedure Laterality Date    BACK SURGERY      COLONOSCOPY  14   [facility] DENTAL SURGERY N/A 3/8/2017    REMOVAL OF BILATERAL MANDIBULAR LELO AND MAXILLARY EDENTULOUS ALVEOPLASTY performed by Karen Ojeda DDS at [facility] 80      removal    GASTROSTOMY TUBE PLACEMENT      INSERT MIDLINE CATHETER  2021         TONSILLECTOMY      TRACHEOSTOMY      TRACHEOSTOMY CLOSURE      TUBAL LIGATION      UPPER GASTROINTESTINAL ENDOSCOPY  3/16/2016    Peg tube insertion       Social History:    Social History     Tobacco Use    Smoking status: Former Smoker     Packs/day: 1.00     Years: 20.00     Pack years: 20.00     Types: Cigarettes     Quit date: 10/23/2013     Years since quittin.2    Smokeless tobacco: Never Used   Substance Use Topics    Alcohol use: No     Comment: beer once a month                                Counseling given: Not Answered      Vital Signs (Current):   Vitals: 01/26/21 0531 01/26/21 0744 01/26/21 1010 01/26/21 1036   BP:  (!) 178/97  (!) 185/98   Pulse:  72 74 67   Resp:  16  16   Temp:  97.9 °F (36.6 °C)  98 °F (36.7 °C)   TempSrc:  Oral  Infrared   SpO2:  96%  95%   Weight: 161 lb 8 oz (73.3 kg)                                                 BP Readings from Last 3 Encounters:   01/26/21 (!) 185/98   01/21/21 103/65   01/05/21 (!) 182/100       NPO Status:                                                                                 BMI:   Wt Readings from Last 3 Encounters:   01/26/21 161 lb 8 oz (73.3 kg)   01/21/21 161 lb (73 kg)   01/05/21 160 lb (72.6 kg)     Body mass index is 27.72 kg/m².     CBC:   Lab Results   Component Value Date    WBC 8.7 01/26/2021    RBC 3.72 01/26/2021    HGB 11.2 01/26/2021    HCT 34.7 01/26/2021    MCV 93.3 01/26/2021    RDW 13.5 01/26/2021     01/26/2021       CMP:   Lab Results   Component Value Date     01/26/2021    K 3.6 01/26/2021     01/26/2021    CO2 24 01/26/2021    BUN 7 01/26/2021    CREATININE 0.84 01/26/2021    GFRAA >60 01/26/2021    LABGLOM >60 01/26/2021    GLUCOSE 142 01/26/2021    PROT 5.9 01/21/2021    CALCIUM 8.3 01/26/2021    BILITOT 1.01 01/21/2021    ALKPHOS 210 01/21/2021    AST 30 01/21/2021    ALT 26 01/21/2021       POC Tests:   Recent Labs     01/26/21  0817   POCGLU 132*       Coags:   Lab Results   Component Value Date    PROTIME 11.0 01/21/2021    INR 1.0 01/21/2021    APTT 21.4 01/21/2021       HCG (If Applicable): No results found for: PREGTESTUR, PREGSERUM, HCG, HCGQUANT     ABGs: No results found for: PHART, PO2ART, ZNL7HKH, GYF9FDI, BEART, [id number]     Type & Screen (If Applicable):  No results found for: LABABO, LABRH    Drug/Infectious Status (If Applicable):  No results found for: HIV, HEPCAB    COVID-19 Screening (If Applicable):   Lab Results   Component Value Date    COVID19 Not Detected 01/21/2021         Anesthesia Evaluation Patient summary reviewed and Nursing notes reviewed  Airway: Mallampati: II  TM distance: >3 FB     Mouth opening: > = 3 FB Dental:          Pulmonary:normal exam    (+) COPD: moderate,  asthma:                            Cardiovascular:  Exercise tolerance: good (>4 METS),   (+) hypertension:, pulmonary hypertension: moderate, hyperlipidemia      ECG reviewed  Rhythm: regular  Rate: normal  Echocardiogram reviewed         Beta Blocker:  Not on Beta Blocker         Neuro/Psych:   Negative Neuro/Psych ROS              GI/Hepatic/Renal:        (-) GERD, liver disease and no renal disease       Endo/Other:    (+) Diabetes, hyperthyroidism::., .                 Abdominal:           Vascular:                                        Anesthesia Plan      MAC and TIVA     ASA 3       Induction: intravenous. MIPS: Postoperative opioids intended and Prophylactic antiemetics administered. Anesthetic plan and risks discussed with patient.       Plan discussed with CRNA and surgical team.                  Harlan Elizabeth MD   1/26/2021

## 2021-01-27 VITALS
WEIGHT: 161.8 LBS | OXYGEN SATURATION: 95 % | TEMPERATURE: 98.6 F | BODY MASS INDEX: 27.62 KG/M2 | DIASTOLIC BLOOD PRESSURE: 84 MMHG | SYSTOLIC BLOOD PRESSURE: 177 MMHG | HEART RATE: 68 BPM | HEIGHT: 64 IN | RESPIRATION RATE: 16 BRPM

## 2021-01-27 LAB
ABSOLUTE EOS #: 0.43 K/UL (ref 0–0.4)
ABSOLUTE IMMATURE GRANULOCYTE: 0.34 K/UL (ref 0–0.3)
ABSOLUTE LYMPH #: 1.81 K/UL (ref 1–4.8)
ABSOLUTE MONO #: 0.69 K/UL (ref 0.1–0.8)
ANION GAP SERPL CALCULATED.3IONS-SCNC: 9 MMOL/L (ref 9–17)
BASOPHILS # BLD: 1 % (ref 0–2)
BASOPHILS ABSOLUTE: 0.09 K/UL (ref 0–0.2)
BUN BLDV-MCNC: 4 MG/DL (ref 8–23)
BUN/CREAT BLD: ABNORMAL (ref 9–20)
CALCIUM SERPL-MCNC: 8.5 MG/DL (ref 8.6–10.4)
CHLORIDE BLD-SCNC: 104 MMOL/L (ref 98–107)
CO2: 25 MMOL/L (ref 20–31)
CREAT SERPL-MCNC: 0.9 MG/DL (ref 0.5–0.9)
CULTURE: NORMAL
DIFFERENTIAL TYPE: ABNORMAL
EOSINOPHILS RELATIVE PERCENT: 5 % (ref 1–4)
GFR AFRICAN AMERICAN: >60 ML/MIN
GFR NON-AFRICAN AMERICAN: >60 ML/MIN
GFR SERPL CREATININE-BSD FRML MDRD: ABNORMAL ML/MIN/{1.73_M2}
GFR SERPL CREATININE-BSD FRML MDRD: ABNORMAL ML/MIN/{1.73_M2}
GLUCOSE BLD-MCNC: 103 MG/DL (ref 70–99)
GLUCOSE BLD-MCNC: 105 MG/DL (ref 65–105)
GLUCOSE BLD-MCNC: 197 MG/DL (ref 65–105)
HCT VFR BLD CALC: 33.6 % (ref 36.3–47.1)
HEMOGLOBIN: 11.6 G/DL (ref 11.9–15.1)
IMMATURE GRANULOCYTES: 4 %
LYMPHOCYTES # BLD: 21 % (ref 24–44)
Lab: NORMAL
MAGNESIUM: 1.8 MG/DL (ref 1.6–2.6)
MCH RBC QN AUTO: 30.6 PG (ref 25.2–33.5)
MCHC RBC AUTO-ENTMCNC: 34.5 G/DL (ref 28.4–34.8)
MCV RBC AUTO: 88.7 FL (ref 82.6–102.9)
MONOCYTES # BLD: 8 % (ref 1–7)
MORPHOLOGY: ABNORMAL
MORPHOLOGY: ABNORMAL
NRBC AUTOMATED: 0 PER 100 WBC
NUCLEATED RED BLOOD CELLS: 2 PER 100 WBC
PDW BLD-RTO: 13.2 % (ref 11.8–14.4)
PLATELET # BLD: 239 K/UL (ref 138–453)
PLATELET ESTIMATE: ABNORMAL
PMV BLD AUTO: 9.9 FL (ref 8.1–13.5)
POTASSIUM SERPL-SCNC: 3.5 MMOL/L (ref 3.7–5.3)
RBC # BLD: 3.79 M/UL (ref 3.95–5.11)
RBC # BLD: ABNORMAL 10*6/UL
SEG NEUTROPHILS: 61 % (ref 36–66)
SEGMENTED NEUTROPHILS ABSOLUTE COUNT: 5.24 K/UL (ref 1.8–7.7)
SODIUM BLD-SCNC: 138 MMOL/L (ref 135–144)
SPECIMEN DESCRIPTION: NORMAL
SURGICAL PATHOLOGY REPORT: NORMAL
WBC # BLD: 8.6 K/UL (ref 3.5–11.3)
WBC # BLD: ABNORMAL 10*3/UL

## 2021-01-27 PROCEDURE — 99239 HOSP IP/OBS DSCHRG MGMT >30: CPT | Performed by: INTERNAL MEDICINE

## 2021-01-27 PROCEDURE — 99232 SBSQ HOSP IP/OBS MODERATE 35: CPT | Performed by: INTERNAL MEDICINE

## 2021-01-27 PROCEDURE — APPSS45 APP SPLIT SHARED TIME 31-45 MINUTES: Performed by: INTERNAL MEDICINE

## 2021-01-27 PROCEDURE — 6360000002 HC RX W HCPCS: Performed by: INTERNAL MEDICINE

## 2021-01-27 PROCEDURE — 94640 AIRWAY INHALATION TREATMENT: CPT

## 2021-01-27 PROCEDURE — 6370000000 HC RX 637 (ALT 250 FOR IP): Performed by: STUDENT IN AN ORGANIZED HEALTH CARE EDUCATION/TRAINING PROGRAM

## 2021-01-27 PROCEDURE — 83735 ASSAY OF MAGNESIUM: CPT

## 2021-01-27 PROCEDURE — 6370000000 HC RX 637 (ALT 250 FOR IP): Performed by: INTERNAL MEDICINE

## 2021-01-27 PROCEDURE — 85025 COMPLETE CBC W/AUTO DIFF WBC: CPT

## 2021-01-27 PROCEDURE — 82947 ASSAY GLUCOSE BLOOD QUANT: CPT

## 2021-01-27 PROCEDURE — 80048 BASIC METABOLIC PNL TOTAL CA: CPT

## 2021-01-27 PROCEDURE — 36415 COLL VENOUS BLD VENIPUNCTURE: CPT

## 2021-01-27 RX ORDER — PANTOPRAZOLE SODIUM 40 MG/1
40 TABLET, DELAYED RELEASE ORAL
Qty: 60 TABLET | Refills: 0 | Status: SHIPPED | OUTPATIENT
Start: 2021-01-27 | End: 2022-05-19 | Stop reason: SDUPTHER

## 2021-01-27 RX ORDER — PANTOPRAZOLE SODIUM 40 MG/1
40 TABLET, DELAYED RELEASE ORAL
Qty: 90 TABLET | Refills: 1 | Status: SHIPPED | OUTPATIENT
Start: 2021-03-25 | End: 2021-01-29

## 2021-01-27 RX ADMIN — PANTOPRAZOLE SODIUM 40 MG: 40 TABLET, DELAYED RELEASE ORAL at 07:46

## 2021-01-27 RX ADMIN — DESMOPRESSIN ACETATE 40 MG: 0.2 TABLET ORAL at 08:23

## 2021-01-27 RX ADMIN — METOCLOPRAMIDE 10 MG: 5 INJECTION, SOLUTION INTRAMUSCULAR; INTRAVENOUS at 07:47

## 2021-01-27 RX ADMIN — METOCLOPRAMIDE 10 MG: 5 INJECTION, SOLUTION INTRAMUSCULAR; INTRAVENOUS at 00:03

## 2021-01-27 RX ADMIN — METOPROLOL TARTRATE 25 MG: 25 TABLET ORAL at 08:23

## 2021-01-27 RX ADMIN — INSULIN GLARGINE 10 UNITS: 100 INJECTION, SOLUTION SUBCUTANEOUS at 08:27

## 2021-01-27 RX ADMIN — ALBUTEROL SULFATE 2.5 MG: 2.5 SOLUTION RESPIRATORY (INHALATION) at 09:14

## 2021-01-27 RX ADMIN — POTASSIUM BICARBONATE 40 MEQ: 782 TABLET, EFFERVESCENT ORAL at 08:25

## 2021-01-27 RX ADMIN — DILTIAZEM HYDROCHLORIDE 120 MG: 120 CAPSULE, COATED, EXTENDED RELEASE ORAL at 08:23

## 2021-01-27 RX ADMIN — LEVOTHYROXINE SODIUM 88 MCG: 88 TABLET ORAL at 07:47

## 2021-01-27 NOTE — PROGRESS NOTES
[facility]  Internal Medicine Teaching Residency Program  Inpatient Daily Progress Note  ______________________________________________________________________________    Patient: Shahla Toledo  YOB: 1955   [facility]:[id number]    Acct: [de-identified]     Room:   Admit date: 2021  Today's date: 21  Number of days in the hospital: 6    SUBJECTIVE   Admitting Diagnosis: GI bleed, abdominal obstruction  CC: Abdominal pain, GI bleed  No acute events overnight. Afebrile. SBP in 170s. Patient refused Norvasc for her blood pressure when it was added yesterday. To continue on Cardizem and Lopressor. Labetalol as needed IV. Patient denies any nausea, emesis, abdominal pain. EGD colonoscopy showing severe colitis in the left colon with pseudomembranes and ulcerations suggestive of ischemic colitis, biopsy taken, 5 mm sessile polyp in sigmoid colon which was resected with cold snare. We will continue acid suppression with PPI twice daily for 4 weeks, repeat colonoscopy in 5 years, advance diet and resume anticoagulation after 6 days    ROS:  Constitutional:  negative for chills, fevers, sweats  Respiratory:  negative for cough, dyspnea on exertion, hemoptysis, shortness of breath, wheezing  Cardiovascular:  negative for chest pain, chest pressure/discomfort, lower extremity edema, palpitations  Gastrointestinal:  negative for abdominal pain, constipation, diarrhea, nausea, vomiting  Neurological:  negative for dizziness, headache    OBJECTIVE     Vital Signs:  BP (!) 177/84   Pulse 68   Temp 98.6 °F (37 °C)   Resp 16   Ht 5' 4\" (1.626 m)   Wt 161 lb 8 oz (73.3 kg)   SpO2 95%   BMI 27.72 kg/m²     Temp (24hrs), Av °F (36.7 °C), Min:96.6 °F (35.9 °C), Max:98.6 °F (37 °C)    No intake/output data recorded.     Physical Exam:  Constitutional: Appears well, no distress  EENT: PERRLA, EOMI, sclera clear, anicteric, oropharynx clear, no lesions, neck supple with midline trachea. Neck: Supple, symmetrical, trachea midline, no adenopathy, thyroid symmetric, no jvd skin normal  Respiratory: clear to auscultation, no wheezes or rales and unlabored breathing. No intercostal tenderness  Cardiovascular: regular rate and rhythm, normal S1, S2, no murmur noted and 2+ pulses throughout  Abdomen: soft, distended, tenderness improving.    Extremities:  peripheral pulses normal, no pedal edema, no clubbing or cyanosis    Medications:  Scheduled Medications:    pantoprazole  40 mg Oral BID AC    metoprolol tartrate  25 mg Oral BID    atorvastatin  40 mg Oral Daily    dilTIAZem  120 mg Oral Daily    metoclopramide  10 mg Intravenous Q6H    insulin lispro  0-12 Units Subcutaneous 4x Daily    insulin glargine  10 Units Subcutaneous Daily    levothyroxine  88 mcg Oral Daily    sodium chloride flush  10 mL Intravenous 2 times per day    albuterol  2.5 mg Nebulization 4x Daily    sodium chloride flush  10 mL Intravenous 2 times per day     Continuous Infusions:    sodium chloride 50 mL/hr at 01/26/21 1701    dextrose       PRN Medications    labetalol, 10 mg, Q4H PRN      promethazine, 6.25 mg, Q4H PRN      sodium chloride flush, 10 mL, PRN      promethazine, 12.5 mg, Q6H PRN    Or      ondansetron, 4 mg, Q6H PRN      albuterol sulfate HFA, 2 puff, Q4H PRN      glucose, 15 g, PRN      dextrose, 12.5 g, PRN      glucagon (rDNA), 1 mg, PRN      dextrose, 100 mL/hr, PRN      sodium chloride flush, 10 mL, PRN        Diagnostic Labs:  CBC:   Recent Labs     01/25/21  0516 01/26/21  0526 01/27/21  0633   WBC 7.2 8.7 8.6   RBC 3.52* 3.72* 3.79*   HGB 10.7* 11.2* 11.6*   HCT 32.9* 34.7* 33.6*   MCV 93.5 93.3 88.7   RDW 13.7 13.5 13.2    270 239     BMP:   Recent Labs     01/25/21  1419 01/26/21  0526 01/27/21  0633    135 138   K 5.3 3.6* 3.5*    102 104   CO2 27 24 25   BUN 10 7* 4*   CREATININE 0.86 0.84 0.90     BNP: No results for input(s): BNP in the last 72 hours. PT/INR: No results for input(s): PROTIME, INR in the last 72 hours. APTT: No results for input(s): APTT in the last 72 hours. CARDIAC ENZYMES: No results for input(s): CKMB, CKMBINDEX, TROPONINI in the last 72 hours. Invalid input(s): CKTOTAL;3  FASTING LIPID PANEL:  Lab Results   Component Value Date    CHOL 150 11/18/2020    HDL 36 (L) 11/18/2020    TRIG 201 (H) 11/18/2020     LIVER PROFILE: No results for input(s): AST, ALT, ALB, BILIDIR, BILITOT, ALKPHOS in the last 72 hours. MICROBIOLOGY:   Lab Results   Component Value Date/Time    CULTURE NO GROWTH 6 DAYS 01/21/2021 08:15 PM    CULTURE NO GROWTH 6 DAYS 01/21/2021 08:15 PM       Imaging:    Ct Abdomen Pelvis Wo Contrast Additional Contrast? None    Result Date: 1/21/2021  Impacted stool throughout the sigmoid colon and rectum causing dilatation of the more proximal colon to the cecum. The cecum measures 7 cm in diameter. Small amount of fluid in the cul-de-sac. Xr Abdomen (kub) (single Ap View)    Result Date: 1/23/2021  1. Interval decrease in stool burden with minimal remaining in the distal colon. 2.  Colonic wall edema is noted, most pronounced in the transverse colon. 3.  Mild small bowel distension, favored to represent ileus. 4.  Enteric tube terminates at the level of the body of the stomach. Xr Abdomen (kub) (single Ap View)    Result Date: 1/22/2021  Somewhat motion degraded. No obvious free air. Enteric tube not well seen, but possibly pulled back somewhat, as above. Nonspecific gas-filled small bowel loops in the mid abdomen. No definite obstruction. Ct Head Wo Contrast    Result Date: 1/21/2021  No acute intracranial abnormality. Ct Abdomen Pelvis W Iv Contrast Additional Contrast? None    Result Date: 1/24/2021  1. Findings compatible with diffuse colitis. Liquid stool is noted throughout the colon. 2.  Mild small bowel distension fluid, suggestive of ileus.  3.  Trace pelvic free fluid. Xr Abdomen For Ng/og/ne Tube Placement    Result Date: 1/22/2021  Enteric tube tip and side port are below the level of the diaphragm, in the expected location of the stomach. Cta Abdomen Pelvis W Wo Contrast    Result Date: 1/21/2021  The colon remains obstructed by impacted stool in the distal sigmoid and rectum with a large amount of stool from rectum to cecum. Increasing third space fluid in the lower abdomen and pelvis. No pneumatosis or evidence of high-grade arterial obstruction. Normal bowel wall enhancement. The findings were discussed with Dr. Nabor Mars in the ED at 2:35 PM.       ASSESSMENT & PLAN     ASSESSMENT / PLAN:     1. Acute toxic metabolic encephalopathy due to unknown etiology  - Resolved  - CT head unremarkable  - Electrolytes within normal limits on admission  - Continue to monitor     2. Active GI bleed likely secondary to Stercoral colitis  - On eliquis at home, reversed with Kcentra and [address]  - Hgb stable at 11.6  - Trend H&H daily  -   Start Reglan 10 mg IV every 6 hours. - EGD colonoscopy showing severe colitis in the left colon with pseudomembranes and ulcerations suggestive of ischemic colitis, biopsy taken, 5 mm sessile polyp in sigmoid colon which was resected with cold snare.  - We will continue acid suppression with PPI twice daily for 4 weeks, repeat colonoscopy in 5 years, advance diet and resume anticoagulation after 6 days  - Gen surg - no surgical interventions at this time.        3. Lactic acidosis- resolved       4. Hyperglycemia  -  Bridged with Lantus 10 units daily, start medium dose insulin sliding scale. - Hypoglycemia protocols   - Electrolytes replaced  - Monitor POCT Glucose 4 x daily  Advance diet as tolerated     5. EtOH and polysubstance abuse  - [facility] protocol     6. Hypothyroidism  - TSH 0.01, T4 1.02  - Start levothyroxine 88 mcg PO daily     7.  A-fib  - On eliquis at home, reversed on Kcentra and [address]  - Continue to monitor  - Holding anticoagulation due to GI bleed. Plan to resume Eliquis today after colonoscopy if okay with GI     8. Quaker  - Refusing blood products  - Accepting EPO and iron  - Minimize blood draws                    DVT prophylaxis: EPC cuffs  GI prophylaxis: Pepcid    Discharge planning: Possible discharge today    Lianna Blackwell MD  Internal Medicine Resident, PGY-1  [facility];  [place], [place]  1/27/2021, 9:00 AM

## 2021-01-27 NOTE — PROGRESS NOTES
[facility]. [facility] Gastroenterology Progress Note    Oliva Willard is a 60 y.o. female patient. Hospitalization Day:6      Chief consult reason: LGIB on Eliquis, alcohol use      Subjective: Patient seen and examined. Patient doing well. Patient is tolerating diet. She had a small amount of loose brown stool with scant/small amount of red-tinged mucus. Objective:   VITALS:  BP (!) 177/84   Pulse 68   Temp 98.6 °F (37 °C)   Resp 16   Ht 5' 4\" (1.626 m)   Wt 161 lb 8 oz (73.3 kg)   SpO2 95%   BMI 27.72 kg/m²   TEMPERATURE:  Current - Temp: 98.6 °F (37 °C);  Max - Temp  Av °F (36.7 °C)  Min: 96.6 °F (35.9 °C)  Max: 98.6 °F (37 °C)    Physical Assessment:  General appearance:  alert, cooperative and no distress  Mental Status:  oriented to person, place and time and falt affect  Lungs:  clear to auscultation bilaterally, normal effort  Heart:  regular rate and rhythm, no murmur  Abdomen:  soft, nontender, mildly distended, + bowel sounds  Extremities:  no edema, no redness, No clubbing  Skin:  warm, dry, no gross lesions or rashes    CURRENT MEDICATIONS:  Scheduled Meds:   pantoprazole  40 mg Oral BID AC    metoprolol tartrate  25 mg Oral BID    atorvastatin  40 mg Oral Daily    dilTIAZem  120 mg Oral Daily    metoclopramide  10 mg Intravenous Q6H    insulin lispro  0-12 Units Subcutaneous 4x Daily    insulin glargine  10 Units Subcutaneous Daily    levothyroxine  88 mcg Oral Daily    sodium chloride flush  10 mL Intravenous 2 times per day    albuterol  2.5 mg Nebulization 4x Daily    sodium chloride flush  10 mL Intravenous 2 times per day     Continuous Infusions:   sodium chloride 50 mL/hr at 21 1701    dextrose       PRN Meds:labetalol, promethazine, sodium chloride flush, promethazine **OR** ondansetron, albuterol sulfate HFA, glucose, dextrose, glucagon (rDNA), dextrose, sodium chloride flush      Data Review:  LABS and IMAGING:     CBC  Recent Labs     21  0516 75 mL intravenously. Arterial and venous phases were performed. MIP (maximum intensity projection) images were performed. Dose reduction techniques were achieved by using automated exposure control and/or adjustment of mA and/or kV according to patient size and/or use of iterative reconstruction technique. FINDINGS: There is somewhat less stool in the sigmoid colon after the patient recently passed a bloody stool, however the distal sigmoid and rectum still are impacted by stool and the colon remains markedly dilated with fluid without bowel wall thickening. There is normal enhancement of the bowel wall. No pneumatosis. The celiac axis and the SMA are patent. There is no aortic dissection. Normal enhancement of the kidneys, adrenal glands, pancreas, spleen and liver. There is increasing free fluid in the right lower quadrant and the pelvis compared to earlier today. The lung bases remain clear. There is no free air. The bone windows are unchanged. The colon remains obstructed by impacted stool in the distal sigmoid and rectum with a large amount of stool from rectum to cecum. Increasing third space fluid in the lower abdomen and pelvis. No pneumatosis or evidence of high-grade arterial obstruction. Normal bowel wall enhancement. The findings were discussed with Dr. Krystal Hyman in the ED at 2:35 PM.         ENDOSCOPY     Procedure:                 Colonoscopy with biopsy, polypectomy (cold snare); Esophagogastroduodenoscopy  --diagnostic  Facility:                      [facility]  Date:                           1/26/2021  Endoscopist:             Elise Mathews MD, [facility]     Indication:  hematochezia. Anemia of acute blood loss. Abnormal CT showing colitis     Findings:   1. Normal terminal ileum  2. Severe colitis in the left side of the colon associated with pseudomembranes, ulcerations suggestive of ischemic colitis.   Biopsied with cold biopsy forceps. 3.  5 mm sessile polyp in the sigmoid colon. Resected with cold snare. Resection and retrieval was complete. 4.  Patchy colitis throughout the rest of the colon which was mild to moderate characterized by erythema. This was also biopsied and segmental fashion with cold biopsy forcep. 5.  Normal retroflexion     Findings[de-identified]   Esophagus: normal.   Stomach: Moderate gastritis in the cardia caused by gastric erythema. The rest of the stomach was otherwise normal.  Duodenum: normal        Recommendations:  Await pathology. Repeat colonoscopy in 5 years. -Acid suppression with a proton pump inhibitor twice daily for 4 weeks and then once daily. Full liquid diet today.        Electronically signed by Kim Bland MD, FACG on 1/26/2021 at 11:27 AM       Active Problems:    GI bleed    Patient is Cheondoism    Dehydration  Resolved Problems:    * No resolved hospital problems. *       GI Assessment:    1. Rectal bleeding - Resolved. Hemoglobin stable  -  likely secondary to ischemic colitis   2. Constipation - resolved  3. History of polysubstance abuse    Patient is clinically improved and hemoglobin is stable    Plan of care:  1. Advance diet  2. Discontinue Reglan  3. PPI 40 mg twice daily x 4 weeks then once daily  4. Recommend holding AC for 5-7 days post EGD/colonoscopy - discussed with primary team.   5. Patient will need repeat colonoscopy in 5 years  6. GI will sign off      Please feel free to contact me with any questions or concerns. Thank you for allowing me to participate in the care of your patient. RAJNI Barroso - CNP on 1/27/2021 at 10:25 [address] [place] [address] Gastroenterology    Please note that this note was generated using a voice recognition dictation software. Although every effort was made to ensure the accuracy of this automated transcription, some errors in transcription may have occurred.

## 2021-01-28 ENCOUNTER — TELEPHONE (OUTPATIENT)
Dept: FAMILY MEDICINE CLINIC | Age: 66
End: 2021-01-28

## 2021-01-28 NOTE — TELEPHONE ENCOUNTER
[facility] Transitions Initial Follow Up Call    Outreach made within 2 business days of discharge: Yes    Patient: Na Mclean Patient : 1955   MRN: [id number]  Reason for Admission: There are no discharge diagnoses documented for the most recent discharge. Discharge Date: 21       Spoke with: Tatiana Gosselin    Discharge department/facility: [facility] Interactive Patient Contact:  Was patient able to fill all prescriptions: No  Was patient instructed to bring all medications to the follow-up visit: Yes  Is patient taking all medications as directed in the discharge summary?  No  Does patient understand their discharge instructions: Yes  Does patient have questions or concerns that need addressed prior to 7-14 day follow up office visit: no    Scheduled appointment with PCP within 7-14 days    Follow Up  Future Appointments   Date Time Provider Diomedes Cardenas   2021  2:00 PM MD sofia Bear W   2021  1:30 PM [address] ErikRahul Lees   2021  2:00 PM [facility]   2021 11:00 AM MD Nikolas Evans [facility]       Sacha Pinto MA

## 2021-01-29 ENCOUNTER — OFFICE VISIT (OUTPATIENT)
Dept: FAMILY MEDICINE CLINIC | Age: 66
End: 2021-01-29
Payer: COMMERCIAL

## 2021-01-29 VITALS
DIASTOLIC BLOOD PRESSURE: 90 MMHG | TEMPERATURE: 97.9 F | HEART RATE: 78 BPM | WEIGHT: 163 LBS | SYSTOLIC BLOOD PRESSURE: 152 MMHG | BODY MASS INDEX: 27.98 KG/M2 | OXYGEN SATURATION: 96 %

## 2021-01-29 DIAGNOSIS — R32 INCONTINENCE IN FEMALE: ICD-10-CM

## 2021-01-29 DIAGNOSIS — Z79.4 TYPE 2 DIABETES MELLITUS WITHOUT COMPLICATION, WITH LONG-TERM CURRENT USE OF INSULIN (HCC): ICD-10-CM

## 2021-01-29 DIAGNOSIS — E11.43 TYPE 2 DIABETES MELLITUS WITH DIABETIC AUTONOMIC NEUROPATHY, WITH LONG-TERM CURRENT USE OF INSULIN (HCC): ICD-10-CM

## 2021-01-29 DIAGNOSIS — F19.10 POLYSUBSTANCE ABUSE (HCC): ICD-10-CM

## 2021-01-29 DIAGNOSIS — I10 ESSENTIAL HYPERTENSION: Chronic | ICD-10-CM

## 2021-01-29 DIAGNOSIS — K92.2 GASTROINTESTINAL HEMORRHAGE, UNSPECIFIED GASTROINTESTINAL HEMORRHAGE TYPE: Primary | ICD-10-CM

## 2021-01-29 DIAGNOSIS — J44.9 ASTHMA WITH COPD (HCC): ICD-10-CM

## 2021-01-29 DIAGNOSIS — E11.9 TYPE 2 DIABETES MELLITUS WITHOUT COMPLICATION, WITH LONG-TERM CURRENT USE OF INSULIN (HCC): ICD-10-CM

## 2021-01-29 DIAGNOSIS — I48.0 PAROXYSMAL ATRIAL FIBRILLATION (HCC): ICD-10-CM

## 2021-01-29 DIAGNOSIS — K59.00 CONSTIPATION, UNSPECIFIED CONSTIPATION TYPE: ICD-10-CM

## 2021-01-29 DIAGNOSIS — K55.9 ISCHEMIC COLITIS (HCC): ICD-10-CM

## 2021-01-29 DIAGNOSIS — Z79.4 TYPE 2 DIABETES MELLITUS WITH DIABETIC AUTONOMIC NEUROPATHY, WITH LONG-TERM CURRENT USE OF INSULIN (HCC): ICD-10-CM

## 2021-01-29 PROCEDURE — 1111F DSCHRG MED/CURRENT MED MERGE: CPT | Performed by: INTERNAL MEDICINE

## 2021-01-29 PROCEDURE — 99215 OFFICE O/P EST HI 40 MIN: CPT | Performed by: INTERNAL MEDICINE

## 2021-01-29 RX ORDER — POLYETHYLENE GLYCOL 3350 17 G/17G
17 POWDER, FOR SOLUTION ORAL DAILY
Qty: 1530 G | Refills: 1 | Status: SHIPPED | OUTPATIENT
Start: 2021-01-29 | End: 2021-02-28

## 2021-01-29 RX ORDER — DIAPER,BRIEF,ADULT, DISPOSABLE
1 EACH MISCELLANEOUS PRN
Qty: 100 EACH | Refills: 5 | Status: SHIPPED | OUTPATIENT
Start: 2021-01-29 | End: 2021-11-03

## 2021-01-29 RX ORDER — PEN NEEDLE, DIABETIC 31 GX3/16"
NEEDLE, DISPOSABLE MISCELLANEOUS
Qty: 100 EACH | Refills: 10 | Status: SHIPPED | OUTPATIENT
Start: 2021-01-29 | End: 2021-11-03

## 2021-01-29 RX ORDER — SENNA PLUS 8.6 MG/1
1 TABLET ORAL 2 TIMES DAILY
Qty: 60 TABLET | Refills: 11 | Status: SHIPPED | OUTPATIENT
Start: 2021-01-29 | End: 2021-11-03

## 2021-01-29 RX ORDER — ISOPROPYL ALCOHOL 70 ML/100ML
SWAB TOPICAL
Qty: 100 EACH | Refills: 5 | Status: SHIPPED | OUTPATIENT
Start: 2021-01-29 | End: 2021-11-03

## 2021-01-29 NOTE — PATIENT INSTRUCTIONS
SURVEY:    You may be receiving a survey from [employer] regarding your visit today. Please complete the survey to enable us to provide the highest quality of care to you and your family. If you cannot score us a very good on any question, please call the office to discuss how we could of made your experience a very good one. Thank you. You may be receiving a survey from [employer] regarding your visit today. You may get this in the mail, through your MyChart or in your email. Please complete the survey to enable us to provide the highest quality of care to you and your family. If you cannot score us as very good ( 5 Stars) on any question, please feel free to call the office to discuss how we could have made your experience exceptional.     Thank You!       MD Elizabeth Taylor RMA Prudence Roe, PCA

## 2021-01-29 NOTE — PROGRESS NOTES
Post-Discharge Transitional Care Management Services or Hospital Follow Up      Jillian Escalona   YOB: 1955    Date of Office Visit:  1/29/2021  Date of Hospital Admission: 1/21/21  Date of Hospital Discharge: 1/27/21  Risk of hospital readmission (high >=14%. Medium >=10%) :Readmission Risk Score: 26      Care management risk score Rising risk (score 2-5) and Complex Care (Scores >=6): 7     Non face to face  following discharge, date last encounter closed (first attempt may have been earlier): 1/28/2021  8:40 AM    Call initiated 2 business days of discharge: Yes    Patient Active Problem List   Diagnosis    Cervical neck pain with evidence of disc disease    Paroxysmal atrial fibrillation ([address]    Graves' disease    [facility] with [facility]    Essential hypertension    Type 2 diabetes mellitus without complication, with long-term current use of insulin ([address]    Iron deficiency anemia    Irritable bowel syndrome with both constipation and diarrhea    Mixed hyperlipidemia    GI bleed    Patient is Jehovah's witness    Dehydration       Allergies   Allergen Reactions    Norco [Hydrocodone-Acetaminophen]      Nausea and vomiting    Oxycodone Nausea And Vomiting    Percocet [Oxycodone-Acetaminophen] Nausea And Vomiting    Sulfa Antibiotics Other (See Comments)     Hallucinations.        Medications listed as ordered at the time of discharge from hospital   Avelino SMITH   Home Medication Instructions MAXIM:    Printed on:01/29/21 6355   Medication Information                      albuterol (PROVENTIL) (2.5 MG/3ML) 0.083% nebulizer solution  Take 3 mLs by nebulization 4 times daily             albuterol sulfate  (90 Base) MCG/ACT inhaler  INHALE TWO (2) PUFFS BY MOUTH EVERY 6 HOURS AS NEEDED FOR WHEEZING             Alcohol Swabs (EASY TOUCH ALCOHOL PREP MEDIUM) 70 % PADS  USE FOUR TIMES DAILY             amLODIPine (NORVASC) 10 MG tablet  Take 10 mg by mouth daily apixaban (ELIQUIS) 5 MG TABS tablet  TAKE ONE (1) TABLET BY MOUTH TWICE DAILY             atorvastatin (LIPITOR) 40 MG tablet  TAKE 1 TABLET BY MOUTH ONCE DAILY             Blood Glucose Monitoring Suppl SOUTH  1 Units by Does not apply route once for 1 dose PLease dispense a machine that is covered by her insurance  DX E11.9             Blood Pressure Monitoring (B-D ASSURE BPM/AUTO ARM CUFF) MISC  1 Device by Does not apply route daily as needed (htn)             Cholecalciferol (VITAMIN D3) 25 MCG (1000 UT) TABS  TAKE (1) TABLET BY MOUTH DAILY             cyclobenzaprine (FLEXERIL) 5 MG tablet  TAKE 1 TABLET BY MOUTH THREE TIMES DAILY AS NEEDED FOR MUSCLE SPASMS             dicyclomine (BENTYL) 10 MG capsule  Take 1 capsule by mouth 4 times daily             dilTIAZem (CARDIZEM CD) 120 MG extended release capsule  TAKE 1 CAPSULE BY MOUTH EVERY DAY             FEROSUL 325 (65 Fe) MG tablet  TAKE (1) TABLET BY MOUTH DAILY             gabapentin (NEURONTIN) 800 MG tablet  TAKE 1 TABLET BY MOUTH TWICE DAILY             hydroCHLOROthiazide (HYDRODIURIL) 25 MG tablet  TAKE 1 TABLET BY MOUTH EVERY MORNING             Incontinence Supply Disposable (POISE PAD) PADS  Apply 1 Piece topically as needed (incontinence)             insulin glargine (LANTUS SOLOSTAR) 100 UNIT/ML injection pen  Inject 30 Units into the skin nightly             Insulin Pen Needle (TRUEPLUS 5-BEVEL PEN NEEDLES) 31G X 6 MM MISC  USE AS DIRECTED DAILY             Insulin Pen Needle (TRUEPLUS PEN NEEDLES) 31G X 5 MM MISC  USE AS DIRECTED DAILY             Lancets MISC  1 each by Does not apply route daily Tests 3 times a day/PRN.  Please dispense what [facility] covers to go with her new Glucometer             levothyroxine (SYNTHROID) 88 MCG tablet  TAKE 1 TABLET BY MOUTH EVERY DAY             lisinopril (PRINIVIL;ZESTRIL) 5 MG tablet  TAKE 1 TABLET BY MOUTH EVERY DAY             loratadine (CLARITIN) 10 MG tablet  TAKE 1 TABLET BY MOUTH EVERY DAY metoprolol tartrate (LOPRESSOR) 25 MG tablet  TAKE ONE (1) TABLET BY MOUTH TWICE DAILY             Multiple Vitamin (MULTI-VITAMINS) TABS  TAKE 1 TABLET BY MOUTH EVERY DAY             Omega-3 Fatty Acids (FISH OIL) 1000 MG CAPS  TAKE TWO (2) CAPSULES BY MOUTH DAILY             oxybutynin (DITROPAN XL) 15 MG extended release tablet  TAKE 1 TABLET BY MOUTH DAILY             pantoprazole (PROTONIX) 40 MG tablet  Take 1 tablet by mouth 2 times daily (before meals)             polyethylene glycol (GLYCOLAX) 17 GM/SCOOP powder  Take 17 g by mouth daily             potassium chloride (KLOR-CON M) 20 MEQ extended release tablet  Take 1 tablet by mouth daily             senna (SENOKOT) 8.6 MG tablet  Take 1 tablet by mouth 2 times daily             TRULICITY 1.5 PK/6.7OL SOPN  1.5 mg once a week Monday's                   Medications marked \"taking\" at this time  Outpatient Medications Marked as Taking for the 1/29/21 encounter (Office Visit) with Emilie Jara MD   Medication Sig Dispense Refill    Incontinence Supply Disposable (POISE PAD) PADS Apply 1 Piece topically as needed (incontinence) 100 each 5    Alcohol Swabs (EASY TOUCH ALCOHOL PREP MEDIUM) 70 % PADS USE FOUR TIMES DAILY 100 each 5    polyethylene glycol (GLYCOLAX) 17 GM/SCOOP powder Take 17 g by mouth daily 1530 g 1    Insulin Pen Needle (TRUEPLUS PEN NEEDLES) 31G X 5 MM MISC USE AS DIRECTED DAILY 100 each 10    senna (SENOKOT) 8.6 MG tablet Take 1 tablet by mouth 2 times daily 60 tablet 11    pantoprazole (PROTONIX) 40 MG tablet Take 1 tablet by mouth 2 times daily (before meals) 60 tablet 0    levothyroxine (SYNTHROID) 88 MCG tablet TAKE 1 TABLET BY MOUTH EVERY DAY 30 tablet 3    oxybutynin (DITROPAN XL) 15 MG extended release tablet TAKE 1 TABLET BY MOUTH DAILY 30 tablet 0    dilTIAZem (CARDIZEM CD) 120 MG extended release capsule TAKE 1 CAPSULE BY MOUTH EVERY DAY 30 capsule 5  loratadine (CLARITIN) 10 MG tablet TAKE 1 TABLET BY MOUTH EVERY DAY 30 tablet 10    albuterol sulfate  (90 Base) MCG/ACT inhaler INHALE TWO (2) PUFFS BY MOUTH EVERY 6 HOURS AS NEEDED FOR WHEEZING 324 g 10    metoprolol tartrate (LOPRESSOR) 25 MG tablet TAKE ONE (1) TABLET BY MOUTH TWICE DAILY 180 tablet 10    atorvastatin (LIPITOR) 40 MG tablet TAKE 1 TABLET BY MOUTH ONCE DAILY 90 tablet 1    potassium chloride (KLOR-CON M) 20 MEQ extended release tablet Take 1 tablet by mouth daily 90 tablet 2    insulin glargine (LANTUS SOLOSTAR) 100 UNIT/ML injection pen Inject 30 Units into the skin nightly (Patient taking differently: Inject 25 Units into the skin nightly ) 5 pen 3    cyclobenzaprine (FLEXERIL) 5 MG tablet TAKE 1 TABLET BY MOUTH THREE TIMES DAILY AS NEEDED FOR MUSCLE SPASMS 30 tablet 10    albuterol (PROVENTIL) (2.5 MG/3ML) 0.083% nebulizer solution Take 3 mLs by nebulization 4 times daily 120 each 2    lisinopril (PRINIVIL;ZESTRIL) 5 MG tablet TAKE 1 TABLET BY MOUTH EVERY DAY 30 tablet 10    Lancets MISC 1 each by Does not apply route daily Tests 3 times a day/PRN.  Please [facility] to [facility] with her new Glucometer 082 each 3    TRULICITY 1.5 NX/0.1PB SOPN 1.5 mg once a week Monday's      Multiple Vitamin (MULTI-VITAMINS) TABS TAKE 1 TABLET BY MOUTH EVERY DAY 90 tablet 4    dicyclomine (BENTYL) 10 MG capsule Take 1 capsule by mouth 4 times daily 120 capsule 3    Cholecalciferol (VITAMIN D3) 25 MCG (1000 UT) TABS TAKE (1) TABLET BY MOUTH DAILY 90 tablet 10    Omega-3 Fatty Acids (FISH OIL) 1000 MG CAPS TAKE TWO (2) CAPSULES BY MOUTH DAILY 180 capsule 10    hydroCHLOROthiazide (HYDRODIURIL) 25 MG tablet TAKE 1 TABLET BY MOUTH EVERY MORNING 90 tablet 10    FEROSUL 325 (65 Fe) MG tablet TAKE (1) TABLET BY MOUTH DAILY 90 tablet 10        Medications patient taking as of now reconciled against medications ordered at time of hospital discharge: Yes    Chief Complaint Patient presents with    Follow-Up from Hospital     Patient presents today for a hospital f/u [facility] 1/21-1/26 with a GI bleed    Referral - General     Patient needs to be referred to a specialist for the GI bleed. History of Present illness - Follow up of Hospital diagnosis(es): Severe colitis, GI bleed, acute toxic metabolic encephalopathy, hyperglycemia    Inpatient course: Discharge summary reviewed- see chart. Verena Curling presents to office to follow-up for hospitalization at [facility] from 1/21 to 1/27. She initially presented to [facility], ER complaining of abdominal pain, GI bleed and intermittent confusion. Has a history of paroxysmal A. Fib,  anticoagulated with Eliquis, has diabetes, Graves' disease, COPD. CT abdomen and pelvis in ER revealed significant stool burden and dilatation of cecum up to 7 cm. Urine drug screen was positive for opioids, marijuana and TCA. She also underwent CTA abdomen with and without contrast showing: Obstructed by impacted stool in the distal sigmoid and rectum with a large amount of stool from rectum to cecum, increased third space fluid in the lower abdomen and pelvis, no pneumatosis or evidence of high-grade arterial obstruction. She  had leukocytosis with WBC count about 20,000, H&H was normal, she had elevated lactic acid 3.0. She had reversal of Eliquis with Kcentra and transferred to [facility] for further evaluation. She was seen by general surgery team.  No acute abdominal problems identified to warrant surgical intervention. GI consult was recommended. She was started on IV Protonix and Pepcid. She was also on insulin drip due to severe hyperglycemia. She received tapwater enema and GoLYTELY via NG. She started having liquid brown stool, abdominal pain and distention resolved. She underwent EGD and colonoscopy on 1/26/2021 by Dr. Heena Swann revealing:       Findings:   1.   Normal terminal ileum 2.  Severe colitis in the left side of the colon associated with pseudomembranes, ulcerations suggestive of ischemic colitis. Biopsied with cold biopsy forceps. 3.  5 mm sessile polyp in the sigmoid colon. Resected with cold snare. Resection and retrieval was complete. 4.  Patchy colitis throughout the rest of the colon which was mild to moderate characterized by erythema. This was also biopsied and segmental fashion with cold biopsy forcep. 5.  Normal retroflexion    Findings[de-identified]   Esophagus: normal.   Stomach: Moderate gastritis in the cardia caused by gastric erythema. The rest of the stomach was otherwise normal.  Duodenum: normal  Interval history/Current status:    States doing well. Still has some abdominal distention and soreness. Tolerates diet, no N/V, bowels moving regularly, no blood in stool, no melena. She states going to resume Eliquis on 2/2/21    A comprehensive review of systems was negative except for what was noted in the HPI. Vitals:    01/29/21 1312 01/29/21 1322   BP: (!) 160/92 (!) 152/90   Site: Right Upper Arm Left Upper Arm   Position: Sitting Sitting   Cuff Size: Medium Adult Medium Adult   Pulse: 78    Temp: 97.9 °F (36.6 °C)    SpO2: 96%    Weight: 163 lb (73.9 kg)      Body mass index is 27.98 kg/m².    Wt Readings from Last 3 Encounters:   01/29/21 163 lb (73.9 kg)   01/27/21 161 lb 12.8 oz (73.4 kg)   01/21/21 161 lb (73 kg)     BP Readings from Last 3 Encounters:   01/29/21 (!) 152/90   01/27/21 (!) 177/84   01/26/21 (!) 144/132        Physical Exam:  General Appearance: alert and oriented to person, place and time, well developed and well- nourished, in no acute distress  Skin: warm and dry, no rash or erythema  Head: normocephalic and atraumatic,oral mucosa moist  Eyes: pupils equal, round, and reactive to light, extraocular eye movements intact, conjunctivae normal ENT: tympanic membrane, external ear and ear canal normal bilaterallyNeck: supple and non-tender without mass, no thyromegaly or thyroid nodules, no cervical lymphadenopathy  Pulmonary/Chest: clear to auscultation bilaterally- no wheezes, rales or rhonchi, normal air movement, no respiratory distress  Cardiovascular: normal rate, regular rhythm, normal S1 and S2, no murmurs, rubs, clicks, or gallops  Abdomen: soft, mild diffuse tenderness, mild distention,  normal bowel sounds  Extremities: no cyanosis, clubbing or edema  Musculoskeletal: normal range of motion, no joint swelling, deformity or tenderness  Neurologic: reflexes normal and symmetric, no cranial nerve deficit, gait, coordination and speech normal    Assessment/Plan:    1. Gastrointestinal hemorrhage, unspecified gastrointestinal hemorrhage type    Resolved. Continue on Protonix,  Resume Eliquis on 2/2, H/H stable on discharge, s/p EGD/colonoscopy (results as above)    - [place] DISCHARGE MEDS RECONCILED W/ CURRENT OUTPATIENT MED LIST    2. Ischemic colitis ([address]  Resolved. Recommended to continue on PPI, repeat colonoscopy in 5 years  Tolerates diet, bowels moving regularly. - [place] DISCHARGE MEDS RECONCILED W/ CURRENT OUTPATIENT MED LIST    3. Obstipation  Continue on Senokot and Glycolax. High fiber diet and good hydration advised. - [place] DISCHARGE MEDS RECONCILED W/ CURRENT OUTPATIENT MED LIST  - polyethylene glycol (GLYCOLAX) 17 GM/SCOOP powder; Take 17 g by mouth daily  Dispense: 1530 g; Refill: 1    4. Paroxysmal atrial fibrillation (HCC)  Rate controlled, on Cardizem and Lopressor, resume Eliquis on 2/2  - [place] DISCHARGE MEDS RECONCILED W/ CURRENT OUTPATIENT MED LIST    5. Type 2 diabetes mellitus without complication, with long-term current use of insulin (HCC)  Continue on Lantus, Trulicity. Last HbA1C was 7.2% on 11/19/2020    - Alcohol Swabs (EASY TOUCH ALCOHOL PREP MEDIUM) 70 % PADS; USE FOUR TIMES DAILY  Dispense: 100 each;  Refill: 5 - [place] DISCHARGE MEDS RECONCILED W/ CURRENT OUTPATIENT MED LIST    6. Polysubstance abuse ([facility] [address]  She is strongly advised to avoid opioid pain meds and benzodiazepines. Discussed potential risks of respiratory suppression/death   She is not sure why her urine tox screen 1/21/21 was positive for amphetamines, opiates and TCA. Admits to smoking marijuana but not using drugs, she thinks her friends gave her \" something for pain\". She is told not to take any meds/pills other than prescribed by her physicians    - [place] DISCHARGE MEDS RECONCILED W/ CURRENT OUTPATIENT MED LIST    7. Incontinence in female    - Incontinence Supply Disposable (POISE PAD) PADS; Apply 1 Piece topically as needed (incontinence)  Dispense: 100 each; Refill: 5    8. Asthma with COPD ([facility] [address]    - Nebulizer Administration Set      9. Constipation, unspecified constipation type  Prescribed Senokot. - senna (SENOKOT) 8.6 MG tablet; Take 1 tablet by mouth 2 times daily  Dispense: 60 tablet;  Refill: 11        Medical Decision Making: high complexity

## 2021-01-31 NOTE — DISCHARGE SUMMARY
[address]     Department of Internal Medicine - Staff Internal Medicine Teaching Service    INPATIENT DISCHARGE SUMMARY      Patient Identification:  Erica Maldonado is a 60 y.o. female. :  1955  MRN: [id number]     Acct: [de-identified]   PCP: Bereket Dobson MD  Admit Date:  2021  Discharge date and time: 2021  4:15 PM   Attending Provider: No att. providers found                                     [address] Problem Lists:  Active Problems:    GI bleed    Patient is Religious    Dehydration  Resolved Problems:    * No resolved hospital problems. *      HOSPITAL STAY     Brief Inpatient course:   Patient was transferred from [facility] via [facility] [address] due to concerns for intermittent confusion, and GI bleeding.  History of paroxysmal A. fib, COPD, hypertension, diabetes, Graves', polysubstance abuse and alcohol abuse.  Also with history of esophagitis and gastroparesis secondary to diabetes.  Patient also endorsing abdominal pain at the time, subsequently found to have active bright red stool and bleeding per rectum multiple times.  CT Abdo pelvis showed concerns for significant stool burden and dilation of cecum up to 7 cm.   CHANDA positive for opioids, vitamins, TCA and marijuana.  Patient is a Religious and is deferring blood products at this time, however open to iron and EPO.  Patient is on Eliquis, and was given Kcentra and [address] at outlying facility, also received 3 L of fluid in route to ED.     Repeat labs concerning for elevated lactic acid and elevated blood glucose.  CK myoglobin within normal limits.  Elevated white count 20.2, downtrending.  Hemoglobin elevated at 16.7.  Negative Covid.  Patient is a poor historian and was unable to state name or location of where she was at.  CT head showed no acute processes in [facility] ED.  Repeat CT Abdo pelvis in [facility] ED concerning for obstructed colon due to impacted stool in distal sigmoid and rectum.  Patient also experiencing increasing in third space fluid diffusely.  General surgery consulted for worsening lactic acid.  NG tube placed. General surgery signed off. GI was consulted and they recommended GoLYTELY bowel prep for constipation, tapwater enemas every 4 hours. Patient was not able to hold tapwater enemas and abdomen was distended and had profuse vomiting of 1.4 L. Cefepime and Flagyl were discontinued. WBC count, lactic acid levels were not in normal limits. Repeat CT abdomen revealed signs of ileus and GI recommended bowel prep regimen at a slower rate along with Reglan IV 10 mg every 6 hours and recommended a dose of methylnaltrexone 12 mg subcutaneous today. GI was consulted to perform EGD colonoscopy -which showed severe colitis in the left side of the colon associated with pseudomembranes, ulcerations suggestive of ischemic colitis. Diet was advanced. Would recommend acid suppression with a PPI twice daily for 4 weeks and then once daily. Repeat colonoscopy in 5 years. Diet was advanced patient tolerated and is being discharged under stable conditions. Procedures/ Significant Interventions:    EGD     Consults:     Consults:     Final Specialist Recommendations/Findings:   IP CONSULT TO GI  IP CONSULT TO GENERAL SURGERY  IP CONSULT TO CRITICAL CARE  IP CONSULT TO SOCIAL WORK  IP CONSULT TO IV TEAM      Any Hospital Acquired Infections: none    Discharge Functional Status:  stable    DISCHARGE PLAN     Disposition: home    Patient Instructions:   Discharge Medication List as of 1/27/2021  1:59 PM      START taking these medications    Details   ! ! pantoprazole (PROTONIX) 40 MG tablet Take 1 tablet by mouth 2 times daily (before meals), Disp-60 tablet, R-0Normal      !! pantoprazole (PROTONIX) 40 MG tablet Take 1 tablet by mouth every morning (before breakfast), Disp-90 tablet, R-1Normal       !! - Potential duplicate medications found. lisinopril (PRINIVIL;ZESTRIL) 5 MG tablet TAKE 1 TABLET BY MOUTH EVERY DAY, Disp-30 tablet,R-10Normal      Lancets MISC DAILY Starting Fri 6/19/2020, Disp-100 each, R-3, NormalTests 3 times a day/PRN. Please dispense what [facility] covers to go with her new Glucometer      TRULICITY 1.5 KQ/5.2FM SOPN 1.5 mg once a week Monday's, DAWHistorical Med      Multiple Vitamin (MULTI-VITAMINS) TABS TAKE 1 TABLET BY MOUTH EVERY DAY, Disp-90 tablet, R-4Normal      dicyclomine (BENTYL) 10 MG capsule Take 1 capsule by mouth 4 times daily, Disp-120 capsule, R-3Normal      senna (SENOKOT) 8.6 MG tablet Take 1 tablet by mouth 2 times daily, Disp-60 tablet, R-11Normal      Cholecalciferol (VITAMIN D3) 25 MCG (1000 UT) TABS TAKE (1) TABLET BY MOUTH DAILY, Disp-90 tablet, R-10Normal      Omega-3 Fatty Acids (FISH OIL) 1000 MG CAPS TAKE TWO (2) CAPSULES BY MOUTH DAILY, Disp-180 capsule, R-10Normal      hydroCHLOROthiazide (HYDRODIURIL) 25 MG tablet TAKE 1 TABLET BY MOUTH EVERY MORNING, Disp-90 tablet, R-10Normal      gabapentin (NEURONTIN) 800 MG tablet TAKE 1 TABLET BY MOUTH TWICE DAILY, Disp-60 tablet, R-10Normal      FEROSUL 325 (65 Fe) MG tablet TAKE (1) TABLET BY MOUTH DAILY, Disp-90 tablet, R-10Normal      Blood Glucose Monitoring Suppl SOUTH 1 Units by Does not apply route once for 1 dose PLease dispense a machine that is covered by her insurance  DX E11.9, Does not apply, ONCE Starting Thu 4/26/2018, Disp-1 Device,R-0, Normal      Incontinence Supply Disposable (POISE PAD) PADS PRN Starting Tue 11/21/2017, Disp-100 each, R-5, Normal      Alcohol Swabs (EASY TOUCH ALCOHOL PREP MEDIUM) 70 % PADS Disp-100 each, R-5, Normal      Blood Pressure Monitoring (B-D ASSURE BPM/AUTO ARM CUFF) MISC DAILY PRN Starting 8/25/2016, Until Discontinued, Disp-1 each, R-0, Print       !! - Potential duplicate medications found. Please discuss with provider.           Activity: activity as tolerated    Diet: diabetic diet    Follow-up:    Eduin Stacy Patria Mcburney, [address] [facility]  [phone]    Schedule an appointment as soon as possible for a visit in 1 week  Hospital Discharge. Michael Mireles MD  [place] [address]  [phone]    Schedule an appointment as soon as possible for a visit in 4 weeks  Hopsital discharge and repeat colonoscopy. Patient Instructions: Follow up with PCP as instructed. Call for appointment  Follow up with GI as instructed. Call for appointment  Take Protonix 40 mg twice daily for 28 days, followed, by 40 mg once a day   Restart Eliquis after holding it for 7 days. Restart 2/2/21. Follow up labs: none  Follow up imaging: none    Note that over 30 minutes was spent in preparing discharge papers, discussing discharge with patient, medication review, etc.      Julia Ortega MD,   Internal Medicine Resident, PGY-1  [address], [place]  1/31/2021, 12:47 PM

## 2021-02-05 DIAGNOSIS — M50.90 CERVICAL NECK PAIN WITH EVIDENCE OF DISC DISEASE: ICD-10-CM

## 2021-02-08 RX ORDER — GABAPENTIN 800 MG/1
TABLET ORAL
Qty: 60 TABLET | Refills: 3 | Status: SHIPPED | OUTPATIENT
Start: 2021-02-08 | End: 2021-07-22

## 2021-02-23 DIAGNOSIS — N32.89 UNSTABLE BLADDER: ICD-10-CM

## 2021-02-23 RX ORDER — OXYBUTYNIN CHLORIDE 15 MG/1
15 TABLET, EXTENDED RELEASE ORAL DAILY
Qty: 30 TABLET | Refills: 3 | Status: SHIPPED | OUTPATIENT
Start: 2021-02-23 | End: 2021-11-03

## 2021-03-09 DIAGNOSIS — I10 ESSENTIAL HYPERTENSION: ICD-10-CM

## 2021-03-09 DIAGNOSIS — E78.2 MIXED HYPERLIPIDEMIA: ICD-10-CM

## 2021-03-09 RX ORDER — HYDROCHLOROTHIAZIDE 25 MG/1
25 TABLET ORAL EVERY MORNING
Qty: 30 TABLET | Refills: 10 | Status: SHIPPED | OUTPATIENT
Start: 2021-03-09 | End: 2022-05-19 | Stop reason: SDUPTHER

## 2021-03-09 RX ORDER — ATORVASTATIN CALCIUM 40 MG/1
TABLET, FILM COATED ORAL
Qty: 30 TABLET | Refills: 10 | Status: SHIPPED | OUTPATIENT
Start: 2021-03-09 | End: 2022-05-19 | Stop reason: SDUPTHER

## 2021-04-05 DIAGNOSIS — I48.0 PAROXYSMAL ATRIAL FIBRILLATION (HCC): ICD-10-CM

## 2021-04-05 RX ORDER — CHLORAL HYDRATE 500 MG
CAPSULE ORAL
Qty: 60 CAPSULE | Refills: 10 | OUTPATIENT
Start: 2021-04-05

## 2021-04-07 ENCOUNTER — APPOINTMENT (OUTPATIENT)
Dept: GENERAL RADIOLOGY | Age: 66
End: 2021-04-07
Payer: COMMERCIAL

## 2021-04-07 ENCOUNTER — HOSPITAL ENCOUNTER (EMERGENCY)
Age: 66
Discharge: HOME | End: 2021-04-07
Attending: EMERGENCY MEDICINE
Payer: COMMERCIAL

## 2021-04-07 VITALS
TEMPERATURE: 97.7 F | HEART RATE: 73 BPM | SYSTOLIC BLOOD PRESSURE: 141 MMHG | HEIGHT: 64 IN | RESPIRATION RATE: 20 BRPM | BODY MASS INDEX: 28.17 KG/M2 | OXYGEN SATURATION: 98 % | WEIGHT: 165 LBS | DIASTOLIC BLOOD PRESSURE: 78 MMHG

## 2021-04-07 DIAGNOSIS — M79.671 RIGHT FOOT PAIN: Primary | ICD-10-CM

## 2021-04-07 PROCEDURE — 6370000000 HC RX 637 (ALT 250 FOR IP): Performed by: EMERGENCY MEDICINE

## 2021-04-07 PROCEDURE — 73630 X-RAY EXAM OF FOOT: CPT

## 2021-04-07 PROCEDURE — 99285 EMERGENCY DEPT VISIT HI MDM: CPT

## 2021-04-07 RX ORDER — ACETAMINOPHEN 500 MG
1000 TABLET ORAL ONCE
Status: COMPLETED | OUTPATIENT
Start: 2021-04-07 | End: 2021-04-07

## 2021-04-07 RX ADMIN — ACETAMINOPHEN 1000 MG: 500 TABLET, FILM COATED ORAL at 22:14

## 2021-04-07 ASSESSMENT — PAIN DESCRIPTION - FREQUENCY: FREQUENCY: CONTINUOUS

## 2021-04-07 ASSESSMENT — PAIN DESCRIPTION - DESCRIPTORS: DESCRIPTORS: THROBBING

## 2021-04-07 ASSESSMENT — PAIN DESCRIPTION - LOCATION: LOCATION: FOOT

## 2021-04-07 ASSESSMENT — PAIN DESCRIPTION - PROGRESSION: CLINICAL_PROGRESSION: NOT CHANGED

## 2021-04-07 ASSESSMENT — PAIN SCALES - GENERAL: PAINLEVEL_OUTOF10: 9

## 2021-04-07 ASSESSMENT — PAIN DESCRIPTION - PAIN TYPE: TYPE: ACUTE PAIN

## 2021-04-07 ASSESSMENT — PAIN DESCRIPTION - ORIENTATION: ORIENTATION: RIGHT;ANTERIOR

## 2021-04-08 ASSESSMENT — ENCOUNTER SYMPTOMS
NAUSEA: 0
SHORTNESS OF BREATH: 0
VOMITING: 0

## 2021-04-08 NOTE — ED TRIAGE NOTES
Pt states she slipped and had a fall. Pt complains of right ft and ankle pain.   No pain medication taken before arrival.

## 2021-04-08 NOTE — ED PROVIDER NOTES
[facility] ED  [address]  [place] [address]  Phone: [phone]  eMERGENCY dEPARTMENT eNCOUnter      Pt Name: Debra Lewis  MRN: [id number]  [place] 1955  Date of evaluation: 4/8/21      CHIEF COMPLAINT     Chief Complaint   Patient presents with   [address]    Debra Lewis is a 60 y.o. female who presents with right lower extremity pain after a fall. Patient states that the floor was slippery and this is what caused her to slip. She describes her toe bending backwards. She denies striking her head or loss of consciousness no chest pain or difficulty breathing no nausea vomiting or headaches. When I take the history from the patient she indicates that the pain is in the foot. She does not complain of ankle pain. REVIEW OF SYSTEMS     Review of Systems   Constitutional: Negative for fever. Respiratory: Negative for shortness of breath. Cardiovascular: Negative for chest pain. Gastrointestinal: Negative for nausea and vomiting. Skin: Negative for wound. Neurological: Negative for weakness and numbness. Psychiatric/Behavioral: Negative for confusion. All other systems reviewed and are negative. PAST MEDICAL HISTORY    has a past medical history of Asthma, Atrial fibrillation ([address] Bowel obstruction ([address] COPD (chronic obstructive pulmonary disease) ([address] DDD (degenerative disc disease), Diabetes mellitus ([address] Hyperlipidemia, Hypertension, Hyperthyroidism, and Type II or unspecified type diabetes mellitus without mention of complication, not stated as uncontrolled. SURGICAL HISTORY      has a past surgical history that includes Tubal ligation; Tonsillectomy; Colonoscopy (04/23/14); back surgery; Upper gastrointestinal endoscopy (3/16/2016); tracheostomy; tracheostomy closure; Gastrostomy tube placement; gastrostomy tube change; Dental surgery (N/A, 3/8/2017); Insert Midline Catheter (1/22/2021);  Colonoscopy (N/A, 1/26/2021); Upper gastrointestinal endoscopy (N/A, 1/26/2021); and Colonoscopy (1/26/2021). CURRENT MEDICATIONS       Discharge Medication List as of 4/7/2021 10:44 PM      CONTINUE these medications which have NOT CHANGED    Details   atorvastatin (LIPITOR) 40 MG tablet TAKE (1) TABLET BY MOUTH ONCE DAILY, Disp-30 tablet, R-10Normal      hydroCHLOROthiazide (HYDRODIURIL) 25 MG tablet TAKE 1 TABLET BY MOUTH EVERY MORNING, Disp-30 tablet, R-10Normal      oxybutynin (DITROPAN XL) 15 MG extended release tablet Take 1 tablet by mouth daily, Disp-30 tablet, R-3Normal      gabapentin (NEURONTIN) 800 MG tablet TAKE 1 TABLET BY MOUTH TWICE DAILY, Disp-60 tablet, R-3Normal      Incontinence Supply Disposable (POISE PAD) PADS PRN Starting Fri 1/29/2021, Disp-100 each, R-5, Normal      Alcohol Swabs (EASY TOUCH ALCOHOL PREP MEDIUM) 70 % PADS Disp-100 each, R-5, NormalUSE FOUR TIMES DAILY      !! Insulin Pen Needle (TRUEPLUS PEN NEEDLES) 31G X 5 MM MISC USE AS DIRECTED DAILY, Disp-100 each, R-10Normal      senna (SENOKOT) 8.6 MG tablet Take 1 tablet by mouth 2 times daily, Disp-60 tablet, R-11Normal      apixaban (ELIQUIS) 5 MG TABS tablet TAKE ONE (1) TABLET BY MOUTH TWICE DAILY, Disp-180 tablet, R-10Normal      pantoprazole (PROTONIX) 40 MG tablet Take 1 tablet by mouth 2 times daily (before meals), Disp-60 tablet, R-0Normal      levothyroxine (SYNTHROID) 88 MCG tablet TAKE 1 TABLET BY MOUTH EVERY DAY, Disp-30 tablet, R-3requesting a new prescriptionNormal      !!  Insulin Pen Needle (TRUEPLUS 5-BEVEL PEN NEEDLES) 31G X 6 MM MISC USE AS DIRECTED DAILY, Disp-100 each, R-10Normal      dilTIAZem (CARDIZEM CD) 120 MG extended release capsule TAKE 1 CAPSULE BY MOUTH EVERY DAY, Disp-30 capsule,R-5Normal      loratadine (CLARITIN) 10 MG tablet TAKE 1 TABLET BY MOUTH EVERY DAY, Disp-30 tablet,R-10Normal      albuterol sulfate  (90 Base) MCG/ACT inhaler INHALE TWO (2) PUFFS BY MOUTH EVERY 6 HOURS AS NEEDED FOR WHEEZING, Disp-324 g,R-10Normal      metoprolol tartrate (LOPRESSOR) 25 MG tablet TAKE ONE (1) TABLET BY MOUTH TWICE DAILY, Disp-180 tablet,R-10Normal      potassium chloride (KLOR-CON M) 20 MEQ extended release tablet Take 1 tablet by mouth daily, Disp-90 tablet,R-2Normal      insulin glargine (LANTUS SOLOSTAR) 100 UNIT/ML injection pen Inject 30 Units into the skin nightly, Disp-5 pen,R-3NO PRINT      cyclobenzaprine (FLEXERIL) 5 MG tablet TAKE 1 TABLET BY MOUTH THREE TIMES DAILY AS NEEDED FOR MUSCLE SPASMS, Disp-30 tablet,R-10Normal      albuterol (PROVENTIL) (2.5 MG/3ML) 0.083% nebulizer solution Take 3 mLs by nebulization 4 times daily, Disp-120 each,R-2Normal      lisinopril (PRINIVIL;ZESTRIL) 5 MG tablet TAKE 1 TABLET BY MOUTH EVERY DAY, Disp-30 tablet,R-10Normal      Lancets MISC DAILY Starting Fri 6/19/2020, Disp-100 each, R-3, NormalTests 3 times a day/PRN.  Please dispense what [facility] covers to go with her new Glucometer      TRULICITY 1.5 MO/6.6MH SOPN 1.5 mg once a week Monday's, DAWHistorical Med      Multiple Vitamin (MULTI-VITAMINS) TABS TAKE 1 TABLET BY MOUTH EVERY DAY, Disp-90 tablet, R-4Normal      dicyclomine (BENTYL) 10 MG capsule Take 1 capsule by mouth 4 times daily, Disp-120 capsule, R-3Normal      Cholecalciferol (VITAMIN D3) 25 MCG (1000 UT) TABS TAKE (1) TABLET BY MOUTH DAILY, Disp-90 tablet, R-10Normal      Omega-3 Fatty Acids (FISH OIL) 1000 MG CAPS TAKE TWO (2) CAPSULES BY MOUTH DAILY, Disp-180 capsule, R-10Normal      FEROSUL 325 (65 Fe) MG tablet TAKE (1) TABLET BY MOUTH DAILY, Disp-90 tablet, R-10Normal      Blood Glucose Monitoring Suppl SOUTH 1 Units by Does not apply route once for 1 dose PLease dispense a machine that is covered by her insurance  DX E11.9, Does not apply, ONCE Starting Thu 4/26/2018, Disp-1 Device,R-0, Normal      Blood Pressure Monitoring (B-D ASSURE BPM/AUTO ARM CUFF) MISC DAILY PRN Starting 8/25/2016, Until Discontinued, Disp-1 each, R-0, Print       !! - Potential duplicate medications found. Please discuss with provider. ALLERGIES     is allergic to norco [hydrocodone-acetaminophen]; oxycodone; percocet [oxycodone-acetaminophen]; and sulfa antibiotics. FAMILY HISTORY     She indicated that her mother is alive. She indicated that her father is . She indicated that her brother is alive. She indicated that her son is alive. She indicated that her maternal aunt is . family history includes Heart Disease in her mother; High Cholesterol in her mother; No Known Problems in her son. SOCIAL HISTORY      reports that she quit smoking about 7 years ago. Her smoking use included cigarettes. She has a 20.00 pack-year smoking history. She has never used smokeless tobacco. She reports that she does not drink alcohol or use drugs. PHYSICAL EXAM     INITIAL VITALS:  height is 5' 4\" (1.626 m) and weight is 165 lb (74.8 kg). Her tympanic temperature is 97.7 °F (36.5 °C). Her blood pressure is 141/78 (abnormal) and her pulse is 73. Her respiration is 20 and oxygen saturation is 98%. Physical Exam  Vitals signs reviewed. Constitutional:       General: She is not in acute distress. Appearance: Normal appearance. She is not ill-appearing. HENT:      Head: Normocephalic and atraumatic. Eyes:      Extraocular Movements: Extraocular movements intact. Pupils: Pupils are equal, round, and reactive to light. Cardiovascular:      Rate and Rhythm: Normal rate and regular rhythm. Pulses: Normal pulses. Heart sounds: Normal heart sounds. Pulmonary:      Effort: Pulmonary effort is normal.      Breath sounds: Normal breath sounds. No wheezing. Musculoskeletal:      Comments: Examination of the lower extremities left lower extremity unremarkable. Right lower extremity no tenderness or pain with range of motion in the right hip femur knee leg or ankle. The patient does not have tenderness on the medial or lateral malleolus.   There is no visible swelling in the ankle. Patient has tenderness over the mid dorsum of the foot towards the second and first digit. Skin is intact compartments are soft. Sensation is intact to light touch. Good capillary refill. Skin:     Capillary Refill: Capillary refill takes less than 2 seconds. Neurological:      General: No focal deficit present. Mental Status: She is alert and oriented to person, place, and time. Cranial Nerves: No cranial nerve deficit. Sensory: No sensory deficit. Motor: No weakness. Psychiatric:         Mood and Affect: Mood normal.         Behavior: Behavior normal.       DIFFERENTIAL DIAGNOSIS / MDM / EMERGENCY DEPARTMENT COURSE:     Discussed pain control with the patient she is agreeable to try acetaminophen patient does not tolerate narcotics. Plan for x-ray of the right foot. Discussed possible x-ray of the ankle given history patient did not want to proceed with this exam is not worrisome for significant ankle injury at this point. X-ray of the foot does not show acute fracture or dislocation. Plan for walking shoe and over-the-counter acetaminophen or ibuprofen. This was discussed with the patient she was updated. Patient is to follow-up with the PCP of her choice she is given the phone number she is given contact information for the family practice clinic. Patient was educated on the warning signs which return to the emergency department and she did not have any other acute concerns. I have reviewed the disposition diagnosis with the patient and or their family/guardian. I have answered their questions and givendischarge instructions. They voiced understanding of these instructions and did not have any further questions or complaints.     DIAGNOSTIC RESULTS     EKG: All EKG's are interpreted by the Emergency Department Physician who either signs or Co-signs this chart in the absence of a cardiologist.        RADIOLOGY:   I directly visualized the following plain film images and reviewed the radiologistinterpretations of radiologic studies:    Xr Foot Right (min 3 Views)    Result Date: 4/7/2021  EXAMINATION: THREE XRAY VIEWS OF THE RIGHT FOOT 4/7/2021 10:12 pm COMPARISON: 06/14/2017 HISTORY: ORDERING SYSTEM PROVIDED HISTORY: foot pain / trauma TECHNOLOGIST PROVIDED HISTORY: foot pain / trauma Reason for Exam: Fall, pain to the 1st digit of the right foot Acuity: Acute Type of Exam: Initial FINDINGS: No fracture, dislocation, or focal osseous lesion is noted. No significant soft tissue abnormality seen. No fracture or dislocation. LABS:  No results found for this visit on 04/07/21. EMERGENCY DEPARTMENT COURSE:   Vitals:    Vitals:    04/07/21 2120 04/07/21 2254   BP: 120/83 (!) 141/78   Pulse: 64 73   Resp: 20 20   Temp: 97.7 °F (36.5 °C)    TempSrc: Tympanic    SpO2: 98%    Weight: 165 lb (74.8 kg)    Height: 5' 4\" (1.626 m)      -------------------------  BP: (!) 141/78, Temp: 97.7 °F (36.5 °C), Pulse: 73, Resp: 20      CONSULTS:  None    PROCEDURES:  None    FINAL IMPRESSION      1.  Right foot pain          DISPOSITION/PLAN   DISPOSITION Decision To Discharge 04/07/2021 10:43:35 PM      PATIENT REFERRED TO:  [facility] [address] [place] [place] [id number]  [phone]  In 2 days        DISCHARGE MEDICATIONS:  Discharge Medication List as of 4/7/2021 10:44 PM            (Please note that portions of this note were completed with avoice recognition program.  Efforts were made to edit the dictations but occasionally words are mis-transcribed.)    Suzie Henning MD, [facility]  Attending Emergency Medicine Physician        Suzie Henning MD  04/08/21 2317

## 2021-04-08 NOTE — ED NOTES
Writer called the [employer] service. They will be in here in 15-20 minutes.      Keyon Tenorio RN  04/07/21 [id number]

## 2021-05-04 RX ORDER — MELATONIN
Qty: 30 TABLET | Refills: 10 | OUTPATIENT
Start: 2021-05-04

## 2021-05-13 RX ORDER — MELATONIN
Qty: 30 TABLET | Refills: 10 | OUTPATIENT
Start: 2021-05-13

## 2021-06-14 ENCOUNTER — TELEPHONE (OUTPATIENT)
Dept: GASTROENTEROLOGY | Age: 66
End: 2021-06-14

## 2021-07-01 RX ORDER — MELATONIN
Qty: 30 TABLET | Refills: 10 | Status: SHIPPED | OUTPATIENT
Start: 2021-07-01 | End: 2022-05-19 | Stop reason: SDUPTHER

## 2021-07-02 DIAGNOSIS — M50.90 CERVICAL NECK PAIN WITH EVIDENCE OF DISC DISEASE: ICD-10-CM

## 2021-07-02 RX ORDER — GABAPENTIN 800 MG/1
TABLET ORAL
Qty: 60 TABLET | Refills: 3 | OUTPATIENT
Start: 2021-07-02

## 2021-07-22 DIAGNOSIS — M50.90 CERVICAL NECK PAIN WITH EVIDENCE OF DISC DISEASE: ICD-10-CM

## 2021-07-22 RX ORDER — POTASSIUM CHLORIDE 20 MEQ/1
TABLET, EXTENDED RELEASE ORAL
Qty: 30 TABLET | Refills: 0 | Status: SHIPPED | OUTPATIENT
Start: 2021-07-22 | End: 2021-11-03

## 2021-07-22 RX ORDER — LISINOPRIL 5 MG/1
TABLET ORAL
Qty: 30 TABLET | Refills: 0 | Status: SHIPPED | OUTPATIENT
Start: 2021-07-22 | End: 2021-11-03

## 2021-07-22 RX ORDER — GABAPENTIN 800 MG/1
TABLET ORAL
Qty: 60 TABLET | Refills: 3 | Status: SHIPPED | OUTPATIENT
Start: 2021-07-22 | End: 2022-05-19 | Stop reason: SDUPTHER

## 2021-08-27 LAB — MAMMOGRAPHY, EXTERNAL: NORMAL

## 2021-09-15 RX ORDER — DILTIAZEM HYDROCHLORIDE 120 MG/1
CAPSULE, COATED, EXTENDED RELEASE ORAL
Qty: 30 CAPSULE | Refills: 0 | OUTPATIENT
Start: 2021-09-15

## 2021-11-03 ENCOUNTER — OFFICE VISIT (OUTPATIENT)
Dept: CARDIOLOGY | Age: 66
End: 2021-11-03
Payer: MEDICARE

## 2021-11-03 VITALS
BODY MASS INDEX: 28 KG/M2 | HEART RATE: 87 BPM | WEIGHT: 164 LBS | SYSTOLIC BLOOD PRESSURE: 130 MMHG | DIASTOLIC BLOOD PRESSURE: 88 MMHG | HEIGHT: 64 IN

## 2021-11-03 DIAGNOSIS — I10 ESSENTIAL HYPERTENSION: ICD-10-CM

## 2021-11-03 DIAGNOSIS — I48.0 PAROXYSMAL ATRIAL FIBRILLATION (HCC): ICD-10-CM

## 2021-11-03 DIAGNOSIS — R06.09 DYSPNEA ON EXERTION: ICD-10-CM

## 2021-11-03 DIAGNOSIS — R07.9 CHEST PAIN, UNSPECIFIED TYPE: Primary | ICD-10-CM

## 2021-11-03 PROCEDURE — 1123F ACP DISCUSS/DSCN MKR DOCD: CPT | Performed by: INTERNAL MEDICINE

## 2021-11-03 PROCEDURE — G8417 CALC BMI ABV UP PARAM F/U: HCPCS | Performed by: INTERNAL MEDICINE

## 2021-11-03 PROCEDURE — G8484 FLU IMMUNIZE NO ADMIN: HCPCS | Performed by: INTERNAL MEDICINE

## 2021-11-03 PROCEDURE — 1090F PRES/ABSN URINE INCON ASSESS: CPT | Performed by: INTERNAL MEDICINE

## 2021-11-03 PROCEDURE — 4040F PNEUMOC VAC/ADMIN/RCVD: CPT | Performed by: INTERNAL MEDICINE

## 2021-11-03 PROCEDURE — 3017F COLORECTAL CA SCREEN DOC REV: CPT | Performed by: INTERNAL MEDICINE

## 2021-11-03 PROCEDURE — 99214 OFFICE O/P EST MOD 30 MIN: CPT | Performed by: INTERNAL MEDICINE

## 2021-11-03 PROCEDURE — 1036F TOBACCO NON-USER: CPT | Performed by: INTERNAL MEDICINE

## 2021-11-03 PROCEDURE — G8427 DOCREV CUR MEDS BY ELIG CLIN: HCPCS | Performed by: INTERNAL MEDICINE

## 2021-11-03 PROCEDURE — 93010 ELECTROCARDIOGRAM REPORT: CPT | Performed by: INTERNAL MEDICINE

## 2021-11-03 PROCEDURE — 99204 OFFICE O/P NEW MOD 45 MIN: CPT | Performed by: INTERNAL MEDICINE

## 2021-11-03 PROCEDURE — G8399 PT W/DXA RESULTS DOCUMENT: HCPCS | Performed by: INTERNAL MEDICINE

## 2021-11-03 PROCEDURE — 93005 ELECTROCARDIOGRAM TRACING: CPT | Performed by: INTERNAL MEDICINE

## 2021-11-03 NOTE — PROGRESS NOTES
Today's Date: 11/3/2021  Patient's Name: Rena Mtz  Patient's age: 73 y.o., 1955    Subjective:  Rena Mtz is being seen in clinic today regarding PAF, HTN    she is here to establish cardiology care. She reports left and right lateral chest discomfort with and without exertion. She denies substernal chest pain. She reports dyspnea on exertion. No PND, no syncope or pre-syncope, no orthopnea. She had a fall 4/7/21 and was seen in ER. She has h/o PAF and was seeing Dr. Arun Montoya. She had lower GI bleeding 1/2021 and colonoscopy 1/2021 showed severe colitis in the left side of colon with ulcerations along with patchy colitis of the rest of the colon. EGD showed moderate gastritis. She reports that Eliquis was stopped earlier this year by PCP. She is apparently scheduled for repeat scope in December. She is unable to exercise as causes dyspnea. Past Medical History:   has a past medical history of Asthma, Atrial fibrillation ([address] Bowel obstruction ([address] COPD (chronic obstructive pulmonary disease) ([address] DDD (degenerative disc disease), Diabetes mellitus ([address] Hyperlipidemia, Hypertension, Hyperthyroidism, and Type II or unspecified type diabetes mellitus without mention of complication, not stated as uncontrolled. Past Surgical History:   has a past surgical history that includes Tubal ligation; Tonsillectomy; Colonoscopy (04/23/14); back surgery; Upper gastrointestinal endoscopy (3/16/2016); tracheostomy; tracheostomy closure; Gastrostomy tube placement; gastrostomy tube change; Dental surgery (N/A, 3/8/2017); Insert Midline Catheter (1/22/2021); Colonoscopy (N/A, 1/26/2021); Upper gastrointestinal endoscopy (N/A, 1/26/2021); and Colonoscopy (1/26/2021). Home Medications:  Prior to Admission medications    Medication Sig Start Date End Date Taking?  Authorizing Provider   lisinopril (PRINIVIL;ZESTRIL) 5 MG tablet TAKE 1 TABLET BY MOUTH ONCE DAILY *EMERGENCY REFILL* 7/22/21   Dale Malinda Vyas MD   potassium chloride (KLOR-CON M) 20 MEQ extended release tablet TAKE 1 TABLET BY MOUTH ONCE DAILY *EMERGENCY REFILL* 7/22/21   Ileana Chaves MD   gabapentin (NEURONTIN) 800 MG tablet TAKE ONE (1) TABLET BY MOUTH TWICE DAILY 7/22/21 8/22/21  Ileana Chaves MD   vitamin D3 (CHOLECALCIFEROL) 25 MCG (1000 UT) TABS tablet TAKE 1 TABLET BY MOUTH DAILY 7/1/21   Ileana Chaves MD   atorvastatin (LIPITOR) 40 MG tablet TAKE (1) TABLET BY MOUTH ONCE DAILY 3/9/21   Ileana Chaves MD   hydroCHLOROthiazide (HYDRODIURIL) 25 MG tablet TAKE 1 TABLET BY MOUTH EVERY MORNING 3/9/21   Ileana Chaves MD   oxybutynin (DITROPAN XL) 15 MG extended release tablet Take 1 tablet by mouth daily 2/23/21   Arnaldo Rivas MD   Incontinence Supply Disposable (POISE PAD) PADS Apply 1 Piece topically as needed (incontinence) 1/29/21   Ileana Chaves MD   Alcohol Swabs (EASY TOUCH ALCOHOL PREP MEDIUM) 70 % PADS USE FOUR TIMES DAILY 1/29/21   Ileana Chaves MD   Insulin Pen Needle (TRUEPLUS PEN NEEDLES) 31G X 5 MM MISC USE AS DIRECTED DAILY 1/29/21   Ileana Chaves MD   senna (SENOKOT) 8.6 MG tablet Take 1 tablet by mouth 2 times daily 1/29/21 1/29/22  Ileana Chaves MD   apixaban (ELIQUIS) 5 MG TABS tablet TAKE ONE (1) TABLET BY MOUTH TWICE DAILY 2/2/21   Abbey Mg DO   pantoprazole (PROTONIX) 40 MG tablet Take 1 tablet by mouth 2 times daily (before meals) 1/27/21 2/26/21  Abbeydario Mg, DO   levothyroxine (SYNTHROID) 88 MCG tablet TAKE 1 TABLET BY MOUTH EVERY DAY 1/18/21   Ileana Chaves MD   Insulin Pen Needle (TRUEPLUS 5-BEVEL PEN NEEDLES) 31G X 6 MM MISC USE AS DIRECTED DAILY  Patient not taking: Reported on 1/29/2021 1/8/21   Ileana Chaves MD   dilTIAZem (CARDIZEM CD) 120 MG extended release capsule TAKE 1 CAPSULE BY MOUTH EVERY DAY 11/16/20   Ileana Chaves MD   loratadine (CLARITIN) 10 MG tablet TAKE 1 TABLET BY MOUTH EVERY DAY 11/11/20   Ileana Chaves MD   albuterol sulfate  (90 Base) MCG/ACT inhaler INHALE TWO (2) PUFFS BY MOUTH EVERY 6 HOURS AS NEEDED FOR WHEEZING 10/7/20   Griselda Ahle, MD   metoprolol tartrate (LOPRESSOR) 25 MG tablet TAKE ONE (1) TABLET BY MOUTH TWICE DAILY 10/7/20   Griselda Ahle, MD   insulin glargine (LANTUS SOLOSTAR) 100 UNIT/ML injection pen Inject 30 Units into the skin nightly  Patient taking differently: Inject 25 Units into the skin nightly  8/24/20   Griselda Ahle, MD   cyclobenzaprine (FLEXERIL) 5 MG tablet TAKE 1 TABLET BY MOUTH THREE TIMES DAILY AS NEEDED FOR MUSCLE SPASMS 8/13/20   Griselda Ahle, MD   albuterol (PROVENTIL) (2.5 MG/3ML) 0.083% nebulizer solution Take 3 mLs by nebulization 4 times daily 7/23/20   Griselda Ahle, MD   Lancets MISC 1 each by Does not apply route daily Tests 3 times a day/PRN.  Please dispense what [facility] covers to go with her new Glucometer 6/19/20   Griselda Ahle, MD   TRULICITY 1.5 AV/0.3NY SOPN 1.5 mg once a week Monday's 4/6/20   Historical Provider, MD   Multiple Vitamin (MULTI-VITAMINS) TABS TAKE 1 TABLET BY MOUTH EVERY DAY 5/7/20   Griselda Ahle, MD   dicyclomine (BENTYL) 10 MG capsule Take 1 capsule by mouth 4 times daily 4/27/20   Griselda Ahle, MD   Omega-3 Fatty Acids (FISH OIL) 1000 MG CAPS TAKE TWO (2) CAPSULES BY MOUTH DAILY 4/14/20   Griselda Ahle, MD   FEROSUL 325 (65 Fe) MG tablet TAKE (1) TABLET BY MOUTH DAILY 4/23/19   Griselda Ahle, MD   Blood Glucose Monitoring Suppl SOUTH 1 Units by Does not apply route once for 1 dose PLease dispense a machine that is covered by her insurance  DX E11.9 4/26/18 8/24/20  Griselda Ahle, MD   Blood Pressure Monitoring (B-D ASSURE BPM/AUTO ARM CUFF) MISC 1 Device by Does not apply route daily as needed (htn)  Patient not taking: Reported on 8/6/2020 8/25/16   Florencia Calero MD       Allergies:  Norco [hydrocodone-acetaminophen], Oxycodone, Percocet [oxycodone-acetaminophen], and Sulfa antibiotics    Social History:   reports that she quit smoking about 8 years ago. Her smoking use included cigarettes. She has a 20.00 pack-year smoking history. She has never used smokeless tobacco. She reports that she does not drink alcohol and does not use drugs. Family History: family history includes Heart Disease in her mother; High Cholesterol in her mother; No Known Problems in her son. No h/o sudden cardiac death. No for premature CAD    REVIEW OF SYSTEMS:    · Constitutional: there has been no unanticipated weight loss. There's been No change in energy level, No change in activity level. · Eyes: No visual changes or diplopia. No scleral icterus. · ENT: No Headaches, hearing loss or vertigo. No mouth sores or sore throat. · Cardiovascular: see above  · Respiratory: see above  · Gastrointestinal: see above. · Genitourinary: No dysuria, trouble voiding, or hematuria. · Musculoskeletal:  No gait disturbance, No weakness or joint complaints. · Integumentary: No rash or pruritis. · Neurological: No headache or diplopia. No tingling  · Psychiatric: No anxiety, or depression. · Endocrine: No temperature intolerance. · Hematologic/Lymphatic: No abnormal bruising or bleeding, blood clots or swollen lymph nodes. · Allergic/Immunologic: No nasal congestion or hives. PHYSICAL EXAM:      Vitals:    11/03/21 0935   BP: 130/88   Pulse: 87       Constitutional and General Appearance: alert, cooperative, no distress and appears stated age  HEENT: PERRL, no cervical lymphadenopathy. No masses palpable. Normal oral mucosa  Respiratory:  · Normal excursion and expansion without use of accessory muscles  · Resp Auscultation: Good respiratory effort. No for increased work of breathing.  On auscultation: clear to auscultation bilaterally  Cardiovascular:  · Heart tones are crisp and normal. regular S1 and S2.  · Jugular venous pulsation Normal  · The carotid upstroke is normal in amplitude and contour without delay or bruit   Abdomen:   · soft  · Bowel sounds present  Extremities:  ·  No edema  Neurological:  · Alert and oriented. Cardiac Data:  EKG: NSR, NL ECG    Labs:     CBC: No results for input(s): WBC, HGB, HCT, PLT in the last 72 hours. BMP: No results for input(s): NA, K, CO2, BUN, CREATININE, LABGLOM, GLUCOSE in the last 72 hours. PT/INR: No results for input(s): PROTIME, INR in the last 72 hours. FASTING LIPID PANEL:  Lab Results   Component Value Date    HDL 36 11/18/2020    TRIG 201 11/18/2020     LIVER PROFILE:No results for input(s): AST, ALT, LABALBU in the last 72 hours. Problem List:  Patient Active Problem List   Diagnosis    Cervical neck pain with evidence of disc disease    Paroxysmal atrial fibrillation ([address]    Graves' disease    Asthma with COPD ([address]    Essential hypertension    Type 2 diabetes mellitus without complication, with long-term current use of insulin ([address]    Iron deficiency anemia    Irritable bowel syndrome with both constipation and diarrhea    Mixed hyperlipidemia    GI bleed    Patient is [employer]      EP study 3/27/2016- No inducible SVT or VT      TTE 3/27/21  Summary  Global left ventricular systolic function is normal.  Estimated ejection fraction is >70%. Mildly increased left ventricular wall thickness. Normal right ventricular size and function. Mild mitral regurgitation. Mild tricuspid regurgitation. Estimated right ventricular systolic pressure is 60 mm Hg. Moderate to severe pulmonary hypertension. Assessment and plan:    -PAF- CHADSVASC score at least 4. Not on eliquis apparently was discontinued by PCP due to GI bleeding in 1/2021. Continue cardiazem and metoprolol. Taking ASA 81mg po qday. After GI work up and stress test/TTE, I will refer her to EP for consideration of Watchmann device  -Chest pain and dyspnea. I will obtain TTE and lexiscan stress test. If stress test is abnormal, will have GI repeat endoscopy/colonoscopy prior to undergoing cardiac cath.   -COPD due to history of smoking. She quit smoking 2015. -HTN- Stable. Monitor BP at home. Continue lisinopril, HCTZ, cardiazem and metoprolol. -H/o Graves disease- on replacement. -DM  -Hyperlipidemia- continue statin. -GI bleeding- colonoscopy 1/2021 showed severe colitis in the left side of colon with ulcerations along with patchy colitis of the rest of the colon. EGD showed moderate gastritis. She saw GI as follow up 9/15/21. On PPI  -Patient is a Yazidi  -RTC 2 months.     Maricel [address] [facility]  [phone]

## 2021-11-05 ENCOUNTER — OFFICE VISIT (OUTPATIENT)
Dept: FAMILY MEDICINE CLINIC | Age: 66
End: 2021-11-05
Payer: MEDICARE

## 2021-11-05 VITALS
HEART RATE: 77 BPM | SYSTOLIC BLOOD PRESSURE: 120 MMHG | DIASTOLIC BLOOD PRESSURE: 70 MMHG | WEIGHT: 165.4 LBS | OXYGEN SATURATION: 96 % | BODY MASS INDEX: 28.39 KG/M2

## 2021-11-05 DIAGNOSIS — Z87.891 FORMER SMOKER: ICD-10-CM

## 2021-11-05 DIAGNOSIS — J44.9 ASTHMA WITH COPD (HCC): ICD-10-CM

## 2021-11-05 DIAGNOSIS — Z79.4 TYPE 2 DIABETES MELLITUS WITH OTHER SPECIFIED COMPLICATION, WITH LONG-TERM CURRENT USE OF INSULIN (HCC): Primary | ICD-10-CM

## 2021-11-05 DIAGNOSIS — Z23 NEEDS FLU SHOT: ICD-10-CM

## 2021-11-05 DIAGNOSIS — Z91.81 AT HIGH RISK FOR FALLS: ICD-10-CM

## 2021-11-05 DIAGNOSIS — E11.69 TYPE 2 DIABETES MELLITUS WITH OTHER SPECIFIED COMPLICATION, WITH LONG-TERM CURRENT USE OF INSULIN (HCC): Primary | ICD-10-CM

## 2021-11-05 DIAGNOSIS — E05.00 GRAVES' DISEASE: ICD-10-CM

## 2021-11-05 LAB — HBA1C MFR BLD: 7.7 %

## 2021-11-05 PROCEDURE — 1090F PRES/ABSN URINE INCON ASSESS: CPT | Performed by: INTERNAL MEDICINE

## 2021-11-05 PROCEDURE — 3017F COLORECTAL CA SCREEN DOC REV: CPT | Performed by: INTERNAL MEDICINE

## 2021-11-05 PROCEDURE — 83036 HEMOGLOBIN GLYCOSYLATED A1C: CPT | Performed by: INTERNAL MEDICINE

## 2021-11-05 PROCEDURE — 3023F SPIROM DOC REV: CPT | Performed by: INTERNAL MEDICINE

## 2021-11-05 PROCEDURE — 1123F ACP DISCUSS/DSCN MKR DOCD: CPT | Performed by: INTERNAL MEDICINE

## 2021-11-05 PROCEDURE — G8427 DOCREV CUR MEDS BY ELIG CLIN: HCPCS | Performed by: INTERNAL MEDICINE

## 2021-11-05 PROCEDURE — G8417 CALC BMI ABV UP PARAM F/U: HCPCS | Performed by: INTERNAL MEDICINE

## 2021-11-05 PROCEDURE — 2022F DILAT RTA XM EVC RTNOPTHY: CPT | Performed by: INTERNAL MEDICINE

## 2021-11-05 PROCEDURE — G8484 FLU IMMUNIZE NO ADMIN: HCPCS | Performed by: INTERNAL MEDICINE

## 2021-11-05 PROCEDURE — PBSHW POCT GLYCOSYLATED HEMOGLOBIN (HGB A1C): Performed by: INTERNAL MEDICINE

## 2021-11-05 PROCEDURE — 4040F PNEUMOC VAC/ADMIN/RCVD: CPT | Performed by: INTERNAL MEDICINE

## 2021-11-05 PROCEDURE — G8399 PT W/DXA RESULTS DOCUMENT: HCPCS | Performed by: INTERNAL MEDICINE

## 2021-11-05 PROCEDURE — 1036F TOBACCO NON-USER: CPT | Performed by: INTERNAL MEDICINE

## 2021-11-05 PROCEDURE — PBSHW INFLUENZA, QUADV, ADJUVANTED, 65 YRS +, IM, PF, PREFILL SYR, 0.5ML (FLUAD): Performed by: INTERNAL MEDICINE

## 2021-11-05 PROCEDURE — 99214 OFFICE O/P EST MOD 30 MIN: CPT | Performed by: INTERNAL MEDICINE

## 2021-11-05 PROCEDURE — 3051F HG A1C>EQUAL 7.0%<8.0%: CPT | Performed by: INTERNAL MEDICINE

## 2021-11-05 PROCEDURE — 90694 VACC AIIV4 NO PRSRV 0.5ML IM: CPT | Performed by: INTERNAL MEDICINE

## 2021-11-05 PROCEDURE — G8926 SPIRO NO PERF OR DOC: HCPCS | Performed by: INTERNAL MEDICINE

## 2021-11-05 SDOH — ECONOMIC STABILITY: FOOD INSECURITY: WITHIN THE PAST 12 MONTHS, THE FOOD YOU BOUGHT JUST DIDN'T LAST AND YOU DIDN'T HAVE MONEY TO GET MORE.: NEVER TRUE

## 2021-11-05 SDOH — ECONOMIC STABILITY: FOOD INSECURITY: WITHIN THE PAST 12 MONTHS, YOU WORRIED THAT YOUR FOOD WOULD RUN OUT BEFORE YOU GOT MONEY TO BUY MORE.: NEVER TRUE

## 2021-11-05 ASSESSMENT — PATIENT HEALTH QUESTIONNAIRE - PHQ9
SUM OF ALL RESPONSES TO PHQ9 QUESTIONS 1 & 2: 0
SUM OF ALL RESPONSES TO PHQ QUESTIONS 1-9: 0
1. LITTLE INTEREST OR PLEASURE IN DOING THINGS: 0
2. FEELING DOWN, DEPRESSED OR HOPELESS: 0
SUM OF ALL RESPONSES TO PHQ QUESTIONS 1-9: 0
SUM OF ALL RESPONSES TO PHQ QUESTIONS 1-9: 0

## 2021-11-05 ASSESSMENT — SOCIAL DETERMINANTS OF HEALTH (SDOH): HOW HARD IS IT FOR YOU TO PAY FOR THE VERY BASICS LIKE FOOD, HOUSING, MEDICAL CARE, AND HEATING?: NOT HARD AT ALL

## 2021-11-05 NOTE — PROGRESS NOTES
AS NEEDED FOR WHEEZING  Adolfo Pruitt MD   metoprolol tartrate (LOPRESSOR) 25 MG tablet TAKE ONE (1) TABLET BY MOUTH TWICE DAILY  Adolfo Pruitt MD   cyclobenzaprine (FLEXERIL) 5 MG tablet TAKE 1 TABLET BY MOUTH THREE TIMES DAILY AS NEEDED FOR MUSCLE SPASMS  Adolfo Pruitt MD   albuterol (PROVENTIL) (2.5 MG/3ML) 0.083% nebulizer solution Take 3 mLs by nebulization 4 times daily  Adolfo Pruitt MD   TRULICITY 1.5 IR/8.9PJ SOPN 1.5 mg once a week Monday's  Historical Provider, MD   Omega-3 Fatty Acids ([address]) 1000 MG CAPS TAKE TWO (2) CAPSULES BY MOUTH DAILY  Adolfo Pruitt MD        Allergies:  is allergic to norco [hydrocodone-acetaminophen], oxycodone, percocet [oxycodone-acetaminophen], and sulfa antibiotics. Past Medical History:   has a past medical history of Asthma, Atrial fibrillation ([address] Bowel obstruction ([address] COPD (chronic obstructive pulmonary disease) ([address] DDD (degenerative disc disease), Diabetes mellitus ([address] Hyperlipidemia, Hypertension, Hyperthyroidism, and Type II or unspecified type diabetes mellitus without mention of complication, not stated as uncontrolled. Past Surgical History   has a past surgical history that includes Tubal ligation; Tonsillectomy; Colonoscopy (04/23/14); back surgery; Upper gastrointestinal endoscopy (3/16/2016); tracheostomy; tracheostomy closure; Gastrostomy tube placement; gastrostomy tube change; Dental surgery (N/A, 3/8/2017); Insert Midline Catheter (1/22/2021); Colonoscopy (N/A, 1/26/2021); Upper gastrointestinal endoscopy (N/A, 1/26/2021); and Colonoscopy (1/26/2021). Family History  family history includes Heart Disease in her mother; High Cholesterol in her mother; No Known Problems in her son. Social History   reports that she quit smoking about 8 years ago. Her smoking use included cigarettes. She has a 20.00 pack-year smoking history.  She has never used smokeless tobacco. She reports that she does not drink alcohol and does not use drugs. Health Maintenance:    Health Maintenance   Topic Date Due    COVID-19 Vaccine (1) Never done    Shingles Vaccine (1 of 2) Never done    Low dose CT lung screening  Never done    Diabetic retinal exam  08/11/2019    Breast cancer screen  12/20/2019    Diabetic microalbuminuria test  09/04/2020    Pneumococcal 65+ years Vaccine (2 of 2 - PPSV23) 08/01/2021    Diabetic foot exam  08/24/2021    Flu vaccine (1) 09/01/2021    Annual Wellness Visit (AWV)  Never done    Lipid screen  11/18/2021    A1C test (Diabetic or Prediabetic)  11/19/2021    Potassium monitoring  01/27/2022    Creatinine monitoring  01/27/2022    Cervical cancer screen  12/17/2022    Colon cancer screen colonoscopy  01/26/2026    DTaP/Tdap/Td vaccine (2 - Td or Tdap) 08/12/2026    DEXA (modify frequency per FRAX score)  Completed    Hepatitis C screen  Addressed    HIV screen  Addressed    Hepatitis A vaccine  Aged Out    Hib vaccine  Aged Out    Meningococcal (ACWY) vaccine  Aged Out       Subjective:      Review of Systems   Constitutional: Negative for fatigue, fever and unexpected weight change. HENT: Negative for ear pain, postnasal drip, rhinorrhea, sinus pain, sore throat and trouble swallowing. Eyes: Negative for visual disturbance. Respiratory: Negative for cough, chest tightness and shortness of breath. Cardiovascular: Negative for chest pain and leg swelling. Gastrointestinal: Negative for abdominal pain, blood in stool and diarrhea. Endocrine: Negative for polyuria. Genitourinary: Negative for difficulty urinating and flank pain. Musculoskeletal: Negative for arthralgias, joint swelling and myalgias. Skin: Negative for rash. Allergic/Immunologic: Negative for environmental allergies. Neurological: Negative for weakness, light-headedness, numbness and headaches. Hematological: Negative for adenopathy.    Psychiatric/Behavioral: Negative for behavioral problems and suicidal ideas. The patient is not nervous/anxious. Objective:     /70   Pulse 77   Wt 165 lb 6.4 oz (75 kg)   SpO2 96%   BMI 28.39 kg/m²     Physical Exam  Vitals and nursing note reviewed. HENT:      Head: Normocephalic and atraumatic. Neck:      Comments: Thyroidectomy post surgical scar  Cardiovascular:      Rate and Rhythm: Normal rate and regular rhythm. Pulses: Normal pulses. Heart sounds: Normal heart sounds. Pulmonary:      Effort: Pulmonary effort is normal.      Breath sounds: Normal breath sounds. No stridor. Abdominal:      General: Abdomen is flat. Bowel sounds are normal.      Palpations: Abdomen is soft. Musculoskeletal:      Right lower leg: No edema. Left lower leg: No edema. Skin:     General: Skin is warm. Neurological:      Mental Status: She is oriented to person, place, and time. Mental status is at baseline. Psychiatric:         Mood and Affect: Mood normal.             Assessment       Diagnosis Orders   1. Type 2 diabetes mellitus with other specified complication, with long-term current use of insulin ([address]  [address] Good NP, Diabetic Education, [place]   2. At high risk for falls     3. Former smoker     4. Graves' disease     5. Asthma with COPD ([address]     6. Needs flu shot           PLAN   Diabetic nurse/diabetic supplies/glucose sensor referral placed  Encourage to start checking blood sugars at home. 3 month follow up    Orders Placed This Encounter   Procedures   [address] [facility] Gonzalo Good NP, Diabetic Education, [place]     Referral Priority:   Routine     Referral Type:   Eval and Treat     Referral Reason:   Specialty Services Required     Referred to Provider:   RAJNI Carrington CNP     Requested Specialty:   Nurse Practitioner     Number of Visits Requested:   1        No follow-ups on file. Patient given educational materials - see patient instructions.   Discussed use, benefit, and side effects of prescribed medications. All patient questions answered. Pt voiced understanding. Reviewed health maintenance. Electronically signed Joan Avina MD on 11/5/2021 at 9:52 AM EDT          On the basis of positive falls risk screening, assessment and plan is as follows: home safety tips provided.

## 2021-11-13 ASSESSMENT — ENCOUNTER SYMPTOMS
DIARRHEA: 0
TROUBLE SWALLOWING: 0
SINUS PAIN: 0
ABDOMINAL PAIN: 0
SHORTNESS OF BREATH: 0
CHEST TIGHTNESS: 0
COUGH: 0
RHINORRHEA: 0
BLOOD IN STOOL: 0
SORE THROAT: 0

## 2021-11-16 ENCOUNTER — HOSPITAL ENCOUNTER (OUTPATIENT)
Dept: NON INVASIVE DIAGNOSTICS | Age: 66
Discharge: HOME | End: 2021-11-16
Payer: MEDICARE

## 2021-11-16 ENCOUNTER — HOSPITAL ENCOUNTER (OUTPATIENT)
Dept: NUCLEAR MEDICINE | Age: 66
End: 2021-11-16
Payer: MEDICARE

## 2021-11-16 ENCOUNTER — HOSPITAL ENCOUNTER (OUTPATIENT)
Dept: NUCLEAR MEDICINE | Age: 66
Discharge: HOME | End: 2021-11-18
Payer: MEDICARE

## 2021-11-16 DIAGNOSIS — R07.9 CHEST PAIN, UNSPECIFIED TYPE: ICD-10-CM

## 2021-11-16 DIAGNOSIS — R06.09 DYSPNEA ON EXERTION: ICD-10-CM

## 2021-11-16 LAB
LV EF: 65 %
LVEF MODALITY: NORMAL

## 2021-11-16 PROCEDURE — A9500 TC99M SESTAMIBI: HCPCS | Performed by: INTERNAL MEDICINE

## 2021-11-16 PROCEDURE — 93017 CV STRESS TEST TRACING ONLY: CPT

## 2021-11-16 PROCEDURE — 78452 HT MUSCLE IMAGE SPECT MULT: CPT

## 2021-11-16 PROCEDURE — 3430000000 HC RX DIAGNOSTIC RADIOPHARMACEUTICAL: Performed by: INTERNAL MEDICINE

## 2021-11-16 PROCEDURE — 93018 CV STRESS TEST I&R ONLY: CPT | Performed by: INTERNAL MEDICINE

## 2021-11-16 PROCEDURE — 93306 TTE W/DOPPLER COMPLETE: CPT

## 2021-11-16 PROCEDURE — 6360000002 HC RX W HCPCS: Performed by: INTERNAL MEDICINE

## 2021-11-16 RX ADMIN — REGADENOSON 0.4 MG: 0.08 INJECTION, SOLUTION INTRAVENOUS at 09:14

## 2021-11-16 RX ADMIN — TETRAKIS(2-METHOXYISOBUTYLISOCYANIDE)COPPER(I) TETRAFLUOROBORATE 10 MILLICURIE: 1 INJECTION, POWDER, LYOPHILIZED, FOR SOLUTION INTRAVENOUS at 09:43

## 2021-11-16 RX ADMIN — TETRAKIS(2-METHOXYISOBUTYLISOCYANIDE)COPPER(I) TETRAFLUOROBORATE 30 MILLICURIE: 1 INJECTION, POWDER, LYOPHILIZED, FOR SOLUTION INTRAVENOUS at 09:43

## 2021-11-16 NOTE — PROCEDURES
[address] [place], [place] [address]                              CARDIAC STRESS TEST    PATIENT NAME: Kam Rodriguez                    :        1955  MED REC NO:   [id number]                             ROOM:  ACCOUNT NO:   [de-identified]                           ADMIT DATE: 2021  PROVIDER:     Adilene Grimes    DATE OF STUDY:  2021    STRESS TEST    Ordering Provider:  Junior Emmie MD    Primary Care Provider:  Kecia Marrero MD    Patient's Age: 73     Height: 5 feet 4 inches  Weight: 165 pounds    INDICATION:  Chest pain. Lexiscan 0.4 mg injected over 10 seconds. IV Cardiolite injected 20 seconds post Lexiscan injection. Heart Rate: 58 Resting blood pressure:  146/81              HR   BP  1 min          80   162/80  2 min  3 min          74   143/82  4 min  5 min          71   143/78  6 min  7 min          68   135/79  8 min  9 min          63   131/79  10 min    Symptoms:  Chest Pain: No  Nausea: No  Headache:  No  Shortness of  breath: Yes  Other: Yes, abdominal cramping    Resting EKG:  Normal sinus rhythm. Arrhythmias:  None. EKG changes:  None. Maximum changes:  None. Leads with maximum changes:  None. EKG returned to baseline 0-1 minutes in recovery. INTERPRETATION:  1. Normal ECG portion of stress test.  2.  Nuclear perfusion scan report to follow.     Nuclear Myocardial Perfusion Imaging (SPECT)    TESTING METHOD  STRESS:   Lexiscan  AGENT:    Cardiolite  REST:          Injection Date:  2021  Time:  815  Amount:  10.02  mCi  STRESS:   Injection Date:  2021  Time:  912  Amount:  32.1 mCi  IMAGE TIME:    Rest:  900  Stress:  50    EF:  71%  TID:  1.2  LHR:  0.50    FINDINGS:  Ischemia (Reversible Defect):           No  Infarction (Irreversible Defect):       No  Ejection fraction Calculated:           Yes, 54%  Segmental wall motion:                  Yes  Diastolic LV Compliance (Stiffness):    No    Low risk study. IMPRESSION:  1. Normal perfusion scan. 2.  EF 54%. 3.  Diastolic dysfunction. 4. Low risk.         Ashlie Mcleod    D: 11/16/2021 15:17:09       T: 11/16/2021 15:18:10     MA/[email or web address]_MARK  Job#: [id number]     Doc#: Unknown    CC:  MD Abigail Crowe MD

## 2021-11-16 NOTE — PROGRESS NOTES
Patient Name:  Catarino Keene MRN:  [id number]   :  1955  Age:  73 y.o.  Sex: female   Ordering Provider: Blaise Maya MD  Referring Provider: Solomon Carcamo MD  Primary Care Provider:  Solomon Carcamo MD     Indications: Chest Pain, Dyspnea on Exertion     Medications:     Current Outpatient Medications:     gabapentin (NEURONTIN) 800 MG tablet, TAKE ONE (1) TABLET BY MOUTH TWICE DAILY, Disp: 60 tablet, Rfl: 3    vitamin D3 (CHOLECALCIFEROL) 25 MCG (1000 UT) TABS tablet, TAKE 1 TABLET BY MOUTH DAILY, Disp: 30 tablet, Rfl: 10    atorvastatin (LIPITOR) 40 MG tablet, TAKE (1) TABLET BY MOUTH ONCE DAILY, Disp: 30 tablet, Rfl: 10    hydroCHLOROthiazide (HYDRODIURIL) 25 MG tablet, TAKE 1 TABLET BY MOUTH EVERY MORNING, Disp: 30 tablet, Rfl: 10    pantoprazole (PROTONIX) 40 MG tablet, Take 1 tablet by mouth 2 times daily (before meals), Disp: 60 tablet, Rfl: 0    levothyroxine (SYNTHROID) 88 MCG tablet, TAKE 1 TABLET BY MOUTH EVERY DAY, Disp: 30 tablet, Rfl: 3    dilTIAZem (CARDIZEM CD) 120 MG extended release capsule, TAKE 1 CAPSULE BY MOUTH EVERY DAY, Disp: 30 capsule, Rfl: 5    loratadine (CLARITIN) 10 MG tablet, TAKE 1 TABLET BY MOUTH EVERY DAY, Disp: 30 tablet, Rfl: 10    albuterol sulfate  (90 Base) MCG/ACT inhaler, INHALE TWO (2) PUFFS BY MOUTH EVERY 6 HOURS AS NEEDED FOR WHEEZING, Disp: 324 g, Rfl: 10    metoprolol tartrate (LOPRESSOR) 25 MG tablet, TAKE ONE (1) TABLET BY MOUTH TWICE DAILY, Disp: 180 tablet, Rfl: 10    cyclobenzaprine (FLEXERIL) 5 MG tablet, TAKE 1 TABLET BY MOUTH THREE TIMES DAILY AS NEEDED FOR MUSCLE SPASMS, Disp: 30 tablet, Rfl: 10    albuterol (PROVENTIL) (2.5 MG/3ML) 0.083% nebulizer solution, Take 3 mLs by nebulization 4 times daily, Disp: 120 each, Rfl: 2    TRULICITY 1.5 QG/6.1ZU SOPN, 1.5 mg once a week , Disp: , Rfl:     Omega-3 Fatty Acids (FISH OIL) 1000 MG CAPS, TAKE TWO (2) CAPSULES BY MOUTH DAILY, Disp: 180 capsule, Rfl: 10    Current Facility-Administered Medications:     regadenoson (LEXISCAN) injection 0.4 mg, 0.4 mg, IntraVENous, Once, Dorian Grimes,     Facility-Administered Medications Ordered in Other Encounters:     technetium sestamibi (CARDIOLITE) injection 30 millicurie, 30 millicurie, IntraVENous, ONCE PRYoung COMBS MD    technetium sestamibi (CARDIOLITE) injection 10 millicurie, 10 millicurie, IntraVENous, ONCE PRNYoung MD      ----------------------------------------------------------------------------------------------------------  Verbal order from Dr. Ana Randall to do [facility] stress test, patient does not feel that she can walk on treadmill. Lexiscan 0.4 mg injected over 10 seconds. IV Cardiolite injected 20 seconds post Lexiscan injection. Heart Rate:  58  Resting Blood Pressure:  146/81   ----------------------------------------------------------------------------------------------------------     HR BP   1 min 80 162/80   2 min     3 min 74 143/82   4 min     5 min 71 143/78   6 min     7 min 68 135/79   8 min     9 min 63 131/79   10 min       Symptoms:  Chest Pain:  No      Nausea:  No     Headache:  No    Shortness of Breath:  Yes     Other:  Yes  Abdominal cramping    Electronically signed by Marlee Flores RN on 11/16/21 at 9:01 AM EST  ----------------------------------------------------------------------------------------------------------    Resting EKG:  NSR     Arrhythmias:  None     EKG Changes:  None     Maximum Changes:  None     Leads with maximum changes:  None     EKG returned to baseline 0-1 minutes in recovery. Interpretation:    1. Normal ECG portion of stress test.  2. Nuclear perfusion scan report to follow.           Supervising Physician:  Elaine Vaughan DO

## 2021-12-15 ENCOUNTER — APPOINTMENT (OUTPATIENT)
Dept: GENERAL RADIOLOGY | Age: 66
End: 2021-12-15
Payer: MEDICARE

## 2021-12-15 ENCOUNTER — HOSPITAL ENCOUNTER (EMERGENCY)
Age: 66
Discharge: HOME | End: 2021-12-15
Attending: EMERGENCY MEDICINE
Payer: MEDICARE

## 2021-12-15 ENCOUNTER — APPOINTMENT (OUTPATIENT)
Dept: CT IMAGING | Age: 66
End: 2021-12-15
Payer: MEDICARE

## 2021-12-15 VITALS
RESPIRATION RATE: 16 BRPM | OXYGEN SATURATION: 94 % | SYSTOLIC BLOOD PRESSURE: 125 MMHG | DIASTOLIC BLOOD PRESSURE: 81 MMHG | TEMPERATURE: 97.7 F | HEART RATE: 62 BPM

## 2021-12-15 DIAGNOSIS — S39.012A STRAIN OF LUMBAR REGION, INITIAL ENCOUNTER: ICD-10-CM

## 2021-12-15 DIAGNOSIS — M54.12 CERVICAL RADICULOPATHY: Primary | ICD-10-CM

## 2021-12-15 PROCEDURE — 6370000000 HC RX 637 (ALT 250 FOR IP): Performed by: EMERGENCY MEDICINE

## 2021-12-15 PROCEDURE — 72125 CT NECK SPINE W/O DYE: CPT

## 2021-12-15 PROCEDURE — 72100 X-RAY EXAM L-S SPINE 2/3 VWS: CPT

## 2021-12-15 PROCEDURE — 99284 EMERGENCY DEPT VISIT MOD MDM: CPT

## 2021-12-15 RX ORDER — OXYCODONE HYDROCHLORIDE AND ACETAMINOPHEN 5; 325 MG/1; MG/1
1 TABLET ORAL EVERY 6 HOURS PRN
Qty: 12 TABLET | Refills: 0 | Status: SHIPPED | OUTPATIENT
Start: 2021-12-15 | End: 2021-12-15 | Stop reason: SINTOL

## 2021-12-15 RX ORDER — IBUPROFEN 600 MG/1
600 TABLET ORAL ONCE
Status: COMPLETED | OUTPATIENT
Start: 2021-12-15 | End: 2021-12-15

## 2021-12-15 RX ADMIN — IBUPROFEN 600 MG: 600 TABLET ORAL at 17:51

## 2021-12-15 ASSESSMENT — PAIN DESCRIPTION - PAIN TYPE: TYPE: ACUTE PAIN

## 2021-12-15 ASSESSMENT — PAIN DESCRIPTION - LOCATION: LOCATION: ARM

## 2021-12-15 ASSESSMENT — PAIN DESCRIPTION - DESCRIPTORS: DESCRIPTORS: HEAVINESS;OTHER (COMMENT)

## 2021-12-15 ASSESSMENT — PAIN DESCRIPTION - FREQUENCY: FREQUENCY: INTERMITTENT

## 2021-12-15 ASSESSMENT — PAIN DESCRIPTION - ONSET: ONSET: ON-GOING

## 2021-12-15 ASSESSMENT — PAIN SCALES - GENERAL: PAINLEVEL_OUTOF10: 9

## 2021-12-15 ASSESSMENT — PAIN DESCRIPTION - ORIENTATION: ORIENTATION: LEFT

## 2021-12-15 NOTE — ED PROVIDER NOTES
[address]      Pt Name: Marquis Sales  MRN: [id number]  [place] 1955  Date of evaluation: 12/15/2021      CHIEF COMPLAINT       Chief Complaint   Patient presents with    Arm Pain     left, started Friday         HISTORY OF PRESENT ILLNESS      The patient presents with pain in her left arm and low back. The patient says she fell a couple days ago when she was trying to reach something on a shelf. She is complaining of radicular pain that goes from her neck down her left arm. She says sometimes the muscles in her arms seem to twitch. She also is having little low back discomfort but denies weakness or numbness in the low back. She did not strike her head or lose consciousness. She denies chest pain or shortness of breath. Pain is worse with movement and better with rest.      REVIEW OF SYSTEMS       All systems reviewed and negative unless noted in HPI. The patient denies fever or constitutional symptoms. Denies vision change. Denies any sore throat or rhinorrhea. Denies chest pain or shortness of breath. No nausea,  vomiting or diarrhea. Denies any dysuria. Denies urinary frequency or hematuria. Back pain and neck pain as noted in HPI. Denies any weakness, numbness or focal neurologic deficit. Denies any skin rash or edema. No recent psychiatric issues. No easy bruising or bleeding. History of diabetes. Angelica Cheung PAST MEDICAL HISTORY    has a past medical history of Asthma, Atrial fibrillation ([address] Bowel obstruction ([address] COPD (chronic obstructive pulmonary disease) ([address] DDD (degenerative disc disease), Diabetes mellitus ([address] Hyperlipidemia, Hypertension, Hyperthyroidism, and Type II or unspecified type diabetes mellitus without mention of complication, not stated as uncontrolled. SURGICAL HISTORY      has a past surgical history that includes Tubal ligation; Tonsillectomy; Colonoscopy (04/23/14); back surgery;  Upper SOPN 1.5 mg once a week Monday's, [facility]      Omega-3 Fatty Acids (FISH OIL) 1000 MG CAPS TAKE TWO (2) CAPSULES BY MOUTH DAILY, Disp-180 capsule, R-10Normal             ALLERGIES     is allergic to norco [hydrocodone-acetaminophen], oxycodone, percocet [oxycodone-acetaminophen], and sulfa antibiotics. FAMILY HISTORY     She indicated that her mother is alive. She indicated that her father is . She indicated that her brother is alive. She indicated that her son is alive. She indicated that her maternal aunt is . family history includes Heart Disease in her mother; High Cholesterol in her mother; No Known Problems in her son. SOCIAL HISTORY      reports that she quit smoking about 8 years ago. Her smoking use included cigarettes. She has a 20.00 pack-year smoking history. She has never used smokeless tobacco. She reports that she does not drink alcohol and does not use drugs. PHYSICAL EXAM     INITIAL VITALS:  tympanic temperature is 97.7 °F (36.5 °C). Her blood pressure is 125/81 and her pulse is 62. Her respiration is 16 and oxygen saturation is 94%. The patient is alert and oriented, in no apparent distress. HEENT is atraumatic. Pupils are PERRL at 4 mm. Mucous membranes moist.     Heart sounds regular rate and rhythm with no gallops, murmurs, or rubs. Lungs coarse bilaterally. Abdomen: soft, nontender with no pain to palpation. Musculoskeletal exam: Patient locates pain in the midline of her cervical spine. Mild spasm on the left side noted but not the right. Patient has some discomfort in the left trapezius and down her arm.  +5-5 gross motor strength in upper extremities however. Patient also point locates pain in the lumbar area though no deformity is noted. No thoracic pain. Plus 5 out of 5 gross motor strength in lower extremities as well. Skin: no rash or edema. Neurological exam reveals cranial nerves 2 through 12 grossly intact.   Patient has equal  and normal deep tendon reflexes. No saddle paresthesias. Psychiatric: Appropriate. Lymphatics.:  No lymphadenopathy. DIFFERENTIAL DIAGNOSIS/ MDM:     Fracture, strain, sprain, cauda equina syndrome    DIAGNOSTIC RESULTS         RADIOLOGY:   I reviewed the radiologist interpretations:  CT CERVICAL SPINE WO CONTRAST   Final Result   1. No acute findings in the cervical spine. 2. Moderate cervical spine degenerative changes. XR LUMBAR SPINE (2-3 VIEWS)   Final Result   No acute abnormality but interval worsening (as compared to 2019) of severe   DDD L4-L5 with associated degenerative and other findings, as above.                   CT CERVICAL SPINE WO CONTRAST (Final result)  Result time 12/15/21 17:24:44  Final result by Christiane Antonio MD (12/15/21 17:24:44)                Impression:    1. No acute findings in the cervical spine. 2. Moderate cervical spine degenerative changes. Narrative:    EXAMINATION:   CT OF THE CERVICAL SPINE WITHOUT CONTRAST     12/15/2021 5:07 pm     TECHNIQUE:   CT of the cervical spine was performed without the administration of   intravenous contrast. Multiplanar reformatted images are provided for review. Dose modulation, iterative reconstruction, and/or weight based adjustment of   the mA/kV was utilized to reduce the radiation dose to as low as reasonably   achievable. COMPARISON:   Cervical spine CT 08/28/2015     HISTORY:   ORDERING SYSTEM PROVIDED HISTORY: fall   TECHNOLOGIST PROVIDED HISTORY:   fall   Decision Support Exception - unselect if not a suspected or confirmed   emergency medical condition->Emergency Medical Condition (MA)     FINDINGS:   BONES/ALIGNMENT:  No acute fracture.  Straightening of cervical lordosis.  No   spondylolisthesis.      DEGENERATIVE CHANGES:  Mild degenerative changes of the anterior atlantoaxial   joint.  Moderate to severe degenerative disc disease from C3/C4 to C6/C7 with   mild involvement other levels.  Mild to moderate facet arthropathy. SOFT TISSUES:  Normal appearance of the prevertebral soft tissues.  Unchanged   noncalcified solid nodules measuring up to 0.3 cm in the apical left upper   lobe, considered benign given stability.                       XR LUMBAR SPINE (2-3 VIEWS) (Final result)  Result time 12/15/21 17:23:26  Final result by Ivonne Tian MD (12/15/21 17:23:26)                Impression:    No acute abnormality but interval worsening (as compared to 2019) of severe   DDD L4-L5 with associated degenerative and other findings, as above. Narrative:    EXAMINATION:   THREE XRAY VIEWS OF THE LUMBAR SPINE     12/15/2021 5:06 pm     COMPARISON:   Multi view study of the lumbosacral spine from 01/14/2019; CT scan of the   abdomen and pelvis from 01/24/2021     HISTORY:   ORDERING SYSTEM PROVIDED HISTORY: back pain   TECHNOLOGIST PROVIDED HISTORY:   back pain   Reason for Exam: Low back pain radiates down bilat hips; hx recent falls     History of hypertension, diabetes, COPD, and asthma. FINDINGS:   5 lumbar type vertebral bodies; hypoplastic T12 ribs, especially on the right. No loss vertebral body height or significant malalignment. Interval worsening (as compared to 2019) severe intervertebral disc space   narrowing with vacuum phenomenon and marked endplate sclerosis/spurring and   facet arthropathy L4-L5, and probable short pedicles of the AP canal.     Remainder intervertebral disc spaces maintained.  Mild multilevel   spondylosis.  Moderate facet arthrosis remaining levels, especially L5-S1 and   L2-L3. No destructive or blastic lesion.  Pedicles and paravertebral soft tissue   structures maintained.  SI joints patent.      Moderate retained stool, especially transverse colon.  Unchanged   calcifications abdominal aorta and SMA.                         EMERGENCY DEPARTMENT COURSE:   Vitals:    Vitals:    12/15/21 1624 12/15/21 1752   BP: 103/83 125/81   Pulse: 64 62   Resp: 14 16   Temp: 97.7 °F (36.5 °C)    TempSrc: Tympanic    SpO2: 96% 94%     -------------------------  BP: 125/81, Temp: 97.7 °F (36.5 °C), Pulse: 62, Resp: 16      Re-evaluation Notes    The patient has no acute fracture. I will write for a small amount of medicine for pain. She can follow-up with her doctor. She is discharged in good condition. Controlled Substance Monitoring:    Acute and Chronic Pain Monitoring:   RX Monitoring 3/16/2020   Attestation -   Periodic Controlled Substance Monitoring No signs of potential drug abuse or diversion identified. FINAL IMPRESSION      1. Cervical radiculopathy    2. Strain of lumbar region, initial encounter          DISPOSITION/PLAN   DISPOSITION        Condition on Disposition    good    PATIENT REFERRED TO:  Matilda Mendoza MD  [address] [phone]    In 1 week        DISCHARGE MEDICATIONS:  Discharge Medication List as of 12/15/2021  5:36 PM      START taking these medications    Details   oxyCODONE-acetaminophen (PERCOCET) 5-325 MG per tablet Take 1 tablet by mouth every 6 hours as needed for Pain for up to 3 days. Intended supply: 3 days. Take lowest dose possible to manage pain, Disp-12 tablet, R-0Normal             (Please note that portions of this note were completed with a voice recognition program.  Efforts were made to edit the dictations but occasionally words are mis-transcribed.)    Fiona Damon MD,, MD   Attending Emergency Physician    Addendum: The patient indicated to the nurse that she is allergic to Percocet even though it is not on her list.  She does not want take anything for pain. I have canceled her prescription for Percocet.      Rossana Yang MD  12/15/21 5006

## 2021-12-16 ENCOUNTER — TELEPHONE (OUTPATIENT)
Dept: FAMILY MEDICINE CLINIC | Age: 66
End: 2021-12-16

## 2021-12-20 ENCOUNTER — HOSPITAL ENCOUNTER (EMERGENCY)
Age: 66
Discharge: HOME | End: 2021-12-20
Attending: EMERGENCY MEDICINE
Payer: MEDICARE

## 2021-12-20 VITALS
SYSTOLIC BLOOD PRESSURE: 138 MMHG | HEART RATE: 68 BPM | WEIGHT: 154 LBS | HEIGHT: 64 IN | RESPIRATION RATE: 17 BRPM | DIASTOLIC BLOOD PRESSURE: 72 MMHG | OXYGEN SATURATION: 98 % | BODY MASS INDEX: 26.29 KG/M2 | TEMPERATURE: 97 F

## 2021-12-20 DIAGNOSIS — R73.9 HYPERGLYCEMIA: ICD-10-CM

## 2021-12-20 DIAGNOSIS — M54.12 CERVICAL RADICULOPATHY: Primary | ICD-10-CM

## 2021-12-20 LAB
ABSOLUTE EOS #: 0.3 K/UL (ref 0–0.44)
ABSOLUTE IMMATURE GRANULOCYTE: <0.03 K/UL (ref 0–0.3)
ABSOLUTE LYMPH #: 1.62 K/UL (ref 1.1–3.7)
ABSOLUTE MONO #: 0.4 K/UL (ref 0.1–1.2)
ALLEN TEST: ABNORMAL
ANION GAP SERPL CALCULATED.3IONS-SCNC: 19 MMOL/L (ref 9–17)
BASOPHILS # BLD: 1 % (ref 0–2)
BASOPHILS ABSOLUTE: 0.06 K/UL (ref 0–0.2)
BETA-HYDROXYBUTYRATE: 1.69 MMOL/L (ref 0.02–0.27)
BUN BLDV-MCNC: 28 MG/DL (ref 8–23)
BUN/CREAT BLD: 17 (ref 9–20)
CALCIUM SERPL-MCNC: 11.3 MG/DL (ref 8.6–10.4)
CHLORIDE BLD-SCNC: 78 MMOL/L (ref 98–107)
CO2: 26 MMOL/L (ref 20–31)
CREAT SERPL-MCNC: 1.63 MG/DL (ref 0.5–0.9)
DIFFERENTIAL TYPE: ABNORMAL
EOSINOPHILS RELATIVE PERCENT: 5 % (ref 1–4)
FIO2: ABNORMAL
GFR AFRICAN AMERICAN: 38 ML/MIN
GFR NON-AFRICAN AMERICAN: 32 ML/MIN
GFR SERPL CREATININE-BSD FRML MDRD: ABNORMAL ML/MIN/{1.73_M2}
GFR SERPL CREATININE-BSD FRML MDRD: ABNORMAL ML/MIN/{1.73_M2}
GLUCOSE BLD-MCNC: 1010 MG/DL (ref 70–99)
GLUCOSE BLD-MCNC: 458 MG/DL (ref 65–105)
GLUCOSE BLD-MCNC: 554 MG/DL (ref 65–105)
GLUCOSE BLD-MCNC: 770 MG/DL (ref 70–99)
GLUCOSE BLD-MCNC: >600 MG/DL (ref 65–105)
GLUCOSE BLD-MCNC: >600 MG/DL (ref 65–105)
HCO3 VENOUS: 27.7 MMOL/L (ref 22–29)
HCT VFR BLD CALC: 42 % (ref 36.3–47.1)
HEMOGLOBIN: 15 G/DL (ref 11.9–15.1)
IMMATURE GRANULOCYTES: 0 %
LYMPHOCYTES # BLD: 26 % (ref 24–43)
MCH RBC QN AUTO: 30.2 PG (ref 25.2–33.5)
MCHC RBC AUTO-ENTMCNC: 35.7 G/DL (ref 25.2–33.5)
MCV RBC AUTO: 84.7 FL (ref 82.6–102.9)
MODE: ABNORMAL
MONOCYTES # BLD: 6 % (ref 3–12)
NEGATIVE BASE EXCESS, VEN: ABNORMAL (ref 0–2)
NRBC AUTOMATED: 0 PER 100 WBC
O2 DEVICE/FLOW/%: ABNORMAL
O2 SAT, VEN: 79 % (ref 60–85)
PATIENT TEMP: ABNORMAL
PCO2, VEN: 40.8 MM HG (ref 41–51)
PDW BLD-RTO: 12.7 % (ref 11.8–14.4)
PH VENOUS: 7.44 (ref 7.32–7.43)
PLATELET # BLD: 268 K/UL (ref 138–453)
PLATELET ESTIMATE: ABNORMAL
PMV BLD AUTO: 11.2 FL (ref 8.1–13.5)
PO2, VEN: 41.9 MM HG (ref 30–50)
POC PCO2 TEMP: ABNORMAL MM HG
POC PH TEMP: ABNORMAL
POC PO2 TEMP: ABNORMAL MM HG
POSITIVE BASE EXCESS, VEN: 3 (ref 0–3)
POTASSIUM SERPL-SCNC: 3.9 MMOL/L (ref 3.7–5.3)
RBC # BLD: 4.96 M/UL (ref 3.95–5.11)
RBC # BLD: ABNORMAL 10*6/UL
SAMPLE SITE: ABNORMAL
SEG NEUTROPHILS: 62 % (ref 36–65)
SEGMENTED NEUTROPHILS ABSOLUTE COUNT: 3.81 K/UL (ref 1.5–8.1)
SODIUM BLD-SCNC: 123 MMOL/L (ref 135–144)
TOTAL CO2, VENOUS: ABNORMAL MMOL/L (ref 23–30)
WBC # BLD: 6.2 K/UL (ref 3.5–11.3)
WBC # BLD: ABNORMAL 10*3/UL

## 2021-12-20 PROCEDURE — 82803 BLOOD GASES ANY COMBINATION: CPT

## 2021-12-20 PROCEDURE — 82947 ASSAY GLUCOSE BLOOD QUANT: CPT

## 2021-12-20 PROCEDURE — 99285 EMERGENCY DEPT VISIT HI MDM: CPT

## 2021-12-20 PROCEDURE — 2580000003 HC RX 258: Performed by: EMERGENCY MEDICINE

## 2021-12-20 PROCEDURE — 96372 THER/PROPH/DIAG INJ SC/IM: CPT

## 2021-12-20 PROCEDURE — 6360000002 HC RX W HCPCS: Performed by: EMERGENCY MEDICINE

## 2021-12-20 PROCEDURE — 36415 COLL VENOUS BLD VENIPUNCTURE: CPT

## 2021-12-20 PROCEDURE — 82805 BLOOD GASES W/O2 SATURATION: CPT

## 2021-12-20 PROCEDURE — 82010 KETONE BODYS QUAN: CPT

## 2021-12-20 PROCEDURE — 6370000000 HC RX 637 (ALT 250 FOR IP): Performed by: EMERGENCY MEDICINE

## 2021-12-20 PROCEDURE — 80048 BASIC METABOLIC PNL TOTAL CA: CPT

## 2021-12-20 PROCEDURE — 85025 COMPLETE CBC W/AUTO DIFF WBC: CPT

## 2021-12-20 RX ORDER — 0.9 % SODIUM CHLORIDE 0.9 %
1000 INTRAVENOUS SOLUTION INTRAVENOUS ONCE
Status: COMPLETED | OUTPATIENT
Start: 2021-12-20 | End: 2021-12-20

## 2021-12-20 RX ORDER — MORPHINE SULFATE 10 MG/ML
10 INJECTION, SOLUTION INTRAMUSCULAR; INTRAVENOUS ONCE
Status: COMPLETED | OUTPATIENT
Start: 2021-12-20 | End: 2021-12-20

## 2021-12-20 RX ADMIN — SODIUM CHLORIDE 1000 ML: 9 INJECTION, SOLUTION INTRAVENOUS at 13:40

## 2021-12-20 RX ADMIN — SODIUM CHLORIDE 1000 ML: 9 INJECTION, SOLUTION INTRAVENOUS at 12:33

## 2021-12-20 RX ADMIN — SODIUM CHLORIDE 1000 ML: 9 INJECTION, SOLUTION INTRAVENOUS at 11:28

## 2021-12-20 RX ADMIN — INSULIN HUMAN 10 UNITS: 100 INJECTION, SOLUTION PARENTERAL at 13:59

## 2021-12-20 RX ADMIN — INSULIN HUMAN 15 UNITS: 100 INJECTION, SOLUTION PARENTERAL at 12:06

## 2021-12-20 RX ADMIN — SODIUM CHLORIDE 1000 ML: 9 INJECTION, SOLUTION INTRAVENOUS at 15:13

## 2021-12-20 RX ADMIN — MORPHINE SULFATE 10 MG: 10 INJECTION, SOLUTION INTRAMUSCULAR; INTRAVENOUS at 10:50

## 2021-12-20 ASSESSMENT — PAIN DESCRIPTION - FREQUENCY: FREQUENCY: CONTINUOUS

## 2021-12-20 ASSESSMENT — PAIN SCALES - GENERAL
PAINLEVEL_OUTOF10: 9
PAINLEVEL_OUTOF10: 4

## 2021-12-20 ASSESSMENT — PAIN DESCRIPTION - PAIN TYPE: TYPE: ACUTE PAIN

## 2021-12-20 ASSESSMENT — PAIN DESCRIPTION - ORIENTATION: ORIENTATION: LEFT

## 2021-12-20 ASSESSMENT — PAIN DESCRIPTION - LOCATION: LOCATION: SHOULDER;NECK

## 2021-12-20 NOTE — ED NOTES
Spoke with [facility], the patient's mail in pharmacy, they state that the patient's trulicity is available to be refilled on 12/23/21. The patient does not take any daily antidiabetic medications currently other than trulicity weekly.      Olga Bey RN  12/20/21 1068

## 2021-12-20 NOTE — ED NOTES
Patient presented with spastic movements of the left arm and some weakness to that same extremity. While here she states that she has not taken her insulin in the last few weeks due to her not receiving her prescriptions. She requested a glucose check. Her blood glucose reads high on our glucometer. Dr. Yash Cummins aware. See orders.      Shauna Mario RN  12/20/21 1122

## 2021-12-20 NOTE — ED PROVIDER NOTES
[address]      Pt Name: Catarino Keene  MRN: [id number]  [place] 1955  Date of evaluation: 12/20/2021      CHIEF COMPLAINT       Chief Complaint   Patient presents with    Extremity Weakness         HISTORY OF PRESENT ILLNESS      The patient presents with pain and spastic movement in her left upper extremity. She says \"weakness\", but it is actually that she has problems moving her arm due to pain. The patient was just seen here 5 days ago. I diagnosed her with cervical radiculopathy based upon her symptoms at that time. They have persisted. I wrote her for Percocet but she did not fill the prescription. She has not followed up with her doctor. The patient has no new trauma but has fallen because of her pain in the past.  She has pain from her neck down into her shoulder which causes difficulty with movement. She says she has not taken her insulin because there was a mixup with her mail-in prescription so she asked us to check her sugars. REVIEW OF SYSTEMS       All systems reviewed and negative unless noted in HPI. The patient denies fever or constitutional symptoms. Denies vision change. Denies any sore throat or rhinorrhea. Denies any neck pain or stiffness. Denies chest pain or shortness of breath. No nausea,  vomiting or diarrhea. Denies any dysuria. Denies urinary frequency or hematuria. Pain from neck down left arm especially in shoulder. Reports weakness in the left arm but is due to pain not due to loss of function. Denies any skin rash or edema. No recent psychiatric issues. No easy bruising or bleeding. History of diabetes.       PAST MEDICAL HISTORY    has a past medical history of Asthma, Atrial fibrillation ([address] Bowel obstruction ([address] COPD (chronic obstructive pulmonary disease) ([address] DDD (degenerative disc disease), Diabetes mellitus ([address] Hyperlipidemia, Hypertension, Hyperthyroidism, and Type II or unspecified type diabetes mellitus without mention of complication, not stated as uncontrolled. SURGICAL HISTORY      has a past surgical history that includes Tubal ligation; Tonsillectomy; Colonoscopy (04/23/14); back surgery; Upper gastrointestinal endoscopy (3/16/2016); tracheostomy; tracheostomy closure; Gastrostomy tube placement; gastrostomy tube change; Dental surgery (N/A, 3/8/2017); Insert Midline Catheter (1/22/2021); Colonoscopy (N/A, 1/26/2021); Upper gastrointestinal endoscopy (N/A, 1/26/2021); and Colonoscopy (1/26/2021).     CURRENT MEDICATIONS       Previous Medications    ALBUTEROL (PROVENTIL) (2.5 MG/3ML) 0.083% NEBULIZER SOLUTION    Take 3 mLs by nebulization 4 times daily    ALBUTEROL SULFATE  (90 BASE) MCG/ACT INHALER    INHALE TWO (2) PUFFS BY MOUTH EVERY 6 HOURS AS NEEDED FOR WHEEZING    ATORVASTATIN (LIPITOR) 40 MG TABLET    TAKE (1) TABLET BY MOUTH ONCE DAILY    CYCLOBENZAPRINE (FLEXERIL) 5 MG TABLET    TAKE 1 TABLET BY MOUTH THREE TIMES DAILY AS NEEDED FOR MUSCLE SPASMS    DILTIAZEM (CARDIZEM CD) 120 MG EXTENDED RELEASE CAPSULE    TAKE 1 CAPSULE BY MOUTH EVERY DAY    GABAPENTIN (NEURONTIN) 800 MG TABLET    TAKE ONE (1) TABLET BY MOUTH TWICE DAILY    HYDROCHLOROTHIAZIDE (HYDRODIURIL) 25 MG TABLET    TAKE 1 TABLET BY MOUTH EVERY MORNING    LEVOTHYROXINE (SYNTHROID) 88 MCG TABLET    TAKE 1 TABLET BY MOUTH EVERY DAY    LORATADINE (CLARITIN) 10 MG TABLET    TAKE 1 TABLET BY MOUTH EVERY DAY    METOPROLOL TARTRATE (LOPRESSOR) 25 MG TABLET    TAKE ONE (1) TABLET BY MOUTH TWICE DAILY    OMEGA-3 FATTY ACIDS (FISH OIL) 1000 MG CAPS    TAKE TWO (2) CAPSULES BY MOUTH DAILY    PANTOPRAZOLE (PROTONIX) 40 MG TABLET    Take 1 tablet by mouth 2 times daily (before meals)    TRULICITY 1.5 PA/1.6VD SOPN    1.5 mg once a week Monday's    VITAMIN D3 (CHOLECALCIFEROL) 25 MCG (1000 UT) TABS TABLET    TAKE 1 TABLET BY MOUTH DAILY       ALLERGIES     is allergic to norco [hydrocodone-acetaminophen], oxycodone, percocet [oxycodone-acetaminophen], and sulfa antibiotics. FAMILY HISTORY     She indicated that her mother is alive. She indicated that her father is . She indicated that her brother is alive. She indicated that her son is alive. She indicated that her maternal aunt is . family history includes Heart Disease in her mother; High Cholesterol in her mother; No Known Problems in her son. SOCIAL HISTORY      reports that she quit smoking about 8 years ago. Her smoking use included cigarettes. She has a 20.00 pack-year smoking history. She has never used smokeless tobacco. She reports that she does not drink alcohol and does not use drugs. PHYSICAL EXAM     INITIAL VITALS:  height is 5' 4\" (1.626 m) and weight is 154 lb (69.9 kg). Her tympanic temperature is 97 °F (36.1 °C). Her blood pressure is 138/72 and her pulse is 68. Her respiration is 17 and oxygen saturation is 98%. The patient is alert and oriented, in mild distress due to pain. Patient has spastic movement of her left upper extremity but when told to hold still can do so. HEENT is atraumatic. Pupils are PERRL at 4 mm with normal extraocular motion. Mucous membranes moist.    Heart sounds regular rate and rhythm with no gallops, murmurs, or rubs. Lungs clear, no wheezes, rales or rhonchi. Abdomen: soft, nontender with no pain to palpation. No pulsatile mass. Normal bowel sounds are noted. No rebound or guarding. Musculoskeletal exam shows no evidence of trauma. Subjective discomfort in the cervical and upper thoracic spine. Pain in the left trapezius. Normal  strength and plus 5 out of 5 gross motor strength in all extremities. Normal distal pulses in all extremities. Skin: no rash or edema. Neurological exam reveals cranial nerves 2 through 12 grossly intact. Patient has equal  and normal deep tendon reflexes. Psychiatric: no hallucinations or suicidal ideation. Lymphatics.:  No lymphadenopathy.          DIFFERENTIAL DIAGNOSIS/ MDM:     Cervical radiculopathy, muscle spasm, hyperglycemia, dehydration    DIAGNOSTIC RESULTS           LABS:  Results for orders placed or performed during the hospital encounter of 12/20/21   Beta-Hydroxybutyrate   Result Value Ref Range    Beta-Hydroxybutyrate 1.69 (H) 0.02 - 0.27 mmol/L   CBC Auto Differential   Result Value Ref Range    WBC 6.2 3.5 - 11.3 k/uL    RBC 4.96 3.95 - 5.11 m/uL    Hemoglobin 15.0 11.9 - 15.1 g/dL    Hematocrit 42.0 36.3 - 47.1 %    MCV 84.7 82.6 - 102.9 fL    MCH 30.2 25.2 - 33.5 pg    MCHC 35.7 (H) 25.2 - 33.5 g/dL    RDW 12.7 11.8 - 14.4 %    Platelets 354 000 - 607 k/uL    MPV 11.2 8.1 - 13.5 fL    NRBC Automated 0.0 0.0 per 100 WBC    Differential Type NOT REPORTED     Seg Neutrophils 62 36 - 65 %    Lymphocytes 26 24 - 43 %    Monocytes 6 3 - 12 %    Eosinophils % 5 (H) 1 - 4 %    Basophils 1 0 - 2 %    Immature Granulocytes 0 0 %    Segs Absolute 3.81 1.50 - 8.10 k/uL    Absolute Lymph # 1.62 1.10 - 3.70 k/uL    Absolute Mono # 0.40 0.10 - 1.20 k/uL    Absolute Eos # 0.30 0.00 - 0.44 k/uL    Basophils Absolute 0.06 0.00 - 0.20 k/uL    Absolute Immature Granulocyte <0.03 0.00 - 0.30 k/uL    WBC Morphology NOT REPORTED     RBC Morphology NOT REPORTED     Platelet Estimate NOT REPORTED    Basic Metabolic Panel   Result Value Ref Range    Glucose 1,010 (HH) 70 - 99 mg/dL    BUN 28 (H) 8 - 23 mg/dL    CREATININE 1.63 (H) 0.50 - 0.90 mg/dL    Bun/Cre Ratio 17 9 - 20    Calcium 11.3 (H) 8.6 - 10.4 mg/dL    Sodium 123 (L) 135 - 144 mmol/L    Potassium 3.9 3.7 - 5.3 mmol/L    Chloride 78 (L) 98 - 107 mmol/L    CO2 26 20 - 31 mmol/L    Anion Gap 19 (H) 9 - 17 mmol/L    GFR Non-African American 32 (L) >60 mL/min    GFR  38 (L) >60 mL/min    GFR Comment          GFR Staging NOT REPORTED    GLUCOSE, RANDOM   Result Value Ref Range    Glucose 770 (HH) 70 - 99 mg/dL   Venous Blood Gas, POC   Result Value Ref Range    pH, Leobardo 7.439 (H) 7.320 - 7.430    pCO2, Leobardo 40.8 (L) 41.0 - 51.0 mm Hg    pO2, Leobardo 41.9 30.0 - 50.0 mm Hg    HCO3, Venous 27.7 22.0 - 29.0 mmol/L    Total CO2, Venous NOT REPORTED 23.0 - 30.0 mmol/L    Negative Base Excess, Leobardo NOT REPORTED 0.0 - 2.0    Positive Base Excess, Leobardo 3 0.0 - 3.0    O2 Sat, Leobardo 79 60.0 - 85.0 %    O2 Device/Flow/% NOT REPORTED     David Test NOT REPORTED     Sample Site NOT REPORTED     Mode NOT REPORTED     FIO2 NOT REPORTED     Pt Temp NOT REPORTED     POC pH Temp NOT REPORTED     POC pCO2 Temp NOT REPORTED mm Hg    POC pO2 Temp NOT REPORTED mm Hg   POC Glucose Fingerstick   Result Value Ref Range    POC Glucose 554 (HH) 65 - 105 mg/dL   POC Glucose Fingerstick   Result Value Ref Range    POC Glucose 458 (HH) 65 - 105 mg/dL         EMERGENCY DEPARTMENT COURSE:   Vitals:    Vitals:    12/20/21 1230 12/20/21 1345 12/20/21 1445 12/20/21 1500   BP: 117/70 111/69 130/74 138/72   Pulse: 67 65 62 68   Resp: 13 11 11 17   Temp:       TempSrc:       SpO2: 97% 96% 95% 98%   Weight:       Height:         -------------------------  BP: 138/72, Temp: 97 °F (36.1 °C), Pulse: 68, Resp: 17      Re-evaluation Notes    In looking at the patient's charting, I just got a CT scan of her neck at her last visit. She did not follow-up with her PCP. I am going to have her follow-up there but also give her referral to an orthopedist.  The patient states that she takes once weekly medication for her diabetes but has not taken it for the past 2 weeks. As a consequence, she is hyperglycemic while here. However, she is not in DKA. The patient was treated with IV fluids and insulin with much improvement. We called and the prescription is scheduled to be refilled in the next 3 days. I explained that she should continue the muscle relaxers and other pain medicine that she has been prescribed by her doctor. She says she does not want to take Percocet. I see no indication of acute neurologic deficit. The patient is discharged in good condition. FINAL IMPRESSION      1. Cervical radiculopathy    2.  Hyperglycemia          DISPOSITION/PLAN   DISPOSITION        Condition on Disposition    good    PATIENT REFERRED TO:  Angélica Stubbs MD  [place] [address] [address] [facility]  [phone]    In 3 days      Cherelle Thomas MD  [phone]    In 3 days        DISCHARGE MEDICATIONS:  New Prescriptions    No medications on file       (Please note that portions of this note were completed with a voice recognition program.  Efforts were made to edit the dictations but occasionally words are mis-transcribed.)    Debra Mc MD,, MD   Attending Emergency Physician         Ann Bello MD  12/20/21 7843

## 2021-12-22 ENCOUNTER — HOSPITAL ENCOUNTER (EMERGENCY)
Age: 66
Discharge: HOME | End: 2021-12-22
Attending: EMERGENCY MEDICINE
Payer: MEDICARE

## 2021-12-22 ENCOUNTER — APPOINTMENT (OUTPATIENT)
Dept: CT IMAGING | Age: 66
End: 2021-12-22
Payer: MEDICARE

## 2021-12-22 VITALS
TEMPERATURE: 97.1 F | BODY MASS INDEX: 26.43 KG/M2 | SYSTOLIC BLOOD PRESSURE: 147 MMHG | HEART RATE: 51 BPM | DIASTOLIC BLOOD PRESSURE: 80 MMHG | OXYGEN SATURATION: 98 % | RESPIRATION RATE: 18 BRPM | WEIGHT: 154 LBS

## 2021-12-22 DIAGNOSIS — R73.9 HYPERGLYCEMIA: Primary | ICD-10-CM

## 2021-12-22 LAB
-: NORMAL
ABSOLUTE EOS #: 0.44 K/UL (ref 0–0.44)
ABSOLUTE IMMATURE GRANULOCYTE: <0.03 K/UL (ref 0–0.3)
ABSOLUTE LYMPH #: 2.02 K/UL (ref 1.1–3.7)
ABSOLUTE MONO #: 0.43 K/UL (ref 0.1–1.2)
ALBUMIN SERPL-MCNC: 4.4 G/DL (ref 3.5–5.2)
ALBUMIN/GLOBULIN RATIO: 1.5 (ref 1–2.5)
ALP BLD-CCNC: 97 U/L (ref 35–104)
ALT SERPL-CCNC: 19 U/L (ref 5–33)
AMORPHOUS: NORMAL
ANION GAP SERPL CALCULATED.3IONS-SCNC: 14 MMOL/L (ref 9–17)
ANION GAP SERPL CALCULATED.3IONS-SCNC: 9 MMOL/L (ref 9–17)
AST SERPL-CCNC: 21 U/L
BACTERIA: NORMAL
BASOPHILS # BLD: 2 % (ref 0–2)
BASOPHILS ABSOLUTE: 0.09 K/UL (ref 0–0.2)
BILIRUB SERPL-MCNC: 0.58 MG/DL (ref 0.3–1.2)
BILIRUBIN DIRECT: 0.2 MG/DL
BILIRUBIN URINE: NEGATIVE
BILIRUBIN, INDIRECT: 0.38 MG/DL (ref 0–1)
BUN BLDV-MCNC: 13 MG/DL (ref 8–23)
BUN BLDV-MCNC: 16 MG/DL (ref 8–23)
BUN/CREAT BLD: 12 (ref 9–20)
BUN/CREAT BLD: 13 (ref 9–20)
CALCIUM SERPL-MCNC: 10.6 MG/DL (ref 8.6–10.4)
CALCIUM SERPL-MCNC: 9.6 MG/DL (ref 8.6–10.4)
CASTS UA: NORMAL /LPF (ref 0–2)
CHLORIDE BLD-SCNC: 103 MMOL/L (ref 98–107)
CHLORIDE BLD-SCNC: 88 MMOL/L (ref 98–107)
CO2: 26 MMOL/L (ref 20–31)
CO2: 27 MMOL/L (ref 20–31)
COLOR: ABNORMAL
COMMENT UA: ABNORMAL
CREAT SERPL-MCNC: 1.1 MG/DL (ref 0.5–0.9)
CREAT SERPL-MCNC: 1.24 MG/DL (ref 0.5–0.9)
CRYSTALS, UA: NORMAL /HPF
DIFFERENTIAL TYPE: ABNORMAL
EKG ATRIAL RATE: 54 BPM
EKG P AXIS: 79 DEGREES
EKG P-R INTERVAL: 210 MS
EKG Q-T INTERVAL: 438 MS
EKG QRS DURATION: 78 MS
EKG QTC CALCULATION (BAZETT): 415 MS
EKG R AXIS: 40 DEGREES
EKG T AXIS: 44 DEGREES
EKG VENTRICULAR RATE: 54 BPM
EOSINOPHILS RELATIVE PERCENT: 9 % (ref 1–4)
EPITHELIAL CELLS UA: NORMAL /HPF (ref 0–5)
GFR AFRICAN AMERICAN: 53 ML/MIN
GFR AFRICAN AMERICAN: >60 ML/MIN
GFR NON-AFRICAN AMERICAN: 43 ML/MIN
GFR NON-AFRICAN AMERICAN: 50 ML/MIN
GFR SERPL CREATININE-BSD FRML MDRD: ABNORMAL ML/MIN/{1.73_M2}
GLOBULIN: 2.9 G/DL (ref 1.5–3.8)
GLUCOSE BLD-MCNC: 273 MG/DL (ref 65–105)
GLUCOSE BLD-MCNC: 294 MG/DL (ref 70–99)
GLUCOSE BLD-MCNC: 406 MG/DL (ref 65–105)
GLUCOSE BLD-MCNC: 481 MG/DL (ref 65–105)
GLUCOSE BLD-MCNC: 717 MG/DL (ref 70–99)
GLUCOSE URINE: ABNORMAL
HCT VFR BLD CALC: 36 % (ref 36.3–47.1)
HEMOGLOBIN: 12.6 G/DL (ref 11.9–15.1)
IMMATURE GRANULOCYTES: 0 %
KETONES, URINE: NEGATIVE
LEUKOCYTE ESTERASE, URINE: NEGATIVE
LYMPHOCYTES # BLD: 39 % (ref 24–43)
MCH RBC QN AUTO: 30.4 PG (ref 25.2–33.5)
MCHC RBC AUTO-ENTMCNC: 35 G/DL (ref 25.2–33.5)
MCV RBC AUTO: 87 FL (ref 82.6–102.9)
MONOCYTES # BLD: 8 % (ref 3–12)
MUCUS: NORMAL
NITRITE, URINE: NEGATIVE
NRBC AUTOMATED: 0 PER 100 WBC
OTHER OBSERVATIONS UA: NORMAL
PDW BLD-RTO: 12.9 % (ref 11.8–14.4)
PH UA: 6.5 (ref 5–6)
PLATELET # BLD: 189 K/UL (ref 138–453)
PLATELET ESTIMATE: ABNORMAL
PMV BLD AUTO: 10.9 FL (ref 8.1–13.5)
POTASSIUM SERPL-SCNC: 3.8 MMOL/L (ref 3.7–5.3)
POTASSIUM SERPL-SCNC: 4.2 MMOL/L (ref 3.7–5.3)
PROTEIN UA: NEGATIVE
RBC # BLD: 4.14 M/UL (ref 3.95–5.11)
RBC # BLD: ABNORMAL 10*6/UL
RBC UA: NORMAL /HPF (ref 0–4)
RENAL EPITHELIAL, UA: NORMAL /HPF
SEG NEUTROPHILS: 42 % (ref 36–65)
SEGMENTED NEUTROPHILS ABSOLUTE COUNT: 2.2 K/UL (ref 1.5–8.1)
SODIUM BLD-SCNC: 128 MMOL/L (ref 135–144)
SODIUM BLD-SCNC: 139 MMOL/L (ref 135–144)
SPECIFIC GRAVITY UA: 1 (ref 1.01–1.02)
TOTAL PROTEIN: 7.3 G/DL (ref 6.4–8.3)
TRICHOMONAS: NORMAL
TROPONIN INTERP: NORMAL
TROPONIN T: NORMAL NG/ML
TROPONIN, HIGH SENSITIVITY: 11 NG/L (ref 0–14)
TURBIDITY: ABNORMAL
URINE HGB: NEGATIVE
UROBILINOGEN, URINE: NORMAL
WBC # BLD: 5.2 K/UL (ref 3.5–11.3)
WBC # BLD: ABNORMAL 10*3/UL
WBC UA: NORMAL /HPF (ref 0–4)
YEAST: NORMAL

## 2021-12-22 PROCEDURE — 82947 ASSAY GLUCOSE BLOOD QUANT: CPT

## 2021-12-22 PROCEDURE — 80076 HEPATIC FUNCTION PANEL: CPT

## 2021-12-22 PROCEDURE — 36415 COLL VENOUS BLD VENIPUNCTURE: CPT

## 2021-12-22 PROCEDURE — 93005 ELECTROCARDIOGRAM TRACING: CPT | Performed by: EMERGENCY MEDICINE

## 2021-12-22 PROCEDURE — 70450 CT HEAD/BRAIN W/O DYE: CPT

## 2021-12-22 PROCEDURE — 6370000000 HC RX 637 (ALT 250 FOR IP): Performed by: EMERGENCY MEDICINE

## 2021-12-22 PROCEDURE — 99285 EMERGENCY DEPT VISIT HI MDM: CPT

## 2021-12-22 PROCEDURE — 84484 ASSAY OF TROPONIN QUANT: CPT

## 2021-12-22 PROCEDURE — 81001 URINALYSIS AUTO W/SCOPE: CPT

## 2021-12-22 PROCEDURE — 2580000003 HC RX 258: Performed by: EMERGENCY MEDICINE

## 2021-12-22 PROCEDURE — 80048 BASIC METABOLIC PNL TOTAL CA: CPT

## 2021-12-22 PROCEDURE — 96372 THER/PROPH/DIAG INJ SC/IM: CPT

## 2021-12-22 PROCEDURE — 85025 COMPLETE CBC W/AUTO DIFF WBC: CPT

## 2021-12-22 RX ORDER — DULAGLUTIDE 1.5 MG/.5ML
1.5 INJECTION, SOLUTION SUBCUTANEOUS WEEKLY
Qty: 1 PEN | Refills: 0 | Status: SHIPPED | OUTPATIENT
Start: 2021-12-22 | End: 2022-05-19 | Stop reason: SDUPTHER

## 2021-12-22 RX ORDER — 0.9 % SODIUM CHLORIDE 0.9 %
1000 INTRAVENOUS SOLUTION INTRAVENOUS ONCE
Status: COMPLETED | OUTPATIENT
Start: 2021-12-22 | End: 2021-12-22

## 2021-12-22 RX ORDER — POTASSIUM CHLORIDE 750 MG/1
40 TABLET, FILM COATED, EXTENDED RELEASE ORAL ONCE
Status: DISCONTINUED | OUTPATIENT
Start: 2021-12-22 | End: 2021-12-22 | Stop reason: CLARIF

## 2021-12-22 RX ORDER — POTASSIUM CHLORIDE 20 MEQ/1
40 TABLET, EXTENDED RELEASE ORAL ONCE
Status: COMPLETED | OUTPATIENT
Start: 2021-12-22 | End: 2021-12-22

## 2021-12-22 RX ORDER — DULAGLUTIDE 1.5 MG/.5ML
1.5 INJECTION, SOLUTION SUBCUTANEOUS WEEKLY
Qty: 1 PEN | Refills: 0 | Status: SHIPPED | OUTPATIENT
Start: 2021-12-22 | End: 2021-12-22 | Stop reason: SDUPTHER

## 2021-12-22 RX ADMIN — INSULIN HUMAN 12 UNITS: 100 INJECTION, SOLUTION PARENTERAL at 13:49

## 2021-12-22 RX ADMIN — INSULIN HUMAN 10 UNITS: 100 INJECTION, SOLUTION PARENTERAL at 14:57

## 2021-12-22 RX ADMIN — SODIUM CHLORIDE 1000 ML: 9 INJECTION, SOLUTION INTRAVENOUS at 14:56

## 2021-12-22 RX ADMIN — SODIUM CHLORIDE 1000 ML: 9 INJECTION, SOLUTION INTRAVENOUS at 12:19

## 2021-12-22 RX ADMIN — POTASSIUM CHLORIDE 40 MEQ: 20 TABLET, EXTENDED RELEASE ORAL at 12:19

## 2021-12-22 RX ADMIN — SODIUM CHLORIDE 1000 ML: 9 INJECTION, SOLUTION INTRAVENOUS at 11:19

## 2021-12-22 RX ADMIN — INSULIN HUMAN 15 UNITS: 100 INJECTION, SOLUTION PARENTERAL at 12:06

## 2021-12-22 ASSESSMENT — PAIN DESCRIPTION - LOCATION: LOCATION: NECK

## 2021-12-22 ASSESSMENT — PAIN DESCRIPTION - PAIN TYPE: TYPE: ACUTE PAIN

## 2021-12-22 ASSESSMENT — PAIN SCALES - GENERAL: PAINLEVEL_OUTOF10: 9

## 2021-12-22 NOTE — ED PROVIDER NOTES
[facility] ED  [address]  [place] [address]  Phone: [phone]        Pt Name: Mary Nolasco  MRN: [id number]  [place] 1955  Date of evaluation: 12/22/21      CHIEF COMPLAINT     Chief Complaint   Patient presents with    Extremity Weakness         HISTORY OF PRESENT ILLNESS  (Location/Symptom, Timing/Onset, Context/Setting, Quality, Duration, Modifying Factors, Severity.)    Mary Nolasco is a 73 y.o. female who presents with weakness and a spasming sensation. The patient returns the emergency department by EMS with a spasming sensation and weakness is noted to have an elevated blood glucose reading when she was seen previously she stated she had the same the patient states she gets her medications by mail order and states that she is not received her diabetic medications on the last several days she denies any vomiting or diarrhea no chest pain no shortness of breath no headache her primary complaint is of a spasming sensation and weakness      REVIEW OF SYSTEMS    (2-9 systems for level 4, 10 or more for level 5)     Review of Systems   Neurological: Positive for weakness. Spasming sensation   All other systems reviewed and are negative. PAST MEDICAL HISTORY    has a past medical history of Asthma, Atrial fibrillation ([address] Bowel obstruction ([address] COPD (chronic obstructive pulmonary disease) ([address] DDD (degenerative disc disease), Diabetes mellitus ([address] Hyperlipidemia, Hypertension, Hyperthyroidism, and Type II or unspecified type diabetes mellitus without mention of complication, not stated as uncontrolled. SURGICAL HISTORY      has a past surgical history that includes Tubal ligation; Tonsillectomy; Colonoscopy (04/23/14); back surgery; Upper gastrointestinal endoscopy (3/16/2016); tracheostomy; tracheostomy closure; Gastrostomy tube placement; gastrostomy tube change; Dental surgery (N/A, 3/8/2017); Insert Midline Catheter (1/22/2021);  Colonoscopy (N/A, 1/26/2021); Upper gastrointestinal endoscopy (N/A, 1/26/2021); and Colonoscopy (1/26/2021).     CURRENTMEDICATIONS       Current Discharge Medication List      CONTINUE these medications which have NOT CHANGED    Details   gabapentin (NEURONTIN) 800 MG tablet TAKE ONE (1) TABLET BY MOUTH TWICE DAILY  Qty: 60 tablet, Refills: 3    Associated Diagnoses: Cervical neck pain with evidence of disc disease      vitamin D3 (CHOLECALCIFEROL) 25 MCG (1000 UT) TABS tablet TAKE 1 TABLET BY MOUTH DAILY  Qty: 30 tablet, Refills: 10      atorvastatin (LIPITOR) 40 MG tablet TAKE (1) TABLET BY MOUTH ONCE DAILY  Qty: 30 tablet, Refills: 10    Associated Diagnoses: Mixed hyperlipidemia      hydroCHLOROthiazide (HYDRODIURIL) 25 MG tablet TAKE 1 TABLET BY MOUTH EVERY MORNING  Qty: 30 tablet, Refills: 10    Associated Diagnoses: Essential hypertension      pantoprazole (PROTONIX) 40 MG tablet Take 1 tablet by mouth 2 times daily (before meals)  Qty: 60 tablet, Refills: 0      levothyroxine (SYNTHROID) 88 MCG tablet TAKE 1 TABLET BY MOUTH EVERY DAY  Qty: 30 tablet, Refills: 3    Comments: requesting a new prescription      dilTIAZem (CARDIZEM CD) 120 MG extended release capsule TAKE 1 CAPSULE BY MOUTH EVERY DAY  Qty: 30 capsule, Refills: 5      loratadine (CLARITIN) 10 MG tablet TAKE 1 TABLET BY MOUTH EVERY DAY  Qty: 30 tablet, Refills: 10    Associated Diagnoses: Urticaria      albuterol sulfate  (90 Base) MCG/ACT inhaler INHALE TWO (2) PUFFS BY MOUTH EVERY 6 HOURS AS NEEDED FOR WHEEZING  Qty: 324 g, Refills: 10      metoprolol tartrate (LOPRESSOR) 25 MG tablet TAKE ONE (1) TABLET BY MOUTH TWICE DAILY  Qty: 180 tablet, Refills: 10    Associated Diagnoses: Essential hypertension      cyclobenzaprine (FLEXERIL) 5 MG tablet TAKE 1 TABLET BY MOUTH THREE TIMES DAILY AS NEEDED FOR MUSCLE SPASMS  Qty: 30 tablet, Refills: 10    Associated Diagnoses: Pain of both hip joints      albuterol (PROVENTIL) (2.5 MG/3ML) 0.083% nebulizer solution Take 3 mLs by nebulization 4 times daily  Qty: 120 each, Refills: 2    Associated Diagnoses: Asthma with COPD [address] [address] [facility]      Omega-3 Fatty Acids (FISH OIL) 1000 MG CAPS TAKE TWO (2) CAPSULES BY MOUTH DAILY  Qty: 180 capsule, Refills: 10             ALLERGIES     is allergic to norco [hydrocodone-acetaminophen], oxycodone, percocet [oxycodone-acetaminophen], and sulfa antibiotics. FAMILY HISTORY     She indicated that her mother is alive. She indicated that her father is . She indicated that her brother is alive. She indicated that her son is alive. She indicated that her maternal aunt is . family history includes Heart Disease in her mother; High Cholesterol in her mother; No Known Problems in her son. SOCIAL HISTORY      reports that she quit smoking about 8 years ago. Her smoking use included cigarettes. She has a 20.00 pack-year smoking history. She has never used smokeless tobacco. She reports that she does not drink alcohol and does not use drugs. PHYSICAL EXAM    (up to 7 for level 4, 8 or more for level 5)   INITIAL VITALS:  weight is 154 lb (69.9 kg). Her tympanic temperature is 97.1 °F (36.2 °C). Her blood pressure is 149/84 (abnormal) and her pulse is 51. Her respiration is 16 and oxygen saturation is 97%. Physical Exam  Vitals and nursing note reviewed. Constitutional:       Appearance: Normal appearance. HENT:      Head: Normocephalic and atraumatic. Eyes:      Conjunctiva/sclera: Conjunctivae normal.   Cardiovascular:      Rate and Rhythm: Regular rhythm. Bradycardia present. Pulmonary:      Effort: Pulmonary effort is normal. No respiratory distress. Breath sounds: Normal breath sounds. No stridor. No wheezing, rhonchi or rales. Abdominal:      General: There is no distension. Palpations: Abdomen is soft. There is no mass. Tenderness: There is no abdominal tenderness. There is no guarding or rebound.    Musculoskeletal:         General: No 01/21/2021     HISTORY:   ORDERING SYSTEM PROVIDED HISTORY: spasm sensation   TECHNOLOGIST PROVIDED HISTORY:     spasm sensation   Decision Support Exception - unselect if not a suspected or confirmed   emergency medical condition->Emergency Medical Condition (MA)   Reason for Exam: Spasm sensation; extremity weakness     FINDINGS:   BRAIN/VENTRICLES: There is no acute intracranial hemorrhage, mass effect or   midline shift.  No abnormal extra-axial fluid collection.  The gray-white   differentiation is maintained without evidence of an acute infarct.  There is   no evidence of hydrocephalus. ORBITS: The visualized portion of the orbits demonstrate no acute abnormality. SINUSES: The visualized paranasal sinuses and mastoid air cells demonstrate   no acute abnormality. SOFT TISSUES/SKULL:  No acute abnormality of the visualized skull or soft   tissues.                    LABS:  Results for orders placed or performed during the hospital encounter of 77/83/81   Basic Metabolic Panel   Result Value Ref Range    Glucose 717 (HH) 70 - 99 mg/dL    BUN 16 8 - 23 mg/dL    CREATININE 1.24 (H) 0.50 - 0.90 mg/dL    Bun/Cre Ratio 13 9 - 20    Calcium 10.6 (H) 8.6 - 10.4 mg/dL    Sodium 128 (L) 135 - 144 mmol/L    Potassium 3.8 3.7 - 5.3 mmol/L    Chloride 88 (L) 98 - 107 mmol/L    CO2 26 20 - 31 mmol/L    Anion Gap 14 9 - 17 mmol/L    GFR Non-African American 43 (L) >60 mL/min    GFR  53 (L) >60 mL/min    GFR Comment          GFR Staging NOT REPORTED    CBC Auto Differential   Result Value Ref Range    WBC 5.2 3.5 - 11.3 k/uL    RBC 4.14 3.95 - 5.11 m/uL    Hemoglobin 12.6 11.9 - 15.1 g/dL    Hematocrit 36.0 (L) 36.3 - 47.1 %    MCV 87.0 82.6 - 102.9 fL    MCH 30.4 25.2 - 33.5 pg    MCHC 35.0 (H) 25.2 - 33.5 g/dL    RDW 12.9 11.8 - 14.4 %    Platelets 328 222 - 848 k/uL    MPV 10.9 8.1 - 13.5 fL    NRBC Automated 0.0 0.0 per 100 WBC    Differential Type NOT REPORTED     Seg Neutrophils 42 36 - 65 % Lymphocytes 39 24 - 43 %    Monocytes 8 3 - 12 %    Eosinophils % 9 (H) 1 - 4 %    Basophils 2 0 - 2 %    Immature Granulocytes 0 0 %    Segs Absolute 2.20 1.50 - 8.10 k/uL    Absolute Lymph # 2.02 1.10 - 3.70 k/uL    Absolute Mono # 0.43 0.10 - 1.20 k/uL    Absolute Eos # 0.44 0.00 - 0.44 k/uL    Basophils Absolute 0.09 0.00 - 0.20 k/uL    Absolute Immature Granulocyte <0.03 0.00 - 0.30 k/uL    WBC Morphology NOT REPORTED     RBC Morphology NOT REPORTED     Platelet Estimate NOT REPORTED    Hepatic Function Panel   Result Value Ref Range    Albumin 4.4 3.5 - 5.2 g/dL    Alkaline Phosphatase 97 35 - 104 U/L    ALT 19 5 - 33 U/L    AST 21 <32 U/L    Total Bilirubin 0.58 0.3 - 1.2 mg/dL    Bilirubin, Direct 0.20 <0.31 mg/dL    Bilirubin, Indirect 0.38 0.00 - 1.00 mg/dL    Total Protein 7.3 6.4 - 8.3 g/dL    Globulin 2.9 1.5 - 3.8 g/dL    Albumin/Globulin Ratio 1.5 1.0 - 2.5   Troponin   Result Value Ref Range    Troponin, High Sensitivity 11 0 - 14 ng/L    Troponin T NOT REPORTED <0.03 ng/mL    Troponin Interp NOT REPORTED    Urinalysis Reflex to Culture    Specimen: Urine voided   Result Value Ref Range    Color, UA NOT REPORTED Yellow    Turbidity UA NOT REPORTED Clear    Glucose, Ur 3+ (A) NEGATIVE    Bilirubin Urine NEGATIVE NEGATIVE    Ketones, Urine NEGATIVE NEGATIVE    Specific Gravity, UA 1.005 (L) 1.010 - 1.025    Urine Hgb NEGATIVE NEGATIVE    pH, UA 6.5 (H) 5.0 - 6.0    Protein, UA NEGATIVE NEGATIVE    Urobilinogen, Urine Normal Normal    Nitrite, Urine NEGATIVE NEGATIVE    Leukocyte Esterase, Urine NEGATIVE NEGATIVE    Urinalysis Comments NOT REPORTED    Microscopic Urinalysis   Result Value Ref Range    -          WBC, UA None 0 - 4 /HPF    RBC, UA None 0 - 4 /HPF    Casts UA NOT REPORTED 0 - 2 /LPF    Crystals, UA NOT REPORTED None /HPF    Epithelial Cells UA 0 TO 4 0 - 5 /HPF    Renal Epithelial, UA NOT REPORTED 0 /HPF    Bacteria, UA None None    Mucus, UA NOT REPORTED None    Trichomonas, UA NOT REPORTED None    Amorphous, UA NOT REPORTED None    Other Observations UA NOT REPORTED NOT REQ.     Yeast, UA NOT REPORTED None   Basic Metabolic Panel   Result Value Ref Range    Glucose 294 (H) 70 - 99 mg/dL    BUN 13 8 - 23 mg/dL    CREATININE 1.10 (H) 0.50 - 0.90 mg/dL    Bun/Cre Ratio 12 9 - 20    Calcium 9.6 8.6 - 10.4 mg/dL    Sodium 139 135 - 144 mmol/L    Potassium 4.2 3.7 - 5.3 mmol/L    Chloride 103 98 - 107 mmol/L    CO2 27 20 - 31 mmol/L    Anion Gap 9 9 - 17 mmol/L    GFR Non-African American 50 (L) >60 mL/min    GFR African American >60 >60 mL/min    GFR Comment          GFR Staging NOT REPORTED    POC Glucose Fingerstick   Result Value Ref Range    POC Glucose 481 (HH) 65 - 105 mg/dL   POC Glucose Fingerstick   Result Value Ref Range    POC Glucose 406 (HH) 65 - 105 mg/dL   POC Glucose Fingerstick   Result Value Ref Range    POC Glucose 273 (H) 65 - 105 mg/dL   EKG 12 Lead   Result Value Ref Range    Ventricular Rate 54 BPM    Atrial Rate 54 BPM    P-R Interval 210 ms    QRS Duration 78 ms    Q-T Interval 438 ms    QTc Calculation (Bazett) 415 ms    P Axis 79 degrees    R Axis 40 degrees    T Axis 44 degrees           EMERGENCY DEPARTMENT COURSE:   Vitals:    Vitals:    12/22/21 1056 12/22/21 1215 12/22/21 1418 12/22/21 1530   BP: (!) 176/68 (!) 154/80 132/80 (!) 149/84   Pulse: 57 57 (!) 49 51   Resp: 18 16 17 16   Temp: 97.1 °F (36.2 °C)      TempSrc: Tympanic      SpO2: 97% 99% 98% 97%   Weight: 154 lb (69.9 kg)        -------------------------  BP: (!) 149/84, Temp: 97.1 °F (36.2 °C), Pulse: 51, Resp: 16      RE-EVALUATION:  The patient after IV fluids insulin and potassium her electrolytes have now improved I did discuss the patient with my hospitalist who feels at this point the patient can follow this up as an outpatient I feel that is reasonable I will write a prescription for the patient's Trulicity which she states she has been out of for the last couple of weeks the patient has no signs of diabetic ketoacidosis her electrolytes have improved after treatment here in the emergency department  At this time the patient is without objective evidence of an acute process requiring hospitalization or inpatient management. They have remained hemodynamically stable throughout their entire ED visit and are stable for discharge with outpatient follow-up. The patient understands that at this time there is no evidence for a more malignant underlying process, but the patient also understands that early in the process of an illness or injury, an emergency department workup can be falsely reassuring. Routine discharge counseling was given, and the patient understands that worsening, changing or persistent symptoms should prompt an immediate call or follow up with their primary physician or return to the emergency department. The importance of appropriate follow up was also discussed. I have reviewed the disposition diagnosis with the patient and or their family/guardian. I have answered their questions and given discharge instructions. They voiced understanding of these instructions and did not have any further questions or complaints. PROCEDURES:  None    FINAL IMPRESSION      1. Hyperglycemia          DISPOSITION/PLAN   DISPOSITION        CONDITION ON DISPOSITION:   Improved - Stable    PATIENT REFERRED TO:  Conchita Cueva MD  [place] [address] [address] [facility]  [phone]    Call in 1 day        DISCHARGE MEDICATIONS:  Current Discharge Medication List          (Please note that portions of this note were completed with a voicerecognition program.  Efforts were made to edit the dictations but occasionally words are mis-transcribed.)    Margaret Staton MD,, MD, F.A.C.E.P.   Attending Emergency Medicine Physician       Margaret Staton MD  12/22/21 8051

## 2021-12-23 ENCOUNTER — TELEPHONE (OUTPATIENT)
Dept: FAMILY MEDICINE CLINIC | Age: 66
End: 2021-12-23

## 2021-12-23 NOTE — TELEPHONE ENCOUNTER
Amy [address]  [address] [address] Kaley Spann [address]  Dept: [phone]  Dept Fax: [phone]  Loc: [phone]    December 23, 2021    [address] [place] [address] [address] [facility]      Dear Leonor Moore,    This letter is regarding your visit within our [facility] on 12/22/21. Dr. Ivy Aviles wanted to make sure that you understand your discharge instructions and that you were able to fill any prescriptions that may have been ordered for you. Please contact the office at the above phone number if advised you to follow up with Dr. Ivy Aviles, or if you have any further questions or needs. Also did you know -                             [facility]) practices can often offer you an appointment on the same day that you call for acute issues. *We have some [facility] offices that offer Walk-in appointments; check our website for availability in your community, [email or web address]. [email or web address].      *Evisits are now available for patients through [address] If you do not have MyChart and are interested, please contact the office and a staff member may assist you or go to [email or web address].     Sincerely,     Ivy Aviles MD and your [facility]

## 2021-12-27 ENCOUNTER — TELEPHONE (OUTPATIENT)
Dept: FAMILY MEDICINE CLINIC | Age: 66
End: 2021-12-27

## 2021-12-27 DIAGNOSIS — K59.00 CONSTIPATION, UNSPECIFIED CONSTIPATION TYPE: ICD-10-CM

## 2021-12-27 DIAGNOSIS — I48.0 PAROXYSMAL ATRIAL FIBRILLATION (HCC): ICD-10-CM

## 2021-12-27 DIAGNOSIS — E11.69 TYPE 2 DIABETES MELLITUS WITH OTHER SPECIFIED COMPLICATION, WITH LONG-TERM CURRENT USE OF INSULIN (HCC): Primary | ICD-10-CM

## 2021-12-27 DIAGNOSIS — Z91.81 AT HIGH RISK FOR FALLS: ICD-10-CM

## 2021-12-27 DIAGNOSIS — Z79.4 TYPE 2 DIABETES MELLITUS WITH OTHER SPECIFIED COMPLICATION, WITH LONG-TERM CURRENT USE OF INSULIN (HCC): Primary | ICD-10-CM

## 2021-12-27 DIAGNOSIS — J44.9 ASTHMA WITH COPD (HCC): ICD-10-CM

## 2021-12-27 DIAGNOSIS — I10 ESSENTIAL HYPERTENSION: ICD-10-CM

## 2021-12-27 DIAGNOSIS — Z87.891 FORMER SMOKER: ICD-10-CM

## 2022-05-11 ENCOUNTER — TELEPHONE (OUTPATIENT)
Dept: FAMILY MEDICINE CLINIC | Age: 67
End: 2022-05-11

## 2022-05-11 NOTE — TELEPHONE ENCOUNTER
Attempted to reach patient regarding missed appointment on 5/11/22. Unable to contact at this time. Left message to reschedule.

## 2022-05-16 DIAGNOSIS — M25.551 PAIN OF BOTH HIP JOINTS: ICD-10-CM

## 2022-05-16 DIAGNOSIS — M50.90 CERVICAL NECK PAIN WITH EVIDENCE OF DISC DISEASE: ICD-10-CM

## 2022-05-16 DIAGNOSIS — E78.2 MIXED HYPERLIPIDEMIA: ICD-10-CM

## 2022-05-16 DIAGNOSIS — M25.552 PAIN OF BOTH HIP JOINTS: ICD-10-CM

## 2022-05-16 DIAGNOSIS — I10 ESSENTIAL HYPERTENSION: ICD-10-CM

## 2022-05-16 RX ORDER — HYDROCHLOROTHIAZIDE 25 MG/1
25 TABLET ORAL EVERY MORNING
Qty: 30 TABLET | Refills: 10 | OUTPATIENT
Start: 2022-05-16

## 2022-05-16 RX ORDER — DULAGLUTIDE 1.5 MG/.5ML
1.5 INJECTION, SOLUTION SUBCUTANEOUS WEEKLY
Qty: 1 PEN | Refills: 0 | OUTPATIENT
Start: 2022-05-16

## 2022-05-16 RX ORDER — LEVOTHYROXINE SODIUM 88 UG/1
TABLET ORAL
Qty: 30 TABLET | Refills: 3 | OUTPATIENT
Start: 2022-05-16

## 2022-05-16 RX ORDER — INSULIN GLARGINE 100 [IU]/ML
INJECTION, SOLUTION SUBCUTANEOUS
COMMUNITY
Start: 2022-03-01 | End: 2022-05-19 | Stop reason: SDUPTHER

## 2022-05-16 RX ORDER — ATORVASTATIN CALCIUM 40 MG/1
TABLET, FILM COATED ORAL
Qty: 30 TABLET | Refills: 10 | OUTPATIENT
Start: 2022-05-16

## 2022-05-16 RX ORDER — DILTIAZEM HYDROCHLORIDE 120 MG/1
CAPSULE, COATED, EXTENDED RELEASE ORAL
Qty: 30 CAPSULE | Refills: 5 | OUTPATIENT
Start: 2022-05-16

## 2022-05-16 RX ORDER — CYCLOBENZAPRINE HCL 5 MG
TABLET ORAL
Qty: 30 TABLET | Refills: 10 | OUTPATIENT
Start: 2022-05-16

## 2022-05-16 RX ORDER — GABAPENTIN 800 MG/1
TABLET ORAL
Qty: 60 TABLET | Refills: 3 | OUTPATIENT
Start: 2022-05-16 | End: 2022-08-30

## 2022-05-16 RX ORDER — INSULIN GLARGINE 100 [IU]/ML
INJECTION, SOLUTION SUBCUTANEOUS
Qty: 5 PEN | OUTPATIENT
Start: 2022-05-16

## 2022-05-17 DIAGNOSIS — E78.2 MIXED HYPERLIPIDEMIA: ICD-10-CM

## 2022-05-17 DIAGNOSIS — M50.90 CERVICAL NECK PAIN WITH EVIDENCE OF DISC DISEASE: ICD-10-CM

## 2022-05-17 DIAGNOSIS — I10 ESSENTIAL HYPERTENSION: ICD-10-CM

## 2022-05-17 DIAGNOSIS — Z79.4 TYPE 2 DIABETES MELLITUS WITH OTHER SPECIFIED COMPLICATION, WITH LONG-TERM CURRENT USE OF INSULIN (HCC): Primary | ICD-10-CM

## 2022-05-17 DIAGNOSIS — E11.69 TYPE 2 DIABETES MELLITUS WITH OTHER SPECIFIED COMPLICATION, WITH LONG-TERM CURRENT USE OF INSULIN (HCC): Primary | ICD-10-CM

## 2022-05-17 RX ORDER — LEVOTHYROXINE SODIUM 88 UG/1
TABLET ORAL
Qty: 30 TABLET | Refills: 0 | OUTPATIENT
Start: 2022-05-17

## 2022-05-17 RX ORDER — DILTIAZEM HYDROCHLORIDE 120 MG/1
CAPSULE, COATED, EXTENDED RELEASE ORAL
Qty: 30 CAPSULE | Refills: 0 | OUTPATIENT
Start: 2022-05-17

## 2022-05-17 RX ORDER — ATORVASTATIN CALCIUM 40 MG/1
TABLET, FILM COATED ORAL
Qty: 30 TABLET | Refills: 0 | OUTPATIENT
Start: 2022-05-17

## 2022-05-17 RX ORDER — GABAPENTIN 800 MG/1
TABLET ORAL
Qty: 60 TABLET | Refills: 0 | OUTPATIENT
Start: 2022-05-17 | End: 2022-08-31

## 2022-05-17 RX ORDER — LEVOTHYROXINE SODIUM 88 UG/1
TABLET ORAL
Qty: 30 TABLET | Refills: 3 | Status: CANCELLED | OUTPATIENT
Start: 2022-05-17

## 2022-05-17 RX ORDER — HYDROCHLOROTHIAZIDE 25 MG/1
25 TABLET ORAL EVERY MORNING
Qty: 30 TABLET | Refills: 0 | OUTPATIENT
Start: 2022-05-17

## 2022-05-17 RX ORDER — INSULIN GLARGINE 100 [IU]/ML
INJECTION, SOLUTION SUBCUTANEOUS
Qty: 5 PEN | Status: CANCELLED | OUTPATIENT
Start: 2022-05-17

## 2022-05-17 RX ORDER — INSULIN GLARGINE 100 [IU]/ML
INJECTION, SOLUTION SUBCUTANEOUS
Qty: 5 PEN | Refills: 0 | OUTPATIENT
Start: 2022-05-17

## 2022-05-17 RX ORDER — GABAPENTIN 800 MG/1
TABLET ORAL
Qty: 60 TABLET | Refills: 3 | Status: CANCELLED | OUTPATIENT
Start: 2022-05-17 | End: 2022-08-31

## 2022-05-17 RX ORDER — MELATONIN
Qty: 30 TABLET | Refills: 0 | OUTPATIENT
Start: 2022-05-17

## 2022-05-17 RX ORDER — PANTOPRAZOLE SODIUM 40 MG/1
40 TABLET, DELAYED RELEASE ORAL
Qty: 60 TABLET | Refills: 0 | OUTPATIENT
Start: 2022-05-17 | End: 2022-06-16

## 2022-05-17 RX ORDER — DULAGLUTIDE 1.5 MG/.5ML
1.5 INJECTION, SOLUTION SUBCUTANEOUS WEEKLY
Qty: 1 PEN | Refills: 0 | Status: CANCELLED | OUTPATIENT
Start: 2022-05-17

## 2022-05-17 RX ORDER — DILTIAZEM HYDROCHLORIDE 120 MG/1
CAPSULE, COATED, EXTENDED RELEASE ORAL
Qty: 30 CAPSULE | Refills: 5 | Status: CANCELLED | OUTPATIENT
Start: 2022-05-17

## 2022-05-17 RX ORDER — HYDROCHLOROTHIAZIDE 25 MG/1
25 TABLET ORAL EVERY MORNING
Qty: 30 TABLET | Refills: 10 | Status: CANCELLED | OUTPATIENT
Start: 2022-05-17

## 2022-05-17 RX ORDER — AMLODIPINE BESYLATE 10 MG/1
10 TABLET ORAL DAILY
Qty: 30 TABLET | Status: CANCELLED | OUTPATIENT
Start: 2022-05-17

## 2022-05-17 RX ORDER — ATORVASTATIN CALCIUM 40 MG/1
TABLET, FILM COATED ORAL
Qty: 30 TABLET | Refills: 10 | Status: CANCELLED | OUTPATIENT
Start: 2022-05-17

## 2022-05-17 NOTE — TELEPHONE ENCOUNTER
Patient is establishing with Aquilino Pretty and needs a short supply to get by till [facility] in June. Patient is out of state currently and has had travel issues in returning. She is out of medications.        Sadi Carvajal is requesting a refill on the following medication(s):  Requested Prescriptions     Pending Prescriptions Disp Refills    dilTIAZem (CARDIZEM CD) 120 MG extended release capsule 30 capsule 0     Sig: TAKE 1 CAPSULE BY MOUTH EVERY DAY    hydroCHLOROthiazide (HYDRODIURIL) 25 MG tablet 30 tablet 0     Sig: Take 1 tablet by mouth every morning    vitamin D3 (CHOLECALCIFEROL) 25 MCG (1000 UT) TABS tablet 30 tablet 0     Sig: TAKE 1 TABLET BY MOUTH DAILY    pantoprazole (PROTONIX) 40 MG tablet 60 tablet 0     Sig: Take 1 tablet by mouth 2 times daily (before meals)    gabapentin (NEURONTIN) 800 MG tablet 60 tablet 0     Sig: TAKE ONE (1) TABLET BY MOUTH TWICE DAILY    LANTUS SOLOSTAR 100 UNIT/ML injection pen 5 pen 0     Sig: inject 15 units subcutaneously daily    atorvastatin (LIPITOR) 40 MG tablet 30 tablet 0     Sig: TAKE (1) TABLET BY MOUTH ONCE DAILY    levothyroxine (SYNTHROID) 88 MCG tablet 30 tablet 0     Sig: TAKE 1 TABLET BY MOUTH EVERY DAY       Last Visit Date (If Applicable):  40/0/7873    Next Visit Date:    Visit date not found

## 2022-05-17 NOTE — TELEPHONE ENCOUNTER
Attila Stovall called requesting a refill of the below medication which has been pended for you:     Requested Prescriptions     Pending Prescriptions Disp Refills    amLODIPine (NORVASC) 10 MG tablet 30 tablet      Sig: Take 1 tablet by mouth daily    levothyroxine (SYNTHROID) 88 MCG tablet 30 tablet 3    dilTIAZem (CARDIZEM CD) 120 MG extended release capsule 30 capsule 5     Sig: TAKE 1 CAPSULE BY MOUTH EVERY DAY    metoprolol tartrate (LOPRESSOR) 25 MG tablet 180 tablet 10    hydroCHLOROthiazide (HYDRODIURIL) 25 MG tablet 30 tablet 10     Sig: Take 1 tablet by mouth every morning    gabapentin (NEURONTIN) 800 MG tablet 60 tablet 3     Sig: TAKE ONE (1) TABLET BY MOUTH TWICE DAILY    atorvastatin (LIPITOR) 40 MG tablet 30 tablet 10     Sig: TAKE (1) TABLET BY MOUTH ONCE DAILY    TRULICITY 1.5 SF/9.0CI SOPN 1 pen 0     Sig: Inject 1.5 mg into the muscle once a week Monday's    LANTUS SOLOSTAR 100 UNIT/ML injection pen 5 pen        Last Appointment Date: Visit date not found  Next Appointment Date: 6/10/2022    Allergies   Allergen Reactions    Norco [Hydrocodone-Acetaminophen]      Nausea and vomiting    Oxycodone Nausea And Vomiting    Percocet [Oxycodone-Acetaminophen] Nausea And Vomiting    Sulfa Antibiotics Other (See Comments)     Hallucinations.

## 2022-05-17 NOTE — TELEPHONE ENCOUNTER
Patient called requesting refill to loaded pharmacy. She is currently in [place], and she will be establishing with Dr. Alen Rogers on 6/10/22, as she will be returning to [place] around that time. She states she is completely out of her medication, so she is unsure what she should do. Patient states she previously saw Greer Cui at [facility], so writer advised we may be unable to fill her meds until she is seen in the clinic. Patient can be contacted at [phone]. Please advise.

## 2022-05-18 ENCOUNTER — TELEPHONE (OUTPATIENT)
Dept: FAMILY MEDICINE CLINIC | Age: 67
End: 2022-05-18

## 2022-05-18 NOTE — TELEPHONE ENCOUNTER
Thea Green called for Dominick Dodson, she is completely out of her medication and she is stuck in [place]. She did not take enough medication with for her trip and they are having issues getting back to [place]. Wanting her prescriptions called to [facility]. States she talked to someone and they were rude to her about the medications and did not appreciate how they talked to Dominick Dodson. Writer spoke to both [place] and told the patient they should see about going to an urgent care or emergency room to see if they can get her meds since she is unable to get home until after Sunday or she can attempt to contact the physician who ordered her medications (Dr Yury Ortiz) and see if maybe she could refill them for her since she knows her history.  They stated understanding and said they would try Dr Belle Hartley first. Morphology Per Location (Optional): Tan flat papule Detail Level: Detailed Size Of Lesion: 4mm Size Of Lesion: 5mm

## 2022-05-19 DIAGNOSIS — M50.90 CERVICAL NECK PAIN WITH EVIDENCE OF DISC DISEASE: ICD-10-CM

## 2022-05-19 DIAGNOSIS — E78.2 MIXED HYPERLIPIDEMIA: ICD-10-CM

## 2022-05-19 DIAGNOSIS — I10 ESSENTIAL HYPERTENSION: ICD-10-CM

## 2022-05-19 RX ORDER — PANTOPRAZOLE SODIUM 40 MG/1
40 TABLET, DELAYED RELEASE ORAL
Qty: 60 TABLET | Refills: 0 | Status: SHIPPED | OUTPATIENT
Start: 2022-05-19 | End: 2022-06-29 | Stop reason: SDUPTHER

## 2022-05-19 RX ORDER — MELATONIN
Qty: 30 TABLET | Refills: 0 | Status: SHIPPED | OUTPATIENT
Start: 2022-05-19

## 2022-05-19 RX ORDER — ATORVASTATIN CALCIUM 40 MG/1
TABLET, FILM COATED ORAL
Qty: 30 TABLET | Refills: 0 | Status: SHIPPED | OUTPATIENT
Start: 2022-05-19 | End: 2022-06-29 | Stop reason: SDUPTHER

## 2022-05-19 RX ORDER — DILTIAZEM HYDROCHLORIDE 120 MG/1
CAPSULE, COATED, EXTENDED RELEASE ORAL
Qty: 30 CAPSULE | Refills: 0 | Status: SHIPPED | OUTPATIENT
Start: 2022-05-19 | End: 2022-06-29 | Stop reason: SDUPTHER

## 2022-05-19 RX ORDER — DULAGLUTIDE 1.5 MG/.5ML
1.5 INJECTION, SOLUTION SUBCUTANEOUS WEEKLY
Qty: 4 PEN | Refills: 0 | Status: SHIPPED | OUTPATIENT
Start: 2022-05-19 | End: 2022-07-21 | Stop reason: SDUPTHER

## 2022-05-19 RX ORDER — LEVOTHYROXINE SODIUM 88 UG/1
TABLET ORAL
Qty: 30 TABLET | Refills: 0 | Status: SHIPPED | OUTPATIENT
Start: 2022-05-19 | End: 2022-06-29 | Stop reason: SDUPTHER

## 2022-05-19 RX ORDER — GABAPENTIN 800 MG/1
TABLET ORAL
Qty: 60 TABLET | Refills: 0 | Status: SHIPPED | OUTPATIENT
Start: 2022-05-19 | End: 2022-07-21 | Stop reason: DRUGHIGH

## 2022-05-19 RX ORDER — INSULIN GLARGINE 100 [IU]/ML
INJECTION, SOLUTION SUBCUTANEOUS
Qty: 5 PEN | Refills: 0 | Status: SHIPPED | OUTPATIENT
Start: 2022-05-19 | End: 2022-06-29 | Stop reason: SDUPTHER

## 2022-05-19 RX ORDER — HYDROCHLOROTHIAZIDE 25 MG/1
25 TABLET ORAL EVERY MORNING
Qty: 30 TABLET | Refills: 0 | Status: SHIPPED | OUTPATIENT
Start: 2022-05-19 | End: 2022-06-29 | Stop reason: SDUPTHER

## 2022-05-19 NOTE — TELEPHONE ENCOUNTER
She was last seen in our office 11/5/2021. Her initial appt with Dr Susy Reynaga is not till June. Is there anything we can do to help her?

## 2022-05-19 NOTE — TELEPHONE ENCOUNTER
I typically do not refill meds before I have seen a patient. These have now been filled by Dr. Thomas Zamora for 30-day supplies until she can establish with me on 6/10. However, it does not appear she did the Trulicity, so I will refill this one only.

## 2022-05-19 NOTE — TELEPHONE ENCOUNTER
Mary Evans is requesting a refill on the following medication(s):  Requested Prescriptions     Pending Prescriptions Disp Refills    dilTIAZem (CARDIZEM CD) 120 MG extended release capsule 30 capsule 0     Sig: TAKE 1 CAPSULE BY MOUTH EVERY DAY    hydroCHLOROthiazide (HYDRODIURIL) 25 MG tablet 30 tablet 0     Sig: Take 1 tablet by mouth every morning    vitamin D3 (CHOLECALCIFEROL) 25 MCG (1000 UT) TABS tablet 30 tablet 0     Sig: TAKE 1 TABLET BY MOUTH DAILY    pantoprazole (PROTONIX) 40 MG tablet 60 tablet 0     Sig: Take 1 tablet by mouth 2 times daily (before meals)    gabapentin (NEURONTIN) 800 MG tablet 60 tablet 0     Sig: TAKE ONE (1) TABLET BY MOUTH TWICE DAILY    LANTUS SOLOSTAR 100 UNIT/ML injection pen 5 pen 0     Sig: inject 15 units subcutaneously daily    levothyroxine (SYNTHROID) 88 MCG tablet 30 tablet 0     Sig: TAKE 1 TABLET BY MOUTH EVERY DAY    atorvastatin (LIPITOR) 40 MG tablet 30 tablet 0     Sig: TAKE (1) TABLET BY MOUTH ONCE DAILY       Last Visit Date (If Applicable):  70/1/7382    Next Visit Date:    Visit date not found

## 2022-06-10 ENCOUNTER — TELEPHONE (OUTPATIENT)
Dept: FAMILY MEDICINE CLINIC | Age: 67
End: 2022-06-10

## 2022-06-29 ENCOUNTER — HOSPITAL ENCOUNTER (OUTPATIENT)
Dept: LAB | Age: 67
Discharge: HOME | End: 2022-06-29
Payer: MEDICARE

## 2022-06-29 ENCOUNTER — OFFICE VISIT (OUTPATIENT)
Dept: FAMILY MEDICINE CLINIC | Age: 67
End: 2022-06-29
Payer: MEDICARE

## 2022-06-29 VITALS
TEMPERATURE: 97.6 F | OXYGEN SATURATION: 96 % | DIASTOLIC BLOOD PRESSURE: 88 MMHG | BODY MASS INDEX: 28.48 KG/M2 | WEIGHT: 166.8 LBS | HEART RATE: 59 BPM | HEIGHT: 64 IN | SYSTOLIC BLOOD PRESSURE: 134 MMHG

## 2022-06-29 DIAGNOSIS — I48.0 PAROXYSMAL ATRIAL FIBRILLATION (HCC): ICD-10-CM

## 2022-06-29 DIAGNOSIS — E89.0 POSTABLATIVE HYPOTHYROIDISM: ICD-10-CM

## 2022-06-29 DIAGNOSIS — I10 ESSENTIAL HYPERTENSION: ICD-10-CM

## 2022-06-29 DIAGNOSIS — E78.2 MIXED HYPERLIPIDEMIA: ICD-10-CM

## 2022-06-29 DIAGNOSIS — Z23 NEED FOR PROPHYLACTIC VACCINATION AND INOCULATION AGAINST VARICELLA: ICD-10-CM

## 2022-06-29 DIAGNOSIS — M51.36 DEGENERATIVE DISC DISEASE, LUMBAR: ICD-10-CM

## 2022-06-29 DIAGNOSIS — Z12.31 ENCOUNTER FOR SCREENING MAMMOGRAM FOR BREAST CANCER: ICD-10-CM

## 2022-06-29 DIAGNOSIS — Z79.4 TYPE 2 DIABETES MELLITUS WITHOUT COMPLICATION, WITH LONG-TERM CURRENT USE OF INSULIN (HCC): ICD-10-CM

## 2022-06-29 DIAGNOSIS — Z23 NEED FOR PNEUMOCOCCAL VACCINATION: ICD-10-CM

## 2022-06-29 DIAGNOSIS — K21.9 GASTROESOPHAGEAL REFLUX DISEASE, UNSPECIFIED WHETHER ESOPHAGITIS PRESENT: ICD-10-CM

## 2022-06-29 DIAGNOSIS — Z79.4 TYPE 2 DIABETES MELLITUS WITHOUT COMPLICATION, WITH LONG-TERM CURRENT USE OF INSULIN (HCC): Primary | ICD-10-CM

## 2022-06-29 DIAGNOSIS — E11.9 TYPE 2 DIABETES MELLITUS WITHOUT COMPLICATION, WITH LONG-TERM CURRENT USE OF INSULIN (HCC): Primary | ICD-10-CM

## 2022-06-29 DIAGNOSIS — M50.90 CERVICAL NECK PAIN WITH EVIDENCE OF DISC DISEASE: ICD-10-CM

## 2022-06-29 DIAGNOSIS — E11.9 TYPE 2 DIABETES MELLITUS WITHOUT COMPLICATION, WITH LONG-TERM CURRENT USE OF INSULIN (HCC): ICD-10-CM

## 2022-06-29 PROBLEM — R53.81 PHYSICAL DEBILITY: Status: ACTIVE | Noted: 2022-01-07

## 2022-06-29 PROBLEM — H61.22 HEARING LOSS OF LEFT EAR DUE TO CERUMEN IMPACTION: Status: ACTIVE | Noted: 2021-09-01

## 2022-06-29 PROBLEM — H61.23 BILATERAL IMPACTED CERUMEN: Status: ACTIVE | Noted: 2021-04-13

## 2022-06-29 PROBLEM — R29.6 RECURRENT FALLS: Status: ACTIVE | Noted: 2022-01-06

## 2022-06-29 PROBLEM — N89.8 VAGINAL ITCHING: Status: ACTIVE | Noted: 2022-03-01

## 2022-06-29 PROBLEM — M62.838 MUSCLE SPASM: Status: ACTIVE | Noted: 2022-01-06

## 2022-06-29 LAB
ANION GAP SERPL CALCULATED.3IONS-SCNC: 10 MMOL/L (ref 9–17)
BUN BLDV-MCNC: 21 MG/DL (ref 8–23)
BUN/CREAT BLD: 16 (ref 9–20)
CALCIUM SERPL-MCNC: 10.3 MG/DL (ref 8.6–10.4)
CHLORIDE BLD-SCNC: 97 MMOL/L (ref 98–107)
CO2: 30 MMOL/L (ref 20–31)
CREAT SERPL-MCNC: 1.3 MG/DL (ref 0.5–0.9)
GFR AFRICAN AMERICAN: 50 ML/MIN
GFR NON-AFRICAN AMERICAN: 41 ML/MIN
GFR SERPL CREATININE-BSD FRML MDRD: ABNORMAL ML/MIN/{1.73_M2}
GLUCOSE BLD-MCNC: 133 MG/DL (ref 70–99)
POTASSIUM SERPL-SCNC: 4.2 MMOL/L (ref 3.7–5.3)
SODIUM BLD-SCNC: 137 MMOL/L (ref 135–144)
TSH SERPL DL<=0.05 MIU/L-ACNC: 0.56 UIU/ML (ref 0.3–5)

## 2022-06-29 PROCEDURE — G8427 DOCREV CUR MEDS BY ELIG CLIN: HCPCS | Performed by: FAMILY MEDICINE

## 2022-06-29 PROCEDURE — 99213 OFFICE O/P EST LOW 20 MIN: CPT

## 2022-06-29 PROCEDURE — 83036 HEMOGLOBIN GLYCOSYLATED A1C: CPT

## 2022-06-29 PROCEDURE — 99214 OFFICE O/P EST MOD 30 MIN: CPT | Performed by: FAMILY MEDICINE

## 2022-06-29 PROCEDURE — 2022F DILAT RTA XM EVC RTNOPTHY: CPT | Performed by: FAMILY MEDICINE

## 2022-06-29 PROCEDURE — 1036F TOBACCO NON-USER: CPT | Performed by: FAMILY MEDICINE

## 2022-06-29 PROCEDURE — 1090F PRES/ABSN URINE INCON ASSESS: CPT | Performed by: FAMILY MEDICINE

## 2022-06-29 PROCEDURE — 1123F ACP DISCUSS/DSCN MKR DOCD: CPT | Performed by: FAMILY MEDICINE

## 2022-06-29 PROCEDURE — 36415 COLL VENOUS BLD VENIPUNCTURE: CPT

## 2022-06-29 PROCEDURE — 3046F HEMOGLOBIN A1C LEVEL >9.0%: CPT | Performed by: FAMILY MEDICINE

## 2022-06-29 PROCEDURE — 80048 BASIC METABOLIC PNL TOTAL CA: CPT

## 2022-06-29 PROCEDURE — 90677 PCV20 VACCINE IM: CPT | Performed by: FAMILY MEDICINE

## 2022-06-29 PROCEDURE — G8399 PT W/DXA RESULTS DOCUMENT: HCPCS | Performed by: FAMILY MEDICINE

## 2022-06-29 PROCEDURE — 3017F COLORECTAL CA SCREEN DOC REV: CPT | Performed by: FAMILY MEDICINE

## 2022-06-29 PROCEDURE — 84443 ASSAY THYROID STIM HORMONE: CPT

## 2022-06-29 PROCEDURE — G8417 CALC BMI ABV UP PARAM F/U: HCPCS | Performed by: FAMILY MEDICINE

## 2022-06-29 RX ORDER — PANTOPRAZOLE SODIUM 40 MG/1
40 TABLET, DELAYED RELEASE ORAL DAILY
Qty: 90 TABLET | Refills: 1 | Status: SHIPPED | OUTPATIENT
Start: 2022-06-29 | End: 2022-07-21 | Stop reason: SDUPTHER

## 2022-06-29 RX ORDER — DILTIAZEM HYDROCHLORIDE 120 MG/1
CAPSULE, COATED, EXTENDED RELEASE ORAL
Qty: 90 CAPSULE | Refills: 1 | Status: SHIPPED | OUTPATIENT
Start: 2022-06-29 | End: 2022-07-21 | Stop reason: SDUPTHER

## 2022-06-29 RX ORDER — LEVOTHYROXINE SODIUM 88 UG/1
TABLET ORAL
Qty: 90 TABLET | Refills: 0 | Status: SHIPPED | OUTPATIENT
Start: 2022-06-29 | End: 2022-07-21 | Stop reason: SDUPTHER

## 2022-06-29 RX ORDER — POTASSIUM CHLORIDE 20 MEQ/1
TABLET, EXTENDED RELEASE ORAL
COMMUNITY
Start: 2022-05-19

## 2022-06-29 RX ORDER — HYDROCHLOROTHIAZIDE 25 MG/1
25 TABLET ORAL EVERY MORNING
Qty: 90 TABLET | Refills: 3 | Status: SHIPPED | OUTPATIENT
Start: 2022-06-29 | End: 2022-07-21 | Stop reason: SDUPTHER

## 2022-06-29 RX ORDER — LOSARTAN POTASSIUM 25 MG/1
25 TABLET ORAL DAILY
Qty: 90 TABLET | Refills: 3 | Status: SHIPPED | OUTPATIENT
Start: 2022-06-29 | End: 2022-07-21 | Stop reason: SDUPTHER

## 2022-06-29 RX ORDER — MELATONIN
Qty: 30 TABLET | Refills: 0 | Status: CANCELLED | OUTPATIENT
Start: 2022-06-29

## 2022-06-29 RX ORDER — GLIMEPIRIDE 2 MG/1
TABLET ORAL
COMMUNITY
Start: 2022-05-19 | End: 2022-06-29 | Stop reason: SDUPTHER

## 2022-06-29 RX ORDER — FLUTICASONE PROPIONATE AND SALMETEROL 250; 50 UG/1; UG/1
1 POWDER RESPIRATORY (INHALATION) 2 TIMES DAILY
COMMUNITY
Start: 2021-08-16

## 2022-06-29 RX ORDER — INSULIN GLARGINE 100 [IU]/ML
INJECTION, SOLUTION SUBCUTANEOUS
Qty: 6 PEN | Refills: 0 | Status: SHIPPED | OUTPATIENT
Start: 2022-06-29 | End: 2022-07-11 | Stop reason: SDUPTHER

## 2022-06-29 RX ORDER — FLASH GLUCOSE SENSOR
KIT MISCELLANEOUS
COMMUNITY
Start: 2022-04-21 | End: 2022-10-19

## 2022-06-29 RX ORDER — LOSARTAN POTASSIUM 25 MG/1
25 TABLET ORAL DAILY
COMMUNITY
Start: 2022-05-19 | End: 2022-06-29 | Stop reason: SDUPTHER

## 2022-06-29 RX ORDER — ATORVASTATIN CALCIUM 40 MG/1
40 TABLET, FILM COATED ORAL DAILY
Qty: 90 TABLET | Refills: 2 | Status: SHIPPED | OUTPATIENT
Start: 2022-06-29 | End: 2022-07-21 | Stop reason: SDUPTHER

## 2022-06-29 RX ORDER — GLIMEPIRIDE 2 MG/1
2 TABLET ORAL
Qty: 30 TABLET | Refills: 0 | Status: SHIPPED | OUTPATIENT
Start: 2022-06-29 | End: 2022-07-11 | Stop reason: SDUPTHER

## 2022-06-29 RX ORDER — CYCLOBENZAPRINE HCL 5 MG
TABLET ORAL
Qty: 30 TABLET | Refills: 10 | Status: CANCELLED | OUTPATIENT
Start: 2022-06-29

## 2022-06-29 RX ORDER — CYCLOBENZAPRINE HCL 10 MG
10 TABLET ORAL 2 TIMES DAILY PRN
Qty: 60 TABLET | Refills: 2 | Status: SHIPPED | OUTPATIENT
Start: 2022-06-29 | End: 2022-07-21 | Stop reason: SDUPTHER

## 2022-06-29 RX ORDER — FLASH GLUCOSE SCANNING READER
EACH MISCELLANEOUS
COMMUNITY
Start: 2022-04-22 | End: 2022-10-19

## 2022-06-29 RX ORDER — ADHESIVE BANDAGE 3/4"
BANDAGE TOPICAL
Qty: 1 EACH | Refills: 0 | Status: SHIPPED | OUTPATIENT
Start: 2022-06-29 | End: 2022-07-21 | Stop reason: SDUPTHER

## 2022-06-29 ASSESSMENT — PATIENT HEALTH QUESTIONNAIRE - PHQ9
SUM OF ALL RESPONSES TO PHQ9 QUESTIONS 1 & 2: 0
2. FEELING DOWN, DEPRESSED OR HOPELESS: 0
SUM OF ALL RESPONSES TO PHQ QUESTIONS 1-9: 0
1. LITTLE INTEREST OR PLEASURE IN DOING THINGS: 0
SUM OF ALL RESPONSES TO PHQ QUESTIONS 1-9: 0

## 2022-06-29 ASSESSMENT — ENCOUNTER SYMPTOMS
ABDOMINAL PAIN: 0
BACK PAIN: 1
CONSTIPATION: 1

## 2022-06-29 NOTE — PROGRESS NOTES
RHEA Ragland [address]  [address], [address] [place]  [phone]      Alexandra Kirkpatrick is a 73 y.o. female who presents today for her medical conditions/complaints as noted below. Alexandra Kirkpatrick is c/o of Establish Care      HPI:     Pt here today to establish - was most recently seeing Dr. Roman Dave. Has chronic pain in her neck and b/l shoulder (R> L). Has been diagnosed with DDD in her cervical spine (moderate-severe from C3-C7 per CT cervical spine in 12/2021) and lumbar spine (severe at L4-5 per x-ray from 12/2021). Had injections in her neck in [facility] in the past approx 5 years ago - first one was helpful, but subsequent ones did not help. Not interested in having more of these. MRI of cervical spine from 11/5/18 showed: \"There is moderate and severe multilevel foraminal narrowing at the C2-C3 through C6-C7 levels secondary to uncovertebral and/or facet joint arthropathy as described above level by level. 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 \"    Taking Gabapentin 800 mg BID - does feel like it helps, but the relief does not last all day. Sometimes the med makes her a little tired, but not all the time. Also taking Flexeril 5 mg TID as needed for pain - takes this only as needed, more at bedtime (1 tab). Wondering about increasing her dose to 10 mg tabs, as she has tried two 5 mg tabs before and tolerated well. Diagnosed with DKA in [id number] - was admitted to [facility] at that time. Eventually required tracheostomy and feeding tube per surgical history in Epic and pt's recall. Checking glucose 2-3x's per day.   Taking Amaryl 2 mg daily, Trulicity 1.5 mg once weekly on Monday's, and Lantus 15-30 units nightly, but states that she only takes this as needed when her glucose is 180 or higher. Takes this maybe 3x's per week. Has been on Metformin in the past, but states it was \"starting to hurt my kidneys\" so it was stopped. Last A1c was >14.0% on 3/1/22; due for re-check now. Will be going to see Dr. Thea Mendez on 7/18 for eye exam.  Had foot exam with Dr. Sofia Vargas (previous PCP). Tries to eat more fruits/vegetables and lean meats as much as possible. Taking Losartan 25 mg daily and HCTZ 25 mg daily for HTN - stable. Has had h/o edema as well, but improved on the HCTZ. BP well-controlled today - 134/88. Pt has been diagnosed with a-fib - taking Cardizem 120 mg daily and Lopressor 25 mg BID. Has been recommended to take ASA 81 mg daily x past 2 weeks, but typically takes this once daily. Used to be on Eliquis, but Dr. Sofia Vargas stopped this last year due to GI bleeding. Saw Dr. Miguelina Do on 11/3/21 - had cardiac work-up at that time, but has not followed up with him since. Taking Synthroid 88 mcg daily for hypothyroidism s/p ablation - takes this on an empty stomach. Last TSH was 0.24 in 3/2022. Taking Lipitor 40 mg daily for HYPERLIPIDEMIA - stable. Last LP on 3/1/22 showed total chol 234, HDL 38, and TG's at 514. Also taking fish oil (2) daily. Taking Protonix 40 mg daily for GERD - stable. No issues with heartburn currently. Using Albuterol inhaler as needed for COPD/asthma - typically takes this once daily. Also uses Albuterol nebulizer treatments infrequently as needed, usually when she is sick. Taking Vit D3 5000 IU daily for history of deficiency - gets this OTC. Lives at home alone. Does not currently drive. Not currently working. H/o tobacco abuse - quit in 2013 cold turkey. Had smoked 3 ppd for at least 20 years. No other tobacco use. Denies recreational drug use. Denies alcohol use. Tries to walks for exercise as much as she tolerates.           Past Medical History:   Diagnosis Date  Asthma     Atrial fibrillation (HCC)     Bowel obstruction (HCC)     COPD (chronic obstructive pulmonary disease) (HCC)     DDD (degenerative disc disease)     Diabetes mellitus ([facility] [address]     Hyperlipidemia     Hypertension     Hyperthyroidism     Type II or unspecified type diabetes mellitus without mention of complication, not stated as uncontrolled       Past Surgical History:   Procedure Laterality Date    COLONOSCOPY  2014    COLONOSCOPY N/A 2021    COLONOSCOPY WITH BIOPSY performed by Terrall Dakin, MD at [facility]    COLONOSCOPY  2021    COLONOSCOPY POLYPECTOMY SNARE/COLD BIOPSY performed by Terrall Dakin, MD at [address] [facility] N/A 2017    REMOVAL OF BILATERAL MANDIBULAR LELO AND MAXILLARY EDENTULOUS ALVEOPLASTY performed by Veronika Drake DDS at [address] [place] [id number] [facility]      after being diagnosed with DKA in ; had this for 1 year   [address] [place] [address]  2021         THYROID SURGERY      ablation    TONSILLECTOMY      childhood    TRACHEOSTOMY      when diagnosed with DKA    TRACHEOSTOMY CLOSURE      TUBAL LIGATION      UPPER GASTROINTESTINAL ENDOSCOPY  2016    Peg tube insertion    UPPER GASTROINTESTINAL ENDOSCOPY N/A 2021    EGD ESOPHAGOGASTRODUODENOSCOPY performed by Terrall Dakin, MD at [facility]     Family History   Problem Relation Age of Onset    Heart Disease Mother     High Cholesterol Mother     No Known Problems Son      Social History     Tobacco Use    Smoking status: Former Smoker     Packs/day: 1.00     Years: 20.00     Pack years: 20.00     Types: Cigarettes     Quit date: 10/23/2013     Years since quittin.6    Smokeless tobacco: Never Used   Substance Use Topics    Alcohol use: No     Comment: beer once a month      Current Outpatient Medications   Medication Sig Dispense Refill    potassium chloride (KLOR-CON M) 20 MEQ extended release tablet       fluticasone-salmeterol (ADVAIR) 250-50 MCG/ACT AEPB diskus inhaler Inhale 1 puff into the lungs 2 times daily      Continuous Blood Gluc Sensor (FREESTYLE KOKO 2 SENSOR) [facility]       Continuous Blood Gluc  (FREESTYLE KOKO 2 READER) SOUTH       zoster recombinant adjuvanted vaccine (SHINGRIX) 50 MCG/0.5ML SUSR injection 50 MCG IM then repeat 2-6 months. 0.5 mL 1    losartan (COZAAR) 25 MG tablet Take 1 tablet by mouth daily 90 tablet 3    glimepiride (AMARYL) 2 MG tablet Take 1 tablet by mouth every morning (before breakfast) 30 tablet 0    levothyroxine (SYNTHROID) 88 MCG tablet TAKE 1 TABLET BY MOUTH EVERY DAY 90 tablet 0    pantoprazole (PROTONIX) 40 MG tablet Take 1 tablet by mouth daily 90 tablet 1    hydroCHLOROthiazide (HYDRODIURIL) 25 MG tablet Take 1 tablet by mouth every morning 90 tablet 3    dilTIAZem (CARDIZEM CD) 120 MG extended release capsule TAKE 1 CAPSULE BY MOUTH EVERY DAY 90 capsule 1    atorvastatin (LIPITOR) 40 MG tablet Take 1 tablet by mouth daily 90 tablet 2    LANTUS SOLOSTAR 100 UNIT/ML injection pen Inject 20 units subcutaneously daily 6 pen 0    Blood Pressure Monitoring (BLOOD PRESSURE CUFF) MISC Use to check blood pressure once daily.  1 each 0    cyclobenzaprine (FLEXERIL) 10 MG tablet Take 1 tablet by mouth 2 times daily as needed for Muscle spasms 60 tablet 2    vitamin D3 (CHOLECALCIFEROL) 25 MCG (1000 UT) TABS tablet TAKE 1 TABLET BY MOUTH DAILY 30 tablet 0    gabapentin (NEURONTIN) 800 MG tablet TAKE ONE (1) TABLET BY MOUTH TWICE DAILY 60 tablet 0    TRULICITY 1.5 BK/4.4DC SOPN Inject 1.5 mg into the muscle once a week 4 pen 0    albuterol sulfate  (90 Base) MCG/ACT inhaler INHALE TWO (2) PUFFS BY MOUTH EVERY 6 HOURS AS NEEDED FOR WHEEZING 324 g 10    metoprolol tartrate (LOPRESSOR) 25 MG tablet TAKE ONE (1) TABLET BY MOUTH TWICE DAILY 180 tablet 10    albuterol (PROVENTIL) (2.5 MG/3ML) 0.083% nebulizer solution Take 3 mLs by nebulization 4 times daily 120 each 2    Omega-3 Fatty Acids (FISH OIL) 1000 MG CAPS TAKE TWO (2) CAPSULES BY MOUTH DAILY 180 capsule 10    loratadine (CLARITIN) 10 MG tablet TAKE 1 TABLET BY MOUTH EVERY DAY (Patient not taking: Reported on 6/29/2022) 30 tablet 10     No current facility-administered medications for this visit. Allergies   Allergen Reactions    Norco [Hydrocodone-Acetaminophen]      Nausea and vomiting    Oxycodone Nausea And Vomiting    Percocet [Oxycodone-Acetaminophen] Nausea And Vomiting    Sulfa Antibiotics Other (See Comments)     Hallucinations. Health Maintenance   Topic Date Due    Annual Wellness Visit (AWV)  Never done    COVID-19 Vaccine (1) Never done    Low dose CT lung screening  Never done    Diabetic retinal exam  08/11/2019    Diabetic microalbuminuria test  09/04/2020    Diabetic foot exam  08/24/2021    Lipids  11/18/2021    Shingles vaccine (2 of 2) 08/24/2022    A1C test (Diabetic or Prediabetic)  09/29/2022    Cervical cancer screen  12/17/2022    Depression Screen  06/29/2023    Breast cancer screen  08/27/2023    Colorectal Cancer Screen  01/26/2026    DTaP/Tdap/Td vaccine (2 - Td or Tdap) 08/12/2026    DEXA (modify frequency per FRAX score)  Completed    Flu vaccine  Completed    Pneumococcal 65+ years Vaccine  Completed    Hepatitis C screen  Addressed    HIV screen  Addressed    Hepatitis A vaccine  Aged Out    Hib vaccine  Aged Out    Meningococcal (ACWY) vaccine  Aged Out       Subjective:      Review of Systems   Constitutional: Negative for unexpected weight change. Cardiovascular: Negative for leg swelling. Gastrointestinal: Positive for constipation (improved now, but has history of this; now going once daily). Negative for abdominal pain. Musculoskeletal: Positive for back pain (chronic) and neck pain (chronic). Neurological: Positive for numbness (feet).        Objective:     Vitals:    06/29/22 1436   BP: 134/88   Site: Right Upper Arm   Position: Sitting   Cuff Size: Large Adult   Pulse: 59   Temp: 97.6 °F (36.4 °C)   TempSrc: Temporal   SpO2: 96%   Weight: 166 lb 12.8 oz (75.7 kg)   Height: 5' 3.5\" (1.613 m)     Physical Exam  Vitals and nursing note reviewed. Constitutional:       General: She is not in acute distress. Appearance: She is well-developed. HENT:      Head: Normocephalic and atraumatic. Right Ear: Tympanic membrane, ear canal and external ear normal.      Left Ear: Tympanic membrane, ear canal and external ear normal.      Nose: Nose normal.      Mouth/Throat:      Mouth: Mucous membranes are moist.      Pharynx: Oropharynx is clear. No posterior oropharyngeal erythema. Eyes:      Conjunctiva/sclera: Conjunctivae normal.   Cardiovascular:      Rate and Rhythm: Normal rate and regular rhythm. Heart sounds: Normal heart sounds. Pulmonary:      Effort: Pulmonary effort is normal. No respiratory distress. Breath sounds: Normal breath sounds. Abdominal:      General: Bowel sounds are normal. There is no distension. Palpations: Abdomen is soft. Tenderness: There is no abdominal tenderness. Musculoskeletal:      Cervical back: Neck supple. Skin:     General: Skin is warm and dry. Neurological:      Mental Status: She is alert and oriented to person, place, and time. Assessment:      1. Type 2 diabetes mellitus without complication, with long-term current use of insulin (HCC)  -     glimepiride (AMARYL) 2 MG tablet; Take 1 tablet by mouth every morning (before breakfast), Disp-30 tablet, R-0Normal  -     LANTUS SOLOSTAR 100 UNIT/ML injection pen; Inject 20 units subcutaneously daily, Disp-6 pen, R-0, DAWNormal  -     Hemoglobin A1C; Future  -     Microalbumin, Ur; Future  -     Comprehensive Metabolic Panel; Future  2. Essential hypertension  -     losartan (COZAAR) 25 MG tablet;  Take 1 tablet by mouth daily, Disp-90 tablet, R-3Normal  - hydroCHLOROthiazide (HYDRODIURIL) 25 MG tablet; Take 1 tablet by mouth every morning, Disp-90 tablet, R-3Normal  -     Blood Pressure Monitoring (BLOOD PRESSURE CUFF) MISC; Disp-1 each, R-0, NormalUse to check blood pressure once daily. -     Comprehensive Metabolic Panel; Future  3. Mixed hyperlipidemia  -     atorvastatin (LIPITOR) 40 MG tablet; Take 1 tablet by mouth daily, Disp-90 tablet, R-2Normal  -     Lipid Panel; Future  -     Comprehensive Metabolic Panel; Future  4. Paroxysmal atrial fibrillation (HCC)  -     dilTIAZem (CARDIZEM CD) 120 MG extended release capsule; TAKE 1 CAPSULE BY MOUTH EVERY DAY, Disp-90 capsule, R-1Normal  5. Postablative hypothyroidism  -     levothyroxine (SYNTHROID) 88 MCG tablet; TAKE 1 TABLET BY MOUTH EVERY DAY, Disp-90 tablet, R-0Normal  -     TSH With Reflex Ft4; Future  6. Gastroesophageal reflux disease, unspecified whether esophagitis present  -     pantoprazole (PROTONIX) 40 MG tablet; Take 1 tablet by mouth daily, Disp-90 tablet, R-1Normal  7. Cervical neck pain with evidence of disc disease  -     cyclobenzaprine (FLEXERIL) 10 MG tablet; Take 1 tablet by mouth 2 times daily as needed for Muscle spasms, Disp-60 tablet, R-2Normal  8. Degenerative disc disease, lumbar  -     cyclobenzaprine (FLEXERIL) 10 MG tablet; Take 1 tablet by mouth 2 times daily as needed for Muscle spasms, Disp-60 tablet, R-2Normal  9. Encounter for screening mammogram for breast cancer  -     [facility] EARLINE DIGITAL SCREEN BILATERAL; Future  -     Basic Metabolic Panel; Future  10. Need for pneumococcal vaccination  -     Pneumococcal, PCV20, PREVNAR 21, (age 25 yrs+), IM, PF  11. Need for prophylactic vaccination and inoculation against varicella  -     zoster recombinant adjuvanted vaccine (SHINGRIX) 50 MCG/0.5ML SUSR injection; 50 MCG IM then repeat 2-6 months., Disp-0.5 mL, R-1Print         Plan:       Will increase her Gabapentin to 800 mg TID to see if this helps more with her chronic pain from DDD and neuropathy - will do this with next refill due around 22. Declined Covid vaccines. Return in about 6 weeks (around 8/10/2022) for MAW visit and f/u DM and thyroid. Orders Placed This Encounter   Procedures    [facility] EARLINE DIGITAL SCREEN BILATERAL     Standing Status:   Future     Standing Expiration Date:   2023     Order Specific Question:   Reason for exam:     Answer:   screening for breast cancer    Pneumococcal, PCV20, PREVNAR 21, (age 25 yrs+), IM, PF    Lipid Panel     Standing Status:   Future     Standing Expiration Date:   2023     Order Specific Question:   Is Patient Fasting?/# of Hours     Answer:   12    Hemoglobin A1C     Standing Status:   Future     Number of Occurrences:   1     Standing Expiration Date:   2023    TSH With Reflex Ft4     Standing Status:   Future     Number of Occurrences:   1     Standing Expiration Date:   2023    Basic Metabolic Panel     Standing Status:   Future     Number of Occurrences:   1     Standing Expiration Date:   2023    Microalbumin, Ur     Standing Status:   Future     Standing Expiration Date:   2023    Comprehensive Metabolic Panel     Standing Status:   Future     Standing Expiration Date:   2023     Orders Placed This Encounter   Medications    zoster recombinant adjuvanted vaccine (SHINGRIX) 50 MCG/0.5ML SUSR injection     Si MCG IM then repeat 2-6 months.      Dispense:  0.5 mL     Refill:  1    losartan (COZAAR) 25 MG tablet     Sig: Take 1 tablet by mouth daily     Dispense:  90 tablet     Refill:  3    glimepiride (AMARYL) 2 MG tablet     Sig: Take 1 tablet by mouth every morning (before breakfast)     Dispense:  30 tablet     Refill:  0    levothyroxine (SYNTHROID) 88 MCG tablet     Sig: TAKE 1 TABLET BY MOUTH EVERY DAY     Dispense:  90 tablet     Refill:  0    pantoprazole (PROTONIX) 40 MG tablet     Sig: Take 1 tablet by mouth daily     Dispense:  90 tablet     Refill:  1    hydroCHLOROthiazide (HYDRODIURIL) 25 MG tablet     Sig: Take 1 tablet by mouth every morning     Dispense:  90 tablet     Refill:  3    dilTIAZem (CARDIZEM CD) 120 MG extended release capsule     Sig: TAKE 1 CAPSULE BY MOUTH EVERY DAY     Dispense:  90 capsule     Refill:  1    atorvastatin (LIPITOR) 40 MG tablet     Sig: Take 1 tablet by mouth daily     Dispense:  90 tablet     Refill:  2    LANTUS SOLOSTAR 100 UNIT/ML injection pen     Sig: Inject 20 units subcutaneously daily     Dispense:  6 pen     Refill:  0    Blood Pressure Monitoring (BLOOD PRESSURE CUFF) MISC     Sig: Use to check blood pressure once daily. Dispense:  1 each     Refill:  0    cyclobenzaprine (FLEXERIL) 10 MG tablet     Sig: Take 1 tablet by mouth 2 times daily as needed for Muscle spasms     Dispense:  60 tablet     Refill:  2       Patient given educational materials - see patient instructions. Discussed use, benefit, and side effects of prescribed medications. All patient questions answered. Pt voiced understanding. Reviewed health maintenance.             Electronically signed by Minda Matias DO, DO on 6/30/2022 at 11:54 PM

## 2022-06-30 LAB
ESTIMATED AVERAGE GLUCOSE: 289 MG/DL
HBA1C MFR BLD: 11.7 % (ref 4–6)

## 2022-07-01 DIAGNOSIS — Z79.4 TYPE 2 DIABETES MELLITUS WITHOUT COMPLICATION, WITH LONG-TERM CURRENT USE OF INSULIN (HCC): ICD-10-CM

## 2022-07-01 DIAGNOSIS — E11.9 TYPE 2 DIABETES MELLITUS WITHOUT COMPLICATION, WITH LONG-TERM CURRENT USE OF INSULIN (HCC): ICD-10-CM

## 2022-07-01 RX ORDER — INSULIN GLARGINE 100 [IU]/ML
INJECTION, SOLUTION SUBCUTANEOUS
Qty: 15 ML | Refills: 11 | OUTPATIENT
Start: 2022-07-01

## 2022-07-01 RX ORDER — GLIMEPIRIDE 2 MG/1
TABLET ORAL
Qty: 30 TABLET | Refills: 11 | OUTPATIENT
Start: 2022-07-01

## 2022-07-11 DIAGNOSIS — Z79.4 TYPE 2 DIABETES MELLITUS WITHOUT COMPLICATION, WITH LONG-TERM CURRENT USE OF INSULIN (HCC): ICD-10-CM

## 2022-07-11 DIAGNOSIS — E89.0 POSTABLATIVE HYPOTHYROIDISM: Primary | ICD-10-CM

## 2022-07-11 DIAGNOSIS — E11.9 TYPE 2 DIABETES MELLITUS WITHOUT COMPLICATION, WITH LONG-TERM CURRENT USE OF INSULIN (HCC): ICD-10-CM

## 2022-07-11 DIAGNOSIS — R79.89 ELEVATED SERUM CREATININE: ICD-10-CM

## 2022-07-11 NOTE — TELEPHONE ENCOUNTER
Per result note, pt has not yet received meds from mail in pharmacy, would like 30 day supply sent to [address] called requesting a refill of the below medication which has been pended for you:     Requested Prescriptions     Pending Prescriptions Disp Refills    LANTUS SOLOSTAR 100 UNIT/ML injection pen 6 pen 0     Sig: Inject 15 units subcutaneously daily    glimepiride (AMARYL) 2 MG tablet 60 tablet 0     Sig: Take 1 tablet by mouth in the morning and at bedtime       Last Appointment Date: 6/29/2022  Next Appointment Date: 8/10/2022    Allergies   Allergen Reactions    Norco [Hydrocodone-Acetaminophen]      Nausea and vomiting    Oxycodone Nausea And Vomiting    Percocet [Oxycodone-Acetaminophen] Nausea And Vomiting    Sulfa Antibiotics Other (See Comments)     Hallucinations.

## 2022-07-13 RX ORDER — INSULIN GLARGINE 100 [IU]/ML
15 INJECTION, SOLUTION SUBCUTANEOUS NIGHTLY
Qty: 2 PEN | Refills: 0 | Status: SHIPPED | OUTPATIENT
Start: 2022-07-13 | End: 2022-07-13 | Stop reason: SDUPTHER

## 2022-07-13 RX ORDER — GLIMEPIRIDE 2 MG/1
2 TABLET ORAL 2 TIMES DAILY
Qty: 60 TABLET | Refills: 0 | Status: SHIPPED | OUTPATIENT
Start: 2022-07-13 | End: 2022-07-21 | Stop reason: SDUPTHER

## 2022-07-13 RX ORDER — INSULIN GLARGINE 100 [IU]/ML
15 INJECTION, SOLUTION SUBCUTANEOUS NIGHTLY
Qty: 5 PEN | Refills: 0 | Status: SHIPPED | OUTPATIENT
Start: 2022-07-13 | End: 2022-07-21 | Stop reason: SDUPTHER

## 2022-07-14 NOTE — PROGRESS NOTES
Phone: Cory Chambers      Fax: [phone]                            Outpatient Physical Therapy                                                                            Daily Note    Date: 2020  Patient Name: Renuka Ho        MRN: [id number]   ACCT#:  [de-identified]  : 1955  (62 y.o.)    Referring Practitioner: Dr. Gopal Clarke    Referral Date : 19    Diagnosis: Cervical Neck Pain with DDD, chronic bilateral low back pain with sciatica  Treatment Diagnosis: neck pain, back pain    Onset Date: 19(Referral)  PT Insurance Information: [facility]  Total # of Visits Approved: 16 Per Physician Order  Total # of Visits to Date: 8  No Show: 3  Canceled Appointment: 1  Plan of Care/Certification Expiration Date: 20    Pre-Treatment Pain:  9/10     Assessment  Assessment: Patient rates cervical and lumbar pain @ 9/10. Completed stretching and postural strengthening ex to both affected areas to provide stability. Manual therapy to cervical region with tightness noted right SCM/ suboccipital > left. Patient noting pain decreased to 6/10 post treatment. Chart Reviewed: Yes    Plan  Plan: Continue with current plan    Exercises/Modalities/Manual:  See DocFlow Sheet    Education:           Goals  (Total # of Visits to Date: 8)   Short Term Goals -                     Long Term Goals - Time Frame for Long term goals : 12  Long term goal 1: Decrease pain neck and back 4/10 at worst x 3 days  Long term goal 2:  Increase strength B hip flexion 4+/5 to squat and lift with good form  Long term goal 3: Improve functional mobility with Oswestry score < 10/50          Post Treatment Pain:  6/10    Time In: 1245    Time Out : 1330        Timed Code Treatment Minutes: 45 Minutes  Total Treatment Time: Carley Cuellar 38, PT     Date: 2020 Pt orders received to return to personal care home. IV removed. AVS given and explained to wife. Pt cleaned and dressed. Pt wheeled and placed into [employer] vehicle safely.

## 2022-07-18 ENCOUNTER — NURSE ONLY (OUTPATIENT)
Dept: LAB | Age: 67
End: 2022-07-18

## 2022-07-18 ENCOUNTER — OFFICE VISIT (OUTPATIENT)
Dept: OPTOMETRY | Age: 67
End: 2022-07-18
Payer: MEDICARE

## 2022-07-18 VITALS
RESPIRATION RATE: 15 BRPM | HEART RATE: 60 BPM | OXYGEN SATURATION: 98 % | TEMPERATURE: 97.7 F | SYSTOLIC BLOOD PRESSURE: 122 MMHG | DIASTOLIC BLOOD PRESSURE: 80 MMHG

## 2022-07-18 VITALS — SYSTOLIC BLOOD PRESSURE: 132 MMHG | DIASTOLIC BLOOD PRESSURE: 73 MMHG

## 2022-07-18 DIAGNOSIS — H52.4 MYOPIA OF BOTH EYES WITH ASTIGMATISM AND PRESBYOPIA: ICD-10-CM

## 2022-07-18 DIAGNOSIS — E11.9 INSULIN DEPENDENT TYPE 2 DIABETES MELLITUS (HCC): Primary | ICD-10-CM

## 2022-07-18 DIAGNOSIS — Z79.4 INSULIN DEPENDENT TYPE 2 DIABETES MELLITUS (HCC): Primary | ICD-10-CM

## 2022-07-18 DIAGNOSIS — H52.13 MYOPIA OF BOTH EYES WITH ASTIGMATISM AND PRESBYOPIA: ICD-10-CM

## 2022-07-18 DIAGNOSIS — H52.203 MYOPIA OF BOTH EYES WITH ASTIGMATISM AND PRESBYOPIA: ICD-10-CM

## 2022-07-18 DIAGNOSIS — H53.8 BLURRED VISION, BILATERAL: ICD-10-CM

## 2022-07-18 DIAGNOSIS — Z01.30 BLOOD PRESSURE CHECK: Primary | ICD-10-CM

## 2022-07-18 DIAGNOSIS — H25.813 COMBINED FORMS OF AGE-RELATED CATARACT OF BOTH EYES: ICD-10-CM

## 2022-07-18 PROCEDURE — 3046F HEMOGLOBIN A1C LEVEL >9.0%: CPT | Performed by: OPTOMETRIST

## 2022-07-18 PROCEDURE — 99212 OFFICE O/P EST SF 10 MIN: CPT | Performed by: OPTOMETRIST

## 2022-07-18 PROCEDURE — 3017F COLORECTAL CA SCREEN DOC REV: CPT | Performed by: OPTOMETRIST

## 2022-07-18 PROCEDURE — 1090F PRES/ABSN URINE INCON ASSESS: CPT | Performed by: OPTOMETRIST

## 2022-07-18 PROCEDURE — G8427 DOCREV CUR MEDS BY ELIG CLIN: HCPCS | Performed by: OPTOMETRIST

## 2022-07-18 PROCEDURE — G8399 PT W/DXA RESULTS DOCUMENT: HCPCS | Performed by: OPTOMETRIST

## 2022-07-18 PROCEDURE — 1036F TOBACCO NON-USER: CPT | Performed by: OPTOMETRIST

## 2022-07-18 PROCEDURE — 99204 OFFICE O/P NEW MOD 45 MIN: CPT | Performed by: OPTOMETRIST

## 2022-07-18 PROCEDURE — 92015 DETERMINE REFRACTIVE STATE: CPT | Performed by: OPTOMETRIST

## 2022-07-18 PROCEDURE — G8417 CALC BMI ABV UP PARAM F/U: HCPCS | Performed by: OPTOMETRIST

## 2022-07-18 PROCEDURE — 1123F ACP DISCUSS/DSCN MKR DOCD: CPT | Performed by: OPTOMETRIST

## 2022-07-18 PROCEDURE — 92250 FUNDUS PHOTOGRAPHY W/I&R: CPT | Performed by: OPTOMETRIST

## 2022-07-18 PROCEDURE — 2022F DILAT RTA XM EVC RTNOPTHY: CPT | Performed by: OPTOMETRIST

## 2022-07-18 RX ORDER — TROPICAMIDE 10 MG/ML
1 SOLUTION/ DROPS OPHTHALMIC ONCE
Status: COMPLETED | OUTPATIENT
Start: 2022-07-18 | End: 2022-07-18

## 2022-07-18 RX ADMIN — TROPICAMIDE 1 DROP: 10 SOLUTION/ DROPS OPHTHALMIC at 13:39

## 2022-07-18 ASSESSMENT — KERATOMETRY
OD_K1POWER_DIOPTERS: 44.50
OS_K2POWER_DIOPTERS: 45.00
OS_AXISANGLE2_DEGREES: 061
OD_AXISANGLE_DEGREES: 173
OS_K1POWER_DIOPTERS: 44.25
OD_K2POWER_DIOPTERS: 45.50
OS_AXISANGLE_DEGREES: 151
OD_AXISANGLE2_DEGREES: 083
METHOD_AUTO_MANUAL: AUTOMATED

## 2022-07-18 ASSESSMENT — REFRACTION_MANIFEST
OD_CYLINDER: -1.25
OS_SPHERE: -1.75
OD_SPHERE: -1.25
OS_AXIS: 075
OD_ADD: +2.50
OS_ADD: +2.50
OD_AXIS: 080
OS_CYLINDER: -0.50

## 2022-07-18 ASSESSMENT — VISUAL ACUITY
OS_SC: 20/150
OD_SC: 20/250
METHOD: SNELLEN - LINEAR

## 2022-07-18 ASSESSMENT — TONOMETRY
IOP_METHOD: NON-CONTACT AIR PUFF
OS_IOP_MMHG: 25
OD_IOP_MMHG: 23

## 2022-07-18 NOTE — PROGRESS NOTES
Pt to injection room for BP check. Rested 15 mins. Prior to reading. /80 pt took all medications today around 8 am. Patient denies any visual disturbances or headaches. Patient just curious what her BP is at this time. Patient reports she has not received a cuff that had been ordered already. /80  HR 60  Temp 97.7  SpO2 98%  RR 15  Patient would like her PCP to be aware of today's vital signs.

## 2022-07-18 NOTE — PROGRESS NOTES
Gracie Ratliff presents today for   Chief Complaint   Patient presents with    Ophth Diabetic Exam   .    HPI    Last Vision Exam:  1 year? Last Ophthalmology Exam: n/a  Last Filled Glasses Rx: 1 year? Insurance: [facility]   Update: DM Exam, update glasses and no contact lenses for now She has worn them in the past but its been a few years. Glasses are broken right now, distance vision only - she didn't like the bifocal   Diabetic:  Sugars:  140 today  HmgA1C: 11.7  June          Current Outpatient Medications   Medication Sig Dispense Refill    glimepiride (AMARYL) 2 MG tablet Take 1 tablet by mouth 2 times daily 60 tablet 0    LANTUS SOLOSTAR 100 UNIT/ML injection pen Inject 15 Units into the skin nightly 5 pen 0    potassium chloride (KLOR-CON M) 20 MEQ extended release tablet       fluticasone-salmeterol (ADVAIR) 250-50 MCG/ACT AEPB diskus inhaler Inhale 1 puff into the lungs 2 times daily      Continuous Blood Gluc Sensor (FREESTYLE KOK[facility] 2 [facility]ENS[facility]       Continuous Blood Gluc  (FREESTYLE KOKO 2 READER) SOUTH       zoster recombinant adjuvanted vaccine (SHINGRIX) 50 MCG/0.5ML SUSR injection 50 MCG IM then repeat 2-6 months. 0.5 mL 1    losartan (COZAAR) 25 MG tablet Take 1 tablet by mouth daily 90 tablet 3    levothyroxine (SYNTHROID) 88 MCG tablet TAKE 1 TABLET BY MOUTH EVERY DAY 90 tablet 0    pantoprazole (PROTONIX) 40 MG tablet Take 1 tablet by mouth daily 90 tablet 1    hydroCHLOROthiazide (HYDRODIURIL) 25 MG tablet Take 1 tablet by mouth every morning 90 tablet 3    dilTIAZem (CARDIZEM CD) 120 MG extended release capsule TAKE 1 CAPSULE BY MOUTH EVERY DAY 90 capsule 1    atorvastatin (LIPITOR) 40 MG tablet Take 1 tablet by mouth daily 90 tablet 2    Blood Pressure Monitoring (BLOOD PRESSURE CUFF) MISC Use to check blood pressure once daily.  1 each 0    cyclobenzaprine (FLEXERIL) 10 MG tablet Take 1 tablet by mouth 2 times daily as needed for Muscle spasms 60 tablet 2    vitamin D3 (CHOLECALCIFEROL) 25 MCG (1000 UT) TABS tablet TAKE 1 TABLET BY MOUTH DAILY 30 tablet 0    gabapentin (NEURONTIN) 800 MG tablet TAKE ONE (1) TABLET BY MOUTH TWICE DAILY 60 tablet 0    TRULICITY 1.5 NN/4.0QP SOPN Inject 1.5 mg into the muscle once a week 4 pen 0    loratadine (CLARITIN) 10 MG tablet TAKE 1 TABLET BY MOUTH EVERY DAY (Patient not taking: Reported on 2022) 30 tablet 10    albuterol sulfate  (90 Base) MCG/ACT inhaler INHALE TWO (2) PUFFS BY MOUTH EVERY 6 HOURS AS NEEDED FOR WHEEZING 324 g 10    metoprolol tartrate (LOPRESSOR) 25 MG tablet TAKE ONE (1) TABLET BY MOUTH TWICE DAILY 180 tablet 10    albuterol (PROVENTIL) (2.5 MG/3ML) 0.083% nebulizer solution Take 3 mLs by nebulization 4 times daily 120 each 2    Omega-3 Fatty Acids (FISH OIL) 1000 MG CAPS TAKE TWO (2) CAPSULES BY MOUTH DAILY 180 capsule 10     No current facility-administered medications for this visit.          Family History   Problem Relation Age of Onset    Heart Disease Mother     High Cholesterol Mother     No Known Problems Son      Social History     Socioeconomic History    Marital status: Single     Spouse name: None    Number of children: None    Years of education: None    Highest education level: None   Tobacco Use    Smoking status: Former     Packs/day: 1.00     Years: 20.00     Pack years: 20.00     Types: Cigarettes     Quit date: 10/23/2013     Years since quittin.7    Smokeless tobacco: Never   Substance and Sexual Activity    Alcohol use: No     Comment: beer once a month    Drug use: No     Comment: former marijuana     Social Determinants of Health     Financial Resource Strain: Low Risk     Difficulty of Paying Living Expenses: Not hard at all   Food Insecurity: No Food Insecurity    Worried About Running Out of Food in the Last Year: Never true    Ran Out of Food in the Last Year: Never true       Past Medical History:   Diagnosis Date    Asthma     Atrial fibrillation ([facility]     Bowel [facility] COPD (chronic obstructive pulmonary disease) (HCC)     DDD (degenerative disc disease)     Diabetes mellitus ([facility] [address]     Hyperlipidemia     Hypertension     Hyperthyroidism     Type II or unspecified type diabetes mellitus without mention of complication, not stated as uncontrolled          Main Ophthalmology Exam       Slit Lamp Exam         Right Left    Lens 1+ Nuclear sclerosis 1+ Nuclear sclerosis                   <div id=\"[email or web address]\"></div>     Tonometry       Tonometry (Non-contact air puff, 1:30 PM)         Right Left    Pressure 23 25              Tonometry #2 (Tonopen, 1:34 PM)         Right Left    Pressure 15 16              Tonometry Comments       Right / Left  IOPg 21.6 / 22.5  CH 8.9 / 8.0  WS 9.0 / 6.8                       Visual Acuity (Snellen - Linear)         Right Left    Dist sc 20/250 20/150          Keratometry       Keratometry (Automated)         K1 Axis K2 Axis    Right 44.50 083 45.50 173    Left 44.25 061 45.00 151                  Pupils       Pupils         Pupils    Right PERRL    Left PERRL                  Not recorded       Not recorded         Ophthalmology Exam       Wearing Rx         Sphere    Right borken ;  not with     Left                     Manifest Refraction       Manifest Refraction         Sphere Cylinder [place] Dist [facility] Add [place]    Right -1.25 -1.25 080 20/25 +2.50     Left -1.75 -0.50 075 20/25 +2.50 20/20-              Manifest Refraction #2 (Auto)         Sphere Cylinder [place] Dist VA Add [place]    Right -1.25 -1.75 083       Left -1.75 -0.75 073                      Final Rx         Sphere Cylinder Axis Add    Right -1.25 -1.25 080 +2.50    Left -1.75 -0.50 075 +2.50      Type: patient prefers sv distance only     Expiration Date: 7/18/2024   Interested in transition          Neuro/Psych       Neuro/Psych       Oriented x3: Yes    Mood/Affect: Normal                    Orders Placed This Encounter   Procedures    HM DIABETES EYE EXAM    Color Fundus Photography-OU-Both Eyes       IMPRESSION:  1. Insulin dependent type 2 diabetes mellitus ([address]    2. Myopia of both eyes with astigmatism and presbyopia    3. Blurred vision, bilateral    4. Combined forms of age-related cataract of both eyes        PLAN:    Discussed the patient's diagnosis of diabetes and the impact this can have on their ocular health, potentially even leading to permanent blindness. I discussed with the patient the importance of continued follow-up and management with their primary care physician to control their glycemic, blood pressure, and lipid levels. The patient verbalized understanding. 2. New glasses recommended  3. \"  4. Monitor for future progression and visual significance. Counseled patient that more glare may be noticed and more light may be needed for reading. Glycemic control as per PCP   There are no Patient Instructions on file for this visit.    Return in about 1 year (around 7/18/2023) for complete eye exam.

## 2022-07-19 DIAGNOSIS — M50.90 CERVICAL NECK PAIN WITH EVIDENCE OF DISC DISEASE: ICD-10-CM

## 2022-07-19 DIAGNOSIS — E11.9 TYPE 2 DIABETES MELLITUS WITHOUT COMPLICATION, WITH LONG-TERM CURRENT USE OF INSULIN (HCC): ICD-10-CM

## 2022-07-19 DIAGNOSIS — I10 ESSENTIAL HYPERTENSION: ICD-10-CM

## 2022-07-19 DIAGNOSIS — Z79.4 TYPE 2 DIABETES MELLITUS WITH OTHER SPECIFIED COMPLICATION, WITH LONG-TERM CURRENT USE OF INSULIN (HCC): ICD-10-CM

## 2022-07-19 DIAGNOSIS — E89.0 POSTABLATIVE HYPOTHYROIDISM: ICD-10-CM

## 2022-07-19 DIAGNOSIS — K21.9 GASTROESOPHAGEAL REFLUX DISEASE, UNSPECIFIED WHETHER ESOPHAGITIS PRESENT: ICD-10-CM

## 2022-07-19 DIAGNOSIS — E78.2 MIXED HYPERLIPIDEMIA: ICD-10-CM

## 2022-07-19 DIAGNOSIS — E11.69 TYPE 2 DIABETES MELLITUS WITH OTHER SPECIFIED COMPLICATION, WITH LONG-TERM CURRENT USE OF INSULIN (HCC): ICD-10-CM

## 2022-07-19 DIAGNOSIS — J44.9 ASTHMA WITH COPD (HCC): ICD-10-CM

## 2022-07-19 DIAGNOSIS — I48.0 PAROXYSMAL ATRIAL FIBRILLATION (HCC): ICD-10-CM

## 2022-07-19 DIAGNOSIS — J45.909 UNCOMPLICATED ASTHMA, UNSPECIFIED ASTHMA SEVERITY, UNSPECIFIED WHETHER PERSISTENT: Primary | ICD-10-CM

## 2022-07-19 DIAGNOSIS — Z79.4 TYPE 2 DIABETES MELLITUS WITHOUT COMPLICATION, WITH LONG-TERM CURRENT USE OF INSULIN (HCC): ICD-10-CM

## 2022-07-19 DIAGNOSIS — M51.36 DEGENERATIVE DISC DISEASE, LUMBAR: ICD-10-CM

## 2022-07-19 NOTE — TELEPHONE ENCOUNTER
Pt does not wish to use [facility] anymore, would like to use [facility] mail in pharmacy. Needs meds sent there please. Jaida Mello called requesting a refill of the below medication which has been pended for you:     Requested Prescriptions     Pending Prescriptions Disp Refills    albuterol sulfate HFA (PROVENTIL;VENTOLIN;PROAIR) 108 (90 Base) MCG/ACT inhaler 324 g 10     Sig: INHALE TWO (2) PUFFS BY MOUTH EVERY 6 HOURS AS NEEDED FOR WHEEZING    atorvastatin (LIPITOR) 40 MG tablet 90 tablet 1     Sig: Take 1 tablet by mouth in the morning. cyclobenzaprine (FLEXERIL) 10 MG tablet 60 tablet 0     Sig: Take 1 tablet by mouth 2 times daily as needed for Muscle spasms    dilTIAZem (CARDIZEM CD) 120 MG extended release capsule 90 capsule 1     Sig: TAKE 1 CAPSULE BY MOUTH EVERY DAY    gabapentin (NEURONTIN) 800 MG tablet 60 tablet 0     Sig: TAKE ONE (1) TABLET BY MOUTH TWICE DAILY    glimepiride (AMARYL) 2 MG tablet 60 tablet 1     Sig: Take 1 tablet by mouth in the morning and 1 tablet in the evening. hydroCHLOROthiazide (HYDRODIURIL) 25 MG tablet 90 tablet 1     Sig: Take 1 tablet by mouth every morning    LANTUS SOLOSTAR 100 UNIT/ML injection pen 5 pen 1     Sig: Inject 15 Units into the skin nightly    levothyroxine (SYNTHROID) 88 MCG tablet 90 tablet 1     Sig: TAKE 1 TABLET BY MOUTH EVERY DAY    losartan (COZAAR) 25 MG tablet 90 tablet 1     Sig: Take 1 tablet by mouth in the morning. pantoprazole (PROTONIX) 40 MG tablet 90 tablet 3     Sig: Take 1 tablet by mouth in the morning. TRULICITY 1.5 PJ/6.2CF SOPN 4 pen 0     Sig: Inject 1.5 mg into the muscle once a week    metoprolol tartrate (LOPRESSOR) 25 MG tablet 180 tablet 1     Sig: Take 1 tablet by mouth in the morning and 1 tablet before bedtime. albuterol (PROVENTIL) (2.5 MG/3ML) 0.083% nebulizer solution 120 each 2     Sig: Take 3 mLs by nebulization in the morning and 3 mLs at noon and 3 mLs in the evening and 3 mLs before bedtime.        Last Appointment Date: 6/29/2022  Next Appointment Date: 8/10/2022    Allergies   Allergen Reactions    Norco [Hydrocodone-Acetaminophen]      Nausea and vomiting    Oxycodone Nausea And Vomiting    Percocet [Oxycodone-Acetaminophen] Nausea And Vomiting    Sulfa Antibiotics Other (See Comments)     Hallucinations.

## 2022-07-20 ENCOUNTER — TELEPHONE (OUTPATIENT)
Dept: CARDIOLOGY | Age: 67
End: 2022-07-20

## 2022-07-20 ENCOUNTER — OFFICE VISIT (OUTPATIENT)
Dept: CARDIOLOGY | Age: 67
End: 2022-07-20
Payer: MEDICARE

## 2022-07-20 VITALS
RESPIRATION RATE: 16 BRPM | SYSTOLIC BLOOD PRESSURE: 150 MMHG | BODY MASS INDEX: 29.98 KG/M2 | DIASTOLIC BLOOD PRESSURE: 88 MMHG | HEIGHT: 63 IN | HEART RATE: 60 BPM | WEIGHT: 169.2 LBS

## 2022-07-20 DIAGNOSIS — E78.2 MIXED HYPERLIPIDEMIA: ICD-10-CM

## 2022-07-20 DIAGNOSIS — I10 ESSENTIAL HYPERTENSION: ICD-10-CM

## 2022-07-20 DIAGNOSIS — I48.0 PAROXYSMAL ATRIAL FIBRILLATION (HCC): Primary | ICD-10-CM

## 2022-07-20 PROCEDURE — 99214 OFFICE O/P EST MOD 30 MIN: CPT | Performed by: INTERNAL MEDICINE

## 2022-07-20 PROCEDURE — 1123F ACP DISCUSS/DSCN MKR DOCD: CPT | Performed by: INTERNAL MEDICINE

## 2022-07-20 PROCEDURE — G8417 CALC BMI ABV UP PARAM F/U: HCPCS | Performed by: INTERNAL MEDICINE

## 2022-07-20 PROCEDURE — G8399 PT W/DXA RESULTS DOCUMENT: HCPCS | Performed by: INTERNAL MEDICINE

## 2022-07-20 PROCEDURE — 1036F TOBACCO NON-USER: CPT | Performed by: INTERNAL MEDICINE

## 2022-07-20 PROCEDURE — 93010 ELECTROCARDIOGRAM REPORT: CPT | Performed by: INTERNAL MEDICINE

## 2022-07-20 PROCEDURE — G8427 DOCREV CUR MEDS BY ELIG CLIN: HCPCS | Performed by: INTERNAL MEDICINE

## 2022-07-20 PROCEDURE — 93005 ELECTROCARDIOGRAM TRACING: CPT | Performed by: INTERNAL MEDICINE

## 2022-07-20 PROCEDURE — 3017F COLORECTAL CA SCREEN DOC REV: CPT | Performed by: INTERNAL MEDICINE

## 2022-07-20 PROCEDURE — 1090F PRES/ABSN URINE INCON ASSESS: CPT | Performed by: INTERNAL MEDICINE

## 2022-07-20 RX ORDER — FENOFIBRATE 145 MG/1
145 TABLET, COATED ORAL DAILY
Qty: 90 TABLET | Refills: 3 | Status: SHIPPED | OUTPATIENT
Start: 2022-07-20

## 2022-07-20 NOTE — PROGRESS NOTES
Today's Date: 7/20/2022  Patient's Name: Bridger Ndiaye  Patient's age: 73 y.o., 1955    Subjective:  Bridger Ndiaye is being seen in clinic today regarding PAF, HTN, h/o GI bleeding      she is here for follow up. She denies any recurrence of GI bleeding. She reports right lateral chest pain lasting for few seconds while sitting. No recurrence with ambulation or exertion. She had negative stress test in 2021. She denies any dyspnea. No PND, no syncope or pre-syncope, no orthopnea. Past Medical History:   has a past medical history of Asthma, Atrial fibrillation ([address] Bowel obstruction ([address] COPD (chronic obstructive pulmonary disease) ([address] DDD (degenerative disc disease), Diabetes mellitus ([address] Hyperlipidemia, Hypertension, Hyperthyroidism, and Type II or unspecified type diabetes mellitus without mention of complication, not stated as uncontrolled. Past Surgical History:   has a past surgical history that includes Tubal ligation; Tonsillectomy; Colonoscopy (04/23/2014); Upper gastrointestinal endoscopy (03/16/2016); tracheostomy (2015); tracheostomy closure; Gastrostomy tube placement; gastrostomy tube change; Dental surgery (N/A, 03/08/2017); Insert Midline Catheter (01/22/2021); Colonoscopy (N/A, 01/26/2021); Upper gastrointestinal endoscopy (N/A, 01/26/2021); Colonoscopy (01/26/2021); and Thyroid surgery (2021). Home Medications:  Prior to Admission medications    Medication Sig Start Date End Date Taking?  Authorizing Provider   glimepiride (AMARYL) 2 MG tablet Take 1 tablet by mouth 2 times daily 7/13/22   Hao Bread Black, DO   LANTUS SOLOSTAR 100 UNIT/ML injection pen Inject 15 Units into the skin nightly 7/13/22   Hao Bread Black, DO   potassium chloride (KLOR-CON M) 20 MEQ extended release tablet  5/19/22   Historical Provider, MD   fluticasone-salmeterol (ADVAIR) 250-50 MCG/ACT AEPB diskus inhaler Inhale 1 puff into the lungs 2 times daily 8/16/21   Historical Provider, MD 10/7/20   Sera Mueller MD   albuterol (PROVENTIL) (2.5 MG/3ML) 0.083% nebulizer solution Take 3 mLs by nebulization 4 times daily 7/23/20   Sera Mueller MD   Omega-3 Fatty Acids (FISH OIL) 1000 MG CAPS TAKE TWO (2) CAPSULES BY MOUTH DAILY 4/14/20   Sera Mueller MD       Allergies:  Norco [hydrocodone-acetaminophen], Oxycodone, Percocet [oxycodone-acetaminophen], and Sulfa antibiotics    Social History:   reports that she quit smoking about 8 years ago. Her smoking use included cigarettes. She has a 20.00 pack-year smoking history. She has never used smokeless tobacco. She reports that she does not drink alcohol and does not use drugs. Family History: family history includes Heart Disease in her mother; High Cholesterol in her mother; No Known Problems in her son. No h/o sudden cardiac death. No for premature CAD    REVIEW OF SYSTEMS:    Constitutional: there has been no unanticipated weight loss. There's been No change in energy level, No change in activity level. Eyes: No visual changes or diplopia. No scleral icterus. ENT: No Headaches, hearing loss or vertigo. No mouth sores or sore throat. Cardiovascular: see above  Respiratory: see above  Gastrointestinal: No abdominal pain, appetite loss, blood in stools. Genitourinary: No dysuria, trouble voiding, or hematuria. Musculoskeletal:  No gait disturbance, No weakness or joint complaints. Integumentary: No rash or pruritis. Neurological: No headache or diplopia. No tingling  Psychiatric: No anxiety, or depression. Endocrine: No temperature intolerance. Hematologic/Lymphatic: No abnormal bruising or bleeding, blood clots or swollen lymph nodes. Allergic/Immunologic: No nasal congestion or hives. PHYSICAL EXAM:      Vitals:    07/20/22 1000   BP: (!) 150/88   Pulse: 60   Resp: 16       Constitutional and General Appearance: alert, cooperative, no distress and appears stated age  HEENT: PERRL, no cervical lymphadenopathy.  No masses palpable. Normal oral mucosa  Respiratory:  Normal excursion and expansion without use of accessory muscles  Resp Auscultation: Good respiratory effort. No for increased work of breathing. On auscultation: clear to auscultation bilaterally  Cardiovascular:  Heart tones are crisp and normal. regular S1 and S2.  Jugular venous pulsation Normal  The carotid upstroke is normal in amplitude and contour without delay or bruit   Abdomen:   soft  Bowel sounds present  Extremities:   No edema  Neurological:  Alert and oriented. Cardiac Data:  EKG: SR, AS infarction. Labs:     CBC: No results for input(s): WBC, HGB, HCT, PLT in the last 72 hours. BMP: No results for input(s): NA, K, CO2, BUN, CREATININE, LABGLOM, GLUCOSE in the last 72 hours. PT/INR: No results for input(s): PROTIME, INR in the last 72 hours. FASTING LIPID PANEL:  Lab Results   Component Value Date/Time    HDL 36 11/18/2020 11:00 AM    TRIG 201 11/18/2020 11:00 AM     LIVER PROFILE:No results for input(s): AST, ALT, LABALBU in the last 72 hours. Problem List:  Patient Active Problem List   Diagnosis    Cervical neck pain with evidence of disc disease    Paroxysmal atrial fibrillation (HCC)    Graves' disease    Asthma with COPD ([address]    Essential hypertension    Type 2 diabetes mellitus without complication, with long-term current use of insulin ([address]    Iron deficiency anemia    Irritable bowel syndrome with both constipation and diarrhea    Mixed hyperlipidemia    GI bleed    Patient is Tenriism    Bilateral impacted cerumen    Hearing loss of left ear due to cerumen impaction    Muscle spasm    Physical debility    Recurrent falls    Vaginal itching      Nuclear stress test 11/16/21  IMPRESSION:  1. Normal perfusion scan. 2.  EF 54%. 3.  Diastolic dysfunction. 4. Low risk. TTE 11/16/21  Summary  Normal left ventricular diameter. Left ventricular systolic function is normal.  Left ventricular ejection fraction 65 %.   No doppler evidence of diastolic dysfunction. Mitral annular calcification. Mild to moderate mitral regurgitation. Normal function of other valves. No pericardial effusion. Assessment and plan:    -PAF- CHADSVASC score at least 4. Not on eliquis apparently was discontinued by PCP due to GI bleeding in 1/2021. Continue cardiazem and metoprolol. Taking ASA 81mg po qday. I will refer her to EP for consideration of Watchmann device  -No ischemia on stress test 11/16/21 with preserved LV systolic function and mild to moderate MR on TTE  -COPD due to history of smoking. She quit smoking 2015. -HTN- Stable. Monitor BP at home. Continue lisinopril, HCTZ, cardiazem and metoprolol. -H/o Graves disease- on replacement. -DM- Managed by PCP  -Hyperlipidemia- Lipid panel 3/1/22- , , HDL 38, direct . continue atorvastatin. Add fenofibrate 145mg po qday  -GI bleeding- colonoscopy 1/2021 showed severe colitis in the left side of colon with ulcerations along with patchy colitis of the rest of the colon. EGD showed moderate gastritis. She saw GI as follow up 9/15/21. On PPI  -Patient is a [employer]  -RTC 6 months.     Maricel Granado [facility]  [phone]

## 2022-07-21 RX ORDER — LEVOTHYROXINE SODIUM 88 UG/1
TABLET ORAL
Qty: 90 TABLET | Refills: 0 | Status: SHIPPED | OUTPATIENT
Start: 2022-07-21

## 2022-07-21 RX ORDER — ALBUTEROL SULFATE 90 UG/1
AEROSOL, METERED RESPIRATORY (INHALATION)
Qty: 54 G | Refills: 1 | Status: SHIPPED | OUTPATIENT
Start: 2022-07-21

## 2022-07-21 RX ORDER — INSULIN GLARGINE 100 [IU]/ML
15 INJECTION, SOLUTION SUBCUTANEOUS NIGHTLY
Qty: 5 PEN | Refills: 1 | Status: ON HOLD | OUTPATIENT
Start: 2022-07-21 | End: 2022-10-10 | Stop reason: SDUPTHER

## 2022-07-21 RX ORDER — CYCLOBENZAPRINE HCL 10 MG
10 TABLET ORAL 2 TIMES DAILY PRN
Qty: 90 TABLET | Refills: 1 | Status: SHIPPED | OUTPATIENT
Start: 2022-07-21 | End: 2022-08-11

## 2022-07-21 RX ORDER — DULAGLUTIDE 1.5 MG/.5ML
1.5 INJECTION, SOLUTION SUBCUTANEOUS WEEKLY
Qty: 12 PEN | Refills: 1 | Status: SHIPPED | OUTPATIENT
Start: 2022-07-21

## 2022-07-21 RX ORDER — PANTOPRAZOLE SODIUM 40 MG/1
40 TABLET, DELAYED RELEASE ORAL DAILY
Qty: 90 TABLET | Refills: 1 | Status: ON HOLD | OUTPATIENT
Start: 2022-07-21 | End: 2022-10-10 | Stop reason: HOSPADM

## 2022-07-21 RX ORDER — GABAPENTIN 800 MG/1
TABLET ORAL
Qty: 60 TABLET | Refills: 0 | Status: CANCELLED | OUTPATIENT
Start: 2022-07-21 | End: 2022-11-02

## 2022-07-21 RX ORDER — DILTIAZEM HYDROCHLORIDE 120 MG/1
CAPSULE, COATED, EXTENDED RELEASE ORAL
Qty: 90 CAPSULE | Refills: 1 | Status: SHIPPED | OUTPATIENT
Start: 2022-07-21

## 2022-07-21 RX ORDER — GLIMEPIRIDE 2 MG/1
2 TABLET ORAL 2 TIMES DAILY
Qty: 180 TABLET | Refills: 0 | Status: SHIPPED | OUTPATIENT
Start: 2022-07-21

## 2022-07-21 RX ORDER — ATORVASTATIN CALCIUM 40 MG/1
40 TABLET, FILM COATED ORAL DAILY
Qty: 90 TABLET | Refills: 2 | Status: SHIPPED | OUTPATIENT
Start: 2022-07-21

## 2022-07-21 RX ORDER — ADHESIVE BANDAGE 3/4"
BANDAGE TOPICAL
Qty: 1 EACH | Refills: 0 | Status: SHIPPED | OUTPATIENT
Start: 2022-07-21

## 2022-07-21 RX ORDER — HYDROCHLOROTHIAZIDE 25 MG/1
25 TABLET ORAL EVERY MORNING
Qty: 90 TABLET | Refills: 1 | Status: ON HOLD | OUTPATIENT
Start: 2022-07-21 | End: 2022-10-10 | Stop reason: SDUPTHER

## 2022-07-21 RX ORDER — GABAPENTIN 600 MG/1
TABLET ORAL
Qty: 180 TABLET | Refills: 0 | Status: SHIPPED | OUTPATIENT
Start: 2022-07-21 | End: 2022-10-19

## 2022-07-21 RX ORDER — LOSARTAN POTASSIUM 25 MG/1
25 TABLET ORAL DAILY
Qty: 90 TABLET | Refills: 1 | Status: SHIPPED | OUTPATIENT
Start: 2022-07-21

## 2022-07-21 RX ORDER — ALBUTEROL SULFATE 2.5 MG/3ML
2.5 SOLUTION RESPIRATORY (INHALATION) EVERY 6 HOURS PRN
Qty: 120 EACH | Refills: 1 | Status: SHIPPED | OUTPATIENT
Start: 2022-07-21 | End: 2022-10-19

## 2022-07-21 NOTE — TELEPHONE ENCOUNTER
Please inform pt - with her worsened renal function on most recent labs, the maximum dose of Gabapentin she can take at a time is 600 mg, and she should only take this dose 1-2x's daily (max daily dose should be 900 mg). Will re-write Gabapentin Rx to reflect this now; if renal function improves in the future, she may be able to increase dose again. Controlled Substance Monitoring:    Acute and Chronic Pain Monitoring:   RX Monitoring 7/21/2022   Attestation -   Periodic Controlled Substance Monitoring No signs of potential drug abuse or diversion identified.

## 2022-07-26 DIAGNOSIS — M51.36 DEGENERATIVE DISC DISEASE, LUMBAR: ICD-10-CM

## 2022-07-26 DIAGNOSIS — M50.90 CERVICAL NECK PAIN WITH EVIDENCE OF DISC DISEASE: ICD-10-CM

## 2022-07-26 RX ORDER — CYCLOBENZAPRINE HCL 10 MG
TABLET ORAL
Qty: 60 TABLET | Refills: 11 | OUTPATIENT
Start: 2022-07-26

## 2022-07-29 DIAGNOSIS — Z79.4 TYPE 2 DIABETES MELLITUS WITHOUT COMPLICATION, WITH LONG-TERM CURRENT USE OF INSULIN (HCC): ICD-10-CM

## 2022-07-29 DIAGNOSIS — E11.9 TYPE 2 DIABETES MELLITUS WITHOUT COMPLICATION, WITH LONG-TERM CURRENT USE OF INSULIN (HCC): ICD-10-CM

## 2022-08-01 RX ORDER — INSULIN GLARGINE 100 [IU]/ML
15 INJECTION, SOLUTION SUBCUTANEOUS NIGHTLY
Qty: 15 ML | Refills: 11 | OUTPATIENT
Start: 2022-08-01

## 2022-08-08 DIAGNOSIS — M50.90 CERVICAL NECK PAIN WITH EVIDENCE OF DISC DISEASE: ICD-10-CM

## 2022-08-08 DIAGNOSIS — E11.9 TYPE 2 DIABETES MELLITUS WITHOUT COMPLICATION, WITH LONG-TERM CURRENT USE OF INSULIN (HCC): ICD-10-CM

## 2022-08-08 DIAGNOSIS — Z79.4 TYPE 2 DIABETES MELLITUS WITHOUT COMPLICATION, WITH LONG-TERM CURRENT USE OF INSULIN (HCC): ICD-10-CM

## 2022-08-08 DIAGNOSIS — M51.36 DEGENERATIVE DISC DISEASE, LUMBAR: ICD-10-CM

## 2022-08-08 RX ORDER — CYCLOBENZAPRINE HCL 10 MG
10 TABLET ORAL 2 TIMES DAILY PRN
Qty: 90 TABLET | Refills: 1 | Status: CANCELLED | OUTPATIENT
Start: 2022-08-08

## 2022-08-08 RX ORDER — INSULIN GLARGINE 100 [IU]/ML
15 INJECTION, SOLUTION SUBCUTANEOUS NIGHTLY
Qty: 5 PEN | Refills: 1 | Status: CANCELLED | OUTPATIENT
Start: 2022-08-08

## 2022-08-08 RX ORDER — GLIMEPIRIDE 2 MG/1
2 TABLET ORAL 2 TIMES DAILY
Qty: 180 TABLET | Refills: 0 | Status: CANCELLED | OUTPATIENT
Start: 2022-08-08

## 2022-08-11 ENCOUNTER — APPOINTMENT (OUTPATIENT)
Dept: CT IMAGING | Age: 67
End: 2022-08-11
Payer: MEDICARE

## 2022-08-11 ENCOUNTER — HOSPITAL ENCOUNTER (EMERGENCY)
Age: 67
Discharge: HOME | End: 2022-08-11
Attending: EMERGENCY MEDICINE
Payer: MEDICARE

## 2022-08-11 VITALS
HEART RATE: 74 BPM | RESPIRATION RATE: 14 BRPM | SYSTOLIC BLOOD PRESSURE: 153 MMHG | DIASTOLIC BLOOD PRESSURE: 78 MMHG | OXYGEN SATURATION: 97 % | TEMPERATURE: 97.3 F | WEIGHT: 170 LBS | BODY MASS INDEX: 30.12 KG/M2 | HEIGHT: 63 IN

## 2022-08-11 DIAGNOSIS — N39.0 URINARY TRACT INFECTION WITHOUT HEMATURIA, SITE UNSPECIFIED: Primary | ICD-10-CM

## 2022-08-11 DIAGNOSIS — R10.12 ABDOMINAL PAIN, LEFT UPPER QUADRANT: ICD-10-CM

## 2022-08-11 LAB
ABSOLUTE EOS #: 0.47 K/UL (ref 0–0.44)
ABSOLUTE IMMATURE GRANULOCYTE: <0.03 K/UL (ref 0–0.3)
ABSOLUTE LYMPH #: 1.97 K/UL (ref 1.1–3.7)
ABSOLUTE MONO #: 0.36 K/UL (ref 0.1–1.2)
ALBUMIN SERPL-MCNC: 4.5 G/DL (ref 3.5–5.2)
ALBUMIN/GLOBULIN RATIO: 1.4 (ref 1–2.5)
ALP BLD-CCNC: 87 U/L (ref 35–104)
ALT SERPL-CCNC: 30 U/L (ref 5–33)
AMYLASE: 85 U/L (ref 28–100)
ANION GAP SERPL CALCULATED.3IONS-SCNC: 11 MMOL/L (ref 9–17)
AST SERPL-CCNC: 20 U/L
BACTERIA: ABNORMAL
BASOPHILS # BLD: 1 % (ref 0–2)
BASOPHILS ABSOLUTE: 0.05 K/UL (ref 0–0.2)
BILIRUB SERPL-MCNC: 0.36 MG/DL (ref 0.3–1.2)
BILIRUBIN DIRECT: 0.13 MG/DL
BILIRUBIN URINE: NEGATIVE
BILIRUBIN, INDIRECT: 0.23 MG/DL (ref 0–1)
BUN BLDV-MCNC: 16 MG/DL (ref 8–23)
BUN/CREAT BLD: 15 (ref 9–20)
CALCIUM SERPL-MCNC: 10.1 MG/DL (ref 8.6–10.4)
CHLORIDE BLD-SCNC: 95 MMOL/L (ref 98–107)
CO2: 29 MMOL/L (ref 20–31)
CREAT SERPL-MCNC: 1.07 MG/DL (ref 0.5–0.9)
EOSINOPHILS RELATIVE PERCENT: 8 % (ref 1–4)
EPITHELIAL CELLS UA: ABNORMAL /HPF (ref 0–5)
GFR AFRICAN AMERICAN: >60 ML/MIN
GFR NON-AFRICAN AMERICAN: 51 ML/MIN
GFR SERPL CREATININE-BSD FRML MDRD: ABNORMAL ML/MIN/{1.73_M2}
GLOBULIN: 3.3 G/DL (ref 1.5–3.8)
GLUCOSE BLD-MCNC: 306 MG/DL (ref 70–99)
GLUCOSE URINE: ABNORMAL
HCT VFR BLD CALC: 37.5 % (ref 36.3–47.1)
HEMOGLOBIN: 12.8 G/DL (ref 11.9–15.1)
IMMATURE GRANULOCYTES: 0 %
KETONES, URINE: NEGATIVE
LACTIC ACID, SEPSIS: 1.8 MMOL/L (ref 0.5–1.9)
LEUKOCYTE ESTERASE, URINE: ABNORMAL
LIPASE: 47 U/L (ref 13–60)
LYMPHOCYTES # BLD: 32 % (ref 24–43)
MCH RBC QN AUTO: 31.1 PG (ref 25.2–33.5)
MCHC RBC AUTO-ENTMCNC: 34.1 G/DL (ref 25.2–33.5)
MCV RBC AUTO: 91.2 FL (ref 82.6–102.9)
MONOCYTES # BLD: 6 % (ref 3–12)
NITRITE, URINE: NEGATIVE
NRBC AUTOMATED: 0 PER 100 WBC
PDW BLD-RTO: 13.2 % (ref 11.8–14.4)
PH UA: 6 (ref 5–6)
PLATELET # BLD: 162 K/UL (ref 138–453)
PMV BLD AUTO: 10 FL (ref 8.1–13.5)
POTASSIUM SERPL-SCNC: 3.7 MMOL/L (ref 3.7–5.3)
PROTEIN UA: NEGATIVE
RBC # BLD: 4.11 M/UL (ref 3.95–5.11)
RBC UA: ABNORMAL /HPF (ref 0–4)
SEG NEUTROPHILS: 53 % (ref 36–65)
SEGMENTED NEUTROPHILS ABSOLUTE COUNT: 3.28 K/UL (ref 1.5–8.1)
SODIUM BLD-SCNC: 135 MMOL/L (ref 135–144)
SPECIFIC GRAVITY UA: 1 (ref 1.01–1.02)
TOTAL PROTEIN: 7.8 G/DL (ref 6.4–8.3)
TROPONIN, HIGH SENSITIVITY: 6 NG/L (ref 0–14)
URINE HGB: NEGATIVE
UROBILINOGEN, URINE: NORMAL
WBC # BLD: 6.1 K/UL (ref 3.5–11.3)
WBC UA: ABNORMAL /HPF (ref 0–4)

## 2022-08-11 PROCEDURE — 99285 EMERGENCY DEPT VISIT HI MDM: CPT

## 2022-08-11 PROCEDURE — 2580000003 HC RX 258: Performed by: EMERGENCY MEDICINE

## 2022-08-11 PROCEDURE — 83605 ASSAY OF LACTIC ACID: CPT

## 2022-08-11 PROCEDURE — 80048 BASIC METABOLIC PNL TOTAL CA: CPT

## 2022-08-11 PROCEDURE — 83690 ASSAY OF LIPASE: CPT

## 2022-08-11 PROCEDURE — 74177 CT ABD & PELVIS W/CONTRAST: CPT

## 2022-08-11 PROCEDURE — 84484 ASSAY OF TROPONIN QUANT: CPT

## 2022-08-11 PROCEDURE — 80076 HEPATIC FUNCTION PANEL: CPT

## 2022-08-11 PROCEDURE — 96374 THER/PROPH/DIAG INJ IV PUSH: CPT

## 2022-08-11 PROCEDURE — 96361 HYDRATE IV INFUSION ADD-ON: CPT

## 2022-08-11 PROCEDURE — 85025 COMPLETE CBC W/AUTO DIFF WBC: CPT

## 2022-08-11 PROCEDURE — 81001 URINALYSIS AUTO W/SCOPE: CPT

## 2022-08-11 PROCEDURE — 6360000002 HC RX W HCPCS: Performed by: EMERGENCY MEDICINE

## 2022-08-11 PROCEDURE — 6360000004 HC RX CONTRAST MEDICATION: Performed by: EMERGENCY MEDICINE

## 2022-08-11 PROCEDURE — 82150 ASSAY OF AMYLASE: CPT

## 2022-08-11 PROCEDURE — 6370000000 HC RX 637 (ALT 250 FOR IP): Performed by: EMERGENCY MEDICINE

## 2022-08-11 RX ORDER — 0.9 % SODIUM CHLORIDE 0.9 %
1000 INTRAVENOUS SOLUTION INTRAVENOUS ONCE
Status: COMPLETED | OUTPATIENT
Start: 2022-08-11 | End: 2022-08-11

## 2022-08-11 RX ORDER — CEPHALEXIN 250 MG/1
500 CAPSULE ORAL ONCE
Status: COMPLETED | OUTPATIENT
Start: 2022-08-11 | End: 2022-08-11

## 2022-08-11 RX ORDER — CEPHALEXIN 500 MG/1
500 CAPSULE ORAL 2 TIMES DAILY
Qty: 14 CAPSULE | Refills: 0 | Status: SHIPPED | OUTPATIENT
Start: 2022-08-11 | End: 2022-08-18

## 2022-08-11 RX ORDER — ORPHENADRINE CITRATE 30 MG/ML
60 INJECTION INTRAMUSCULAR; INTRAVENOUS ONCE
Status: COMPLETED | OUTPATIENT
Start: 2022-08-11 | End: 2022-08-11

## 2022-08-11 RX ORDER — CYCLOBENZAPRINE HCL 10 MG
10 TABLET ORAL 2 TIMES DAILY PRN
Qty: 90 TABLET | Refills: 1 | Status: SHIPPED | OUTPATIENT
Start: 2022-08-11 | End: 2022-09-01 | Stop reason: SDUPTHER

## 2022-08-11 RX ADMIN — IOPAMIDOL 100 ML: 755 INJECTION, SOLUTION INTRAVENOUS at 19:48

## 2022-08-11 RX ADMIN — SODIUM CHLORIDE 1000 ML: 9 INJECTION, SOLUTION INTRAVENOUS at 19:51

## 2022-08-11 RX ADMIN — ORPHENADRINE CITRATE 60 MG: 30 INJECTION INTRAMUSCULAR; INTRAVENOUS at 20:45

## 2022-08-11 RX ADMIN — CEPHALEXIN 500 MG: 250 CAPSULE ORAL at 21:09

## 2022-08-11 ASSESSMENT — PAIN SCALES - GENERAL
PAINLEVEL_OUTOF10: 9
PAINLEVEL_OUTOF10: 9

## 2022-08-11 ASSESSMENT — PAIN - FUNCTIONAL ASSESSMENT: PAIN_FUNCTIONAL_ASSESSMENT: 0-10

## 2022-08-11 ASSESSMENT — PAIN DESCRIPTION - PAIN TYPE: TYPE: ACUTE PAIN

## 2022-08-11 ASSESSMENT — PAIN DESCRIPTION - DESCRIPTORS
DESCRIPTORS: ACHING
DESCRIPTORS: TENDER

## 2022-08-11 ASSESSMENT — PAIN DESCRIPTION - FREQUENCY: FREQUENCY: CONTINUOUS

## 2022-08-11 ASSESSMENT — PAIN DESCRIPTION - ORIENTATION
ORIENTATION: LEFT;UPPER
ORIENTATION: LEFT;LOWER

## 2022-08-11 ASSESSMENT — PAIN DESCRIPTION - LOCATION
LOCATION: ABDOMEN
LOCATION: ABDOMEN

## 2022-08-11 NOTE — ED PROVIDER NOTES
[facility] ED  [address]  Phone: [phone]        ADDENDUM:      Care of this patient was assumed from Dr. Rj Dempsey. The patient was seen for Abdominal Pain  . The patient's initial evaluation and plan have been discussed with the prior provider who initially evaluated the patient. Nursing Notes, Past Medical Hx, Past Surgical Hx, Allergies, were all reviewed. PAST MEDICAL HISTORY    has a past medical history of Asthma, Atrial fibrillation ([address] Bowel obstruction ([address] COPD (chronic obstructive pulmonary disease) ([address] DDD (degenerative disc disease), Diabetes mellitus ([address] Hyperlipidemia, Hypertension, Hyperthyroidism, and Type II or unspecified type diabetes mellitus without mention of complication, not stated as uncontrolled. SURGICAL HISTORY      has a past surgical history that includes Tubal ligation; Tonsillectomy; Colonoscopy (04/23/2014); Upper gastrointestinal endoscopy (03/16/2016); tracheostomy (2015); tracheostomy closure; Gastrostomy tube placement; gastrostomy tube change; Dental surgery (N/A, 03/08/2017); Insert Midline Catheter (01/22/2021); Colonoscopy (N/A, 01/26/2021); Upper gastrointestinal endoscopy (N/A, 01/26/2021); Colonoscopy (01/26/2021); and Thyroid surgery (2021). CURRENT MEDICATIONS       Discharge Medication List as of 8/11/2022  9:04 PM        CONTINUE these medications which have NOT CHANGED    Details   Blood Pressure Monitoring (BLOOD PRESSURE CUFF) MISC Disp-1 each, R-0, NormalUse to check blood pressure once daily.       albuterol sulfate HFA (PROVENTIL;VENTOLIN;PROAIR) 108 (90 Base) MCG/ACT inhaler INHALE 1-2 PUFFS EVERY 6 HOURS AS NEEDED FOR WHEEZING OR SHORTNESS OF BREATH., Disp-54 g, R-1Normal      atorvastatin (LIPITOR) 40 MG tablet Take 1 tablet by mouth in the morning., Disp-90 tablet, R-2Normal      dilTIAZem (CARDIZEM CD) 120 MG extended release capsule TAKE 1 CAPSULE BY MOUTH EVERY DAY, Disp-90 capsule, R-1Normal      glimepiride (AMARYL) 2 MG tablet Take 1 tablet by mouth in the morning and 1 tablet in the evening., Disp-180 tablet, R-0Normal      hydroCHLOROthiazide (HYDRODIURIL) 25 MG tablet Take 1 tablet by mouth every morning, Disp-90 tablet, R-1Normal      LANTUS SOLOSTAR 100 UNIT/ML injection pen Inject 15 Units into the skin nightly, Disp-5 pen, R-1, DAWNormal      levothyroxine (SYNTHROID) 88 MCG tablet TAKE 1 TABLET BY MOUTH EVERY DAY, Disp-90 tablet, R-0Normal      losartan (COZAAR) 25 MG tablet Take 1 tablet by mouth in the morning., Disp-90 tablet, R-1Normal      pantoprazole (PROTONIX) 40 MG tablet Take 1 tablet by mouth in the morning., Disp-90 tablet, [id number]      TRULICITY 1.5 VK/7.6PG SOPN Inject 1.5 mg into the skin once a week, Disp-12 pen, R-1, DAWNormal      metoprolol tartrate (LOPRESSOR) 25 MG tablet Take 1 tablet by mouth in the morning and 1 tablet before bedtime. , Disp-180 tablet, R-1Normal      albuterol (PROVENTIL) (2.5 MG/3ML) 0.083% nebulizer solution Take 3 mLs by nebulization every 6 hours as needed for Wheezing or Shortness of Breath, Disp-120 each, R-1Normal      gabapentin (NEURONTIN) 600 MG tablet Take 1 tab by mouth 1-2x's daily as needed for chronic neck and back pain., Disp-180 tablet, R-0Normal      fenofibrate (TRICOR) 145 MG tablet Take 1 tablet by mouth in the morning.  ., Disp-90 tablet, R-3Normal      potassium chloride (KLOR-CON M) 20 MEQ extended release tablet Historical Med      fluticasone-salmeterol (ADVAIR) 250-50 MCG/ACT AEPB diskus inhaler Inhale 1 puff into the lungs 2 times dailyHistorical Med      Continuous Blood Gluc Sensor (FREESTYLE KOKO 2 SENSOR) MISC Historical Med      Continuous Blood Gluc  (FREESTYLE KOKO 2 READER) SOUTH Historical Med      zoster recombinant adjuvanted vaccine (SHINGRIX) 50 MCG/0.5ML SUSR injection 50 MCG IM then repeat 2-6 months., Disp-0.5 mL, R-1Print      vitamin D3 (CHOLECALCIFEROL) 25 MCG (1000 UT) TABS tablet TAKE Hepatic Function Panel   Result Value Ref Range    Albumin 4.5 3.5 - 5.2 g/dL    Alkaline Phosphatase 87 35 - 104 U/L    ALT 30 5 - 33 U/L    AST 20 <32 U/L    Total Bilirubin 0.36 0.3 - 1.2 mg/dL    Bilirubin, Direct 0.13 <0.31 mg/dL    Bilirubin, Indirect 0.23 0.00 - 1.00 mg/dL    Total Protein 7.8 6.4 - 8.3 g/dL    Globulin 3.3 1.5 - 3.8 g/dL    Albumin/Globulin Ratio 1.4 1.0 - 2.5   Troponin   Result Value Ref Range    Troponin, High Sensitivity 6 0 - 14 ng/L   Lactate, Sepsis   Result Value Ref Range    Lactic Acid, Sepsis 1.8 0.5 - 1.9 mmol/L   Urinalysis with Reflex to Culture    Specimen: Urine, clean catch   Result Value Ref Range    Glucose, Ur 2+ (A) NEGATIVE    Bilirubin Urine NEGATIVE NEGATIVE    Ketones, Urine NEGATIVE NEGATIVE    Specific Gravity, UA 1.005 (L) 1.010 - 1.025    Urine Hgb NEGATIVE NEGATIVE    pH, UA 6.0 5.0 - 6.0    Protein, UA NEGATIVE NEGATIVE    Urobilinogen, Urine Normal Normal    Nitrite, Urine NEGATIVE NEGATIVE    Leukocyte Esterase, Urine 1+ (A) NEGATIVE   Microscopic Urinalysis   Result Value Ref Range    WBC, UA 5 TO 10 0 - 4 /HPF    RBC, UA 5 TO 10 0 - 4 /HPF    Epithelial Cells UA 5 TO 10 0 - 5 /HPF    Bacteria, UA 1+ (A) None       RADIOLOGY:  CT ABDOMEN PELVIS W IV CONTRAST Additional Contrast? None   Final Result   1. No acute intra-abdominal abnormality. 2. Hepatic steatosis. RECENT VITALS:  BP: (!) 153/78, Temp: 97.3 °F (36.3 °C), Heart Rate: 74, Resp: 14     ED Course     The patient was given the following medications:  Orders Placed This Encounter   Medications    iopamidol (ISOVUE-370) 76 % injection 100 mL    0.9 % sodium chloride bolus    orphenadrine (NORFLEX) injection 60 mg    cephALEXin (KEFLEX) capsule 500 mg     Order Specific Question:   Antimicrobial Indications     Answer:   Urinary Tract Infection    cephALEXin (KEFLEX) 500 MG capsule     Sig: Take 1 capsule by mouth in the morning and 1 capsule before bedtime. Do all this for 7 days. Dispense:  14 capsule     Refill:  0    cyclobenzaprine (FLEXERIL) 10 MG tablet     Sig: Take 1 tablet by mouth 2 times daily as needed for Muscle spasms     Dispense:  90 tablet     Refill:  1       Medical Decision Making           The patient is a 78year old female who presents for evaluation of left upper quadrant abdominal pain for 4 days. The patient has history of a previous PEG tube in the same location. Vital signs are stable except for slightly elevated blood pressure. CBC is unremarkable. BMP shows hyperglycemia of 306 and baseline creatinine 1.07. Troponin, lactic acid, LFTs, amylase and lipase unremarkable. At time of signout CT abdomen pelvis is pending. CT shows no acute process. Urinalysis shows signs of infection and she was started on Keflex. The patient admits to me that she had a fall a few days ago onto the left side. I suspect she may have a musculoskeletal contusion and spasm. She was treated with Norflex with improvement. I have low suspicion for acute abdomen, perforation or obstruction. I instructed the patient to take her antibiotic as prescribed and to take Tylenol as needed for pain. She was told to take Flexeril as needed for muscle spasm. She was instructed to apply ice packs to her left side for 20 minutes at a time and to follow-up with her PCP in 1 to 2 days. She was instructed to return to the ER for worsening symptoms or any other concern. The patient understands that at this time there is no evidence for a more malignant underlying process, but also understands that early in the process of an illness or injury, an emergency department workup can be falsely reassuring. Routine discharge counseling was given, and the patient understands that worsening, changing or persistent symptoms should prompt an immediate call or follow up with their primary physician or return to the emergency department. The importance of appropriate follow up was also discussed.   I have reviewed the disposition diagnosis with the patient. I have answered their questions and given discharge instructions. They voiced understanding of these instructions and did not have any further questions or complaints. Disposition     FINAL IMPRESSION      1. Urinary tract infection without hematuria, site unspecified    2. Abdominal pain, left upper quadrant          DISPOSITION/PLAN   DISPOSITION Decision To Discharge 08/11/2022 09:01:56 PM      CONDITION ON DISPOSITION:   Stable    PATIENT REFERRED TO:  Masha Tran DO  [address]  [phone]    Schedule an appointment as soon as possible for a visit in 2 days      [facility]  [address]  [phone]  Go to   If symptoms worsen    DISCHARGE MEDICATIONS:  Discharge Medication List as of 8/11/2022  9:04 PM        START taking these medications    Details   cephALEXin (KEFLEX) 500 MG capsule Take 1 capsule by mouth in the morning and 1 capsule before bedtime. Do all this for 7 days. , Disp-14 capsule, R-0Normal                   (Please note that portions of this note were completed with a voice recognition program.  Efforts were made to edit the dictations but occasionally words are mis-transcribed.)    Rhett Stephens DO  Emergency Medicine Physician                  Rhett Stephens DO  08/11/22 [id number]

## 2022-08-11 NOTE — ED PROVIDER NOTES
[facility] ED  [address]  [place] [address]  Phone: [phone]  [facility]      Pt Name: Isidro Ferrara  MRN: [id number]  [place] 1955  Date of evaluation: 8/11/2022    CHIEF COMPLAINT       Chief Complaint   Patient presents with    Abdominal Pain       HISTORY OF PRESENT ILLNESS    Isidro Ferrara is a 78 y.o. female who presents to the [facility] complaining of left upper quadrant abdominal pain for the past 4 days. No bowel complaints. No fevers or chills no nausea vomiting no chest pain or shortness of breath. Movement makes it worse pain rated 9 out of 10. She only takes gabapentin and Neurontin for her pain has not taken anything else for the discomfort. Denies any other complaints. Nothing makes it better. She has had a G-tube in the past and the pain is in that area. Denies injury. REVIEW OF SYSTEMS       Constitutional: No fevers or chills   HEENT: No sore throat, rhinorrhea, or earache   Eyes: No blurry vision or double vision no drainage   Cardiovascular: No chest pain or tachycardia   Respiratory: No wheezing or shortness of breath no cough   Gastrointestinal: No nausea, vomiting, diarrhea, constipation, positive left upper quadrant abdominal pain  : No hematuria or dysuria   Musculoskeletal: No swelling or pain   Skin: No rash   Neurological: No focal neurologic complaints, paresthesias, weakness, or headache     PAST MEDICAL HISTORY    has a past medical history of Asthma, Atrial fibrillation ([address] Bowel obstruction ([address] COPD (chronic obstructive pulmonary disease) ([address] DDD (degenerative disc disease), Diabetes mellitus ([address] Hyperlipidemia, Hypertension, Hyperthyroidism, and Type II or unspecified type diabetes mellitus without mention of complication, not stated as uncontrolled. SURGICAL HISTORY      has a past surgical history that includes Tubal ligation; Tonsillectomy; Colonoscopy (04/23/2014);  Upper gastrointestinal endoscopy (03/16/2016); tracheostomy (2015); tracheostomy closure; Gastrostomy tube placement; gastrostomy tube change; Dental surgery (N/A, 03/08/2017); Insert Midline Catheter (01/22/2021); Colonoscopy (N/A, 01/26/2021); Upper gastrointestinal endoscopy (N/A, 01/26/2021); Colonoscopy (01/26/2021); and Thyroid surgery (2021). CURRENT MEDICATIONS       Previous Medications    ALBUTEROL (PROVENTIL) (2.5 MG/3ML) 0.083% NEBULIZER SOLUTION    Take 3 mLs by nebulization every 6 hours as needed for Wheezing or Shortness of Breath    ALBUTEROL SULFATE HFA (PROVENTIL;VENTOLIN;PROAIR) 108 (90 BASE) MCG/ACT INHALER    INHALE 1-2 PUFFS EVERY 6 HOURS AS NEEDED FOR WHEEZING OR SHORTNESS OF BREATH. ATORVASTATIN (LIPITOR) 40 MG TABLET    Take 1 tablet by mouth in the morning. BLOOD PRESSURE MONITORING (BLOOD PRESSURE CUFF) MISC    Use to check blood pressure once daily. CONTINUOUS BLOOD GLUC  (FREESTYLE KOKO 2 READER) SOUTH        CONTINUOUS BLOOD GLUC SENSOR (FREESTYLE KOKO 2 SENSOR) [facility]        CYCLOBENZAPRINE (FLEXERIL) 10 MG TABLET    Take 1 tablet by mouth 2 times daily as needed for Muscle spasms    DILTIAZEM (CARDIZEM CD) 120 MG EXTENDED RELEASE CAPSULE    TAKE 1 CAPSULE BY MOUTH EVERY DAY    FENOFIBRATE (TRICOR) 145 MG TABLET    Take 1 tablet by mouth in the morning. Ambika Gip FLUTICASONE-SALMETEROL (ADVAIR) 250-50 MCG/ACT AEPB DISKUS INHALER    Inhale 1 puff into the lungs 2 times daily    GABAPENTIN (NEURONTIN) 600 MG TABLET    Take 1 tab by mouth 1-2x's daily as needed for chronic neck and back pain. GLIMEPIRIDE (AMARYL) 2 MG TABLET    Take 1 tablet by mouth in the morning and 1 tablet in the evening.     HYDROCHLOROTHIAZIDE (HYDRODIURIL) 25 MG TABLET    Take 1 tablet by mouth every morning    LANTUS SOLOSTAR 100 UNIT/ML INJECTION PEN    Inject 15 Units into the skin nightly    LEVOTHYROXINE (SYNTHROID) 88 MCG TABLET    TAKE 1 TABLET BY MOUTH EVERY DAY    LORATADINE (CLARITIN) 10 MG TABLET    TAKE 1 TABLET BY MOUTH EVERY DAY    LOSARTAN (COZAAR) 25 MG TABLET    Take 1 tablet by mouth in the morning. METOPROLOL TARTRATE (LOPRESSOR) 25 MG TABLET    Take 1 tablet by mouth in the morning and 1 tablet before bedtime. OMEGA-3 FATTY ACIDS (FISH OIL) 1000 MG CAPS    TAKE TWO (2) CAPSULES BY MOUTH DAILY    PANTOPRAZOLE (PROTONIX) 40 MG TABLET    Take 1 tablet by mouth in the morning. POTASSIUM CHLORIDE (KLOR-CON M) 20 MEQ EXTENDED RELEASE TABLET        TRULICITY 1.5 IN/7.0JI SOPN    Inject 1.5 mg into the skin once a week    VITAMIN D3 (CHOLECALCIFEROL) 25 MCG (1000 UT) TABS TABLET    TAKE 1 TABLET BY MOUTH DAILY    ZOSTER RECOMBINANT ADJUVANTED VACCINE (SHINGRIX) 50 MCG/0.5ML SUSR INJECTION    50 MCG IM then repeat 2-6 months. ALLERGIES     is allergic to norco [hydrocodone-acetaminophen], oxycodone, percocet [oxycodone-acetaminophen], and sulfa antibiotics. FAMILY HISTORY     She indicated that her mother is alive. She indicated that her father is . She indicated that her brother is alive. She indicated that her son is alive. She indicated that her maternal aunt is . family history includes Heart Disease in her mother; High Cholesterol in her mother; No Known Problems in her son. SOCIAL HISTORY      reports that she quit smoking about 8 years ago. Her smoking use included cigarettes. She has a 20.00 pack-year smoking history. She has never used smokeless tobacco. She reports that she does not drink alcohol and does not use drugs.     PHYSICAL EXAM       ED Triage Vitals [22 1851]   BP Temp Temp Source Heart Rate Resp SpO2 Height Weight   (!) 173/98 97.3 °F (36.3 °C) Tympanic 77 14 97 % 5' 3\" (1.6 m) 170 lb (77.1 kg)     Constitutional: Alert, oriented x3, nontoxic, answering questions appropriately, acting properly for age, in no acute distress   HEENT: Extraocular muscles intact,  Neck: Trachea midline   Cardiovascular: Regular rhythm and rate no murmurs   Respiratory: Clear to auscultation bilaterally no wheezes, rhonchi, rales, no respiratory distress no tachypnea no retractions no hypoxia  Gastrointestinal: Soft, moderate left upper quadrant tenderness to palpation, nondistended, diminished bowel sounds. No rebound, rigidity, or guarding. No abdominal bruit no pulsatile mass  Musculoskeletal: No extremity pain or swelling   Neurologic: Moving all 4 extremities without difficulty there are no gross focal neurologic deficits   Skin: Warm and dry         DIFFERENTIAL DIAGNOSIS/ MDM:     IV labs. CT abdomen and pelvis. DIAGNOSTIC RESULTS     EKG: All EKG's are interpreted by the Emergency Department Physician who either signs or Co-signs this chart in the absence of a cardiologist.        Not indicated unless otherwise documented above    LABS:  No results found for this visit on 08/11/22. Not indicated unless otherwise documented above    RADIOLOGY:   I reviewed the radiologist interpretations:    CT ABDOMEN PELVIS W IV CONTRAST Additional Contrast? None    (Results Pending)       Not indicated unless otherwise documented above    EMERGENCY DEPARTMENT COURSE:     The patient was given the following medications:  No orders of the defined types were placed in this encounter.        Vitals:   -------------------------  BP (!) 173/98   Pulse 77   Temp 97.3 °F (36.3 °C) (Tympanic)   Resp 14   Ht 5' 3\" (1.6 m)   Wt 170 lb (77.1 kg)   SpO2 97%   BMI 30.11 kg/m²       At 7:30 PM care will be transferred to Dr. Julian Randle      (Please note that portions of this note were completed with a voice recognition program.  Efforts were made to edit the dictations but occasionally words are mis-transcribed.)    Kevin Miller DO   Attending Emergency Physician       Kevin Miller DO  08/11/22 [id number]

## 2022-08-22 DIAGNOSIS — E11.9 TYPE 2 DIABETES MELLITUS WITHOUT COMPLICATION, WITH LONG-TERM CURRENT USE OF INSULIN (HCC): ICD-10-CM

## 2022-08-22 DIAGNOSIS — Z79.4 TYPE 2 DIABETES MELLITUS WITHOUT COMPLICATION, WITH LONG-TERM CURRENT USE OF INSULIN (HCC): ICD-10-CM

## 2022-08-22 RX ORDER — GLIMEPIRIDE 2 MG/1
2 TABLET ORAL 2 TIMES DAILY
Qty: 180 TABLET | Refills: 0 | Status: CANCELLED | OUTPATIENT
Start: 2022-08-22

## 2022-08-22 RX ORDER — INSULIN GLARGINE 100 [IU]/ML
15 INJECTION, SOLUTION SUBCUTANEOUS NIGHTLY
Qty: 5 PEN | Refills: 1 | Status: CANCELLED | OUTPATIENT
Start: 2022-08-22

## 2022-08-22 RX ORDER — CYCLOBENZAPRINE HCL 10 MG
10 TABLET ORAL 2 TIMES DAILY PRN
Qty: 90 TABLET | Refills: 1 | Status: CANCELLED | OUTPATIENT
Start: 2022-08-22

## 2022-08-24 ENCOUNTER — OFFICE VISIT (OUTPATIENT)
Dept: FAMILY MEDICINE CLINIC | Age: 67
End: 2022-08-24
Payer: MEDICARE

## 2022-08-24 ENCOUNTER — HOSPITAL ENCOUNTER (OUTPATIENT)
Dept: GENERAL RADIOLOGY | Age: 67
Discharge: HOME | End: 2022-08-26
Payer: MEDICARE

## 2022-08-24 VITALS
RESPIRATION RATE: 16 BRPM | SYSTOLIC BLOOD PRESSURE: 124 MMHG | BODY MASS INDEX: 30.69 KG/M2 | HEART RATE: 74 BPM | OXYGEN SATURATION: 97 % | HEIGHT: 63 IN | WEIGHT: 173.2 LBS | DIASTOLIC BLOOD PRESSURE: 82 MMHG

## 2022-08-24 DIAGNOSIS — E11.43 GASTROPARESIS DUE TO DM (HCC): ICD-10-CM

## 2022-08-24 DIAGNOSIS — I10 ESSENTIAL HYPERTENSION: ICD-10-CM

## 2022-08-24 DIAGNOSIS — R10.84 GENERALIZED ABDOMINAL PAIN: ICD-10-CM

## 2022-08-24 DIAGNOSIS — R10.84 GENERALIZED ABDOMINAL PAIN: Primary | ICD-10-CM

## 2022-08-24 DIAGNOSIS — K31.84 GASTROPARESIS DUE TO DM (HCC): ICD-10-CM

## 2022-08-24 PROCEDURE — 1123F ACP DISCUSS/DSCN MKR DOCD: CPT | Performed by: NURSE PRACTITIONER

## 2022-08-24 PROCEDURE — G8427 DOCREV CUR MEDS BY ELIG CLIN: HCPCS | Performed by: NURSE PRACTITIONER

## 2022-08-24 PROCEDURE — G8417 CALC BMI ABV UP PARAM F/U: HCPCS | Performed by: NURSE PRACTITIONER

## 2022-08-24 PROCEDURE — G8399 PT W/DXA RESULTS DOCUMENT: HCPCS | Performed by: NURSE PRACTITIONER

## 2022-08-24 PROCEDURE — 2024F 7 FLD RTA PHOTO EVC RTNOPTHY: CPT | Performed by: NURSE PRACTITIONER

## 2022-08-24 PROCEDURE — 1090F PRES/ABSN URINE INCON ASSESS: CPT | Performed by: NURSE PRACTITIONER

## 2022-08-24 PROCEDURE — 74018 RADEX ABDOMEN 1 VIEW: CPT

## 2022-08-24 PROCEDURE — 3017F COLORECTAL CA SCREEN DOC REV: CPT | Performed by: NURSE PRACTITIONER

## 2022-08-24 PROCEDURE — 99214 OFFICE O/P EST MOD 30 MIN: CPT | Performed by: NURSE PRACTITIONER

## 2022-08-24 PROCEDURE — 1036F TOBACCO NON-USER: CPT | Performed by: NURSE PRACTITIONER

## 2022-08-24 PROCEDURE — 99213 OFFICE O/P EST LOW 20 MIN: CPT

## 2022-08-24 PROCEDURE — 3046F HEMOGLOBIN A1C LEVEL >9.0%: CPT | Performed by: NURSE PRACTITIONER

## 2022-08-24 RX ORDER — BLOOD PRESSURE TEST KIT
KIT MISCELLANEOUS
Qty: 1 KIT | Refills: 0 | Status: SHIPPED | OUTPATIENT
Start: 2022-08-24

## 2022-08-24 RX ORDER — METOCLOPRAMIDE 5 MG/1
5 TABLET ORAL 3 TIMES DAILY
Qty: 90 TABLET | Refills: 0 | Status: ON HOLD | OUTPATIENT
Start: 2022-08-24 | End: 2022-10-10 | Stop reason: HOSPADM

## 2022-08-24 ASSESSMENT — PATIENT HEALTH QUESTIONNAIRE - PHQ9
SUM OF ALL RESPONSES TO PHQ QUESTIONS 1-9: 1
1. LITTLE INTEREST OR PLEASURE IN DOING THINGS: 0
2. FEELING DOWN, DEPRESSED OR HOPELESS: 1
SUM OF ALL RESPONSES TO PHQ9 QUESTIONS 1 & 2: 1
SUM OF ALL RESPONSES TO PHQ QUESTIONS 1-9: 1

## 2022-08-24 NOTE — PROGRESS NOTES
Subjective:      Patient ID: Gracie Ratliff is a 78 y.o. female coming in for   Chief Complaint   Patient presents with    Abdominal Pain     Abdominal discomfort and pain. Feels \"bloated\". Has been happening x 1 week. Was seen in the ER, given atb and muscle relaxer. Tender to the touch. Painful. \"Hard\" feeling. Last BM was yesterday 8/23/22    ED Follow-up    Other     Needs an order for a BP cuff      HPI  LUQ pain, bloating, constipation. Hyperglycemia. Poorly controlled diabetes. Seen in ER 8/11/22 Ct abd/pelvis done:  1. No acute intra-abdominal abnormality. 2. Hepatic steatosis   Started on keflex for possible UTI. Continues to have pain.      Recent Results (from the past 672 hour(s))   CBC with Auto Differential    Collection Time: 08/11/22  7:04 PM   Result Value Ref Range    WBC 6.1 3.5 - 11.3 k/uL    RBC 4.11 3.95 - 5.11 m/uL    Hemoglobin 12.8 11.9 - 15.1 g/dL    Hematocrit 37.5 36.3 - 47.1 %    MCV 91.2 82.6 - 102.9 fL    MCH 31.1 25.2 - 33.5 pg    MCHC 34.1 (H) 25.2 - 33.5 g/dL    RDW 13.2 11.8 - 14.4 %    Platelets 387 284 - 026 k/uL    MPV 10.0 8.1 - 13.5 fL    NRBC Automated 0.0 0.0 per 100 WBC    Seg Neutrophils 53 36 - 65 %    Lymphocytes 32 24 - 43 %    Monocytes 6 3 - 12 %    Eosinophils % 8 (H) 1 - 4 %    Basophils 1 0 - 2 %    Immature Granulocytes 0 0 %    Segs Absolute 3.28 1.50 - 8.10 k/uL    Absolute Lymph # 1.97 1.10 - 3.70 k/uL    Absolute Mono # 0.36 0.10 - 1.20 k/uL    Absolute Eos # 0.47 (H) 0.00 - 0.44 k/uL    Basophils Absolute 0.05 0.00 - 0.20 k/uL    Absolute Immature Granulocyte <0.03 0.00 - 0.30 k/uL   Basic Metabolic Panel    Collection Time: 08/11/22  7:04 PM   Result Value Ref Range    Glucose 306 (H) 70 - 99 mg/dL    BUN 16 8 - 23 mg/dL    Creatinine 1.07 (H) 0.50 - 0.90 mg/dL    Bun/Cre Ratio 15 9 - 20    Calcium 10.1 8.6 - 10.4 mg/dL    Sodium 135 135 - 144 mmol/L    Potassium 3.7 3.7 - 5.3 mmol/L    Chloride 95 (L) 98 - 107 mmol/L    CO2 29 20 - 31 mmol/L    Anion Gap 11 9 - 17 mmol/L    GFR Non-African American 51 (L) >60 mL/min    GFR African American >60 >60 mL/min    GFR Comment         Amylase    Collection Time: 08/11/22  7:04 PM   Result Value Ref Range    Amylase 85 28 - 100 U/L   Lipase    Collection Time: 08/11/22  7:04 PM   Result Value Ref Range    Lipase 47 13 - 60 U/L   Hepatic Function Panel    Collection Time: 08/11/22  7:04 PM   Result Value Ref Range    Albumin 4.5 3.5 - 5.2 g/dL    Alkaline Phosphatase 87 35 - 104 U/L    ALT 30 5 - 33 U/L    AST 20 <32 U/L    Total Bilirubin 0.36 0.3 - 1.2 mg/dL    Bilirubin, Direct 0.13 <0.31 mg/dL    Bilirubin, Indirect 0.23 0.00 - 1.00 mg/dL    Total Protein 7.8 6.4 - 8.3 g/dL    Globulin 3.3 1.5 - 3.8 g/dL    Albumin/Globulin Ratio 1.4 1.0 - 2.5   Troponin    Collection Time: 08/11/22  7:04 PM   Result Value Ref Range    Troponin, High Sensitivity 6 0 - 14 ng/L   Lactate, Sepsis    Collection Time: 08/11/22  7:04 PM   Result Value Ref Range    Lactic Acid, Sepsis 1.8 0.5 - 1.9 mmol/L   Urinalysis with Reflex to Culture    Collection Time: 08/11/22  8:20 PM    Specimen: Urine, clean catch   Result Value Ref Range    Glucose, Ur 2+ (A) NEGATIVE    Bilirubin Urine NEGATIVE NEGATIVE    Ketones, Urine NEGATIVE NEGATIVE    Specific Gravity, UA 1.005 (L) 1.010 - 1.025    Urine Hgb NEGATIVE NEGATIVE    pH, UA 6.0 5.0 - 6.0    Protein, UA NEGATIVE NEGATIVE    Urobilinogen, Urine Normal Normal    Nitrite, Urine NEGATIVE NEGATIVE    Leukocyte Esterase, Urine 1+ (A) NEGATIVE   Microscopic Urinalysis    Collection Time: 08/11/22  8:20 PM   Result Value Ref Range    WBC, UA 5 TO 10 0 - 4 /HPF    RBC, UA 5 TO 10 0 - 4 /HPF    Epithelial Cells UA 5 TO 10 0 - 5 /HPF    Bacteria, UA 1+ (A) None   A1c 6/29/22:11.7      Review of Systems     Objective:   Physical Exam  Vitals and nursing note reviewed. Constitutional:       General: She is not in acute distress. Appearance: Normal appearance. She is not ill-appearing.    HENT:      Head: Normocephalic. Cardiovascular:      Rate and Rhythm: Normal rate and regular rhythm. Heart sounds: Normal heart sounds. Pulmonary:      Effort: Pulmonary effort is normal.      Breath sounds: Normal breath sounds. Abdominal:      General: Bowel sounds are normal.      Palpations: Abdomen is soft. Tenderness: There is abdominal tenderness (LUQ). There is no guarding or rebound. Musculoskeletal:      Right lower leg: No edema. Left lower leg: No edema. Skin:     General: Skin is warm and dry. Findings: No rash. Neurological:      General: No focal deficit present. Mental Status: She is alert and oriented to person, place, and time. Assessment:      1. Generalized abdominal pain    2. Gastroparesis due to DM ([address]    3. Essential hypertension           Plan:    -symptoms do somewhat correlate with gastroparesis. Will start reglan 5mg tid.   -xray ordered to investigate possible constipation    Orders Placed This Encounter   Procedures    XR ABDOMEN (KUB) (SINGLE AP VIEW)     Standing Status:   Future     Number of Occurrences:   1     Standing Expiration Date:   8/24/2023     Order Specific Question:   Reason for exam:     Answer:   ongoing LUQ pain      Outpatient Encounter Medications as of 8/24/2022   Medication Sig Dispense Refill    Blood Pressure KIT Use to check bp 1-2 times per day or as needed. 1 kit 0    metoclopramide (REGLAN) 5 MG tablet Take 1 tablet by mouth 3 times daily 90 tablet 0    cyclobenzaprine (FLEXERIL) 10 MG tablet Take 1 tablet by mouth 2 times daily as needed for Muscle spasms 90 tablet 1    Blood Pressure Monitoring (BLOOD PRESSURE CUFF) MISC Use to check blood pressure once daily. 1 each 0    albuterol sulfate HFA (PROVENTIL;VENTOLIN;PROAIR) 108 (90 Base) MCG/ACT inhaler INHALE 1-2 PUFFS EVERY 6 HOURS AS NEEDED FOR WHEEZING OR SHORTNESS OF BREATH. 54 g 1    atorvastatin (LIPITOR) 40 MG tablet Take 1 tablet by mouth in the morning.  90 tablet 2 dilTIAZem (CARDIZEM CD) 120 MG extended release capsule TAKE 1 CAPSULE BY MOUTH EVERY DAY 90 capsule 1    glimepiride (AMARYL) 2 MG tablet Take 1 tablet by mouth in the morning and 1 tablet in the evening. 180 tablet 0    hydroCHLOROthiazide (HYDRODIURIL) 25 MG tablet Take 1 tablet by mouth every morning 90 tablet 1    LANTUS SOLOSTAR 100 UNIT/ML injection pen Inject 15 Units into the skin nightly 5 pen 1    levothyroxine (SYNTHROID) 88 MCG tablet TAKE 1 TABLET BY MOUTH EVERY DAY 90 tablet 0    losartan (COZAAR) 25 MG tablet Take 1 tablet by mouth in the morning. 90 tablet 1    TRULICITY 1.5 HO/4.7ES SOPN Inject 1.5 mg into the skin once a week 12 pen 1    metoprolol tartrate (LOPRESSOR) 25 MG tablet Take 1 tablet by mouth in the morning and 1 tablet before bedtime. 180 tablet 1    albuterol (PROVENTIL) (2.5 MG/3ML) 0.083% nebulizer solution Take 3 mLs by nebulization every 6 hours as needed for Wheezing or Shortness of Breath 120 each 1    gabapentin (NEURONTIN) 600 MG tablet Take 1 tab by mouth 1-2x's daily as needed for chronic neck and back pain. 180 tablet 0    fenofibrate (TRICOR) 145 MG tablet Take 1 tablet by mouth in the morning. . 90 tablet 3    potassium chloride (KLOR-CON M) 20 MEQ extended release tablet       fluticasone-salmeterol (ADVAIR) 250-50 MCG/ACT AEPB diskus inhaler Inhale 1 puff into the lungs 2 times daily      Continuous Blood Gluc Sensor (FREESTYLE KOKO 2 SENSOR) MISC       Continuous Blood Gluc  (FREESTYLE KOKO 2 READER) SOUTH       zoster recombinant adjuvanted vaccine (SHINGRIX) 50 MCG/0.5ML SUSR injection 50 MCG IM then repeat 2-6 months.  0.5 mL 1    vitamin D3 (CHOLECALCIFEROL) 25 MCG (1000 UT) TABS tablet TAKE 1 TABLET BY MOUTH DAILY 30 tablet 0    loratadine (CLARITIN) 10 MG tablet TAKE 1 TABLET BY MOUTH EVERY DAY 30 tablet 10    Omega-3 Fatty Acids (FISH OIL) 1000 MG CAPS TAKE TWO (2) CAPSULES BY MOUTH DAILY 180 capsule 10    pantoprazole (PROTONIX) 40 MG tablet Take 1 tablet by mouth in the morning. 90 tablet 1     No facility-administered encounter medications on file as of 8/24/2022.             Sebastien Mendez, APRN - CNP

## 2022-08-29 ENCOUNTER — HOSPITAL ENCOUNTER (OUTPATIENT)
Dept: MAMMOGRAPHY | Age: 67
Discharge: HOME | End: 2022-08-31
Payer: MEDICARE

## 2022-08-29 DIAGNOSIS — Z12.31 ENCOUNTER FOR SCREENING MAMMOGRAM FOR BREAST CANCER: ICD-10-CM

## 2022-08-29 PROCEDURE — 77063 BREAST TOMOSYNTHESIS BI: CPT

## 2022-08-31 ENCOUNTER — APPOINTMENT (OUTPATIENT)
Dept: CT IMAGING | Age: 67
End: 2022-08-31
Payer: MEDICARE

## 2022-08-31 ENCOUNTER — OFFICE VISIT (OUTPATIENT)
Dept: PRIMARY CARE CLINIC | Age: 67
End: 2022-08-31
Payer: MEDICARE

## 2022-08-31 ENCOUNTER — HOSPITAL ENCOUNTER (EMERGENCY)
Age: 67
Discharge: HOME | End: 2022-08-31
Attending: EMERGENCY MEDICINE
Payer: MEDICARE

## 2022-08-31 VITALS
DIASTOLIC BLOOD PRESSURE: 85 MMHG | SYSTOLIC BLOOD PRESSURE: 185 MMHG | HEART RATE: 64 BPM | TEMPERATURE: 97 F | OXYGEN SATURATION: 96 % | HEIGHT: 63 IN | WEIGHT: 173 LBS | BODY MASS INDEX: 30.65 KG/M2 | RESPIRATION RATE: 15 BRPM

## 2022-08-31 VITALS
BODY MASS INDEX: 30.65 KG/M2 | SYSTOLIC BLOOD PRESSURE: 138 MMHG | TEMPERATURE: 96.6 F | OXYGEN SATURATION: 96 % | DIASTOLIC BLOOD PRESSURE: 80 MMHG | HEART RATE: 81 BPM | HEIGHT: 63 IN | WEIGHT: 173 LBS

## 2022-08-31 DIAGNOSIS — R10.84 GENERALIZED ABDOMINAL PAIN: Primary | ICD-10-CM

## 2022-08-31 DIAGNOSIS — R10.9 LEFT SIDED ABDOMINAL PAIN: Primary | ICD-10-CM

## 2022-08-31 DIAGNOSIS — R73.9 HYPERGLYCEMIA: ICD-10-CM

## 2022-08-31 LAB
ABSOLUTE EOS #: 0.46 K/UL (ref 0–0.44)
ABSOLUTE IMMATURE GRANULOCYTE: 0.04 K/UL (ref 0–0.3)
ABSOLUTE LYMPH #: 2.03 K/UL (ref 1.1–3.7)
ABSOLUTE MONO #: 0.47 K/UL (ref 0.1–1.2)
ALBUMIN SERPL-MCNC: 4.4 G/DL (ref 3.5–5.2)
ALBUMIN/GLOBULIN RATIO: 1.4 (ref 1–2.5)
ALP BLD-CCNC: 98 U/L (ref 35–104)
ALT SERPL-CCNC: 18 U/L (ref 5–33)
ANION GAP SERPL CALCULATED.3IONS-SCNC: 11 MMOL/L (ref 9–17)
AST SERPL-CCNC: 15 U/L
BACTERIA: NORMAL
BASOPHILS # BLD: 1 % (ref 0–2)
BASOPHILS ABSOLUTE: 0.05 K/UL (ref 0–0.2)
BILIRUB SERPL-MCNC: 0.42 MG/DL (ref 0.3–1.2)
BILIRUBIN URINE: NEGATIVE
BUN BLDV-MCNC: 17 MG/DL (ref 8–23)
BUN/CREAT BLD: 16 (ref 9–20)
CALCIUM SERPL-MCNC: 9.9 MG/DL (ref 8.6–10.4)
CHLORIDE BLD-SCNC: 95 MMOL/L (ref 98–107)
CHP ED QC CHECK: NORMAL
CO2: 28 MMOL/L (ref 20–31)
CREAT SERPL-MCNC: 1.08 MG/DL (ref 0.5–0.9)
EOSINOPHILS RELATIVE PERCENT: 7 % (ref 1–4)
EPITHELIAL CELLS UA: NORMAL /HPF (ref 0–5)
GFR AFRICAN AMERICAN: >60 ML/MIN
GFR NON-AFRICAN AMERICAN: 51 ML/MIN
GFR SERPL CREATININE-BSD FRML MDRD: ABNORMAL ML/MIN/{1.73_M2}
GLUCOSE BLD-MCNC: 269 MG/DL
GLUCOSE BLD-MCNC: 269 MG/DL (ref 65–105)
GLUCOSE BLD-MCNC: 444 MG/DL (ref 70–99)
GLUCOSE URINE: ABNORMAL
HCT VFR BLD CALC: 36.2 % (ref 36.3–47.1)
HEMOGLOBIN: 12.4 G/DL (ref 11.9–15.1)
IMMATURE GRANULOCYTES: 1 %
KETONES, URINE: NEGATIVE
LACTIC ACID: 1.7 MMOL/L (ref 0.5–2.2)
LEUKOCYTE ESTERASE, URINE: ABNORMAL
LIPASE: 51 U/L (ref 13–60)
LYMPHOCYTES # BLD: 30 % (ref 24–43)
MCH RBC QN AUTO: 30.6 PG (ref 25.2–33.5)
MCHC RBC AUTO-ENTMCNC: 34.3 G/DL (ref 25.2–33.5)
MCV RBC AUTO: 89.4 FL (ref 82.6–102.9)
MONOCYTES # BLD: 7 % (ref 3–12)
NITRITE, URINE: NEGATIVE
NRBC AUTOMATED: 0 PER 100 WBC
PDW BLD-RTO: 13.1 % (ref 11.8–14.4)
PH UA: 7.5 (ref 5–6)
PLATELET # BLD: 169 K/UL (ref 138–453)
PMV BLD AUTO: 10.6 FL (ref 8.1–13.5)
POTASSIUM SERPL-SCNC: 4.4 MMOL/L (ref 3.7–5.3)
PROTEIN UA: NEGATIVE
RBC # BLD: 4.05 M/UL (ref 3.95–5.11)
RBC UA: NORMAL /HPF (ref 0–4)
SEG NEUTROPHILS: 54 % (ref 36–65)
SEGMENTED NEUTROPHILS ABSOLUTE COUNT: 3.68 K/UL (ref 1.5–8.1)
SODIUM BLD-SCNC: 134 MMOL/L (ref 135–144)
SPECIFIC GRAVITY UA: 1.01 (ref 1.01–1.02)
TOTAL PROTEIN: 7.5 G/DL (ref 6.4–8.3)
TROPONIN, HIGH SENSITIVITY: <6 NG/L (ref 0–14)
URINE HGB: NEGATIVE
UROBILINOGEN, URINE: NORMAL
WBC # BLD: 6.7 K/UL (ref 3.5–11.3)
WBC UA: NORMAL /HPF (ref 0–4)

## 2022-08-31 PROCEDURE — 99999 PR OFFICE/OUTPT VISIT,PROCEDURE ONLY: CPT | Performed by: FAMILY MEDICINE

## 2022-08-31 PROCEDURE — 83605 ASSAY OF LACTIC ACID: CPT

## 2022-08-31 PROCEDURE — 82947 ASSAY GLUCOSE BLOOD QUANT: CPT

## 2022-08-31 PROCEDURE — 84484 ASSAY OF TROPONIN QUANT: CPT

## 2022-08-31 PROCEDURE — 83690 ASSAY OF LIPASE: CPT

## 2022-08-31 PROCEDURE — 6360000002 HC RX W HCPCS: Performed by: EMERGENCY MEDICINE

## 2022-08-31 PROCEDURE — 85025 COMPLETE CBC W/AUTO DIFF WBC: CPT

## 2022-08-31 PROCEDURE — 6370000000 HC RX 637 (ALT 250 FOR IP): Performed by: EMERGENCY MEDICINE

## 2022-08-31 PROCEDURE — 80053 COMPREHEN METABOLIC PANEL: CPT

## 2022-08-31 PROCEDURE — 96361 HYDRATE IV INFUSION ADD-ON: CPT

## 2022-08-31 PROCEDURE — 6360000004 HC RX CONTRAST MEDICATION: Performed by: EMERGENCY MEDICINE

## 2022-08-31 PROCEDURE — 99285 EMERGENCY DEPT VISIT HI MDM: CPT

## 2022-08-31 PROCEDURE — 2580000003 HC RX 258: Performed by: EMERGENCY MEDICINE

## 2022-08-31 PROCEDURE — 2709999900 CT ABDOMEN PELVIS W IV CONTRAST

## 2022-08-31 PROCEDURE — 96372 THER/PROPH/DIAG INJ SC/IM: CPT

## 2022-08-31 PROCEDURE — 99211 OFF/OP EST MAY X REQ PHY/QHP: CPT | Performed by: FAMILY MEDICINE

## 2022-08-31 PROCEDURE — 81001 URINALYSIS AUTO W/SCOPE: CPT

## 2022-08-31 PROCEDURE — 96374 THER/PROPH/DIAG INJ IV PUSH: CPT

## 2022-08-31 RX ORDER — 0.9 % SODIUM CHLORIDE 0.9 %
1000 INTRAVENOUS SOLUTION INTRAVENOUS ONCE
Status: COMPLETED | OUTPATIENT
Start: 2022-08-31 | End: 2022-08-31

## 2022-08-31 RX ORDER — KETOROLAC TROMETHAMINE 30 MG/ML
15 INJECTION, SOLUTION INTRAMUSCULAR; INTRAVENOUS ONCE
Status: COMPLETED | OUTPATIENT
Start: 2022-08-31 | End: 2022-08-31

## 2022-08-31 RX ADMIN — INSULIN HUMAN 10 UNITS: 100 INJECTION, SOLUTION PARENTERAL at 15:38

## 2022-08-31 RX ADMIN — SODIUM CHLORIDE 1000 ML: 9 INJECTION, SOLUTION INTRAVENOUS at 15:23

## 2022-08-31 RX ADMIN — KETOROLAC TROMETHAMINE 15 MG: 30 INJECTION, SOLUTION INTRAMUSCULAR at 15:38

## 2022-08-31 RX ADMIN — IOPAMIDOL 100 ML: 755 INJECTION, SOLUTION INTRAVENOUS at 14:12

## 2022-08-31 ASSESSMENT — ENCOUNTER SYMPTOMS
EYE PAIN: 0
BLOOD IN STOOL: 0
BACK PAIN: 0
ABDOMINAL PAIN: 1
SHORTNESS OF BREATH: 0
ABDOMINAL DISTENTION: 1
DIARRHEA: 0
COUGH: 0
VOMITING: 0
NAUSEA: 0
CONSTIPATION: 1

## 2022-08-31 ASSESSMENT — PAIN - FUNCTIONAL ASSESSMENT: PAIN_FUNCTIONAL_ASSESSMENT: 0-10

## 2022-08-31 ASSESSMENT — PAIN DESCRIPTION - ORIENTATION: ORIENTATION: LEFT;UPPER

## 2022-08-31 ASSESSMENT — PAIN DESCRIPTION - LOCATION: LOCATION: ABDOMEN

## 2022-08-31 ASSESSMENT — PAIN SCALES - GENERAL
PAINLEVEL_OUTOF10: 9
PAINLEVEL_OUTOF10: 6

## 2022-08-31 ASSESSMENT — PAIN DESCRIPTION - DESCRIPTORS: DESCRIPTORS: SORE

## 2022-08-31 NOTE — ED PROVIDER NOTES
[facility]  eMERGENCY dEPARTMENT eNCOUnter      Pt Name: Yony Yan  MRN: [id number]  [place] 1955  Date of evaluation: 8/31/2022      CHIEF COMPLAINT       Chief Complaint   Patient presents with    Abdominal Pain    Constipation     X 1-2 weeks. Last BM 3 days ago and hard. Usually has a BM once a week. HISTORY OF PRESENT ILLNESS    Yony Yan is a 78 y.o. female who presents with chief complaint of abdominal pain and distention is been going on for a couple weeks she is only had a bowel movement about once a week which is unusual and they are very hard she is got left-sided pain she does have a history of prior obstruction x2 in the past she is required a feeding tube she says there is been no nausea vomiting her abdomen is bloated her clothes are not fitting. She says she is also not eating because she does not have teeth and has melena for dentures      REVIEW OF SYSTEMS         Review of Systems   Constitutional:  Negative for chills and fever. HENT:  Negative for congestion and ear pain. Eyes:  Negative for pain and visual disturbance. Respiratory:  Negative for cough and shortness of breath. Cardiovascular:  Negative for chest pain, palpitations and leg swelling. Gastrointestinal:  Positive for abdominal distention, abdominal pain and constipation. Negative for blood in stool, diarrhea, nausea and vomiting. Endocrine: Negative for polydipsia and polyuria. Genitourinary:  Negative for difficulty urinating, dysuria, frequency, vaginal bleeding and vaginal discharge. Musculoskeletal:  Negative for back pain, joint swelling, myalgias, neck pain and neck stiffness. Skin:  Negative for rash. Neurological:  Negative for dizziness, weakness and headaches. Hematological:  Negative for adenopathy. Does not bruise/bleed easily. Psychiatric/Behavioral:  Negative for confusion, self-injury and suicidal ideas.         PAST MEDICAL HISTORY    has a past medical history of Asthma, Atrial fibrillation ([facility] [address] Bowel obstruction (HCC), COPD (chronic obstructive pulmonary disease) ([facility] [address] DDD (degenerative disc disease), Diabetes mellitus ([facility] [address] Hyperlipidemia, Hypertension, Hyperthyroidism, and Type II or unspecified type diabetes mellitus without mention of complication, not stated as uncontrolled. SURGICAL HISTORY      has a past surgical history that includes Tubal ligation; Tonsillectomy; Colonoscopy (04/23/2014); Upper gastrointestinal endoscopy (03/16/2016); tracheostomy (2015); tracheostomy closure; Gastrostomy tube placement; gastrostomy tube change; Dental surgery (N/A, 03/08/2017); Insert Midline Catheter (01/22/2021); Colonoscopy (N/A, 01/26/2021); Upper gastrointestinal endoscopy (N/A, 01/26/2021); Colonoscopy (01/26/2021); and Thyroid surgery (2021). CURRENT MEDICATIONS       Previous Medications    ALBUTEROL (PROVENTIL) (2.5 MG/3ML) 0.083% NEBULIZER SOLUTION    Take 3 mLs by nebulization every 6 hours as needed for Wheezing or Shortness of Breath    ALBUTEROL SULFATE HFA (PROVENTIL;VENTOLIN;PROAIR) 108 (90 BASE) MCG/ACT INHALER    INHALE 1-2 PUFFS EVERY 6 HOURS AS NEEDED FOR WHEEZING OR SHORTNESS OF BREATH. ATORVASTATIN (LIPITOR) 40 MG TABLET    Take 1 tablet by mouth in the morning. BLOOD PRESSURE KIT    Use to check bp 1-2 times per day or as needed. BLOOD PRESSURE MONITORING (BLOOD PRESSURE CUFF) MISC    Use to check blood pressure once daily. CONTINUOUS BLOOD GLUC  (FREESTYLE KOKO 2 READER) SOUTH        CONTINUOUS BLOOD GLUC SENSOR (FREESTYLE KOKO 2 SENSOR) MISC        CYCLOBENZAPRINE (FLEXERIL) 10 MG TABLET    Take 1 tablet by mouth 2 times daily as needed for Muscle spasms    DILTIAZEM (CARDIZEM CD) 120 MG EXTENDED RELEASE CAPSULE    TAKE 1 CAPSULE BY MOUTH EVERY DAY    FENOFIBRATE (TRICOR) 145 MG TABLET    Take 1 tablet by mouth in the morning. Lawernce Roughen     FLUTICASONE-SALMETEROL (ADVAIR) 250-50 MCG/ACT AEPB DISKUS INHALER    Inhale 1 puff into the lungs 2 times daily    GABAPENTIN (NEURONTIN) 600 MG TABLET    Take 1 tab by mouth 1-2x's daily as needed for chronic neck and back pain. GLIMEPIRIDE (AMARYL) 2 MG TABLET    Take 1 tablet by mouth in the morning and 1 tablet in the evening. HYDROCHLOROTHIAZIDE (HYDRODIURIL) 25 MG TABLET    Take 1 tablet by mouth every morning    LANTUS SOLOSTAR 100 UNIT/ML INJECTION PEN    Inject 15 Units into the skin nightly    LEVOTHYROXINE (SYNTHROID) 88 MCG TABLET    TAKE 1 TABLET BY MOUTH EVERY DAY    LORATADINE (CLARITIN) 10 MG TABLET    TAKE 1 TABLET BY MOUTH EVERY DAY    LOSARTAN (COZAAR) 25 MG TABLET    Take 1 tablet by mouth in the morning. METOCLOPRAMIDE (REGLAN) 5 MG TABLET    Take 1 tablet by mouth 3 times daily    METOPROLOL TARTRATE (LOPRESSOR) 25 MG TABLET    Take 1 tablet by mouth in the morning and 1 tablet before bedtime. OMEGA-3 FATTY ACIDS (FISH OIL) 1000 MG CAPS    TAKE TWO (2) CAPSULES BY MOUTH DAILY    PANTOPRAZOLE (PROTONIX) 40 MG TABLET    Take 1 tablet by mouth in the morning. POTASSIUM CHLORIDE (KLOR-CON M) 20 MEQ EXTENDED RELEASE TABLET        TRULICITY 1.5 YT/9.0OH SOPN    Inject 1.5 mg into the skin once a week    VITAMIN D3 (CHOLECALCIFEROL) 25 MCG (1000 UT) TABS TABLET    TAKE 1 TABLET BY MOUTH DAILY    ZOSTER RECOMBINANT ADJUVANTED VACCINE (SHINGRIX) 50 MCG/0.5ML SUSR INJECTION    50 MCG IM then repeat 2-6 months. ALLERGIES     is allergic to norco [hydrocodone-acetaminophen], oxycodone, percocet [oxycodone-acetaminophen], and sulfa antibiotics. FAMILY HISTORY     She indicated that her mother is alive. She indicated that her father is . She indicated that her brother is alive. She indicated that her son is alive. She indicated that her maternal aunt is . family history includes Heart Disease in her mother; High Cholesterol in her mother; No Known Problems in her son. SOCIAL HISTORY      reports that she quit smoking about 8 years ago.  Her smoking use included cigarettes. She has a 20.00 pack-year smoking history. She has never used smokeless tobacco. She reports that she does not drink alcohol and does not use drugs. PHYSICAL EXAM     INITIAL VITALS:  height is 5' 3\" (1.6 m) and weight is 173 lb (78.5 kg). Her tympanic temperature is 97 °F (36.1 °C). Her blood pressure is 185/85 (abnormal) and her pulse is 64. Her respiration is 15 and oxygen saturation is 96%. Physical Exam  Constitutional:       Appearance: She is well-developed. HENT:      Head: Normocephalic and atraumatic. Right Ear: External ear normal.      Left Ear: External ear normal.   Eyes:      Conjunctiva/sclera: Conjunctivae normal.      Pupils: Pupils are equal, round, and reactive to light. Cardiovascular:      Rate and Rhythm: Normal rate and regular rhythm. Pulmonary:      Effort: Pulmonary effort is normal.      Breath sounds: Normal breath sounds. Abdominal:      General: A surgical scar is present. Bowel sounds are normal. There is distension. Palpations: Abdomen is soft. Tenderness: There is generalized abdominal tenderness. Musculoskeletal:         General: No tenderness. Cervical back: Normal range of motion. Skin:     General: Skin is warm and dry. Neurological:      General: No focal deficit present. Mental Status: She is alert and oriented to person, place, and time.    Psychiatric:         Behavior: Behavior normal.         DIFFERENTIAL DIAGNOSIS/ MDM:     Abdominal pain and distention CT rule out obstruction rule out mass we will do a work-up    DIAGNOSTIC RESULTS     EKG: All EKG's are interpreted by the Emergency Department Physician who either signs or Co-signs this chart in the absence of a cardiologist.        RADIOLOGY:   I directly visualized the following  images and reviewed the radiologist interpretations:       CT OF THE ABDOMEN AND PELVIS WITH CONTRAST 8/31/2022 1:11 pm       TECHNIQUE:   CT of the abdomen and pelvis was performed with the administration of   intravenous contrast. Multiplanar reformatted images are provided for review. Automated exposure control, iterative reconstruction, and/or weight based   adjustment of the mA/kV was utilized to reduce the radiation dose to as low   as reasonably achievable. COMPARISON:   Recent CT study from August 11, 2022. HISTORY:   ORDERING SYSTEM PROVIDED HISTORY: Pain and bloating   TECHNOLOGIST PROVIDED HISTORY:       Pain and bloating   Decision Support Exception - unselect if not a suspected or confirmed   emergency medical condition->Emergency Medical Condition (MA)   Reason for Exam: Left sided abdominal pain for 1 week, severe  today with   bloating. History of recent UTI & constipation       FINDINGS:   Lower Chest: The visualized lung bases are grossly clear. Organs: The liver, spleen, pancreas, kidneys, gallbladder, and adrenal   glands are without acute finding. MR degree of chronic renal cortical   scarring is again noted. GI/Bowel: A few scattered diverticula are present within the sigmoid colon. The stomach and the small and large bowel loops are otherwise normal in   caliber, contour, and morphology, without acute abnormality. No dilated   loops or areas of bowel wall thickening. The appendix is normal.       Peritoneum/Retroperitoneum: There is no free fluid or extraluminal gas. No   enlarged or suspicious mesenteric or retroperitoneal lymphadenopathy. The   abdominal aorta and iliac arteries are patent and of normal caliber. Pelvis: No pelvic free fluid or enlarged or suspicious pelvic or inguinal   lymphadenopathy. No appreciable uterine or adnexal abnormality. The urinary   bladder and the pelvic bowel loops are unremarkable. Bones/Soft Tissues: Degenerative changes are present throughout the lumbar   spine. No acute fracture. No abdominal wall or inguinal hernia.            Impression   There are no acute findings on this contrast-enhanced CT study of the abdomen   and pelvis to account for the patient's symptoms. Sigmoid diverticulosis. Chronic renal cortical scarring. ED BEDSIDE ULTRASOUND:       LABS:  Labs Reviewed   CBC WITH AUTO DIFFERENTIAL - Abnormal; Notable for the following components:       Result Value    Hematocrit 36.2 (*)     MCHC 34.3 (*)     Eosinophils % 7 (*)     Immature Granulocytes 1 (*)     Absolute Eos # 0.46 (*)     All other components within normal limits   COMPREHENSIVE METABOLIC PANEL W/ REFLEX TO MG FOR LOW K - Abnormal; Notable for the following components:    Glucose 444 (*)     Creatinine 1.08 (*)     Sodium 134 (*)     Chloride 95 (*)     GFR Non- 51 (*)     All other components within normal limits   URINALYSIS WITH REFLEX TO CULTURE - Abnormal; Notable for the following components:    Glucose, Ur 3+ (*)     pH, UA 7.5 (*)     Leukocyte Esterase, Urine 1+ (*)     All other components within normal limits   POC GLUCOSE FINGERSTICK - Abnormal; Notable for the following components:    POC Glucose 269 (*)     All other components within normal limits   POCT GLUCOSE - Normal   LACTIC ACID   TROPONIN   LIPASE   MICROSCOPIC URINALYSIS           EMERGENCY DEPARTMENT COURSE:   Vitals:    Vitals:    08/31/22 1305 08/31/22 1506 08/31/22 1714   BP: (!) 150/95 138/89 (!) 185/85   Pulse: 75 75 64   Resp: 16 14 15   Temp: 97 °F (36.1 °C)     TempSrc: Tympanic     SpO2: 95% 95% 96%   Weight: 173 lb (78.5 kg)     Height: 5' 3\" (1.6 m)       -------------------------  BP: (!) 185/85, Temp: 97 °F (36.1 °C), Heart Rate: 64, Resp: 15        Re-evaluation Notes    Patient treated IV hydration with insulin blood sugar is improved    CRITICAL CARE:   None        CONSULTS:      PROCEDURES:  None    FINAL IMPRESSION      1. Generalized abdominal pain    2.  Hyperglycemia          DISPOSITION/PLAN   DISPOSITION Decision To Discharge    Condition on Disposition    Stable    PATIENT REFERRED TO:  DO [address]  [place] [address]  [phone]    In 3 days      DISCHARGE MEDICATIONS:  New Prescriptions    No medications on file       (Please note that portions of this note were completed with a voice recognition program.  Efforts were made to edit the dictations but occasionally words are mis-transcribed.)    Jenna Avalos MD,, MD, F.A.A.E.M.   Attending Emergency Physician                           Jenna Avalos MD  08/31/22 0217

## 2022-08-31 NOTE — PROGRESS NOTES
[facility]             [address] [facility], [place], [address]                        Telephone [phone]             Fax [phone]       Brandon Moe  :  1955  Age:  78 y.o. MRN:  [id number]  Date of visit:  2022       Assessment and Plan:    Left sided abdominal pain  As she is rating her pain as a 9 out of 10, I recommended evaluation at the ED. She was transported to [facility]  by Urgent Care staff. Subjective:    Brandon Moe is a 78 y.o. female who presents to [facility] today (2022) for evaluation of:  Abdominal Pain (Burning sensation that radiates around to the L lower side. Pt is having trouble having a BM. Sx began last week but has gotten worse in the last day. When pt eats the pain and burning get worse.)      She reports left-sided abdominal pain for over a week. She was seen at [facility] on 2022. She was diagnosed with a urinary tract infection. Keflex was prescribed. She had a CT on 2022 that had no significant abnormality. She saw Doc Narvaez NP on 2022 for follow up. Reglan was prescribed for presumed gastroparesis. She had a KUB done on 2022 that showed moderate stool in the colon. She states that her symptoms have been worsening in the past 24 hours. She rates her pain as 9 out of 10.     She has the following problem list:  Patient Active Problem List   Diagnosis    Cervical neck pain with evidence of disc disease    Paroxysmal atrial fibrillation (HCC)    Graves' disease    Asthma with COPD ([facility] [address]    Essential hypertension    Type 2 diabetes mellitus without complication, with long-term current use of insulin (HCC)    Iron deficiency anemia    Irritable bowel syndrome with both constipation and diarrhea    Mixed hyperlipidemia    GI bleed    Patient is Shinto    Bilateral impacted tablet in the evening. 180 tablet 0    hydroCHLOROthiazide (HYDRODIURIL) 25 MG tablet Take 1 tablet by mouth every morning 90 tablet 1    LANTUS SOLOSTAR 100 UNIT/ML injection pen Inject 15 Units into the skin nightly 5 pen 1    levothyroxine (SYNTHROID) 88 MCG tablet TAKE 1 TABLET BY MOUTH EVERY DAY 90 tablet 0    losartan (COZAAR) 25 MG tablet Take 1 tablet by mouth in the morning. 90 tablet 1    TRULICITY 1.5 LQ/9.1NQ SOPN Inject 1.5 mg into the skin once a week 12 pen 1    metoprolol tartrate (LOPRESSOR) 25 MG tablet Take 1 tablet by mouth in the morning and 1 tablet before bedtime. 180 tablet 1    albuterol (PROVENTIL) (2.5 MG/3ML) 0.083% nebulizer solution Take 3 mLs by nebulization every 6 hours as needed for Wheezing or Shortness of Breath 120 each 1    gabapentin (NEURONTIN) 600 MG tablet Take 1 tab by mouth 1-2x's daily as needed for chronic neck and back pain. 180 tablet 0    fenofibrate (TRICOR) 145 MG tablet Take 1 tablet by mouth in the morning. . 90 tablet 3    fluticasone-salmeterol (ADVAIR) 250-50 MCG/ACT AEPB diskus inhaler Inhale 1 puff into the lungs 2 times daily      vitamin D3 (CHOLECALCIFEROL) 25 MCG (1000 UT) TABS tablet TAKE 1 TABLET BY MOUTH DAILY 30 tablet 0    loratadine (CLARITIN) 10 MG tablet TAKE 1 TABLET BY MOUTH EVERY DAY 30 tablet 10    Omega-3 Fatty Acids (FISH OIL) 1000 MG CAPS TAKE TWO (2) CAPSULES BY MOUTH DAILY 180 capsule 10    Blood Pressure KIT Use to check bp 1-2 times per day or as needed. 1 kit 0    Blood Pressure Monitoring (BLOOD PRESSURE CUFF) MISC Use to check blood pressure once daily. 1 each 0    pantoprazole (PROTONIX) 40 MG tablet Take 1 tablet by mouth in the morning.  90 tablet 1    potassium chloride (KLOR-CON M) 20 MEQ extended release tablet       Continuous Blood Gluc Sensor (FREESTYLE KOKO 2 SENSOR) MISC       Continuous Blood Gluc  (FREESTYLE KOKO 2 READER) SOUTH       zoster recombinant adjuvanted vaccine (SHINGRIX) 50 MCG/0.5ML SUSR injection 50 MCG IM then repeat 2-6 months. 0.5 mL 1     No current facility-administered medications for this visit. She is allergic to norco [hydrocodone-acetaminophen], oxycodone, percocet [oxycodone-acetaminophen], and sulfa antibiotics. She  reports that she quit smoking about 8 years ago. Her smoking use included cigarettes. She has a 20.00 pack-year smoking history. She has never used smokeless tobacco..      Objective:    Vitals:    08/31/22 1158   BP: 138/80   Pulse: 81   Temp: (!) 96.6 °F (35.9 °C)   TempSrc: Tympanic   SpO2: 96%   Weight: 173 lb (78.5 kg)   Height: 5' 3\" (1.6 m)     Body mass index is 30.65 kg/m².           (Please note that portions of this note were completed with a voice-recognition program. Efforts were made to edit the dictation but occasionally words are mis-transcribed.)

## 2022-09-01 RX ORDER — CYCLOBENZAPRINE HCL 10 MG
10 TABLET ORAL 2 TIMES DAILY PRN
Qty: 60 TABLET | Refills: 0 | Status: SHIPPED | OUTPATIENT
Start: 2022-09-01 | End: 2022-10-06

## 2022-09-01 NOTE — TELEPHONE ENCOUNTER
Fernando Clemons called requesting a refill of the below medication which has been pended for you:     Requested Prescriptions     Pending Prescriptions Disp Refills    cyclobenzaprine (FLEXERIL) 10 MG tablet 90 tablet 1     Sig: Take 1 tablet by mouth 2 times daily as needed for Muscle spasms       Last Appointment Date: 6/29/2022  Next Appointment Date: 9/27/2022    Allergies   Allergen Reactions    Norco [Hydrocodone-Acetaminophen]      Nausea and vomiting    Oxycodone Nausea And Vomiting    Percocet [Oxycodone-Acetaminophen] Nausea And Vomiting    Sulfa Antibiotics Other (See Comments)     Hallucinations.

## 2022-09-27 ENCOUNTER — TELEPHONE (OUTPATIENT)
Dept: FAMILY MEDICINE CLINIC | Age: 67
End: 2022-09-27

## 2022-09-28 ENCOUNTER — TELEPHONE (OUTPATIENT)
Dept: CARDIOLOGY | Age: 67
End: 2022-09-28

## 2022-09-28 NOTE — TELEPHONE ENCOUNTER
Received clearance request from [facility] for pt to have an EGD on 10-18-22. Please review and advise.      Last Appt:  7/20/2022  Next Appt:   1/25/2023  Med verified in [facility] [address]

## 2022-09-28 NOTE — TELEPHONE ENCOUNTER
[place] [address]  [address] [facility]  [place] [address]  Dept: MD He[place]        9/28/2022 (void after 30 days)        Re:  Rena Mtz            1955      Procedure/Surgery: EGD    Rena Mtz is at low cardiac risk, but acceptable for the planned surgical procedure. She may proceed accordingly.         Sincerely,      Jaren Cagle MD

## 2022-10-05 DIAGNOSIS — M62.838 MUSCLE SPASM: Primary | ICD-10-CM

## 2022-10-05 NOTE — TELEPHONE ENCOUNTER
Laure Kraus called requesting a refill of the below medication which has been pended for you:     Requested Prescriptions     Pending Prescriptions Disp Refills    cyclobenzaprine (FLEXERIL) 10 MG tablet [Pharmacy Med Name: Cyclobenzaprine Hydrochloride 10mg Tablet] 60 tablet 11     Sig: Take 1 tablet by mouth twice daily as needed for muscle spasms       Last Appointment Date: 6/29/2022  Next Appointment Date: 11/2/2022    Allergies   Allergen Reactions    Norco [Hydrocodone-Acetaminophen]      Nausea and vomiting    Oxycodone Nausea And Vomiting    Percocet [Oxycodone-Acetaminophen] Nausea And Vomiting    Sulfa Antibiotics Other (See Comments)     Hallucinations.

## 2022-10-06 ENCOUNTER — HOSPITAL ENCOUNTER (INPATIENT)
Age: 67
LOS: 3 days | Discharge: HOME | DRG: 639 | End: 2022-10-10
Attending: EMERGENCY MEDICINE | Admitting: INTERNAL MEDICINE
Payer: MEDICARE

## 2022-10-06 DIAGNOSIS — I10 ESSENTIAL HYPERTENSION: ICD-10-CM

## 2022-10-06 DIAGNOSIS — E11.9 TYPE 2 DIABETES MELLITUS WITHOUT COMPLICATION, WITH LONG-TERM CURRENT USE OF INSULIN (HCC): ICD-10-CM

## 2022-10-06 DIAGNOSIS — E11.65 HYPERGLYCEMIA DUE TO DIABETES MELLITUS (HCC): Primary | ICD-10-CM

## 2022-10-06 DIAGNOSIS — Z79.4 TYPE 2 DIABETES MELLITUS WITHOUT COMPLICATION, WITH LONG-TERM CURRENT USE OF INSULIN (HCC): ICD-10-CM

## 2022-10-06 LAB
ABSOLUTE EOS #: 0.14 K/UL (ref 0–0.44)
ABSOLUTE IMMATURE GRANULOCYTE: <0.03 K/UL (ref 0–0.3)
ABSOLUTE LYMPH #: 1.75 K/UL (ref 1.1–3.7)
ABSOLUTE MONO #: 0.5 K/UL (ref 0.1–1.2)
ANION GAP SERPL CALCULATED.3IONS-SCNC: 13 MMOL/L (ref 9–17)
BASOPHILS # BLD: 1 % (ref 0–2)
BASOPHILS ABSOLUTE: 0.07 K/UL (ref 0–0.2)
BETA-HYDROXYBUTYRATE: 0.3 MMOL/L (ref 0.02–0.27)
BUN BLDV-MCNC: 30 MG/DL (ref 8–23)
BUN/CREAT BLD: 19 (ref 9–20)
CALCIUM SERPL-MCNC: 9.2 MG/DL (ref 8.6–10.4)
CHLORIDE BLD-SCNC: 85 MMOL/L (ref 98–107)
CO2: 26 MMOL/L (ref 20–31)
CREAT SERPL-MCNC: 1.62 MG/DL (ref 0.5–0.9)
EOSINOPHILS RELATIVE PERCENT: 2 % (ref 1–4)
GFR SERPL CREATININE-BSD FRML MDRD: 35 ML/MIN/1.73M2
GLUCOSE BLD-MCNC: 800 MG/DL (ref 70–99)
HCT VFR BLD CALC: 42.2 % (ref 36.3–47.1)
HEMOGLOBIN: 14.7 G/DL (ref 11.9–15.1)
IMMATURE GRANULOCYTES: 0 %
LYMPHOCYTES # BLD: 25 % (ref 24–43)
MCH RBC QN AUTO: 30.9 PG (ref 25.2–33.5)
MCHC RBC AUTO-ENTMCNC: 34.8 G/DL (ref 25.2–33.5)
MCV RBC AUTO: 88.7 FL (ref 82.6–102.9)
MONOCYTES # BLD: 7 % (ref 3–12)
NRBC AUTOMATED: 0 PER 100 WBC
PDW BLD-RTO: 12.5 % (ref 11.8–14.4)
PLATELET # BLD: 234 K/UL (ref 138–453)
PMV BLD AUTO: 11.6 FL (ref 8.1–13.5)
POTASSIUM SERPL-SCNC: 3.6 MMOL/L (ref 3.7–5.3)
RBC # BLD: 4.76 M/UL (ref 3.95–5.11)
REASON FOR REJECTION: NORMAL
SARS-COV-2, RAPID: NOT DETECTED
SEG NEUTROPHILS: 65 % (ref 36–65)
SEGMENTED NEUTROPHILS ABSOLUTE COUNT: 4.64 K/UL (ref 1.5–8.1)
SODIUM BLD-SCNC: 124 MMOL/L (ref 135–144)
SPECIMEN DESCRIPTION: NORMAL
WBC # BLD: 7.1 K/UL (ref 3.5–11.3)
ZZ NTE CLEAN UP: ORDERED TEST: NORMAL
ZZ NTE WITH NAME CLEAN UP: SPECIMEN SOURCE: NORMAL

## 2022-10-06 PROCEDURE — 99285 EMERGENCY DEPT VISIT HI MDM: CPT

## 2022-10-06 PROCEDURE — 85025 COMPLETE CBC W/AUTO DIFF WBC: CPT

## 2022-10-06 PROCEDURE — 82010 KETONE BODYS QUAN: CPT

## 2022-10-06 PROCEDURE — 87635 SARS-COV-2 COVID-19 AMP PRB: CPT

## 2022-10-06 PROCEDURE — APPSS30 APP SPLIT SHARED TIME 16-30 MINUTES

## 2022-10-06 PROCEDURE — 2580000003 HC RX 258: Performed by: EMERGENCY MEDICINE

## 2022-10-06 PROCEDURE — 96361 HYDRATE IV INFUSION ADD-ON: CPT

## 2022-10-06 PROCEDURE — 6360000002 HC RX W HCPCS: Performed by: EMERGENCY MEDICINE

## 2022-10-06 PROCEDURE — 80048 BASIC METABOLIC PNL TOTAL CA: CPT

## 2022-10-06 PROCEDURE — 96374 THER/PROPH/DIAG INJ IV PUSH: CPT

## 2022-10-06 PROCEDURE — 36415 COLL VENOUS BLD VENIPUNCTURE: CPT

## 2022-10-06 PROCEDURE — 6370000000 HC RX 637 (ALT 250 FOR IP): Performed by: EMERGENCY MEDICINE

## 2022-10-06 PROCEDURE — 96375 TX/PRO/DX INJ NEW DRUG ADDON: CPT

## 2022-10-06 PROCEDURE — 83036 HEMOGLOBIN GLYCOSYLATED A1C: CPT

## 2022-10-06 RX ORDER — CYCLOBENZAPRINE HCL 10 MG
10 TABLET ORAL 2 TIMES DAILY PRN
Qty: 60 TABLET | Refills: 2 | Status: SHIPPED | OUTPATIENT
Start: 2022-10-06

## 2022-10-06 RX ORDER — 0.9 % SODIUM CHLORIDE 0.9 %
1000 INTRAVENOUS SOLUTION INTRAVENOUS ONCE
Status: COMPLETED | OUTPATIENT
Start: 2022-10-06 | End: 2022-10-07

## 2022-10-06 RX ORDER — 0.9 % SODIUM CHLORIDE 0.9 %
1000 INTRAVENOUS SOLUTION INTRAVENOUS ONCE
Status: COMPLETED | OUTPATIENT
Start: 2022-10-06 | End: 2022-10-06

## 2022-10-06 RX ORDER — ONDANSETRON 2 MG/ML
4 INJECTION INTRAMUSCULAR; INTRAVENOUS ONCE
Status: COMPLETED | OUTPATIENT
Start: 2022-10-06 | End: 2022-10-06

## 2022-10-06 RX ADMIN — SODIUM CHLORIDE 1000 ML: 9 INJECTION, SOLUTION INTRAVENOUS at 22:35

## 2022-10-06 RX ADMIN — ONDANSETRON 4 MG: 2 INJECTION INTRAMUSCULAR; INTRAVENOUS at 20:56

## 2022-10-06 RX ADMIN — INSULIN HUMAN 5 UNITS: 100 INJECTION, SOLUTION PARENTERAL at 22:35

## 2022-10-06 RX ADMIN — POTASSIUM BICARBONATE 20 MEQ: 782 TABLET, EFFERVESCENT ORAL at 22:35

## 2022-10-06 RX ADMIN — SODIUM CHLORIDE 1000 ML: 9 INJECTION, SOLUTION INTRAVENOUS at 20:53

## 2022-10-06 ASSESSMENT — LIFESTYLE VARIABLES
HOW OFTEN DO YOU HAVE A DRINK CONTAINING ALCOHOL: NEVER
HOW MANY STANDARD DRINKS CONTAINING ALCOHOL DO YOU HAVE ON A TYPICAL DAY: PATIENT DOES NOT DRINK

## 2022-10-06 ASSESSMENT — PAIN - FUNCTIONAL ASSESSMENT: PAIN_FUNCTIONAL_ASSESSMENT: 0-10

## 2022-10-06 ASSESSMENT — PAIN SCALES - GENERAL: PAINLEVEL_OUTOF10: 8

## 2022-10-07 PROBLEM — N17.9 ACUTE KIDNEY INJURY SUPERIMPOSED ON CKD (HCC): Status: ACTIVE | Noted: 2022-10-07

## 2022-10-07 PROBLEM — N18.9 ACUTE KIDNEY INJURY SUPERIMPOSED ON CKD (HCC): Status: ACTIVE | Noted: 2022-10-07

## 2022-10-07 PROBLEM — E11.65 UNCONTROLLED TYPE 2 DIABETES MELLITUS WITH HYPERGLYCEMIA (HCC): Status: ACTIVE | Noted: 2022-10-07

## 2022-10-07 LAB
ABSOLUTE EOS #: 0.22 K/UL (ref 0–0.44)
ABSOLUTE IMMATURE GRANULOCYTE: <0.03 K/UL (ref 0–0.3)
ABSOLUTE LYMPH #: 2.76 K/UL (ref 1.1–3.7)
ABSOLUTE MONO #: 0.39 K/UL (ref 0.1–1.2)
ANION GAP SERPL CALCULATED.3IONS-SCNC: 9 MMOL/L (ref 9–17)
BACTERIA: ABNORMAL
BASOPHILS # BLD: 1 % (ref 0–2)
BASOPHILS ABSOLUTE: 0.06 K/UL (ref 0–0.2)
BILIRUBIN URINE: NEGATIVE
BUN BLDV-MCNC: 26 MG/DL (ref 8–23)
BUN/CREAT BLD: 18 (ref 9–20)
CALCIUM SERPL-MCNC: 9.8 MG/DL (ref 8.6–10.4)
CHLORIDE BLD-SCNC: 98 MMOL/L (ref 98–107)
CO2: 31 MMOL/L (ref 20–31)
CREAT SERPL-MCNC: 1.46 MG/DL (ref 0.5–0.9)
EOSINOPHILS RELATIVE PERCENT: 4 % (ref 1–4)
EPITHELIAL CELLS UA: ABNORMAL /HPF (ref 0–5)
ESTIMATED AVERAGE GLUCOSE: 441 MG/DL
GFR SERPL CREATININE-BSD FRML MDRD: 39 ML/MIN/1.73M2
GLUCOSE BLD-MCNC: 101 MG/DL (ref 65–105)
GLUCOSE BLD-MCNC: 109 MG/DL (ref 65–105)
GLUCOSE BLD-MCNC: 129 MG/DL (ref 65–105)
GLUCOSE BLD-MCNC: 136 MG/DL (ref 65–105)
GLUCOSE BLD-MCNC: 153 MG/DL (ref 65–105)
GLUCOSE BLD-MCNC: 156 MG/DL (ref 65–105)
GLUCOSE BLD-MCNC: 168 MG/DL (ref 65–105)
GLUCOSE BLD-MCNC: 177 MG/DL (ref 65–105)
GLUCOSE BLD-MCNC: 177 MG/DL (ref 65–105)
GLUCOSE BLD-MCNC: 181 MG/DL (ref 65–105)
GLUCOSE BLD-MCNC: 182 MG/DL (ref 65–105)
GLUCOSE BLD-MCNC: 194 MG/DL (ref 65–105)
GLUCOSE BLD-MCNC: 197 MG/DL (ref 65–105)
GLUCOSE BLD-MCNC: 197 MG/DL (ref 65–105)
GLUCOSE BLD-MCNC: 200 MG/DL (ref 65–105)
GLUCOSE BLD-MCNC: 203 MG/DL (ref 65–105)
GLUCOSE BLD-MCNC: 212 MG/DL (ref 65–105)
GLUCOSE BLD-MCNC: 219 MG/DL (ref 65–105)
GLUCOSE BLD-MCNC: 233 MG/DL (ref 70–99)
GLUCOSE BLD-MCNC: 335 MG/DL (ref 65–105)
GLUCOSE BLD-MCNC: 355 MG/DL (ref 65–105)
GLUCOSE BLD-MCNC: 444 MG/DL (ref 65–105)
GLUCOSE BLD-MCNC: 527 MG/DL (ref 65–105)
GLUCOSE BLD-MCNC: >600 MG/DL (ref 65–105)
GLUCOSE BLD-MCNC: >600 MG/DL (ref 65–105)
GLUCOSE URINE: ABNORMAL
HBA1C MFR BLD: 17 % (ref 4–6)
HCT VFR BLD CALC: 36.8 % (ref 36.3–47.1)
HEMOGLOBIN: 13 G/DL (ref 11.9–15.1)
IMMATURE GRANULOCYTES: 0 %
KETONES, URINE: NEGATIVE
LEUKOCYTE ESTERASE, URINE: NEGATIVE
LYMPHOCYTES # BLD: 46 % (ref 24–43)
MAGNESIUM: 1.9 MG/DL (ref 1.6–2.6)
MCH RBC QN AUTO: 31.2 PG (ref 25.2–33.5)
MCHC RBC AUTO-ENTMCNC: 35.3 G/DL (ref 25.2–33.5)
MCV RBC AUTO: 88.2 FL (ref 82.6–102.9)
MONOCYTES # BLD: 6 % (ref 3–12)
NITRITE, URINE: NEGATIVE
NRBC AUTOMATED: 0 PER 100 WBC
PDW BLD-RTO: 12.6 % (ref 11.8–14.4)
PH UA: 6 (ref 5–6)
PLATELET # BLD: 198 K/UL (ref 138–453)
PMV BLD AUTO: 10.5 FL (ref 8.1–13.5)
POTASSIUM SERPL-SCNC: 3.2 MMOL/L (ref 3.7–5.3)
POTASSIUM SERPL-SCNC: 4.4 MMOL/L (ref 3.7–5.3)
PROTEIN UA: NEGATIVE
RBC # BLD: 4.17 M/UL (ref 3.95–5.11)
RBC UA: ABNORMAL /HPF (ref 0–4)
SEG NEUTROPHILS: 43 % (ref 36–65)
SEGMENTED NEUTROPHILS ABSOLUTE COUNT: 2.62 K/UL (ref 1.5–8.1)
SODIUM BLD-SCNC: 138 MMOL/L (ref 135–144)
SPECIFIC GRAVITY UA: 1.01 (ref 1.01–1.02)
URINE HGB: NEGATIVE
UROBILINOGEN, URINE: NORMAL
WBC # BLD: 6.1 K/UL (ref 3.5–11.3)
WBC UA: ABNORMAL /HPF (ref 0–4)

## 2022-10-07 PROCEDURE — 94640 AIRWAY INHALATION TREATMENT: CPT

## 2022-10-07 PROCEDURE — 93005 ELECTROCARDIOGRAM TRACING: CPT

## 2022-10-07 PROCEDURE — 85025 COMPLETE CBC W/AUTO DIFF WBC: CPT

## 2022-10-07 PROCEDURE — 81001 URINALYSIS AUTO W/SCOPE: CPT

## 2022-10-07 PROCEDURE — 80048 BASIC METABOLIC PNL TOTAL CA: CPT

## 2022-10-07 PROCEDURE — 36415 COLL VENOUS BLD VENIPUNCTURE: CPT

## 2022-10-07 PROCEDURE — 6370000000 HC RX 637 (ALT 250 FOR IP)

## 2022-10-07 PROCEDURE — 97161 PT EVAL LOW COMPLEX 20 MIN: CPT

## 2022-10-07 PROCEDURE — 6370000000 HC RX 637 (ALT 250 FOR IP): Performed by: INTERNAL MEDICINE

## 2022-10-07 PROCEDURE — 2000000000 HC ICU R&B

## 2022-10-07 PROCEDURE — 84132 ASSAY OF SERUM POTASSIUM: CPT

## 2022-10-07 PROCEDURE — 99232 SBSQ HOSP IP/OBS MODERATE 35: CPT | Performed by: INTERNAL MEDICINE

## 2022-10-07 PROCEDURE — 83735 ASSAY OF MAGNESIUM: CPT

## 2022-10-07 PROCEDURE — 2060000000 HC ICU INTERMEDIATE R&B

## 2022-10-07 PROCEDURE — 2580000003 HC RX 258

## 2022-10-07 PROCEDURE — 82947 ASSAY GLUCOSE BLOOD QUANT: CPT

## 2022-10-07 PROCEDURE — 6360000002 HC RX W HCPCS

## 2022-10-07 PROCEDURE — 94760 N-INVAS EAR/PLS OXIMETRY 1: CPT

## 2022-10-07 RX ORDER — CETIRIZINE HYDROCHLORIDE 5 MG/1
5 TABLET ORAL DAILY
Status: DISCONTINUED | OUTPATIENT
Start: 2022-10-07 | End: 2022-10-10 | Stop reason: HOSPADM

## 2022-10-07 RX ORDER — FENOFIBRATE 160 MG/1
160 TABLET ORAL DAILY
Status: DISCONTINUED | OUTPATIENT
Start: 2022-10-07 | End: 2022-10-10 | Stop reason: HOSPADM

## 2022-10-07 RX ORDER — POTASSIUM CHLORIDE 20 MEQ/1
40 TABLET, EXTENDED RELEASE ORAL ONCE
Status: COMPLETED | OUTPATIENT
Start: 2022-10-07 | End: 2022-10-07

## 2022-10-07 RX ORDER — LOSARTAN POTASSIUM 25 MG/1
25 TABLET ORAL DAILY
Status: DISCONTINUED | OUTPATIENT
Start: 2022-10-07 | End: 2022-10-10 | Stop reason: HOSPADM

## 2022-10-07 RX ORDER — DILTIAZEM HYDROCHLORIDE 120 MG/1
120 CAPSULE, COATED, EXTENDED RELEASE ORAL DAILY
Status: DISCONTINUED | OUTPATIENT
Start: 2022-10-07 | End: 2022-10-10 | Stop reason: HOSPADM

## 2022-10-07 RX ORDER — ACETAMINOPHEN 650 MG/1
650 SUPPOSITORY RECTAL EVERY 6 HOURS PRN
Status: DISCONTINUED | OUTPATIENT
Start: 2022-10-07 | End: 2022-10-10 | Stop reason: HOSPADM

## 2022-10-07 RX ORDER — IPRATROPIUM BROMIDE AND ALBUTEROL SULFATE 2.5; .5 MG/3ML; MG/3ML
1 SOLUTION RESPIRATORY (INHALATION) EVERY 4 HOURS PRN
Status: DISCONTINUED | OUTPATIENT
Start: 2022-10-07 | End: 2022-10-10 | Stop reason: HOSPADM

## 2022-10-07 RX ORDER — GABAPENTIN 300 MG/1
600 CAPSULE ORAL 2 TIMES DAILY PRN
Status: DISCONTINUED | OUTPATIENT
Start: 2022-10-07 | End: 2022-10-10 | Stop reason: HOSPADM

## 2022-10-07 RX ORDER — SODIUM CHLORIDE 0.9 % (FLUSH) 0.9 %
5-40 SYRINGE (ML) INJECTION PRN
Status: DISCONTINUED | OUTPATIENT
Start: 2022-10-07 | End: 2022-10-10 | Stop reason: HOSPADM

## 2022-10-07 RX ORDER — POLYETHYLENE GLYCOL 3350 17 G/17G
17 POWDER, FOR SOLUTION ORAL DAILY PRN
Status: DISCONTINUED | OUTPATIENT
Start: 2022-10-07 | End: 2022-10-10 | Stop reason: HOSPADM

## 2022-10-07 RX ORDER — POTASSIUM CHLORIDE 20 MEQ/1
20 TABLET, EXTENDED RELEASE ORAL
Status: DISCONTINUED | OUTPATIENT
Start: 2022-10-07 | End: 2022-10-10 | Stop reason: HOSPADM

## 2022-10-07 RX ORDER — ONDANSETRON 2 MG/ML
4 INJECTION INTRAMUSCULAR; INTRAVENOUS EVERY 6 HOURS PRN
Status: DISCONTINUED | OUTPATIENT
Start: 2022-10-07 | End: 2022-10-10 | Stop reason: HOSPADM

## 2022-10-07 RX ORDER — INSULIN LISPRO 100 [IU]/ML
0-4 INJECTION, SOLUTION INTRAVENOUS; SUBCUTANEOUS
Status: DISCONTINUED | OUTPATIENT
Start: 2022-10-07 | End: 2022-10-10 | Stop reason: HOSPADM

## 2022-10-07 RX ORDER — IPRATROPIUM BROMIDE AND ALBUTEROL SULFATE 2.5; .5 MG/3ML; MG/3ML
1 SOLUTION RESPIRATORY (INHALATION) 4 TIMES DAILY
Status: DISCONTINUED | OUTPATIENT
Start: 2022-10-07 | End: 2022-10-10 | Stop reason: HOSPADM

## 2022-10-07 RX ORDER — ACETAMINOPHEN 325 MG/1
650 TABLET ORAL EVERY 6 HOURS PRN
Status: DISCONTINUED | OUTPATIENT
Start: 2022-10-07 | End: 2022-10-10 | Stop reason: HOSPADM

## 2022-10-07 RX ORDER — CYCLOBENZAPRINE HCL 10 MG
10 TABLET ORAL 2 TIMES DAILY PRN
Status: DISCONTINUED | OUTPATIENT
Start: 2022-10-07 | End: 2022-10-10 | Stop reason: HOSPADM

## 2022-10-07 RX ORDER — IPRATROPIUM BROMIDE AND ALBUTEROL SULFATE 2.5; .5 MG/3ML; MG/3ML
1 SOLUTION RESPIRATORY (INHALATION) EVERY 4 HOURS
Status: DISCONTINUED | OUTPATIENT
Start: 2022-10-07 | End: 2022-10-07

## 2022-10-07 RX ORDER — SODIUM CHLORIDE 9 MG/ML
INJECTION, SOLUTION INTRAVENOUS PRN
Status: DISCONTINUED | OUTPATIENT
Start: 2022-10-07 | End: 2022-10-10 | Stop reason: HOSPADM

## 2022-10-07 RX ORDER — INSULIN LISPRO 100 [IU]/ML
5 INJECTION, SOLUTION INTRAVENOUS; SUBCUTANEOUS
Status: DISCONTINUED | OUTPATIENT
Start: 2022-10-07 | End: 2022-10-09

## 2022-10-07 RX ORDER — SODIUM CHLORIDE 0.9 % (FLUSH) 0.9 %
5-40 SYRINGE (ML) INJECTION EVERY 12 HOURS SCHEDULED
Status: DISCONTINUED | OUTPATIENT
Start: 2022-10-07 | End: 2022-10-10 | Stop reason: HOSPADM

## 2022-10-07 RX ORDER — ENOXAPARIN SODIUM 100 MG/ML
40 INJECTION SUBCUTANEOUS DAILY
Status: DISCONTINUED | OUTPATIENT
Start: 2022-10-07 | End: 2022-10-10 | Stop reason: HOSPADM

## 2022-10-07 RX ORDER — VITAMIN B COMPLEX
1 TABLET ORAL DAILY
Status: DISCONTINUED | OUTPATIENT
Start: 2022-10-07 | End: 2022-10-10 | Stop reason: HOSPADM

## 2022-10-07 RX ORDER — GLIPIZIDE 5 MG/1
5 TABLET ORAL
Status: DISCONTINUED | OUTPATIENT
Start: 2022-10-07 | End: 2022-10-10 | Stop reason: HOSPADM

## 2022-10-07 RX ORDER — ONDANSETRON 4 MG/1
4 TABLET, ORALLY DISINTEGRATING ORAL EVERY 8 HOURS PRN
Status: DISCONTINUED | OUTPATIENT
Start: 2022-10-07 | End: 2022-10-10 | Stop reason: HOSPADM

## 2022-10-07 RX ORDER — INSULIN GLARGINE 100 [IU]/ML
15 INJECTION, SOLUTION SUBCUTANEOUS NIGHTLY
Status: DISCONTINUED | OUTPATIENT
Start: 2022-10-07 | End: 2022-10-08

## 2022-10-07 RX ORDER — DEXTROSE MONOHYDRATE 100 MG/ML
INJECTION, SOLUTION INTRAVENOUS CONTINUOUS PRN
Status: DISCONTINUED | OUTPATIENT
Start: 2022-10-07 | End: 2022-10-10 | Stop reason: HOSPADM

## 2022-10-07 RX ORDER — ATORVASTATIN CALCIUM 40 MG/1
40 TABLET, FILM COATED ORAL DAILY
Status: DISCONTINUED | OUTPATIENT
Start: 2022-10-07 | End: 2022-10-10 | Stop reason: HOSPADM

## 2022-10-07 RX ORDER — HYDROCHLOROTHIAZIDE 25 MG/1
25 TABLET ORAL EVERY MORNING
Status: DISCONTINUED | OUTPATIENT
Start: 2022-10-07 | End: 2022-10-10 | Stop reason: HOSPADM

## 2022-10-07 RX ORDER — INSULIN LISPRO 100 [IU]/ML
0-4 INJECTION, SOLUTION INTRAVENOUS; SUBCUTANEOUS NIGHTLY
Status: DISCONTINUED | OUTPATIENT
Start: 2022-10-07 | End: 2022-10-10 | Stop reason: HOSPADM

## 2022-10-07 RX ORDER — LEVOTHYROXINE SODIUM 88 UG/1
88 TABLET ORAL
Status: DISCONTINUED | OUTPATIENT
Start: 2022-10-07 | End: 2022-10-10 | Stop reason: HOSPADM

## 2022-10-07 RX ADMIN — Medication 14.01 UNITS/HR: at 01:44

## 2022-10-07 RX ADMIN — Medication 4.4 UNITS/HR: at 23:41

## 2022-10-07 RX ADMIN — LEVOTHYROXINE SODIUM 88 MCG: 0.09 TABLET ORAL at 06:26

## 2022-10-07 RX ADMIN — POTASSIUM CHLORIDE 40 MEQ: 1500 TABLET, EXTENDED RELEASE ORAL at 08:53

## 2022-10-07 RX ADMIN — CETIRIZINE HYDROCHLORIDE 5 MG: 5 TABLET, FILM COATED ORAL at 08:49

## 2022-10-07 RX ADMIN — ATORVASTATIN CALCIUM 40 MG: 40 TABLET, FILM COATED ORAL at 08:48

## 2022-10-07 RX ADMIN — IPRATROPIUM BROMIDE AND ALBUTEROL SULFATE 1 AMPULE: .5; 2.5 SOLUTION RESPIRATORY (INHALATION) at 16:04

## 2022-10-07 RX ADMIN — IPRATROPIUM BROMIDE AND ALBUTEROL SULFATE 1 AMPULE: .5; 2.5 SOLUTION RESPIRATORY (INHALATION) at 04:30

## 2022-10-07 RX ADMIN — METOPROLOL TARTRATE 25 MG: 25 TABLET, FILM COATED ORAL at 01:44

## 2022-10-07 RX ADMIN — GLIPIZIDE 5 MG: 5 TABLET ORAL at 16:42

## 2022-10-07 RX ADMIN — FENOFIBRATE 160 MG: 160 TABLET ORAL at 08:50

## 2022-10-07 RX ADMIN — INSULIN LISPRO 5 UNITS: 100 INJECTION, SOLUTION INTRAVENOUS; SUBCUTANEOUS at 16:58

## 2022-10-07 RX ADMIN — IPRATROPIUM BROMIDE AND ALBUTEROL SULFATE 1 AMPULE: .5; 2.5 SOLUTION RESPIRATORY (INHALATION) at 19:57

## 2022-10-07 RX ADMIN — CHOLECALCIFEROL TAB 25 MCG (1000 UNIT) 1000 UNITS: 25 TAB at 08:51

## 2022-10-07 RX ADMIN — Medication 10.7 UNITS/HR: at 15:23

## 2022-10-07 RX ADMIN — ENOXAPARIN SODIUM 40 MG: 100 INJECTION SUBCUTANEOUS at 08:49

## 2022-10-07 RX ADMIN — IPRATROPIUM BROMIDE AND ALBUTEROL SULFATE 1 AMPULE: .5; 2.5 SOLUTION RESPIRATORY (INHALATION) at 12:14

## 2022-10-07 RX ADMIN — MOMETASONE FUROATE AND FORMOTEROL FUMARATE DIHYDRATE 2 PUFF: 200; 5 AEROSOL RESPIRATORY (INHALATION) at 19:57

## 2022-10-07 RX ADMIN — MOMETASONE FUROATE AND FORMOTEROL FUMARATE DIHYDRATE 2 PUFF: 200; 5 AEROSOL RESPIRATORY (INHALATION) at 07:37

## 2022-10-07 RX ADMIN — INSULIN GLARGINE 15 UNITS: 100 INJECTION, SOLUTION SUBCUTANEOUS at 21:13

## 2022-10-07 RX ADMIN — GLIPIZIDE 5 MG: 5 TABLET ORAL at 06:26

## 2022-10-07 RX ADMIN — INSULIN LISPRO 5 UNITS: 100 INJECTION, SOLUTION INTRAVENOUS; SUBCUTANEOUS at 11:43

## 2022-10-07 RX ADMIN — LOSARTAN POTASSIUM 25 MG: 25 TABLET, FILM COATED ORAL at 08:51

## 2022-10-07 RX ADMIN — IPRATROPIUM BROMIDE AND ALBUTEROL SULFATE 1 AMPULE: .5; 2.5 SOLUTION RESPIRATORY (INHALATION) at 07:35

## 2022-10-07 RX ADMIN — DILTIAZEM HYDROCHLORIDE 120 MG: 120 CAPSULE, EXTENDED RELEASE ORAL at 08:49

## 2022-10-07 RX ADMIN — POTASSIUM CHLORIDE 20 MEQ: 1500 TABLET, EXTENDED RELEASE ORAL at 08:51

## 2022-10-07 RX ADMIN — GABAPENTIN 600 MG: 300 CAPSULE ORAL at 21:16

## 2022-10-07 RX ADMIN — INSULIN GLARGINE 15 UNITS: 100 INJECTION, SOLUTION SUBCUTANEOUS at 01:44

## 2022-10-07 ASSESSMENT — PAIN SCALES - GENERAL
PAINLEVEL_OUTOF10: 0
PAINLEVEL_OUTOF10: 0

## 2022-10-07 NOTE — ED PROVIDER NOTES
BREATH. ATORVASTATIN (LIPITOR) 40 MG TABLET    Take 1 tablet by mouth in the morning. BLOOD PRESSURE KIT    Use to check bp 1-2 times per day or as needed. BLOOD PRESSURE MONITORING (BLOOD PRESSURE CUFF) MISC    Use to check blood pressure once daily. CONTINUOUS BLOOD GLUC  (FREESTYLE KOKO 2 READER) SOUTH        CONTINUOUS BLOOD GLUC SENSOR (FREESTYLE KOKO 2 SENSOR) MISC        CYCLOBENZAPRINE (FLEXERIL) 10 MG TABLET    Take 1 tablet by mouth 2 times daily as needed for Muscle spasms    DILTIAZEM (CARDIZEM CD) 120 MG EXTENDED RELEASE CAPSULE    TAKE 1 CAPSULE BY MOUTH EVERY DAY    FENOFIBRATE (TRICOR) 145 MG TABLET    Take 1 tablet by mouth in the morning. Richelle Treviño FLUTICASONE-SALMETEROL (ADVAIR) 250-50 MCG/ACT AEPB DISKUS INHALER    Inhale 1 puff into the lungs 2 times daily    GABAPENTIN (NEURONTIN) 600 MG TABLET    Take 1 tab by mouth 1-2x's daily as needed for chronic neck and back pain. GLIMEPIRIDE (AMARYL) 2 MG TABLET    Take 1 tablet by mouth in the morning and 1 tablet in the evening. HYDROCHLOROTHIAZIDE (HYDRODIURIL) 25 MG TABLET    Take 1 tablet by mouth every morning    LANTUS SOLOSTAR 100 UNIT/ML INJECTION PEN    Inject 15 Units into the skin nightly    LEVOTHYROXINE (SYNTHROID) 88 MCG TABLET    TAKE 1 TABLET BY MOUTH EVERY DAY    LORATADINE (CLARITIN) 10 MG TABLET    TAKE 1 TABLET BY MOUTH EVERY DAY    LOSARTAN (COZAAR) 25 MG TABLET    Take 1 tablet by mouth in the morning. METOCLOPRAMIDE (REGLAN) 5 MG TABLET    Take 1 tablet by mouth 3 times daily    METOPROLOL TARTRATE (LOPRESSOR) 25 MG TABLET    Take 1 tablet by mouth in the morning and 1 tablet before bedtime. OMEGA-3 FATTY ACIDS (FISH OIL) 1000 MG CAPS    TAKE TWO (2) CAPSULES BY MOUTH DAILY    PANTOPRAZOLE (PROTONIX) 40 MG TABLET    Take 1 tablet by mouth in the morning.     POTASSIUM CHLORIDE (KLOR-CON M) 20 MEQ EXTENDED RELEASE TABLET        TRULICITY 1.5 UY/4.7ZK SOPN    Inject 1.5 mg into the skin once a week VITAMIN D3 (CHOLECALCIFEROL) 25 MCG (1000 UT) TABS TABLET    TAKE 1 TABLET BY MOUTH DAILY    ZOSTER RECOMBINANT ADJUVANTED VACCINE (SHINGRIX) 50 MCG/0.5ML SUSR INJECTION    50 MCG IM then repeat 2-6 months. ALLERGIES     is allergic to norco [hydrocodone-acetaminophen], oxycodone, percocet [oxycodone-acetaminophen], and sulfa antibiotics. FAMILY HISTORY     She indicated that her mother is alive. She indicated that her father is . She indicated that her brother is alive. She indicated that her son is alive. She indicated that her maternal aunt is . family history includes Heart Disease in her mother; High Cholesterol in her mother; No Known Problems in her son. SOCIAL HISTORY      reports that she quit smoking about 8 years ago. Her smoking use included cigarettes. She has a 20.00 pack-year smoking history. She has never used smokeless tobacco. She reports that she does not drink alcohol and does not use drugs. PHYSICAL EXAM     INITIAL VITALS:  weight is 175 lb (79.4 kg). Her tympanic temperature is 97.8 °F (36.6 °C). Her blood pressure is 110/65 and her pulse is 80. Her respiration is 17 and oxygen saturation is 94%.       General: Patient alert nontoxic-appearing female in no apparent distress  HEENT: Head is atraumatic conjunctive are clear mouth shows dry mucous membranes  Neck: Supple  Respiratory: Lung sounds are clear bilateral  Cardiac: Heart is regular rate and rhythm  GI: Abdomen soft nontender bowel sounds active throughout  Neuro: Patient has no gross focal neurological deficits at bedside    DIFFERENTIAL DIAGNOSIS/ MDM:     Hyperglycemia obtain GI work-up    DIAGNOSTIC RESULTS     EKG: All EKG's are interpreted by the Emergency Department Physician who either signs or Co-signs this chart in the absence of a cardiologist.        RADIOLOGY:   I directly visualized the following  images and reviewed the radiologist interpretations:  No orders to display LABS:  Labs Reviewed   CBC WITH AUTO DIFFERENTIAL - Abnormal; Notable for the following components:       Result Value    MCHC 34.8 (*)     All other components within normal limits   BETA-HYDROXYBUTYRATE - Abnormal; Notable for the following components:    Beta-Hydroxybutyrate 0.30 (*)     All other components within normal limits   BASIC METABOLIC PANEL - Abnormal; Notable for the following components:    Glucose 800 (*)     BUN 30 (*)     Creatinine 1.62 (*)     Est, Glom Filt Rate 35 (*)     Sodium 124 (*)     Potassium 3.6 (*)     Chloride 85 (*)     All other components within normal limits   SPECIMEN REJECTION   URINALYSIS WITH REFLEX TO CULTURE         EMERGENCY DEPARTMENT COURSE:   Vitals:    Vitals:    10/06/22 2022 10/06/22 2030   BP:  110/65   Pulse: 79 80   Resp: 17    Temp: 97.8 °F (36.6 °C)    TempSrc: Tympanic    SpO2: 94% 94%   Weight: 175 lb (79.4 kg)      -------------------------  BP: 110/65, Temp: 97.8 °F (36.6 °C), Heart Rate: 80, Resp: 17    Orders Placed This Encounter   Medications    0.9 % sodium chloride bolus    ondansetron (ZOFRAN) injection 4 mg           Re-evaluation Notes    Was given IV fluids her sugar came back at 800 with relatively normal ketones at this time though I do feel she needs to be admitted for slower correction of her sugar I did speak with nurse practitioner Kwesi Ngo was agreeable    CRITICAL CARE:   None      CONSULTS:      PROCEDURES:  None    FINAL IMPRESSION      1. Hyperglycemia due to diabetes mellitus [facility])          DISPOSITION/PLAN   DISPOSITION Decision To Admit 10/06/2022 10:14:37 PM      Condition on Disposition    Fair    PATIENT REFERRED TO:  No follow-up provider specified.     DISCHARGE MEDICATIONS:  New Prescriptions    No medications on file       (Please note that portions of this note were completed with a voice recognition program.  Efforts were made to edit the dictations but occasionally words are mis-transcribed.)    Aggie Manning MD,, MD, F.A.C.E.P.   Attending Emergency Physician        Zeinab Fernando MD  10/06/22 3382

## 2022-10-07 NOTE — PROGRESS NOTES
10/07/22 1125   Encounter Summary   Encounter Overview/Reason  Initial Encounter   Service Provided For: Patient   Referral/Consult From: [facility] Family members;Friends/neighbors   Last Encounter  10/07/22   Complexity of Encounter Moderate   Begin Time 1040   End Time  1058   Total Time Calculated 18 min   Spiritual/Emotional needs   Type Spiritual Support   Assessment/Intervention/Outcome   Assessment Calm;Coping   Intervention Active listening;Nurtured Hope;Prayer (assurance of)/[place]   Outcome Comfort;Engaged in conversation;Expressed Gratitude

## 2022-10-07 NOTE — PROGRESS NOTES
Physical Therapy  Facility/Department: [address] [place] [address]  Physical Therapy Initial Assessment    Name: Lolis Umana  : 1955  MRN: [id number]  Date of Service: 10/7/2022    Discharge Recommendations:  Continue to assess pending progress, Home with Home health PT          Patient Diagnosis(es): The encounter diagnosis was Hyperglycemia due to diabetes mellitus ([facility] [address] Past Medical History:  has a past medical history of Abdominal pain, Asthma, Atrial fibrillation ([facility] [address] Bowel obstruction ([address] COPD (chronic obstructive pulmonary disease) ([facility] [address] DDD (degenerative disc disease), Diabetes mellitus ([facility] [address] Hyperlipidemia, Hypertension, Hyperthyroidism, and Type II or unspecified type diabetes mellitus without mention of complication, not stated as uncontrolled. Past Surgical History:  has a past surgical history that includes Tubal ligation; Tonsillectomy; Colonoscopy (2014); Upper gastrointestinal endoscopy (2016); tracheostomy (); tracheostomy closure; Gastrostomy tube placement; gastrostomy tube change; Dental surgery (N/A, 2017); Insert Midline Catheter (2021); Colonoscopy (N/A, 2021); Upper gastrointestinal endoscopy (N/A, 2021); Colonoscopy (2021); and Thyroid surgery (). Assessment   Body Structures, Functions, Activity Limitations Requiring Skilled Therapeutic Intervention: Decreased functional mobility ; Decreased ADL status; Decreased ROM; Decreased endurance;Decreased strength;Decreased balance;Decreased posture  Therapy Prognosis: Good  Decision Making: Low Complexity  Activity Tolerance  Activity Tolerance: Patient tolerated evaluation without incident     Plan   Physcial Therapy Plan  General Plan: 1 time a day 7 days a week  Current Treatment Recommendations: Strengthening, ROM, Balance training, Functional mobility training, Gait training, Stair training, Endurance training, Neuromuscular re-education, Home exercise program, Safety education & training, Patient/Caregiver education & training, Equipment evaluation, education, & procurement     Restrictions        Subjective   General  Chart Reviewed: Yes  Patient assessed for rehabilitation services?: Yes         Social/Functional History  Social/Functional History  Lives With: Alone  Type of Home: Apartment  Home Layout: One level  Home Access: Elevator  Bathroom Shower/Tub: Walk-in shower  Bathroom Toilet: Standard  Bathroom Equipment: Grab bars in shower, Shower chair, Grab bars around toilet  Home Equipment: Walker, [address] Help From: Family  ADL Assistance: Independent  Homemaking Assistance: Independent  Homemaking Responsibilities: Yes  Ambulation Assistance: Independent  Transfer Assistance: Independent  Active : No  Patient's  Info: Brother  Vision/Hearing  Vision  Vision: Within Functional Limits  Hearing  Hearing: Within functional limits    Cognition   Orientation  Overall Orientation Status: Within Functional Limits     Objective   Heart Rate: 68  Heart Rate Source: Monitor  BP: 90/71  BP Location: Left upper arm  BP Method: Automatic  Patient Position: Semi fowlers  MAP (Calculated): 77.33  Resp: 16  SpO2: 94 %  O2 Device: None (Room air)     Observation/Palpation  Posture: Good        AROM RLE (degrees)  RLE AROM: WFL  AROM LLE (degrees)  LLE AROM : WFL  Strength RLE  Comment: grossly 4/5  Strength LLE  Comment: grossly 4/5           Bed mobility  Supine to Sit: Contact guard assistance  Sit to Supine: Contact guard assistance  Transfers  Sit to Stand: Contact guard assistance  Stand to Sit: Contact guard assistance  Ambulation  Surface: Level tile  Device: No Device  Assistance: Contact guard assistance  Quality of Gait: decreased balance with turns  Gait Deviations: Slow Dulce Maria  Distance: 10'     Balance  Posture: Fair  Sitting - Static: Good  Sitting - Dynamic: Fair;+  Standing - Static: Fair  Standing - Dynamic: Fair;-           OutComes Score AM-PAC Score             Tinneti Score       Goals  Short Term Goals  Time Frame for Short Term Goals: LOS  Short Term Goal 1: Bed mobilty with mod I  Short Term Goal 2: Transfers mod I  Short Term Goal 3: Amb x 150ft with no device with mod I  Patient Goals   Patient Goals : Return Home       Education         Therapy Time   Individual Concurrent Group Co-treatment   Time In 0825         Time Out 0837         Minutes 12                 Dorian Myers, PT

## 2022-10-07 NOTE — PLAN OF CARE
Problem: ABCDS Injury Assessment  Goal: Absence of physical injury  Outcome: Progressing     Problem: Discharge Planning  Goal: Discharge to home or other facility with appropriate resources  Outcome: Progressing  Flowsheets (Taken 72/5/8249 2518 by Tram Ponce RN)  Discharge to home or other facility with appropriate resources: Identify barriers to discharge with patient and caregiver     Problem: Pain  Goal: Verbalizes/displays adequate comfort level or baseline comfort level  Outcome: Progressing     Problem: Chronic Conditions and Co-morbidities  Goal: Patient's chronic conditions and co-morbidity symptoms are monitored and maintained or improved  Outcome: Progressing

## 2022-10-07 NOTE — CARE COORDINATION
Case Management Initial Discharge Plan  Jany Sim             Met with:patient to discuss discharge plans. Information verified: address, contacts, phone number, , and insurance: Yes  Insurance Provider: Primary:  Payor: Luisa Davis / Plan: Karen Best / Product Type: *No Product type* /                                         Emergency Contact/Next of Kin name & number: verified  Who are involved in patient's support system? family    PCP: Edda Car,   Date of last visit: see chart    Discharge Planning    Living Arrangements:  [address] has 1 story  Elevator to get into front door 21 )    Patient able to perform ADL's:Independent    Current Services (outpatient & in home) none  DME equipment: walker if needed      Is patient receiving oral anticoagulation therapy? No    Potential Assistance Needed:  N/A    Patient agreeable to home care: n/a  Freedom of choice provided:  no    Prior SNF/Rehab Placement and Facility: no  Agreeable to SNF/Rehab: n/a  [place] of choice provided: no      Expected Discharge date:       Patient expects to be discharged to: If home: is the family and/or caregiver wiling & able to provide support at home? Unsure    Follow Up Appointment: Best Day/ Time:      Transportation provider: self   Transportation arrangements needed for discharge: No    Readmission Risk              Risk of Unplanned Readmission:  22             Does patient have a readmission risk score greater than 20?: Yes  If yes, follow-up appointment must be made within 7 days of discharge.      Goals of Care:       Educated patient on transitional options and is agreeable with plan      Discharge Plan: home without needs          Electronically signed by Tim Bravo RN on 10/7/22 at 10:54 AM EDT

## 2022-10-07 NOTE — PROGRESS NOTES
Comprehensive Nutrition Assessment    Type and Reason for Visit:  Initial, Positive Nutrition Screen, Consult, Patient Education (Unsure amt of weight loss, decreased appetite, malnutrition score:3 ; dietitian consult: diet education for diabetes)    Nutrition Recommendations/Plan:   Continue current diet. If blood sugars continue to run high may consider decreasing CHO amt per meal to 60 gm per meal or 4 carb choices per meal to better meet kcal needs. Suggest referral to outpatient dietitian services for diet education on diabetes (currently not in office). Monitor PO intakes, diet tolerance, GI status, and labs      Malnutrition Assessment:  Malnutrition Status: At risk for malnutrition (Comment) (10/07/22 1640)    Context:  Acute Illness     Findings of the 6 clinical characteristics of malnutrition:  Energy Intake:  Mild decrease in energy intake (Comment)  Weight Loss:  No significant weight loss (4.3% weight loss x 2 months per EHR)     Body Fat Loss:  Unable to assess     Muscle Mass Loss:  Unable to assess    Fluid Accumulation:  No significant fluid accumulation     Strength:  Not Performed    Nutrition Assessment:    +nutrition screen and consult for diet education. Patient admission r/t Hyperglycemia due to diabetes mellitus and SHAHRAM superimposed on CKD. Patient has hx: COPD, HLD, HTN, bowel obstruction and T2DM. Pt reported her sugars have been high for the last 4 days, BG in the 500s. She came in d/t abd pain, N/V. Her Hgb A1C: 11.7% (H). No N/V per chart. PO intakes %. Unable to provide diet education inpatient currently d/t not in office. Suggest sending referral to outpatient dietitian services for diet education on CHO control diet/ DM. Continue current diet and monitor PO intakes, diet tolerance, GI status, and labs. Nutrition Related Findings:    GI: WDL. No edema.  Labs reviewed: BUN 26 (H), CRE 1.46 (H), POC Glucose: 194 (H) Wound Type: None       Current Nutrition Intake & Therapies:    Average Meal Intake: %  Average Supplements Intake: None Ordered  ADULT DIET; Regular; 5 carb choices (75 gm/meal); Low Fat/Low Chol/High Fiber/2 gm Na    Anthropometric Measures:  Height: 5' 3\" (160 cm)  Ideal Body Weight (IBW): 115 lbs (52 kg)       Current Body Weight: 165 lb 11.2 oz (75.2 kg), 144.1 % IBW. Weight Source: Bed Scale  Current BMI (kg/m2): 29.4  Usual Body Weight: 173 lb 3.2 oz (78.6 kg) (8/24/22 per EHR)  % Weight Change (Calculated): -4.3  Weight Adjustment For: No Adjustment                 BMI Categories: Overweight (BMI 25.0-29. 9)    Estimated Daily Nutrient Needs:  Energy Requirements Based On: Kcal/kg  Weight Used for Energy Requirements: Current  Energy (kcal/day): 5271-4150 kcal (23-25 kcal/kg)  Weight Used for Protein Requirements: Ideal  Protein (g/day): 75-83 gm protein (1.0-1.1 g/kg) r/t renal fx       Nutrition Diagnosis:   Food & Nutrition-related knowledge deficit related to endocrine dysfuntion as evidenced by lab values    Nutrition Interventions:   Food and/or Nutrient Delivery: Continue Current Diet  Nutrition Education/Counseling: Education needed  Coordination of Nutrition Care: Continue to monitor while inpatient       Goals:     Goals: Meet at least 75% of estimated needs       Nutrition Monitoring and Evaluation:   Behavioral-Environmental Outcomes: None Identified  Food/Nutrient Intake Outcomes: Food and Nutrient Intake  Physical Signs/Symptoms Outcomes: Biochemical Data, Skin, Weight, GI Status    Discharge Planning:    Continue current diet, Recommend pursue outpatient diabetes education     Vero Vaughan, [address] [place] [address]  Office Number: [phone]

## 2022-10-07 NOTE — PROGRESS NOTES
rounding on PCU    Assessment: Patient is resting in chair. She reports not having taken good care of herself in the past and attempts to do so now but likes to eat things that cause her sugar to go high. She has a few friends and family that is scattered geographically. She does attend [employer] with a friend. .     Intervention: Engaged in conversation.  prayed with her and blessed her. Patient expressed appreciation for visit and offer of continued prayer. Plan: Chaplains are available on site or on call 24/7 for spiritual and emotional support.

## 2022-10-08 PROBLEM — E11.00 HYPEROSMOLAR HYPERGLYCEMIC STATE (HHS) (HCC): Status: ACTIVE | Noted: 2022-10-08

## 2022-10-08 LAB
ABSOLUTE EOS #: 0.2 K/UL (ref 0–0.44)
ABSOLUTE IMMATURE GRANULOCYTE: <0.03 K/UL (ref 0–0.3)
ABSOLUTE LYMPH #: 1.81 K/UL (ref 1.1–3.7)
ABSOLUTE MONO #: 0.36 K/UL (ref 0.1–1.2)
ALBUMIN SERPL-MCNC: 3.8 G/DL (ref 3.5–5.2)
ALBUMIN/GLOBULIN RATIO: 1.4 (ref 1–2.5)
ALP BLD-CCNC: 69 U/L (ref 35–104)
ALT SERPL-CCNC: 14 U/L (ref 5–33)
ANION GAP SERPL CALCULATED.3IONS-SCNC: 12 MMOL/L (ref 9–17)
AST SERPL-CCNC: 19 U/L
BASOPHILS # BLD: 1 % (ref 0–2)
BASOPHILS ABSOLUTE: 0.05 K/UL (ref 0–0.2)
BILIRUB SERPL-MCNC: 0.3 MG/DL (ref 0.3–1.2)
BUN BLDV-MCNC: 20 MG/DL (ref 8–23)
BUN/CREAT BLD: 16 (ref 9–20)
CALCIUM SERPL-MCNC: 9.8 MG/DL (ref 8.6–10.4)
CHLORIDE BLD-SCNC: 102 MMOL/L (ref 98–107)
CO2: 25 MMOL/L (ref 20–31)
CREAT SERPL-MCNC: 1.22 MG/DL (ref 0.5–0.9)
EOSINOPHILS RELATIVE PERCENT: 4 % (ref 1–4)
GFR SERPL CREATININE-BSD FRML MDRD: 49 ML/MIN/1.73M2
GLUCOSE BLD-MCNC: 100 MG/DL (ref 65–105)
GLUCOSE BLD-MCNC: 102 MG/DL (ref 65–105)
GLUCOSE BLD-MCNC: 105 MG/DL (ref 65–105)
GLUCOSE BLD-MCNC: 115 MG/DL (ref 65–105)
GLUCOSE BLD-MCNC: 118 MG/DL (ref 65–105)
GLUCOSE BLD-MCNC: 121 MG/DL (ref 65–105)
GLUCOSE BLD-MCNC: 122 MG/DL (ref 65–105)
GLUCOSE BLD-MCNC: 124 MG/DL (ref 65–105)
GLUCOSE BLD-MCNC: 127 MG/DL (ref 65–105)
GLUCOSE BLD-MCNC: 131 MG/DL (ref 65–105)
GLUCOSE BLD-MCNC: 154 MG/DL (ref 65–105)
GLUCOSE BLD-MCNC: 160 MG/DL (ref 65–105)
GLUCOSE BLD-MCNC: 170 MG/DL (ref 65–105)
GLUCOSE BLD-MCNC: 184 MG/DL (ref 70–99)
GLUCOSE BLD-MCNC: 197 MG/DL (ref 65–105)
GLUCOSE BLD-MCNC: 226 MG/DL (ref 65–105)
GLUCOSE BLD-MCNC: 240 MG/DL (ref 65–105)
GLUCOSE BLD-MCNC: 247 MG/DL (ref 65–105)
GLUCOSE BLD-MCNC: 250 MG/DL (ref 65–105)
GLUCOSE BLD-MCNC: 254 MG/DL (ref 65–105)
GLUCOSE BLD-MCNC: 284 MG/DL (ref 65–105)
GLUCOSE BLD-MCNC: 350 MG/DL (ref 65–105)
GLUCOSE BLD-MCNC: 83 MG/DL (ref 65–105)
GLUCOSE BLD-MCNC: 86 MG/DL (ref 65–105)
HCT VFR BLD CALC: 37.7 % (ref 36.3–47.1)
HEMOGLOBIN: 13.1 G/DL (ref 11.9–15.1)
IMMATURE GRANULOCYTES: 0 %
LYMPHOCYTES # BLD: 33 % (ref 24–43)
MCH RBC QN AUTO: 31.2 PG (ref 25.2–33.5)
MCHC RBC AUTO-ENTMCNC: 34.7 G/DL (ref 25.2–33.5)
MCV RBC AUTO: 89.8 FL (ref 82.6–102.9)
MONOCYTES # BLD: 7 % (ref 3–12)
NRBC AUTOMATED: 0 PER 100 WBC
PDW BLD-RTO: 12.9 % (ref 11.8–14.4)
PLATELET # BLD: 188 K/UL (ref 138–453)
PMV BLD AUTO: 10.8 FL (ref 8.1–13.5)
POTASSIUM SERPL-SCNC: 3.7 MMOL/L (ref 3.7–5.3)
RBC # BLD: 4.2 M/UL (ref 3.95–5.11)
SEG NEUTROPHILS: 55 % (ref 36–65)
SEGMENTED NEUTROPHILS ABSOLUTE COUNT: 2.99 K/UL (ref 1.5–8.1)
SODIUM BLD-SCNC: 139 MMOL/L (ref 135–144)
TOTAL PROTEIN: 6.6 G/DL (ref 6.4–8.3)
WBC # BLD: 5.4 K/UL (ref 3.5–11.3)

## 2022-10-08 PROCEDURE — 2580000003 HC RX 258

## 2022-10-08 PROCEDURE — 6370000000 HC RX 637 (ALT 250 FOR IP): Performed by: INTERNAL MEDICINE

## 2022-10-08 PROCEDURE — 82947 ASSAY GLUCOSE BLOOD QUANT: CPT

## 2022-10-08 PROCEDURE — 6370000000 HC RX 637 (ALT 250 FOR IP)

## 2022-10-08 PROCEDURE — 80053 COMPREHEN METABOLIC PANEL: CPT

## 2022-10-08 PROCEDURE — 6360000002 HC RX W HCPCS

## 2022-10-08 PROCEDURE — 85025 COMPLETE CBC W/AUTO DIFF WBC: CPT

## 2022-10-08 PROCEDURE — 2060000000 HC ICU INTERMEDIATE R&B

## 2022-10-08 PROCEDURE — 94760 N-INVAS EAR/PLS OXIMETRY 1: CPT

## 2022-10-08 PROCEDURE — 94640 AIRWAY INHALATION TREATMENT: CPT

## 2022-10-08 PROCEDURE — 36415 COLL VENOUS BLD VENIPUNCTURE: CPT

## 2022-10-08 PROCEDURE — 2000000000 HC ICU R&B

## 2022-10-08 PROCEDURE — 97116 GAIT TRAINING THERAPY: CPT | Performed by: PHYSICAL THERAPY ASSISTANT

## 2022-10-08 PROCEDURE — APPNB15 APP NON BILLABLE TIME 0-15 MINS

## 2022-10-08 PROCEDURE — 99233 SBSQ HOSP IP/OBS HIGH 50: CPT | Performed by: INTERNAL MEDICINE

## 2022-10-08 RX ORDER — LACTULOSE 10 G/15ML
20 SOLUTION ORAL 3 TIMES DAILY
Status: DISCONTINUED | OUTPATIENT
Start: 2022-10-08 | End: 2022-10-10 | Stop reason: HOSPADM

## 2022-10-08 RX ORDER — INSULIN GLARGINE 100 [IU]/ML
30 INJECTION, SOLUTION SUBCUTANEOUS 2 TIMES DAILY
Status: DISCONTINUED | OUTPATIENT
Start: 2022-10-08 | End: 2022-10-10

## 2022-10-08 RX ADMIN — HYDROCHLOROTHIAZIDE 25 MG: 25 TABLET ORAL at 08:40

## 2022-10-08 RX ADMIN — METOPROLOL TARTRATE 25 MG: 25 TABLET, FILM COATED ORAL at 08:42

## 2022-10-08 RX ADMIN — METOPROLOL TARTRATE 25 MG: 25 TABLET, FILM COATED ORAL at 20:36

## 2022-10-08 RX ADMIN — INSULIN LISPRO 5 UNITS: 100 INJECTION, SOLUTION INTRAVENOUS; SUBCUTANEOUS at 08:46

## 2022-10-08 RX ADMIN — GABAPENTIN 600 MG: 300 CAPSULE ORAL at 08:46

## 2022-10-08 RX ADMIN — LACTULOSE 20 G: 20 SOLUTION ORAL at 20:36

## 2022-10-08 RX ADMIN — LEVOTHYROXINE SODIUM 88 MCG: 0.09 TABLET ORAL at 06:20

## 2022-10-08 RX ADMIN — DILTIAZEM HYDROCHLORIDE 120 MG: 120 CAPSULE, EXTENDED RELEASE ORAL at 08:40

## 2022-10-08 RX ADMIN — INSULIN GLARGINE 30 UNITS: 100 INJECTION, SOLUTION SUBCUTANEOUS at 20:36

## 2022-10-08 RX ADMIN — INSULIN LISPRO 1 UNITS: 100 INJECTION, SOLUTION INTRAVENOUS; SUBCUTANEOUS at 12:54

## 2022-10-08 RX ADMIN — GLIPIZIDE 5 MG: 5 TABLET ORAL at 08:46

## 2022-10-08 RX ADMIN — CETIRIZINE HYDROCHLORIDE 5 MG: 5 TABLET, FILM COATED ORAL at 08:40

## 2022-10-08 RX ADMIN — SODIUM CHLORIDE: 9 INJECTION, SOLUTION INTRAVENOUS at 02:45

## 2022-10-08 RX ADMIN — LOSARTAN POTASSIUM 25 MG: 25 TABLET, FILM COATED ORAL at 08:41

## 2022-10-08 RX ADMIN — GABAPENTIN 600 MG: 300 CAPSULE ORAL at 20:41

## 2022-10-08 RX ADMIN — IPRATROPIUM BROMIDE AND ALBUTEROL SULFATE 1 AMPULE: .5; 2.5 SOLUTION RESPIRATORY (INHALATION) at 12:50

## 2022-10-08 RX ADMIN — IPRATROPIUM BROMIDE AND ALBUTEROL SULFATE 1 AMPULE: .5; 2.5 SOLUTION RESPIRATORY (INHALATION) at 16:29

## 2022-10-08 RX ADMIN — INSULIN LISPRO 5 UNITS: 100 INJECTION, SOLUTION INTRAVENOUS; SUBCUTANEOUS at 12:55

## 2022-10-08 RX ADMIN — POTASSIUM CHLORIDE 20 MEQ: 1500 TABLET, EXTENDED RELEASE ORAL at 08:39

## 2022-10-08 RX ADMIN — GLIPIZIDE 5 MG: 5 TABLET ORAL at 17:36

## 2022-10-08 RX ADMIN — ATORVASTATIN CALCIUM 40 MG: 40 TABLET, FILM COATED ORAL at 08:41

## 2022-10-08 RX ADMIN — ENOXAPARIN SODIUM 40 MG: 100 INJECTION SUBCUTANEOUS at 08:39

## 2022-10-08 RX ADMIN — POLYETHYLENE GLYCOL 3350 17 G: 17 POWDER, FOR SOLUTION ORAL at 06:21

## 2022-10-08 RX ADMIN — INSULIN LISPRO 5 UNITS: 100 INJECTION, SOLUTION INTRAVENOUS; SUBCUTANEOUS at 17:36

## 2022-10-08 RX ADMIN — IPRATROPIUM BROMIDE AND ALBUTEROL SULFATE 1 AMPULE: .5; 2.5 SOLUTION RESPIRATORY (INHALATION) at 19:35

## 2022-10-08 RX ADMIN — FENOFIBRATE 160 MG: 160 TABLET ORAL at 08:40

## 2022-10-08 RX ADMIN — INSULIN GLARGINE 30 UNITS: 100 INJECTION, SOLUTION SUBCUTANEOUS at 12:54

## 2022-10-08 RX ADMIN — IPRATROPIUM BROMIDE AND ALBUTEROL SULFATE 1 AMPULE: .5; 2.5 SOLUTION RESPIRATORY (INHALATION) at 08:59

## 2022-10-08 RX ADMIN — MOMETASONE FUROATE AND FORMOTEROL FUMARATE DIHYDRATE 2 PUFF: 200; 5 AEROSOL RESPIRATORY (INHALATION) at 08:59

## 2022-10-08 RX ADMIN — MOMETASONE FUROATE AND FORMOTEROL FUMARATE DIHYDRATE 2 PUFF: 200; 5 AEROSOL RESPIRATORY (INHALATION) at 19:36

## 2022-10-08 RX ADMIN — Medication 18 UNITS/HR: at 13:37

## 2022-10-08 RX ADMIN — CHOLECALCIFEROL TAB 25 MCG (1000 UNIT) 1000 UNITS: 25 TAB at 08:40

## 2022-10-08 ASSESSMENT — PAIN DESCRIPTION - LOCATION: LOCATION: BACK

## 2022-10-08 ASSESSMENT — PAIN SCALES - GENERAL
PAINLEVEL_OUTOF10: 6
PAINLEVEL_OUTOF10: 0

## 2022-10-08 NOTE — PLAN OF CARE
Problem: ABCDS Injury Assessment  Goal: Absence of physical injury  10/8/2022 1403 by Amber Barnes RN  Outcome: Progressing  10/8/2022 0139 by Roman Holder RN  Outcome: Progressing     Problem: Discharge Planning  Goal: Discharge to home or other facility with appropriate resources  10/8/2022 1403 by Amber Barnes RN  Outcome: Progressing  10/8/2022 0139 by Roman Holder RN  Outcome: Progressing     Problem: Pain  Goal: Verbalizes/displays adequate comfort level or baseline comfort level  10/8/2022 1403 by Amber Barnes RN  Outcome: Progressing  10/8/2022 0139 by Roman Holder RN  Outcome: Progressing     Problem: Chronic Conditions and Co-morbidities  Goal: Patient's chronic conditions and co-morbidity symptoms are monitored and maintained or improved  10/8/2022 1403 by Amber Barnes RN  Outcome: Progressing  10/8/2022 0139 by Roman Holder RN  Outcome: Progressing     Problem: Nutrition Deficit:  Goal: Optimize nutritional status  10/8/2022 1403 by Amber Barnes RN  Outcome: Progressing  10/8/2022 0139 by Roman Holder RN  Outcome: Progressing

## 2022-10-08 NOTE — PROGRESS NOTES
Physical Therapy  Facility/Department: [facility] CARE  Daily Treatment Note  NAME: Haydee Whitley  : 1955  MRN: [id number]    Date of Service: 10/8/2022    Discharge Recommendations:  Continue to assess pending progress, Home with Home health PT        Patient Diagnosis(es): The encounter diagnosis was Hyperglycemia due to diabetes mellitus ([facility] [address] Assessment   Activity Tolerance: Patient tolerated treatment well;Patient limited by fatigue     Plan    Physcial Therapy Plan  General Plan: 1 time a day 7 days a week  Current Treatment Recommendations: Strengthening;ROM;Balance training;Functional mobility training;Gait training;Stair training; Endurance training;Neuromuscular re-education;Home exercise program;Safety education & training;Patient/Caregiver education & training;Equipment evaluation, education, & procurement     Restrictions        Subjective    Subjective  Subjective: Pt. in bed at initiation of session. Agreeable to therapy at this time. Pain: Pain rated 8/10 through low back.   Orientation  Overall Orientation Status: Within Normal Limits     Objective   Vitals  O2 Device: None (Room air)  Bed Mobility Training  Bed Mobility Training: Yes  Supine to Sit: Supervision  Scooting: Supervision  Transfer Training  Transfer Training: Yes  Overall Level of Assistance: Supervision  Sit to Stand: Supervision  Stand to Sit: Supervision  Gait Training  Gait Training: Yes  Gait  Overall Level of Assistance: Stand-by assistance;Contact-guard assistance  Distance (ft): 400 Feet  Assistive Device: Gait belt  Neuromuscular Education  NDT Treatment: Gait ;Standing  PT Exercises  Exercise Treatment: Supine ankle pumps, quad sets, glute sets, hip abd.x20 ea  Resistive Exercises: Seated HS curls, and LAQ x10 ea wiht manual resistance             Goals  Short Term Goals  Time Frame for Short Term Goals: LOS  Short Term Goal 1: Bed mobilty with mod I  Short Term Goal 2: Transfers mod I  Short Term Goal 3: Amb x 150ft with no device with mod I  Patient Goals   Patient Goals : Return Home    Education       Therapy Time   Individual Concurrent Group Co-treatment   Time In 0815         Time Out 0831         Minutes [address] [place], [place]

## 2022-10-08 NOTE — PLAN OF CARE
Problem: ABCDS Injury Assessment  Goal: Absence of physical injury  Outcome: Progressing     Problem: Discharge Planning  Goal: Discharge to home or other facility with appropriate resources  Outcome: Progressing     Problem: Pain  Goal: Verbalizes/displays adequate comfort level or baseline comfort level  Outcome: Progressing     Problem: Chronic Conditions and Co-morbidities  Goal: Patient's chronic conditions and co-morbidity symptoms are monitored and maintained or improved  Outcome: Progressing     Problem: Nutrition Deficit:  Goal: Optimize nutritional status  Outcome: Progressing

## 2022-10-08 NOTE — PROGRESS NOTES
Hospitalist Progress Note  10/8/2022 12:19 PM  Subjective:   Admit Date: 10/6/2022  PCP: Tana Barclay DO  Interval History:Feeling much better---Sugars up this AM around 300.  >800 on entry. About 200 units given last 24 hrs by gtt, plus 15 lantus and 5 units with meals humalog plus SS. She reports she has used up to 30 units of lantus a few yrs ago plus about 75 units of short acting insulin a day at max. Trulicity has been long term med and recent amaryl addition and does not tolerate metformin    Diet: ADULT DIET; Regular; 5 carb choices (75 gm/meal);  Low Fat/Low Chol/High Fiber/2 gm Na  Medications:   Scheduled Meds:   insulin glargine  30 Units SubCUTAneous BID    atorvastatin  40 mg Oral Daily    dilTIAZem  120 mg Oral Daily    fenofibrate  160 mg Oral Daily    mometasone-formoterol  2 puff Inhalation BID    glipiZIDE  5 mg Oral BID AC    hydroCHLOROthiazide  25 mg Oral QAM    levothyroxine  88 mcg Oral QAM AC    cetirizine  5 mg Oral Daily    losartan  25 mg Oral Daily    metoprolol tartrate  25 mg Oral BID    potassium chloride  20 mEq Oral Daily with breakfast    [START ON 10/10/2022] Dulaglutide  1.5 mg SubCUTAneous Weekly    Vitamin D  1 tablet Oral Daily    sodium chloride flush  5-40 mL IntraVENous 2 times per day    enoxaparin  40 mg SubCUTAneous Daily    ipratropium-albuterol  1 ampule Inhalation 4x daily    insulin lispro  5 Units SubCUTAneous TID WC    insulin lispro  0-4 Units SubCUTAneous TID WC    insulin lispro  0-4 Units SubCUTAneous Nightly     Continuous Infusions:   insulin 15.2 Units/hr (10/08/22 1134)    dextrose      sodium chloride 20 mL/hr at 10/08/22 0703     CBC:   Recent Labs     10/06/22  2035 10/07/22  0624 10/08/22  0547   WBC 7.1 6.1 5.4   HGB 14.7 13.0 13.1    198 188     BMP:    Recent Labs     10/06/22  2146 10/07/22  0624 10/07/22  1154 10/08/22  0547   * 138  --  139   K 3.6* 3.2* 4.4 3.7   CL 85* 98  --  102   CO2 26 31  --  25   BUN 30* 26*  --  20 CREATININE 1.62* 1.46*  --  1.22*   GLUCOSE 800* 233*  --  184*     Hepatic:   Recent Labs     10/08/22  0547   AST 19   ALT 14   BILITOT 0.3   ALKPHOS 69     Troponin: No results for input(s): TROPONINI in the last 72 hours. BNP: No results for input(s): BNP in the last 72 hours. Lipids: No results for input(s): CHOL, HDL in the last 72 hours. Invalid input(s): LDLCALCU  INR: No results for input(s): INR in the last 72 hours. Objective:   Vitals: /85   Pulse 95   Temp 97.5 °F (36.4 °C) (Tympanic)   Resp 16   Ht 5' 3\" (1.6 m)   Wt 171 lb (77.6 kg)   SpO2 96%   BMI 30.29 kg/m²     Intake/Output Summary (Last 24 hours) at 10/8/2022 1219  Last data filed at 10/8/2022 0940  Gross per 24 hour   Intake 298.66 ml   Output 1700 ml   Net -1401.34 ml     Patient Vitals for the past 96 hrs (Last 3 readings):   Weight   10/07/22 1811 171 lb (77.6 kg)   10/07/22 0055 165 lb 11.2 oz (75.2 kg)   10/06/22 2022 175 lb (79.4 kg)     General appearance: alert and cooperative with exam  HEENT: Head: Normocephalic, no lesions, without obvious abnormality. Neck: no adenopathy, no carotid bruit, no JVD, supple, symmetrical, trachea midline, and thyroid not enlarged, symmetric, no tenderness/mass/nodules  Lungs: clear to auscultation bilaterally  Heart: regular rate and rhythm, S1, S2 normal, no murmur, click, rub or gallop  Abdomen: soft, non-tender; bowel sounds normal; no masses,  no organomegaly  Extremities: extremities normal, atraumatic, no cyanosis or edema  Neurologic: Mental status: Alert, oriented, thought content appropriate    Assessment and Plan:         Hyperglycemia due to diabetes mellitus ([facility] [address]    Acute kidney injury superimposed on CKD (HCC)    Hyperosmolar hyperglycemic state (HHS) (HCC)    Increase lantus to 30 BID today, continue gtt. Reassess hourly.   Check AM labs    Kavita Ness MD, MD  Rounding Hospitalist

## 2022-10-08 NOTE — PLAN OF CARE
Problem: ABCDS Injury Assessment  Goal: Absence of physical injury  10/8/2022 1954 by Bin Pathak RN  Outcome: Progressing  10/8/2022 1403 by Guilherme Meeks RN  Outcome: Progressing     Problem: Discharge Planning  Goal: Discharge to home or other facility with appropriate resources  10/8/2022 1954 by Bin Pathak RN  Outcome: Progressing  10/8/2022 1403 by Guilherme Meeks RN  Outcome: Progressing     Problem: Pain  Goal: Verbalizes/displays adequate comfort level or baseline comfort level  10/8/2022 1954 by Bin Pathak RN  Outcome: Progressing  10/8/2022 1403 by Guilherme Meeks RN  Outcome: Progressing     Problem: Chronic Conditions and Co-morbidities  Goal: Patient's chronic conditions and co-morbidity symptoms are monitored and maintained or improved  10/8/2022 1954 by Bin Pathak RN  Outcome: Progressing  10/8/2022 1403 by Guilherme Meeks RN  Outcome: Progressing     Problem: Nutrition Deficit:  Goal: Optimize nutritional status  10/8/2022 1954 by Bin Pathak RN  Outcome: Progressing  10/8/2022 1403 by Guilherme Meeks RN  Outcome: Progressing

## 2022-10-09 LAB
ABSOLUTE EOS #: 0.26 K/UL (ref 0–0.44)
ABSOLUTE IMMATURE GRANULOCYTE: <0.03 K/UL (ref 0–0.3)
ABSOLUTE LYMPH #: 2.02 K/UL (ref 1.1–3.7)
ABSOLUTE MONO #: 0.31 K/UL (ref 0.1–1.2)
ANION GAP SERPL CALCULATED.3IONS-SCNC: 10 MMOL/L (ref 9–17)
BASOPHILS # BLD: 1 % (ref 0–2)
BASOPHILS ABSOLUTE: 0.04 K/UL (ref 0–0.2)
BUN BLDV-MCNC: 12 MG/DL (ref 8–23)
BUN/CREAT BLD: 11 (ref 9–20)
CALCIUM SERPL-MCNC: 10.1 MG/DL (ref 8.6–10.4)
CHLORIDE BLD-SCNC: 105 MMOL/L (ref 98–107)
CO2: 25 MMOL/L (ref 20–31)
CREAT SERPL-MCNC: 1.13 MG/DL (ref 0.5–0.9)
EKG ATRIAL RATE: 67 BPM
EKG P AXIS: 66 DEGREES
EKG P-R INTERVAL: 190 MS
EKG Q-T INTERVAL: 414 MS
EKG QRS DURATION: 80 MS
EKG QTC CALCULATION (BAZETT): 437 MS
EKG R AXIS: 25 DEGREES
EKG T AXIS: 37 DEGREES
EKG VENTRICULAR RATE: 67 BPM
EOSINOPHILS RELATIVE PERCENT: 6 % (ref 1–4)
GFR SERPL CREATININE-BSD FRML MDRD: 54 ML/MIN/1.73M2
GLUCOSE BLD-MCNC: 101 MG/DL (ref 65–105)
GLUCOSE BLD-MCNC: 105 MG/DL (ref 65–105)
GLUCOSE BLD-MCNC: 106 MG/DL (ref 65–105)
GLUCOSE BLD-MCNC: 108 MG/DL (ref 65–105)
GLUCOSE BLD-MCNC: 108 MG/DL (ref 65–105)
GLUCOSE BLD-MCNC: 119 MG/DL (ref 65–105)
GLUCOSE BLD-MCNC: 126 MG/DL (ref 70–99)
GLUCOSE BLD-MCNC: 192 MG/DL (ref 65–105)
GLUCOSE BLD-MCNC: 202 MG/DL (ref 65–105)
GLUCOSE BLD-MCNC: 203 MG/DL (ref 65–105)
GLUCOSE BLD-MCNC: 216 MG/DL (ref 65–105)
GLUCOSE BLD-MCNC: 226 MG/DL (ref 65–105)
GLUCOSE BLD-MCNC: 229 MG/DL (ref 65–105)
GLUCOSE BLD-MCNC: 230 MG/DL (ref 65–105)
GLUCOSE BLD-MCNC: 265 MG/DL (ref 65–105)
GLUCOSE BLD-MCNC: 266 MG/DL (ref 65–105)
GLUCOSE BLD-MCNC: 271 MG/DL (ref 65–105)
GLUCOSE BLD-MCNC: 274 MG/DL (ref 65–105)
GLUCOSE BLD-MCNC: 93 MG/DL (ref 65–105)
GLUCOSE BLD-MCNC: 96 MG/DL (ref 65–105)
HCT VFR BLD CALC: 37.3 % (ref 36.3–47.1)
HEMOGLOBIN: 12.6 G/DL (ref 11.9–15.1)
IMMATURE GRANULOCYTES: 0 %
LYMPHOCYTES # BLD: 44 % (ref 24–43)
MCH RBC QN AUTO: 30.6 PG (ref 25.2–33.5)
MCHC RBC AUTO-ENTMCNC: 33.8 G/DL (ref 25.2–33.5)
MCV RBC AUTO: 90.5 FL (ref 82.6–102.9)
MONOCYTES # BLD: 7 % (ref 3–12)
NRBC AUTOMATED: 0 PER 100 WBC
PDW BLD-RTO: 13 % (ref 11.8–14.4)
PLATELET # BLD: 180 K/UL (ref 138–453)
PMV BLD AUTO: 10.7 FL (ref 8.1–13.5)
POTASSIUM SERPL-SCNC: 4.3 MMOL/L (ref 3.7–5.3)
RBC # BLD: 4.12 M/UL (ref 3.95–5.11)
SEG NEUTROPHILS: 42 % (ref 36–65)
SEGMENTED NEUTROPHILS ABSOLUTE COUNT: 1.94 K/UL (ref 1.5–8.1)
SODIUM BLD-SCNC: 140 MMOL/L (ref 135–144)
WBC # BLD: 4.6 K/UL (ref 3.5–11.3)

## 2022-10-09 PROCEDURE — 94640 AIRWAY INHALATION TREATMENT: CPT

## 2022-10-09 PROCEDURE — 6360000002 HC RX W HCPCS

## 2022-10-09 PROCEDURE — 94760 N-INVAS EAR/PLS OXIMETRY 1: CPT

## 2022-10-09 PROCEDURE — 6370000000 HC RX 637 (ALT 250 FOR IP): Performed by: INTERNAL MEDICINE

## 2022-10-09 PROCEDURE — 99233 SBSQ HOSP IP/OBS HIGH 50: CPT | Performed by: INTERNAL MEDICINE

## 2022-10-09 PROCEDURE — 85025 COMPLETE CBC W/AUTO DIFF WBC: CPT

## 2022-10-09 PROCEDURE — 2000000000 HC ICU R&B

## 2022-10-09 PROCEDURE — 82947 ASSAY GLUCOSE BLOOD QUANT: CPT

## 2022-10-09 PROCEDURE — 97110 THERAPEUTIC EXERCISES: CPT | Performed by: PHYSICAL THERAPY ASSISTANT

## 2022-10-09 PROCEDURE — 2580000003 HC RX 258

## 2022-10-09 PROCEDURE — 6370000000 HC RX 637 (ALT 250 FOR IP)

## 2022-10-09 PROCEDURE — 2060000000 HC ICU INTERMEDIATE R&B

## 2022-10-09 PROCEDURE — 80048 BASIC METABOLIC PNL TOTAL CA: CPT

## 2022-10-09 PROCEDURE — 97116 GAIT TRAINING THERAPY: CPT | Performed by: PHYSICAL THERAPY ASSISTANT

## 2022-10-09 PROCEDURE — 36415 COLL VENOUS BLD VENIPUNCTURE: CPT

## 2022-10-09 RX ORDER — INSULIN LISPRO 100 [IU]/ML
8 INJECTION, SOLUTION INTRAVENOUS; SUBCUTANEOUS ONCE
Status: COMPLETED | OUTPATIENT
Start: 2022-10-09 | End: 2022-10-09

## 2022-10-09 RX ORDER — INSULIN LISPRO 100 [IU]/ML
10 INJECTION, SOLUTION INTRAVENOUS; SUBCUTANEOUS
Status: DISCONTINUED | OUTPATIENT
Start: 2022-10-09 | End: 2022-10-10

## 2022-10-09 RX ADMIN — MOMETASONE FUROATE AND FORMOTEROL FUMARATE DIHYDRATE 2 PUFF: 200; 5 AEROSOL RESPIRATORY (INHALATION) at 08:47

## 2022-10-09 RX ADMIN — CHOLECALCIFEROL TAB 25 MCG (1000 UNIT) 1000 UNITS: 25 TAB at 09:01

## 2022-10-09 RX ADMIN — HYDROCHLOROTHIAZIDE 25 MG: 25 TABLET ORAL at 09:06

## 2022-10-09 RX ADMIN — FENOFIBRATE 160 MG: 160 TABLET ORAL at 09:01

## 2022-10-09 RX ADMIN — INSULIN GLARGINE 30 UNITS: 100 INJECTION, SOLUTION SUBCUTANEOUS at 09:09

## 2022-10-09 RX ADMIN — GLIPIZIDE 5 MG: 5 TABLET ORAL at 06:37

## 2022-10-09 RX ADMIN — CETIRIZINE HYDROCHLORIDE 5 MG: 5 TABLET, FILM COATED ORAL at 09:10

## 2022-10-09 RX ADMIN — GABAPENTIN 600 MG: 300 CAPSULE ORAL at 20:31

## 2022-10-09 RX ADMIN — IPRATROPIUM BROMIDE AND ALBUTEROL SULFATE 1 AMPULE: .5; 2.5 SOLUTION RESPIRATORY (INHALATION) at 08:47

## 2022-10-09 RX ADMIN — INSULIN LISPRO 5 UNITS: 100 INJECTION, SOLUTION INTRAVENOUS; SUBCUTANEOUS at 09:09

## 2022-10-09 RX ADMIN — SODIUM CHLORIDE, PRESERVATIVE FREE 10 ML: 5 INJECTION INTRAVENOUS at 20:31

## 2022-10-09 RX ADMIN — DILTIAZEM HYDROCHLORIDE 120 MG: 120 CAPSULE, EXTENDED RELEASE ORAL at 09:00

## 2022-10-09 RX ADMIN — GLIPIZIDE 5 MG: 5 TABLET ORAL at 16:06

## 2022-10-09 RX ADMIN — IPRATROPIUM BROMIDE AND ALBUTEROL SULFATE 1 AMPULE: .5; 2.5 SOLUTION RESPIRATORY (INHALATION) at 19:45

## 2022-10-09 RX ADMIN — LACTULOSE 20 G: 20 SOLUTION ORAL at 09:02

## 2022-10-09 RX ADMIN — INSULIN LISPRO 10 UNITS: 100 INJECTION, SOLUTION INTRAVENOUS; SUBCUTANEOUS at 17:33

## 2022-10-09 RX ADMIN — POTASSIUM CHLORIDE 20 MEQ: 1500 TABLET, EXTENDED RELEASE ORAL at 09:00

## 2022-10-09 RX ADMIN — LEVOTHYROXINE SODIUM 88 MCG: 0.09 TABLET ORAL at 06:37

## 2022-10-09 RX ADMIN — INSULIN GLARGINE 30 UNITS: 100 INJECTION, SOLUTION SUBCUTANEOUS at 20:30

## 2022-10-09 RX ADMIN — METOPROLOL TARTRATE 25 MG: 25 TABLET, FILM COATED ORAL at 09:06

## 2022-10-09 RX ADMIN — INSULIN LISPRO 1 UNITS: 100 INJECTION, SOLUTION INTRAVENOUS; SUBCUTANEOUS at 17:33

## 2022-10-09 RX ADMIN — INSULIN LISPRO 8 UNITS: 100 INJECTION, SOLUTION INTRAVENOUS; SUBCUTANEOUS at 10:56

## 2022-10-09 RX ADMIN — INSULIN LISPRO 2 UNITS: 100 INJECTION, SOLUTION INTRAVENOUS; SUBCUTANEOUS at 11:47

## 2022-10-09 RX ADMIN — SODIUM CHLORIDE, PRESERVATIVE FREE 10 ML: 5 INJECTION INTRAVENOUS at 09:09

## 2022-10-09 RX ADMIN — ATORVASTATIN CALCIUM 40 MG: 40 TABLET, FILM COATED ORAL at 09:01

## 2022-10-09 RX ADMIN — GABAPENTIN 600 MG: 300 CAPSULE ORAL at 11:01

## 2022-10-09 RX ADMIN — INSULIN LISPRO 10 UNITS: 100 INJECTION, SOLUTION INTRAVENOUS; SUBCUTANEOUS at 11:47

## 2022-10-09 RX ADMIN — MOMETASONE FUROATE AND FORMOTEROL FUMARATE DIHYDRATE 2 PUFF: 200; 5 AEROSOL RESPIRATORY (INHALATION) at 19:45

## 2022-10-09 RX ADMIN — IPRATROPIUM BROMIDE AND ALBUTEROL SULFATE 1 AMPULE: .5; 2.5 SOLUTION RESPIRATORY (INHALATION) at 15:51

## 2022-10-09 RX ADMIN — IPRATROPIUM BROMIDE AND ALBUTEROL SULFATE 1 AMPULE: .5; 2.5 SOLUTION RESPIRATORY (INHALATION) at 12:38

## 2022-10-09 RX ADMIN — ENOXAPARIN SODIUM 40 MG: 100 INJECTION SUBCUTANEOUS at 09:01

## 2022-10-09 RX ADMIN — LOSARTAN POTASSIUM 25 MG: 25 TABLET, FILM COATED ORAL at 09:01

## 2022-10-09 RX ADMIN — METOPROLOL TARTRATE 25 MG: 25 TABLET, FILM COATED ORAL at 20:31

## 2022-10-09 ASSESSMENT — PAIN SCALES - GENERAL
PAINLEVEL_OUTOF10: 8
PAINLEVEL_OUTOF10: 0

## 2022-10-09 ASSESSMENT — PAIN DESCRIPTION - LOCATION: LOCATION: BACK

## 2022-10-09 NOTE — PROGRESS NOTES
Physical Therapy  Facility/Department: [facility]  PROGRESSIVE CARE  Daily Treatment Note  NAME: Radha Street  : 1955  MRN: [id number]    Date of Service: 10/9/2022    Discharge Recommendations:  Continue to assess pending progress, Home with Home health PT        Patient Diagnosis(es): The encounter diagnosis was Hyperglycemia due to diabetes mellitus ([facility] [address] Assessment   Activity Tolerance: Patient tolerated treatment well;Patient limited by fatigue     Plan    Physcial Therapy Plan  General Plan: 1 time a day 7 days a week  Current Treatment Recommendations: Strengthening;ROM;Balance training;Functional mobility training;Gait training;Stair training; Endurance training;Neuromuscular re-education;Home exercise program;Safety education & training;Patient/Caregiver education & training;Equipment evaluation, education, & procurement  PT Plan of Care: Daily     Restrictions        Subjective    Subjective  Subjective: Pt. in bed at initiation of session. Agreeable to therapy at this time. Notes sleepiness this date. Pain: Pain rated 8/10 through low back. Orientation  Overall Orientation Status: Within Normal Limits     Objective   Vitals  O2 Device: None (Room air)  Bed Mobility Training  Bed Mobility Training: Yes  Supine to Sit: Supervision  Scooting: Supervision  Transfer Training  Transfer Training: Yes  Overall Level of Assistance: Supervision  Sit to Stand: Supervision  Stand to Sit: Supervision  Gait Training  Gait Training: Yes  Right Side Weight Bearing: As tolerated  Left Side Weight Bearing: As tolerated  Gait  Overall Level of Assistance: Stand-by assistance  Distance (ft): 800 Feet  Assistive Device: Gait belt  Neuromuscular Education  Neuromuscular Education: Yes  NDT Treatment: Gait ;Standing  Weight Bearing  Weight Bearing Technique: Yes  PT Exercises  Exercise Treatment: Supine ankle pumps, quad sets, glute sets, hip abd.x20 ea.  Seated HR/TR, marching  Resistive Exercises: Seated HS curls, LAQ, abd, add x15 ea with manual resistance     Safety Devices  Type of Devices: Gait belt;Nurse notified; Left in chair       Goals  Short Term Goals  Time Frame for Short Term Goals: LOS  Short Term Goal 1: Bed mobilty with mod I  Short Term Goal 2: Transfers mod I  Short Term Goal 3: Amb x 150ft with no device with mod I  Patient Goals   Patient Goals : Return Home    Education       Therapy Time   Individual Concurrent Group Co-treatment   Time In 9748         Time Out 7569         Minutes [address] [facility]

## 2022-10-09 NOTE — PROGRESS NOTES
Hospitalist Progress Note  10/9/2022 10:24 AM  Subjective:   Admit Date: 10/6/2022  PCP: Durga Florian,   Interval History: Creat better. Insulin drip off--but daily requirement still uncertain with significant risk for both hypoglycemia and severe hyperglycemia. Doing well from sxs standpoint    Diet: ADULT DIET; Regular; 5 carb choices (75 gm/meal); Low Fat/Low Chol/High Fiber/2 gm Na  Medications:   Scheduled Meds:   insulin lispro  10 Units SubCUTAneous TID WC    insulin glargine  30 Units SubCUTAneous BID    lactulose  20 g Oral TID    atorvastatin  40 mg Oral Daily    dilTIAZem  120 mg Oral Daily    fenofibrate  160 mg Oral Daily    mometasone-formoterol  2 puff Inhalation BID    glipiZIDE  5 mg Oral BID AC    hydroCHLOROthiazide  25 mg Oral QAM    levothyroxine  88 mcg Oral QAM AC    cetirizine  5 mg Oral Daily    losartan  25 mg Oral Daily    metoprolol tartrate  25 mg Oral BID    potassium chloride  20 mEq Oral Daily with breakfast    [START ON 10/10/2022] Dulaglutide  1.5 mg SubCUTAneous Weekly    Vitamin D  1 tablet Oral Daily    sodium chloride flush  5-40 mL IntraVENous 2 times per day    enoxaparin  40 mg SubCUTAneous Daily    ipratropium-albuterol  1 ampule Inhalation 4x daily    insulin lispro  0-4 Units SubCUTAneous TID WC    insulin lispro  0-4 Units SubCUTAneous Nightly     Continuous Infusions:   dextrose      sodium chloride Stopped (10/08/22 2051)     CBC:   Recent Labs     10/07/22  0624 10/08/22  0547 10/09/22  0501   WBC 6.1 5.4 4.6   HGB 13.0 13.1 12.6    188 180     BMP:    Recent Labs     10/07/22  0624 10/07/22  1154 10/08/22  0547 10/09/22  0501     --  139 140   K 3.2* 4.4 3.7 4.3   CL 98  --  102 105   CO2 31  --  25 25   BUN 26*  --  20 12   CREATININE 1.46*  --  1.22* 1.13*   GLUCOSE 233*  --  184* 126*     Hepatic:   Recent Labs     10/08/22  0547   AST 19   ALT 14   BILITOT 0.3   ALKPHOS 69     Troponin: No results for input(s): TROPONINI in the last 72 hours.   BNP: No results for input(s): BNP in the last 72 hours. Lipids: No results for input(s): CHOL, HDL in the last 72 hours. Invalid input(s): LDLCALCU  INR: No results for input(s): INR in the last 72 hours. Objective:   Vitals: /79   Pulse 92   Temp 97.7 °F (36.5 °C) (Tympanic)   Resp 18   Ht 5' 3\" (1.6 m)   Wt 167 lb 14.4 oz (76.2 kg)   SpO2 100%   BMI 29.74 kg/m²     Intake/Output Summary (Last 24 hours) at 10/9/2022 1024  Last data filed at 10/9/2022 5845  Gross per 24 hour   Intake 2766.74 ml   Output 3800 ml   Net -1033.26 ml     Patient Vitals for the past 96 hrs (Last 3 readings):   Weight   10/09/22 0431 167 lb 14.4 oz (76.2 kg)   10/07/22 1811 171 lb (77.6 kg)   10/07/22 0055 165 lb 11.2 oz (75.2 kg)     General appearance: alert and cooperative with exam  HEENT: Head: Normocephalic, no lesions, without obvious abnormality. Neck: no adenopathy, no carotid bruit, no JVD, supple, symmetrical, trachea midline, and thyroid not enlarged, symmetric, no tenderness/mass/nodules  Lungs: clear to auscultation bilaterally  Heart: regular rate and rhythm, S1, S2 normal, no murmur, click, rub or gallop  Abdomen: soft, non-tender; bowel sounds normal; no masses,  no organomegaly  Extremities: extremities normal, atraumatic, no cyanosis or edema  Neurologic: Mental status: Alert, oriented, thought content appropriate    Assessment and Plan:       Hyperglycemia due to diabetes mellitus ([address]    Acute kidney injury superimposed on CKD (HCC)    Hyperosmolar hyperglycemic state (HHS) ([facility])  Drip off. Increase Humalog to 10 plus ss, lantus 30 BID. Continue frequent checks. At significant risk for hypoglycemia and severe recurrent hyperglycemia.          Bobbye Lanes, MD, MD  Rounding Hospitalist

## 2022-10-10 VITALS
BODY MASS INDEX: 28.54 KG/M2 | WEIGHT: 161.1 LBS | SYSTOLIC BLOOD PRESSURE: 114 MMHG | HEART RATE: 78 BPM | RESPIRATION RATE: 18 BRPM | TEMPERATURE: 97.5 F | DIASTOLIC BLOOD PRESSURE: 81 MMHG | HEIGHT: 63 IN | OXYGEN SATURATION: 99 %

## 2022-10-10 LAB
ABSOLUTE EOS #: 0.27 K/UL (ref 0–0.44)
ABSOLUTE IMMATURE GRANULOCYTE: <0.03 K/UL (ref 0–0.3)
ABSOLUTE LYMPH #: 2.14 K/UL (ref 1.1–3.7)
ABSOLUTE MONO #: 0.38 K/UL (ref 0.1–1.2)
ANION GAP SERPL CALCULATED.3IONS-SCNC: 8 MMOL/L (ref 9–17)
BASOPHILS # BLD: 1 % (ref 0–2)
BASOPHILS ABSOLUTE: 0.05 K/UL (ref 0–0.2)
BUN BLDV-MCNC: 14 MG/DL (ref 8–23)
BUN/CREAT BLD: 10 (ref 9–20)
CALCIUM SERPL-MCNC: 10.2 MG/DL (ref 8.6–10.4)
CHLORIDE BLD-SCNC: 104 MMOL/L (ref 98–107)
CO2: 28 MMOL/L (ref 20–31)
CREAT SERPL-MCNC: 1.35 MG/DL (ref 0.5–0.9)
EOSINOPHILS RELATIVE PERCENT: 6 % (ref 1–4)
GFR SERPL CREATININE-BSD FRML MDRD: 43 ML/MIN/1.73M2
GLUCOSE BLD-MCNC: 199 MG/DL (ref 70–99)
GLUCOSE BLD-MCNC: 205 MG/DL (ref 65–105)
GLUCOSE BLD-MCNC: 253 MG/DL (ref 65–105)
HCT VFR BLD CALC: 38.5 % (ref 36.3–47.1)
HEMOGLOBIN: 13.1 G/DL (ref 11.9–15.1)
IMMATURE GRANULOCYTES: 0 %
LYMPHOCYTES # BLD: 44 % (ref 24–43)
MCH RBC QN AUTO: 31.7 PG (ref 25.2–33.5)
MCHC RBC AUTO-ENTMCNC: 34 G/DL (ref 25.2–33.5)
MCV RBC AUTO: 93.2 FL (ref 82.6–102.9)
MONOCYTES # BLD: 8 % (ref 3–12)
NRBC AUTOMATED: 0 PER 100 WBC
PDW BLD-RTO: 12.9 % (ref 11.8–14.4)
PLATELET # BLD: 158 K/UL (ref 138–453)
PMV BLD AUTO: 11.3 FL (ref 8.1–13.5)
POTASSIUM SERPL-SCNC: 5.1 MMOL/L (ref 3.7–5.3)
RBC # BLD: 4.13 M/UL (ref 3.95–5.11)
SEG NEUTROPHILS: 41 % (ref 36–65)
SEGMENTED NEUTROPHILS ABSOLUTE COUNT: 1.97 K/UL (ref 1.5–8.1)
SODIUM BLD-SCNC: 140 MMOL/L (ref 135–144)
WBC # BLD: 4.8 K/UL (ref 3.5–11.3)

## 2022-10-10 PROCEDURE — 2580000003 HC RX 258

## 2022-10-10 PROCEDURE — 80048 BASIC METABOLIC PNL TOTAL CA: CPT

## 2022-10-10 PROCEDURE — 82947 ASSAY GLUCOSE BLOOD QUANT: CPT

## 2022-10-10 PROCEDURE — 94760 N-INVAS EAR/PLS OXIMETRY 1: CPT

## 2022-10-10 PROCEDURE — 6370000000 HC RX 637 (ALT 250 FOR IP): Performed by: INTERNAL MEDICINE

## 2022-10-10 PROCEDURE — 36415 COLL VENOUS BLD VENIPUNCTURE: CPT

## 2022-10-10 PROCEDURE — 94640 AIRWAY INHALATION TREATMENT: CPT

## 2022-10-10 PROCEDURE — 99238 HOSP IP/OBS DSCHRG MGMT 30/<: CPT | Performed by: INTERNAL MEDICINE

## 2022-10-10 PROCEDURE — 85025 COMPLETE CBC W/AUTO DIFF WBC: CPT

## 2022-10-10 PROCEDURE — 6370000000 HC RX 637 (ALT 250 FOR IP)

## 2022-10-10 PROCEDURE — 97116 GAIT TRAINING THERAPY: CPT

## 2022-10-10 PROCEDURE — 6360000002 HC RX W HCPCS

## 2022-10-10 RX ORDER — INSULIN LISPRO 100 [IU]/ML
2 INJECTION, SOLUTION INTRAVENOUS; SUBCUTANEOUS ONCE
Status: COMPLETED | OUTPATIENT
Start: 2022-10-10 | End: 2022-10-10

## 2022-10-10 RX ORDER — INSULIN GLARGINE 100 [IU]/ML
35 INJECTION, SOLUTION SUBCUTANEOUS NIGHTLY
Qty: 1 ADJUSTABLE DOSE PRE-FILLED PEN SYRINGE | Refills: 0
Start: 2022-10-10

## 2022-10-10 RX ORDER — HYDROCHLOROTHIAZIDE 25 MG/1
25 TABLET ORAL EVERY MORNING
Qty: 90 TABLET | Refills: 0
Start: 2022-10-14

## 2022-10-10 RX ORDER — INSULIN GLARGINE 100 [IU]/ML
5 INJECTION, SOLUTION SUBCUTANEOUS ONCE
Status: COMPLETED | OUTPATIENT
Start: 2022-10-10 | End: 2022-10-10

## 2022-10-10 RX ORDER — INSULIN GLARGINE 100 [IU]/ML
35 INJECTION, SOLUTION SUBCUTANEOUS 2 TIMES DAILY
Status: DISCONTINUED | OUTPATIENT
Start: 2022-10-10 | End: 2022-10-10 | Stop reason: HOSPADM

## 2022-10-10 RX ORDER — INSULIN LISPRO 100 [IU]/ML
12 INJECTION, SOLUTION INTRAVENOUS; SUBCUTANEOUS
Status: DISCONTINUED | OUTPATIENT
Start: 2022-10-10 | End: 2022-10-10 | Stop reason: HOSPADM

## 2022-10-10 RX ADMIN — METOPROLOL TARTRATE 25 MG: 25 TABLET, FILM COATED ORAL at 08:08

## 2022-10-10 RX ADMIN — INSULIN GLARGINE 30 UNITS: 100 INJECTION, SOLUTION SUBCUTANEOUS at 08:09

## 2022-10-10 RX ADMIN — POTASSIUM CHLORIDE 20 MEQ: 1500 TABLET, EXTENDED RELEASE ORAL at 08:08

## 2022-10-10 RX ADMIN — MOMETASONE FUROATE AND FORMOTEROL FUMARATE DIHYDRATE 2 PUFF: 200; 5 AEROSOL RESPIRATORY (INHALATION) at 07:40

## 2022-10-10 RX ADMIN — CETIRIZINE HYDROCHLORIDE 5 MG: 5 TABLET, FILM COATED ORAL at 08:07

## 2022-10-10 RX ADMIN — INSULIN LISPRO 2 UNITS: 100 INJECTION, SOLUTION INTRAVENOUS; SUBCUTANEOUS at 08:25

## 2022-10-10 RX ADMIN — INSULIN LISPRO 12 UNITS: 100 INJECTION, SOLUTION INTRAVENOUS; SUBCUTANEOUS at 11:47

## 2022-10-10 RX ADMIN — ENOXAPARIN SODIUM 40 MG: 100 INJECTION SUBCUTANEOUS at 08:07

## 2022-10-10 RX ADMIN — ATORVASTATIN CALCIUM 40 MG: 40 TABLET, FILM COATED ORAL at 08:08

## 2022-10-10 RX ADMIN — GLIPIZIDE 5 MG: 5 TABLET ORAL at 06:35

## 2022-10-10 RX ADMIN — FENOFIBRATE 160 MG: 160 TABLET ORAL at 08:07

## 2022-10-10 RX ADMIN — INSULIN LISPRO 1 UNITS: 100 INJECTION, SOLUTION INTRAVENOUS; SUBCUTANEOUS at 11:47

## 2022-10-10 RX ADMIN — LEVOTHYROXINE SODIUM 88 MCG: 0.09 TABLET ORAL at 06:35

## 2022-10-10 RX ADMIN — GABAPENTIN 600 MG: 300 CAPSULE ORAL at 08:24

## 2022-10-10 RX ADMIN — INSULIN LISPRO 10 UNITS: 100 INJECTION, SOLUTION INTRAVENOUS; SUBCUTANEOUS at 08:08

## 2022-10-10 RX ADMIN — INSULIN GLARGINE 5 UNITS: 100 INJECTION, SOLUTION SUBCUTANEOUS at 08:26

## 2022-10-10 RX ADMIN — DILTIAZEM HYDROCHLORIDE 120 MG: 120 CAPSULE, EXTENDED RELEASE ORAL at 08:07

## 2022-10-10 RX ADMIN — IPRATROPIUM BROMIDE AND ALBUTEROL SULFATE 1 AMPULE: .5; 2.5 SOLUTION RESPIRATORY (INHALATION) at 13:16

## 2022-10-10 RX ADMIN — IPRATROPIUM BROMIDE AND ALBUTEROL SULFATE 1 AMPULE: .5; 2.5 SOLUTION RESPIRATORY (INHALATION) at 07:38

## 2022-10-10 RX ADMIN — CHOLECALCIFEROL TAB 25 MCG (1000 UNIT) 1000 UNITS: 25 TAB at 08:24

## 2022-10-10 RX ADMIN — LACTULOSE 20 G: 20 SOLUTION ORAL at 08:07

## 2022-10-10 RX ADMIN — SODIUM CHLORIDE, PRESERVATIVE FREE 10 ML: 5 INJECTION INTRAVENOUS at 08:13

## 2022-10-10 RX ADMIN — LOSARTAN POTASSIUM 25 MG: 25 TABLET, FILM COATED ORAL at 08:08

## 2022-10-10 RX ADMIN — HYDROCHLOROTHIAZIDE 25 MG: 25 TABLET ORAL at 08:08

## 2022-10-10 ASSESSMENT — PAIN DESCRIPTION - LOCATION: LOCATION: BACK;NECK;SHOULDER

## 2022-10-10 ASSESSMENT — PAIN SCALES - GENERAL: PAINLEVEL_OUTOF10: 9

## 2022-10-10 NOTE — PROGRESS NOTES
Physical Therapy  Facility/Department: [facility]  PROGRESSIVE CARE  Daily Treatment Note  NAME: Radha Street  : 1955  MRN: [id number]    Date of Service: 10/10/2022    Discharge Recommendations:  Continue to assess pending progress, Home with Home health PT        Patient Diagnosis(es): The encounter diagnosis was Hyperglycemia due to diabetes mellitus ([facility] [address] Assessment   Activity Tolerance: Patient tolerated treatment well     Plan    Physcial Therapy Plan  General Plan: 1 time a day 7 days a week  Current Treatment Recommendations: Strengthening;ROM;Balance training;Functional mobility training;Gait training;Stair training; Endurance training;Neuromuscular re-education;Home exercise program;Safety education & training;Patient/Caregiver education & training;Equipment evaluation, education, & procurement     Restrictions        Subjective    Subjective  Subjective: Pt in bed upon arrival. Pt pleasant and agreeable to session. Pain: denies this date  Orientation  Overall Orientation Status: Within Normal Limits     Objective   Vitals  O2 Device: None (Room air)  Bed Mobility Training  Bed Mobility Training: Yes  Supine to Sit: Supervision  Scooting: Supervision  Balance  Sitting: Intact  Standing: Intact  Transfer Training  Transfer Training: Yes  Overall Level of Assistance: Supervision  Sit to Stand: Supervision  Stand to Sit: Supervision  Gait Training  Gait Training: Yes  Gait  Overall Level of Assistance: Supervision  Interventions: Safety awareness training (bumps into objects when not paying attention)  Distance (ft): 350 Feet  Assistive Device: Gait belt  Neuromuscular Education  NDT Treatment: Gait ;Standing  PT Exercises  Exercise Treatment: Supine ankle pumps, quad sets, glute sets, SLR, hip abd.x20 ea. Seated HR/TR, marching  Resistive Exercises: Seated HS curls, LAQ, abd, add x15 ea with manual resistance     Safety Devices  Type of Devices: Gait belt;Nurse notified; Left in chair;Chair alarm in place       Goals  Short Term Goals  Time Frame for Short Term Goals: LOS  Short Term Goal 1: Bed mobilty with mod I  Short Term Goal 2: Transfers mod I  Short Term Goal 3: Amb x 150ft with no device with mod I  Patient Goals   Patient Goals : Return Home    Education       Therapy Time   Individual Concurrent Group Co-treatment   Time In 0951         Time Out 1009         Minutes 18                 Juhi Rodriguez, PTA

## 2022-10-10 NOTE — PROGRESS NOTES
Physician Progress Note      Ismael GARCIA #:                  [id number]  :                       1955  ADMIT DATE:       10/6/2022 8:21 PM  [address] [place] DATE:        10/10/2022 1:30 PM  RESPONDING  PROVIDER #:        Ricardo Kahn MD          QUERY TEXT:    Patient admitted with uncontrolled DM. Noted documentation of Acute Kidney   Injury in H&P. Admission creatinine 1.62 and decreased to low of 1.13 this admission - a less   than 1.5 times decrease (1.62 / 1.5 = 1.08). Note prior to admission   creatinine  1.08 on 22 and 1.07 on 22    In order to support the diagnosis of SHAHRAM this admission , please include   additional clinical indicators in your documentation. Or please document if   the diagnosis of SHAHRAM has been ruled out after further study. The medical record reflects the following:  Risk Factors: DM, CKD  Clinical Indicators:    BUN / Cr / GFR  22     16 / 1.07 / 51  prior to admission  22     17 / 1.  prior to admission  10/6/22     30 / 1.62 / -     admission  10/7/22     26 / 1.46 / -  10/8/22     20 / 1.22 / -  10/9/22     12 / 1.13 /-  10/10/22    14 / 1.35 /-  Treatment: DM care, IV fluids, lab monitoring    Defined by Kidney Disease Improving Global Outcomes (KDIGO) clinical practice   guideline for acute kidney injury:  -Increase in SCr by greater than or equal to 0.3 mg/dl within 48 hours; or  -Increase or decrease in SCr to greater than or equal to 1.5 times baseline,   which is known or presumed to have occurred within the prior 7 days; or  -Urine volume < 0.5ml/kg/h for 6 hours. Options provided:  -- Acute kidney injury evidenced by, Please document evidence as well as a   numerical baseline creatinine, if known.   -- Acute kidney injury ruled out after study  -- Other - I will add my own diagnosis  -- Disagree - Not applicable / Not valid  -- Disagree - Clinically unable to determine / Unknown  -- Refer to Clinical Documentation Reviewer    PROVIDER RESPONSE TEXT:    Acute kidney injury was ruled out after study.     Query created by: Rocael Bansal on 10/10/2022 12:09 PM      Electronically signed by:  Jackie Walton MD 10/10/2022 1:45 PM

## 2022-10-10 NOTE — PROGRESS NOTES
DISCHARGE BARRIERS       Reason for Referral:  SW completed a Psychosocial Assessment for evaluation of patient's mental health, social status, and functional capacity within the community. Velma Kim is a 78 y.o. female admitted due to Hyperglycemia due to diabetes mellitus. SW provided supportive listening while patient discussed past medical history and events leading up to hospitalization. Mental Status:  Alert, oriented, and engaging during assessment. Decision Making:  Makes own decisions. Family/Social/Home Environment: Lives at [facility]      Support: Discussed a good social support network     Current Services: None    Current DMEs: Walker when needed, shower chair, and grab bars. PCP: Tessa Whitfield, DO and repots no issues affording medication.  status:  None     ADLs and means of transportation: Independent in ADLs prior to hospitalization and unable to transport self. Food insecurity or needed financial assistance: Denies any food insecurity or financial concerns at this time. Patient states she has been disabled \"for a while now\". Patient reports she receives social security. ACP and Code Status:  Velma Kim is a Full Code status and does not have an Advance Directive. SW discussed an Advance Directive which included the patient's choices for care and treatment in the case of a health event that adversely affects decision-making abilities. SW provided education and resources. Patient reports her son Thea Montes in [place] knows her medical wishes and is working on her living will. Velma Kim has no questions at this time and has agreed to keep me up-to-date should anything change. Collaborative List of SNF/ECF/HH were provided: offered, declined No discharge order at this time. Anticipated Needs/Discharge Plan:  Spoke with patient/family/representative about discharge plan.  Patient/Family/Representative verbalizes understanding of the plan of care and denies discharge needs or further services at this time. SW provided business card. SW will continue to monitor needs and assist as appropriate.          Electronically signed by ALEXSANDRA Willis on 10/10/2022 at 10:41 AM

## 2022-10-10 NOTE — PROGRESS NOTES
Hospitalist Progress Note    Patient:  Chetna Ramos     YOB: 1955    MRN: [id number]   Admit date: 10/6/2022     Acct: [de-identified]     PCP: DO ALONDRA Rocha--Interval History:    Diabetes Mellitus Type 2---uncontrolled outpatient---medications adjusted---home----10.10.2022    Abdominal pain--resolved    SHAHRAM---resolved---CKD---Stage 3b    See note below     All other ROS negative except noted in HPI    Diet:  ADULT DIET; Regular; 5 carb choices (75 gm/meal);  Low Fat/Low Chol/High Fiber/2 gm Na    Medications:  Scheduled Meds:   insulin glargine  35 Units SubCUTAneous BID    insulin lispro  12 Units SubCUTAneous TID WC    lactulose  20 g Oral TID    atorvastatin  40 mg Oral Daily    dilTIAZem  120 mg Oral Daily    fenofibrate  160 mg Oral Daily    mometasone-formoterol  2 puff Inhalation BID    glipiZIDE  5 mg Oral BID AC    [Held by provider] hydroCHLOROthiazide  25 mg Oral QAM    levothyroxine  88 mcg Oral QAM AC    cetirizine  5 mg Oral Daily    losartan  25 mg Oral Daily    metoprolol tartrate  25 mg Oral BID    potassium chloride  20 mEq Oral Daily with breakfast    Dulaglutide  1.5 mg SubCUTAneous Weekly    Vitamin D  1 tablet Oral Daily    sodium chloride flush  5-40 mL IntraVENous 2 times per day    enoxaparin  40 mg SubCUTAneous Daily    ipratropium-albuterol  1 ampule Inhalation 4x daily    insulin lispro  0-4 Units SubCUTAneous TID WC    insulin lispro  0-4 Units SubCUTAneous Nightly     Continuous Infusions:   dextrose      sodium chloride Stopped (10/08/22 2051)     PRN Meds:ipratropium-albuterol, cyclobenzaprine, gabapentin, glucose, dextrose bolus **OR** dextrose bolus, glucagon (rDNA), dextrose, sodium chloride flush, sodium chloride, ondansetron **OR** ondansetron, polyethylene glycol, acetaminophen **OR** acetaminophen    Objective:  Labs:  CBC with Differential:    Lab Results   Component Value Date/Time    WBC 4.8 10/10/2022 05:26 AM    RBC 4.13 10/10/2022 05:26 AM    HGB 13.1 10/10/2022 05:26 AM    HCT 38.5 10/10/2022 05:26 AM     10/10/2022 05:26 AM    MCV 93.2 10/10/2022 05:26 AM    MCH 31.7 10/10/2022 05:26 AM    MCHC 34.0 10/10/2022 05:26 AM    RDW 12.9 10/10/2022 05:26 AM    NRBC 2 01/27/2021 06:33 AM    LYMPHOPCT 44 10/10/2022 05:26 AM    MONOPCT 8 10/10/2022 05:26 AM    BASOPCT 1 10/10/2022 05:26 AM    MONOSABS 0.38 10/10/2022 05:26 AM    LYMPHSABS 2.14 10/10/2022 05:26 AM    EOSABS 0.27 10/10/2022 05:26 AM    BASOSABS 0.05 10/10/2022 05:26 AM    DIFFTYPE NOT REPORTED 12/22/2021 11:13 AM     BMP:    Lab Results   Component Value Date/Time     10/10/2022 05:26 AM    K 5.1 10/10/2022 05:26 AM     10/10/2022 05:26 AM    CO2 28 10/10/2022 05:26 AM    BUN 14 10/10/2022 05:26 AM    LABALBU 3.8 10/08/2022 05:47 AM    CREATININE 1.35 10/10/2022 05:26 AM    CALCIUM 10.2 10/10/2022 05:26 AM    GFRAA >60 08/31/2022 01:23 PM    LABGLOM 43 10/10/2022 05:26 AM    GLUCOSE 199 10/10/2022 05:26 AM           Physical Exam:  Vitals: /81   Pulse 78   Temp 97.5 °F (36.4 °C) (Tympanic)   Resp 18   Ht 5' 3\" (1.6 m)   Wt 161 lb 1.6 oz (73.1 kg) Comment: removed extra blankets from bed  SpO2 99%   BMI 28.54 kg/m²   24 hour intake/output:  Intake/Output Summary (Last 24 hours) at 10/10/2022 1132  Last data filed at 10/10/2022 1049  Gross per 24 hour   Intake 1090 ml   Output 1 ml   Net 1089 ml     Last 3 weights: Wt Readings from Last 3 Encounters:   10/10/22 161 lb 1.6 oz (73.1 kg)   08/31/22 173 lb (78.5 kg)   08/31/22 173 lb (78.5 kg)     HEENT: Normocephalic and Atraumatic  Neck: Supple, No Masses, Tenderness, Nodularity, and No Lymphadenopathy  Chest/Lungs: Clear to Auscultation without Rales, Rhonchi, or Wheezes  Cardiac: Regular Rate and Rhythm  GI/Abdomen:  Bowel Sounds Present and Soft, Non-tender, without Guarding or Rebound Tenderness  : Not examined  EXT/Skin: No Edema, No Cyanosis, and No Clubbing  Neuro:  alert----- and No Localizing Signs/Symptoms      Assessment:    Principal Problem:    Hyperglycemia due to diabetes mellitus ([facility] [facility]  Active Problems:    Acute kidney injury superimposed on CKD ([facility] [facility]    Hyperosmolar hyperglycemic state (HHS) ([facility] [address] [facility]  Resolved Problems:    * No resolved hospital problems.  *    Jorge BOTELLO  67  WF   [risa Harkins, ADINA]  FULL CODE  COVID-19---NEGATIVE                          INSULIN gtt---10.6.2022--11.9.2022    Diabetes Mellitus Type 2---uncontrolled  Abdominal pain---nausea--vomiting         HgbA1c = 11.7---10.6.2022         DKA---history   SHAHRAM superposed on CKD---typically Stage 3a---now Stage 3b   Falls--recurrent        Atrial fibrillation---PAF----history  Hypertension   Hyperlipidemia   Hyperthyroidism---Graves's disease---now hypothyroidism due to ablation   COPD  Asthma   Tobacco abuse---quit c. 2014  PMH:     DDD, irritable bowel syndrome--IBS, iron deficiency anemia, impacted cerumen--left ear,                 vaginal itching, GI bleed  PSH:      colonoscopy---2014--2021, dental--removal bilateral mandibular leah and                maxillary--edentulous--2017, G-tube--placement and removal, tonsillectomy---                childhood, tracheostomy--2015, EGD--PEG---2021    Allergies:  Norco--hydrocodone, Percocet--oxycodone, sulfa      Plan:     Home---10.10.2022     Medications reviewed     HCTZ restart on 10.14.2022     DM2---Lantus 35 nightly----log 12-12-12     Diabetic educator outpatient     Follow up Dr. Adilson Boss, [facility]     See orders    Electronically signed by Lia Lopez MD on 10/10/2022 at 11:32 AM    Hospitalist

## 2022-10-10 NOTE — DISCHARGE INSTR - DIET

## 2022-10-10 NOTE — PLAN OF CARE
Problem: ABCDS Injury Assessment  Goal: Absence of physical injury  Outcome: Progressing     Problem: Discharge Planning  Goal: Discharge to home or other facility with appropriate resources  Outcome: Progressing  Flowsheets (Taken 10/10/2022 0828)  Discharge to home or other facility with appropriate resources: Identify barriers to discharge with patient and caregiver     Problem: Pain  Goal: Verbalizes/displays adequate comfort level or baseline comfort level  Outcome: Progressing     Problem: Chronic Conditions and Co-morbidities  Goal: Patient's chronic conditions and co-morbidity symptoms are monitored and maintained or improved  Outcome: Progressing  Flowsheets (Taken 10/10/2022 0828)  Care Plan - Patient's Chronic Conditions and Co-Morbidity Symptoms are Monitored and Maintained or Improved: Monitor and assess patient's chronic conditions and comorbid symptoms for stability, deterioration, or improvement     Problem: Nutrition Deficit:  Goal: Optimize nutritional status  Outcome: Progressing     Problem: Safety - Adult  Goal: Free from fall injury  Outcome: Progressing

## 2022-10-10 NOTE — PLAN OF CARE
Problem: ABCDS Injury Assessment  Goal: Absence of physical injury  10/10/2022 1240 by Bibi Stoddard RN  Outcome: Completed  10/10/2022 1014 by Bibi Stoddard RN  Outcome: Progressing     Problem: Discharge Planning  Goal: Discharge to home or other facility with appropriate resources  10/10/2022 1240 by Bibi Stoddard RN  Outcome: Completed  10/10/2022 1014 by Bibi Stoddard RN  Outcome: Progressing  Flowsheets (Taken 10/10/2022 5461)  Discharge to home or other facility with appropriate resources: Identify barriers to discharge with patient and caregiver     Problem: Pain  Goal: Verbalizes/displays adequate comfort level or baseline comfort level  10/10/2022 1240 by Bibi Stoddard RN  Outcome: Completed  10/10/2022 1014 by Bibi Stoddard RN  Outcome: Progressing     Problem: Chronic Conditions and Co-morbidities  Goal: Patient's chronic conditions and co-morbidity symptoms are monitored and maintained or improved  10/10/2022 1240 by Bibi Stoddard RN  Outcome: Completed  10/10/2022 1014 by Bibi Stoddard RN  Outcome: Progressing  Flowsheets (Taken 10/10/2022 08)  Care Plan - Patient's Chronic Conditions and Co-Morbidity Symptoms are Monitored and Maintained or Improved: Monitor and assess patient's chronic conditions and comorbid symptoms for stability, deterioration, or improvement     Problem: Nutrition Deficit:  Goal: Optimize nutritional status  10/10/2022 1240 by Bibi Stoddard RN  Outcome: Completed  10/10/2022 1014 by Bibi Stoddard RN  Outcome: Progressing     Problem: Safety - Adult  Goal: Free from fall injury  10/10/2022 1240 by Bibi Stoddard RN  Outcome: Completed  10/10/2022 1014 by Bibi Stoddard RN  Outcome: Progressing

## 2022-10-11 ENCOUNTER — TELEPHONE (OUTPATIENT)
Dept: FAMILY MEDICINE CLINIC | Age: 67
End: 2022-10-11

## 2022-10-11 ENCOUNTER — CARE COORDINATION (OUTPATIENT)
Dept: CARE COORDINATION | Age: 67
End: 2022-10-11

## 2022-10-11 NOTE — TELEPHONE ENCOUNTER
Judy [facility] Transitions Initial Follow Up Call    Outreach made within 2 business days of discharge: Yes    Patient: Donnell Brannon Patient : 1955   MRN: [id number]  Reason for Admission: There are no discharge diagnoses documented for the most recent discharge. Discharge Date: 10/10/22       Spoke with: MONICA    Discharge department/facility: [facility]    TCM Interactive Patient Contact:  Was patient able to fill all prescriptions: Yes  Was patient instructed to bring all medications to the follow-up visit: Yes  Is patient taking all medications as directed in the discharge summary? Yes  Does patient understand their discharge instructions: Yes  Does patient have questions or concerns that need addressed prior to 7-14 day follow up office visit: no    Scheduled appointment with PCP within 7-14 days; No availability with Dr. Armida Haley in required time frame. OK to schedule with another provider. OV 10/12 with Sacha. Will f/u with Dr. Armida Haley at previously scheduled visit on .      Follow Up  Future Appointments   Date Time Provider Diomedes Cardenas   10/12/2022  2:00 PM RAJNI Burroughs - CNP [facility]   2022 10:40 AM [facility]   2023 11:30 AM Arminda Hansen MD [facility]       Adán Bales RN

## 2022-10-11 NOTE — DISCHARGE SUMMARY
[address]                 [address] [place] [address], 00 [facility]                               DISCHARGE SUMMARY    PATIENT NAME: Brandon Steele                    :        1955  MED REC NO:   [id number]                             ROOM:       0203  ACCOUNT NO:   [de-identified]                           ADMIT DATE: 10/06/2022  PROVIDER:     Nicolle Velasquez. Isabel Honeycutt MD                  DISCHARGE DATE:  10/10/2022    ATTENDING PHYSICIAN OF HOSPITALIZATION:  Deandre Graf MD    PERSONAL PHYSICIAN:  Cassi Briggs DO, [facility]. DIAGNOSES:  1. Diabetes mellitus, type 2, uncontrolled, 10/06/2022.  2.  Abdominal pain, nausea, vomiting, resolved. Hemoglobin A1c 11.7,  10/06/2022. DKA history. 3.  Chronic kidney disease, typically stage IIIA with IIIB at the time  of admission, however, SHAHRAM ruled out. 4.  Frequent falls at home. 5.  Atrial fibrillation, paroxysmal atrial fibrillation history. 6.  Hypertension. 7.  Hyperlipidemia. 8.  Hyperthyroidism, Graves' disease, now hypothyroidism due to  ablation. 9.  COPD. 10.  Asthma. 11.  Tobacco abuse, quit . Outside sources suggest polysubstance abuse. Other medical problems set forth in the progress note of 10/10/2022,  incorporated for reference herein. HISTORY OF PRESENT ILLNESS/HOSPITAL COURSE:  The patient is a  56-year-old white female, patient of Cassi Briggs DO, [facility]. The patient had presented with diabetes  mellitus uncontrolled, markedly elevated blood glucose levels, was  placed on an insulin drip from 10/06/2022 to 10/09/2022. The patient's  insulin was adjusted, most recent dose for home will be Lantus insulin  35 units a.m. and p.m. together with 12 units of Humalog breakfast,  lunch, evening meal.    initially presented with stage III kidney disease, however,  the range was not significant enough for acute kidney injury. The  patient typically at a stage II level, currently at a stage IIIB,  stable. recent falls at home, none during this  hospitalization. atrial fibrillation, paroxysmal atrial fibrillation by  history. Hypertension, controlled, blood pressure 114/81. Prior hyperthyroidism, now hypothyroidism due to ablation for  Graves' disease, on replacement therapy of Synthroid. COPD, asthma,  controlled. Tobacco abuse, quit circa 2014. LABORATORY DATA:  Around the time of discharge, white cell count 4.8,  hemoglobin 13.1, hematocrit 38.5, platelets 607,706. Sodium 140,  potassium 5.1, chloride 104, CO2 28, BUN 14, creatinine 1.35, glucose of  199, adjustment of insulin made, calcium 10.2 and GFR of 43. DISCHARGE INSTRUCTIONS/FOLLOWUP:  Discharged to home on 10/10/2022. DIET:  Cardiac, diabetic. ACTIVITY:  As tolerated. CONDITION AT DISCHARGE:  Fair, improved. MEDICATIONS:  No new medications change. Hydrochlorothiazide (HCTZ) to  be restarted on 10/14/2022, 25 mg p.o. daily; 35 units of Lantus  nightly. FOLLOWING MEDICATIONS CONTINUED WITHOUT CHANGE:  Albuterol sulfate HFA 2  puffs every 6 hours p.r.n. shortness of breath, wheezing; albuterol  0.083% nebulizer every 6 hours p.r.n. shortness of breath, wheezing. FOLLOWING MEDICATIONS CONTINUED WITHOUT CHANGE:  Atorvastatin (Lipitor)  40 mg p.o. daily q.a.m.; cyclobenzaprine (Flexeril) 10 mg twice daily  p.r.n. muscle spasm; diltiazem extended release (Cardizem CD) 120 mg  p.o. daily; fenofibrate (Tricor) 145 mg p.o. q.a.m.; fish oil 1000 mg  capsules, 2 capsules for 2000 mg p.o. daily; Advair 250/50 one  inhalation twice daily; the patient utilizes a [employer] device at  home for the monitoring of her blood glucose levels; gabapentin  (Neurontin) 600 mg one to two times daily p.r.n. chronic neck and back  pain, glimepiride (Amaryl) 2 mg p.o. q.a.m. and 2 mg p.o. q.p.m.;  levothyroxine (Synthroid) 88 mcg (0.088 mg) p.o. daily; loratadine  (Claritin) 10 mg p.o. daily; losartan (Cozaar) 25 mg p.o. q.a.m.,  metoprolol tartrate (Lopressor) 25 mg p.o. b.i.d., second dose being  taken before bedtime; potassium chloride 20 mEq p.o. daily; Trulicity  1.5 mg subcutaneously weekly; vitamin D3 25 mcg p.o. daily. FOLLOWING MEDICATIONS DISCONTINUED:  Metoclopramide (Reglan) together  with pantoprazole (Protonix). Follow up with the patient's personal physician, Cesar Charles DO,  [facility]. The patient to be seen  outpatient diabetic educator. Any aspect of the patient's care not discussed in the chart and/or  dictation will be addressed and treated as an outpatient. The patient's medications have been reviewed including, but not limited  to, pre-hospital, hospital and discharge medications. The patient  and/or the patient's personal representatives were specifically advised  the only medications to be taken are those set forth in the discharge  orders and no other medications should be taken. Any prior medications  not on the discharge orders are specifically discontinued.         Skyler Ennis MD    D: 10/10/2022 14:42:57       T: 10/10/2022 14:46:32     [email or web address]  Job#: [id number]     Doc#: [id number]    CC:

## 2022-10-12 ENCOUNTER — OFFICE VISIT (OUTPATIENT)
Dept: FAMILY MEDICINE CLINIC | Age: 67
End: 2022-10-12

## 2022-10-12 ENCOUNTER — CARE COORDINATION (OUTPATIENT)
Dept: CARE COORDINATION | Age: 67
End: 2022-10-12

## 2022-10-12 VITALS
HEIGHT: 63 IN | RESPIRATION RATE: 16 BRPM | WEIGHT: 165 LBS | OXYGEN SATURATION: 98 % | HEART RATE: 88 BPM | SYSTOLIC BLOOD PRESSURE: 132 MMHG | BODY MASS INDEX: 29.23 KG/M2 | DIASTOLIC BLOOD PRESSURE: 86 MMHG

## 2022-10-12 DIAGNOSIS — E11.9 TYPE 2 DIABETES MELLITUS WITHOUT COMPLICATION, WITH LONG-TERM CURRENT USE OF INSULIN (HCC): ICD-10-CM

## 2022-10-12 DIAGNOSIS — E11.65 HYPERGLYCEMIA DUE TO DIABETES MELLITUS (HCC): ICD-10-CM

## 2022-10-12 DIAGNOSIS — Z09 HOSPITAL DISCHARGE FOLLOW-UP: Primary | ICD-10-CM

## 2022-10-12 DIAGNOSIS — Z79.4 TYPE 2 DIABETES MELLITUS WITHOUT COMPLICATION, WITH LONG-TERM CURRENT USE OF INSULIN (HCC): ICD-10-CM

## 2022-10-12 ASSESSMENT — ENCOUNTER SYMPTOMS
DIARRHEA: 0
NAUSEA: 0
VOMITING: 0
ABDOMINAL PAIN: 1

## 2022-10-12 ASSESSMENT — PATIENT HEALTH QUESTIONNAIRE - PHQ9
1. LITTLE INTEREST OR PLEASURE IN DOING THINGS: 0
SUM OF ALL RESPONSES TO PHQ QUESTIONS 1-9: 0
SUM OF ALL RESPONSES TO PHQ9 QUESTIONS 1 & 2: 0
2. FEELING DOWN, DEPRESSED OR HOPELESS: 0

## 2022-10-12 NOTE — CARE COORDINATION
Care Transitions Outreach Attempt #2 final    Call within 2 business days of discharge: Yes   Attempted to reach patient for transitions of care follow up. Unable to reach patient. HIPAA compliant message left on  requesting a return call. Letter sent via [facility]. Final attempt, episode resolved. Patient: Ingrid Kaplan Patient : 1955 MRN: [id number]    Last Discharge  Street       Date Complaint Diagnosis Description Type Department Provider    10/6/22 Abdominal Pain; Hyperglycemia Hyperglycemia due to diabetes mellitus ([facility] [address] . .. ED to Hosp-Admission (Discharged) (ADMITTED) Latonya Stahl MD; Danna Greenwood. .. Was this an external facility discharge?  No Discharge Facility: [facility]    Noted following upcoming appointments from discharge chart review:   [facility] follow up appointment(s):   Future Appointments   Date Time Provider Diomedes Cardenas   10/12/2022  2:00 PM RAJNI Saini - CNP [facility]   2022 10:40 AM [facility]   2023 11:30 AM Lady Kelsey MD [address] [id number] Health/ Care Transition Nurse  [phone]

## 2022-10-12 NOTE — PROGRESS NOTES
Subjective:      Patient ID: Blayne Ruiz is a 78 y.o. female coming in for   Chief Complaint   Patient presents with    Follow-Up from Hospital     Follow up from hospital for hyperglycemia. Doing well now.  last night and 130 this morning. [facility]  Hospital f/u appt. Admitted to [facility] 10/6/22-10/10/22 for uncontrolled diabetes. A1c while admitted 17%. Discharged home current regimen includes: Trulicity 3.1XR weekly. Lantus 35units nightly. Glimepride 2mg bid. Glucose 130 this morning and 160 last night. Pt checking glucose AC/HS. Was referred to Diabetes management with [facility] at [facility], but she is unable to make her appts due to transportation. Pt would benefit from continuous glucose monitoring. Review of Systems   Gastrointestinal:  Positive for abdominal pain (continues to have pain to left upper quadrant where feeding tube use to be. is suppose to see a GI specialist in [facility] on 10/18/22). Negative for diarrhea, nausea and vomiting. Objective:/86   Pulse 88   Resp 16   Ht 5' 3\" (1.6 m)   Wt 165 lb (74.8 kg)   SpO2 98%   BMI 29.23 kg/m²      Physical Exam  Vitals and nursing note reviewed. Constitutional:       General: She is not in acute distress. Appearance: Normal appearance. She is not ill-appearing. HENT:      Head: Normocephalic. Cardiovascular:      Rate and Rhythm: Normal rate and regular rhythm. Heart sounds: Normal heart sounds. Pulmonary:      Effort: Pulmonary effort is normal.      Breath sounds: Normal breath sounds. Musculoskeletal:      Right lower leg: No edema. Left lower leg: No edema. Skin:     General: Skin is warm and dry. Findings: No rash. Neurological:      General: No focal deficit present. Mental Status: She is alert and oriented to person, place, and time. Assessment:      1. Hospital discharge follow-up    2.  Type 2 diabetes mellitus without complication, with long-term current use skin once a week 12 pen 1    metoprolol tartrate (LOPRESSOR) 25 MG tablet Take 1 tablet by mouth in the morning and 1 tablet before bedtime. 180 tablet 1    albuterol (PROVENTIL) (2.5 MG/3ML) 0.083% nebulizer solution Take 3 mLs by nebulization every 6 hours as needed for Wheezing or Shortness of Breath 120 each 1    gabapentin (NEURONTIN) 600 MG tablet Take 1 tab by mouth 1-2x's daily as needed for chronic neck and back pain. 180 tablet 0    fenofibrate (TRICOR) 145 MG tablet Take 1 tablet by mouth in the morning. . 90 tablet 3    potassium chloride (KLOR-CON M) 20 MEQ extended release tablet       fluticasone-salmeterol (ADVAIR) 250-50 MCG/ACT AEPB diskus inhaler Inhale 1 puff into the lungs 2 times daily      Continuous Blood Gluc Sensor (FREESTYLE KOKO 2 SENSOR) MISC       Continuous Blood Gluc  (FREESTYLE KOKO 2 READER) SOUTH       zoster recombinant adjuvanted vaccine (SHINGRIX) 50 MCG/0.5ML SUSR injection 50 MCG IM then repeat 2-6 months. 0.5 mL 1    vitamin D3 (CHOLECALCIFEROL) 25 MCG (1000 UT) TABS tablet TAKE 1 TABLET BY MOUTH DAILY 30 tablet 0    loratadine (CLARITIN) 10 MG tablet TAKE 1 TABLET BY MOUTH EVERY DAY 30 tablet 10    Omega-3 Fatty Acids (FISH OIL) 1000 MG CAPS TAKE TWO (2) CAPSULES BY MOUTH DAILY 180 capsule 10     No facility-administered encounter medications on file as of 10/12/2022.             Carla Martinez, APRN - CNP

## 2022-10-13 DIAGNOSIS — Z79.4 TYPE 2 DIABETES MELLITUS WITHOUT COMPLICATION, WITH LONG-TERM CURRENT USE OF INSULIN (HCC): Primary | ICD-10-CM

## 2022-10-13 DIAGNOSIS — E11.9 TYPE 2 DIABETES MELLITUS WITHOUT COMPLICATION, WITH LONG-TERM CURRENT USE OF INSULIN (HCC): Primary | ICD-10-CM

## 2022-10-13 NOTE — TELEPHONE ENCOUNTER
Pt calling back stating her ins will cover the Dexcom at [facility], please send script there.

## 2022-10-14 NOTE — TELEPHONE ENCOUNTER
Patient asking for the dexcom to be sent into the pharm. Aware [facility] is out of the office until Tuesday 10/18. Philipp Almazan called requesting a refill of the below medication which has been pended for you:     Requested Prescriptions     Pending Prescriptions Disp Refills    Continuous Blood Gluc Sensor (DEXCOM G6 SENSOR) MISC 9 each 1     Sig: Use to monitor blood glucose continuously    Continuous Blood Gluc  (DEXCOM G6 ) SOUTH 1 each 0     Sig: Use to monitor blood glucose continuously    Continuous Blood Gluc Transmit (DEXCOM G6 TRANSMITTER) MISC 2 each 1     Sig: Use to monitor blood glucose continuously       Last Appointment Date: 6/29/2022  Next Appointment Date: 11/2/2022    Allergies   Allergen Reactions    Norco [Hydrocodone-Acetaminophen]      Nausea and vomiting    Oxycodone Nausea And Vomiting    Percocet [Oxycodone-Acetaminophen] Nausea And Vomiting    Sulfa Antibiotics Other (See Comments)     Hallucinations.

## 2022-10-19 DIAGNOSIS — J44.9 ASTHMA WITH COPD (HCC): ICD-10-CM

## 2022-10-19 RX ORDER — BLOOD-GLUCOSE SENSOR
EACH MISCELLANEOUS
Qty: 9 EACH | Refills: 3 | Status: SHIPPED | OUTPATIENT
Start: 2022-10-19

## 2022-10-19 RX ORDER — BLOOD-GLUCOSE TRANSMITTER
EACH MISCELLANEOUS
Qty: 1 EACH | Refills: 3 | Status: SHIPPED | OUTPATIENT
Start: 2022-10-19

## 2022-10-19 RX ORDER — ALBUTEROL SULFATE 2.5 MG/3ML
2.5 SOLUTION RESPIRATORY (INHALATION) EVERY 6 HOURS PRN
Qty: 120 ML | Refills: 3 | Status: SHIPPED | OUTPATIENT
Start: 2022-10-19

## 2022-10-19 RX ORDER — BLOOD-GLUCOSE,RECEIVER,CONT
EACH MISCELLANEOUS
Qty: 1 EACH | Refills: 0 | Status: SHIPPED | OUTPATIENT
Start: 2022-10-19

## 2022-11-08 ENCOUNTER — OFFICE VISIT (OUTPATIENT)
Dept: DIABETES SERVICES | Age: 67
End: 2022-11-08
Payer: MEDICARE

## 2022-11-08 VITALS
WEIGHT: 164 LBS | RESPIRATION RATE: 16 BRPM | HEIGHT: 63 IN | HEART RATE: 72 BPM | BODY MASS INDEX: 29.06 KG/M2 | SYSTOLIC BLOOD PRESSURE: 108 MMHG | DIASTOLIC BLOOD PRESSURE: 82 MMHG

## 2022-11-08 DIAGNOSIS — I10 ESSENTIAL HYPERTENSION: Chronic | ICD-10-CM

## 2022-11-08 DIAGNOSIS — N18.31 TYPE 2 DIABETES MELLITUS WITH STAGE 3A CHRONIC KIDNEY DISEASE, WITH LONG-TERM CURRENT USE OF INSULIN (HCC): Primary | ICD-10-CM

## 2022-11-08 DIAGNOSIS — Z79.4 TYPE 2 DIABETES MELLITUS WITH STAGE 3A CHRONIC KIDNEY DISEASE, WITH LONG-TERM CURRENT USE OF INSULIN (HCC): Primary | ICD-10-CM

## 2022-11-08 DIAGNOSIS — E11.22 TYPE 2 DIABETES MELLITUS WITH STAGE 3A CHRONIC KIDNEY DISEASE, WITH LONG-TERM CURRENT USE OF INSULIN (HCC): Primary | ICD-10-CM

## 2022-11-08 DIAGNOSIS — E78.2 MIXED HYPERLIPIDEMIA: ICD-10-CM

## 2022-11-08 PROBLEM — E11.65 HYPERGLYCEMIA DUE TO DIABETES MELLITUS (HCC): Status: RESOLVED | Noted: 2022-10-07 | Resolved: 2022-11-08

## 2022-11-08 PROBLEM — E11.00 HYPEROSMOLAR HYPERGLYCEMIC STATE (HHS) (HCC): Status: RESOLVED | Noted: 2022-10-08 | Resolved: 2022-11-08

## 2022-11-08 PROCEDURE — 3046F HEMOGLOBIN A1C LEVEL >9.0%: CPT | Performed by: NURSE PRACTITIONER

## 2022-11-08 PROCEDURE — 2024F 7 FLD RTA PHOTO EVC RTNOPTHY: CPT | Performed by: NURSE PRACTITIONER

## 2022-11-08 PROCEDURE — 99214 OFFICE O/P EST MOD 30 MIN: CPT | Performed by: NURSE PRACTITIONER

## 2022-11-08 PROCEDURE — 1123F ACP DISCUSS/DSCN MKR DOCD: CPT | Performed by: NURSE PRACTITIONER

## 2022-11-08 PROCEDURE — G8484 FLU IMMUNIZE NO ADMIN: HCPCS | Performed by: NURSE PRACTITIONER

## 2022-11-08 PROCEDURE — 3074F SYST BP LT 130 MM HG: CPT | Performed by: NURSE PRACTITIONER

## 2022-11-08 PROCEDURE — G8399 PT W/DXA RESULTS DOCUMENT: HCPCS | Performed by: NURSE PRACTITIONER

## 2022-11-08 PROCEDURE — 3078F DIAST BP <80 MM HG: CPT | Performed by: NURSE PRACTITIONER

## 2022-11-08 PROCEDURE — 1036F TOBACCO NON-USER: CPT | Performed by: NURSE PRACTITIONER

## 2022-11-08 PROCEDURE — 3017F COLORECTAL CA SCREEN DOC REV: CPT | Performed by: NURSE PRACTITIONER

## 2022-11-08 PROCEDURE — 1111F DSCHRG MED/CURRENT MED MERGE: CPT | Performed by: NURSE PRACTITIONER

## 2022-11-08 PROCEDURE — 99205 OFFICE O/P NEW HI 60 MIN: CPT | Performed by: NURSE PRACTITIONER

## 2022-11-08 PROCEDURE — G8427 DOCREV CUR MEDS BY ELIG CLIN: HCPCS | Performed by: NURSE PRACTITIONER

## 2022-11-08 PROCEDURE — G8417 CALC BMI ABV UP PARAM F/U: HCPCS | Performed by: NURSE PRACTITIONER

## 2022-11-08 PROCEDURE — 1090F PRES/ABSN URINE INCON ASSESS: CPT | Performed by: NURSE PRACTITIONER

## 2022-11-08 RX ORDER — BLOOD-GLUCOSE,RECEIVER,CONT
1 EACH MISCELLANEOUS DAILY
Qty: 1 EACH | Refills: 0 | Status: SHIPPED | OUTPATIENT
Start: 2022-11-08

## 2022-11-08 RX ORDER — PANTOPRAZOLE SODIUM 40 MG/1
40 TABLET, DELAYED RELEASE ORAL DAILY
COMMUNITY
Start: 2022-09-24

## 2022-11-08 RX ORDER — BLOOD-GLUCOSE TRANSMITTER
EACH MISCELLANEOUS
Qty: 1 EACH | Refills: 3 | Status: SHIPPED | OUTPATIENT
Start: 2022-11-08

## 2022-11-08 RX ORDER — SUCRALFATE 1 G/1
TABLET ORAL
COMMUNITY
Start: 2022-10-18

## 2022-11-08 RX ORDER — MULTIVIT WITH MINERALS/LUTEIN
1000 TABLET ORAL DAILY
COMMUNITY

## 2022-11-08 RX ORDER — ZINC GLUCONATE 50 MG
50 TABLET ORAL DAILY
COMMUNITY

## 2022-11-08 RX ORDER — DULAGLUTIDE 3 MG/.5ML
3 INJECTION, SOLUTION SUBCUTANEOUS WEEKLY
Qty: 4 ADJUSTABLE DOSE PRE-FILLED PEN SYRINGE | Refills: 3 | Status: SHIPPED | OUTPATIENT
Start: 2022-11-08

## 2022-11-08 RX ORDER — BLOOD-GLUCOSE SENSOR
EACH MISCELLANEOUS
Qty: 3 EACH | Refills: 3 | Status: SHIPPED | OUTPATIENT
Start: 2022-11-08

## 2022-11-08 ASSESSMENT — ENCOUNTER SYMPTOMS
ABDOMINAL PAIN: 0
RESPIRATORY NEGATIVE: 1
SHORTNESS OF BREATH: 0
DIARRHEA: 0

## 2022-11-08 NOTE — PROGRESS NOTES
[facility] [address]  [address] [facility], [address]  [place] [address] [facility]  [phone]        HISTORY:    George Lorenzo presents today for evaluation and management of:  Chief Complaint   Patient presents with    Diabetes     Type 2. Dx 2012    New Patient       Patient Care Team:  Sara Lira DO as PCP - General (Family Medicine)  Sara Lira DO as PCP - [facility] Empaneled Provider  Donnie Hayes as Consulting Physician (Pain Management)  Rona Andrews MD as Consulting Physician (Cardiology)      Interval History:    Past DM Medications   none    Current Diabetic Medications  Lantus 35 units once daily   Trulicity 1.5 mg once weekly   Glimepiride 2 mg bid    DKA episodes: 0    11/08/22   George Lorenzo is a 78 y.o. female patient who presents to clinic today for her diabetes. she has a history of HTN, CKD, hyperlipidemia and mobility issues which contributes to her diabetes. She is here for initial dm management. . she denies any current signs or symptoms of hyper/hypoglycemia. she states they are taking their medications as prescribed without any adverse effects. Diet: moms meals   Exercise: none  BS testing: patient tests 4 time(s) per day average >200  Hypoglycemia: No  Hypoglycemia as needed treatment: snack  Issues:   Diabetic foot exam up-to-date: No  Diabetic retinal exam up-to-date: Yes    Diabetes complications:nephropathy and cardiovascular disease      High cholesterol-  Takes lipitor and tricor and denies any adverse effects with its use. Watches diet and exercise. Hypertension-  Takes hctz, cardizem, cozaar and lopressor and denies any adverse effects with their use. Watches diet and exercise. Denies any chest pain, dizziness or edema. Obesity- Working on weight loss.          Past Medical History:   Diagnosis Date    Abdominal pain     left side    Asthma     Atrial fibrillation (HCC)     Bowel obstruction ([address] COPD (chronic obstructive pulmonary disease) (HCC)     DDD (degenerative disc disease)     Diabetes mellitus (HCC)     Hyperlipidemia     Hypertension     Hyperthyroidism     Type II or unspecified type diabetes mellitus without mention of complication, not stated as uncontrolled      Family History   Problem Relation Age of Onset    Heart Disease Mother     High Cholesterol Mother     No Known Problems Son      Social History     Tobacco Use    Smoking status: Former     Packs/day: 1.00     Years: 20.00     Pack years: 20.00     Types: Cigarettes     Quit date: 10/23/2013     Years since quittin.0    Smokeless tobacco: Never   Substance Use Topics    Alcohol use: No     Comment: beer once a month    Drug use: No     Comment: former marijuana     Allergies   Allergen Reactions    Norco [Hydrocodone-Acetaminophen]      Nausea and vomiting    Oxycodone Nausea And Vomiting    Percocet [Oxycodone-Acetaminophen] Nausea And Vomiting    Sulfa Antibiotics Other (See Comments)     Hallucinations. MEDICATIONS:  Current Outpatient Medications   Medication Sig Dispense Refill    sucralfate (CARAFATE) 1 GM tablet take 1 tablet by mouth four times a day      Specialty Vitamins Products (HAIR NOURISHING SUPPLEMENT PO) Scalpmed for women.  \"GROW MY HAIR\" 1 tablet by mouth daily      Sennosides 17.2 MG TABS Take 1 tablet by mouth daily      pantoprazole (PROTONIX) 40 MG tablet Take 40 mg by mouth daily      Ascorbic Acid (VITAMIN C) 250 MG tablet Take 1,000 mg by mouth daily      zinc gluconate 50 MG tablet Take 50 mg by mouth daily      Continuous Blood Gluc  (DEXCOM G6 ) SOUTH 1 Units by Does not apply route daily 1 each 0    Continuous Blood Gluc Sensor (DEXCOM G6 SENSOR) MISC Use once sensor every 10 days 3 each 3    Continuous Blood Gluc Transmit (DEXCOM G6 TRANSMITTER) MISC Use one transmitter every 3 months 1 each 3    Dulaglutide (TRULICITY) 3 UQ/8.4FS SOPN Inject 3 mg into the skin once a week 4 Adjustable Dose Pre-filled Pen Syringe 3    hydroCHLOROthiazide (HYDRODIURIL) 25 MG tablet Take 1 tablet by mouth every morning 90 tablet 0    LANTUS SOLOSTAR 100 UNIT/ML injection pen Inject 35 Units into the skin nightly 1 Adjustable Dose Pre-filled Pen Syringe 0    cyclobenzaprine (FLEXERIL) 10 MG tablet Take 1 tablet by mouth 2 times daily as needed for Muscle spasms 60 tablet 2    atorvastatin (LIPITOR) 40 MG tablet Take 1 tablet by mouth in the morning. 90 tablet 2    dilTIAZem (CARDIZEM CD) 120 MG extended release capsule TAKE 1 CAPSULE BY MOUTH EVERY DAY 90 capsule 1    glimepiride (AMARYL) 2 MG tablet Take 1 tablet by mouth in the morning and 1 tablet in the evening. 180 tablet 0    levothyroxine (SYNTHROID) 88 MCG tablet TAKE 1 TABLET BY MOUTH EVERY DAY 90 tablet 0    losartan (COZAAR) 25 MG tablet Take 1 tablet by mouth in the morning. 90 tablet 1    metoprolol tartrate (LOPRESSOR) 25 MG tablet Take 1 tablet by mouth in the morning and 1 tablet before bedtime. 180 tablet 1    gabapentin (NEURONTIN) 600 MG tablet Take 1 tab by mouth 1-2x's daily as needed for chronic neck and back pain. 180 tablet 0    vitamin D3 (CHOLECALCIFEROL) 25 MCG (1000 UT) TABS tablet TAKE 1 TABLET BY MOUTH DAILY 30 tablet 0    loratadine (CLARITIN) 10 MG tablet TAKE 1 TABLET BY MOUTH EVERY DAY 30 tablet 10    albuterol (PROVENTIL) (2.5 MG/3ML) 0.083% nebulizer solution Take 3 mLs by nebulization every 6 hours as needed for Wheezing or Shortness of Breath 120 mL 3    Blood Pressure KIT Use to check bp 1-2 times per day or as needed. 1 kit 0    Blood Pressure Monitoring (BLOOD PRESSURE CUFF) MISC Use to check blood pressure once daily. 1 each 0    albuterol sulfate HFA (PROVENTIL;VENTOLIN;PROAIR) 108 (90 Base) MCG/ACT inhaler INHALE 1-2 PUFFS EVERY 6 HOURS AS NEEDED FOR WHEEZING OR SHORTNESS OF BREATH. 54 g 1    fenofibrate (TRICOR) 145 MG tablet Take 1 tablet by mouth in the morning.  . (Patient not taking: Reported on 11/8/2022) 90 tablet 3    potassium chloride (KLOR-CON M) 20 MEQ extended release tablet Take 20 mEq by mouth daily      fluticasone-salmeterol (ADVAIR) 250-50 MCG/ACT AEPB diskus inhaler Inhale 1 puff into the lungs 2 times daily      zoster recombinant adjuvanted vaccine (SHINGRIX) 50 MCG/0.5ML SUSR injection 50 MCG IM then repeat 2-6 months. 0.5 mL 1    Omega-3 Fatty Acids (FISH OIL) 1000 MG CAPS TAKE TWO (2) CAPSULES BY MOUTH DAILY (Patient not taking: Reported on 11/8/2022) 180 capsule 10     No current facility-administered medications for this visit. Review ofSymptoms:  Review of Systems   Constitutional:  Positive for fatigue. Negative for unexpected weight change. Eyes:  Negative for visual disturbance. Respiratory: Negative. Negative for shortness of breath. Cardiovascular:  Negative for chest pain and leg swelling. Gastrointestinal:  Negative for abdominal pain and diarrhea. Endocrine: Negative for polydipsia, polyphagia and polyuria. Genitourinary: Negative. Musculoskeletal: Negative. Skin:  Negative for rash and wound. Neurological:  Negative for dizziness, tremors, seizures and headaches. Psychiatric/Behavioral: Negative. Negative for confusion and decreased concentration. Theremainder of a complete 14-point review of systems is negative. Vital Signs: /82 (Site: Right Upper Arm, Position: Sitting, Cuff Size: Large Adult)   Pulse 72   Resp 16   Ht 5' 3\" (1.6 m)   Wt 164 lb (74.4 kg)   BMI 29.05 kg/m²      Wt Readings from Last 3 Encounters:   11/08/22 164 lb (74.4 kg)   10/12/22 165 lb (74.8 kg)   10/10/22 161 lb 1.6 oz (73.1 kg)     Body mass index is 29.05 kg/m².   LABS:  Hemoglobin A1C   Date Value Ref Range Status   10/06/2022 17.0 (H) 4.0 - 6.0 % Final   06/29/2022 11.7 (H) 4.0 - 6.0 % Final     Lab Results   Component Value Date    LABMICR CANNOT BE CALCULATED 09/04/2019     Lab Results   Component Value Date     10/10/2022    K 5.1 10/10/2022     10/10/2022    CO2 28 10/10/2022    BUN 14 10/10/2022    CREATININE 1.35 (H) 10/10/2022    GLUCOSE 199 (H) 10/10/2022    CALCIUM 10.2 10/10/2022    PROT 6.6 10/08/2022    LABALBU 3.8 10/08/2022    BILITOT 0.3 10/08/2022    ALKPHOS 69 10/08/2022    AST 19 10/08/2022    ALT 14 10/08/2022    LABGLOM 43 (L) 10/10/2022    GFRAA >60 08/31/2022    GLOB 3.3 08/11/2022     Lab Results   Component Value Date    CHOL 150 11/18/2020    CHOL 115 06/08/2020    CHOL 150 12/09/2019     Lab Results   Component Value Date    TRIG 201 (H) 11/18/2020    TRIG 165 (H) 06/08/2020    TRIG 142 12/09/2019     Lab Results   Component Value Date    HDL 36 (L) 11/18/2020    HDL 37 (L) 06/08/2020    HDL 50 12/09/2019     Lab Results   Component Value Date    LDLCHOLESTEROL 74 11/18/2020    LDLCHOLESTEROL 45 06/08/2020    LDLCHOLESTEROL 72 12/09/2019     Lab Results   Component Value Date    VLDL NOT REPORTED (H) 11/18/2020    VLDL NOT REPORTED (H) 06/08/2020    VLDL NOT REPORTED 12/09/2019     Lab Results   Component Value Date    CHOLHDLRATIO 4.2 11/18/2020    CHOLHDLRATIO 3.1 06/08/2020    CHOLHDLRATIO 3.0 12/09/2019           Physical Exam  Constitutional:       Appearance: She is well-developed. Eyes:      Pupils: Pupils are equal, round, and reactive to light. Neck:      Thyroid: No thyroid mass, thyromegaly or thyroid tenderness. Cardiovascular:      Rate and Rhythm: Normal rate and regular rhythm. Heart sounds: Normal heart sounds. Pulmonary:      Effort: Pulmonary effort is normal.      Breath sounds: Normal breath sounds. Abdominal:      General: Bowel sounds are normal.      Palpations: Abdomen is soft. Skin:     General: Skin is warm and dry. Comments: Negative for open/nonhealing wounds. Negative for lipohypertrophy. Neurological:      Mental Status: She is alert and oriented to person, place, and time. ASSESSMENT/PLAN:     Diagnosis Orders   1.  Type 2 diabetes mellitus with stage 3a chronic kidney disease, with long-term current use of insulin (HCC)  Hemoglobin C6R    Basic Metabolic Panel    Continuous Blood Gluc  (DEXCOM G6 ) SOUTH    Continuous Blood Gluc Sensor (DEXCOM G6 SENSOR) MISC    Continuous Blood Gluc Transmit (DEXCOM G6 TRANSMITTER) MISC    Dulaglutide (TRULICITY) 3 EN9.9DY SOPN      2. Mixed hyperlipidemia        3. Essential hypertension          Orders Placed This Encounter   Procedures    Hemoglobin X1T    Basic Metabolic Panel     Orders Placed This Encounter   Medications    Continuous Blood Gluc  (DEXCOM G6 ) SOUTH     Si Units by Does not apply route daily     Dispense:  1 each     Refill:  0    Continuous Blood Gluc Sensor (DEXCOM G6 SENSOR) MISC     Sig: Use once sensor every 10 days     Dispense:  3 each     Refill:  3    Continuous Blood Gluc Transmit (DEXCOM G6 TRANSMITTER) MISC     Sig: Use one transmitter every 3 months     Dispense:  1 each     Refill:  3    Dulaglutide (TRULICITY) 3 WE/3.9PP SOPN     Sig: Inject 3 mg into the skin once a week     Dispense:  4 Adjustable Dose Pre-filled Pen Syringe     Refill:  3     Requested Prescriptions     Signed Prescriptions Disp Refills    Continuous Blood Gluc  (DEXCOM G6 ) SOUTH 1 each 0     Si Units by Does not apply route daily    Continuous Blood Gluc Sensor (DEXCOM G6 SENSOR) MISC 3 each 3     Sig: Use once sensor every 10 days    Continuous Blood Gluc Transmit (DEXCOM G6 TRANSMITTER) MISC 1 each 3     Sig: Use one transmitter every 3 months    Dulaglutide (TRULICITY) 3 LB/7.9JY SOPN 4 Adjustable Dose Pre-filled Pen Syringe 3     Sig: Inject 3 mg into the skin once a week       1. Type 2 diabetes mellitus with stage 3a chronic kidney disease, with long-term current use of insulin (HCC)  - Unstable  HbA1C goal is less than 7%. - Fasting blood glucose goal is 70-120mg/dl and postprandial blood sugar goal is less than 180 mg/dl.    - Labs reviewed including most recent A1c, UACR and kidney function. Repeat labs due in 3 months.    -We discussed in great detail dietary modifications they can make to better improve their blood sugars. --Initial diabetic education completed. Discussed diabetes as a disease and how we can manage it to prevent complications associated with it.   -close follow up recommended. Patient Instructions   Call office if you do not get dexcom from the pharmacy   Document food intake   Nothing to drink but water  Increase Trulicity to 3 mg once weekly   Work on not missing any medications. Discussed signs and symptoms of hyper/hypoglycemia and how to treat. Encouraged 150 minutes of physical activity per week. Follow a low carbohydrate diet. Encouraged at least 7 hours of sleep. The patient was informed of the goals of diabetes management. This can only be accomplished by watching their diet and exercise levels. We certainly use medicines to help attain these goals. The consequences of not controlling blood sugars were discussed. These include blindness, heart disease, stroke, kidney disease, and possibly need for dialysis. They were told to be careful with their foot care as diabetics often have nerve damage, infections and risk for limb amutations . They also need a dilated eye exam yearly. We discussed the issues of diet, exercise, medication, complication avoidance, reviewed the signs and symptoms of diabetes, hypoglycemic episodes, significance of HbA1C.     - Hemoglobin A1C; Future  - Basic Metabolic Panel; Future  - Continuous Blood Gluc  (DEXCOM G6 ) SOUTH; 1 Units by Does not apply route daily  Dispense: 1 each; Refill: 0  - Continuous Blood Gluc Sensor (DEXCOM G6 SENSOR) MISC; Use once sensor every 10 days  Dispense: 3 each; Refill: 3  - Continuous Blood Gluc Transmit (DEXCOM G6 TRANSMITTER) MISC; Use one transmitter every 3 months  Dispense: 1 each;  Refill: 3  - Dulaglutide (TRULICITY) 3 MG/6.0PF SOPN; Inject 3 mg into the skin once a week Dispense: 4 Adjustable Dose Pre-filled Pen Syringe; Refill: 3    2. Mixed hyperlipidemia  stable, lipid panel reviewed, continue current medications. Diet and exercise. 3. Essential hypertension   stable, continue current medications. Diet and exercise Seek emergent care if chest pain develops. Answered all patient questions. Agrees to follow plan of care and to follow up in 1 months, sooner if needed. Call office if unexplained blood sugars less than 70 occur or above 400. Call office or access [facility] with any further questions or concerns. Be sure to bring glucometer/food log at next appointment. Total time spent reviewing chart, labs, counseling patient and documenting on the date of the encounter: 60 min.       Electronically signed by RAJNI Mcnally CNP on 11/8/2022 at 1:43 PM      (Please note that portions of this note were completed with a voice-recognition program. Efforts were made to edit the dictation but occasionally words are mis-transcribed.)

## 2022-11-08 NOTE — PATIENT INSTRUCTIONS
Call office if you do not get dexcom from the pharmacy   Document food intake   Nothing to drink but water  Increase Trulicity to 3 mg once weekly   Work on not missing any medications.

## 2022-11-18 DIAGNOSIS — E11.9 TYPE 2 DIABETES MELLITUS WITHOUT COMPLICATION, WITH LONG-TERM CURRENT USE OF INSULIN (HCC): ICD-10-CM

## 2022-11-18 DIAGNOSIS — Z79.4 TYPE 2 DIABETES MELLITUS WITHOUT COMPLICATION, WITH LONG-TERM CURRENT USE OF INSULIN (HCC): ICD-10-CM

## 2022-11-21 RX ORDER — GLIMEPIRIDE 2 MG/1
2 TABLET ORAL 2 TIMES DAILY WITH MEALS
Qty: 180 TABLET | Refills: 1 | Status: SHIPPED | OUTPATIENT
Start: 2022-11-21

## 2022-11-21 NOTE — TELEPHONE ENCOUNTER
[facility] called requesting a refill of the below medication which has been pended for you:     Requested Prescriptions     Pending Prescriptions Disp Refills    glimepiride (AMARYL) 2 MG tablet [Pharmacy Med Name: glimepiride 2 mg tablet] 180 tablet 0     Sig: TAKE ONE TABLET BY MOUTH TWICE DAILY @ 9am & 5pm IN THE MORNING AND IN THE EVENING       Last Appointment Date: 6/29/2022  Next Appointment Date: 12/13/2022    Allergies   Allergen Reactions    Norco [Hydrocodone-Acetaminophen]      Nausea and vomiting    Oxycodone Nausea And Vomiting    Percocet [Oxycodone-Acetaminophen] Nausea And Vomiting    Sulfa Antibiotics Other (See Comments)     Hallucinations.

## 2022-11-28 DIAGNOSIS — E11.9 TYPE 2 DIABETES MELLITUS WITHOUT COMPLICATION, WITH LONG-TERM CURRENT USE OF INSULIN (HCC): ICD-10-CM

## 2022-11-28 DIAGNOSIS — Z79.4 TYPE 2 DIABETES MELLITUS WITHOUT COMPLICATION, WITH LONG-TERM CURRENT USE OF INSULIN (HCC): ICD-10-CM

## 2022-11-28 NOTE — TELEPHONE ENCOUNTER
[facility] called requesting a refill of the below medication which has been pended for you:     Requested Prescriptions     Pending Prescriptions Disp Refills    LANTUS SOLOSTAR 100 UNIT/ML injection pen 1 Adjustable Dose Pre-filled Pen Syringe 0     Sig: Inject 35 Units into the skin nightly       Last Appointment Date: 6/29/2022  Next Appointment Date: 12/13/2022    Allergies   Allergen Reactions    Norco [Hydrocodone-Acetaminophen]      Nausea and vomiting    Oxycodone Nausea And Vomiting    Percocet [Oxycodone-Acetaminophen] Nausea And Vomiting    Sulfa Antibiotics Other (See Comments)     Hallucinations.
Pharmacy called requesting refill.
73.5

## 2022-11-29 RX ORDER — INSULIN GLARGINE 100 [IU]/ML
35 INJECTION, SOLUTION SUBCUTANEOUS NIGHTLY
Qty: 10 ADJUSTABLE DOSE PRE-FILLED PEN SYRINGE | Refills: 0 | Status: SHIPPED | OUTPATIENT
Start: 2022-11-29

## 2022-12-02 DIAGNOSIS — E11.9 TYPE 2 DIABETES MELLITUS WITHOUT COMPLICATION, WITH LONG-TERM CURRENT USE OF INSULIN (HCC): ICD-10-CM

## 2022-12-02 DIAGNOSIS — Z79.4 TYPE 2 DIABETES MELLITUS WITHOUT COMPLICATION, WITH LONG-TERM CURRENT USE OF INSULIN (HCC): ICD-10-CM

## 2022-12-02 RX ORDER — GLIMEPIRIDE 2 MG/1
2 TABLET ORAL 2 TIMES DAILY WITH MEALS
Qty: 180 TABLET | Refills: 1 | OUTPATIENT
Start: 2022-12-02

## 2022-12-13 ENCOUNTER — OFFICE VISIT (OUTPATIENT)
Dept: FAMILY MEDICINE CLINIC | Age: 67
End: 2022-12-13
Payer: MEDICARE

## 2022-12-13 ENCOUNTER — TELEPHONE (OUTPATIENT)
Dept: ONCOLOGY | Age: 67
End: 2022-12-13

## 2022-12-13 VITALS
DIASTOLIC BLOOD PRESSURE: 78 MMHG | SYSTOLIC BLOOD PRESSURE: 118 MMHG | OXYGEN SATURATION: 96 % | BODY MASS INDEX: 27.66 KG/M2 | WEIGHT: 162 LBS | HEIGHT: 64 IN | TEMPERATURE: 97.4 F | HEART RATE: 61 BPM

## 2022-12-13 DIAGNOSIS — E11.9 TYPE 2 DIABETES MELLITUS WITHOUT COMPLICATION, WITH LONG-TERM CURRENT USE OF INSULIN (HCC): Primary | ICD-10-CM

## 2022-12-13 DIAGNOSIS — R29.6 RECURRENT FALLS: ICD-10-CM

## 2022-12-13 DIAGNOSIS — Z12.31 ENCOUNTER FOR SCREENING MAMMOGRAM FOR BREAST CANCER: ICD-10-CM

## 2022-12-13 DIAGNOSIS — I48.0 PAROXYSMAL ATRIAL FIBRILLATION (HCC): ICD-10-CM

## 2022-12-13 DIAGNOSIS — Z79.4 TYPE 2 DIABETES MELLITUS WITHOUT COMPLICATION, WITH LONG-TERM CURRENT USE OF INSULIN (HCC): Primary | ICD-10-CM

## 2022-12-13 DIAGNOSIS — M51.36 DEGENERATIVE DISC DISEASE, LUMBAR: ICD-10-CM

## 2022-12-13 DIAGNOSIS — I10 ESSENTIAL HYPERTENSION: ICD-10-CM

## 2022-12-13 DIAGNOSIS — Z00.00 INITIAL MEDICARE ANNUAL WELLNESS VISIT: ICD-10-CM

## 2022-12-13 DIAGNOSIS — Z23 NEED FOR INFLUENZA VACCINATION: ICD-10-CM

## 2022-12-13 DIAGNOSIS — Z87.891 PERSONAL HISTORY OF TOBACCO USE: ICD-10-CM

## 2022-12-13 DIAGNOSIS — Z91.81 AT HIGH RISK FOR FALLS: ICD-10-CM

## 2022-12-13 PROCEDURE — 3046F HEMOGLOBIN A1C LEVEL >9.0%: CPT | Performed by: FAMILY MEDICINE

## 2022-12-13 PROCEDURE — 1123F ACP DISCUSS/DSCN MKR DOCD: CPT | Performed by: FAMILY MEDICINE

## 2022-12-13 PROCEDURE — 3017F COLORECTAL CA SCREEN DOC REV: CPT | Performed by: FAMILY MEDICINE

## 2022-12-13 PROCEDURE — 2024F 7 FLD RTA PHOTO EVC RTNOPTHY: CPT | Performed by: FAMILY MEDICINE

## 2022-12-13 PROCEDURE — 1036F TOBACCO NON-USER: CPT | Performed by: FAMILY MEDICINE

## 2022-12-13 PROCEDURE — PBSHW INFLUENZA, FLUAD, (AGE 65 Y+), IM, PF, 0.5 ML: Performed by: FAMILY MEDICINE

## 2022-12-13 PROCEDURE — 99214 OFFICE O/P EST MOD 30 MIN: CPT | Performed by: FAMILY MEDICINE

## 2022-12-13 PROCEDURE — 3078F DIAST BP <80 MM HG: CPT | Performed by: FAMILY MEDICINE

## 2022-12-13 PROCEDURE — 3074F SYST BP LT 130 MM HG: CPT | Performed by: FAMILY MEDICINE

## 2022-12-13 PROCEDURE — G8417 CALC BMI ABV UP PARAM F/U: HCPCS | Performed by: FAMILY MEDICINE

## 2022-12-13 PROCEDURE — G0008 ADMIN INFLUENZA VIRUS VAC: HCPCS | Performed by: FAMILY MEDICINE

## 2022-12-13 PROCEDURE — G8484 FLU IMMUNIZE NO ADMIN: HCPCS | Performed by: FAMILY MEDICINE

## 2022-12-13 PROCEDURE — G0438 PPPS, INITIAL VISIT: HCPCS | Performed by: FAMILY MEDICINE

## 2022-12-13 PROCEDURE — 1090F PRES/ABSN URINE INCON ASSESS: CPT | Performed by: FAMILY MEDICINE

## 2022-12-13 PROCEDURE — G0296 VISIT TO DETERM LDCT ELIG: HCPCS | Performed by: FAMILY MEDICINE

## 2022-12-13 PROCEDURE — G8399 PT W/DXA RESULTS DOCUMENT: HCPCS | Performed by: FAMILY MEDICINE

## 2022-12-13 PROCEDURE — G8427 DOCREV CUR MEDS BY ELIG CLIN: HCPCS | Performed by: FAMILY MEDICINE

## 2022-12-13 RX ORDER — DILTIAZEM HYDROCHLORIDE 120 MG/1
CAPSULE, EXTENDED RELEASE ORAL
COMMUNITY
Start: 2022-11-15 | End: 2022-12-13

## 2022-12-13 RX ORDER — GABAPENTIN 800 MG/1
TABLET ORAL
COMMUNITY
Start: 2022-11-15

## 2022-12-13 ASSESSMENT — PATIENT HEALTH QUESTIONNAIRE - PHQ9
SUM OF ALL RESPONSES TO PHQ QUESTIONS 1-9: 0
SUM OF ALL RESPONSES TO PHQ QUESTIONS 1-9: 0
1. LITTLE INTEREST OR PLEASURE IN DOING THINGS: 0
SUM OF ALL RESPONSES TO PHQ QUESTIONS 1-9: 0
2. FEELING DOWN, DEPRESSED OR HOPELESS: 0
SUM OF ALL RESPONSES TO PHQ9 QUESTIONS 1 & 2: 0
SUM OF ALL RESPONSES TO PHQ QUESTIONS 1-9: 0

## 2022-12-13 NOTE — PROGRESS NOTES
Medicare Annual Wellness Visit    Tim Valenzuela is here for Medicare AWV    Assessment & Plan   Type 2 diabetes mellitus without complication, with long-term current use of insulin (HCC)  -      DIABETES FOOT EXAM  Essential hypertension  Paroxysmal atrial fibrillation (HCC)  Degenerative disc disease, lumbar  -     Misc. Devices (BATH/SHOWER SEAT) MISC; Disp-1 each, R-0, PrintUse daily as needed for showering/bathing.  -     Misc. Devices (ROLLER WALKER) MISC; Disp-1 each, R-0, PrintUse daily as needed for ambulation. Encounter for screening mammogram for breast cancer  -     [facility] EARLINE DIGITAL SCREEN BILATERAL; Future  Personal history of tobacco use  -     [place] VISIT TO DISCUSS LUNG CA SCREEN W LDCT  -     CT Lung Screen (Annual); Future  Need for influenza vaccination  -     Influenza, FLUAD, (age 72 y+), IM, Preservative Free, 0.5 mL  Initial Medicare annual wellness visit  At high risk for falls  Recurrent falls  -     Misc. Devices (ROLLER WALKER) MISC; Disp-1 each, R-0, PrintUse daily as needed for ambulation. Recommendations for Preventive Services Due: see orders and patient instructions/AVS.  Recommended screening schedule for the next 5-10 years is provided to the patient in written form: see Patient Instructions/AVS.     Return in 3 months (on 3/13/2023) for f/u DM, HTN, labs/meds. Subjective       Patient's complete Health Risk Assessment and screening values have been reviewed and are found in Flowsheets. The following problems were reviewed today and where indicated follow up appointments were made and/or referrals ordered. Positive Risk Factor Screenings with Interventions:    Fall Risk:  Do you feel unsteady or are you worried about falling? : (!) yes  2 or more falls in past year?: (!) yes  Fall with injury in past year?: (!) yes (bilat knees from falling)     Interventions:    Rx's given for new walker and shower chair           Self-assessment of health:   In general, how would you say your health is?: (!) Poor    Interventions: Will continue with DM NP (Nisha) for improving glycemic control and continue with Cardiology and GI      General HRA Questions:  Select all that apply: (!) Loneliness, Social Isolation    Loneliness Interventions:  Related to her neighbors at current apartment complex    Social Isolation Interventions:  Patient declined any further interventions or treatment           Safety:  Do you have any tripping hazards - clutter in doorways, halls, or stairs?: (!) Yes (clutter in room)  Interventions:  Pt will work on cleaning up her space to avoid falls    ADL's:   Patient reports needing help with:  Select all that apply: [employer], Shopping  Interventions:  Cousin is helping with transportation; sometimes her brother    Advanced Directives:  Do you have a Living Will?: (!) No (writer will give patient a packet)    Intervention:  has NO advanced directive - information provided       Lung Cancer Screening:  LDCT Screening: Discussed with patient the benefits and harms of screening, follow-up diagnostic testing, over-diagnosis, false positive rate, and total radiation exposure. Counseled on the importance of adherence to annual lung cancer LDCT screening, impact of comorbidities, ability and willingness to undergo diagnosis and treatment. Counseled on the importance of maintaining cigarette smoking abstinence and cessation. The patient has a history of >20 pack years and is either still smoking or quit within the last 15 years. There are no signs or symptoms of lung cancer. Objective   Vitals:    12/13/22 1207   BP: 118/78   Site: Right Upper Arm   Position: Sitting   Cuff Size: Large Adult   Pulse: 61   Temp: 97.4 °F (36.3 °C)   TempSrc: Temporal   SpO2: 96%   Weight: 162 lb (73.5 kg)   Height: 5' 3.5\" (1.613 m)      Body mass index is 28.25 kg/m².               Allergies   Allergen Reactions    Norco [Hydrocodone-Acetaminophen]      Nausea and vomiting Oxycodone Nausea And Vomiting    Percocet [Oxycodone-Acetaminophen] Nausea And Vomiting    Sulfa Antibiotics Other (See Comments)     Hallucinations. Prior to Visit Medications    Medication Sig Taking? Authorizing Provider   gabapentin (NEURONTIN) 800 MG tablet  Yes Historical Provider, MD   Misc. Devices (BATH/SHOWER SEAT) MISC Use daily as needed for showering/bathing. Yes Peyton Hastings, DO   Misc. Devices (ROLLER [place]) MISC Use daily as needed for ambulation. Yes Vida Harkins, DO   LANTUS SOLOSTAR 100 UNIT/ML injection pen Inject 35 Units into the skin nightly Yes Vida Harkins, DO   glimepiride (AMARYL) 2 MG tablet Take 1 tablet by mouth 2 times daily (with meals) Yes Peyton Hastings, DO   Specialty Vitamins Products (HAIR NOURISHING SUPPLEMENT PO) Scalpmed for women. \"GROW MY HAIR\" 1 tablet by mouth daily Yes Historical Provider, MD   Sennosides 17.2 MG TABS Take 1 tablet by mouth daily Yes Historical Provider, MD   pantoprazole (PROTONIX) 40 MG tablet Take 40 mg by mouth daily Yes Historical Provider, MD   zinc gluconate 50 MG tablet Take 50 mg by mouth daily Yes Historical Provider, MD   Dulaglutide (TRULICITY) 3 OW/0.2OM SOPN Inject 3 mg into the skin once a week Yes RAJNI Almeida CNP   albuterol (PROVENTIL) (2.5 MG/3ML) 0.083% nebulizer solution Take 3 mLs by nebulization every 6 hours as needed for Wheezing or Shortness of Breath Yes Vida Harkins DO   hydroCHLOROthiazide (HYDRODIURIL) 25 MG tablet Take 1 tablet by mouth every morning Yes Guerrero Rosales MD   Blood Pressure KIT Use to check bp 1-2 times per day or as needed. Yes RAJNI Sales CNP   albuterol sulfate HFA (PROVENTIL;VENTOLIN;PROAIR) 108 (90 Base) MCG/ACT inhaler INHALE 1-2 PUFFS EVERY 6 HOURS AS NEEDED FOR WHEEZING OR SHORTNESS OF BREATH. Yes Vida Harkins DO   atorvastatin (LIPITOR) 40 MG tablet Take 1 tablet by mouth in the morning.  Yes Vida Harkins DO   dilTIAZem (CARDIZEM CD) 120 MG extended release capsule TAKE 1 CAPSULE BY MOUTH EVERY DAY Yes Bassam Harkins, DO   levothyroxine (SYNTHROID) 88 MCG tablet TAKE 1 TABLET BY MOUTH EVERY DAY Yes Bassam Harkins, DO   losartan (COZAAR) 25 MG tablet Take 1 tablet by mouth in the morning. Yes Bassam Harkins, DO   metoprolol tartrate (LOPRESSOR) 25 MG tablet Take 1 tablet by mouth in the morning and 1 tablet before bedtime. Yes Bassam Harkins, DO   fenofibrate (TRICOR) 145 MG tablet Take 1 tablet by mouth in the morning.  Daljit Elmore MD   potassium chloride (KLOR-CON M) 20 MEQ extended release tablet Take 20 mEq by mouth daily Yes Historical Provider, MD   fluticasone-salmeterol (ADVAIR) 250-50 MCG/ACT AEPB diskus inhaler Inhale 1 puff into the lungs 2 times daily Yes Historical Provider, MD   vitamin D3 (CHOLECALCIFEROL) 25 MCG (1000 UT) TABS tablet TAKE 1 TABLET BY MOUTH DAILY Yes Marva Antonio,    loratadine (CLARITIN) 10 MG tablet TAKE 1 TABLET BY MOUTH EVERY DAY Yes Sunil Lopez MD   Omega-3 Fatty Acids (FISH OIL) 1000 MG CAPS TAKE TWO (2) CAPSULES BY MOUTH DAILY Yes Sunil Lopez MD   cyclobenzaprine (FLEXERIL) 10 MG tablet Take 1 tablet by mouth 2 times daily as needed for Muscle spasms  Bassam Harkins, DO   Ascorbic Acid (VITAMIN C) 250 MG tablet Take 1,000 mg by mouth daily  Patient not taking: Reported on 12/13/2022  Historical Provider, MD   Continuous Blood Gluc  (DEXCOM G6 ) SOUTH 1 Units by Does not apply route daily  Patient not taking: Reported on 12/13/2022  RAJNI Kirk CNP   Continuous Blood Gluc Sensor (DEXCOM G6 SENSOR) MISC Use once sensor every 10 days  Patient not taking: Reported on 12/13/2022  RAJNI Kirk CNP   Continuous Blood Gluc Transmit (DEXCOM G6 TRANSMITTER) MISC Use one transmitter every 3 months  Patient not taking: Reported on 12/13/2022  RAJNI Kirk CNP   gabapentin (NEURONTIN) 600 MG tablet Take 1 tab by mouth 1-2x's daily as needed for chronic neck and back pain.  Patient not taking: Reported on 12/13/2022  Nessa Harkins DO   zoster recombinant adjuvanted vaccine [place]) 50 MCG/0.5ML SUSR injection 50 MCG IM then repeat 2-6 months.   Patient not taking: Reported on 12/13/2022  Katherin Mendez DO       CareTeam (Including outside providers/suppliers regularly involved in providing care):   Patient Care Team:  Katherin Mendez DO as PCP - General (Family Medicine)  Katherin Mendez DO as PCP - [facility] Empaneled Provider  Davina Farley as Consulting Physician (Pain Management)  Ian Licea MD as Consulting Physician (Cardiology)     Reviewed and updated this visit:  Tobacco  Allergies  Meds  Med Hx  Surg Hx  Soc Hx  Fam Hx

## 2022-12-13 NOTE — PROGRESS NOTES
[address]  [address], [address]  [phone]      Abigail Treadwell is a 78 y.o. female who presents today for her medical conditions/complaints as noted below. Abigail Treadwell is c/o of Medicare AWV      HPI:     Pt here today for follow-up of DM. Saw Rosey Olivo on 11/8 - A1c was increased to 17.0%. She increased her Trulicity to 3 mg once weekly on Monday's - has been better at getting dose in every week. Also taking Lantus 30 units nightly (getting this in every night) and Amaryl 2 mg BID (better at taking this consistently). Checking glucose 2-3x's daily (not every day) - fasting has been 150-200; post-prandial 200-300's (highest 400). Will see Crystal again on 12/21; will re-check labs in early Jan.  Getting Mom's Meals every other day, which are healthier. Was recommended to start working out at the gym, but has trouble with consistent transportation. Taking Losartan 25 mg daily and HCTZ 25 mg daily for HTN - stable. Has had h/o edema as well, but improved on the HCTZ. BP well-controlled today - 118/78. Seeing Cardiology for a-fib - will see Dr. Deandre Fierro next month. Also saw Dr. Sidney Barone at [facility] on 10/20/22 - had 30-day event monitor ordered, which she just completed. If this does not show a-fib, they will consider loop implant instead of Watchman, for monitoring. Taking Cardizem 120 mg daily and Lopressor 25 mg BID. Also taking  mg once daily. Taking Synthroid 88 mcg daily for hypothyroidism s/p ablation - takes this on an empty stomach. Will re-check thyroid levels next month with next bloodwork. Taking Lipitor 40 mg daily for HYPERLIPIDEMIA - stable. Last LP on 3/1/22 showed total chol 234, HDL 38, and TG's at 514. Trying to cut down on her snack foods and carbs. Also taking fish oil (2) daily. Taking Protonix 40 mg daily for GERD - stable. No issues with heartburn currently.   Saw GI at [facility] in [facility] 3-4 weeks ago - did EGD to re-check after having GI bleed earlier this year; was told she had \"ulceration\" and was given medication to take until it was gone. Scheduled for colonoscopy on 1/3    Increased Flexeril from 5 mg to 10 mg - feels like this is helping more. Takes it nightly as needed.   Taking Gabapentin 600 mg TID that she is getting from [facility]          Past Medical History:   Diagnosis Date    Abdominal pain     left side    Asthma     Atrial fibrillation (HCC)     Bowel obstruction (HCC)     COPD (chronic obstructive pulmonary disease) (HCC)     DDD (degenerative disc disease)     Diabetes mellitus ([facility] [address]     Hyperlipidemia     Hypertension     Hyperthyroidism     Type II or unspecified type diabetes mellitus without mention of complication, not stated as uncontrolled       Past Surgical History:   Procedure Laterality Date    COLONOSCOPY  04/23/2014    COLONOSCOPY N/A 01/26/2021    COLONOSCOPY WITH BIOPSY performed by Pat Simmons MD at [facility]    COLONOSCOPY  01/26/2021    COLONOSCOPY POLYPECTOMY SNARE/COLD BIOPSY performed by Pat Simmons MD at [address] [place] [address] 03/08/2017    REMOVAL OF BILATERAL MANDIBULAR LELO AND MAXILLARY EDENTULOUS ALVEOPLASTY performed by Edward Garcia DDS at [address] [place] [address]      removal    [address]      after being diagnosed with DKA in 2015; had this for 1 year    INSERT MIDLINE CATHETER  01/22/2021         THYROID SURGERY  2021    ablation    TONSILLECTOMY      childhood    TRACHEOSTOMY  2015    when diagnosed with DKA    TRACHEOSTOMY CLOSURE      TUBAL LIGATION      UPPER GASTROINTESTINAL ENDOSCOPY  03/16/2016    Peg tube insertion    UPPER GASTROINTESTINAL ENDOSCOPY N/A 01/26/2021    EGD ESOPHAGOGASTRODUODENOSCOPY performed by Pat Simmons MD at [facility]     Family History   Problem Relation Age of Onset    Heart Disease Mother     High Cholesterol Mother     No Known Problems Son      Social History     Tobacco Use    Smoking status: Former     Packs/day: 1.00     Years: 20.00     Pack years: 20.00     Types: Cigarettes     Quit date: 10/23/2013     Years since quittin.1    Smokeless tobacco: Never   Substance Use Topics    Alcohol use: No     Comment: beer once a month      Current Outpatient Medications   Medication Sig Dispense Refill    gabapentin (NEURONTIN) 800 MG tablet       Misc. Devices (BATH/SHOWER SEAT) MISC Use daily as needed for showering/bathing. 1 each 0    Misc. Devices (ROLLER [place]) MISC Use daily as needed for ambulation. 1 each 0    LANTUS SOLOSTAR 100 UNIT/ML injection pen Inject 35 Units into the skin nightly 10 Adjustable Dose Pre-filled Pen Syringe 0    glimepiride (AMARYL) 2 MG tablet Take 1 tablet by mouth 2 times daily (with meals) 180 tablet 1    Specialty Vitamins Products (HAIR NOURISHING SUPPLEMENT PO) Scalpmed for women. \"GROW MY HAIR\" 1 tablet by mouth daily      Sennosides 17.2 MG TABS Take 1 tablet by mouth daily      pantoprazole (PROTONIX) 40 MG tablet Take 40 mg by mouth daily      zinc gluconate 50 MG tablet Take 50 mg by mouth daily      Dulaglutide (TRULICITY) 3 HA/4.8OQ SOPN Inject 3 mg into the skin once a week 4 Adjustable Dose Pre-filled Pen Syringe 3    albuterol (PROVENTIL) (2.5 MG/3ML) 0.083% nebulizer solution Take 3 mLs by nebulization every 6 hours as needed for Wheezing or Shortness of Breath 120 mL 3    hydroCHLOROthiazide (HYDRODIURIL) 25 MG tablet Take 1 tablet by mouth every morning 90 tablet 0    Blood Pressure KIT Use to check bp 1-2 times per day or as needed. 1 kit 0    albuterol sulfate HFA (PROVENTIL;VENTOLIN;PROAIR) 108 (90 Base) MCG/ACT inhaler INHALE 1-2 PUFFS EVERY 6 HOURS AS NEEDED FOR WHEEZING OR SHORTNESS OF BREATH. 54 g 1    atorvastatin (LIPITOR) 40 MG tablet Take 1 tablet by mouth in the morning.  90 tablet 2    dilTIAZem (CARDIZEM CD) 120 MG extended release capsule TAKE 1 CAPSULE BY MOUTH EVERY DAY 90 capsule 1    levothyroxine (SYNTHROID) 88 MCG tablet TAKE 1 TABLET BY MOUTH EVERY DAY 90 tablet 0    losartan (COZAAR) 25 MG tablet Take 1 tablet by mouth in the morning. 90 tablet 1    metoprolol tartrate (LOPRESSOR) 25 MG tablet Take 1 tablet by mouth in the morning and 1 tablet before bedtime. 180 tablet 1    fenofibrate (TRICOR) 145 MG tablet Take 1 tablet by mouth in the morning. . 90 tablet 3    potassium chloride (KLOR-CON M) 20 MEQ extended release tablet Take 20 mEq by mouth daily      fluticasone-salmeterol (ADVAIR) 250-50 MCG/ACT AEPB diskus inhaler Inhale 1 puff into the lungs 2 times daily      vitamin D3 (CHOLECALCIFEROL) 25 MCG (1000 UT) TABS tablet TAKE 1 TABLET BY MOUTH DAILY 30 tablet 0    loratadine (CLARITIN) 10 MG tablet TAKE 1 TABLET BY MOUTH EVERY DAY 30 tablet 10    Omega-3 Fatty Acids (FISH OIL) 1000 MG CAPS TAKE TWO (2) CAPSULES BY MOUTH DAILY 180 capsule 10    cyclobenzaprine (FLEXERIL) 10 MG tablet Take 1 tablet by mouth 2 times daily as needed for Muscle spasms 60 tablet 3    Ascorbic Acid (VITAMIN C) 250 MG tablet Take 1,000 mg by mouth daily (Patient not taking: Reported on 12/13/2022)      Continuous Blood Gluc  (DEXCOM G6 ) SOUTH 1 Units by Does not apply route daily (Patient not taking: Reported on 12/13/2022) 1 each 0    Continuous Blood Gluc Sensor (DEXCOM G6 SENSOR) MISC Use once sensor every 10 days (Patient not taking: Reported on 12/13/2022) 3 each 3    Continuous Blood Gluc Transmit (DEXCOM G6 TRANSMITTER) MISC Use one transmitter every 3 months (Patient not taking: Reported on 12/13/2022) 1 each 3    gabapentin (NEURONTIN) 600 MG tablet Take 1 tab by mouth 1-2x's daily as needed for chronic neck and back pain. (Patient not taking: Reported on 12/13/2022) 180 tablet 0    zoster recombinant adjuvanted vaccine ([place]) 50 MCG/0.5ML SUSR injection 50 MCG IM then repeat 2-6 months.  (Patient not taking: Reported on 12/13/2022) 0.5 mL 1     No current facility-administered medications for this visit. Allergies   Allergen Reactions    Norco [Hydrocodone-Acetaminophen]      Nausea and vomiting    Oxycodone Nausea And Vomiting    Percocet [Oxycodone-Acetaminophen] Nausea And Vomiting    Sulfa Antibiotics Other (See Comments)     Hallucinations. Health Maintenance   Topic Date Due    COVID-19 Vaccine (1) Never done    GFR test (Diabetes, CKD 3-4, OR last GFR 15-59)  Never done    Low dose CT lung screening  Never done    Diabetic Alb to Cr ratio (uACR) test  09/04/2020    Lipids  11/18/2021    A1C test (Diabetic or Prediabetic)  01/06/2023    Diabetic retinal exam  07/18/2023    Diabetic foot exam  12/13/2023    Depression Screen  12/13/2023    Annual Wellness Visit (AWV)  12/14/2023    Breast cancer screen  08/29/2024    Colorectal Cancer Screen  01/26/2026    DTaP/Tdap/Td vaccine (2 - Td or Tdap) 08/12/2026    DEXA (modify frequency per FRAX score)  Completed    Flu vaccine  Completed    Shingles vaccine  Completed    Pneumococcal 65+ years Vaccine  Completed    Hepatitis C screen  Addressed    Hepatitis A vaccine  Aged Out    Hib vaccine  Aged Out    Meningococcal (ACWY) vaccine  Aged Out       Subjective:      Review of Systems   Constitutional:  Negative for unexpected weight change. Musculoskeletal:  Positive for back pain. Objective:     Vitals:    12/13/22 1207   BP: 118/78   Site: Right Upper Arm   Position: Sitting   Cuff Size: Large Adult   Pulse: 61   Temp: 97.4 °F (36.3 °C)   TempSrc: Temporal   SpO2: 96%   Weight: 162 lb (73.5 kg)   Height: 5' 3.5\" (1.613 m)     Physical Exam  Constitutional:       General: She is not in acute distress. Appearance: Normal appearance. HENT:      Head: Normocephalic and atraumatic. Eyes:      Conjunctiva/sclera: Conjunctivae normal.   Cardiovascular:      Rate and Rhythm: Normal rate and regular rhythm. Heart sounds: Normal heart sounds.    Pulmonary:      Effort: Pulmonary effort is normal. No respiratory distress. Breath sounds: Normal breath sounds. Abdominal:      General: Bowel sounds are normal. There is no distension. Palpations: Abdomen is soft. Tenderness: There is no abdominal tenderness. Musculoskeletal:      Right lower leg: No edema. Left lower leg: No edema. Skin:     General: Skin is warm and dry. Neurological:      General: No focal deficit present. Mental Status: She is alert and oriented to person, place, and time. Psychiatric:         Mood and Affect: Mood normal.       Diabetic foot check: Normal sensation with the monofilament bilaterally, other than R heel (absent). Dorsalis pedis pulses intact bilaterally. No skin breakdown, erythema, blisters, scaling, or ulcers. Toenails thin and not ingrown. No evidence of fungal infection. Assessment:      1. Type 2 diabetes mellitus without complication, with long-term current use of insulin (formerly [facility])  -      DIABETES FOOT EXAM  2. Essential hypertension  3. Paroxysmal atrial fibrillation (formerly [facility])  4. Degenerative disc disease, lumbar  -     Misc. Devices (BATH/SHOWER SEAT) MISC; Disp-1 each, R-0, PrintUse daily as needed for showering/bathing.  -     Misc. Devices (ROLLER WALKER) MISC; Disp-1 each, R-0, PrintUse daily as needed for ambulation. 5. Encounter for screening mammogram for breast cancer  -     [facility] EARLINE DIGITAL SCREEN BILATERAL; Future  6. Personal history of tobacco use  -     [place] VISIT TO DISCUSS LUNG CA SCREEN W LDCT  -     CT Lung Screen (Annual); Future  7. Need for influenza vaccination  -     Influenza, FLUAD, (age 72 y+), IM, Preservative Free, 0.5 mL  8. Initial Medicare annual wellness visit  9. At high risk for falls  10. Recurrent falls  -     Misc. Devices (ROLLER WALKER) MISC; Disp-1 each, R-0, PrintUse daily as needed for ambulation. Plan:      Return in 3 months (on 3/13/2023) for f/u DM, HTN, labs/meds.     Orders Placed This Encounter   Procedures    CT Lung Screen (Annual)     Age: Patient is 78 y.o. Smoking History: Social History    Tobacco Use      Smoking status: Former        Packs/day: 1.00        Years: 20.00        Pack years: 21        Types: Cigarettes        Quit date: 10/23/2013        Years since quittin.1      Smokeless tobacco: Never    Alcohol use: No      Comment: beer once a month    Drug use: No      Comment: former marijuana   Pack years: 20    Date of last lung cancer screening: No previous lung cancer screening exam     Standing Status:   Future     Standing Expiration Date:   2024     Order Specific Question:   Is there documentation of shared decision making? Answer:   Yes     Order Specific Question:   Is this a low dose CT or a routine CT? Answer:   Low Dose CT [1]     Order Specific Question:   Is this the first (baseline) CT or an annual exam?     Answer:   Baseline [1]     Order Specific Question:   Does the patient show any signs or symptoms of lung cancer? Answer:   No     Order Specific Question:   Smoking Status? Answer: Former [4]     Order Specific Question:   Date quit smoking? (must be within 15 years)     Answer:   10/23/2013     Order Specific Question:   Smoking packs per day? Answer:   2     Order Specific Question:   Years smoking? Answer:   21    [facility] EARLINE DIGITAL SCREEN BILATERAL     Standing Status:   Future     Standing Expiration Date:   2024     Order Specific Question:   Reason for exam:     Answer:   screening for breast cancer    Influenza, FLUAD, (age 72 y+), IM, Preservative Free, 0.5 mL    [facility] DIABETES FOOT EXAM    [place] VISIT TO DISCUSS LUNG CA SCREEN W LDCT     Orders Placed This Encounter   Medications    Misc. Devices (BATH/SHOWER SEAT) MISC     Sig: Use daily as needed for showering/bathing. Dispense:  1 each     Refill:  0    Misc. Devices (ROLLER [place]) MISC     Sig: Use daily as needed for ambulation.      Dispense:  1 each     Refill:  0     Please dispense rolling walker with brakes and seat. Patient given educational materials - see patient instructions. Discussed use, benefit, and side effects of prescribed medications. All patient questions answered. Pt voiced understanding. Reviewed health maintenance.             Electronically signed by Zander Medina DO, DO on 12/26/2022 at 12:16 AM

## 2022-12-13 NOTE — PROGRESS NOTES
Discussed with the patient the current USPSTF guidelines released March 9, 2021 for screening for lung cancer. For adults aged 48 to [de-identified] years who have a 20 pack-year smoking history and currently smoke or have quit within the past 15 years the grade B recommendation is to:  Screen for lung cancer with low-dose computed tomography (LDCT) every year. Stop screening once a person has not smoked for 15 years or has a health problem that limits life expectancy or the ability to have lung surgery. The patient  reports that she quit smoking about 9 years ago. Her smoking use included cigarettes. She has a 20.00 pack-year smoking history. She has never used smokeless tobacco.. Discussed with patient the risks and benefits of screening, including over-diagnosis, false positive rate, and total radiation exposure. The patient currently exhibits no signs or symptoms suggestive of lung cancer. Discussed with patient the importance of compliance with yearly annual lung cancer screenings and willingness to undergo diagnosis and treatment if screening scan is positive. In addition, the patient was counseled regarding the importance of remaining smoke free and/or total smoking cessation.     Also reviewed the following if the patient has Medicare that as of February 10, 2022, Medicare only covers LDCT screening in patients aged 51-72 with at least a 20 pack-year smoking history who currently smoke or have quit in the last 15 years

## 2022-12-13 NOTE — PATIENT INSTRUCTIONS
Learning About Lung Cancer Screening  What is screening for lung cancer? Lung cancer screening is a way to find some lung cancers early, before a person has any symptoms of the cancer. Lung cancer screening may help those who have the highest risk for lung cancer--people age 48 and older who are or were heavy smokers. For most people, who aren't at increased risk, screening for lung cancer probably isn't helpful. Screening won't prevent cancer. And it may not find all lung cancers. Lung cancer screening may lower the risk of dying from lung cancer in a small number of people. How is it done? Lung cancer screening is done with a low-dose CT (computed tomography) scan. A CT scan uses X-rays, or radiation, to make detailed pictures of your body. Experts recommend that screening be done in medical centers that focus on finding and treating lung cancer. Who is screening recommended for? Lung cancer screening is recommended for people age 48 and older who are or were heavy smokers. That means people with a smoking history of at least 20 pack years. A pack year is a way to measure how heavy a smoker you are or were. To figure out your pack years, multiply how many packs a day on average (assuming 20 cigarettes per pack) you have smoked by how many years you have smoked. For example: If you smoked 1 pack a day for 20 years, that's 1 times 20. So you have a smoking history of 20 pack years. If you smoked 2 packs a day for 10 years, that's 2 times 10. So you have a smoking history of 20 pack years. Experts agree that screening is for people who have a high risk of lung cancer. But experts don't agree on what high risk means. Some say people age 48 or older with at least a 20-pack-year smoking history are high risk. Others say it's people age 54 or older with a 30-pack-year history. To see if you could benefit from screening, first find out if you are at high risk for lung cancer.  Your doctor can help you decide your lung cancer risk. What are the risks of screening? CT screening for lung cancer isn't perfect. It can show an abnormal result when it turns out there wasn't any cancer. This is called a false-positive result. This means you may need more tests to make sure you don't have cancer. These tests can be harmful and cause a lot of worry. These tests may include more CT scans and invasive testing like a lung biopsy. In a biopsy, the doctor takes a sample of tissue from inside your lung so it can be looked at under a microscope. A biopsy is the only way to tell if you have lung cancer. If the biopsy finds cancer, you and your doctor will have to decide how or whether to treat it. Some lung cancers found on CT scans are harmless and would not have caused a problem if they had not been found through screening. But because doctors can't tell which ones will turn out to be harmless, most will be treated. This means that you may get treatment--including surgery, radiation, or chemotherapy--that you don't need. There is a risk of damage to cells or tissue from being exposed to radiation, including the small amounts used in CTs, X-rays, and other medical tests. Over time, exposure to radiation may cause cancer and other health problems. But in most cases, the risk of getting cancer from being exposed to small amounts of radiation is low. It's not a reason to avoid these tests for most people. What are the benefits of screening? Your scan may be normal (negative). For some people who are at higher risk, screening lowers the chance of dying of lung cancer. How much and how long you smoked helps to determine your risk level. Screening can find some cancers early, when treatment may be more likely to work. What happens after screening? The results of your CT scan will be sent to your doctor. Someone from your care team will explain the results of your scan and answer any questions you may have.  If you need any follow-up, he or she will help you understand what to do next. After a lung cancer screening, you can go back to your usual activities right away. A lung cancer screening test can't tell if you have lung cancer. If your results are positive, your doctor can't tell whether an abnormal finding is a harmless nodule, cancer, or something else without doing more tests. What can you do to help prevent lung cancer? Some lung cancers can't be prevented. But if you smoke, quitting smoking is the best step you can take to prevent lung cancer. If you want to quit, your doctor can recommend medicines or other ways to help. Follow-up care is a key part of your treatment and safety. Be sure to make and go to all appointments, and call your doctor if you are having problems. It's also a good idea to know your test results and keep a list of the medicines you take. Where can you learn more? Go to http://www.guerrier.com/ and enter Q940 to learn more about \"Learning About Lung Cancer Screening. \"  Current as of: May 4, 2022               Content Version: 13.5  © 4584-7170 Healthwise, [employer]. Care instructions adapted under license by [facility] ([facility]). If you have questions about a medical condition or this instruction, always ask your healthcare professional. [address] any warranty or liability for your use of this information. Preventing Falls: Care Instructions  Overview     Getting around your home safely can be a challenge if you have injuries or health problems that make it easy for you to fall. Loose rugs and furniture in walkways are among the dangers for many older people who have problems walking or who have poor eyesight. People who have conditions such as arthritis, osteoporosis, or dementia also have to be careful not to fall. You can make your home safer with a few simple measures. Follow-up care is a key part of your treatment and safety.  Be sure to make and go to all appointments, and call your doctor if you are having problems. It's also a good idea to know your test results and keep a list of the medicines you take. How can you care for yourself at home? Taking care of yourself  Exercise regularly to improve your strength, muscle tone, and balance. Walk if you can. Swimming may be a good choice if you cannot walk easily. Have your vision and hearing checked each year or any time you notice a change. If you have trouble seeing and hearing, you might not be able to avoid objects and could lose your balance. Know the side effects of the medicines you take. Ask your doctor or pharmacist whether the medicines you take can affect your balance. Sleeping pills or sedatives can affect your balance. Limit the amount of alcohol you drink. Alcohol can impair your balance and other senses. Ask your doctor whether calluses or corns on your feet need to be removed. If you wear loose-fitting shoes because of calluses or corns, you can lose your balance and fall. Talk to your doctor if you have numbness in your feet. You may get dizzy if you do not drink enough water. To prevent dehydration, drink plenty of fluids. Choose water and other clear liquids. If you have kidney, heart, or liver disease and have to limit fluids, talk with your doctor before you increase the amount of fluids you drink. Preventing falls at home  Remove raised doorway thresholds, throw rugs, and clutter. Repair loose carpet or raised areas in the floor. Move furniture and electrical cords to keep them out of walking paths. Use nonskid floor wax, and wipe up spills right away, especially on ceramic tile floors. If you use a walker or cane, put rubber tips on it. If you use crutches, clean the bottoms of them regularly with an abrasive pad, such as steel wool. Keep your house well lit, especially stairways, porches, and outside walkways. Use night-lights in areas such as hallways and bathrooms.  Add extra light switches or use remote switches (such as switches that go on or off when you clap your hands) to make it easier to turn lights on if you have to get up during the night. Install sturdy handrails on stairways. Move items in your cabinets so that the things you use a lot are on the lower shelves (about waist level). Keep a cordless phone and a flashlight with new batteries by your bed. If possible, put a phone in each of the main rooms of your house, or carry a cell phone in case you fall and cannot reach a phone. Or, you can wear a device around your neck or wrist. You push a button that sends a signal for help. Wear low-heeled shoes that fit well and give your feet good support. Use footwear with nonskid soles. Check the heels and soles of your shoes for wear. Repair or replace worn heels or soles. Do not wear socks without shoes on smooth floors, such as wood. Walk on the grass when the sidewalks are slippery. If you live in an area that gets snow and ice in the winter, sprinkle salt on slippery steps and sidewalks. Or ask a family member or friend to do this for you. Preventing falls in the bath  Install grab bars and nonskid mats inside and outside your shower or tub and near the toilet and sinks. Use shower chairs and bath benches. Use a hand-held shower head that will allow you to sit while showering. Get into a tub or shower by putting the weaker leg in first. Get out of a tub or shower with your strong side first.  Repair loose toilet seats and consider installing a raised toilet seat to make getting on and off the toilet easier. Keep your bathroom door unlocked while you are in the shower. Where can you learn more? Go to http://www.guerrier.com/ and enter G117 to learn more about \"Preventing Falls: Care Instructions. \"  Current as of: May 4, 2022               Content Version: 13.5  © 0773-5169 Healthwise, Inc[employer].    Care instructions adapted under license by [facility]. If you have questions about a medical condition or this instruction, always ask your healthcare professional. [address] any warranty or liability for your use of this information. Learning About Emotional Support  When do you need emotional support? You might find getting support from others helpful when you have a long-term health problem. Often people feel alone, confused, or scared when coping with an illness. But you aren't alone. Other people are going through the same thing you are and know how you feel. Talking with others about your feelings can help you feel better. Your family and friends can give you support. So can your doctor, a support group, or a Latter-day. If you have a support network, you will not feel as alone. You will learn new ways to deal with your situation, and you may try harder to overcome it. Where you can get support  Family and friends: They can help you cope by giving you comfort and encouragement. Counseling: Professional counseling can help you cope with situations that interfere with your life and cause stress. Counseling can help you understand and deal with your illness. Your doctor: Find a doctor you trust and feel comfortable with. Be open and honest about your fears and concerns. Your doctor can help you get the right medical treatments, including counseling. Spiritual or Samaritan groups: They can provide comfort and may be able to help you find counseling or other social support services. Social groups: They can help you meet new people and get involved in activities you enjoy. Community support groups: In a support group, you can talk to others who have dealt with the same problems or illness as you. You can encourage one another and learn ways to cope with tough emotions. How to find a support group  Ask your doctor, counselor, or other health professional for suggestions.   Contact your local Latter-day, Buddhist, [facility], or other Bahai group. Ask your family and friends. Ask people who have the same health concerns. Go online. Forums and blogs let you read messages from others and leave your own messages. You can exchange stories, vent your frustrations, and ask and answer questions. Contact a city, state, or national group that provides support for your health concerns. Your library or [employer] center may have a list of these groups. Or you can look for information online. Look for a support group that works for you. Ask yourself if you prefer structure and would like a , or if you would like a less formal group. Do you prefer face-to-face meetings? Or do you feel more secure in online chat rooms or forums? Supportive relationships  A supportive relationship includes emotional support such as love, trust, and understanding, as well as advice and concrete help, such as help managing your time. Reach out to others  Family and friends can help you. Ask them to:  Listen to you and give you encouragement. This can keep you from feeling hopeless or alone. Help with small daily tasks or with bigger problems. A helping hand can keep you from feeling overwhelmed. Help you manage a health problem. For example, ask them to go to doctor visits with you. Your loved ones can offer support by being involved in your medical care. Respect your relationships  A good relationship is also a two-way street. You count on help from others, but they also count on you. Know your friends' limits. You don't have to see or call your friends every day. If you are going through a rough patch, ask friends if you can contact them outside of the usual boundaries. Don't always complain or talk about yourself. Know when it's time to stop talking and listen or just enjoy your friend's company. Know that good friends can be a bad influence.  For example, if a friend encourages you to drink when you know it will harm you, you may want to end the friendship. Where can you learn more? Go to http://www.woods.com/ and enter G092 to learn more about \"Learning About Emotional Support. \"  Current as of: February 9, 2022               Content Version: 13.5  © 2006-2022 Healthwise, Incorporated. Care instructions adapted under license by [facility] ([facility]). If you have questions about a medical condition or this instruction, always ask your healthcare professional. [address] any warranty or liability for your use of this information. Learning About Emotional Support  When do you need emotional support? You might find getting support from others helpful when you have a long-term health problem. Often people feel alone, confused, or scared when coping with an illness. But you aren't alone. Other people are going through the same thing you are and know how you feel. Talking with others about your feelings can help you feel better. Your family and friends can give you support. So can your doctor, a support group, or a Voodoo. If you have a support network, you will not feel as alone. You will learn new ways to deal with your situation, and you may try harder to overcome it. Where you can get support  Family and friends: They can help you cope by giving you comfort and encouragement. Counseling: Professional counseling can help you cope with situations that interfere with your life and cause stress. Counseling can help you understand and deal with your illness. Your doctor: Find a doctor you trust and feel comfortable with. Be open and honest about your fears and concerns. Your doctor can help you get the right medical treatments, including counseling. Spiritual or Sabianist groups: They can provide comfort and may be able to help you find counseling or other social support services. Social groups: They can help you meet new people and get involved in activities you enjoy. Community support groups:  In a support group, you can talk to others who have dealt with the same problems or illness as you. You can encourage one another and learn ways to cope with tough emotions. How to find a support group  Ask your doctor, counselor, or other health professional for suggestions. Contact your local Sikhism, Mandaen, Christian, or other Oriental orthodox group. Ask your family and friends. Ask people who have the same health concerns. Go online. Forums and blogs let you read messages from others and leave your own messages. You can exchange stories, vent your frustrations, and ask and answer questions. Contact a city, state, or national group that provides support for your health concerns. Your library or [employer] center may have a list of these groups. Or you can look for information online. Look for a support group that works for you. Ask yourself if you prefer structure and would like a , or if you would like a less formal group. Do you prefer face-to-face meetings? Or do you feel more secure in online chat rooms or forums? Supportive relationships  A supportive relationship includes emotional support such as love, trust, and understanding, as well as advice and concrete help, such as help managing your time. Reach out to others  Family and friends can help you. Ask them to:  Listen to you and give you encouragement. This can keep you from feeling hopeless or alone. Help with small daily tasks or with bigger problems. A helping hand can keep you from feeling overwhelmed. Help you manage a health problem. For example, ask them to go to doctor visits with you. Your loved ones can offer support by being involved in your medical care. Respect your relationships  A good relationship is also a two-way street. You count on help from others, but they also count on you. Know your friends' limits. You don't have to see or call your friends every day.  If you are going through a rough patch, ask friends if you can contact them outside of the usual boundaries. Don't always complain or talk about yourself. Know when it's time to stop talking and listen or just enjoy your friend's company. Know that good friends can be a bad influence. For example, if a friend encourages you to drink when you know it will harm you, you may want to end the friendship. Where can you learn more? Go to http://www.woods.com/ and enter G092 to learn more about \"Learning About Emotional Support. \"  Current as of: February 9, 2022               Content Version: 13.5  © 7464-9688 [employer]. Care instructions adapted under license by [facility] ([facility]). If you have questions about a medical condition or this instruction, always ask your healthcare professional. [address] any warranty or liability for your use of this information. Hearing Loss: Care Instructions  Overview     Hearing loss is a sudden or slow decrease in how well you hear. It can range from mild to severe. Permanent hearing loss can occur with aging. It also can happen when you are exposed long-term to loud noise. Examples include listening to loud music, riding motorcycles, or being around other loud machines. Hearing loss can affect your work and home life. It can make you feel lonely or depressed. You may feel that you have lost your independence. But hearing aids and other devices can help you hear better and feel connected to others. Follow-up care is a key part of your treatment and safety. Be sure to make and go to all appointments, and call your doctor if you are having problems. It's also a good idea to know your test results and keep a list of the medicines you take. How can you care for yourself at home? Avoid loud noises whenever possible. This helps keep your hearing from getting worse. Always wear hearing protection around loud noises. Wear a hearing aid as directed.  See a professional who can help you pick a hearing aid that fits you. Have hearing tests as your doctor suggests. They can show whether your hearing has changed. Your hearing aid may need to be adjusted. Use other devices as needed. These may include:  Telephone amplifiers and hearing aids that can connect to a television, stereo, radio, or microphone. Devices that use lights or vibrations. These alert you to the doorbell, a ringing telephone, or a baby monitor. Television closed-captioning. This shows the words at the bottom of the screen. Most new TVs can do this. TTY (text telephone). This lets you type messages back and forth on the telephone instead of talking or listening. These devices are also called TDD. When messages are typed on the keyboard, they are sent over the phone line to a receiving TTY. The message is shown on a monitor. Use text messaging, social media, and email if it is hard for you to communicate by telephone. Try to learn a listening technique called speechreading. It is not lipreading. You pay attention to people's gestures, expressions, posture, and tone of voice. These clues can help you understand what a person is saying. Face the person you are talking to, and have them face you. Make sure the lighting is good. You need to see the other person's face clearly. Think about counseling if you need help to adjust to your hearing loss. When should you call for help? Watch closely for changes in your health, and be sure to contact your doctor if:    You think your hearing is getting worse.     You have new symptoms, such as dizziness or nausea. Where can you learn more? Go to http://www.guerrier.com/ and enter R798 to learn more about \"Hearing Loss: Care Instructions. \"  Current as of: May 4, 2022               Content Version: 13.5  © 7609-9293 Healthwise, Incorporated. Care instructions adapted under license by [facility] ([facility]).  If you have questions about a medical condition or this instruction, always ask your healthcare professional. [address] any warranty or liability for your use of this information. Learning About Activities of Daily Living  What are activities of daily living? Activities of daily living (ADLs) are the basic self-care tasks you do every day. As you age, and if you have health problems, you may find that it's harder to do these things for yourself. That's when you may need some help. Your doctor uses ADLs to measure how much help you need. Knowing what you can and can't do for yourself is an important first step to getting help. And when you have the help you need, you can stay as independent as possible. Your doctor will want to know if you are able to do tasks such as: Take a bath or shower without help. Go to the bathroom by yourself. Dress and undress without help. Shave, comb your hair, and brush teeth on your own. Get in and out of bed or a chair without help. Feed yourself without help. If you are having trouble doing basic self-care tasks, talk with your doctor. You may want to bring a caregiver or family member who can help the doctor understand your needs and abilities. How will a doctor assess your ADLs? Asking about ADLs is part of a routine health checkup your doctor will likely do as you age. Your health check might be done in a doctor's office, in your home, or at a hospital. The goal is to find out if you are having any problems that could make your health problems worse or that make it unsafe for you to be on your own. To measure your ADLs, your doctor will ask how hard it is for you to do routine tasks. He or she may also want to know if you have changed the way you do a task because of a health problem. He or she may watch how you:  Walk back and forth. Keep your balance while you stand or walk. Move from sitting to standing or from a bed to a chair. Button or unbutton a shirt or sweater. Remove and put on your shoes.   It's normal to feel a little worried or anxious if you find you can't do all the things you used to be able to do. Talking with your doctor about ADLs isn't a test that you either pass or fail. It's just a way to get more information about your health and safety. Follow-up care is a key part of your treatment and safety. Be sure to make and go to all appointments, and call your doctor if you are having problems. It's also a good idea to know your test results and keep a list of the medicines you take. Current as of: October 6, 2021               Content Version: 13.5  © 2006-2022 [email or web address]. Care instructions adapted under license by [facility] ([facility] [facility]). If you have questions about a medical condition or this instruction, always ask your healthcare professional. [address] any warranty or liability for your use of this information. Advance Directives: Care Instructions  Overview  An advance directive is a legal way to state your wishes at the end of your life. It tells your family and your doctor what to do if you can't say what you want. There are two main types of advance directives. You can change them any time your wishes change. Living will. This form tells your family and your doctor your wishes about life support and other treatment. The form is also called a declaration. Medical power of . This form lets you name a person to make treatment decisions for you when you can't speak for yourself. This person is called a health care agent (health care proxy, health care surrogate). The form is also called a durable power of  for health care. If you do not have an advance directive, decisions about your medical care may be made by a family member, or by a doctor or a  who doesn't know you. It may help to think of an advance directive as a gift to the people who care for you.  If you have one, they won't have to make tough decisions by themselves. For more information, including forms for your state, see the [address] website (www.caringinfo.org/planning/advance-directives/). Follow-up care is a key part of your treatment and safety. Be sure to make and go to all appointments, and call your doctor if you are having problems. It's also a good idea to know your test results and keep a list of the medicines you take. What should you include in an advance directive? Many states have a unique advance directive form. (It may ask you to address specific issues.) Or you might use a universal form that's approved by many states. If your form doesn't tell you what to address, it may be hard to know what to include in your advance directive. Use the questions below to help you get started. Who do you want to make decisions about your medical care if you are not able to? What life-support measures do you want if you have a serious illness that gets worse over time or can't be cured? What are you most afraid of that might happen? (Maybe you're afraid of having pain, losing your independence, or being kept alive by machines.)  Where would you prefer to die? (Your home? A hospital? A nursing home?)  Do you want to donate your organs when you die? Do you want certain Gnosticist practices performed before you die? When should you call for help? Be sure to contact your doctor if you have any questions. Where can you learn more? Go to http://www.guerrier.com/ and enter R264 to learn more about \"Advance Directives: Care Instructions. \"  Current as of: June 16, 2022               Content Version: 13.5  © 9201-7899 [employer]. Care instructions adapted under license by [facility] ([facility]). If you have questions about a medical condition or this instruction, always ask your healthcare professional. [address] any warranty or liability for your use of this information.       Personalized Preventive Plan for [facility] - 12/13/2022  Medicare offers a range of preventive health benefits. Some of the tests and screenings are paid in full while other may be subject to a deductible, co-insurance, and/or copay. Some of these benefits include a comprehensive review of your medical history including lifestyle, illnesses that may run in your family, and various assessments and screenings as appropriate. After reviewing your medical record and screening and assessments performed today your provider may have ordered immunizations, labs, imaging, and/or referrals for you. A list of these orders (if applicable) as well as your Preventive Care list are included within your After Visit Summary for your review. Other Preventive Recommendations:    A preventive eye exam performed by an eye specialist is recommended every 1-2 years to screen for glaucoma; cataracts, macular degeneration, and other eye disorders. A preventive dental visit is recommended every 6 months. Try to get at least 150 minutes of exercise per week or 10,000 steps per day on a pedometer . Order or download the FREE \"Exercise & Physical Activity: Your Everyday Guide\" from [employer]. Call 5-404.335.7598 or search [employer] online. You need 2295-5747 mg of calcium and 1818-9173 IU of vitamin D per day. It is possible to meet your calcium requirement with diet alone, but a vitamin D supplement is usually necessary to meet this goal.  When exposed to the sun, use a sunscreen that protects against both UVA and UVB radiation with an SPF of 30 or greater. Reapply every 2 to 3 hours or after sweating, drying off with a towel, or swimming. Always wear a seat belt when traveling in a car. Always wear a helmet when riding a bicycle or motorcycle.

## 2022-12-13 NOTE — LETTER
12/13/2022        Rosemary Houston  [address] [facility]    Dear Usha Baires: Your healthcare provider has ordered a low dose CT scan of the chest for lung cancer screening. Enclosed you will find information about CT lung screening and smoking cessation resources. If you are unable to keep you appointment for you CT lung screening, please call our scheduling department at [phone]. Keep in mind that CT lung screening does not take the place of smoking cessation. Please do not hesitate to contact me if you have any questions or concerns.     [address] [facility],      [facility] Screening [facility]  [phone]

## 2022-12-15 DIAGNOSIS — M62.838 MUSCLE SPASM: ICD-10-CM

## 2022-12-15 NOTE — TELEPHONE ENCOUNTER
Horacio Mendez called requesting a refill of the below medication which has been pended for you:     Requested Prescriptions     Pending Prescriptions Disp Refills    cyclobenzaprine (FLEXERIL) 10 MG tablet [Pharmacy Med Name: Cyclobenzaprine Hydrochloride 10mg Tablet] 60 tablet 11     Sig: Take 1 tablet by mouth twice daily as needed for muscle spasms       Last Appointment Date: 12/13/2022  Next Appointment Date: 3/14/2023    Allergies   Allergen Reactions    Norco [Hydrocodone-Acetaminophen]      Nausea and vomiting    Oxycodone Nausea And Vomiting    Percocet [Oxycodone-Acetaminophen] Nausea And Vomiting    Sulfa Antibiotics Other (See Comments)     Hallucinations.

## 2022-12-17 RX ORDER — CYCLOBENZAPRINE HCL 10 MG
10 TABLET ORAL 2 TIMES DAILY PRN
Qty: 60 TABLET | Refills: 3 | Status: SHIPPED | OUTPATIENT
Start: 2022-12-17

## 2022-12-20 DIAGNOSIS — M50.90 CERVICAL NECK PAIN WITH EVIDENCE OF DISC DISEASE: ICD-10-CM

## 2022-12-20 DIAGNOSIS — M62.838 MUSCLE SPASM: ICD-10-CM

## 2022-12-20 DIAGNOSIS — E89.0 POSTABLATIVE HYPOTHYROIDISM: ICD-10-CM

## 2022-12-20 RX ORDER — CYCLOBENZAPRINE HCL 10 MG
TABLET ORAL
Qty: 90 TABLET | Refills: 0 | OUTPATIENT
Start: 2022-12-20

## 2022-12-20 RX ORDER — GABAPENTIN 600 MG/1
TABLET ORAL
Qty: 180 TABLET | Refills: 0 | OUTPATIENT
Start: 2022-12-20

## 2022-12-20 NOTE — TELEPHONE ENCOUNTER
Too too to fill gabapentin. Perry Harrington called requesting a refill of the below medication which has been pended for you:     Requested Prescriptions     Pending Prescriptions Disp Refills    levothyroxine (SYNTHROID) 88 MCG tablet [Pharmacy Med Name: levothyroxine 88 mcg tablet] 90 tablet 0     Sig: TAKE ONE TABLET BY MOUTH DAILY AT 8AM     Refused Prescriptions Disp Refills    cyclobenzaprine (FLEXERIL) 10 MG tablet [Pharmacy Med Name: cyclobenzaprine 10 mg tablet] 90 tablet 0     Sig: TAKE ONE TABLET BY MOUTH TWICE DAILY AS NEEDED FOR MUSCLE SPASMS (VIAL)    gabapentin (NEURONTIN) 600 MG tablet [Pharmacy Med Name: gabapentin 600 mg tablet] 180 tablet 0     Sig: TAKE ONE TABLET BY MOUTH 1-2 TIMES DAILY AS NEEDED FOR CHRONIC NECK AND BACK PAIN (VIAL)       Last Appointment Date: 12/13/2022  Next Appointment Date: 3/14/2023    Allergies   Allergen Reactions    Norco [Hydrocodone-Acetaminophen]      Nausea and vomiting    Oxycodone Nausea And Vomiting    Percocet [Oxycodone-Acetaminophen] Nausea And Vomiting    Sulfa Antibiotics Other (See Comments)     Hallucinations.

## 2022-12-23 RX ORDER — LEVOTHYROXINE SODIUM 88 UG/1
TABLET ORAL
Qty: 90 TABLET | Refills: 0 | OUTPATIENT
Start: 2022-12-23

## 2022-12-23 NOTE — TELEPHONE ENCOUNTER
Patient states she does have a refill at the pharmacy for her to  yet. She was made aware to get bloodwork done after the 1st of the year.

## 2022-12-26 ASSESSMENT — ENCOUNTER SYMPTOMS: BACK PAIN: 1

## 2023-01-04 DIAGNOSIS — J44.9 ASTHMA WITH COPD (HCC): ICD-10-CM

## 2023-01-04 NOTE — TELEPHONE ENCOUNTER
Justine Riggins called requesting a refill of the below medication which has been pended for you:     Requested Prescriptions     Pending Prescriptions Disp Refills    albuterol (PROVENTIL) (2.5 MG/3ML) 0.083% nebulizer solution [Pharmacy Med Name: Albuterol Sulfate 0.083% Solution for Inhalation] 90 mL 11     Sig: Inhale 1 vial via nebulizer every 6 hours as needed for wheezing       Last Appointment Date: 12/13/2022  Next Appointment Date: 3/14/2023    Allergies   Allergen Reactions    Norco [Hydrocodone-Acetaminophen]      Nausea and vomiting    Oxycodone Nausea And Vomiting    Percocet [Oxycodone-Acetaminophen] Nausea And Vomiting    Sulfa Antibiotics Other (See Comments)     Hallucinations.

## 2023-01-06 DIAGNOSIS — J44.9 ASTHMA WITH COPD (HCC): ICD-10-CM

## 2023-01-06 RX ORDER — ALBUTEROL SULFATE 2.5 MG/3ML
2.5 SOLUTION RESPIRATORY (INHALATION) EVERY 6 HOURS PRN
Qty: 120 ML | Refills: 3 | OUTPATIENT
Start: 2023-01-06

## 2023-01-07 RX ORDER — ALBUTEROL SULFATE 2.5 MG/3ML
2.5 SOLUTION RESPIRATORY (INHALATION) EVERY 6 HOURS PRN
Qty: 360 ML | Refills: 11 | Status: SHIPPED | OUTPATIENT
Start: 2023-01-07

## 2023-01-11 DIAGNOSIS — M50.90 CERVICAL NECK PAIN WITH EVIDENCE OF DISC DISEASE: ICD-10-CM

## 2023-01-11 DIAGNOSIS — E89.0 POSTABLATIVE HYPOTHYROIDISM: ICD-10-CM

## 2023-01-11 NOTE — TELEPHONE ENCOUNTER
Per [facility], last fill 12/13, quantity 60 for 30 days. Spoke to patient about blood work to complete. States she does not know if she will have enough until she gets blood work done. 30 supply pended for thyroid medication. Ying Holliday called requesting a refill of the below medication which has been pended for you:     Requested Prescriptions     Pending Prescriptions Disp Refills    levothyroxine (SYNTHROID) 88 MCG tablet [Pharmacy Med Name: levothyroxine 88 mcg tablet] 30 tablet 0     Sig: TAKE ONE TABLET BY MOUTH DAILY AT 8AM    gabapentin (NEURONTIN) 600 MG tablet [Pharmacy Med Name: gabapentin 600 mg tablet] 180 tablet 0     Sig: TAKE ONE TABLET BY MOUTH 1-2 TIMES DAILY AS NEEDED FOR CHRONIC NECK AND BACK PAIN (VIAL)       Last Appointment Date: 12/13/2022  Next Appointment Date: 3/14/2023    Allergies   Allergen Reactions    Norco [Hydrocodone-Acetaminophen]      Nausea and vomiting    Oxycodone Nausea And Vomiting    Percocet [Oxycodone-Acetaminophen] Nausea And Vomiting    Sulfa Antibiotics Other (See Comments)     Hallucinations.

## 2023-01-13 DIAGNOSIS — J44.9 ASTHMA WITH COPD (HCC): ICD-10-CM

## 2023-01-13 RX ORDER — ALBUTEROL SULFATE 2.5 MG/3ML
SOLUTION RESPIRATORY (INHALATION)
Qty: 90 ML | Refills: 11 | OUTPATIENT
Start: 2023-01-13

## 2023-01-14 RX ORDER — GABAPENTIN 600 MG/1
TABLET ORAL
Qty: 60 TABLET | Refills: 0 | Status: SHIPPED | OUTPATIENT
Start: 2023-01-14 | End: 2023-02-13

## 2023-01-14 RX ORDER — LEVOTHYROXINE SODIUM 88 UG/1
88 TABLET ORAL DAILY
Qty: 30 TABLET | Refills: 0 | Status: SHIPPED | OUTPATIENT
Start: 2023-01-14

## 2023-01-14 NOTE — TELEPHONE ENCOUNTER
Controlled Substance Monitoring:    Acute and Chronic Pain Monitoring:   RX Monitoring 1/14/2023   Attestation -   Periodic Controlled Substance Monitoring No signs of potential drug abuse or diversion identified.

## 2023-01-16 NOTE — TELEPHONE ENCOUNTER
Home health orders placed. High Dose Vitamin A Pregnancy And Lactation Text: High dose vitamin A therapy is contraindicated during pregnancy and breast feeding.

## 2023-01-19 DIAGNOSIS — E11.9 TYPE 2 DIABETES MELLITUS WITHOUT COMPLICATION, WITH LONG-TERM CURRENT USE OF INSULIN (HCC): ICD-10-CM

## 2023-01-19 DIAGNOSIS — M50.90 CERVICAL NECK PAIN WITH EVIDENCE OF DISC DISEASE: ICD-10-CM

## 2023-01-19 DIAGNOSIS — Z79.4 TYPE 2 DIABETES MELLITUS WITHOUT COMPLICATION, WITH LONG-TERM CURRENT USE OF INSULIN (HCC): ICD-10-CM

## 2023-01-21 RX ORDER — GABAPENTIN 600 MG/1
TABLET ORAL
Qty: 60 TABLET | Refills: 0 | OUTPATIENT
Start: 2023-01-21

## 2023-01-21 NOTE — TELEPHONE ENCOUNTER
Duplicate request for Gabapentin - med already refilled in separate encounter on 1/14/23. Pt is overdue for re-check of A1c, as ordered by TANK Garcia. No upcoming appt's with Crystal and does not see me again until 3/2023. Please call and see if she can do labwork (also needs BMP and TSH) this week.

## 2023-01-23 DIAGNOSIS — E11.9 TYPE 2 DIABETES MELLITUS WITHOUT COMPLICATION, WITH LONG-TERM CURRENT USE OF INSULIN (HCC): ICD-10-CM

## 2023-01-23 DIAGNOSIS — Z79.4 TYPE 2 DIABETES MELLITUS WITHOUT COMPLICATION, WITH LONG-TERM CURRENT USE OF INSULIN (HCC): ICD-10-CM

## 2023-01-23 DIAGNOSIS — M62.838 MUSCLE SPASM: ICD-10-CM

## 2023-01-24 RX ORDER — GLIMEPIRIDE 2 MG/1
2 TABLET ORAL 2 TIMES DAILY WITH MEALS
Qty: 180 TABLET | Refills: 0 | Status: SHIPPED | OUTPATIENT
Start: 2023-01-24

## 2023-01-24 RX ORDER — CYCLOBENZAPRINE HCL 10 MG
10 TABLET ORAL 2 TIMES DAILY PRN
Qty: 60 TABLET | Refills: 2 | Status: SHIPPED | OUTPATIENT
Start: 2023-01-24

## 2023-01-24 RX ORDER — INSULIN GLARGINE 100 [IU]/ML
35 INJECTION, SOLUTION SUBCUTANEOUS NIGHTLY
Qty: 30 ML | Refills: 0 | Status: SHIPPED | OUTPATIENT
Start: 2023-01-24

## 2023-01-24 NOTE — TELEPHONE ENCOUNTER
See other refill request encounter - pt is overdue for A1c re-check and is due for appt with Shelley Estevez (nothing scheduled).   Need to review labs to see if meds need adjusted by myself or Crystal.

## 2023-01-24 NOTE — TELEPHONE ENCOUNTER
See other refill request encounter - pt is overdue for A1c re-check and is due for appt with Ketlon Dolan (nothing scheduled).   Need to review labs to see if meds need adjusted by myself or Crystal.

## 2023-01-25 RX ORDER — DULAGLUTIDE 1.5 MG/.5ML
1.5 INJECTION, SOLUTION SUBCUTANEOUS WEEKLY
Qty: 6 ML | Refills: 0 | Status: SHIPPED | OUTPATIENT
Start: 2023-01-25

## 2023-01-25 NOTE — TELEPHONE ENCOUNTER
Patient wants script sent to [facility]. Transferred call over for patient to schedule with Nisha

## 2023-01-25 NOTE — TELEPHONE ENCOUNTER
Noted, thank you. Amaryl and Lantus refilled in separate encounters. Please confirm what pharmacy pt is using - the other 2 med[facility], and this one is set for [place] ([place] Also needs to schedule appt with Tana Guido for DM follow-up.

## 2023-02-01 DIAGNOSIS — I10 ESSENTIAL HYPERTENSION: ICD-10-CM

## 2023-02-01 DIAGNOSIS — E78.2 MIXED HYPERLIPIDEMIA: ICD-10-CM

## 2023-02-03 LAB
CHP ED QC CHECK: NORMAL
GLUCOSE BLD-MCNC: 193 MG/DL

## 2023-02-07 RX ORDER — HYDROCHLOROTHIAZIDE 25 MG/1
TABLET ORAL
Qty: 90 TABLET | Refills: 1 | OUTPATIENT
Start: 2023-02-07

## 2023-02-07 RX ORDER — LOSARTAN POTASSIUM 25 MG/1
TABLET ORAL
Qty: 90 TABLET | Refills: 1 | OUTPATIENT
Start: 2023-02-07

## 2023-02-07 RX ORDER — ATORVASTATIN CALCIUM 40 MG/1
TABLET, FILM COATED ORAL
Qty: 90 TABLET | Refills: 1 | OUTPATIENT
Start: 2023-02-07

## 2023-02-14 ENCOUNTER — HOSPITAL ENCOUNTER (OUTPATIENT)
Age: 68
Discharge: HOME | End: 2023-02-14
Payer: MEDICARE

## 2023-02-14 ENCOUNTER — OFFICE VISIT (OUTPATIENT)
Dept: DIABETES SERVICES | Age: 68
End: 2023-02-14
Payer: MEDICARE

## 2023-02-14 VITALS
RESPIRATION RATE: 16 BRPM | DIASTOLIC BLOOD PRESSURE: 72 MMHG | WEIGHT: 162 LBS | HEIGHT: 64 IN | HEART RATE: 72 BPM | SYSTOLIC BLOOD PRESSURE: 118 MMHG | BODY MASS INDEX: 27.66 KG/M2

## 2023-02-14 DIAGNOSIS — Z79.4 TYPE 2 DIABETES MELLITUS WITHOUT COMPLICATION, WITH LONG-TERM CURRENT USE OF INSULIN (HCC): ICD-10-CM

## 2023-02-14 DIAGNOSIS — Z79.4 TYPE 2 DIABETES MELLITUS WITH STAGE 3A CHRONIC KIDNEY DISEASE, WITH LONG-TERM CURRENT USE OF INSULIN (HCC): ICD-10-CM

## 2023-02-14 DIAGNOSIS — N18.31 TYPE 2 DIABETES MELLITUS WITH STAGE 3A CHRONIC KIDNEY DISEASE, WITH LONG-TERM CURRENT USE OF INSULIN (HCC): Primary | ICD-10-CM

## 2023-02-14 DIAGNOSIS — I10 ESSENTIAL HYPERTENSION: ICD-10-CM

## 2023-02-14 DIAGNOSIS — E11.9 TYPE 2 DIABETES MELLITUS WITHOUT COMPLICATION, WITH LONG-TERM CURRENT USE OF INSULIN (HCC): ICD-10-CM

## 2023-02-14 DIAGNOSIS — E78.2 MIXED HYPERLIPIDEMIA: ICD-10-CM

## 2023-02-14 DIAGNOSIS — N18.31 TYPE 2 DIABETES MELLITUS WITH STAGE 3A CHRONIC KIDNEY DISEASE, WITH LONG-TERM CURRENT USE OF INSULIN (HCC): ICD-10-CM

## 2023-02-14 DIAGNOSIS — E89.0 POSTABLATIVE HYPOTHYROIDISM: ICD-10-CM

## 2023-02-14 DIAGNOSIS — E11.22 TYPE 2 DIABETES MELLITUS WITH STAGE 3A CHRONIC KIDNEY DISEASE, WITH LONG-TERM CURRENT USE OF INSULIN (HCC): ICD-10-CM

## 2023-02-14 DIAGNOSIS — E11.22 TYPE 2 DIABETES MELLITUS WITH STAGE 3A CHRONIC KIDNEY DISEASE, WITH LONG-TERM CURRENT USE OF INSULIN (HCC): Primary | ICD-10-CM

## 2023-02-14 DIAGNOSIS — Z79.4 TYPE 2 DIABETES MELLITUS WITH STAGE 3A CHRONIC KIDNEY DISEASE, WITH LONG-TERM CURRENT USE OF INSULIN (HCC): Primary | ICD-10-CM

## 2023-02-14 LAB
ALBUMIN SERPL-MCNC: 4.4 G/DL (ref 3.5–5.2)
ALBUMIN/GLOBULIN RATIO: 1.1 (ref 1–2.5)
ALP SERPL-CCNC: 101 U/L (ref 35–104)
ALT SERPL-CCNC: 27 U/L (ref 5–33)
ANION GAP SERPL CALCULATED.3IONS-SCNC: 11 MMOL/L (ref 9–17)
ANION GAP SERPL CALCULATED.3IONS-SCNC: 11 MMOL/L (ref 9–17)
AST SERPL-CCNC: 25 U/L
BILIRUB SERPL-MCNC: 0.4 MG/DL (ref 0.3–1.2)
BUN SERPL-MCNC: 17 MG/DL (ref 8–23)
BUN SERPL-MCNC: 17 MG/DL (ref 8–23)
BUN/CREAT BLD: 13 (ref 9–20)
BUN/CREAT BLD: 13 (ref 9–20)
CALCIUM SERPL-MCNC: 10.5 MG/DL (ref 8.6–10.4)
CALCIUM SERPL-MCNC: 10.5 MG/DL (ref 8.6–10.4)
CHLORIDE SERPL-SCNC: 88 MMOL/L (ref 98–107)
CHLORIDE SERPL-SCNC: 88 MMOL/L (ref 98–107)
CHOLEST SERPL-MCNC: 230 MG/DL
CHOLESTEROL/HDL RATIO: 7
CO2 SERPL-SCNC: 28 MMOL/L (ref 20–31)
CO2 SERPL-SCNC: 28 MMOL/L (ref 20–31)
CREAT SERPL-MCNC: 1.31 MG/DL (ref 0.5–0.9)
CREAT SERPL-MCNC: 1.31 MG/DL (ref 0.5–0.9)
CREATININE URINE: 38.6 MG/DL (ref 28–217)
CREATININE URINE: 38.8 MG/DL (ref 28–217)
GFR SERPL CREATININE-BSD FRML MDRD: 45 ML/MIN/1.73M2
GFR SERPL CREATININE-BSD FRML MDRD: 45 ML/MIN/1.73M2
GLUCOSE SERPL-MCNC: 475 MG/DL (ref 70–99)
GLUCOSE SERPL-MCNC: 475 MG/DL (ref 70–99)
HDLC SERPL-MCNC: 33 MG/DL
LDLC SERPL CALC-MCNC: ABNORMAL MG/DL (ref 0–130)
LDLC SERPL DIRECT ASSAY-MCNC: 133 MG/DL
MICROALBUMIN/CREAT 24H UR: 12 MG/L
MICROALBUMIN/CREAT 24H UR: 13 MG/L
MICROALBUMIN/CREAT UR-RTO: 31 MCG/MG CREAT
MICROALBUMIN/CREAT UR-RTO: 34 MCG/MG CREAT
POTASSIUM SERPL-SCNC: 4.2 MMOL/L (ref 3.7–5.3)
POTASSIUM SERPL-SCNC: 4.2 MMOL/L (ref 3.7–5.3)
PROT SERPL-MCNC: 8.3 G/DL (ref 6.4–8.3)
SODIUM SERPL-SCNC: 127 MMOL/L (ref 135–144)
SODIUM SERPL-SCNC: 127 MMOL/L (ref 135–144)
T4 FREE SERPL-MCNC: 1.45 NG/DL (ref 0.93–1.7)
TRIGL SERPL-MCNC: 437 MG/DL
TSH SERPL-ACNC: 0.27 UIU/ML (ref 0.3–5)

## 2023-02-14 PROCEDURE — 83036 HEMOGLOBIN GLYCOSYLATED A1C: CPT

## 2023-02-14 PROCEDURE — 1123F ACP DISCUSS/DSCN MKR DOCD: CPT | Performed by: NURSE PRACTITIONER

## 2023-02-14 PROCEDURE — 3017F COLORECTAL CA SCREEN DOC REV: CPT | Performed by: NURSE PRACTITIONER

## 2023-02-14 PROCEDURE — 99214 OFFICE O/P EST MOD 30 MIN: CPT | Performed by: NURSE PRACTITIONER

## 2023-02-14 PROCEDURE — 1090F PRES/ABSN URINE INCON ASSESS: CPT | Performed by: NURSE PRACTITIONER

## 2023-02-14 PROCEDURE — 84439 ASSAY OF FREE THYROXINE: CPT

## 2023-02-14 PROCEDURE — 80048 BASIC METABOLIC PNL TOTAL CA: CPT

## 2023-02-14 PROCEDURE — 3078F DIAST BP <80 MM HG: CPT | Performed by: NURSE PRACTITIONER

## 2023-02-14 PROCEDURE — 80053 COMPREHEN METABOLIC PANEL: CPT

## 2023-02-14 PROCEDURE — G8484 FLU IMMUNIZE NO ADMIN: HCPCS | Performed by: NURSE PRACTITIONER

## 2023-02-14 PROCEDURE — 36415 COLL VENOUS BLD VENIPUNCTURE: CPT

## 2023-02-14 PROCEDURE — G8399 PT W/DXA RESULTS DOCUMENT: HCPCS | Performed by: NURSE PRACTITIONER

## 2023-02-14 PROCEDURE — 3046F HEMOGLOBIN A1C LEVEL >9.0%: CPT | Performed by: NURSE PRACTITIONER

## 2023-02-14 PROCEDURE — G8417 CALC BMI ABV UP PARAM F/U: HCPCS | Performed by: NURSE PRACTITIONER

## 2023-02-14 PROCEDURE — G8427 DOCREV CUR MEDS BY ELIG CLIN: HCPCS | Performed by: NURSE PRACTITIONER

## 2023-02-14 PROCEDURE — 83721 ASSAY OF BLOOD LIPOPROTEIN: CPT

## 2023-02-14 PROCEDURE — 2022F DILAT RTA XM EVC RTNOPTHY: CPT | Performed by: NURSE PRACTITIONER

## 2023-02-14 PROCEDURE — 1036F TOBACCO NON-USER: CPT | Performed by: NURSE PRACTITIONER

## 2023-02-14 PROCEDURE — 84443 ASSAY THYROID STIM HORMONE: CPT

## 2023-02-14 PROCEDURE — 80061 LIPID PANEL: CPT

## 2023-02-14 PROCEDURE — 3074F SYST BP LT 130 MM HG: CPT | Performed by: NURSE PRACTITIONER

## 2023-02-14 PROCEDURE — 82043 UR ALBUMIN QUANTITATIVE: CPT

## 2023-02-14 PROCEDURE — 82570 ASSAY OF URINE CREATININE: CPT

## 2023-02-14 RX ORDER — DULAGLUTIDE 3 MG/.5ML
3 INJECTION, SOLUTION SUBCUTANEOUS WEEKLY
Qty: 4 ADJUSTABLE DOSE PRE-FILLED PEN SYRINGE | Refills: 3 | Status: SHIPPED | OUTPATIENT
Start: 2023-02-14

## 2023-02-14 RX ORDER — INSULIN GLARGINE 100 [IU]/ML
35 INJECTION, SOLUTION SUBCUTANEOUS NIGHTLY
Qty: 31.5 ML | Refills: 1 | Status: SHIPPED | OUTPATIENT
Start: 2023-02-14 | End: 2023-05-15

## 2023-02-14 ASSESSMENT — ENCOUNTER SYMPTOMS
DIARRHEA: 0
ABDOMINAL PAIN: 0
SHORTNESS OF BREATH: 0
RESPIRATORY NEGATIVE: 1

## 2023-02-14 NOTE — PROGRESS NOTES
[address], [address]  [place] [address] [place] [address]  [phone]        HISTORY:    Brandon Moe presents today for evaluation and management of:  Chief Complaint   Patient presents with    Diabetes    3 Month Follow-Up       Patient Care Team:  Sofiya Mak DO as PCP - General (Family Medicine)  Sofiya Mak DO as PCP - Empaneled Provider  Shun Eric as Consulting Physician (Pain Management)  Ariela Shetty MD as Consulting Physician (Cardiology)      Interval History:    Past DM Medications   none     Current Diabetic Medications  Lantus 35 units once daily   Trulicity 1.5 mg once weekly   Glimepiride 2 mg bid     DKA episodes: 0    11/08/22   Brandon Moe is a 78 y.o. female patient who presents to clinic today for her diabetes. she has a history of HTN, CKD, hyperlipidemia and mobility issues which contributes to her diabetes. She is here for initial dm management. . she denies any current signs or symptoms of hyper/hypoglycemia. she states they are taking their medications as prescribed without any adverse effects. Diet: moms meals   Exercise: none  BS testing: patient tests 4 time(s) per day average >200  Hypoglycemia: No  Hypoglycemia as needed treatment: snack  Issues:   Diabetic foot exam up-to-date: No  Diabetic retinal exam up-to-date: Yes     Diabetes complications:nephropathy and cardiovascular disease    02/14/23   Brandon Moe is a 78 y.o. female patient who presents to clinic today for her diabetes. she has a history of HTN, CKD, hyperlipidemia and mobility issues which contributes to her diabetes. At previous visit Trulicity was increased but is still taking lower dose. She was also ordered dexcom but has not started yet. she denies any current signs or symptoms of hyper/hypoglycemia. she states they are taking their medications as prescribed without any adverse effects.  She does admit to missing doses of insulin and will sometimes takes extra. Diet: regular  Exercise: none  BS testing: fasting range: >200, patient tests 1 time(s) per day  Hypoglycemia: No  Hypoglycemia as needed treatment: snack  Issues:   Diabetic foot exam up-to-date: Yes  Diabetic retinal exam up-to-date: Yes    Diabetes complications:nephropathy      High cholesterol-  Takes lipitor and tricor and denies any adverse effects with its use. Watches diet and exercise. Hypertension-  Takes hctz, cardizem, cozaar and lopressor and denies any adverse effects with their use. Watches diet and exercise. Denies any chest pain, dizziness or edema. Obesity- Working on weight loss. Past Medical History:   Diagnosis Date    Abdominal pain     left side    Asthma     Atrial fibrillation (HCC)     Bowel obstruction (HCC)     COPD (chronic obstructive pulmonary disease) (HCC)     DDD (degenerative disc disease)     Diabetes mellitus ([facility] [address]     Hyperlipidemia     Hypertension     Hyperthyroidism     Type II or unspecified type diabetes mellitus without mention of complication, not stated as uncontrolled      Family History   Problem Relation Age of Onset    Heart Disease Mother     High Cholesterol Mother     No Known Problems Son      Social History     Tobacco Use    Smoking status: Former     Packs/day: 1.00     Years: 20.00     Pack years: 20.00     Types: Cigarettes     Quit date: 10/23/2013     Years since quittin.3    Smokeless tobacco: Never   Substance Use Topics    Alcohol use: No     Comment: beer once a month    Drug use: No     Comment: former marijuana     Allergies   Allergen Reactions    Norco [Hydrocodone-Acetaminophen]      Nausea and vomiting    Oxycodone Nausea And Vomiting    Percocet [Oxycodone-Acetaminophen] Nausea And Vomiting    Sulfa Antibiotics Other (See Comments)     Hallucinations.        MEDICATIONS:  Current Outpatient Medications   Medication Sig Dispense Refill    insulin glargine (LANTUS SOLOSTAR) 100 UNIT/ML injection pen Inject 35 Units into the skin nightly 31.5 mL 1    Dulaglutide (TRULICITY) 3 RP/6.9LH SOPN Inject 3 mg into the skin once a week 4 Adjustable Dose Pre-filled Pen Syringe 3    glimepiride (AMARYL) 2 MG tablet Take 1 tablet by mouth 2 times daily (with meals) 180 tablet 0    cyclobenzaprine (FLEXERIL) 10 MG tablet Take 1 tablet by mouth 2 times daily as needed for Muscle spasms 60 tablet 2    levothyroxine (SYNTHROID) 88 MCG tablet Take 1 tablet by mouth Daily Take first thing in the morning, on an empty stomach, 30 mins prior to other meds/food. 30 tablet 0    gabapentin (NEURONTIN) 600 MG tablet TAKE ONE TABLET BY MOUTH 1-2 TIMES DAILY AS NEEDED FOR CHRONIC NECK AND BACK PAIN 60 tablet 0    albuterol (PROVENTIL) (2.5 MG/3ML) 0.083% nebulizer solution Take 3 mLs by nebulization every 6 hours as needed for Wheezing or Shortness of Breath 360 mL 11    Specialty Vitamins Products (HAIR NOURISHING SUPPLEMENT PO) Scalpmed for women. \"GROW MY HAIR\" 1 tablet by mouth daily      pantoprazole (PROTONIX) 40 MG tablet Take 40 mg by mouth daily      Ascorbic Acid (VITAMIN C) 250 MG tablet Take 1,000 mg by mouth daily      zinc gluconate 50 MG tablet Take 50 mg by mouth daily      hydroCHLOROthiazide (HYDRODIURIL) 25 MG tablet Take 1 tablet by mouth every morning 90 tablet 0    albuterol sulfate HFA (PROVENTIL;VENTOLIN;PROAIR) 108 (90 Base) MCG/ACT inhaler INHALE 1-2 PUFFS EVERY 6 HOURS AS NEEDED FOR WHEEZING OR SHORTNESS OF BREATH. 54 g 1    atorvastatin (LIPITOR) 40 MG tablet Take 1 tablet by mouth in the morning. 90 tablet 2    dilTIAZem (CARDIZEM CD) 120 MG extended release capsule TAKE 1 CAPSULE BY MOUTH EVERY DAY 90 capsule 1    losartan (COZAAR) 25 MG tablet Take 1 tablet by mouth in the morning. 90 tablet 1    metoprolol tartrate (LOPRESSOR) 25 MG tablet Take 1 tablet by mouth in the morning and 1 tablet before bedtime.  180 tablet 1    fenofibrate (TRICOR) 145 MG tablet Take 1 tablet by mouth in the morning. . 90 tablet 3    potassium chloride (KLOR-CON M) 20 MEQ extended release tablet Take 20 mEq by mouth daily      vitamin D3 (CHOLECALCIFEROL) 25 MCG (1000 UT) TABS tablet TAKE 1 TABLET BY MOUTH DAILY 30 tablet 0    loratadine (CLARITIN) 10 MG tablet TAKE 1 TABLET BY MOUTH EVERY DAY 30 tablet 10    Misc. Devices (BATH/SHOWER SEAT) MISC Use daily as needed for showering/bathing. 1 each 0    Misc. Devices (ROLLER [place]) MISC Use daily as needed for ambulation. 1 each 0    Sennosides 17.2 MG TABS Take 1 tablet by mouth daily (Patient not taking: Reported on 2/14/2023)      Continuous Blood Gluc  (DEXCOM G6 ) SOUTH 1 Units by Does not apply route daily (Patient not taking: Reported on 12/13/2022) 1 each 0    Continuous Blood Gluc Sensor (DEXCOM G6 SENSOR) MISC Use once sensor every 10 days (Patient not taking: Reported on 12/13/2022) 3 each 3    Continuous Blood Gluc Transmit (DEXCOM G6 TRANSMITTER) MISC Use one transmitter every 3 months (Patient not taking: Reported on 12/13/2022) 1 each 3    fluticasone-salmeterol (ADVAIR) 250-50 MCG/ACT AEPB diskus inhaler Inhale 1 puff into the lungs 2 times daily      zoster recombinant adjuvanted vaccine (SHINGRIX) 50 MCG/0.5ML SUSR injection 50 MCG IM then repeat 2-6 months. (Patient not taking: Reported on 12/13/2022) 0.5 mL 1    Omega-3 Fatty Acids (FISH OIL) 1000 MG CAPS TAKE TWO (2) CAPSULES BY MOUTH DAILY (Patient not taking: Reported on 2/14/2023) 180 capsule 10     No current facility-administered medications for this visit. Review ofSymptoms:  Review of Systems   Constitutional:  Positive for fatigue. Negative for unexpected weight change. Eyes:  Negative for visual disturbance. Respiratory: Negative. Negative for shortness of breath. Cardiovascular:  Negative for chest pain and leg swelling. Gastrointestinal:  Negative for abdominal pain and diarrhea.    Endocrine: Negative for polydipsia, polyphagia and polyuria. Genitourinary: Negative. Musculoskeletal: Negative. Skin:  Negative for rash and wound. Neurological:  Negative for dizziness, tremors, seizures and headaches. Psychiatric/Behavioral: Negative. Negative for confusion and decreased concentration. Theremainder of a complete 14-point review of systems is negative. Vital Signs: /72 (Site: Right Upper Arm, Position: Sitting, Cuff Size: Medium Adult)   Pulse 72   Resp 16   Ht 5' 3.5\" (1.613 m)   Wt 162 lb (73.5 kg)   BMI 28.25 kg/m²      Wt Readings from Last 3 Encounters:   02/14/23 162 lb (73.5 kg)   12/13/22 162 lb (73.5 kg)   11/08/22 164 lb (74.4 kg)     Body mass index is 28.25 kg/m².   LABS:  Hemoglobin A1C   Date Value Ref Range Status   10/06/2022 17.0 (H) 4.0 - 6.0 % Final   06/29/2022 11.7 (H) 4.0 - 6.0 % Final     Lab Results   Component Value Date    LABMICR CANNOT BE CALCULATED 09/04/2019     Lab Results   Component Value Date     10/10/2022    K 5.1 10/10/2022     10/10/2022    CO2 28 10/10/2022    BUN 14 10/10/2022    CREATININE 1.35 (H) 10/10/2022    GLUCOSE 193 02/03/2023    CALCIUM 10.2 10/10/2022    PROT 6.6 10/08/2022    LABALBU 3.8 10/08/2022    BILITOT 0.3 10/08/2022    ALKPHOS 69 10/08/2022    AST 19 10/08/2022    ALT 14 10/08/2022    LABGLOM 43 (L) 10/10/2022    GFRAA >60 08/31/2022    GLOB 3.3 08/11/2022     Lab Results   Component Value Date    CHOL 150 11/18/2020    CHOL 115 06/08/2020    CHOL 150 12/09/2019     Lab Results   Component Value Date    TRIG 201 (H) 11/18/2020    TRIG 165 (H) 06/08/2020    TRIG 142 12/09/2019     Lab Results   Component Value Date    HDL 36 (L) 11/18/2020    HDL 37 (L) 06/08/2020    HDL 50 12/09/2019     Lab Results   Component Value Date    LDLCHOLESTEROL 74 11/18/2020    LDLCHOLESTEROL 45 06/08/2020    LDLCHOLESTEROL 72 12/09/2019     Lab Results   Component Value Date    VLDL NOT REPORTED (H) 11/18/2020    VLDL NOT REPORTED (H) 06/08/2020    VLDL NOT REPORTED 12/09/2019     Lab Results   Component Value Date    CHOLHDLRATIO 4.2 11/18/2020    CHOLHDLRATIO 3.1 06/08/2020    CHOLHDLRATIO 3.0 12/09/2019           Physical Exam  Constitutional:       Appearance: She is well-developed. Eyes:      Pupils: Pupils are equal, round, and reactive to light. Neck:      Thyroid: No thyroid mass, thyromegaly or thyroid tenderness. Cardiovascular:      Rate and Rhythm: Normal rate and regular rhythm. Heart sounds: Normal heart sounds. Pulmonary:      Effort: Pulmonary effort is normal.      Breath sounds: Normal breath sounds. Abdominal:      General: Bowel sounds are normal.      Palpations: Abdomen is soft. Skin:     General: Skin is warm and dry. Comments: Negative for open/nonhealing wounds. Negative for lipohypertrophy. Neurological:      Mental Status: She is alert and oriented to person, place, and time. ASSESSMENT/PLAN:     Diagnosis Orders   1. Type 2 diabetes mellitus with stage 3a chronic kidney disease, with long-term current use of insulin ([facility])  insulin glargine (LANTUS SOLOSTAR) 100 UNIT/ML injection pen    Dulaglutide (TRULICITY) 3 YO/5.4WT SOPN    Microalbumin, Ur      2. Mixed hyperlipidemia        3.  Essential hypertension          Orders Placed This Encounter   Procedures    Microalbumin, Ur     Orders Placed This Encounter   Medications    insulin glargine (LANTUS SOLOSTAR) 100 UNIT/ML injection pen     Sig: Inject 35 Units into the skin nightly     Dispense:  31.5 mL     Refill:  1     Patient requests delivery    Dulaglutide (TRULICITY) 3 JF/7.2LZ SOPN     Sig: Inject 3 mg into the skin once a week     Dispense:  4 Adjustable Dose Pre-filled Pen Syringe     Refill:  3     Requested Prescriptions     Signed Prescriptions Disp Refills    insulin glargine (LANTUS SOLOSTAR) 100 UNIT/ML injection pen 31.5 mL 1     Sig: Inject 35 Units into the skin nightly    Dulaglutide (TRULICITY) 3 OY/4.8UA SOPN 4 Adjustable Dose Pre-filled Pen Syringe 3     Sig: Inject 3 mg into the skin once a week       1. Type 2 diabetes mellitus with stage 3a chronic kidney disease, with long-term current use of insulin ([facility])  - Unstable  HbA1C goal is less than 7%. - Fasting blood glucose goal is 70-120mg/dl and postprandial blood sugar goal is less than 180 mg/dl. - Labs reviewed including most recent A1c, UACR and kidney function. Repeat labs due in 1 week. -We discussed in great detail dietary modifications they can make to better improve their blood sugars. --Initial diabetic education completed. Discussed diabetes as a disease and how we can manage it to prevent complications associated with it.   -close follow up recommend   Patient Instructions   Your shipment: [id number]    Delivered On: Wednesday, December 14 at 11:31 A. M. at [facility] , [address] [facility] , [address] , [address] , [address]    -increase Trulicity to 3 mg once weekly     -get Dexcom stated     -document food intake     -labs due today       Discussed signs and symptoms of hyper/hypoglycemia and how to treat. Encouraged 150 minutes of physical activity per week. Follow a low carbohydrate diet. Encouraged at least 7 hours of sleep. The patient was informed of the goals of diabetes management. This can only be accomplished by watching their diet and exercise levels. We certainly use medicines to help attain these goals. The consequences of not controlling blood sugars were discussed. These include blindness, heart disease, stroke, kidney disease, and possibly need for dialysis. They were told to be careful with their foot care as diabetics often have nerve damage, infections and risk for limb amutations . They also need a dilated eye exam yearly.  We discussed the issues of diet, exercise, medication, complication avoidance, reviewed the signs and symptoms of diabetes, hypoglycemic episodes, significance of HbA1C.     - insulin glargine (LANTUS SOLOSTAR) 100 UNIT/ML injection pen; Inject 35 Units into the skin nightly  Dispense: 31.5 mL; Refill: 1  - Dulaglutide (TRULICITY) 3 KENDRA/6.8VI SOPN; Inject 3 mg into the skin once a week  Dispense: 4 Adjustable Dose Pre-filled Pen Syringe; Refill: 3  - Microalbumin, Ur; Future    2. Mixed hyperlipidemia  stable, lipid panel reviewed, continue current medications. Diet and exercise. 3. Essential hypertension   stable, continue current medications. Diet and exercise Seek emergent care if chest pain develops. Answered all patient questions. Agrees to follow plan of care and to follow up in 2 weeks, sooner if needed. Call office if unexplained blood sugars less than 70 occur or above 400. Call office or access "[employer]hart with any further questions or concerns. Be sure to bring glucometer/food log at next appointment. Total time spent reviewing chart, labs, counseling patient and documenting on the date of the encounter: 30 min.       Electronically signed by RAJNI Wallace CNP on 2/14/2023 at 2:04 PM      (Please note that portions of this note were completed with a voice-recognition program. Efforts were made to edit the dictation but occasionally words are mis-transcribed.)

## 2023-02-14 NOTE — PATIENT INSTRUCTIONS
Your shipment: [id number]    Delivered On: Wednesday, December 14 at 11:31 A. M. at [facility] , [address] [facility] , [address] , [address] , [address]    -increase Trulicity to 3 mg once weekly     -get Dexcom stated     -document food intake     -labs due today

## 2023-02-15 LAB
EST. AVERAGE GLUCOSE BLD GHB EST-MCNC: 407 MG/DL
HBA1C MFR BLD: 15.8 % (ref 4–6)

## 2023-02-27 ENCOUNTER — HOSPITAL ENCOUNTER (OUTPATIENT)
Dept: CT IMAGING | Age: 68
Discharge: HOME | End: 2023-03-01
Payer: MEDICARE

## 2023-02-27 DIAGNOSIS — Z87.891 PERSONAL HISTORY OF TOBACCO USE: ICD-10-CM

## 2023-02-27 PROCEDURE — 71271 CT THORAX LUNG CANCER SCR C-: CPT

## 2023-02-28 ENCOUNTER — OFFICE VISIT (OUTPATIENT)
Dept: DIABETES SERVICES | Age: 68
End: 2023-02-28
Payer: MEDICARE

## 2023-02-28 VITALS
HEART RATE: 72 BPM | DIASTOLIC BLOOD PRESSURE: 84 MMHG | HEIGHT: 64 IN | WEIGHT: 168 LBS | BODY MASS INDEX: 28.68 KG/M2 | SYSTOLIC BLOOD PRESSURE: 104 MMHG | RESPIRATION RATE: 18 BRPM

## 2023-02-28 DIAGNOSIS — E78.2 MIXED HYPERLIPIDEMIA: ICD-10-CM

## 2023-02-28 DIAGNOSIS — I10 ESSENTIAL HYPERTENSION: ICD-10-CM

## 2023-02-28 DIAGNOSIS — E11.22 TYPE 2 DIABETES MELLITUS WITH STAGE 3A CHRONIC KIDNEY DISEASE, WITH LONG-TERM CURRENT USE OF INSULIN (HCC): Primary | ICD-10-CM

## 2023-02-28 DIAGNOSIS — N18.31 TYPE 2 DIABETES MELLITUS WITH STAGE 3A CHRONIC KIDNEY DISEASE, WITH LONG-TERM CURRENT USE OF INSULIN (HCC): Primary | ICD-10-CM

## 2023-02-28 DIAGNOSIS — Z79.4 TYPE 2 DIABETES MELLITUS WITH STAGE 3A CHRONIC KIDNEY DISEASE, WITH LONG-TERM CURRENT USE OF INSULIN (HCC): Primary | ICD-10-CM

## 2023-02-28 PROCEDURE — 1036F TOBACCO NON-USER: CPT | Performed by: NURSE PRACTITIONER

## 2023-02-28 PROCEDURE — 99214 OFFICE O/P EST MOD 30 MIN: CPT | Performed by: NURSE PRACTITIONER

## 2023-02-28 PROCEDURE — 1090F PRES/ABSN URINE INCON ASSESS: CPT | Performed by: NURSE PRACTITIONER

## 2023-02-28 PROCEDURE — 2022F DILAT RTA XM EVC RTNOPTHY: CPT | Performed by: NURSE PRACTITIONER

## 2023-02-28 PROCEDURE — 1123F ACP DISCUSS/DSCN MKR DOCD: CPT | Performed by: NURSE PRACTITIONER

## 2023-02-28 PROCEDURE — G8484 FLU IMMUNIZE NO ADMIN: HCPCS | Performed by: NURSE PRACTITIONER

## 2023-02-28 PROCEDURE — G8427 DOCREV CUR MEDS BY ELIG CLIN: HCPCS | Performed by: NURSE PRACTITIONER

## 2023-02-28 PROCEDURE — G8417 CALC BMI ABV UP PARAM F/U: HCPCS | Performed by: NURSE PRACTITIONER

## 2023-02-28 PROCEDURE — 3046F HEMOGLOBIN A1C LEVEL >9.0%: CPT | Performed by: NURSE PRACTITIONER

## 2023-02-28 PROCEDURE — 3074F SYST BP LT 130 MM HG: CPT | Performed by: NURSE PRACTITIONER

## 2023-02-28 PROCEDURE — 3017F COLORECTAL CA SCREEN DOC REV: CPT | Performed by: NURSE PRACTITIONER

## 2023-02-28 PROCEDURE — G8399 PT W/DXA RESULTS DOCUMENT: HCPCS | Performed by: NURSE PRACTITIONER

## 2023-02-28 PROCEDURE — 3079F DIAST BP 80-89 MM HG: CPT | Performed by: NURSE PRACTITIONER

## 2023-02-28 RX ORDER — MULTIVIT WITH MINERALS/LUTEIN
1000 TABLET ORAL DAILY
COMMUNITY

## 2023-02-28 ASSESSMENT — ENCOUNTER SYMPTOMS
DIARRHEA: 0
RESPIRATORY NEGATIVE: 1
ABDOMINAL PAIN: 0
SHORTNESS OF BREATH: 0

## 2023-02-28 NOTE — PROGRESS NOTES
[facility]  [address] [facility], [address]  [place] [address] [facility]  [phone]        HISTORY:    Lawrence Cartwright presents today for evaluation and management of:  Chief Complaint   Patient presents with    Diabetes     2 week follow up       Patient Care Team:  Jose Velasquez DO as PCP - General (Family Medicine)  Jose Velasquez DO as PCP - Empaneled Provider  Matty Kong as Consulting Physician (Pain Management)  Amari Caal MD as Consulting Physician (Cardiology)      Interval History:    Past DM Medications   none     Current Diabetic Medications  Lantus 35 units once daily   Trulicity 3 mg once weekly -not yet started  Glimepiride 2 mg bid     DKA episodes: 0    02/14/23   Lawrence Cartwright is a 78 y.o. female patient who presents to clinic today for her diabetes. she has a history of HTN, CKD, hyperlipidemia and mobility issues which contributes to her diabetes. At previous visit Trulicity was increased but is still taking lower dose. She was also ordered dexcom but has not started yet. she denies any current signs or symptoms of hyper/hypoglycemia. she states they are taking their medications as prescribed without any adverse effects. She does admit to missing doses of insulin and will sometimes takes extra. Diet: regular  Exercise: none  BS testing: fasting range: >200, patient tests 1 time(s) per day  Hypoglycemia: No  Hypoglycemia as needed treatment: snack  Issues:   Diabetic foot exam up-to-date: Yes  Diabetic retinal exam up-to-date: Yes     Diabetes complications:nephropathy       02/28/23   Lawrence Cartwright is a 78 y.o. female patient who presents to clinic today for her diabetes. she has a history of HTN, CKD, hyperlipidemia and mobility issues  which contributes to her diabetes. At previous visit increase trulicity but has not picked up yet-due to backorder. Did not  dexcom yet.   she denies any current signs or symptoms of hyper/hypoglycemia. she states they are taking their medications as prescribed without any adverse effects. Diet: documenting food intake   Exercise: none  BS testing: fasting range: >200, postprandial range: >200, patient tests 1 time(s) per day  Hypoglycemia: No  Hypoglycemia as needed treatment: snack  Issues:   Diabetic foot exam up-to-date: Yes  Diabetic retinal exam up-to-date: Yes    Diabetes complications:nephropathy      High cholesterol-  Takes lipitor and tricor and denies any adverse effects with its use. Watches diet and exercise. Hypertension-  Takes hctz, cardizem, cozaar and lopressor and denies any adverse effects with their use. Watches diet and exercise. Denies any chest pain, dizziness or edema. Obesity- Working on weight loss. Past Medical History:   Diagnosis Date    Abdominal pain     left side    Asthma     Atrial fibrillation (HCC)     Bowel obstruction (HCC)     COPD (chronic obstructive pulmonary disease) (HCC)     DDD (degenerative disc disease)     Diabetes mellitus ([facility] [address]     Hyperlipidemia     Hypertension     Hyperthyroidism     Type II or unspecified type diabetes mellitus without mention of complication, not stated as uncontrolled      Family History   Problem Relation Age of Onset    Heart Disease Mother     High Cholesterol Mother     No Known Problems Son      Social History     Tobacco Use    Smoking status: Former     Packs/day: 1.00     Years: 20.00     Pack years: 20.00     Types: Cigarettes     Quit date: 10/23/2013     Years since quittin.3    Smokeless tobacco: Never   Substance Use Topics    Alcohol use: No     Comment: beer once a month    Drug use: No     Comment: former marijuana     Allergies   Allergen Reactions    Norco [Hydrocodone-Acetaminophen]      Nausea and vomiting    Oxycodone Nausea And Vomiting    Percocet [Oxycodone-Acetaminophen] Nausea And Vomiting    Sulfa Antibiotics Other (See Comments)     Hallucinations. MEDICATIONS:  Current Outpatient Medications   Medication Sig Dispense Refill    vitamin E 1000 units capsule Take 1,000 Units by mouth daily      insulin glargine (LANTUS SOLOSTAR) 100 UNIT/ML injection pen Inject 35 Units into the skin nightly 31.5 mL 1    Dulaglutide (TRULICITY) 3 LEBRON/1.9RF SOPN Inject 3 mg into the skin once a week (Patient taking differently: Inject 3 mg into the skin once a week Still taking the 1.5mg at this time) 4 Adjustable Dose Pre-filled Pen Syringe 3    glimepiride (AMARYL) 2 MG tablet Take 1 tablet by mouth 2 times daily (with meals) 180 tablet 0    cyclobenzaprine (FLEXERIL) 10 MG tablet Take 1 tablet by mouth 2 times daily as needed for Muscle spasms 60 tablet 2    levothyroxine (SYNTHROID) 88 MCG tablet Take 1 tablet by mouth Daily Take first thing in the morning, on an empty stomach, 30 mins prior to other meds/food. 30 tablet 0    gabapentin (NEURONTIN) 600 MG tablet TAKE ONE TABLET BY MOUTH 1-2 TIMES DAILY AS NEEDED FOR CHRONIC NECK AND BACK PAIN 60 tablet 0    albuterol (PROVENTIL) (2.5 MG/3ML) 0.083% nebulizer solution Take 3 mLs by nebulization every 6 hours as needed for Wheezing or Shortness of Breath 360 mL 11    Specialty Vitamins Products (HAIR NOURISHING SUPPLEMENT PO) Scalpmed for women. \"GROW MY HAIR\" 1 tablet by mouth daily      pantoprazole (PROTONIX) 40 MG tablet Take 40 mg by mouth daily      Ascorbic Acid (VITAMIN C) 250 MG tablet Take 1,000 mg by mouth daily      zinc gluconate 50 MG tablet Take 50 mg by mouth daily      hydroCHLOROthiazide (HYDRODIURIL) 25 MG tablet Take 1 tablet by mouth every morning 90 tablet 0    albuterol sulfate HFA (PROVENTIL;VENTOLIN;PROAIR) 108 (90 Base) MCG/ACT inhaler INHALE 1-2 PUFFS EVERY 6 HOURS AS NEEDED FOR WHEEZING OR SHORTNESS OF BREATH. 54 g 1    atorvastatin (LIPITOR) 40 MG tablet Take 1 tablet by mouth in the morning.  90 tablet 2    dilTIAZem (CARDIZEM CD) 120 MG extended release capsule TAKE 1 CAPSULE BY MOUTH EVERY DAY 90 capsule 1    losartan (COZAAR) 25 MG tablet Take 1 tablet by mouth in the morning. 90 tablet 1    metoprolol tartrate (LOPRESSOR) 25 MG tablet Take 1 tablet by mouth in the morning and 1 tablet before bedtime. 180 tablet 1    fenofibrate (TRICOR) 145 MG tablet Take 1 tablet by mouth in the morning. . 90 tablet 3    potassium chloride (KLOR-CON M) 20 MEQ extended release tablet Take 20 mEq by mouth daily      fluticasone-salmeterol (ADVAIR) 250-50 MCG/ACT AEPB diskus inhaler Inhale 1 puff into the lungs 2 times daily      vitamin D3 (CHOLECALCIFEROL) 25 MCG (1000 UT) TABS tablet TAKE 1 TABLET BY MOUTH DAILY 30 tablet 0    Omega-3 Fatty Acids (FISH OIL) 1000 MG CAPS TAKE TWO (2) CAPSULES BY MOUTH DAILY 180 capsule 10    Misc. Devices (BATH/SHOWER SEAT) MISC Use daily as needed for showering/bathing. 1 each 0    Misc. Devices (ROLLER Benedict) MISC Use daily as needed for ambulation. 1 each 0    Sennosides 17.2 MG TABS Take 1 tablet by mouth daily (Patient not taking: Reported on 2/14/2023)      Continuous Blood Gluc  (DEXCOM G6 ) SOUTH 1 Units by Does not apply route daily (Patient not taking: Reported on 12/13/2022) 1 each 0    Continuous Blood Gluc Sensor (DEXCOM G6 SENSOR) MISC Use once sensor every 10 days (Patient not taking: Reported on 12/13/2022) 3 each 3    Continuous Blood Gluc Transmit (DEXCOM G6 TRANSMITTER) MISC Use one transmitter every 3 months (Patient not taking: Reported on 12/13/2022) 1 each 3    zoster recombinant adjuvanted vaccine (SHINGRIX) 50 MCG/0.5ML SUSR injection 50 MCG IM then repeat 2-6 months. (Patient not taking: Reported on 12/13/2022) 0.5 mL 1    loratadine (CLARITIN) 10 MG tablet TAKE 1 TABLET BY MOUTH EVERY DAY (Patient not taking: Reported on 2/28/2023) 30 tablet 10     No current facility-administered medications for this visit. Review ofSymptoms:  Review of Systems   Constitutional:  Positive for fatigue. Negative for unexpected weight change.    Eyes: Negative for visual disturbance. Respiratory: Negative. Negative for shortness of breath. Cardiovascular:  Negative for chest pain and leg swelling. Gastrointestinal:  Negative for abdominal pain and diarrhea. Endocrine: Negative for polydipsia, polyphagia and polyuria. Genitourinary: Negative. Musculoskeletal: Negative. Skin:  Negative for rash and wound. Neurological:  Negative for dizziness, tremors, seizures and headaches. Psychiatric/Behavioral: Negative. Negative for confusion and decreased concentration. Theremainder of a complete 14-point review of systems is negative. Vital Signs: /84 (Site: Right Upper Arm, Position: Sitting, Cuff Size: Large Adult)   Pulse 72   Resp 18   Ht 5' 3.5\" (1.613 m)   Wt 168 lb (76.2 kg)   BMI 29.29 kg/m²      Wt Readings from Last 3 Encounters:   02/28/23 168 lb (76.2 kg)   02/14/23 162 lb (73.5 kg)   12/13/22 162 lb (73.5 kg)     Body mass index is 29.29 kg/m².   LABS:  Hemoglobin A1C   Date Value Ref Range Status   02/14/2023 15.8 (H) 4.0 - 6.0 % Final   10/06/2022 17.0 (H) 4.0 - 6.0 % Final     Lab Results   Component Value Date    LABMICR 34 (H) 02/14/2023     Lab Results   Component Value Date     (L) 02/14/2023     (L) 02/14/2023    K 4.2 02/14/2023    K 4.2 02/14/2023    CL 88 (L) 02/14/2023    CL 88 (L) 02/14/2023    CO2 28 02/14/2023    CO2 28 02/14/2023    BUN 17 02/14/2023    BUN 17 02/14/2023    CREATININE 1.31 (H) 02/14/2023    CREATININE 1.31 (H) 02/14/2023    GLUCOSE 475 (H) 02/14/2023    GLUCOSE 475 (H) 02/14/2023    CALCIUM 10.5 (H) 02/14/2023    CALCIUM 10.5 (H) 02/14/2023    PROT 8.3 02/14/2023    LABALBU 4.4 02/14/2023    BILITOT 0.4 02/14/2023    ALKPHOS 101 02/14/2023    AST 25 02/14/2023    ALT 27 02/14/2023    LABGLOM 45 (L) 02/14/2023    LABGLOM 45 (L) 02/14/2023    GFRAA >60 08/31/2022    GLOB 3.3 08/11/2022     Lab Results   Component Value Date    CHOL 230 (H) 02/14/2023    CHOL 150 11/18/2020 CHOL 115 06/08/2020     Lab Results   Component Value Date    TRIG 437 (H) 02/14/2023    TRIG 201 (H) 11/18/2020    TRIG 165 (H) 06/08/2020     Lab Results   Component Value Date    HDL 33 (L) 02/14/2023    HDL 36 (L) 11/18/2020    HDL 37 (L) 06/08/2020     Lab Results   Component Value Date    LDLCHOLESTEROL      02/14/2023    LDLCHOLESTEROL 74 11/18/2020    LDLCHOLESTEROL 45 06/08/2020     Lab Results   Component Value Date    VLDL NOT REPORTED (H) 11/18/2020    VLDL NOT REPORTED (H) 06/08/2020    VLDL NOT REPORTED 12/09/2019     Lab Results   Component Value Date    CHOLHDLRATIO 7.0 (H) 02/14/2023    CHOLHDLRATIO 4.2 11/18/2020    CHOLHDLRATIO 3.1 06/08/2020           Physical Exam  Constitutional:       Appearance: She is well-developed. Eyes:      Pupils: Pupils are equal, round, and reactive to light. Neck:      Thyroid: No thyroid mass, thyromegaly or thyroid tenderness. Cardiovascular:      Rate and Rhythm: Normal rate and regular rhythm. Heart sounds: Normal heart sounds. Pulmonary:      Effort: Pulmonary effort is normal.      Breath sounds: Normal breath sounds. Abdominal:      General: Bowel sounds are normal.      Palpations: Abdomen is soft. Skin:     General: Skin is warm and dry. Comments: Negative for open/nonhealing wounds. Negative for lipohypertrophy. Neurological:      Mental Status: She is alert and oriented to person, place, and time. ASSESSMENT/PLAN:     Diagnosis Orders   1. Type 2 diabetes mellitus with stage 3a chronic kidney disease, with long-term current use of insulin ([facility] [address]        2. Mixed hyperlipidemia        3. Essential hypertension          No orders of the defined types were placed in this encounter. No orders of the defined types were placed in this encounter. Requested Prescriptions      No prescriptions requested or ordered in this encounter       1.  Type 2 diabetes mellitus with stage 3a chronic kidney disease, with long-term current use of insulin (HCC)  - Unstable  HbA1C goal is less than 7%. - Fasting blood glucose goal is 70-120mg/dl and postprandial blood sugar goal is less than 180 mg/dl. - Labs reviewed including most recent A1c, UACR and kidney function. Repeat labs due in 3 months.    -We discussed in great detail dietary modifications they can make to better improve their blood sugars. -follow up diabetes education completed, all questions answered. -reviewed labs again, reminded pt where to get dexcom, close follow up recommended. Patient Instructions   Dexcom: Your shipment: [id number]     Delivered On:  at 11:31 A. M. at [address] [facility] , [address] [facility] , [address] , [address] , [address] [facility],[address]    Phone # [phone]    -increase Trulicity to 3 mg once weekly      -get Dexcom stated -call for appointment      -document food intake   --no juice, cookies, pasta, rice or potatoes, cream of wheat or toast        Discussed signs and symptoms of hyper/hypoglycemia and how to treat. Encouraged 150 minutes of physical activity per week. Follow a low carbohydrate diet. Encouraged at least 7 hours of sleep. The patient was informed of the goals of diabetes management. This can only be accomplished by watching their diet and exercise levels. We certainly use medicines to help attain these goals. The consequences of not controlling blood sugars were discussed. These include blindness, heart disease, stroke, kidney disease, and possibly need for dialysis. They were told to be careful with their foot care as diabetics often have nerve damage, infections and risk for limb amutations . They also need a dilated eye exam yearly. We discussed the issues of diet, exercise, medication, complication avoidance, reviewed the signs and symptoms of diabetes, hypoglycemic episodes, significance of HbA1C.       2. Mixed hyperlipidemia  stable, lipid panel reviewed, continue current medications. Diet and exercise. 3. Essential hypertension   stable, continue current medications. Diet and exercise Seek emergent care if chest pain develops. Answered all patient questions. Agrees to follow plan of care and to follow up in 1 months, sooner if needed. Call office if unexplained blood sugars less than 70 occur or above 400. Call office or access MyChart with any further questions or concerns. Be sure to bring glucometer/food log at next appointment. Total time spent reviewing chart, labs, counseling patient and documenting on the date of the encounter: 30 min.       Electronically signed by RAJNI Coy CNP on 2/28/2023 at 10:12 AM      (Please note that portions of this note were completed with a voice-recognition program. Efforts were made to edit the dictation but occasionally words are mis-transcribed.)

## 2023-02-28 NOTE — PATIENT INSTRUCTIONS
Dexcom: Your shipment: [id number]     Delivered On: Wednesday, December 14 at 11:31 A. M. at [address] [facility] , [address] [facility] , [facility] , [address] , [address] [facility],[address]    Phone # [phone]    -increase Trulicity to 3 mg once weekly      -get Dexcom stated -call for appointment      -document food intake   --no juice, cookies, pasta, rice or potatoes, cream of wheat or toast

## 2023-03-08 DIAGNOSIS — Z79.4 TYPE 2 DIABETES MELLITUS WITHOUT COMPLICATION, WITH LONG-TERM CURRENT USE OF INSULIN (HCC): ICD-10-CM

## 2023-03-08 DIAGNOSIS — E11.9 TYPE 2 DIABETES MELLITUS WITHOUT COMPLICATION, WITH LONG-TERM CURRENT USE OF INSULIN (HCC): ICD-10-CM

## 2023-03-08 DIAGNOSIS — I48.0 PAROXYSMAL ATRIAL FIBRILLATION (HCC): Primary | ICD-10-CM

## 2023-03-09 RX ORDER — DILTIAZEM HYDROCHLORIDE 120 MG/1
120 CAPSULE, EXTENDED RELEASE ORAL DAILY
Qty: 90 CAPSULE | Refills: 3 | Status: SHIPPED | OUTPATIENT
Start: 2023-03-09

## 2023-03-14 ENCOUNTER — TELEPHONE (OUTPATIENT)
Dept: FAMILY MEDICINE CLINIC | Age: 68
End: 2023-03-14

## 2023-03-20 RX ORDER — GLIMEPIRIDE 2 MG/1
TABLET ORAL
Qty: 60 TABLET | Refills: 11 | OUTPATIENT
Start: 2023-03-20

## 2023-03-28 ENCOUNTER — TELEPHONE (OUTPATIENT)
Dept: INTERNAL MEDICINE | Age: 68
End: 2023-03-28

## 2023-03-28 ENCOUNTER — OFFICE VISIT (OUTPATIENT)
Dept: DIABETES SERVICES | Age: 68
End: 2023-03-28
Payer: MEDICARE

## 2023-03-28 VITALS
SYSTOLIC BLOOD PRESSURE: 108 MMHG | HEIGHT: 64 IN | WEIGHT: 169 LBS | HEART RATE: 72 BPM | BODY MASS INDEX: 28.85 KG/M2 | RESPIRATION RATE: 16 BRPM | DIASTOLIC BLOOD PRESSURE: 70 MMHG

## 2023-03-28 DIAGNOSIS — I10 ESSENTIAL HYPERTENSION: ICD-10-CM

## 2023-03-28 DIAGNOSIS — E11.22 TYPE 2 DIABETES MELLITUS WITH STAGE 3A CHRONIC KIDNEY DISEASE, WITH LONG-TERM CURRENT USE OF INSULIN (HCC): Primary | ICD-10-CM

## 2023-03-28 DIAGNOSIS — Z79.4 TYPE 2 DIABETES MELLITUS WITH STAGE 3A CHRONIC KIDNEY DISEASE, WITH LONG-TERM CURRENT USE OF INSULIN (HCC): Primary | ICD-10-CM

## 2023-03-28 DIAGNOSIS — N18.31 TYPE 2 DIABETES MELLITUS WITH STAGE 3A CHRONIC KIDNEY DISEASE, WITH LONG-TERM CURRENT USE OF INSULIN (HCC): Primary | ICD-10-CM

## 2023-03-28 DIAGNOSIS — E78.2 MIXED HYPERLIPIDEMIA: ICD-10-CM

## 2023-03-28 PROCEDURE — G8399 PT W/DXA RESULTS DOCUMENT: HCPCS | Performed by: NURSE PRACTITIONER

## 2023-03-28 PROCEDURE — 1090F PRES/ABSN URINE INCON ASSESS: CPT | Performed by: NURSE PRACTITIONER

## 2023-03-28 PROCEDURE — 1123F ACP DISCUSS/DSCN MKR DOCD: CPT | Performed by: NURSE PRACTITIONER

## 2023-03-28 PROCEDURE — 2022F DILAT RTA XM EVC RTNOPTHY: CPT | Performed by: NURSE PRACTITIONER

## 2023-03-28 PROCEDURE — G8417 CALC BMI ABV UP PARAM F/U: HCPCS | Performed by: NURSE PRACTITIONER

## 2023-03-28 PROCEDURE — 3046F HEMOGLOBIN A1C LEVEL >9.0%: CPT | Performed by: NURSE PRACTITIONER

## 2023-03-28 PROCEDURE — 3074F SYST BP LT 130 MM HG: CPT | Performed by: NURSE PRACTITIONER

## 2023-03-28 PROCEDURE — 99214 OFFICE O/P EST MOD 30 MIN: CPT | Performed by: NURSE PRACTITIONER

## 2023-03-28 PROCEDURE — 1036F TOBACCO NON-USER: CPT | Performed by: NURSE PRACTITIONER

## 2023-03-28 PROCEDURE — 3078F DIAST BP <80 MM HG: CPT | Performed by: NURSE PRACTITIONER

## 2023-03-28 PROCEDURE — 3017F COLORECTAL CA SCREEN DOC REV: CPT | Performed by: NURSE PRACTITIONER

## 2023-03-28 PROCEDURE — G8427 DOCREV CUR MEDS BY ELIG CLIN: HCPCS | Performed by: NURSE PRACTITIONER

## 2023-03-28 PROCEDURE — G8484 FLU IMMUNIZE NO ADMIN: HCPCS | Performed by: NURSE PRACTITIONER

## 2023-03-28 RX ORDER — LORATADINE 10 MG/1
10 TABLET ORAL DAILY
Qty: 90 TABLET | Refills: 3 | Status: SHIPPED | OUTPATIENT
Start: 2023-03-28

## 2023-03-28 RX ORDER — FENOFIBRATE 145 MG/1
145 TABLET, COATED ORAL DAILY
Qty: 90 TABLET | Refills: 3 | Status: SHIPPED | OUTPATIENT
Start: 2023-03-28

## 2023-03-28 ASSESSMENT — ENCOUNTER SYMPTOMS
ABDOMINAL PAIN: 0
DIARRHEA: 0
RESPIRATORY NEGATIVE: 1
SHORTNESS OF BREATH: 0

## 2023-03-28 NOTE — PROGRESS NOTES
(TRICOR) 145 MG tablet 90 tablet 3     Sig: Take 1 tablet by mouth daily       1. Type 2 diabetes mellitus with stage 3a chronic kidney disease, with long-term current use of insulin ([facility])  - Unstable  HbA1C goal is less than 7%. - Fasting blood glucose goal is 70-120mg/dl and postprandial blood sugar goal is less than 180 mg/dl. - Labs reviewed including most recent A1c, UACR and kidney function. Repeat labs due in 2 months.    -We discussed in great detail dietary modifications they can make to better improve their blood sugars. -follow up diabetes education completed, all questions answered.  -no blood sugar logs or food log  -close follow up recommended, she did just increase Trulicity yesterday. Patient Instructions   Continue taking Trulicity 3 mg once weekly     Call office once you get dexcom supplies         Discussed signs and symptoms of hyper/hypoglycemia and how to treat. Encouraged 150 minutes of physical activity per week. Follow a low carbohydrate diet. Encouraged at least 7 hours of sleep. The patient was informed of the goals of diabetes management. This can only be accomplished by watching their diet and exercise levels. We certainly use medicines to help attain these goals. The consequences of not controlling blood sugars were discussed. These include blindness, heart disease, stroke, kidney disease, and possibly need for dialysis. They were told to be careful with their foot care as diabetics often have nerve damage, infections and risk for limb amutations . They also need a dilated eye exam yearly. We discussed the issues of diet, exercise, medication, complication avoidance, reviewed the signs and symptoms of diabetes, hypoglycemic episodes, significance of HbA1C.     - Hemoglobin A1C; Future  - Basic Metabolic Panel; Future    2. Mixed hyperlipidemia  stable, lipid panel reviewed, continue current medications. Diet and exercise. - fenofibrate (TRICOR) 145 MG tablet;  Take 1 tablet by

## 2023-03-28 NOTE — TELEPHONE ENCOUNTER
Per [employer]:  \"another have been placed for the patient.  The order will be shipping out shortly\"

## 2023-03-28 NOTE — TELEPHONE ENCOUNTER
Received response    [email or web address] Neal, yes it looks like the order was return back to us on 12/14/22.  We can rekey another order for the patient\"

## 2023-03-28 NOTE — TELEPHONE ENCOUNTER
Called [employer] in [place] to see if Pts dexcom supplies were still at their facility due to pt not being able to sign for when [employer] tried to be delivered to her house. They stated that they only hold for 7 days and then gets sent back. Messaged [employer] the following: \"This order was never received by patient. Appears that [employer] tried to deliver. She was not home so it was sent to a holding facility in town and she did not  in time from them so I am assuming it got sent back to you all. Is this correct? Is there anyway to have reshipped to pt? Thanks. \"      Will await response

## 2023-04-06 DIAGNOSIS — E89.0 POSTABLATIVE HYPOTHYROIDISM: ICD-10-CM

## 2023-04-07 DIAGNOSIS — E11.9 TYPE 2 DIABETES MELLITUS WITHOUT COMPLICATION, WITH LONG-TERM CURRENT USE OF INSULIN (HCC): ICD-10-CM

## 2023-04-07 DIAGNOSIS — Z79.4 TYPE 2 DIABETES MELLITUS WITHOUT COMPLICATION, WITH LONG-TERM CURRENT USE OF INSULIN (HCC): ICD-10-CM

## 2023-04-07 RX ORDER — LEVOTHYROXINE SODIUM 88 UG/1
TABLET ORAL
Qty: 30 TABLET | Refills: 11 | OUTPATIENT
Start: 2023-04-07

## 2023-04-07 RX ORDER — GLIMEPIRIDE 2 MG/1
TABLET ORAL
Qty: 60 TABLET | Refills: 11 | OUTPATIENT
Start: 2023-04-07

## 2023-04-14 DIAGNOSIS — M50.90 CERVICAL NECK PAIN WITH EVIDENCE OF DISC DISEASE: ICD-10-CM

## 2023-04-14 DIAGNOSIS — E11.22 TYPE 2 DIABETES MELLITUS WITH STAGE 3A CHRONIC KIDNEY DISEASE, WITH LONG-TERM CURRENT USE OF INSULIN (HCC): ICD-10-CM

## 2023-04-14 DIAGNOSIS — E89.0 POSTABLATIVE HYPOTHYROIDISM: ICD-10-CM

## 2023-04-14 DIAGNOSIS — N18.31 TYPE 2 DIABETES MELLITUS WITH STAGE 3A CHRONIC KIDNEY DISEASE, WITH LONG-TERM CURRENT USE OF INSULIN (HCC): ICD-10-CM

## 2023-04-14 DIAGNOSIS — Z79.4 TYPE 2 DIABETES MELLITUS WITHOUT COMPLICATION, WITH LONG-TERM CURRENT USE OF INSULIN (HCC): ICD-10-CM

## 2023-04-14 DIAGNOSIS — E11.9 TYPE 2 DIABETES MELLITUS WITHOUT COMPLICATION, WITH LONG-TERM CURRENT USE OF INSULIN (HCC): ICD-10-CM

## 2023-04-14 DIAGNOSIS — E78.2 MIXED HYPERLIPIDEMIA: ICD-10-CM

## 2023-04-14 DIAGNOSIS — I10 ESSENTIAL HYPERTENSION: ICD-10-CM

## 2023-04-14 DIAGNOSIS — Z79.4 TYPE 2 DIABETES MELLITUS WITH STAGE 3A CHRONIC KIDNEY DISEASE, WITH LONG-TERM CURRENT USE OF INSULIN (HCC): ICD-10-CM

## 2023-04-14 DIAGNOSIS — M62.838 MUSCLE SPASM: ICD-10-CM

## 2023-04-14 DIAGNOSIS — I48.0 PAROXYSMAL ATRIAL FIBRILLATION (HCC): ICD-10-CM

## 2023-04-14 RX ORDER — DILTIAZEM HYDROCHLORIDE 120 MG/1
120 CAPSULE, EXTENDED RELEASE ORAL DAILY
Qty: 90 CAPSULE | Refills: 3 | OUTPATIENT
Start: 2023-04-14

## 2023-04-14 RX ORDER — HYDROCHLOROTHIAZIDE 25 MG/1
25 TABLET ORAL EVERY MORNING
Qty: 90 TABLET | Refills: 0 | OUTPATIENT
Start: 2023-04-14

## 2023-04-14 RX ORDER — GLIMEPIRIDE 2 MG/1
2 TABLET ORAL 2 TIMES DAILY WITH MEALS
Qty: 180 TABLET | Refills: 0 | OUTPATIENT
Start: 2023-04-14

## 2023-04-14 RX ORDER — LEVOTHYROXINE SODIUM 88 UG/1
88 TABLET ORAL DAILY
Qty: 30 TABLET | Refills: 0 | OUTPATIENT
Start: 2023-04-14

## 2023-04-14 RX ORDER — CYCLOBENZAPRINE HCL 10 MG
10 TABLET ORAL 2 TIMES DAILY PRN
Qty: 60 TABLET | Refills: 2 | OUTPATIENT
Start: 2023-04-14

## 2023-04-14 RX ORDER — PANTOPRAZOLE SODIUM 40 MG/1
40 TABLET, DELAYED RELEASE ORAL DAILY
Qty: 30 TABLET | OUTPATIENT
Start: 2023-04-14

## 2023-04-14 RX ORDER — INSULIN GLARGINE 100 [IU]/ML
35 INJECTION, SOLUTION SUBCUTANEOUS NIGHTLY
Qty: 31.5 ML | Refills: 1 | OUTPATIENT
Start: 2023-04-14 | End: 2023-07-13

## 2023-04-14 RX ORDER — ATORVASTATIN CALCIUM 40 MG/1
40 TABLET, FILM COATED ORAL DAILY
Qty: 90 TABLET | Refills: 2 | OUTPATIENT
Start: 2023-04-14

## 2023-04-14 RX ORDER — DULAGLUTIDE 3 MG/.5ML
3 INJECTION, SOLUTION SUBCUTANEOUS WEEKLY
Qty: 4 ADJUSTABLE DOSE PRE-FILLED PEN SYRINGE | Refills: 3 | OUTPATIENT
Start: 2023-04-14

## 2023-04-14 RX ORDER — LOSARTAN POTASSIUM 25 MG/1
25 TABLET ORAL DAILY
Qty: 90 TABLET | Refills: 1 | OUTPATIENT
Start: 2023-04-14

## 2023-04-14 RX ORDER — GABAPENTIN 600 MG/1
TABLET ORAL
Qty: 60 TABLET | Refills: 0 | OUTPATIENT
Start: 2023-04-14 | End: 2023-06-26

## 2023-04-19 ENCOUNTER — OFFICE VISIT (OUTPATIENT)
Dept: FAMILY MEDICINE CLINIC | Age: 68
End: 2023-04-19
Payer: MEDICARE

## 2023-04-19 ENCOUNTER — OFFICE VISIT (OUTPATIENT)
Dept: CARDIOLOGY | Age: 68
End: 2023-04-19
Payer: MEDICARE

## 2023-04-19 VITALS
WEIGHT: 165 LBS | SYSTOLIC BLOOD PRESSURE: 116 MMHG | BODY MASS INDEX: 29.23 KG/M2 | DIASTOLIC BLOOD PRESSURE: 80 MMHG | HEIGHT: 63 IN | HEART RATE: 80 BPM

## 2023-04-19 VITALS
TEMPERATURE: 96.9 F | BODY MASS INDEX: 29.23 KG/M2 | HEIGHT: 63 IN | DIASTOLIC BLOOD PRESSURE: 80 MMHG | OXYGEN SATURATION: 94 % | HEART RATE: 80 BPM | SYSTOLIC BLOOD PRESSURE: 116 MMHG | WEIGHT: 165 LBS

## 2023-04-19 DIAGNOSIS — Z79.4 TYPE 2 DIABETES MELLITUS WITHOUT COMPLICATION, WITH LONG-TERM CURRENT USE OF INSULIN (HCC): Primary | ICD-10-CM

## 2023-04-19 DIAGNOSIS — E89.0 POSTABLATIVE HYPOTHYROIDISM: ICD-10-CM

## 2023-04-19 DIAGNOSIS — M25.561 PAIN IN BOTH KNEES, UNSPECIFIED CHRONICITY: ICD-10-CM

## 2023-04-19 DIAGNOSIS — I48.0 PAROXYSMAL ATRIAL FIBRILLATION (HCC): Primary | ICD-10-CM

## 2023-04-19 DIAGNOSIS — E11.9 TYPE 2 DIABETES MELLITUS WITHOUT COMPLICATION, WITH LONG-TERM CURRENT USE OF INSULIN (HCC): Primary | ICD-10-CM

## 2023-04-19 DIAGNOSIS — R29.6 RECURRENT FALLS: ICD-10-CM

## 2023-04-19 DIAGNOSIS — M25.562 PAIN IN BOTH KNEES, UNSPECIFIED CHRONICITY: ICD-10-CM

## 2023-04-19 DIAGNOSIS — Z78.0 POST-MENOPAUSAL: ICD-10-CM

## 2023-04-19 DIAGNOSIS — I10 ESSENTIAL HYPERTENSION: ICD-10-CM

## 2023-04-19 DIAGNOSIS — M25.551 BILATERAL HIP PAIN: ICD-10-CM

## 2023-04-19 DIAGNOSIS — M25.552 BILATERAL HIP PAIN: ICD-10-CM

## 2023-04-19 DIAGNOSIS — K59.00 CONSTIPATION, UNSPECIFIED CONSTIPATION TYPE: ICD-10-CM

## 2023-04-19 DIAGNOSIS — Z87.891 PERSONAL HISTORY OF TOBACCO USE: ICD-10-CM

## 2023-04-19 DIAGNOSIS — M51.36 DEGENERATIVE DISC DISEASE, LUMBAR: ICD-10-CM

## 2023-04-19 DIAGNOSIS — E78.2 MIXED HYPERLIPIDEMIA: ICD-10-CM

## 2023-04-19 DIAGNOSIS — I48.0 PAROXYSMAL ATRIAL FIBRILLATION (HCC): ICD-10-CM

## 2023-04-19 PROCEDURE — 3074F SYST BP LT 130 MM HG: CPT | Performed by: FAMILY MEDICINE

## 2023-04-19 PROCEDURE — 99213 OFFICE O/P EST LOW 20 MIN: CPT | Performed by: FAMILY MEDICINE

## 2023-04-19 PROCEDURE — G8417 CALC BMI ABV UP PARAM F/U: HCPCS | Performed by: INTERNAL MEDICINE

## 2023-04-19 PROCEDURE — 99214 OFFICE O/P EST MOD 30 MIN: CPT | Performed by: FAMILY MEDICINE

## 2023-04-19 PROCEDURE — 1090F PRES/ABSN URINE INCON ASSESS: CPT | Performed by: INTERNAL MEDICINE

## 2023-04-19 PROCEDURE — 3079F DIAST BP 80-89 MM HG: CPT | Performed by: INTERNAL MEDICINE

## 2023-04-19 PROCEDURE — 93005 ELECTROCARDIOGRAM TRACING: CPT | Performed by: INTERNAL MEDICINE

## 2023-04-19 PROCEDURE — 3078F DIAST BP <80 MM HG: CPT | Performed by: FAMILY MEDICINE

## 2023-04-19 PROCEDURE — 99214 OFFICE O/P EST MOD 30 MIN: CPT | Performed by: INTERNAL MEDICINE

## 2023-04-19 PROCEDURE — 1036F TOBACCO NON-USER: CPT | Performed by: INTERNAL MEDICINE

## 2023-04-19 PROCEDURE — G0296 VISIT TO DETERM LDCT ELIG: HCPCS | Performed by: FAMILY MEDICINE

## 2023-04-19 PROCEDURE — G8399 PT W/DXA RESULTS DOCUMENT: HCPCS | Performed by: INTERNAL MEDICINE

## 2023-04-19 PROCEDURE — 99212 OFFICE O/P EST SF 10 MIN: CPT | Performed by: INTERNAL MEDICINE

## 2023-04-19 PROCEDURE — 1123F ACP DISCUSS/DSCN MKR DOCD: CPT | Performed by: INTERNAL MEDICINE

## 2023-04-19 PROCEDURE — 93010 ELECTROCARDIOGRAM REPORT: CPT | Performed by: INTERNAL MEDICINE

## 2023-04-19 PROCEDURE — 1123F ACP DISCUSS/DSCN MKR DOCD: CPT | Performed by: FAMILY MEDICINE

## 2023-04-19 PROCEDURE — 3074F SYST BP LT 130 MM HG: CPT | Performed by: INTERNAL MEDICINE

## 2023-04-19 PROCEDURE — 3017F COLORECTAL CA SCREEN DOC REV: CPT | Performed by: INTERNAL MEDICINE

## 2023-04-19 PROCEDURE — G8427 DOCREV CUR MEDS BY ELIG CLIN: HCPCS | Performed by: INTERNAL MEDICINE

## 2023-04-19 PROCEDURE — 3046F HEMOGLOBIN A1C LEVEL >9.0%: CPT | Performed by: FAMILY MEDICINE

## 2023-04-19 RX ORDER — GLUCOSAMINE HCL/CHONDROITIN SU 500-400 MG
CAPSULE ORAL
Qty: 100 STRIP | Refills: 3 | Status: SHIPPED | OUTPATIENT
Start: 2023-04-19 | End: 2023-05-04 | Stop reason: SDUPTHER

## 2023-04-19 RX ORDER — LOSARTAN POTASSIUM 25 MG/1
25 TABLET ORAL DAILY
Qty: 90 TABLET | Refills: 3 | Status: SHIPPED | OUTPATIENT
Start: 2023-04-19 | End: 2023-04-28 | Stop reason: SDUPTHER

## 2023-04-19 RX ORDER — GABAPENTIN 800 MG/1
TABLET ORAL
COMMUNITY
Start: 2023-04-06

## 2023-04-19 RX ORDER — POLYETHYLENE GLYCOL 3350 17 G/17G
17 POWDER, FOR SOLUTION ORAL DAILY PRN
Qty: 100 EACH | Refills: 3 | Status: SHIPPED | OUTPATIENT
Start: 2023-04-19 | End: 2023-05-04 | Stop reason: SDUPTHER

## 2023-04-19 RX ORDER — GLIMEPIRIDE 2 MG/1
2 TABLET ORAL 2 TIMES DAILY WITH MEALS
Qty: 180 TABLET | Refills: 1 | Status: SHIPPED | OUTPATIENT
Start: 2023-04-19

## 2023-04-19 RX ORDER — LANCETS 30 GAUGE
EACH MISCELLANEOUS
Qty: 100 EACH | Refills: 3 | Status: SHIPPED | OUTPATIENT
Start: 2023-04-19 | End: 2023-05-04 | Stop reason: SDUPTHER

## 2023-04-19 RX ORDER — ATORVASTATIN CALCIUM 40 MG/1
40 TABLET, FILM COATED ORAL DAILY
Qty: 90 TABLET | Refills: 1 | Status: SHIPPED | OUTPATIENT
Start: 2023-04-19 | End: 2023-04-28 | Stop reason: SDUPTHER

## 2023-04-19 RX ORDER — LEVOTHYROXINE SODIUM 88 UG/1
88 TABLET ORAL DAILY
Qty: 90 TABLET | Refills: 0 | Status: SHIPPED | OUTPATIENT
Start: 2023-04-19

## 2023-04-19 SDOH — ECONOMIC STABILITY: FOOD INSECURITY: WITHIN THE PAST 12 MONTHS, YOU WORRIED THAT YOUR FOOD WOULD RUN OUT BEFORE YOU GOT MONEY TO BUY MORE.: SOMETIMES TRUE

## 2023-04-19 SDOH — ECONOMIC STABILITY: HOUSING INSECURITY
IN THE LAST 12 MONTHS, WAS THERE A TIME WHEN YOU DID NOT HAVE A STEADY PLACE TO SLEEP OR SLEPT IN A SHELTER (INCLUDING NOW)?: NO

## 2023-04-19 SDOH — ECONOMIC STABILITY: FOOD INSECURITY: WITHIN THE PAST 12 MONTHS, THE FOOD YOU BOUGHT JUST DIDN'T LAST AND YOU DIDN'T HAVE MONEY TO GET MORE.: SOMETIMES TRUE

## 2023-04-19 SDOH — ECONOMIC STABILITY: INCOME INSECURITY: HOW HARD IS IT FOR YOU TO PAY FOR THE VERY BASICS LIKE FOOD, HOUSING, MEDICAL CARE, AND HEATING?: NOT VERY HARD

## 2023-04-19 ASSESSMENT — PATIENT HEALTH QUESTIONNAIRE - PHQ9
SUM OF ALL RESPONSES TO PHQ QUESTIONS 1-9: 0
SUM OF ALL RESPONSES TO PHQ QUESTIONS 1-9: 0
2. FEELING DOWN, DEPRESSED OR HOPELESS: 0
SUM OF ALL RESPONSES TO PHQ QUESTIONS 1-9: 0
1. LITTLE INTEREST OR PLEASURE IN DOING THINGS: 0
SUM OF ALL RESPONSES TO PHQ QUESTIONS 1-9: 0
SUM OF ALL RESPONSES TO PHQ9 QUESTIONS 1 & 2: 0

## 2023-04-19 NOTE — PROGRESS NOTES
unanticipated weight loss. There's been No change in energy level, No change in activity level. Eyes: No visual changes or diplopia. No scleral icterus. ENT: No Headaches, hearing loss or vertigo. No mouth sores or sore throat. Cardiovascular: As above. Respiratory: No SOB, cough or hemoptysis. Gastrointestinal: No abdominal pain, appetite loss, blood in stools. No change in bowel or bladder habits. Genitourinary: No dysuria, trouble voiding, or hematuria. Musculoskeletal:  No gait disturbance, No weakness or joint complaints. Integumentary: No rash or pruritis. Psychiatric: No anxiety, or depression. Hematologic/Lymphatic: No abnormal bruising or bleeding, blood clots or swollen lymph nodes. Allergic/Immunologic: No nasal congestion or hives. Physical Exam:  /80   Pulse 80   Ht 5' 3\" (1.6 m)   Wt 165 lb (74.8 kg)   BMI 29.23 kg/m²     Constitutional and General Appearance: alert, cooperative, no distress and appears stated age  HEENT: PERRL, no cervical lymphadenopathy. No masses palpable. Normal oral mucosa  Respiratory:  Normal excursion and expansion without use of accessory muscles  Resp Auscultation: Good respiratory effort. No for increased work of breathing. On auscultation: clear to auscultation bilaterally  Cardiovascular: The apical impulse is not displaced  Heart tones are crisp and normal. regular S1 and S2.  Jugular venous pulsation Normal  The carotid upstroke is normal in amplitude and contour without delay or bruit  Peripheral pulses are symmetrical and full   Abdomen:   No masses or tenderness  Bowel sounds present  Extremities:   No Cyanosis or Clubbing   Lower extremity edema: No   Skin: Warm and dry    Cardiac Data:  EKG: NSR     Labs:     CBC: No results for input(s): WBC, HGB, HCT, PLT in the last 72 hours. BMP: No results for input(s): NA, K, CO2, BUN, CREATININE, LABGLOM, GLUCOSE in the last 72 hours.   PT/INR: No results for input(s): PROTIME, INR in the last 72

## 2023-04-27 ENCOUNTER — TELEPHONE (OUTPATIENT)
Dept: DIABETES SERVICES | Age: 68
End: 2023-04-27

## 2023-04-28 DIAGNOSIS — R29.6 RECURRENT FALLS: ICD-10-CM

## 2023-04-28 DIAGNOSIS — E78.2 MIXED HYPERLIPIDEMIA: ICD-10-CM

## 2023-04-28 DIAGNOSIS — E89.0 POSTABLATIVE HYPOTHYROIDISM: ICD-10-CM

## 2023-04-28 DIAGNOSIS — K21.9 GASTROESOPHAGEAL REFLUX DISEASE, UNSPECIFIED WHETHER ESOPHAGITIS PRESENT: ICD-10-CM

## 2023-04-28 DIAGNOSIS — N18.31 TYPE 2 DIABETES MELLITUS WITH STAGE 3A CHRONIC KIDNEY DISEASE, WITH LONG-TERM CURRENT USE OF INSULIN (HCC): ICD-10-CM

## 2023-04-28 DIAGNOSIS — M25.562 PAIN IN BOTH KNEES, UNSPECIFIED CHRONICITY: ICD-10-CM

## 2023-04-28 DIAGNOSIS — Z79.4 TYPE 2 DIABETES MELLITUS WITH STAGE 3A CHRONIC KIDNEY DISEASE, WITH LONG-TERM CURRENT USE OF INSULIN (HCC): ICD-10-CM

## 2023-04-28 DIAGNOSIS — E87.6 HYPOKALEMIA: ICD-10-CM

## 2023-04-28 DIAGNOSIS — M51.36 DEGENERATIVE DISC DISEASE, LUMBAR: ICD-10-CM

## 2023-04-28 DIAGNOSIS — E11.22 TYPE 2 DIABETES MELLITUS WITH STAGE 3A CHRONIC KIDNEY DISEASE, WITH LONG-TERM CURRENT USE OF INSULIN (HCC): ICD-10-CM

## 2023-04-28 DIAGNOSIS — I10 ESSENTIAL HYPERTENSION: ICD-10-CM

## 2023-04-28 DIAGNOSIS — E11.9 TYPE 2 DIABETES MELLITUS WITHOUT COMPLICATION, WITH LONG-TERM CURRENT USE OF INSULIN (HCC): ICD-10-CM

## 2023-04-28 DIAGNOSIS — J45.909 UNCOMPLICATED ASTHMA, UNSPECIFIED ASTHMA SEVERITY, UNSPECIFIED WHETHER PERSISTENT: ICD-10-CM

## 2023-04-28 DIAGNOSIS — Z79.4 TYPE 2 DIABETES MELLITUS WITHOUT COMPLICATION, WITH LONG-TERM CURRENT USE OF INSULIN (HCC): ICD-10-CM

## 2023-04-28 DIAGNOSIS — I48.0 PAROXYSMAL ATRIAL FIBRILLATION (HCC): ICD-10-CM

## 2023-04-28 DIAGNOSIS — M50.90 CERVICAL NECK PAIN WITH EVIDENCE OF DISC DISEASE: ICD-10-CM

## 2023-04-28 DIAGNOSIS — E55.9 VITAMIN D DEFICIENCY: Primary | ICD-10-CM

## 2023-04-28 DIAGNOSIS — M25.561 PAIN IN BOTH KNEES, UNSPECIFIED CHRONICITY: ICD-10-CM

## 2023-04-28 DIAGNOSIS — K59.00 CONSTIPATION, UNSPECIFIED CONSTIPATION TYPE: ICD-10-CM

## 2023-04-28 DIAGNOSIS — M62.838 MUSCLE SPASM: ICD-10-CM

## 2023-04-28 DIAGNOSIS — J44.9 ASTHMA WITH COPD (HCC): ICD-10-CM

## 2023-04-28 RX ORDER — DILTIAZEM HYDROCHLORIDE 120 MG/1
120 CAPSULE, EXTENDED RELEASE ORAL DAILY
Qty: 90 CAPSULE | Refills: 3 | Status: SHIPPED | OUTPATIENT
Start: 2023-04-28

## 2023-04-28 RX ORDER — ATORVASTATIN CALCIUM 40 MG/1
40 TABLET, FILM COATED ORAL DAILY
Qty: 90 TABLET | Refills: 1 | Status: CANCELLED | OUTPATIENT
Start: 2023-04-28

## 2023-04-28 RX ORDER — HYDROCHLOROTHIAZIDE 25 MG/1
25 TABLET ORAL EVERY MORNING
Qty: 90 TABLET | Refills: 0 | Status: CANCELLED | OUTPATIENT
Start: 2023-04-28

## 2023-04-28 RX ORDER — DULAGLUTIDE 3 MG/.5ML
3 INJECTION, SOLUTION SUBCUTANEOUS WEEKLY
Qty: 4 ADJUSTABLE DOSE PRE-FILLED PEN SYRINGE | Refills: 3 | Status: CANCELLED | OUTPATIENT
Start: 2023-04-28

## 2023-04-28 RX ORDER — ATORVASTATIN CALCIUM 40 MG/1
40 TABLET, FILM COATED ORAL DAILY
Qty: 90 TABLET | Refills: 3 | Status: SHIPPED | OUTPATIENT
Start: 2023-04-28

## 2023-04-28 RX ORDER — LOSARTAN POTASSIUM 25 MG/1
25 TABLET ORAL DAILY
Qty: 90 TABLET | Refills: 3 | Status: CANCELLED | OUTPATIENT
Start: 2023-04-28

## 2023-04-28 RX ORDER — MULTIVIT WITH MINERALS/LUTEIN
1000 TABLET ORAL DAILY
Qty: 30 CAPSULE | Status: CANCELLED | OUTPATIENT
Start: 2023-04-28

## 2023-04-28 RX ORDER — FENOFIBRATE 145 MG/1
145 TABLET, COATED ORAL DAILY
Qty: 90 TABLET | Refills: 3 | Status: CANCELLED | OUTPATIENT
Start: 2023-04-28

## 2023-04-28 RX ORDER — HYDROCHLOROTHIAZIDE 25 MG/1
25 TABLET ORAL EVERY MORNING
Qty: 90 TABLET | Refills: 3 | Status: SHIPPED | OUTPATIENT
Start: 2023-04-28

## 2023-04-28 RX ORDER — INSULIN GLARGINE 100 [IU]/ML
35 INJECTION, SOLUTION SUBCUTANEOUS NIGHTLY
Qty: 31.5 ML | Refills: 1 | Status: CANCELLED | OUTPATIENT
Start: 2023-04-28 | End: 2023-07-27

## 2023-04-28 RX ORDER — ZINC GLUCONATE 50 MG
50 TABLET ORAL DAILY
Qty: 30 TABLET | Status: CANCELLED | OUTPATIENT
Start: 2023-04-28

## 2023-04-28 RX ORDER — DILTIAZEM HYDROCHLORIDE 120 MG/1
120 CAPSULE, EXTENDED RELEASE ORAL DAILY
Qty: 90 CAPSULE | Refills: 3 | Status: CANCELLED | OUTPATIENT
Start: 2023-04-28

## 2023-04-28 RX ORDER — LORATADINE 10 MG/1
10 TABLET ORAL DAILY
Qty: 90 TABLET | Refills: 3 | Status: CANCELLED | OUTPATIENT
Start: 2023-04-28

## 2023-04-28 RX ORDER — LOSARTAN POTASSIUM 25 MG/1
25 TABLET ORAL DAILY
Qty: 90 TABLET | Refills: 3 | Status: SHIPPED | OUTPATIENT
Start: 2023-04-28

## 2023-04-28 RX ORDER — FENOFIBRATE 145 MG/1
145 TABLET, COATED ORAL DAILY
Qty: 90 TABLET | Refills: 3 | Status: SHIPPED | OUTPATIENT
Start: 2023-04-28

## 2023-04-28 NOTE — TELEPHONE ENCOUNTER
Last Appt:  4/19/2023  Next Appt:   11/8/23  Med verified in Epic     Pt changing pharmacy to [facility].

## 2023-04-30 DIAGNOSIS — I10 ESSENTIAL HYPERTENSION: ICD-10-CM

## 2023-05-01 ENCOUNTER — OFFICE VISIT (OUTPATIENT)
Dept: DIABETES SERVICES | Age: 68
End: 2023-05-01
Payer: MEDICARE

## 2023-05-01 VITALS
RESPIRATION RATE: 16 BRPM | HEART RATE: 64 BPM | DIASTOLIC BLOOD PRESSURE: 82 MMHG | HEIGHT: 63 IN | WEIGHT: 172 LBS | BODY MASS INDEX: 30.48 KG/M2 | SYSTOLIC BLOOD PRESSURE: 122 MMHG

## 2023-05-01 DIAGNOSIS — E78.2 MIXED HYPERLIPIDEMIA: ICD-10-CM

## 2023-05-01 DIAGNOSIS — Z79.4 TYPE 2 DIABETES MELLITUS WITH STAGE 3A CHRONIC KIDNEY DISEASE, WITH LONG-TERM CURRENT USE OF INSULIN (HCC): Primary | ICD-10-CM

## 2023-05-01 DIAGNOSIS — E11.22 TYPE 2 DIABETES MELLITUS WITH STAGE 3A CHRONIC KIDNEY DISEASE, WITH LONG-TERM CURRENT USE OF INSULIN (HCC): Primary | ICD-10-CM

## 2023-05-01 DIAGNOSIS — N18.31 TYPE 2 DIABETES MELLITUS WITH STAGE 3A CHRONIC KIDNEY DISEASE, WITH LONG-TERM CURRENT USE OF INSULIN (HCC): Primary | ICD-10-CM

## 2023-05-01 DIAGNOSIS — I10 ESSENTIAL HYPERTENSION: ICD-10-CM

## 2023-05-01 PROCEDURE — 1123F ACP DISCUSS/DSCN MKR DOCD: CPT | Performed by: NURSE PRACTITIONER

## 2023-05-01 PROCEDURE — 3074F SYST BP LT 130 MM HG: CPT | Performed by: NURSE PRACTITIONER

## 2023-05-01 PROCEDURE — G8427 DOCREV CUR MEDS BY ELIG CLIN: HCPCS | Performed by: NURSE PRACTITIONER

## 2023-05-01 PROCEDURE — 1090F PRES/ABSN URINE INCON ASSESS: CPT | Performed by: NURSE PRACTITIONER

## 2023-05-01 PROCEDURE — 3017F COLORECTAL CA SCREEN DOC REV: CPT | Performed by: NURSE PRACTITIONER

## 2023-05-01 PROCEDURE — 3046F HEMOGLOBIN A1C LEVEL >9.0%: CPT | Performed by: NURSE PRACTITIONER

## 2023-05-01 PROCEDURE — 3079F DIAST BP 80-89 MM HG: CPT | Performed by: NURSE PRACTITIONER

## 2023-05-01 PROCEDURE — 2022F DILAT RTA XM EVC RTNOPTHY: CPT | Performed by: NURSE PRACTITIONER

## 2023-05-01 PROCEDURE — 99214 OFFICE O/P EST MOD 30 MIN: CPT

## 2023-05-01 PROCEDURE — 1036F TOBACCO NON-USER: CPT | Performed by: NURSE PRACTITIONER

## 2023-05-01 PROCEDURE — G8399 PT W/DXA RESULTS DOCUMENT: HCPCS | Performed by: NURSE PRACTITIONER

## 2023-05-01 PROCEDURE — 99214 OFFICE O/P EST MOD 30 MIN: CPT | Performed by: NURSE PRACTITIONER

## 2023-05-01 PROCEDURE — G8417 CALC BMI ABV UP PARAM F/U: HCPCS | Performed by: NURSE PRACTITIONER

## 2023-05-01 RX ORDER — HYDROCHLOROTHIAZIDE 25 MG/1
TABLET ORAL
Qty: 30 TABLET | Refills: 11 | OUTPATIENT
Start: 2023-05-01

## 2023-05-01 RX ORDER — INSULIN GLARGINE 100 [IU]/ML
35 INJECTION, SOLUTION SUBCUTANEOUS NIGHTLY
Qty: 31.5 ML | Refills: 1 | Status: SHIPPED | OUTPATIENT
Start: 2023-05-01 | End: 2023-07-30

## 2023-05-01 RX ORDER — LORATADINE 10 MG/1
10 TABLET ORAL DAILY
Qty: 90 TABLET | Refills: 3 | Status: SHIPPED | OUTPATIENT
Start: 2023-05-01

## 2023-05-01 ASSESSMENT — ENCOUNTER SYMPTOMS
DIARRHEA: 0
RESPIRATORY NEGATIVE: 1
ABDOMINAL PAIN: 0
SHORTNESS OF BREATH: 0

## 2023-05-01 NOTE — PATIENT INSTRUCTIONS
Call [employer] to get dexcom reshipped out to you.  [phone]  Call and let us know what they tell you

## 2023-05-01 NOTE — PROGRESS NOTES
[address], [address]  [facility] [id number] [phone]        HISTORY:    Obie Okeefe presents today for evaluation and management of:  Chief Complaint   Patient presents with    Diabetes    1 Month Follow-Up    Back Pain     Sciatica, x 1.5 weeks. Right side, pain radiates from hip to knee        Patient Care Team:  Margaret Tovar DO as PCP - General (Family Medicine)  Margaret Tovar DO as PCP - Empaneled Provider  Mendel Mulch as Consulting Physician (Pain Management)  Trevor Anne MD as Consulting Physician (Cardiology)      Interval History:    Past DM Medications   none     Current Diabetic Medications  Lantus 35 units once daily   Trulicity 3 mg once weekly   Glimepiride 2 mg bid     DKA episodes: 0       03/28/23   Obie Okeefe is a 78 y.o. female patient who presents to clinic today for her diabetes. she has a history of HTN, CKD, hyperlipidemia and mobility issues which contributes to her diabetes. At previous visit diabetes counseling was provided. she denies any current signs or symptoms of hyper/hypoglycemia. she states they are taking their medications as prescribed without any adverse effects. Diet: eating less due to dental issues. Taking protein drink   Exercise: none  BS testing: fasting range: 130-140, postprandial range: 250, patient tests 2 time(s) per day  Hypoglycemia: No  Hypoglycemia as needed treatment: snack  Issues:   Diabetic foot exam up-to-date: Yes  Diabetic retinal exam up-to-date: Yes     Diabetes complications:nephropathy       05/01/23   Obie Okeefe is a 78 y.o. female patient who presents to clinic today for her diabetes. she has a history of HTN, CKD, hyperlipidemia and mobility issues which contributes to her diabetes. At previous visit diabetes counseling was provided. she denies any current signs or symptoms of hyper/hypoglycemia.  she states they are taking

## 2023-05-04 ENCOUNTER — TELEPHONE (OUTPATIENT)
Dept: INTERNAL MEDICINE | Age: 68
End: 2023-05-04

## 2023-05-04 RX ORDER — POTASSIUM CHLORIDE 20 MEQ/1
20 TABLET, EXTENDED RELEASE ORAL DAILY
Qty: 90 TABLET | Refills: 3 | Status: SHIPPED | OUTPATIENT
Start: 2023-05-04

## 2023-05-04 RX ORDER — MULTIVIT WITH MINERALS/LUTEIN
1000 TABLET ORAL DAILY
Qty: 30 TABLET | Status: CANCELLED | OUTPATIENT
Start: 2023-05-04

## 2023-05-04 RX ORDER — ALBUTEROL SULFATE 90 UG/1
AEROSOL, METERED RESPIRATORY (INHALATION)
Qty: 54 G | Refills: 3 | Status: SHIPPED | OUTPATIENT
Start: 2023-05-04

## 2023-05-04 RX ORDER — ALBUTEROL SULFATE 2.5 MG/3ML
2.5 SOLUTION RESPIRATORY (INHALATION) EVERY 6 HOURS PRN
Qty: 360 ML | Refills: 3 | Status: SHIPPED | OUTPATIENT
Start: 2023-05-04

## 2023-05-04 RX ORDER — LANCETS 30 GAUGE
EACH MISCELLANEOUS
Qty: 200 EACH | Refills: 3 | Status: SHIPPED | OUTPATIENT
Start: 2023-05-04

## 2023-05-04 RX ORDER — GABAPENTIN 600 MG/1
TABLET ORAL
Qty: 60 TABLET | Refills: 0 | Status: CANCELLED | OUTPATIENT
Start: 2023-05-04 | End: 2023-07-10

## 2023-05-04 RX ORDER — CHLORAL HYDRATE 500 MG
CAPSULE ORAL
Qty: 90 CAPSULE | Refills: 3 | Status: SHIPPED | OUTPATIENT
Start: 2023-05-04

## 2023-05-04 RX ORDER — GLIMEPIRIDE 2 MG/1
2 TABLET ORAL 2 TIMES DAILY WITH MEALS
Qty: 180 TABLET | Refills: 1 | Status: CANCELLED | OUTPATIENT
Start: 2023-05-04

## 2023-05-04 RX ORDER — GABAPENTIN 800 MG/1
800 TABLET ORAL 2 TIMES DAILY PRN
Qty: 60 TABLET | Refills: 0 | Status: CANCELLED | OUTPATIENT
Start: 2023-05-04 | End: 2023-06-03

## 2023-05-04 RX ORDER — MELATONIN
1000 DAILY
Qty: 90 TABLET | Refills: 3 | Status: SHIPPED | OUTPATIENT
Start: 2023-05-04

## 2023-05-04 RX ORDER — POLYETHYLENE GLYCOL 3350 17 G/17G
17 POWDER, FOR SOLUTION ORAL DAILY PRN
Qty: 100 EACH | Refills: 3 | Status: SHIPPED | OUTPATIENT
Start: 2023-05-04

## 2023-05-04 RX ORDER — GLUCOSAMINE HCL/CHONDROITIN SU 500-400 MG
CAPSULE ORAL
Qty: 200 STRIP | Refills: 3 | Status: SHIPPED | OUTPATIENT
Start: 2023-05-04

## 2023-05-04 RX ORDER — FLUTICASONE PROPIONATE AND SALMETEROL 250; 50 UG/1; UG/1
1 POWDER RESPIRATORY (INHALATION) 2 TIMES DAILY
Qty: 180 EACH | Refills: 3 | Status: SHIPPED | OUTPATIENT
Start: 2023-05-04

## 2023-05-04 RX ORDER — CYCLOBENZAPRINE HCL 10 MG
10 TABLET ORAL 2 TIMES DAILY PRN
Qty: 60 TABLET | Refills: 1 | Status: SHIPPED | OUTPATIENT
Start: 2023-05-04

## 2023-05-04 RX ORDER — LEVOTHYROXINE SODIUM 88 UG/1
88 TABLET ORAL DAILY
Qty: 90 TABLET | Refills: 0 | Status: CANCELLED | OUTPATIENT
Start: 2023-05-04

## 2023-05-04 RX ORDER — PANTOPRAZOLE SODIUM 40 MG/1
40 TABLET, DELAYED RELEASE ORAL DAILY
Qty: 90 TABLET | Refills: 1 | Status: SHIPPED | OUTPATIENT
Start: 2023-05-04

## 2023-05-04 NOTE — TELEPHONE ENCOUNTER
Called patient and notified to bring all supplies with her to upcoming appt. Notified would write her name down and call if appt comes available.

## 2023-05-04 NOTE — TELEPHONE ENCOUNTER
Patient got her Dexcom in the mail today. Has appt with you 6-1-23  if you want her sooner to set up please calll hr.   No openings I could find until 5-31

## 2023-05-07 DIAGNOSIS — M62.838 MUSCLE SPASM: ICD-10-CM

## 2023-05-08 RX ORDER — CYCLOBENZAPRINE HCL 10 MG
TABLET ORAL
Qty: 60 TABLET | Refills: 11 | OUTPATIENT
Start: 2023-05-08

## 2023-05-08 NOTE — TELEPHONE ENCOUNTER
Pt does have enough of her meds to last until she has her bloodwork completed. Will complete next week. Voiced understanding of the gabapentin.

## 2023-05-17 ENCOUNTER — OFFICE VISIT (OUTPATIENT)
Dept: DIABETES SERVICES | Age: 68
End: 2023-05-17

## 2023-05-17 VITALS
RESPIRATION RATE: 16 BRPM | HEART RATE: 80 BPM | SYSTOLIC BLOOD PRESSURE: 122 MMHG | BODY MASS INDEX: 29.41 KG/M2 | HEIGHT: 63 IN | WEIGHT: 166 LBS | DIASTOLIC BLOOD PRESSURE: 62 MMHG

## 2023-05-17 DIAGNOSIS — N18.31 TYPE 2 DIABETES MELLITUS WITH STAGE 3A CHRONIC KIDNEY DISEASE, WITH LONG-TERM CURRENT USE OF INSULIN (HCC): ICD-10-CM

## 2023-05-17 DIAGNOSIS — Z79.4 TYPE 2 DIABETES MELLITUS WITH STAGE 3A CHRONIC KIDNEY DISEASE, WITH LONG-TERM CURRENT USE OF INSULIN (HCC): ICD-10-CM

## 2023-05-17 DIAGNOSIS — E11.22 TYPE 2 DIABETES MELLITUS WITH STAGE 3A CHRONIC KIDNEY DISEASE, WITH LONG-TERM CURRENT USE OF INSULIN (HCC): ICD-10-CM

## 2023-05-17 RX ORDER — INSULIN GLARGINE 100 [IU]/ML
35 INJECTION, SOLUTION SUBCUTANEOUS NIGHTLY
Qty: 31.5 ML | Refills: 1 | Status: CANCELLED | OUTPATIENT
Start: 2023-05-17 | End: 2023-08-15

## 2023-05-17 RX ORDER — DULAGLUTIDE 3 MG/.5ML
3 INJECTION, SOLUTION SUBCUTANEOUS WEEKLY
Qty: 4 ADJUSTABLE DOSE PRE-FILLED PEN SYRINGE | Refills: 3 | Status: CANCELLED | OUTPATIENT
Start: 2023-05-17

## 2023-05-17 NOTE — PROGRESS NOTES
Patient here for Dexcom G6 set up. When opening box,  was not included in box. Called and spoke with Lalita Monique with [employer] and he stated he will get fixed asap and speak with patient and get meter to patient.       Will void this appt for patient

## 2023-05-18 DIAGNOSIS — Z79.4 TYPE 2 DIABETES MELLITUS WITHOUT COMPLICATION, WITH LONG-TERM CURRENT USE OF INSULIN (HCC): ICD-10-CM

## 2023-05-18 DIAGNOSIS — E11.9 TYPE 2 DIABETES MELLITUS WITHOUT COMPLICATION, WITH LONG-TERM CURRENT USE OF INSULIN (HCC): ICD-10-CM

## 2023-05-18 RX ORDER — GABAPENTIN 800 MG/1
TABLET ORAL
Qty: 90 TABLET | Status: CANCELLED | OUTPATIENT
Start: 2023-05-18

## 2023-05-18 RX ORDER — GLIMEPIRIDE 2 MG/1
2 TABLET ORAL 2 TIMES DAILY WITH MEALS
Qty: 180 TABLET | Refills: 1 | Status: CANCELLED | OUTPATIENT
Start: 2023-05-18

## 2023-05-30 DIAGNOSIS — I10 ESSENTIAL HYPERTENSION: ICD-10-CM

## 2023-05-30 RX ORDER — HYDROCHLOROTHIAZIDE 25 MG/1
TABLET ORAL
Qty: 30 TABLET | Refills: 11 | OUTPATIENT
Start: 2023-05-30

## 2023-05-30 NOTE — TELEPHONE ENCOUNTER
Prescribed by Dr. Teo Smith. Rx sent in on 4/28 for 90 tablets with 3 refills. Per 5/7 refill encounter, no longer using [employer].

## 2023-05-31 ENCOUNTER — HOSPITAL ENCOUNTER (EMERGENCY)
Age: 68
Discharge: HOME | End: 2023-05-31
Attending: EMERGENCY MEDICINE
Payer: MEDICARE

## 2023-05-31 ENCOUNTER — APPOINTMENT (OUTPATIENT)
Dept: GENERAL RADIOLOGY | Age: 68
End: 2023-05-31
Payer: MEDICARE

## 2023-05-31 ENCOUNTER — APPOINTMENT (OUTPATIENT)
Dept: CT IMAGING | Age: 68
End: 2023-05-31
Payer: MEDICARE

## 2023-05-31 VITALS
RESPIRATION RATE: 16 BRPM | OXYGEN SATURATION: 96 % | DIASTOLIC BLOOD PRESSURE: 93 MMHG | HEART RATE: 55 BPM | SYSTOLIC BLOOD PRESSURE: 135 MMHG | TEMPERATURE: 96.8 F

## 2023-05-31 DIAGNOSIS — S49.91XA SHOULDER INJURY, RIGHT, INITIAL ENCOUNTER: ICD-10-CM

## 2023-05-31 DIAGNOSIS — S09.90XA INJURY OF HEAD, INITIAL ENCOUNTER: Primary | ICD-10-CM

## 2023-05-31 DIAGNOSIS — S80.211A ABRASION, RIGHT KNEE, INITIAL ENCOUNTER: ICD-10-CM

## 2023-05-31 PROCEDURE — 72125 CT NECK SPINE W/O DYE: CPT

## 2023-05-31 PROCEDURE — 6370000000 HC RX 637 (ALT 250 FOR IP): Performed by: EMERGENCY MEDICINE

## 2023-05-31 PROCEDURE — 99284 EMERGENCY DEPT VISIT MOD MDM: CPT

## 2023-05-31 PROCEDURE — 73030 X-RAY EXAM OF SHOULDER: CPT

## 2023-05-31 PROCEDURE — 73562 X-RAY EXAM OF KNEE 3: CPT

## 2023-05-31 PROCEDURE — 70450 CT HEAD/BRAIN W/O DYE: CPT

## 2023-05-31 RX ORDER — IBUPROFEN 800 MG/1
800 TABLET ORAL EVERY 8 HOURS PRN
Qty: 30 TABLET | Refills: 0 | Status: SHIPPED | OUTPATIENT
Start: 2023-05-31

## 2023-05-31 RX ORDER — GINSENG 100 MG
CAPSULE ORAL ONCE
Status: COMPLETED | OUTPATIENT
Start: 2023-05-31 | End: 2023-05-31

## 2023-05-31 RX ORDER — IBUPROFEN 800 MG/1
800 TABLET ORAL ONCE
Status: COMPLETED | OUTPATIENT
Start: 2023-05-31 | End: 2023-05-31

## 2023-05-31 RX ORDER — HYDROCHLOROTHIAZIDE 25 MG/1
25 TABLET ORAL EVERY MORNING
Qty: 90 TABLET | Refills: 3 | Status: SHIPPED | OUTPATIENT
Start: 2023-05-31

## 2023-05-31 RX ADMIN — IBUPROFEN 800 MG: 800 TABLET, FILM COATED ORAL at 16:23

## 2023-05-31 RX ADMIN — BACITRACIN ZINC: 500 OINTMENT TOPICAL at 14:38

## 2023-05-31 ASSESSMENT — PAIN SCALES - GENERAL
PAINLEVEL_OUTOF10: 9
PAINLEVEL_OUTOF10: 9

## 2023-05-31 ASSESSMENT — ENCOUNTER SYMPTOMS
SHORTNESS OF BREATH: 0
ABDOMINAL PAIN: 0
BACK PAIN: 0
CONSTIPATION: 0
VOMITING: 0
NAUSEA: 0
EYE PAIN: 0
DIARRHEA: 0
COUGH: 0
BLOOD IN STOOL: 0

## 2023-05-31 ASSESSMENT — PAIN - FUNCTIONAL ASSESSMENT: PAIN_FUNCTIONAL_ASSESSMENT: 0-10

## 2023-05-31 ASSESSMENT — LIFESTYLE VARIABLES
HOW MANY STANDARD DRINKS CONTAINING ALCOHOL DO YOU HAVE ON A TYPICAL DAY: PATIENT DOES NOT DRINK
HOW OFTEN DO YOU HAVE A DRINK CONTAINING ALCOHOL: NEVER

## 2023-05-31 ASSESSMENT — PAIN DESCRIPTION - ORIENTATION: ORIENTATION: RIGHT

## 2023-05-31 ASSESSMENT — PAIN DESCRIPTION - LOCATION: LOCATION: SHOULDER

## 2023-05-31 NOTE — ED PROVIDER NOTES
[facility]  eMERGENCY dEPARTMENT eNCOUnter      Pt Name: Jamel Martinez  MRN: [id number]  [place] 1955  Date of evaluation: 5/31/2023      CHIEF COMPLAINT       Chief Complaint   Patient presents with    Fall     Pt was going to an appointment at [facility]. States her sandal stuck to the cement and she fell forward to R side. HISTORY OF PRESENT ILLNESS    Jamel Martinez is a 78 y.o. female who presents with headache right shoulder pain and right knee pain she was coming into the clinic and tripped and fell striking the right side of her head she said her right side of her head aches she has right shoulder pain and also right knee pain there was no loss of consciousness patient was on blood thinners in the past but that is stopped has been no fevers chills or cough pain is worse with movement of the right shoulder and the right knee  There is no chest pain no abdominal pain  Patient last tetanus was 2016  REVIEW OF SYSTEMS         Review of Systems   Constitutional:  Negative for chills and fever. HENT:  Negative for congestion and ear pain. Eyes:  Negative for pain and visual disturbance. Respiratory:  Negative for cough and shortness of breath. Cardiovascular:  Negative for chest pain, palpitations and leg swelling. Gastrointestinal:  Negative for abdominal pain, blood in stool, constipation, diarrhea, nausea and vomiting. Endocrine: Negative for polydipsia and polyuria. Genitourinary:  Negative for difficulty urinating, dysuria and frequency. Musculoskeletal:  Negative for back pain, joint swelling, myalgias, neck pain and neck stiffness. Right shoulder right knee pain   Skin:  Negative for rash. Neurological:  Positive for headaches. Negative for dizziness and weakness. Hematological:  Negative for adenopathy. Does not bruise/bleed easily. Psychiatric/Behavioral:  Negative for confusion, self-injury and suicidal ideas.         PAST MEDICAL HISTORY

## 2023-06-01 ENCOUNTER — OFFICE VISIT (OUTPATIENT)
Dept: DIABETES SERVICES | Age: 68
End: 2023-06-01
Payer: MEDICARE

## 2023-06-01 VITALS
HEIGHT: 63 IN | RESPIRATION RATE: 16 BRPM | HEART RATE: 76 BPM | WEIGHT: 168 LBS | DIASTOLIC BLOOD PRESSURE: 86 MMHG | SYSTOLIC BLOOD PRESSURE: 132 MMHG | BODY MASS INDEX: 29.77 KG/M2

## 2023-06-01 DIAGNOSIS — R53.1 WEAKNESS: ICD-10-CM

## 2023-06-01 DIAGNOSIS — R29.6 FALLS FREQUENTLY: ICD-10-CM

## 2023-06-01 DIAGNOSIS — R26.9 ABNORMAL GAIT: ICD-10-CM

## 2023-06-01 DIAGNOSIS — I10 ESSENTIAL HYPERTENSION: ICD-10-CM

## 2023-06-01 DIAGNOSIS — Z79.4 TYPE 2 DIABETES MELLITUS WITH STAGE 3A CHRONIC KIDNEY DISEASE, WITH LONG-TERM CURRENT USE OF INSULIN (HCC): Primary | ICD-10-CM

## 2023-06-01 DIAGNOSIS — N18.31 TYPE 2 DIABETES MELLITUS WITH STAGE 3A CHRONIC KIDNEY DISEASE, WITH LONG-TERM CURRENT USE OF INSULIN (HCC): Primary | ICD-10-CM

## 2023-06-01 DIAGNOSIS — E78.2 MIXED HYPERLIPIDEMIA: ICD-10-CM

## 2023-06-01 DIAGNOSIS — E11.22 TYPE 2 DIABETES MELLITUS WITH STAGE 3A CHRONIC KIDNEY DISEASE, WITH LONG-TERM CURRENT USE OF INSULIN (HCC): Primary | ICD-10-CM

## 2023-06-01 PROCEDURE — G8399 PT W/DXA RESULTS DOCUMENT: HCPCS | Performed by: NURSE PRACTITIONER

## 2023-06-01 PROCEDURE — 3017F COLORECTAL CA SCREEN DOC REV: CPT | Performed by: NURSE PRACTITIONER

## 2023-06-01 PROCEDURE — 1090F PRES/ABSN URINE INCON ASSESS: CPT | Performed by: NURSE PRACTITIONER

## 2023-06-01 PROCEDURE — 1123F ACP DISCUSS/DSCN MKR DOCD: CPT | Performed by: NURSE PRACTITIONER

## 2023-06-01 PROCEDURE — 3046F HEMOGLOBIN A1C LEVEL >9.0%: CPT | Performed by: NURSE PRACTITIONER

## 2023-06-01 PROCEDURE — 3075F SYST BP GE 130 - 139MM HG: CPT | Performed by: NURSE PRACTITIONER

## 2023-06-01 PROCEDURE — 1036F TOBACCO NON-USER: CPT | Performed by: NURSE PRACTITIONER

## 2023-06-01 PROCEDURE — G8427 DOCREV CUR MEDS BY ELIG CLIN: HCPCS | Performed by: NURSE PRACTITIONER

## 2023-06-01 PROCEDURE — 99213 OFFICE O/P EST LOW 20 MIN: CPT | Performed by: NURSE PRACTITIONER

## 2023-06-01 PROCEDURE — G8417 CALC BMI ABV UP PARAM F/U: HCPCS | Performed by: NURSE PRACTITIONER

## 2023-06-01 PROCEDURE — 99214 OFFICE O/P EST MOD 30 MIN: CPT | Performed by: NURSE PRACTITIONER

## 2023-06-01 PROCEDURE — 2022F DILAT RTA XM EVC RTNOPTHY: CPT | Performed by: NURSE PRACTITIONER

## 2023-06-01 PROCEDURE — 3079F DIAST BP 80-89 MM HG: CPT | Performed by: NURSE PRACTITIONER

## 2023-06-01 RX ORDER — INSULIN GLARGINE 100 [IU]/ML
35 INJECTION, SOLUTION SUBCUTANEOUS NIGHTLY
Qty: 34 ML | Refills: 1 | Status: SHIPPED | OUTPATIENT
Start: 2023-06-01 | End: 2023-08-30

## 2023-06-01 RX ORDER — DULAGLUTIDE 4.5 MG/.5ML
4.5 INJECTION, SOLUTION SUBCUTANEOUS WEEKLY
Qty: 12 ADJUSTABLE DOSE PRE-FILLED PEN SYRINGE | Refills: 2 | Status: SHIPPED | OUTPATIENT
Start: 2023-06-01

## 2023-06-01 ASSESSMENT — ENCOUNTER SYMPTOMS
ABDOMINAL PAIN: 0
SHORTNESS OF BREATH: 0
DIARRHEA: 0
RESPIRATORY NEGATIVE: 1

## 2023-06-01 NOTE — PROGRESS NOTES
[facility] [address]  [address] [facility], [address]  [place] [address] [facility]  [phone]        HISTORY:    Beronica Elliott presents today for evaluation and management of:  Chief Complaint   Patient presents with    Diabetes     Dexcom set up. Patient Care Team:  Jorge Taylor DO as PCP - General (Family Medicine)  Jorge Taylor DO as PCP - Empaneled Provider  Darian Clements as Consulting Physician (Pain Management)  Alexei Mauricio MD as Consulting Physician (Cardiology)      Interval History:    Past DM Medications   none     Current Diabetic Medications  Lantus 35 units once daily ( misses once a week)  Trulicity 3 mg once weekly   Glimepiride 2 mg bid     DKA episodes: 0       05/01/23   Beronica Elliott is a 78 y.o. female patient who presents to clinic today for her diabetes. she has a history of HTN, CKD, hyperlipidemia and mobility issues which contributes to her diabetes. At previous visit diabetes counseling was provided. she denies any current signs or symptoms of hyper/hypoglycemia. she states they are taking their medications as prescribed without any adverse effects. Diet: regular  Exercise: none due to chronic pain   BS testing: fasting range: 140-300, patient tests 4 time(s) per week  Hypoglycemia: No  Hypoglycemia as needed treatment: snack  Issues:   Diabetic foot exam up-to-date: Yes  Diabetic retinal exam up-to-date: Yes     Diabetes complications:nephropathy    06/01/23   Beronica Elliott is a 78 y.o. female patient who presents to clinic today for her diabetes. she has a history of HTN, CKD, hyperlipidemia and mobility issues which contributes to her diabetes. At previous visit diabetes counseling was provided. she denies any current signs or symptoms of hyper/hypoglycemia. she states they are taking their medications as prescribed without any adverse effects.  Still has not received cgm reader   Diet:

## 2023-06-02 ENCOUNTER — TELEPHONE (OUTPATIENT)
Dept: FAMILY MEDICINE CLINIC | Age: 68
End: 2023-06-02

## 2023-06-02 NOTE — TELEPHONE ENCOUNTER
[facility], can schedule for 6/7 at 4PM if agreeable and will discuss walker at that time.

## 2023-06-02 NOTE — TELEPHONE ENCOUNTER
----- Message from Judith Leija sent at 6/1/2023  3:48 PM EDT -----  Subject: Message to Provider    QUESTIONS  Information for Provider? Patient would like a prescription sent to   [facility] [place] faxed to [phone] for a rolator walker  ---------------------------------------------------------------------------  --------------  Zoya Gonzalez INFO  [phone]; OK to leave message on voicemail  ---------------------------------------------------------------------------  --------------  SCRIPT ANSWERS  Relationship to Patient?  Self

## 2023-06-06 ENCOUNTER — TELEPHONE (OUTPATIENT)
Dept: INTERNAL MEDICINE | Age: 68
End: 2023-06-06

## 2023-06-06 DIAGNOSIS — M62.838 MUSCLE SPASM: ICD-10-CM

## 2023-06-06 RX ORDER — CYCLOBENZAPRINE HCL 10 MG
TABLET ORAL
Qty: 60 TABLET | Refills: 11 | OUTPATIENT
Start: 2023-06-06

## 2023-06-06 NOTE — TELEPHONE ENCOUNTER
Juan Jose with [employer] emailed [employer] and gave the following information on patient's dexcom :    \"Good Morning,         Please see below. Sorry, about all the issues with this order. Tracking info [email or web address]     Order is out for delivery today\"

## 2023-06-07 ENCOUNTER — OFFICE VISIT (OUTPATIENT)
Dept: DIABETES SERVICES | Age: 68
End: 2023-06-07
Payer: MEDICARE

## 2023-06-07 VITALS
BODY MASS INDEX: 29.77 KG/M2 | DIASTOLIC BLOOD PRESSURE: 86 MMHG | RESPIRATION RATE: 16 BRPM | HEART RATE: 76 BPM | SYSTOLIC BLOOD PRESSURE: 132 MMHG | WEIGHT: 168 LBS | HEIGHT: 63 IN

## 2023-06-07 DIAGNOSIS — N18.31 TYPE 2 DIABETES MELLITUS WITH STAGE 3A CHRONIC KIDNEY DISEASE, WITH LONG-TERM CURRENT USE OF INSULIN (HCC): Primary | ICD-10-CM

## 2023-06-07 DIAGNOSIS — Z79.4 TYPE 2 DIABETES MELLITUS WITH STAGE 3A CHRONIC KIDNEY DISEASE, WITH LONG-TERM CURRENT USE OF INSULIN (HCC): Primary | ICD-10-CM

## 2023-06-07 DIAGNOSIS — E11.22 TYPE 2 DIABETES MELLITUS WITH STAGE 3A CHRONIC KIDNEY DISEASE, WITH LONG-TERM CURRENT USE OF INSULIN (HCC): Primary | ICD-10-CM

## 2023-06-07 PROCEDURE — 99211 OFF/OP EST MAY X REQ PHY/QHP: CPT | Performed by: NURSE PRACTITIONER

## 2023-06-07 NOTE — PROGRESS NOTES
Patient is here for initial CGM start up. CGM ordered per Elizabeth Pablo MSN, APRN- FNP-BC, ADM-BC. Patient receives supplies from [employer]. All supplies are present. Last diabetic management visit on 6/1/2023. CGM recommended for frequent insulin adjustments, compliance and simplification of diabetes management. This patient is interested and full capable to properly function a CGM. she is taking 1 or more insulin injections a day and is testing blood sugars 2 or more times per day. No other issues need to be discussed currently. Reference guide reviewed with patient. Patient successfully applied CGM Sensor. CGM reader was set up. CGM reader screen settings reviewed with patient. All questions were answered and patient will follow-up with any further questions. follow up in 4 weeks. Patient will call with any questions and use fingerstick as backup if necessary. Education regarding removal of sensor or any imaging given to patient. Discussed with patient the importance of performing fingerstick glucose if CGM sensor warrants. Instructed patient to perform fingerstick glucose if symptoms do not match greater glucose. Informed patient to call company if sensor falls of prior to 10 days or is malfunctioning. Call DME supplier for refills as needed. All  educational material for CGM reviewed and sent with patient. Scheduled visit for 1 month to see crystal to review readings.        Agree with above   Elizabeth Pablo MSN, APRN- FNP-BC, ADM-BC

## 2023-06-19 ENCOUNTER — OFFICE VISIT (OUTPATIENT)
Dept: DIABETES SERVICES | Age: 68
End: 2023-06-19
Payer: MEDICARE

## 2023-06-19 ENCOUNTER — HOSPITAL ENCOUNTER (OUTPATIENT)
Dept: PHYSICAL THERAPY | Age: 68
Setting detail: THERAPIES SERIES
Discharge: HOME | End: 2023-06-19
Payer: MEDICARE

## 2023-06-19 DIAGNOSIS — E11.22 TYPE 2 DIABETES MELLITUS WITH STAGE 3A CHRONIC KIDNEY DISEASE, WITH LONG-TERM CURRENT USE OF INSULIN (HCC): Primary | ICD-10-CM

## 2023-06-19 DIAGNOSIS — Z71.89 DIABETES EDUCATION, ENCOUNTER FOR: ICD-10-CM

## 2023-06-19 DIAGNOSIS — Z79.4 TYPE 2 DIABETES MELLITUS WITH STAGE 3A CHRONIC KIDNEY DISEASE, WITH LONG-TERM CURRENT USE OF INSULIN (HCC): Primary | ICD-10-CM

## 2023-06-19 DIAGNOSIS — N18.31 TYPE 2 DIABETES MELLITUS WITH STAGE 3A CHRONIC KIDNEY DISEASE, WITH LONG-TERM CURRENT USE OF INSULIN (HCC): Primary | ICD-10-CM

## 2023-06-19 PROCEDURE — 99214 OFFICE O/P EST MOD 30 MIN: CPT | Performed by: NURSE PRACTITIONER

## 2023-06-19 PROCEDURE — 97161 PT EVAL LOW COMPLEX 20 MIN: CPT | Performed by: PHYSICAL THERAPIST

## 2023-06-19 PROCEDURE — 2022F DILAT RTA XM EVC RTNOPTHY: CPT | Performed by: NURSE PRACTITIONER

## 2023-06-19 PROCEDURE — 1123F ACP DISCUSS/DSCN MKR DOCD: CPT | Performed by: NURSE PRACTITIONER

## 2023-06-19 PROCEDURE — G8399 PT W/DXA RESULTS DOCUMENT: HCPCS | Performed by: NURSE PRACTITIONER

## 2023-06-19 PROCEDURE — 3046F HEMOGLOBIN A1C LEVEL >9.0%: CPT | Performed by: NURSE PRACTITIONER

## 2023-06-19 PROCEDURE — 1036F TOBACCO NON-USER: CPT | Performed by: NURSE PRACTITIONER

## 2023-06-19 PROCEDURE — 1090F PRES/ABSN URINE INCON ASSESS: CPT | Performed by: NURSE PRACTITIONER

## 2023-06-19 PROCEDURE — G8417 CALC BMI ABV UP PARAM F/U: HCPCS | Performed by: NURSE PRACTITIONER

## 2023-06-19 PROCEDURE — 3017F COLORECTAL CA SCREEN DOC REV: CPT | Performed by: NURSE PRACTITIONER

## 2023-06-19 PROCEDURE — G8427 DOCREV CUR MEDS BY ELIG CLIN: HCPCS | Performed by: NURSE PRACTITIONER

## 2023-06-19 ASSESSMENT — PAIN DESCRIPTION - LOCATION: LOCATION: HIP;BACK;LEG

## 2023-06-19 ASSESSMENT — ENCOUNTER SYMPTOMS
ABDOMINAL PAIN: 0
RESPIRATORY NEGATIVE: 1
DIARRHEA: 0
SHORTNESS OF BREATH: 0

## 2023-06-19 ASSESSMENT — PAIN DESCRIPTION - DESCRIPTORS: DESCRIPTORS: ACHING;TIGHTNESS

## 2023-06-19 ASSESSMENT — PAIN SCALES - GENERAL: PAINLEVEL_OUTOF10: 5

## 2023-06-19 ASSESSMENT — PAIN DESCRIPTION - PAIN TYPE: TYPE: CHRONIC PAIN

## 2023-06-19 ASSESSMENT — PAIN DESCRIPTION - ORIENTATION: ORIENTATION: RIGHT;LEFT

## 2023-06-19 NOTE — PLAN OF CARE
Potential: [] Excellent [x] Good [] Fair  [] Poor     Electronically signed by:  Luz Orozco PT      If you have any questions or concerns, please don't hesitate to call.   Thank you for your referral.      Physician Signature:________________________________Date:__________________  By signing above, therapists plan is approved by physician

## 2023-06-19 NOTE — PROGRESS NOTES
Physical Therapy    Physical Therapy Daily Treatment Note    Date:  2023    Patient Name:  Jesse Sanabria    :  1955  MRN: [id number]  Restrictions/Precautions:     Medical/Treatment Diagnosis Information:   Diagnosis: R26.9 abnormal gait, R29.6 frequent falls  Treatment Diagnosis: R26.9 abnormality of gait, R29.6 frequent falls  Insurance/Certification information:  PT Insurance Information: [facility] Medicare  Physician Information:   Presley Singh CNP  Plan of care signed (Y/N):    Visit# / total visits:  1/10  Pain level: 5-10       Time In:9:10   Time Out:9:50    Progress Note: [x]  Yes  []  No  Next due by: Visit #10, or 23      Subjective:   See eval    Objective: See eval  Observation:   Test measurements:      Exercises:   Exercise/Equipment Resistance/Repetitions Other comments   NUSTEP     Counter ex     Squat Matrix     Lunges     1/2 kneel & up     Step up     Toe tap on step     Step up & over           Gait with cane                         [x] Provided verbal/tactile cueing for activities related to strengthening, flexibility, endurance, ROM. (48755)  [] Provided verbal/tactile cueing for activities related to improving balance, coordination, kinesthetic sense, posture, motor skill, proprioception. (19008)    Therapeutic Activities:     [] Therapeutic activities, direct (one-on-one) patient contact (use of dynamic activities to improve functional performance). (88514)    Gait:   [] Provided training and instruction to the patient for ambulation re-education. (48889)    Self-Care/ADL's  [] Self-care/home management training and compensatory training, meal preparation, safety procedures, and instructions in use of assistive technology devices/adaptive equipment, direct one-on-one contact.  (51859)    Home Exercise Program:     [] Reviewed/Progressed HEP activities related to strengthening, flexibility, endurance, ROM. (55772)  [] Reviewed/Progressed HEP activities related to improving
Independent  Homemaking Assistance: Independent  Ambulation Assistance: Independent  Transfer Assistance: Independent  Active : No  Additional Comments: Has to walk to get around since does not drive    Objective:     PROM RLE (degrees)  RLE PROM: WFL  PROM LLE (degrees)  LLE PROM: [facility]    Strength RLE  Comment: 4-/5 hip, 5-/5 knee, 4-/5 ankle  Strength LLE  Comment: 4-/5 hip, 5-/5 knee, 4-/5 ankle     Additional Measures  Special Tests: Sit to  30\" x8  Other: TUG 13 \"        Transfers  Sit to Stand: Modified independent  Stand to Sit: Modified independent       Assessment:    Conditions Requiring Skilled Therapeutic Intervention  Body Structures, Functions, Activity Limitations Requiring Skilled Therapeutic Intervention: Decreased strength;Decreased balance  Therapy Prognosis: Good  Treatment Diagnosis: R26.9 abnormality of gait, R29.6 frequent falls  Activity Tolerance  Activity Tolerance: Patient tolerated treatment well  Activity Tolerance: Patient tolerated treatment well         Plan:    Physcial Therapy Plan  Plan weeks: 4  Current Treatment Recommendations: Strengthening, Balance training, Home exercise program, Gait training, Functional mobility training, Neuromuscular re-education    OutComes Score:  Rodriguez Balance Score: 44 (06/19/23 0946)                         Goals:  Short Term Goals  Time Frame for Short Term Goals: 1 week  Short Term Goal 1: Start HEP  Long Term Goals  Time Frame for Long Term Goals : 4 weeks  Long Term Goal 1: TUG improve to 11 sec for gait safety & efficiency  Long Term Goal 2: Rodriguez Balance Scale improve to 47/56 to reduce fall risk  Long Term Goal 3: Able to 1/2 kneel and up with hand support for getting up from floor level  Long Term Goal 4: Step up & over 4-6 inch obstacles       Therapy Time:   Individual Concurrent Group Co-treatment   Time In  9:10         Time Out  9:50         Minutes  40                 Jon Santos PT

## 2023-06-19 NOTE — PROGRESS NOTES
Physical Exam  Constitutional:       Appearance: She is well-developed. Eyes:      Pupils: Pupils are equal, round, and reactive to light. Neck:      Thyroid: No thyroid mass, thyromegaly or thyroid tenderness. Cardiovascular:      Rate and Rhythm: Normal rate and regular rhythm. Heart sounds: Normal heart sounds. Pulmonary:      Effort: Pulmonary effort is normal.      Breath sounds: Normal breath sounds. Abdominal:      General: Bowel sounds are normal.      Palpations: Abdomen is soft. Skin:     General: Skin is warm and dry. Comments: Negative for open/nonhealing wounds. Negative for lipohypertrophy. Neurological:      Mental Status: She is alert and oriented to person, place, and time. ASSESSMENT/PLAN:     Diagnosis Orders   1. Type 2 diabetes mellitus with stage 3a chronic kidney disease, with long-term current use of insulin ([address]        2. Diabetes education, encounter for          No orders of the defined types were placed in this encounter. No orders of the defined types were placed in this encounter. Requested Prescriptions      No prescriptions requested or ordered in this encounter       1. Type 2 diabetes mellitus with stage 3a chronic kidney disease, with long-term current use of insulin ([facility] [address]  2. Diabetes education, encounter for  -Education regarding CGM was provided. Quick reference guide reviewed with patient. Patient successfully applied CGM Sensor. CGM reader was set up. CGM reader screen settings reviewed with patient. All questions were answered and patient will follow-up with any further questions. follow up in 4 weeks. Patient will call with any questions and use fingerstick as backup if necessary. Education regarding removal of sensor or any imaging given to patient. Discussed with patient the importance of performing fingerstick glucose if CGM sensor warrants.   Instructed patient to perform fingerstick glucose if symptoms do not match

## 2023-06-22 ENCOUNTER — HOSPITAL ENCOUNTER (OUTPATIENT)
Dept: PHYSICAL THERAPY | Age: 68
Setting detail: THERAPIES SERIES
Discharge: HOME | End: 2023-06-22
Payer: MEDICARE

## 2023-06-22 PROCEDURE — 97110 THERAPEUTIC EXERCISES: CPT | Performed by: PHYSICAL THERAPY ASSISTANT

## 2023-06-22 NOTE — PROGRESS NOTES
Physical Therapy    Physical Therapy Daily Treatment Note    Date:  2023    Patient Name:  Jamel Martinez    :  1955  MRN: [id number]  Restrictions/Precautions:     Medical/Treatment Diagnosis Information:   Diagnosis: R26.9 abnormal gait, R29.6 frequent falls  Treatment Diagnosis: R26.9 abnormality of gait, R29.6 frequent falls  Insurance/Certification information:  PT Insurance Information: [facility] Medicare  Physician Information:   Latoya Grover CNP  Plan of care signed (Y/N):    Visit# / total visits:  2/10  Pain level: 7/10       Time In: 09  Time Out:954    Progress Note: []  Yes  [x]  No  Next due by: Visit #10, or 23      Subjective:   Pain this date rated 10 through left foot. Objective: CHRISTOS complete per flow chart to facilitate strength, motion and stability to allow ease with daily activities and ambulation. Verbal cuing for progression, technique and order of exercises. Initiated and advanced several exercises to improve motion and strength. Patient given written and verbal instruction for home exercises, understanding noted. Observation:   Test measurements:      Exercises:   Exercise/Equipment Resistance/Repetitions Other comments   NUSTEP 5'  L1   Counter ex 10x HR,TR,March, HS Curls   3-way hip 10x ea    Squat Matrix 10x ea 3-way   Lunges 10x ea    1/2 kneel & up     Step up 10x 4'' F,L   Toe tap on step 10x4''    Step up & over           Gait with cane     EOFT, ECFA 3x10'' ea FOAM   Advanced gait 2x ea Tandem, lateral             [x] Provided verbal/tactile cueing for activities related to strengthening, flexibility, endurance, ROM. (56479)  [] Provided verbal/tactile cueing for activities related to improving balance, coordination, kinesthetic sense, posture, motor skill, proprioception. (31404)    Therapeutic Activities:     [] Therapeutic activities, direct (one-on-one) patient contact (use of dynamic activities to improve functional performance).  (24499)    Gait:   []

## 2023-06-26 ENCOUNTER — HOSPITAL ENCOUNTER (OUTPATIENT)
Dept: PHYSICAL THERAPY | Age: 68
Setting detail: THERAPIES SERIES
Discharge: HOME | End: 2023-06-26
Payer: MEDICARE

## 2023-06-28 ENCOUNTER — HOSPITAL ENCOUNTER (OUTPATIENT)
Dept: PHYSICAL THERAPY | Age: 68
Setting detail: THERAPIES SERIES
Discharge: HOME | End: 2023-06-28
Payer: MEDICARE

## 2023-06-28 PROCEDURE — 97110 THERAPEUTIC EXERCISES: CPT | Performed by: PHYSICAL THERAPIST

## 2023-07-05 ENCOUNTER — TELEPHONE (OUTPATIENT)
Dept: FAMILY MEDICINE CLINIC | Age: 68
End: 2023-07-05

## 2023-07-05 DIAGNOSIS — K21.9 GASTROESOPHAGEAL REFLUX DISEASE, UNSPECIFIED WHETHER ESOPHAGITIS PRESENT: ICD-10-CM

## 2023-07-05 DIAGNOSIS — M62.838 MUSCLE SPASM: ICD-10-CM

## 2023-07-05 DIAGNOSIS — E89.0 POSTABLATIVE HYPOTHYROIDISM: ICD-10-CM

## 2023-07-05 RX ORDER — CYCLOBENZAPRINE HCL 10 MG
10 TABLET ORAL 2 TIMES DAILY PRN
Qty: 60 TABLET | Refills: 1 | Status: CANCELLED | OUTPATIENT
Start: 2023-07-05

## 2023-07-05 RX ORDER — LEVOTHYROXINE SODIUM 88 UG/1
88 TABLET ORAL DAILY
Qty: 90 TABLET | Refills: 0 | Status: CANCELLED | OUTPATIENT
Start: 2023-07-05

## 2023-07-05 RX ORDER — GABAPENTIN 800 MG/1
TABLET ORAL
Qty: 90 TABLET | Status: CANCELLED | OUTPATIENT
Start: 2023-07-05

## 2023-07-05 RX ORDER — PANTOPRAZOLE SODIUM 40 MG/1
40 TABLET, DELAYED RELEASE ORAL DAILY
Qty: 90 TABLET | Refills: 1 | Status: CANCELLED | OUTPATIENT
Start: 2023-07-05

## 2023-07-05 NOTE — TELEPHONE ENCOUNTER
Pt only wants to use [facility]. Writer states last time she was seen in office she requested all to go to [facility]. Pt states yes but then Zackary Robertson keeps sending her the meds so she would like to stay with them and she is telling the other pharmacies she does not want the refills. Fax received from [address] [place] [address] for miscellaneous meds/supplies. Pt states she does not need these and is not using [facility]. s/p trach 7/9  - Monitor on heparin drip given pulmonary hemorrhage and competing thrombosis of right arm.   - s/p treatment for Stenotrophomonas in sputum

## 2023-07-06 ENCOUNTER — APPOINTMENT (OUTPATIENT)
Dept: PHYSICAL THERAPY | Age: 68
End: 2023-07-06
Payer: MEDICARE

## 2023-07-06 DIAGNOSIS — E89.0 POSTABLATIVE HYPOTHYROIDISM: ICD-10-CM

## 2023-07-11 ENCOUNTER — HOSPITAL ENCOUNTER (OUTPATIENT)
Dept: PHYSICAL THERAPY | Age: 68
Setting detail: THERAPIES SERIES
Discharge: HOME | End: 2023-07-11
Payer: MEDICARE

## 2023-07-11 PROCEDURE — 97110 THERAPEUTIC EXERCISES: CPT

## 2023-07-11 NOTE — PROGRESS NOTES
Physical Therapy    Physical Therapy Daily Treatment Note    Date:  2023    Patient Name:  Giovana Rojas    :  1955  MRN: [id number]  Restrictions/Precautions:     Medical/Treatment Diagnosis Information:   Diagnosis: R26.9 abnormal gait, R29.6 frequent falls  Treatment Diagnosis: R26.9 abnormality of gait, R29.6 frequent falls  Insurance/Certification information:  PT Insurance Information: [facility] Medicare  Physician Information:   Caryn Rich CNP  Plan of care signed (Y/N): y   Visit# / total visits:  4/10  Pain level: 7/10 - left foot       Time In: 930  Time Out: 1009    Progress Note: []  Yes  [x]  No  Next due by: Visit #10, or 23      Subjective: Reporting that she fell on her way home from therapy last week and her foot has been sore since - reporting 7/10 pain in the left foot. Reporting that she has not had the foot evaluated at all. Objective:    Observation:     Antalgic gait pattern with decreased stance rian on L LE    Did assess left foot. No swelling or redness noted this date. Did have tenderness at the proximal end of 5th metatarsal and around cuboid region. Educated patient if pain persists it would be appropriate to be been. Suspected sensory loss at LE's. DM neuropathy?     Test measurements:  Loss of balance with lunge dynamic activity    Tandem stance & walk requires ccg    Exercises:   Exercise/Equipment Resistance/Repetitions Other comments   NUSTEP 6'  L1   Counter ex 10x HR,TR,March, HS Curls   3-way hip 10x ea    Squat Matrix 10x ea 3-way   Lunges 10x ea    1/2 kneel & up 5x ea    Step up 10x 4'' F,L   Toe tap on step 10x4''    Step up & over 10x ea , 4\" At bar   B hip abd/add Red 15x     Gait with cane     EOFT, ECFA  FOAM   Advanced gait 2x ea Tandem, lateral             [x] Provided verbal/tactile cueing for activities related to strengthening, flexibility, endurance, ROM. (69700)  [] Provided verbal/tactile cueing for activities related to improving balance,

## 2023-07-12 ENCOUNTER — OFFICE VISIT (OUTPATIENT)
Dept: FAMILY MEDICINE CLINIC | Age: 68
End: 2023-07-12
Payer: MEDICARE

## 2023-07-12 ENCOUNTER — HOSPITAL ENCOUNTER (OUTPATIENT)
Age: 68
Discharge: HOME | End: 2023-07-12
Payer: MEDICARE

## 2023-07-12 ENCOUNTER — HOSPITAL ENCOUNTER (OUTPATIENT)
Dept: GENERAL RADIOLOGY | Age: 68
Discharge: HOME | End: 2023-07-14
Payer: MEDICARE

## 2023-07-12 ENCOUNTER — TELEPHONE (OUTPATIENT)
Dept: DIABETES SERVICES | Age: 68
End: 2023-07-12

## 2023-07-12 VITALS
BODY MASS INDEX: 29.16 KG/M2 | RESPIRATION RATE: 16 BRPM | WEIGHT: 164.6 LBS | DIASTOLIC BLOOD PRESSURE: 68 MMHG | OXYGEN SATURATION: 96 % | HEART RATE: 60 BPM | SYSTOLIC BLOOD PRESSURE: 132 MMHG | HEIGHT: 63 IN | TEMPERATURE: 97.3 F

## 2023-07-12 DIAGNOSIS — I10 ESSENTIAL HYPERTENSION: ICD-10-CM

## 2023-07-12 DIAGNOSIS — N18.31 TYPE 2 DIABETES MELLITUS WITH STAGE 3A CHRONIC KIDNEY DISEASE, WITH LONG-TERM CURRENT USE OF INSULIN (HCC): ICD-10-CM

## 2023-07-12 DIAGNOSIS — M79.672 ACUTE FOOT PAIN, LEFT: Primary | ICD-10-CM

## 2023-07-12 DIAGNOSIS — E11.22 TYPE 2 DIABETES MELLITUS WITH STAGE 3A CHRONIC KIDNEY DISEASE, WITH LONG-TERM CURRENT USE OF INSULIN (HCC): ICD-10-CM

## 2023-07-12 DIAGNOSIS — Z79.4 TYPE 2 DIABETES MELLITUS WITH STAGE 3A CHRONIC KIDNEY DISEASE, WITH LONG-TERM CURRENT USE OF INSULIN (HCC): ICD-10-CM

## 2023-07-12 DIAGNOSIS — R29.6 RECURRENT FALLS: ICD-10-CM

## 2023-07-12 DIAGNOSIS — E55.9 VITAMIN D DEFICIENCY: ICD-10-CM

## 2023-07-12 DIAGNOSIS — M79.672 ACUTE FOOT PAIN, LEFT: ICD-10-CM

## 2023-07-12 DIAGNOSIS — E89.0 POSTABLATIVE HYPOTHYROIDISM: ICD-10-CM

## 2023-07-12 DIAGNOSIS — E11.9 TYPE 2 DIABETES MELLITUS WITHOUT COMPLICATION, WITH LONG-TERM CURRENT USE OF INSULIN (HCC): ICD-10-CM

## 2023-07-12 DIAGNOSIS — Z79.4 TYPE 2 DIABETES MELLITUS WITHOUT COMPLICATION, WITH LONG-TERM CURRENT USE OF INSULIN (HCC): ICD-10-CM

## 2023-07-12 DIAGNOSIS — M51.36 DEGENERATIVE DISC DISEASE, LUMBAR: ICD-10-CM

## 2023-07-12 DIAGNOSIS — E78.2 MIXED HYPERLIPIDEMIA: ICD-10-CM

## 2023-07-12 LAB
25(OH)D3 SERPL-MCNC: 46.2 NG/ML
ANION GAP SERPL CALCULATED.3IONS-SCNC: 12 MMOL/L (ref 9–17)
BUN SERPL-MCNC: 25 MG/DL (ref 8–23)
BUN/CREAT SERPL: 17 (ref 9–20)
CALCIUM SERPL-MCNC: 10.8 MG/DL (ref 8.6–10.4)
CHLORIDE SERPL-SCNC: 98 MMOL/L (ref 98–107)
CHOLEST SERPL-MCNC: 209 MG/DL
CHOLESTEROL/HDL RATIO: 5.4
CO2 SERPL-SCNC: 29 MMOL/L (ref 20–31)
CREAT SERPL-MCNC: 1.5 MG/DL (ref 0.5–0.9)
GFR SERPL CREATININE-BSD FRML MDRD: 38 ML/MIN/1.73M2
GLUCOSE SERPL-MCNC: 313 MG/DL (ref 70–99)
HDLC SERPL-MCNC: 39 MG/DL
LDLC SERPL CALC-MCNC: 110 MG/DL (ref 0–130)
POTASSIUM SERPL-SCNC: 4 MMOL/L (ref 3.7–5.3)
SODIUM SERPL-SCNC: 139 MMOL/L (ref 135–144)
TRIGL SERPL-MCNC: 298 MG/DL
TSH SERPL DL<=0.05 MIU/L-ACNC: 0.62 UIU/ML (ref 0.3–5)

## 2023-07-12 PROCEDURE — 3046F HEMOGLOBIN A1C LEVEL >9.0%: CPT | Performed by: FAMILY MEDICINE

## 2023-07-12 PROCEDURE — 73630 X-RAY EXAM OF FOOT: CPT

## 2023-07-12 PROCEDURE — 3074F SYST BP LT 130 MM HG: CPT | Performed by: FAMILY MEDICINE

## 2023-07-12 PROCEDURE — 99213 OFFICE O/P EST LOW 20 MIN: CPT | Performed by: FAMILY MEDICINE

## 2023-07-12 PROCEDURE — 1036F TOBACCO NON-USER: CPT | Performed by: FAMILY MEDICINE

## 2023-07-12 PROCEDURE — 83036 HEMOGLOBIN GLYCOSYLATED A1C: CPT

## 2023-07-12 PROCEDURE — 1123F ACP DISCUSS/DSCN MKR DOCD: CPT | Performed by: FAMILY MEDICINE

## 2023-07-12 PROCEDURE — 1090F PRES/ABSN URINE INCON ASSESS: CPT | Performed by: FAMILY MEDICINE

## 2023-07-12 PROCEDURE — 80048 BASIC METABOLIC PNL TOTAL CA: CPT

## 2023-07-12 PROCEDURE — 99214 OFFICE O/P EST MOD 30 MIN: CPT | Performed by: FAMILY MEDICINE

## 2023-07-12 PROCEDURE — G8399 PT W/DXA RESULTS DOCUMENT: HCPCS | Performed by: FAMILY MEDICINE

## 2023-07-12 PROCEDURE — 80061 LIPID PANEL: CPT

## 2023-07-12 PROCEDURE — G8417 CALC BMI ABV UP PARAM F/U: HCPCS | Performed by: FAMILY MEDICINE

## 2023-07-12 PROCEDURE — 3078F DIAST BP <80 MM HG: CPT | Performed by: FAMILY MEDICINE

## 2023-07-12 PROCEDURE — 84443 ASSAY THYROID STIM HORMONE: CPT

## 2023-07-12 PROCEDURE — 82306 VITAMIN D 25 HYDROXY: CPT

## 2023-07-12 PROCEDURE — 3017F COLORECTAL CA SCREEN DOC REV: CPT | Performed by: FAMILY MEDICINE

## 2023-07-12 PROCEDURE — 36415 COLL VENOUS BLD VENIPUNCTURE: CPT

## 2023-07-12 PROCEDURE — 2022F DILAT RTA XM EVC RTNOPTHY: CPT | Performed by: FAMILY MEDICINE

## 2023-07-12 PROCEDURE — G8427 DOCREV CUR MEDS BY ELIG CLIN: HCPCS | Performed by: FAMILY MEDICINE

## 2023-07-12 RX ORDER — PROCHLORPERAZINE 25 MG/1
SUPPOSITORY RECTAL
Qty: 1 EACH | Refills: 3 | Status: CANCELLED | OUTPATIENT
Start: 2023-07-12

## 2023-07-12 RX ORDER — LEVOTHYROXINE SODIUM 88 UG/1
TABLET ORAL
Qty: 30 TABLET | Refills: 11 | OUTPATIENT
Start: 2023-07-12

## 2023-07-12 NOTE — PROGRESS NOTES
[address] [facility]  [address] [place] [address]  [phone]      Mary Guevara is a 78 y.o. female who presents today for her medical conditions/complaints as noted below. Mary Guevara is c/o of Diabetes      HPI:     Pt had gotten CGM from [employer] [place]; had worn it for 2 weeks, and then last week, when she went to change it, the plastic piece broke and she could not place a new sensor on her arm. She has contacted the company and they are working on sending her a new piece. Due for A1c - last one 15.8% in 2/2023. Checking glucose on old glucose meter - was 173 last night. Usually 130's fasting. Taking Amaryl 2 mg BID, Trulicity 3 mg once weekly on Monday's, and Lantus 35 units nightly. Will be calling Dr. Monica Mcgovern office to make DM eye exam.    Would like a Rx for a rollator walker through [facility]  Has been falling more often - last fall last week and skinned her R knee; states her legs were feeling really tired and her foot caught the edge of the sidewalk  Having more pain in her L foot since the fall over lateral foot; had swelling right after which has already resolved    Has been going to PT 2-3x's per week for balance/gait. Taking Losartan 25 mg daily, Lopressor 25 mg BID, HCTZ 25 mg daily, and Diltiazem 120 mg daily for HTN and a-fib. Also taking ASA 81 mg daily. Taking Gabapentin 800 mg as needed - takes it mostly at night to help with pain and sleep. Doesn't take it often during the day; currently trying to wean off of this, as she doesn't want to be dependent on it.         Past Medical History:   Diagnosis Date    Abdominal pain     left side    Asthma     Atrial fibrillation (HCC)     Bowel obstruction (HCC)     COPD (chronic obstructive pulmonary disease) (HCC)     DDD (degenerative disc disease)     Diabetes mellitus ([address])     Hyperlipidemia     Hypertension     Hyperthyroidism     Type II or unspecified type diabetes mellitus

## 2023-07-13 ENCOUNTER — HOSPITAL ENCOUNTER (OUTPATIENT)
Dept: PHYSICAL THERAPY | Age: 68
Setting detail: THERAPIES SERIES
Discharge: HOME | End: 2023-07-13
Payer: MEDICARE

## 2023-07-13 LAB
EST. AVERAGE GLUCOSE BLD GHB EST-MCNC: 243 MG/DL
HBA1C MFR BLD: 10.1 % (ref 4–6)

## 2023-07-13 NOTE — PROGRESS NOTES
Physical Therapy  Outpatient Physical Therapy    [x] [place]  Phone: [phone]  Fax: [phone]      [] [place]  Phone: [phone]  Fax: [phone]    Physical Therapy  Cancellation/No-show Note  Patient Name:  Martha Zacarias  :  1955   Date:  2023  Cancelled visits to date: 2  No-shows to date: 0    For today's appointment patient:  [x]  Cancelled  []  Rescheduled appointment  []  No-show     Reason given by patient:  [x]  Patient ill  []  Conflicting appointment  []  No transportation    []  Conflict with work  []  No reason given  []  Other:     Comments:    Electronically signed by: Dalila Guadalupe PTA

## 2023-07-18 ENCOUNTER — HOSPITAL ENCOUNTER (OUTPATIENT)
Dept: PHYSICAL THERAPY | Age: 68
Setting detail: THERAPIES SERIES
Discharge: HOME | End: 2023-07-18
Payer: MEDICARE

## 2023-07-18 NOTE — PROGRESS NOTES
Physical Therapy  Outpatient Physical Therapy    [x] [place]  Phone: [phone]  Fax: [phone]      [] [place]  Phone: [phone]  Fax: [phone]    Physical Therapy  Cancellation/No-show Note  Patient Name:  Jerome Florian  :  1955   Date:  2023  Cancelled visits to date: 3  No-shows to date: 0    For today's appointment patient:  [x]  Cancelled  []  Rescheduled appointment  []  No-show     Reason given by patient:  []  Patient ill  []  Conflicting appointment  []  No transportation    []  Conflict with work  []  No reason given  []  Other:     Comments: foot hurts too bad    Electronically signed by: Keila Jean PTA

## 2023-07-20 ENCOUNTER — HOSPITAL ENCOUNTER (OUTPATIENT)
Dept: PHYSICAL THERAPY | Age: 68
Setting detail: THERAPIES SERIES
Discharge: HOME | End: 2023-07-20
Payer: MEDICARE

## 2023-07-20 PROCEDURE — 97110 THERAPEUTIC EXERCISES: CPT

## 2023-07-20 NOTE — PROGRESS NOTES
Physical Therapy    Physical Therapy Daily Treatment Note    Date:  2023    Patient Name:  Shan Wellington    :  1955  MRN: [id number]  Restrictions/Precautions:     Medical/Treatment Diagnosis Information:   Diagnosis: R26.9 abnormal gait, R29.6 frequent falls  Treatment Diagnosis: R26.9 abnormality of gait, R29.6 frequent falls  Insurance/Certification information:  PT Insurance Information: [facility] Medicare  Physician Information:   Teo Westfall CNP  Plan of care signed (Y/N): y   Visit# / total visits:  5/10  Pain level: 0-10       Time In: 943  Time Out: 1021    Progress Note: [x]  Yes  []  No  Next due by: Visit #10, or 23      Subjective: Pt reports doing well this date. Pt reports not having much pain this date with foot feeling better. Pt reports has not been super consistent due to falling. Feels like strength has been improving. Would like to be able to walk with more balance to prevent falls be able to complete ADLs at home. Objective:   CHRISTOS complete per flow chart to facilitate strength, motion and stability to allow ease with daily activities and ambulation. Verbal cuing for progression, technique and order of exercises. Observation:     Antalgic gait pattern with decreased stance time on L LE    Suspected sensory loss at LE's. DM neuropathy?     Test measurements:  TUG no AD:  12 seconds    Rodriguez/56      Exercises:   Exercise/Equipment Resistance/Repetitions Other comments   NUSTEP 6'  L1   Counter ex 10x HR,TR,March, HS Curls   3-way hip 10x ea    Squat Matrix 10x ea 3-way   Lunges 10x ea    1/2 kneel & up 5x ea    Step up 10x 4'' F,L   Toe tap on step 10x4''    Step up & over 10x ea , 4\" At bar   B hip abd/add Red 15x     Gait with cane     EOFT, ECFA  FOAM   Advanced gait 2x ea Tandem, lateral             [x] Provided verbal/tactile cueing for activities related to strengthening, flexibility, endurance, ROM. (53601)  [] Provided verbal/tactile cueing for activities related to

## 2023-07-25 ENCOUNTER — HOSPITAL ENCOUNTER (OUTPATIENT)
Dept: PHYSICAL THERAPY | Age: 68
Setting detail: THERAPIES SERIES
Discharge: HOME | End: 2023-07-25
Payer: MEDICARE

## 2023-07-25 PROCEDURE — 97110 THERAPEUTIC EXERCISES: CPT

## 2023-07-25 NOTE — PROGRESS NOTES
Physical Therapy    Physical Therapy Daily Treatment Note    Date:  2023    Patient Name:  Lucy Ruiz    :  1955  MRN: [id number]  Restrictions/Precautions:     Medical/Treatment Diagnosis Information:   Diagnosis: R26.9 abnormal gait, R29.6 frequent falls  Treatment Diagnosis: R26.9 abnormality of gait, R29.6 frequent falls  Insurance/Certification information:  PT Insurance Information: [facility] Medicare  Physician Information:   Rell Ayala CNP  Plan of care signed (Y/N): y   Visit# / total visits:  2/10 2nd POC, 6 total visits  Pain level: 0/10, 7/10 LBP       Time In: 1:58 PM Time Out: 2:36 PM    Progress Note: []  Yes  [x]  No  Next due by: Visit #10, or 23      Subjective: Pt notes LBP present, L foot pain improved. Objective: Therex per flow sheet for increased B LE stg, stab and ROM and increase balance to increased ease and safety with gait, ADLs, tfers, comm tasks. Observation: Complains of increased knee pain with squat position. Seated RB's secondary to fatigue. Decreased balance as pt fatigues.          Exercises:   Exercise/Equipment Resistance/Repetitions Other comments   NUSTEP 6'  L2   Counter ex 15x HR, March, HS Curls   Hip abd/ext 10x ea    Squat Matrix 10x ea 3-way   Airex NBOS EO  30 sec and with head turns 10x each     FA airex EC 30 sec     Step up 10x 4'' F,L   Toe tap on step 10x4''    Step up & over 10x ea , 4\" At bar   B hip abd/add Red and ball 15x     Gait with cane     tandem 30 sec each     March and side step  10 feet x4 each              [x] Provided verbal/tactile cueing for activities related to strengthening, flexibility, endurance, ROM. (09566)  [] Provided verbal/tactile cueing for activities related to improving balance, coordination, kinesthetic sense, posture, motor skill, proprioception. (31781)    Therapeutic Activities:     [] Therapeutic activities, direct (one-on-one) patient contact (use of dynamic activities to improve functional

## 2023-07-27 ENCOUNTER — HOSPITAL ENCOUNTER (OUTPATIENT)
Dept: PHYSICAL THERAPY | Age: 68
Setting detail: THERAPIES SERIES
Discharge: HOME | End: 2023-07-27
Payer: MEDICARE

## 2023-07-27 PROCEDURE — 97110 THERAPEUTIC EXERCISES: CPT

## 2023-07-27 NOTE — PROGRESS NOTES
Physical Therapy    Physical Therapy Daily Treatment Note    Date:  2023    Patient Name:  Rochelle Miller    :  1955  MRN: [id number]  Restrictions/Precautions:     Medical/Treatment Diagnosis Information:   Diagnosis: R26.9 abnormal gait, R29.6 frequent falls  Treatment Diagnosis: R26.9 abnormality of gait, R29.6 frequent falls  Insurance/Certification information:  PT Insurance Information: [facility] Medicare  Physician Information:   Aj Carter CNP  Plan of care signed (Y/N): y   Visit# / total visits:  3/10 2nd POC, 7 total visits  Pain level: 0/10 at rest        Time In: 1:50 PM        Time Out: 2:20 PM    Progress Note: []  Yes  [x]  No  Next due by: Visit #10, or 23      Subjective:  No pain at entry. Pt notes occ B LE \"lock up\" and \"give out\". Objective: Therex per flow sheet for increased B LE stg, stab and ROM and increase balance to increased ease and safety with gait, ADLs, tfers, comm tasks. Observation:  Seated RB's required secondary to [address]. Decreased balance as pt fatigues. No increased pain noted.         Test measurements:     Exercises:   Exercise/Equipment Resistance/Repetitions Other comments   NUSTEP 6'  L2   Counter ex 15x HR, March on airex no UE   Hip abd/ext 10x ea    Squat Matrix 10x ea 3-way   Airex NBOS EO  30 sec and with head turns 10x each     NBOS airex EC 30 sec     Step up 10x  6in F,L   Toe tap on step 10x  6in         B hip abd/add Green and ball 20x     Gait with cane     tandem 30 sec each     March and side step  10 feet x6 each              [x] Provided verbal/tactile cueing for activities related to strengthening, flexibility, endurance, ROM. (34618)  [] Provided verbal/tactile cueing for activities related to improving balance, coordination, kinesthetic sense, posture, motor skill, proprioception. (17720)    Therapeutic Activities:     [] Therapeutic activities, direct (one-on-one) patient contact (use of dynamic activities to improve

## 2023-08-02 ENCOUNTER — OFFICE VISIT (OUTPATIENT)
Dept: DIABETES SERVICES | Age: 68
End: 2023-08-02
Payer: MEDICARE

## 2023-08-02 VITALS
HEIGHT: 63 IN | BODY MASS INDEX: 30.19 KG/M2 | SYSTOLIC BLOOD PRESSURE: 122 MMHG | WEIGHT: 170.4 LBS | HEART RATE: 68 BPM | DIASTOLIC BLOOD PRESSURE: 60 MMHG

## 2023-08-02 DIAGNOSIS — I10 ESSENTIAL HYPERTENSION: ICD-10-CM

## 2023-08-02 DIAGNOSIS — N18.31 TYPE 2 DIABETES MELLITUS WITH STAGE 3A CHRONIC KIDNEY DISEASE, WITH LONG-TERM CURRENT USE OF INSULIN (HCC): Primary | ICD-10-CM

## 2023-08-02 DIAGNOSIS — E78.2 MIXED HYPERLIPIDEMIA: ICD-10-CM

## 2023-08-02 DIAGNOSIS — Z79.4 TYPE 2 DIABETES MELLITUS WITH STAGE 3A CHRONIC KIDNEY DISEASE, WITH LONG-TERM CURRENT USE OF INSULIN (HCC): Primary | ICD-10-CM

## 2023-08-02 DIAGNOSIS — E11.22 TYPE 2 DIABETES MELLITUS WITH STAGE 3A CHRONIC KIDNEY DISEASE, WITH LONG-TERM CURRENT USE OF INSULIN (HCC): Primary | ICD-10-CM

## 2023-08-02 PROCEDURE — 1123F ACP DISCUSS/DSCN MKR DOCD: CPT | Performed by: NURSE PRACTITIONER

## 2023-08-02 PROCEDURE — 3074F SYST BP LT 130 MM HG: CPT | Performed by: NURSE PRACTITIONER

## 2023-08-02 PROCEDURE — 1036F TOBACCO NON-USER: CPT | Performed by: NURSE PRACTITIONER

## 2023-08-02 PROCEDURE — G8417 CALC BMI ABV UP PARAM F/U: HCPCS | Performed by: NURSE PRACTITIONER

## 2023-08-02 PROCEDURE — 1090F PRES/ABSN URINE INCON ASSESS: CPT | Performed by: NURSE PRACTITIONER

## 2023-08-02 PROCEDURE — 3078F DIAST BP <80 MM HG: CPT | Performed by: NURSE PRACTITIONER

## 2023-08-02 PROCEDURE — G8427 DOCREV CUR MEDS BY ELIG CLIN: HCPCS | Performed by: NURSE PRACTITIONER

## 2023-08-02 PROCEDURE — 99214 OFFICE O/P EST MOD 30 MIN: CPT | Performed by: NURSE PRACTITIONER

## 2023-08-02 PROCEDURE — 3017F COLORECTAL CA SCREEN DOC REV: CPT | Performed by: NURSE PRACTITIONER

## 2023-08-02 PROCEDURE — 3046F HEMOGLOBIN A1C LEVEL >9.0%: CPT | Performed by: NURSE PRACTITIONER

## 2023-08-02 PROCEDURE — 99212 OFFICE O/P EST SF 10 MIN: CPT | Performed by: NURSE PRACTITIONER

## 2023-08-02 PROCEDURE — 2022F DILAT RTA XM EVC RTNOPTHY: CPT | Performed by: NURSE PRACTITIONER

## 2023-08-02 PROCEDURE — G8399 PT W/DXA RESULTS DOCUMENT: HCPCS | Performed by: NURSE PRACTITIONER

## 2023-08-02 RX ORDER — DULAGLUTIDE 4.5 MG/.5ML
4.5 INJECTION, SOLUTION SUBCUTANEOUS WEEKLY
Qty: 12 ADJUSTABLE DOSE PRE-FILLED PEN SYRINGE | Refills: 2 | Status: SHIPPED | OUTPATIENT
Start: 2023-08-02

## 2023-08-02 RX ORDER — INSULIN GLARGINE 100 [IU]/ML
40 INJECTION, SOLUTION SUBCUTANEOUS NIGHTLY
Qty: 12 ML | Refills: 2
Start: 2023-08-02 | End: 2023-10-31

## 2023-08-02 ASSESSMENT — ENCOUNTER SYMPTOMS
SHORTNESS OF BREATH: 0
ABDOMINAL PAIN: 0
DIARRHEA: 0
RESPIRATORY NEGATIVE: 1

## 2023-08-02 NOTE — PROGRESS NOTES
[address] [address]  [phone]        HISTORY:    Rissa Magallanes presents today for evaluation and management of:  Chief Complaint   Patient presents with    Diabetes     1 month follow up after Dexcom  Has been unable to use Dexcom as she needs a sensor sleeve. Has been checking blood glucose by finger stick. Patient Care Team:  Josue Wilson DO as PCP - General (Family Medicine)  [facility] Wilson [facility]  Juany English as Consulting Physician (Pain Management)  Alexander Ocampo MD as Consulting Physician (Cardiology)      Interval History:    Past DM Medications   none     Current Diabetic Medications  Lantus 35 units once daily misses doses   Trulicity 4.5 mg once weekly   Glimepiride 2 mg bid     DKA episodes: 0    06/19/23   Rissa Magallanes is a 78 y.o. female patient who presents to clinic today for her diabetes. she has a history of HTN, CKD, hyperlipidemia and mobility issues which contributes to her diabetes. At previous visit cgm was set up. She is here today for issues with getting dexcom sensor changed she denies any current signs or symptoms of hyper/hypoglycemia. she states they are taking their medications as prescribed without any adverse effects. Diet: regular  Exercise: none  BS testing: needs help with sensor change  Hypoglycemia: No  Hypoglycemia as needed treatment: snack  Issues:   Diabetic foot exam up-to-date: Yes  Diabetic retinal exam up-to-date: Yes     Diabetes complications:nephropathy    08/02/23   Rissa Magallanes is a 80 y.o. female patient who presents to clinic today for her diabetes. she has a history of HTN, CKD, hyperlipidemia and mobility issues which contributes to her diabetes. At previous visit cgm was set up. she denies any current signs or symptoms of hyper/hypoglycemia.  she states they are taking their medications

## 2023-08-03 ENCOUNTER — HOSPITAL ENCOUNTER (OUTPATIENT)
Dept: PHYSICAL THERAPY | Age: 68
Setting detail: THERAPIES SERIES
Discharge: HOME | End: 2023-08-03

## 2023-08-03 NOTE — PROGRESS NOTES
Physical Therapy  Outpatient Physical Therapy    [x] [place]  Phone: [phone]  Fax: [phone]      [] [place]  Phone: [phone]  Fax: [phone]    Physical Therapy  Cancellation/No-show Note  Patient Name:  Giovana Rojas  :  1955   Date:  8/3/2023  Cancelled visits to date: 3  No-shows to date: 1    For today's appointment patient:  []  Cancelled  []  Rescheduled appointment  [x]  No-show     Reason given by patient:  []  Patient ill  []  Conflicting appointment  []  No transportation    []  Conflict with work  [x]  No reason given  []  Other:     Comments: foot hurts too bad    Electronically signed by: Mook Martino PTA

## 2023-08-05 DIAGNOSIS — E89.0 POSTABLATIVE HYPOTHYROIDISM: ICD-10-CM

## 2023-08-07 NOTE — TELEPHONE ENCOUNTER
Caroline Cornelius called requesting a refill of the below medication which has been pended for you:     Requested Prescriptions     Pending Prescriptions Disp Refills    levothyroxine (SYNTHROID) 88 MCG tablet [Pharmacy Med Name: Levothyroxine Sodium 88mcg Tablet] 30 tablet 11     Sig: Take 1 tablet by mouth 30 minutes before breakfast       Last Appointment Date: 7/12/2023  Next Appointment Date: 10/16/2023    Allergies   Allergen Reactions    Norco [Hydrocodone-Acetaminophen]      Nausea and vomiting    Oxycodone Nausea And Vomiting    Percocet [Oxycodone-Acetaminophen] Nausea And Vomiting    Sulfa Antibiotics Other (See Comments)     Hallucinations.

## 2023-08-08 RX ORDER — LEVOTHYROXINE SODIUM 88 UG/1
TABLET ORAL
Qty: 30 TABLET | Refills: 2 | Status: SHIPPED | OUTPATIENT
Start: 2023-08-08

## 2023-08-29 NOTE — TELEPHONE ENCOUNTER
Last dispensed on 4/6, has OV on 4/19, can discuss refills at that time. SUBJECTIVE:   Patient seen and examined at bedside this AM   Downgraded from SICU yesterday. No acute overnight events   Patient well appearing without complaints or concerns   Tolerating diet       MEDICATIONS  (STANDING):  amLODIPine   Tablet 10 milliGRAM(s) Oral every 24 hours  chlorhexidine 2% Cloths 1 Application(s) Topical <User Schedule>  chlorhexidine 2% Cloths 1 Application(s) Topical <User Schedule>  cholestyramine Powder (Sugar-Free) 4 Gram(s) Oral two times a day  dextrose 20%. 500 milliLiter(s) (65 mL/Hr) IV Continuous <Continuous>  dextrose 5%. 1000 milliLiter(s) (50 mL/Hr) IV Continuous <Continuous>  dextrose 50% Injectable 25 Gram(s) IV Push once  dextrose 50% Injectable 25 Gram(s) IV Push once  glucagon  Injectable 1 milliGRAM(s) IntraMuscular once  glucagon  Injectable 1 milliGRAM(s) IntraMuscular once  heparin   Injectable 5000 Unit(s) SubCutaneous every 8 hours  imipenem/cilastatin  IVPB 1000 milliGRAM(s) IV Intermittent every 8 hours  insulin lispro (ADMELOG) corrective regimen sliding scale   SubCutaneous every 6 hours  levothyroxine Injectable 40 MICROGram(s) IV Push <User Schedule>  loperamide 4 milliGRAM(s) Oral every 12 hours  losartan 25 milliGRAM(s) Oral daily  magnesium sulfate  IVPB 1 Gram(s) IV Intermittent once  metoprolol tartrate 25 milliGRAM(s) Oral every 12 hours  nystatin Powder 1 Application(s) Topical three times a day  pantoprazole  Injectable 40 milliGRAM(s) IV Push daily  venlafaxine XR. 300 milliGRAM(s) Oral daily    MEDICATIONS  (PRN):  benzocaine/menthol Lozenge 2 Lozenge Oral every 4 hours PRN Sore Throat  dextrose Oral Gel 15 Gram(s) Oral once PRN Blood Glucose LESS THAN 70 milliGRAM(s)/deciliter  melatonin 5 milliGRAM(s) Oral at bedtime PRN Sleep      Vital Signs Last 24 Hrs  T(C): 36.3 (29 Aug 2023 05:00), Max: 37 (28 Aug 2023 09:15)  T(F): 97.4 (29 Aug 2023 05:00), Max: 98.6 (28 Aug 2023 09:15)  HR: 80 (29 Aug 2023 04:27) (78 - 84)  BP: 133/89 (29 Aug 2023 04:27) (112/65 - 151/67)  BP(mean): 105 (29 Aug 2023 04:27) (78 - 105)  RR: 17 (29 Aug 2023 04:27) (15 - 22)  SpO2: 96% (29 Aug 2023 04:27) (90% - 100%)    Parameters below as of 29 Aug 2023 04:27  Patient On (Oxygen Delivery Method): room air      Physical Exam:  General: NAD, resting comfortably in bed  C/V: NSR  Pulm: Nonlabored breathing, no respiratory distress  Abd: soft, wound vac in place but obstructed/no suction, Poole drain to LIS in fistula (0/30 cc) with surrounding wound manager to gravity (250/370), JOYCE drain in left abdomen with serous output, ileostomy pink, patent, and productive of loose enteric content.  Extrem: WWP, no edema, SCDs in place   Neuro: A&Ox3; no focal deficits       I&O's Summary    28 Aug 2023 07:01  -  29 Aug 2023 07:00  --------------------------------------------------------  IN: 1795 mL / OUT: 2310 mL / NET: -515 mL        LABS:                        10.2   11.43 )-----------( 297      ( 29 Aug 2023 05:30 )             32.4     08-29    130<L>  |  101  |  9   ----------------------------<  101<H>  see note   |  22  |  0.95    Ca    8.4      29 Aug 2023 05:30  Phos  3.6     08-29  Mg     1.8     08-29    TPro  8.5<H>  /  Alb  2.1<L>  /  TBili  3.6<H>  /  DBili  x   /  AST  see note  /  ALT  see note  /  AlkPhos  209<H>  08-29    PT/INR - ( 28 Aug 2023 05:17 )   PT: 15.5 sec;   INR: 1.37          PTT - ( 28 Aug 2023 05:17 )  PTT:35.0 sec  Urinalysis Basic - ( 29 Aug 2023 05:30 )    Color: x / Appearance: x / SG: x / pH: x  Gluc: 101 mg/dL / Ketone: x  / Bili: x / Urobili: x   Blood: x / Protein: x / Nitrite: x   Leuk Esterase: x / RBC: x / WBC x   Sq Epi: x / Non Sq Epi: x / Bacteria: x      CAPILLARY BLOOD GLUCOSE      POCT Blood Glucose.: 83 mg/dL (29 Aug 2023 06:29)  POCT Blood Glucose.: 94 mg/dL (29 Aug 2023 01:49)  POCT Blood Glucose.: 97 mg/dL (28 Aug 2023 23:46)  POCT Blood Glucose.: 103 mg/dL (28 Aug 2023 22:37)  POCT Blood Glucose.: 64 mg/dL (28 Aug 2023 17:53)  POCT Blood Glucose.: 92 mg/dL (28 Aug 2023 11:52)  POCT Blood Glucose.: 77 mg/dL (28 Aug 2023 11:18)  POCT Blood Glucose.: 84 mg/dL (28 Aug 2023 10:44)    LIVER FUNCTIONS - ( 29 Aug 2023 05:30 )  Alb: 2.1 g/dL / Pro: 8.5 g/dL / ALK PHOS: 209 U/L / ALT: see note U/L / AST: see note U/L / GGT: x             RADIOLOGY & ADDITIONAL STUDIES:

## 2023-08-31 ENCOUNTER — OFFICE VISIT (OUTPATIENT)
Dept: DIABETES SERVICES | Age: 68
End: 2023-08-31
Payer: MEDICARE

## 2023-08-31 ENCOUNTER — HOSPITAL ENCOUNTER (OUTPATIENT)
Dept: BONE DENSITY | Age: 68
Discharge: HOME | End: 2023-09-02
Payer: MEDICARE

## 2023-08-31 ENCOUNTER — TELEPHONE (OUTPATIENT)
Dept: INTERNAL MEDICINE | Age: 68
End: 2023-08-31

## 2023-08-31 ENCOUNTER — HOSPITAL ENCOUNTER (OUTPATIENT)
Dept: MAMMOGRAPHY | Age: 68
Discharge: HOME | End: 2023-09-02
Payer: MEDICARE

## 2023-08-31 VITALS — WEIGHT: 165 LBS | BODY MASS INDEX: 29.23 KG/M2 | HEIGHT: 63 IN

## 2023-08-31 VITALS
SYSTOLIC BLOOD PRESSURE: 118 MMHG | HEART RATE: 78 BPM | BODY MASS INDEX: 32.39 KG/M2 | OXYGEN SATURATION: 98 % | DIASTOLIC BLOOD PRESSURE: 84 MMHG | HEIGHT: 60 IN | WEIGHT: 165 LBS

## 2023-08-31 DIAGNOSIS — Z79.4 TYPE 2 DIABETES MELLITUS WITH STAGE 3A CHRONIC KIDNEY DISEASE, WITH LONG-TERM CURRENT USE OF INSULIN (HCC): Primary | ICD-10-CM

## 2023-08-31 DIAGNOSIS — E11.22 TYPE 2 DIABETES MELLITUS WITH STAGE 3A CHRONIC KIDNEY DISEASE, WITH LONG-TERM CURRENT USE OF INSULIN (HCC): Primary | ICD-10-CM

## 2023-08-31 DIAGNOSIS — I10 ESSENTIAL HYPERTENSION: ICD-10-CM

## 2023-08-31 DIAGNOSIS — Z12.31 ENCOUNTER FOR SCREENING MAMMOGRAM FOR BREAST CANCER: ICD-10-CM

## 2023-08-31 DIAGNOSIS — Z78.0 POST-MENOPAUSAL: ICD-10-CM

## 2023-08-31 DIAGNOSIS — E78.2 MIXED HYPERLIPIDEMIA: ICD-10-CM

## 2023-08-31 DIAGNOSIS — N18.31 TYPE 2 DIABETES MELLITUS WITH STAGE 3A CHRONIC KIDNEY DISEASE, WITH LONG-TERM CURRENT USE OF INSULIN (HCC): Primary | ICD-10-CM

## 2023-08-31 PROCEDURE — 3074F SYST BP LT 130 MM HG: CPT | Performed by: NURSE PRACTITIONER

## 2023-08-31 PROCEDURE — 77063 BREAST TOMOSYNTHESIS BI: CPT

## 2023-08-31 PROCEDURE — 3079F DIAST BP 80-89 MM HG: CPT | Performed by: NURSE PRACTITIONER

## 2023-08-31 PROCEDURE — 1090F PRES/ABSN URINE INCON ASSESS: CPT | Performed by: NURSE PRACTITIONER

## 2023-08-31 PROCEDURE — 3046F HEMOGLOBIN A1C LEVEL >9.0%: CPT | Performed by: NURSE PRACTITIONER

## 2023-08-31 PROCEDURE — 99214 OFFICE O/P EST MOD 30 MIN: CPT | Performed by: NURSE PRACTITIONER

## 2023-08-31 PROCEDURE — 1036F TOBACCO NON-USER: CPT | Performed by: NURSE PRACTITIONER

## 2023-08-31 PROCEDURE — 1123F ACP DISCUSS/DSCN MKR DOCD: CPT | Performed by: NURSE PRACTITIONER

## 2023-08-31 PROCEDURE — 2022F DILAT RTA XM EVC RTNOPTHY: CPT | Performed by: NURSE PRACTITIONER

## 2023-08-31 PROCEDURE — G8427 DOCREV CUR MEDS BY ELIG CLIN: HCPCS | Performed by: NURSE PRACTITIONER

## 2023-08-31 PROCEDURE — 99212 OFFICE O/P EST SF 10 MIN: CPT | Performed by: NURSE PRACTITIONER

## 2023-08-31 PROCEDURE — 3017F COLORECTAL CA SCREEN DOC REV: CPT | Performed by: NURSE PRACTITIONER

## 2023-08-31 PROCEDURE — 77080 DXA BONE DENSITY AXIAL: CPT

## 2023-08-31 PROCEDURE — G8417 CALC BMI ABV UP PARAM F/U: HCPCS | Performed by: NURSE PRACTITIONER

## 2023-08-31 PROCEDURE — G8399 PT W/DXA RESULTS DOCUMENT: HCPCS | Performed by: NURSE PRACTITIONER

## 2023-08-31 ASSESSMENT — ENCOUNTER SYMPTOMS
ABDOMINAL PAIN: 0
DIARRHEA: 0
SHORTNESS OF BREATH: 0
RESPIRATORY NEGATIVE: 1

## 2023-08-31 NOTE — TELEPHONE ENCOUNTER
Chart notes from [address] [place] [address] 8/31/2023 faxed to SRAVANTHI MOLINA [facility] per request.

## 2023-08-31 NOTE — PATIENT INSTRUCTIONS
Take insulin daily and do not skip any doses   Bring dexcom reader to every appointment   Bring a1c lab slip

## 2023-08-31 NOTE — PROGRESS NOTES
[address] [address]  [phone]        HISTORY:    Mary Guevara presents today for evaluation and management of:  Chief Complaint   Patient presents with    Diabetes     3 week follow up  Has appointment for DM eye exam on 9/25/23. Patient Care Team:  Desmond Hinton DO as PCP - General (Family Medicine)  [facility]  Dylan Mesa as Consulting Physician (Pain Management)  Christina Shabazz MD as Consulting Physician (Cardiology)      Interval History:    Past DM Medications   none     Current Diabetic Medications  Lantus 30 units once daily misses doses   Trulicity 4.5 mg once weekly   Glimepiride 2 mg bid     DKA episodes: 0    08/02/23   Mary Guevara is a 80 y.o. female patient who presents to clinic today for her diabetes. she has a history of HTN, CKD, hyperlipidemia and mobility issues which contributes to her diabetes. At previous visit cgm was set up. she denies any current signs or symptoms of hyper/hypoglycemia. she states they are taking their medications as prescribed without any adverse effects. She is still having issues with getting dexcom changed every 10 days. Diet: regular   Exercise: none  BS testing: fasting range: 170-160, patient tests 1 time(s) per day  Hypoglycemia: No  Hypoglycemia as needed treatment: snack  Issues:   Diabetic foot exam up-to-date: Yes  Diabetic retinal exam up-to-date: No     Diabetes complications:nephropathy    08/31/23   Mary Guevara is a 80 y.o. female patient who presents to clinic today for her diabetes. she has a history of HTN, CKD, hyperlipidemia and mobility issues  which contributes to her diabetes. At previous visit insulin was increased, she is still missing doses. she denies any current signs or symptoms of hyper/hypoglycemia.  she states they are taking their medications as prescribed

## 2023-08-31 NOTE — TELEPHONE ENCOUNTER
Patient called back to let you know that the name of the lady that she saw was Bobby Farrell and she was from [employer]. Did not have a number.    DAVID

## 2023-09-04 DIAGNOSIS — Z79.4 TYPE 2 DIABETES MELLITUS WITHOUT COMPLICATION, WITH LONG-TERM CURRENT USE OF INSULIN (HCC): ICD-10-CM

## 2023-09-04 DIAGNOSIS — E11.9 TYPE 2 DIABETES MELLITUS WITHOUT COMPLICATION, WITH LONG-TERM CURRENT USE OF INSULIN (HCC): ICD-10-CM

## 2023-09-05 NOTE — TELEPHONE ENCOUNTER
Last Appt:  7/12/2023  Next Appt:   10/16/2023  Med verified in [address] [place] [address] 15  Will need one refill before appt

## 2023-09-09 RX ORDER — GLIMEPIRIDE 2 MG/1
2 TABLET ORAL 2 TIMES DAILY WITH MEALS
Qty: 180 TABLET | Refills: 0 | Status: SHIPPED | OUTPATIENT
Start: 2023-09-09

## 2023-09-14 ENCOUNTER — TELEPHONE (OUTPATIENT)
Dept: INTERNAL MEDICINE | Age: 68
End: 2023-09-14

## 2023-09-14 ENCOUNTER — OFFICE VISIT (OUTPATIENT)
Dept: DIABETES SERVICES | Age: 68
End: 2023-09-14
Payer: MEDICARE

## 2023-09-14 VITALS
HEART RATE: 78 BPM | HEIGHT: 63 IN | OXYGEN SATURATION: 95 % | BODY MASS INDEX: 30.44 KG/M2 | WEIGHT: 171.8 LBS | DIASTOLIC BLOOD PRESSURE: 78 MMHG | SYSTOLIC BLOOD PRESSURE: 116 MMHG

## 2023-09-14 DIAGNOSIS — E78.2 MIXED HYPERLIPIDEMIA: ICD-10-CM

## 2023-09-14 DIAGNOSIS — E11.22 TYPE 2 DIABETES MELLITUS WITH STAGE 3A CHRONIC KIDNEY DISEASE, WITH LONG-TERM CURRENT USE OF INSULIN (HCC): Primary | ICD-10-CM

## 2023-09-14 DIAGNOSIS — N18.31 TYPE 2 DIABETES MELLITUS WITH STAGE 3A CHRONIC KIDNEY DISEASE, WITH LONG-TERM CURRENT USE OF INSULIN (HCC): Primary | ICD-10-CM

## 2023-09-14 DIAGNOSIS — I10 ESSENTIAL HYPERTENSION: ICD-10-CM

## 2023-09-14 DIAGNOSIS — Z79.4 TYPE 2 DIABETES MELLITUS WITH STAGE 3A CHRONIC KIDNEY DISEASE, WITH LONG-TERM CURRENT USE OF INSULIN (HCC): Primary | ICD-10-CM

## 2023-09-14 DIAGNOSIS — E66.09 CLASS 1 OBESITY DUE TO EXCESS CALORIES WITH SERIOUS COMORBIDITY AND BODY MASS INDEX (BMI) OF 30.0 TO 30.9 IN ADULT: ICD-10-CM

## 2023-09-14 PROCEDURE — 99212 OFFICE O/P EST SF 10 MIN: CPT | Performed by: NURSE PRACTITIONER

## 2023-09-14 RX ORDER — LOSARTAN POTASSIUM 25 MG/1
25 TABLET ORAL DAILY
Qty: 90 TABLET | Refills: 3 | Status: CANCELLED | OUTPATIENT
Start: 2023-09-14

## 2023-09-14 RX ORDER — POTASSIUM CHLORIDE 20 MEQ/1
20 TABLET, EXTENDED RELEASE ORAL DAILY
Qty: 90 TABLET | Refills: 3 | Status: CANCELLED | OUTPATIENT
Start: 2023-09-14

## 2023-09-14 RX ORDER — FENOFIBRATE 145 MG/1
145 TABLET, COATED ORAL DAILY
Qty: 90 TABLET | Refills: 3 | Status: CANCELLED | OUTPATIENT
Start: 2023-09-14

## 2023-09-14 RX ORDER — HYDROCHLOROTHIAZIDE 25 MG/1
25 TABLET ORAL EVERY MORNING
Qty: 90 TABLET | Refills: 3 | Status: CANCELLED | OUTPATIENT
Start: 2023-09-14

## 2023-09-14 ASSESSMENT — ENCOUNTER SYMPTOMS
RESPIRATORY NEGATIVE: 1
SHORTNESS OF BREATH: 0
DIARRHEA: 0
ABDOMINAL PAIN: 0

## 2023-09-14 NOTE — TELEPHONE ENCOUNTER
Patient states that she is missing her HCTZ, K, Losartan, and fenofibrate from her mail in order.     Can we please send to cardiology, looks like the prescribed last.

## 2023-09-14 NOTE — TELEPHONE ENCOUNTER
[address] [facility],B-1 Cardiology,     Jody Jackson was seen by Martín Nielson today in diabetic education. At appointment, patient stated that she did not receive several of her medications in her pill packs. Nisha reviewed medications, however they are cardiac medications not prescribed by her. She did not want to fill them at this time as she was  unsure if therapy was continued or they were being held. Please contact patient with questions and follow up.  (HCTZ, K, Losartan, and fenofibrate )

## 2023-09-14 NOTE — PROGRESS NOTES
CHOLHDLRATIO 4.2 11/18/2020           Physical Exam  Constitutional:       Appearance: Normal appearance. HENT:      Head: Normocephalic. Cardiovascular:      Rate and Rhythm: Normal rate. Pulmonary:      Effort: Pulmonary effort is normal.   Musculoskeletal:      Cervical back: Normal range of motion. Neurological:      General: No focal deficit present. Mental Status: She is alert and oriented to person, place, and time. Psychiatric:         Mood and Affect: Mood normal.         Behavior: Behavior normal.         Thought Content: Thought content normal.         Judgment: Judgment normal.           ASSESSMENT/PLAN:     Diagnosis Orders   1. Type 2 diabetes mellitus with stage 3a chronic kidney disease, with long-term current use of insulin ([address])        2. Mixed hyperlipidemia        3. Essential hypertension        4. Class 1 obesity due to excess calories with serious comorbidity and body mass index (BMI) of 30.0 to 30.9 in adult          No orders of the defined types were placed in this encounter. No orders of the defined types were placed in this encounter. Requested Prescriptions      No prescriptions requested or ordered in this encounter       1. Type 2 diabetes mellitus with stage 3a chronic kidney disease, with long-term current use of insulin ([facility])  - Unstable  HbA1C goal is less than 7%. - Fasting blood glucose goal is 70-120mg/dl and postprandial blood sugar goal is less than 180 mg/dl. - Labs reviewed including most recent A1c, UACR and kidney function. Repeat labs due in 3 months.    -We discussed in great detail dietary modifications they can make to better improve their blood sugars. -follow up diabetes education completed, all questions answered. CGM report downloaded and reviewed from the past 2 weeks scanned to media tab. Time in range 4%, 96% hyperglycemia, and hypoglycemia 0%. Predicted A1c per CGM report 10.3% and average glucose 292 mg/dl.    -will increase lantus to

## 2023-09-18 ENCOUNTER — OFFICE VISIT (OUTPATIENT)
Dept: ORTHOPEDIC SURGERY | Age: 68
End: 2023-09-18
Payer: MEDICARE

## 2023-09-18 VITALS
HEART RATE: 87 BPM | WEIGHT: 165 LBS | HEIGHT: 60 IN | DIASTOLIC BLOOD PRESSURE: 77 MMHG | BODY MASS INDEX: 32.39 KG/M2 | SYSTOLIC BLOOD PRESSURE: 129 MMHG

## 2023-09-18 DIAGNOSIS — M79.672 LEFT FOOT PAIN: ICD-10-CM

## 2023-09-18 DIAGNOSIS — M72.2 PLANTAR FASCIITIS: Primary | ICD-10-CM

## 2023-09-18 DIAGNOSIS — M67.02 SHORT ACHILLES TENDON OF LEFT LOWER EXTREMITY: ICD-10-CM

## 2023-09-18 PROCEDURE — L4361 PNEUMA/VAC WALK BOOT PRE OTS: HCPCS | Performed by: NURSE PRACTITIONER

## 2023-09-18 PROCEDURE — 99203 OFFICE O/P NEW LOW 30 MIN: CPT | Performed by: NURSE PRACTITIONER

## 2023-09-18 PROCEDURE — 99214 OFFICE O/P EST MOD 30 MIN: CPT | Performed by: PODIATRIST

## 2023-09-18 PROCEDURE — G8399 PT W/DXA RESULTS DOCUMENT: HCPCS | Performed by: NURSE PRACTITIONER

## 2023-09-18 PROCEDURE — 1090F PRES/ABSN URINE INCON ASSESS: CPT | Performed by: NURSE PRACTITIONER

## 2023-09-18 PROCEDURE — 3078F DIAST BP <80 MM HG: CPT | Performed by: NURSE PRACTITIONER

## 2023-09-18 PROCEDURE — 3074F SYST BP LT 130 MM HG: CPT | Performed by: NURSE PRACTITIONER

## 2023-09-18 PROCEDURE — 1123F ACP DISCUSS/DSCN MKR DOCD: CPT | Performed by: NURSE PRACTITIONER

## 2023-09-18 PROCEDURE — G8417 CALC BMI ABV UP PARAM F/U: HCPCS | Performed by: NURSE PRACTITIONER

## 2023-09-18 PROCEDURE — G8427 DOCREV CUR MEDS BY ELIG CLIN: HCPCS | Performed by: NURSE PRACTITIONER

## 2023-09-18 PROCEDURE — 1036F TOBACCO NON-USER: CPT | Performed by: NURSE PRACTITIONER

## 2023-09-18 PROCEDURE — 3017F COLORECTAL CA SCREEN DOC REV: CPT | Performed by: NURSE PRACTITIONER

## 2023-09-18 RX ORDER — PREDNISONE 10 MG/1
10 TABLET ORAL DAILY
Qty: 14 TABLET | Refills: 0 | Status: SHIPPED | OUTPATIENT
Start: 2023-09-18 | End: 2023-10-02

## 2023-09-22 DIAGNOSIS — E78.2 MIXED HYPERLIPIDEMIA: ICD-10-CM

## 2023-09-22 DIAGNOSIS — I10 ESSENTIAL HYPERTENSION: ICD-10-CM

## 2023-09-25 RX ORDER — HYDROCHLOROTHIAZIDE 25 MG/1
25 TABLET ORAL EVERY MORNING
Qty: 90 TABLET | Refills: 3 | Status: SHIPPED | OUTPATIENT
Start: 2023-09-25

## 2023-09-25 RX ORDER — FENOFIBRATE 145 MG/1
145 TABLET, COATED ORAL DAILY
Qty: 90 TABLET | Refills: 3 | Status: SHIPPED | OUTPATIENT
Start: 2023-09-25

## 2023-09-25 NOTE — PROGRESS NOTES
Results   Component Value Date/Time     07/12/2023 12:50 PM    K 4.0 07/12/2023 12:50 PM    CL 98 07/12/2023 12:50 PM    CO2 29 07/12/2023 12:50 PM    PHOS 3.7 08/10/2018 08:49 AM    BUN 25 07/12/2023 12:50 PM    CREATININE 1.5 07/12/2023 12:50 PM     PT/INR:   Lab Results   Component Value Date/Time    PROTIME 11.0 01/21/2021 06:16 PM    INR 1.0 01/21/2021 06:16 PM     Prealbumin:   Lab Results   Component Value Date/Time    PREALBUMIN 15.7 08/14/2015 10:15 AM     Albumin:  Lab Results   Component Value Date/Time    LABALBU 4.4 02/14/2023 02:11 PM     Sed Rate:  Lab Results   Component Value Date/Time    SEDRATE 15 01/21/2021 08:10 AM     CRP:   Lab Results   Component Value Date/Time    CRP <3.0 01/21/2021 08:10 AM     Micro: No results found for: \"BC\"   Hemoglobin A1C:   Lab Results   Component Value Date/Time    LABA1C 10.1 07/12/2023 12:50 PM       Assessment:     1. Plantar fasciitis  - predniSONE (DELTASONE) 10 MG tablet; Take 1 tablet by mouth daily for 14 days  Dispense: 14 tablet; Refill: 0  - Pneuma/vac walk boot pre ots    2. Short Achilles tendon of left lower extremity  - predniSONE (DELTASONE) 10 MG tablet; Take 1 tablet by mouth daily for 14 days  Dispense: 14 tablet; Refill: 0  - Pneuma/vac walk boot pre ots    3. Left foot pain  - predniSONE (DELTASONE) 10 MG tablet; Take 1 tablet by mouth daily for 14 days  Dispense: 14 tablet;  Refill: 0  - Pneuma/vac walk boot pre ots      Patient Active Problem List   Diagnosis    Cervical neck pain with evidence of disc disease    Paroxysmal atrial fibrillation (HCC)    Graves' disease    Diabetes mellitus due to underlying condition, uncontrolled, with hyperglycemia, with long-term current use of insulin (HCC)    Asthma with COPD ([address])    Essential hypertension    Type 2 diabetes mellitus with stage 3a chronic kidney disease, with long-term current use of insulin (HCC)    Iron deficiency anemia    Irritable bowel syndrome with both constipation and

## 2023-10-04 DIAGNOSIS — E89.0 POSTABLATIVE HYPOTHYROIDISM: ICD-10-CM

## 2023-10-04 RX ORDER — LEVOTHYROXINE SODIUM 88 UG/1
TABLET ORAL
Qty: 30 TABLET | Refills: 11 | OUTPATIENT
Start: 2023-10-04

## 2023-10-06 DIAGNOSIS — J44.89 ASTHMA WITH COPD: ICD-10-CM

## 2023-10-09 NOTE — TELEPHONE ENCOUNTER
Per [facility], last fill 9/8, quantity 60 for 30 days. [facility] a [facility] below medication which has been pended for you:     Requested Prescriptions     Pending Prescriptions Disp Refills    albuterol sulfate HFA (PROVENTIL;VENTOLIN;PROAIR) 108 (90 Base) MCG/ACT inhaler [Pharmacy Med Name: Albuterol Sulfate HFA 90mcg/actuation Inhalation Aerosol] 6.7 each 11     Sig: Inhale 2 puffs by mouth every 4 hours as needed for wheezing    gabapentin (NEURONTIN) 800 MG tablet [Pharmacy Med Name: Gabapentin 800mg Tablet] 60 tablet 11     Sig: Take 1 tablet by mouth 2 times daily. Last Appointment Date: 7/12/2023  Next Appointment Date: 10/16/2023    Allergies   Allergen Reactions    Norco [Hydrocodone-Acetaminophen]      Nausea and vomiting    Oxycodone Nausea And Vomiting    Percocet [Oxycodone-Acetaminophen] Nausea And Vomiting    Sulfa Antibiotics Other (See Comments)     Hallucinations.

## 2023-10-11 RX ORDER — ALBUTEROL SULFATE 90 UG/1
1-2 AEROSOL, METERED RESPIRATORY (INHALATION) EVERY 6 HOURS PRN
Qty: 54 G | Refills: 3 | Status: SHIPPED | OUTPATIENT
Start: 2023-10-11

## 2023-10-11 RX ORDER — GABAPENTIN 800 MG/1
800 TABLET ORAL 2 TIMES DAILY
Qty: 60 TABLET | Refills: 11 | OUTPATIENT
Start: 2023-10-11

## 2023-10-11 NOTE — TELEPHONE ENCOUNTER
Pt just refilled another Gabapentin refill from Dr. Alicia Jean on 10/9 for 30-day supply. Not due for refill now. Can discuss use/refills of this med at pt's next OV on 10/16.

## 2023-10-12 RX ORDER — GABAPENTIN 800 MG/1
800 TABLET ORAL 2 TIMES DAILY
Qty: 60 TABLET | Refills: 11 | OUTPATIENT
Start: 2023-10-12

## 2023-10-13 ENCOUNTER — HOSPITAL ENCOUNTER (OUTPATIENT)
Age: 68
Discharge: HOME | End: 2023-10-13
Payer: MEDICARE

## 2023-10-13 DIAGNOSIS — E89.0 POSTABLATIVE HYPOTHYROIDISM: ICD-10-CM

## 2023-10-13 LAB — TSH SERPL DL<=0.05 MIU/L-ACNC: 0.36 UIU/ML (ref 0.3–5)

## 2023-10-13 PROCEDURE — 84443 ASSAY THYROID STIM HORMONE: CPT

## 2023-10-13 PROCEDURE — 36415 COLL VENOUS BLD VENIPUNCTURE: CPT

## 2023-10-16 ENCOUNTER — OFFICE VISIT (OUTPATIENT)
Dept: FAMILY MEDICINE CLINIC | Age: 68
End: 2023-10-16
Payer: MEDICARE

## 2023-10-16 VITALS
HEIGHT: 63 IN | OXYGEN SATURATION: 95 % | BODY MASS INDEX: 30.51 KG/M2 | HEART RATE: 85 BPM | SYSTOLIC BLOOD PRESSURE: 122 MMHG | TEMPERATURE: 97.6 F | WEIGHT: 172.2 LBS | DIASTOLIC BLOOD PRESSURE: 70 MMHG | RESPIRATION RATE: 16 BRPM

## 2023-10-16 DIAGNOSIS — Z23 NEED FOR INFLUENZA VACCINATION: ICD-10-CM

## 2023-10-16 DIAGNOSIS — Z79.4 TYPE 2 DIABETES MELLITUS WITHOUT COMPLICATION, WITH LONG-TERM CURRENT USE OF INSULIN (HCC): ICD-10-CM

## 2023-10-16 DIAGNOSIS — R29.6 RECURRENT FALLS: ICD-10-CM

## 2023-10-16 DIAGNOSIS — E11.9 TYPE 2 DIABETES MELLITUS WITHOUT COMPLICATION, WITH LONG-TERM CURRENT USE OF INSULIN (HCC): ICD-10-CM

## 2023-10-16 DIAGNOSIS — I10 ESSENTIAL HYPERTENSION: ICD-10-CM

## 2023-10-16 DIAGNOSIS — E89.0 POSTABLATIVE HYPOTHYROIDISM: Primary | ICD-10-CM

## 2023-10-16 DIAGNOSIS — E78.2 MIXED HYPERLIPIDEMIA: ICD-10-CM

## 2023-10-16 DIAGNOSIS — K59.00 CONSTIPATION, UNSPECIFIED CONSTIPATION TYPE: ICD-10-CM

## 2023-10-16 PROCEDURE — 3074F SYST BP LT 130 MM HG: CPT | Performed by: FAMILY MEDICINE

## 2023-10-16 PROCEDURE — 2022F DILAT RTA XM EVC RTNOPTHY: CPT | Performed by: FAMILY MEDICINE

## 2023-10-16 PROCEDURE — G8399 PT W/DXA RESULTS DOCUMENT: HCPCS | Performed by: FAMILY MEDICINE

## 2023-10-16 PROCEDURE — 3017F COLORECTAL CA SCREEN DOC REV: CPT | Performed by: FAMILY MEDICINE

## 2023-10-16 PROCEDURE — G8417 CALC BMI ABV UP PARAM F/U: HCPCS | Performed by: FAMILY MEDICINE

## 2023-10-16 PROCEDURE — 99214 OFFICE O/P EST MOD 30 MIN: CPT | Performed by: FAMILY MEDICINE

## 2023-10-16 PROCEDURE — G8428 CUR MEDS NOT DOCUMENT: HCPCS | Performed by: FAMILY MEDICINE

## 2023-10-16 PROCEDURE — PBSHW INFLUENZA, FLUAD, (AGE 65 Y+), IM, PF, 0.5 ML: Performed by: FAMILY MEDICINE

## 2023-10-16 PROCEDURE — 1090F PRES/ABSN URINE INCON ASSESS: CPT | Performed by: FAMILY MEDICINE

## 2023-10-16 PROCEDURE — 3078F DIAST BP <80 MM HG: CPT | Performed by: FAMILY MEDICINE

## 2023-10-16 PROCEDURE — G8484 FLU IMMUNIZE NO ADMIN: HCPCS | Performed by: FAMILY MEDICINE

## 2023-10-16 PROCEDURE — 90694 VACC AIIV4 NO PRSRV 0.5ML IM: CPT | Performed by: FAMILY MEDICINE

## 2023-10-16 PROCEDURE — 3046F HEMOGLOBIN A1C LEVEL >9.0%: CPT | Performed by: FAMILY MEDICINE

## 2023-10-16 PROCEDURE — 1123F ACP DISCUSS/DSCN MKR DOCD: CPT | Performed by: FAMILY MEDICINE

## 2023-10-16 PROCEDURE — 1036F TOBACCO NON-USER: CPT | Performed by: FAMILY MEDICINE

## 2023-10-16 RX ORDER — PHENOL 1.4 %
1 AEROSOL, SPRAY (ML) MUCOUS MEMBRANE 2 TIMES DAILY
Qty: 60 TABLET | Refills: 11 | Status: SHIPPED | OUTPATIENT
Start: 2023-10-16

## 2023-10-16 RX ORDER — POLYETHYLENE GLYCOL 3350 17 G/17G
17 POWDER, FOR SOLUTION ORAL DAILY PRN
Qty: 100 EACH | Refills: 3 | Status: SHIPPED | OUTPATIENT
Start: 2023-10-16

## 2023-10-16 RX ORDER — CHLORAL HYDRATE 500 MG
2000 CAPSULE ORAL 2 TIMES DAILY
Qty: 360 CAPSULE | Refills: 3 | Status: SHIPPED | OUTPATIENT
Start: 2023-10-16

## 2023-10-16 ASSESSMENT — ENCOUNTER SYMPTOMS
DIARRHEA: 1
ABDOMINAL PAIN: 1

## 2023-10-16 NOTE — PROGRESS NOTES
([address])  -     Hemoglobin A1C; Future  -     Basic Metabolic Panel; Future  3. Essential hypertension  4. Mixed hyperlipidemia  -     Omega-3 Fatty Acids (FISH OIL) 1000 MG capsule; Take 2 capsules by mouth 2 times daily, Disp-360 capsule, R-3Normal  -     Lipid Panel; Future  5. Constipation, unspecified constipation type  -     polyethylene glycol (MIRALAX) 17 g packet; Take 1 packet by mouth daily as needed for Constipation, Disp-100 each, R-3Normal  6. Recurrent falls  7. Need for influenza vaccination  -     Influenza, FLUAD, (age 72 y+), IM, Preservative Free, 0.5 mL         Plan: Will have pt decrease dose of Synthroid to 88 mcg daily, except 1/2 tab on Monday's. Will re-check level again in 3 months. Return in about 3 months (around 1/23/2024). Orders Placed This Encounter   Procedures    Influenza, FLUAD, (age 72 y+), IM, Preservative Free, 0.5 mL    TSH With Reflex Ft4     Standing Status:   Future     Standing Expiration Date:   10/16/2024    Lipid Panel     Standing Status:   Future     Standing Expiration Date:   10/16/2024     Order Specific Question:   Is Patient Fasting?/# of Hours     Answer:   15     Order Specific Question:   Has the patient fasted?      Answer:   Yes    Hemoglobin A1C     Standing Status:   Future     Standing Expiration Date:   75/35/3965    Basic Metabolic Panel     Standing Status:   Future     Standing Expiration Date:   10/16/2024     Orders Placed This Encounter   Medications    Omega-3 Fatty Acids (FISH OIL) 1000 MG capsule     Sig: Take 2 capsules by mouth 2 times daily     Dispense:  360 capsule     Refill:  3    polyethylene glycol (MIRALAX) 17 g packet     Sig: Take 1 packet by mouth daily as needed for Constipation     Dispense:  100 each     Refill:  3    calcium carbonate (CALCIUM 600) 600 MG TABS tablet     Sig: Take 1 tablet by mouth in the morning and at bedtime     Dispense:  60 tablet     Refill:  11       Patient given educational materials - see

## 2023-10-19 DIAGNOSIS — E89.0 POSTABLATIVE HYPOTHYROIDISM: ICD-10-CM

## 2023-10-20 NOTE — TELEPHONE ENCOUNTER
TSH resulted. Teagan Al called requesting a refill of the below medication which has been pended for you:     Requested Prescriptions     Pending Prescriptions Disp Refills    levothyroxine (SYNTHROID) 88 MCG tablet         Last Appointment Date: 10/16/2023  Next Appointment Date: 1/16/2024    Allergies   Allergen Reactions    Norco [Hydrocodone-Acetaminophen]      Nausea and vomiting    Oxycodone Nausea And Vomiting    Percocet [Oxycodone-Acetaminophen] Nausea And Vomiting    Sulfa Antibiotics Other (See Comments)     Hallucinations.

## 2023-10-24 DIAGNOSIS — Z79.4 TYPE 2 DIABETES MELLITUS WITH STAGE 3A CHRONIC KIDNEY DISEASE, WITH LONG-TERM CURRENT USE OF INSULIN (HCC): ICD-10-CM

## 2023-10-24 DIAGNOSIS — N18.31 TYPE 2 DIABETES MELLITUS WITH STAGE 3A CHRONIC KIDNEY DISEASE, WITH LONG-TERM CURRENT USE OF INSULIN (HCC): ICD-10-CM

## 2023-10-24 DIAGNOSIS — E11.22 TYPE 2 DIABETES MELLITUS WITH STAGE 3A CHRONIC KIDNEY DISEASE, WITH LONG-TERM CURRENT USE OF INSULIN (HCC): ICD-10-CM

## 2023-10-24 NOTE — TELEPHONE ENCOUNTER
----- Message from Alida Crawford sent at 10/24/2023 12:07 PM EDT -----  Subject: Refill Request    QUESTIONS  Name of Medication? Dulaglutide (TRULICITY) 4.5 TS/5.3ZI SOPN  Patient-reported dosage and instructions? as prescribed  How many days do you have left? 0  Preferred Pharmacy? [facility] phone number (if available)? [phone]  ---------------------------------------------------------------------------  --------------,  Name of Medication? insulin glargine (LANTUS SOLOSTAR) 100 UNIT/ML   injection pen  Patient-reported dosage and instructions? as prescribed  How many days do you have left? 0  Preferred Pharmacy? [facility] phone number (if available)? [phone]  Additional Information for Provider? Patient has been without this   medication for 1 week - states blood sugar is at 304-needs it sent to this   pharmacy for her to  today; please call patient when RX is sent  ---------------------------------------------------------------------------  --------------  [address]  What is the best way for the office to contact you? OK to leave message on   voicemail  Preferred Call Back Phone Number? [phone]  ---------------------------------------------------------------------------  --------------  SCRIPT ANSWERS  Relationship to Patient?  Self

## 2023-10-24 NOTE — TELEPHONE ENCOUNTER
TANK mosess. Rosemarie Alves called requesting a refill of the below medication which has been pended for you:     Requested Prescriptions     Pending Prescriptions Disp Refills    Dulaglutide (TRULICITY) 4.5 FZ/8.0DV SOPN 12 Adjustable Dose Pre-filled Pen Syringe 2     Sig: Inject 4.5 mg into the skin once a week    insulin glargine (LANTUS SOLOSTAR) 100 UNIT/ML injection pen 12 mL 2     Sig: Inject 40 Units into the skin nightly       Last Appointment Date: 10/16/2023  Next Appointment Date: 1/16/2024    Allergies   Allergen Reactions    Norco [Hydrocodone-Acetaminophen]      Nausea and vomiting    Oxycodone Nausea And Vomiting    Percocet [Oxycodone-Acetaminophen] Nausea And Vomiting    Sulfa Antibiotics Other (See Comments)     Hallucinations.

## 2023-10-25 RX ORDER — LEVOTHYROXINE SODIUM 88 UG/1
TABLET ORAL
Qty: 90 TABLET | Refills: 0 | Status: SHIPPED | OUTPATIENT
Start: 2023-10-25

## 2023-10-25 RX ORDER — INSULIN GLARGINE 100 [IU]/ML
40 INJECTION, SOLUTION SUBCUTANEOUS NIGHTLY
Qty: 12 ML | Refills: 2 | Status: SHIPPED | OUTPATIENT
Start: 2023-10-25 | End: 2024-01-23

## 2023-10-25 RX ORDER — DULAGLUTIDE 4.5 MG/.5ML
4.5 INJECTION, SOLUTION SUBCUTANEOUS WEEKLY
Qty: 12 ADJUSTABLE DOSE PRE-FILLED PEN SYRINGE | Refills: 2 | Status: SHIPPED | OUTPATIENT
Start: 2023-10-25

## 2023-11-03 DIAGNOSIS — E78.2 MIXED HYPERLIPIDEMIA: ICD-10-CM

## 2023-11-03 RX ORDER — ATORVASTATIN CALCIUM 40 MG/1
40 TABLET, FILM COATED ORAL DAILY
Qty: 30 TABLET | Refills: 11 | OUTPATIENT
Start: 2023-11-03

## 2023-11-08 ENCOUNTER — HOSPITAL ENCOUNTER (OUTPATIENT)
Age: 68
Discharge: HOME | End: 2023-11-08
Payer: MEDICARE

## 2023-11-08 ENCOUNTER — OFFICE VISIT (OUTPATIENT)
Dept: DIABETES SERVICES | Age: 68
End: 2023-11-08
Payer: MEDICARE

## 2023-11-08 ENCOUNTER — OFFICE VISIT (OUTPATIENT)
Dept: CARDIOLOGY | Age: 68
End: 2023-11-08
Payer: MEDICARE

## 2023-11-08 VITALS
HEIGHT: 63 IN | HEART RATE: 70 BPM | DIASTOLIC BLOOD PRESSURE: 80 MMHG | SYSTOLIC BLOOD PRESSURE: 136 MMHG | WEIGHT: 175 LBS | BODY MASS INDEX: 31.01 KG/M2 | OXYGEN SATURATION: 97 %

## 2023-11-08 VITALS
HEART RATE: 79 BPM | BODY MASS INDEX: 29.37 KG/M2 | SYSTOLIC BLOOD PRESSURE: 140 MMHG | HEIGHT: 64 IN | DIASTOLIC BLOOD PRESSURE: 80 MMHG | WEIGHT: 172 LBS

## 2023-11-08 DIAGNOSIS — I10 ESSENTIAL HYPERTENSION: ICD-10-CM

## 2023-11-08 DIAGNOSIS — E78.2 MIXED HYPERLIPIDEMIA: ICD-10-CM

## 2023-11-08 DIAGNOSIS — E11.9 TYPE 2 DIABETES MELLITUS WITHOUT COMPLICATION, WITH LONG-TERM CURRENT USE OF INSULIN (HCC): Primary | ICD-10-CM

## 2023-11-08 DIAGNOSIS — E11.9 TYPE 2 DIABETES MELLITUS WITHOUT COMPLICATION, WITH LONG-TERM CURRENT USE OF INSULIN (HCC): ICD-10-CM

## 2023-11-08 DIAGNOSIS — Z79.4 TYPE 2 DIABETES MELLITUS WITHOUT COMPLICATION, WITH LONG-TERM CURRENT USE OF INSULIN (HCC): Primary | ICD-10-CM

## 2023-11-08 DIAGNOSIS — Z79.4 TYPE 2 DIABETES MELLITUS WITHOUT COMPLICATION, WITH LONG-TERM CURRENT USE OF INSULIN (HCC): ICD-10-CM

## 2023-11-08 DIAGNOSIS — I48.0 PAROXYSMAL ATRIAL FIBRILLATION (HCC): Primary | ICD-10-CM

## 2023-11-08 LAB
ANION GAP SERPL CALCULATED.3IONS-SCNC: 9 MMOL/L (ref 9–17)
BUN SERPL-MCNC: 22 MG/DL (ref 8–23)
BUN/CREAT SERPL: 18 (ref 9–20)
CALCIUM SERPL-MCNC: 10.3 MG/DL (ref 8.6–10.4)
CHLORIDE SERPL-SCNC: 101 MMOL/L (ref 98–107)
CO2 SERPL-SCNC: 31 MMOL/L (ref 20–31)
CREAT SERPL-MCNC: 1.2 MG/DL (ref 0.5–0.9)
EST. AVERAGE GLUCOSE BLD GHB EST-MCNC: 260 MG/DL
GFR SERPL CREATININE-BSD FRML MDRD: 50 ML/MIN/1.73M2
GLUCOSE SERPL-MCNC: 234 MG/DL (ref 70–99)
HBA1C MFR BLD: 10.7 % (ref 4–6)
POTASSIUM SERPL-SCNC: 4.1 MMOL/L (ref 3.7–5.3)
SODIUM SERPL-SCNC: 141 MMOL/L (ref 135–144)

## 2023-11-08 PROCEDURE — 1090F PRES/ABSN URINE INCON ASSESS: CPT | Performed by: INTERNAL MEDICINE

## 2023-11-08 PROCEDURE — 93005 ELECTROCARDIOGRAM TRACING: CPT | Performed by: INTERNAL MEDICINE

## 2023-11-08 PROCEDURE — G8427 DOCREV CUR MEDS BY ELIG CLIN: HCPCS | Performed by: INTERNAL MEDICINE

## 2023-11-08 PROCEDURE — 3079F DIAST BP 80-89 MM HG: CPT | Performed by: INTERNAL MEDICINE

## 2023-11-08 PROCEDURE — G8399 PT W/DXA RESULTS DOCUMENT: HCPCS | Performed by: INTERNAL MEDICINE

## 2023-11-08 PROCEDURE — 1123F ACP DISCUSS/DSCN MKR DOCD: CPT | Performed by: INTERNAL MEDICINE

## 2023-11-08 PROCEDURE — G8484 FLU IMMUNIZE NO ADMIN: HCPCS | Performed by: INTERNAL MEDICINE

## 2023-11-08 PROCEDURE — 36415 COLL VENOUS BLD VENIPUNCTURE: CPT

## 2023-11-08 PROCEDURE — 99214 OFFICE O/P EST MOD 30 MIN: CPT | Performed by: INTERNAL MEDICINE

## 2023-11-08 PROCEDURE — 1036F TOBACCO NON-USER: CPT | Performed by: INTERNAL MEDICINE

## 2023-11-08 PROCEDURE — 93010 ELECTROCARDIOGRAM REPORT: CPT | Performed by: INTERNAL MEDICINE

## 2023-11-08 PROCEDURE — 80048 BASIC METABOLIC PNL TOTAL CA: CPT

## 2023-11-08 PROCEDURE — 99212 OFFICE O/P EST SF 10 MIN: CPT | Performed by: INTERNAL MEDICINE

## 2023-11-08 PROCEDURE — 99212 OFFICE O/P EST SF 10 MIN: CPT | Performed by: NURSE PRACTITIONER

## 2023-11-08 PROCEDURE — 83036 HEMOGLOBIN GLYCOSYLATED A1C: CPT

## 2023-11-08 PROCEDURE — G8417 CALC BMI ABV UP PARAM F/U: HCPCS | Performed by: INTERNAL MEDICINE

## 2023-11-08 PROCEDURE — 3077F SYST BP >= 140 MM HG: CPT | Performed by: INTERNAL MEDICINE

## 2023-11-08 PROCEDURE — 3017F COLORECTAL CA SCREEN DOC REV: CPT | Performed by: INTERNAL MEDICINE

## 2023-11-08 RX ORDER — GLIMEPIRIDE 2 MG/1
4 TABLET ORAL 2 TIMES DAILY WITH MEALS
Qty: 180 TABLET | Refills: 3 | Status: SHIPPED | OUTPATIENT
Start: 2023-11-08

## 2023-11-08 ASSESSMENT — ENCOUNTER SYMPTOMS
DIARRHEA: 0
ABDOMINAL PAIN: 0
RESPIRATORY NEGATIVE: 1
SHORTNESS OF BREATH: 0

## 2023-11-08 NOTE — PROGRESS NOTES
physical activity per week. Follow a low carbohydrate diet. Encouraged at least 7 hours of sleep. The patient was informed of the goals of diabetes management. This can only be accomplished by watching their diet and exercise levels. We certainly use medicines to help attain these goals. The consequences of not controlling blood sugars were discussed. These include blindness, heart disease, stroke, kidney disease, and possibly need for dialysis. They were told to be careful with their foot care as diabetics often have nerve damage, infections and risk for limb amutations . They also need a dilated eye exam yearly. We discussed the issues of diet, exercise, medication, complication avoidance, reviewed the signs and symptoms of diabetes, hypoglycemic episodes, significance of HbA1C.     - glimepiride (AMARYL) 2 MG tablet; Take 2 tablets by mouth 2 times daily (with meals)  Dispense: 180 tablet; Refill: 3    2. Mixed hyperlipidemia  stable, lipid panel reviewed, continue current medications. Diet and exercise. 3. Essential hypertension   stable, continue current medications. Diet and exercise Seek emergent care if chest pain develops. Answered all patient questions. Agrees to follow plan of care and to follow up in 3 months, sooner if needed. Call office if unexplained blood sugars less than 70 occur or above 400. Call office or access [facility] with any further questions or concerns. Be sure to bring glucometer/food log at next appointment. Total time spent reviewing chart, labs, counseling patient and documenting on the date of the encounter: 30 min.      Electronically signed by RAJNI Rajput CNP on 11/8/2023 at 12:04 PM      (Please note that portions of this note were completed with a voice-recognition program. Efforts were made to edit the dictation but occasionally words are mis-transcribed.)

## 2023-11-08 NOTE — PATIENT INSTRUCTIONS
Labs due today   Increase glimepiride to 4 mg twice a day   Increase water intake   No midnight snack other than cottage cheese   Work on low carb dinner

## 2023-11-08 NOTE — PROGRESS NOTES
Today's Date: 11/8/2023  Patient's Name: Mary Guevara  Patient's age: 80 y.o., 1955    Subjective:  Mary Guevara is being seen in clinic today regarding PAF, HTN, HL      she is doing well from a cardiac standpoint. No chest pain, no dyspnea, no PND, no syncope or pre-syncope, no orthopnea. Past Medical History:   has a past medical history of Abdominal pain, Asthma, Atrial fibrillation ([address]), Bowel obstruction ([address]), COPD (chronic obstructive pulmonary disease) ([address]), DDD (degenerative disc disease), Diabetes mellitus ([address]), Hyperlipidemia, Hypertension, Hyperthyroidism, and Type II or unspecified type diabetes mellitus without mention of complication, not stated as uncontrolled. Past Surgical History:   has a past surgical history that includes Tubal ligation; Tonsillectomy; Colonoscopy (04/23/2014); Upper gastrointestinal endoscopy (03/16/2016); tracheostomy (2015); tracheostomy closure; Gastrostomy tube placement; gastrostomy tube change; Dental surgery (N/A, 03/08/2017); Insert Midline Catheter (01/22/2021); Colonoscopy (N/A, 01/26/2021); Upper gastrointestinal endoscopy (N/A, 01/26/2021); Colonoscopy (01/26/2021); and Thyroid surgery (2021). Home Medications:  Prior to Admission medications    Medication Sig Start Date End Date Taking?  Authorizing Provider   levothyroxine (SYNTHROID) 88 MCG tablet Take 1 tab by mouth daily, except 1/2 tab on Monday's 10/25/23   Anny Harkins DO   Dulaglutide (TRULICITY) 4.5 MV/4.6PM SOPN Inject 4.5 mg into the skin once a week 10/25/23   RAJNI Lee CNP   insulin glargine (LANTUS SOLOSTAR) 100 UNIT/ML injection pen Inject 40 Units into the skin nightly 10/25/23 1/23/24  RAJNI Lee CNP   Omega-3 Fatty Acids (FISH OIL) 1000 MG capsule Take 2 capsules by mouth 2 times daily 10/16/23   Hallie Harkins DO   polyethylene glycol (MIRALAX) 17 g packet Take 1 packet by mouth daily as needed for Constipation 10/16/23   Black,

## 2023-11-09 DIAGNOSIS — E11.22 TYPE 2 DIABETES MELLITUS WITH STAGE 3A CHRONIC KIDNEY DISEASE, WITH LONG-TERM CURRENT USE OF INSULIN (HCC): Primary | ICD-10-CM

## 2023-11-09 DIAGNOSIS — Z79.4 TYPE 2 DIABETES MELLITUS WITH STAGE 3A CHRONIC KIDNEY DISEASE, WITH LONG-TERM CURRENT USE OF INSULIN (HCC): Primary | ICD-10-CM

## 2023-11-09 DIAGNOSIS — N18.31 TYPE 2 DIABETES MELLITUS WITH STAGE 3A CHRONIC KIDNEY DISEASE, WITH LONG-TERM CURRENT USE OF INSULIN (HCC): Primary | ICD-10-CM

## 2023-11-13 NOTE — TELEPHONE ENCOUNTER
Last prescribed by Dane Gleason, is no longer seeing her. Per [place], last fill 11/7, quantity 60 for 30 days. Jody Schroederr called requesting a refill of the below medication which has been pended for you:     Requested Prescriptions     Pending Prescriptions Disp Refills    gabapentin (NEURONTIN) 800 MG tablet [Pharmacy Med Name: Gabapentin 800mg Tablet] 60 tablet 11     Sig: Take 1 tablet by mouth 2 times daily. Last Appointment Date: 10/16/2023  Next Appointment Date: 1/16/2024    Allergies   Allergen Reactions    Norco [Hydrocodone-Acetaminophen]      Nausea and vomiting    Oxycodone Nausea And Vomiting    Percocet [Oxycodone-Acetaminophen] Nausea And Vomiting    Sulfa Antibiotics Other (See Comments)     Hallucinations.

## 2023-11-15 NOTE — TELEPHONE ENCOUNTER
With pt's current renal function, she is not recommended to take more than 400 mg Gabapentin dose at a time. Would she be willing to decrease to this dose and take BID?

## 2023-11-20 DIAGNOSIS — E78.2 MIXED HYPERLIPIDEMIA: ICD-10-CM

## 2023-11-22 RX ORDER — GABAPENTIN 800 MG/1
TABLET ORAL
Qty: 90 TABLET | Status: CANCELLED | OUTPATIENT
Start: 2023-11-22

## 2023-11-22 RX ORDER — ATORVASTATIN CALCIUM 40 MG/1
40 TABLET, FILM COATED ORAL DAILY
Qty: 90 TABLET | Refills: 3 | Status: SHIPPED | OUTPATIENT
Start: 2023-11-22

## 2023-11-22 RX ORDER — GABAPENTIN 800 MG/1
800 TABLET ORAL 2 TIMES DAILY
Qty: 60 TABLET | Refills: 11 | OUTPATIENT
Start: 2023-11-22

## 2023-11-22 NOTE — TELEPHONE ENCOUNTER
Lipitor prescribed by Dr. Lander Kocher. Nataliia Fung called requesting a refill of the below medication which has been pended for you:     Requested Prescriptions     Pending Prescriptions Disp Refills    atorvastatin (LIPITOR) 40 MG tablet 90 tablet 3     Sig: Take 1 tablet by mouth daily       Last Appointment Date: 10/16/2023  Next Appointment Date: 1/16/2024    Allergies   Allergen Reactions    Norco [Hydrocodone-Acetaminophen]      Nausea and vomiting    Oxycodone Nausea And Vomiting    Percocet [Oxycodone-Acetaminophen] Nausea And Vomiting    Sulfa Antibiotics Other (See Comments)     Hallucinations.

## 2023-12-26 DIAGNOSIS — N18.31 TYPE 2 DIABETES MELLITUS WITH STAGE 3A CHRONIC KIDNEY DISEASE, WITH LONG-TERM CURRENT USE OF INSULIN (HCC): ICD-10-CM

## 2023-12-26 DIAGNOSIS — Z79.4 TYPE 2 DIABETES MELLITUS WITH STAGE 3A CHRONIC KIDNEY DISEASE, WITH LONG-TERM CURRENT USE OF INSULIN (HCC): ICD-10-CM

## 2023-12-26 DIAGNOSIS — E11.22 TYPE 2 DIABETES MELLITUS WITH STAGE 3A CHRONIC KIDNEY DISEASE, WITH LONG-TERM CURRENT USE OF INSULIN (HCC): ICD-10-CM

## 2023-12-26 DIAGNOSIS — E11.9 TYPE 2 DIABETES MELLITUS WITHOUT COMPLICATION, WITH LONG-TERM CURRENT USE OF INSULIN (HCC): ICD-10-CM

## 2023-12-26 DIAGNOSIS — Z79.4 TYPE 2 DIABETES MELLITUS WITHOUT COMPLICATION, WITH LONG-TERM CURRENT USE OF INSULIN (HCC): ICD-10-CM

## 2023-12-26 RX ORDER — INSULIN GLARGINE 100 [IU]/ML
40 INJECTION, SOLUTION SUBCUTANEOUS NIGHTLY
Qty: 12 ML | Refills: 0 | Status: SHIPPED | OUTPATIENT
Start: 2023-12-26 | End: 2024-01-25

## 2023-12-26 NOTE — TELEPHONE ENCOUNTER
Refill request lantus sent to [facility].      Medication is pended if agreeable     Caryn Rich is out of the office all week, Refill forwarded to patients PCP

## 2023-12-26 NOTE — TELEPHONE ENCOUNTER
Dianna called requesting a refill of the below medication which has been pended for you:     Requested Prescriptions     Pending Prescriptions Disp Refills    TRULICITY 1.5 MG/0.5ML SC injection [Pharmacy Med Name: Trulicity 1.5mg/0.5ml Pen] 2 mL 11     Sig: Inject 1.5 mg subcutaneously once a week       Last Appointment Date: 10/16/2023  Next Appointment Date: 1/16/2024    Allergies   Allergen Reactions    Norco [Hydrocodone-Acetaminophen]      Nausea and vomiting    Oxycodone Nausea And Vomiting    Percocet [Oxycodone-Acetaminophen] Nausea And Vomiting    Sulfa Antibiotics Other (See Comments)     Hallucinations.

## 2023-12-26 NOTE — TELEPHONE ENCOUNTER
This refill is set to be sent to [facility] and is for 1.5 mg dose, and there is already a Rx on her med list for 4.[facility] dose to [facility].  Please confirm what she is taking and where she wants medication sent.

## 2024-01-02 DIAGNOSIS — E11.9 TYPE 2 DIABETES MELLITUS WITHOUT COMPLICATION, WITH LONG-TERM CURRENT USE OF INSULIN (HCC): ICD-10-CM

## 2024-01-02 DIAGNOSIS — E89.0 POSTABLATIVE HYPOTHYROIDISM: ICD-10-CM

## 2024-01-02 DIAGNOSIS — Z79.4 TYPE 2 DIABETES MELLITUS WITHOUT COMPLICATION, WITH LONG-TERM CURRENT USE OF INSULIN (HCC): ICD-10-CM

## 2024-01-02 DIAGNOSIS — M50.90 CERVICAL NECK PAIN WITH EVIDENCE OF DISC DISEASE: Primary | ICD-10-CM

## 2024-01-02 DIAGNOSIS — E78.2 MIXED HYPERLIPIDEMIA: ICD-10-CM

## 2024-01-09 DIAGNOSIS — E11.9 TYPE 2 DIABETES MELLITUS WITHOUT COMPLICATION, WITH LONG-TERM CURRENT USE OF INSULIN (HCC): ICD-10-CM

## 2024-01-09 DIAGNOSIS — Z79.4 TYPE 2 DIABETES MELLITUS WITHOUT COMPLICATION, WITH LONG-TERM CURRENT USE OF INSULIN (HCC): ICD-10-CM

## 2024-01-09 DIAGNOSIS — E89.0 POSTABLATIVE HYPOTHYROIDISM: ICD-10-CM

## 2024-01-09 RX ORDER — GLIMEPIRIDE 2 MG/1
4 TABLET ORAL 2 TIMES DAILY WITH MEALS
Qty: 180 TABLET | Refills: 3 | Status: CANCELLED | OUTPATIENT
Start: 2024-01-09

## 2024-01-09 RX ORDER — GLIMEPIRIDE 2 MG/1
4 TABLET ORAL 2 TIMES DAILY WITH MEALS
Qty: 180 TABLET | Refills: 3 | OUTPATIENT
Start: 2024-01-09

## 2024-01-09 RX ORDER — LEVOTHYROXINE SODIUM 88 UG/1
TABLET ORAL
Qty: 90 TABLET | Refills: 0 | OUTPATIENT
Start: 2024-01-09

## 2024-01-09 RX ORDER — DILTIAZEM HYDROCHLORIDE 120 MG/1
CAPSULE, COATED, EXTENDED RELEASE ORAL DAILY
Qty: 30 CAPSULE | Refills: 11 | OUTPATIENT
Start: 2024-01-09

## 2024-01-09 RX ORDER — GLIMEPIRIDE 2 MG/1
2 TABLET ORAL 2 TIMES DAILY WITH MEALS
Qty: 60 TABLET | Refills: 11 | OUTPATIENT
Start: 2024-01-09

## 2024-01-09 NOTE — TELEPHONE ENCOUNTER
Per [place], last fill 12/8/23, quantity 60 for 30 days.     States she has enough levothyroxine to last until labs are completed, can send next rx to [facility]. Does not have any more gabapentin left, needs [facility] to [facility] Pharm[facility]am[facility] called requesting a refill of the below medication which has been pended for you:     Requested Prescriptions     Pending Prescriptions Disp Refills    levothyroxine (SYNTHROID) 88 MCG tablet [Pharmacy Med Name: Levothyroxine Sodium 88mcg Tablet] 28 tablet 11     Sig: Take 1 tab by mouth daily, except one-half tab on Mon    gabapentin (NEURONTIN) 800 MG tablet 90 tablet      Sig: TAKE ONE TABLET BY MOUTH 1-2 TIMES DAILY AS NEEDED FOR CHRONIC NECK AND BACK PAIN     Refused Prescriptions Disp Refills    glimepiride (AMARYL) 2 MG tablet [Pharmacy Med Name: Glimepiride 2mg Tablet] 60 tablet 11     Sig: Take 1 tablet by mouth twice daily with meals     Refused By: LANDRY SANTILLAN     Reason for Refusal: Duplicate Request       Last Appointment Date: 10/16/2023  Next Appointment Date: 1/9/2024    Allergies   Allergen Reactions    Norco [Hydrocodone-Acetaminophen]      Nausea and vomiting    Oxycodone Nausea And Vomiting    Percocet [Oxycodone-Acetaminophen] Nausea And Vomiting    Sulfa Antibiotics Other (See Comments)     Hallucinations.

## 2024-01-09 NOTE — TELEPHONE ENCOUNTER
With pt's current renal function, she is not recommended to take more than 400 mg Gabapentin dose at a time.  Would she be willing to decrease to this 400 mg and take BID?

## 2024-01-09 NOTE — TELEPHONE ENCOUNTER
Spoke with pt, both pended meds are prescribed by Crystal Pinto. Pt has enough to last until she receives next shipment.  States she was on 1.5 but Nisha wanted to increase to 4.5mg.

## 2024-01-09 NOTE — TELEPHONE ENCOUNTER
Can we double check with pt where the 1.5 mg dose came from? And what she is currently taking.  Last refill was from 10/25 for 4.5 mg

## 2024-01-10 NOTE — TELEPHONE ENCOUNTER
Attempted to contact patient.  Phone number is not in service.  Attempt at reaching patient is ongoing.

## 2024-01-11 DIAGNOSIS — N18.31 TYPE 2 DIABETES MELLITUS WITH STAGE 3A CHRONIC KIDNEY DISEASE, WITH LONG-TERM CURRENT USE OF INSULIN (HCC): ICD-10-CM

## 2024-01-11 DIAGNOSIS — Z79.4 TYPE 2 DIABETES MELLITUS WITH STAGE 3A CHRONIC KIDNEY DISEASE, WITH LONG-TERM CURRENT USE OF INSULIN (HCC): ICD-10-CM

## 2024-01-11 DIAGNOSIS — E11.22 TYPE 2 DIABETES MELLITUS WITH STAGE 3A CHRONIC KIDNEY DISEASE, WITH LONG-TERM CURRENT USE OF INSULIN (HCC): ICD-10-CM

## 2024-01-11 RX ORDER — PROCHLORPERAZINE 25 MG/1
SUPPOSITORY RECTAL
Qty: 3 EACH | Refills: 3 | Status: SHIPPED | OUTPATIENT
Start: 2024-01-11

## 2024-01-15 NOTE — TELEPHONE ENCOUNTER
Attmpted to call patient.  Phone number states that it is not in service.  Attempt to reach patient is ongoing.

## 2024-01-16 DIAGNOSIS — E11.22 TYPE 2 DIABETES MELLITUS WITH STAGE 3A CHRONIC KIDNEY DISEASE, WITH LONG-TERM CURRENT USE OF INSULIN (HCC): ICD-10-CM

## 2024-01-16 DIAGNOSIS — Z79.4 TYPE 2 DIABETES MELLITUS WITH STAGE 3A CHRONIC KIDNEY DISEASE, WITH LONG-TERM CURRENT USE OF INSULIN (HCC): ICD-10-CM

## 2024-01-16 DIAGNOSIS — N18.31 TYPE 2 DIABETES MELLITUS WITH STAGE 3A CHRONIC KIDNEY DISEASE, WITH LONG-TERM CURRENT USE OF INSULIN (HCC): ICD-10-CM

## 2024-01-16 RX ORDER — INSULIN GLARGINE 100 [IU]/ML
40 INJECTION, SOLUTION SUBCUTANEOUS NIGHTLY
Qty: 12 ML | Refills: 0 | Status: SHIPPED | OUTPATIENT
Start: 2024-01-16 | End: 2024-02-15

## 2024-01-16 NOTE — TELEPHONE ENCOUNTER
Dianna called requesting a refill of the below medication which has been pended for you: to be Sent to [facility]     Requested Prescriptions     Pending Prescriptions Disp Refills    insulin glargine (LANTUS SOLOSTAR) 100 UNIT/ML injection pen 12 mL 0     Sig: Inject 40 Units into the skin nightly       Last Appointment Date: 11/8/2023  Next Appointment Date: 2/12/2024    Allergies   Allergen Reactions    Norco [Hydrocodone-Acetaminophen]      Nausea and vomiting    Oxycodone Nausea And Vomiting    Percocet [Oxycodone-Acetaminophen] Nausea And Vomiting    Sulfa Antibiotics Other (See Comments)     Hallucinations.

## 2024-01-16 NOTE — TELEPHONE ENCOUNTER
I can refill the lantus. When been trying to reach her regarding the Trulcity. See phone encounters. Can you try to reach pt again to clarify this.

## 2024-01-22 RX ORDER — GABAPENTIN 800 MG/1
800 TABLET ORAL 2 TIMES DAILY PRN
Qty: 180 TABLET | Refills: 0 | OUTPATIENT
Start: 2024-01-22 | End: 2024-04-21

## 2024-01-22 RX ORDER — LEVOTHYROXINE SODIUM 88 UG/1
TABLET ORAL
Qty: 28 TABLET | Refills: 11 | OUTPATIENT
Start: 2024-01-22

## 2024-01-26 ENCOUNTER — HOSPITAL ENCOUNTER (OUTPATIENT)
Age: 69
Discharge: HOME | End: 2024-01-26
Payer: MEDICARE

## 2024-01-26 ENCOUNTER — TELEPHONE (OUTPATIENT)
Dept: FAMILY MEDICINE CLINIC | Age: 69
End: 2024-01-26

## 2024-01-26 DIAGNOSIS — Z79.4 TYPE 2 DIABETES MELLITUS WITH STAGE 3A CHRONIC KIDNEY DISEASE, WITH LONG-TERM CURRENT USE OF INSULIN (HCC): ICD-10-CM

## 2024-01-26 DIAGNOSIS — E11.9 TYPE 2 DIABETES MELLITUS WITHOUT COMPLICATION, WITH LONG-TERM CURRENT USE OF INSULIN (HCC): ICD-10-CM

## 2024-01-26 DIAGNOSIS — E78.2 MIXED HYPERLIPIDEMIA: ICD-10-CM

## 2024-01-26 DIAGNOSIS — N18.31 TYPE 2 DIABETES MELLITUS WITH STAGE 3A CHRONIC KIDNEY DISEASE, WITH LONG-TERM CURRENT USE OF INSULIN (HCC): ICD-10-CM

## 2024-01-26 DIAGNOSIS — E11.22 TYPE 2 DIABETES MELLITUS WITH STAGE 3A CHRONIC KIDNEY DISEASE, WITH LONG-TERM CURRENT USE OF INSULIN (HCC): ICD-10-CM

## 2024-01-26 DIAGNOSIS — Z79.4 TYPE 2 DIABETES MELLITUS WITHOUT COMPLICATION, WITH LONG-TERM CURRENT USE OF INSULIN (HCC): ICD-10-CM

## 2024-01-26 DIAGNOSIS — E89.0 POSTABLATIVE HYPOTHYROIDISM: ICD-10-CM

## 2024-01-26 LAB
ANION GAP SERPL CALCULATED.3IONS-SCNC: 12 MMOL/L (ref 9–17)
BUN SERPL-MCNC: 20 MG/DL (ref 8–23)
BUN/CREAT SERPL: 13 (ref 9–20)
CALCIUM SERPL-MCNC: 10.2 MG/DL (ref 8.6–10.4)
CHLORIDE SERPL-SCNC: 101 MMOL/L (ref 98–107)
CHOLEST SERPL-MCNC: 166 MG/DL (ref 0–199)
CHOLESTEROL/HDL RATIO: 5
CO2 SERPL-SCNC: 27 MMOL/L (ref 20–31)
CREAT SERPL-MCNC: 1.5 MG/DL (ref 0.5–0.9)
EST. AVERAGE GLUCOSE BLD GHB EST-MCNC: 212 MG/DL
GFR SERPL CREATININE-BSD FRML MDRD: 38 ML/MIN/1.73M2
GLUCOSE SERPL-MCNC: 144 MG/DL (ref 70–99)
HBA1C MFR BLD: 9 % (ref 4–6)
HDLC SERPL-MCNC: 32 MG/DL
LDLC SERPL CALC-MCNC: 75 MG/DL (ref 0–100)
POTASSIUM SERPL-SCNC: 4.8 MMOL/L (ref 3.7–5.3)
SODIUM SERPL-SCNC: 140 MMOL/L (ref 135–144)
T4 FREE SERPL-MCNC: 1.1 NG/DL (ref 0.9–1.7)
TRIGL SERPL-MCNC: 297 MG/DL
TSH SERPL DL<=0.05 MIU/L-ACNC: 5.24 UIU/ML (ref 0.3–5)
VLDLC SERPL CALC-MCNC: 59 MG/DL

## 2024-01-26 PROCEDURE — 80048 BASIC METABOLIC PNL TOTAL CA: CPT

## 2024-01-26 PROCEDURE — 84443 ASSAY THYROID STIM HORMONE: CPT

## 2024-01-26 PROCEDURE — 82043 UR ALBUMIN QUANTITATIVE: CPT

## 2024-01-26 PROCEDURE — 36415 COLL VENOUS BLD VENIPUNCTURE: CPT

## 2024-01-26 PROCEDURE — 83036 HEMOGLOBIN GLYCOSYLATED A1C: CPT

## 2024-01-26 PROCEDURE — 82570 ASSAY OF URINE CREATININE: CPT

## 2024-01-26 PROCEDURE — 84439 ASSAY OF FREE THYROXINE: CPT

## 2024-01-26 PROCEDURE — 80061 LIPID PANEL: CPT

## 2024-01-26 NOTE — TELEPHONE ENCOUNTER
Pt returning call. Will do labs today and OK to send in after resulted.  Has been out of levothyroxine since 1/11.   OK to cut down to 400mg gabapentin BID.      Dianna called requesting a refill of the below medication which has been pended for you:     Requested Prescriptions     Pending Prescriptions Disp Refills    levothyroxine (SYNTHROID) 88 MCG tablet 90 tablet 0     Sig: Take 1 tab by mouth daily, except 1/2 tab on Monday's    gabapentin (NEURONTIN) 800 MG tablet 90 tablet      Sig: TAKE ONE TABLET BY MOUTH 1-2 TIMES DAILY AS NEEDED FOR CHRONIC NECK AND BACK PAIN     Refused Prescriptions Disp Refills    glimepiride (AMARYL) 2 MG tablet [Pharmacy Med Name: Glimepiride 2mg Tablet] 60 tablet 11     Sig: Take 1 tablet by mouth twice daily with meals     Refused By: LANDRY SANTILLAN     Reason for Refusal: Duplicate Request    levothyroxine (SYNTHROID) 88 MCG tablet [Pharmacy Med Name: Levothyroxine Sodium 88mcg Tablet] 28 tablet 11     Sig: Take 1 tab by mouth daily, except one-half tab on Mon     Refused By: YNES COLLIER     Reason for Refusal: Other    gabapentin (NEURONTIN) 800 MG tablet 180 tablet 0     Sig: Take 1 tablet by mouth 2 times daily as needed (chronic pain) for up to 90 days.     Refused By: YNES COLLIER     Reason for Refusal: Other       Last Appointment Date: 10/16/2023  Next Appointment Date: 1/9/2024    Allergies   Allergen Reactions    Norco [Hydrocodone-Acetaminophen]      Nausea and vomiting    Oxycodone Nausea And Vomiting    Percocet [Oxycodone-Acetaminophen] Nausea And Vomiting    Sulfa Antibiotics Other (See Comments)     Hallucinations.

## 2024-01-27 LAB
CREAT UR-MCNC: 208 MG/DL (ref 28–217)
MICROALBUMIN UR-MCNC: 59 MG/L (ref 0–20)
MICROALBUMIN/CREAT UR-RTO: 28 MCG/MG CREAT (ref 0–25)

## 2024-01-29 RX ORDER — GABAPENTIN 400 MG/1
400 CAPSULE ORAL 2 TIMES DAILY
Qty: 60 CAPSULE | Refills: 2 | Status: SHIPPED | OUTPATIENT
Start: 2024-01-29 | End: 2024-04-28

## 2024-01-29 RX ORDER — LEVOTHYROXINE SODIUM 88 UG/1
TABLET ORAL
Qty: 90 TABLET | Refills: 0 | Status: SHIPPED | OUTPATIENT
Start: 2024-01-29

## 2024-01-29 RX ORDER — GABAPENTIN 800 MG/1
TABLET ORAL
Qty: 90 TABLET | Status: CANCELLED | OUTPATIENT
Start: 2024-01-29

## 2024-01-29 NOTE — TELEPHONE ENCOUNTER
Controlled Substance Monitoring:    Acute and Chronic Pain Monitoring:   RX Monitoring Periodic Controlled Substance Monitoring   1/29/2024   1:24 PM No signs of potential drug abuse or diversion identified.

## 2024-01-31 ENCOUNTER — OFFICE VISIT (OUTPATIENT)
Dept: FAMILY MEDICINE CLINIC | Age: 69
End: 2024-01-31
Payer: MEDICARE

## 2024-01-31 VITALS
DIASTOLIC BLOOD PRESSURE: 80 MMHG | TEMPERATURE: 97.9 F | SYSTOLIC BLOOD PRESSURE: 140 MMHG | HEIGHT: 63 IN | BODY MASS INDEX: 30.19 KG/M2 | HEART RATE: 77 BPM | RESPIRATION RATE: 18 BRPM | OXYGEN SATURATION: 97 % | WEIGHT: 170.4 LBS

## 2024-01-31 DIAGNOSIS — E87.6 HYPOKALEMIA: ICD-10-CM

## 2024-01-31 DIAGNOSIS — E78.2 MIXED HYPERLIPIDEMIA: ICD-10-CM

## 2024-01-31 DIAGNOSIS — J44.89 ASTHMA WITH COPD: ICD-10-CM

## 2024-01-31 DIAGNOSIS — M25.561 PAIN IN BOTH KNEES, UNSPECIFIED CHRONICITY: ICD-10-CM

## 2024-01-31 DIAGNOSIS — M25.562 PAIN IN BOTH KNEES, UNSPECIFIED CHRONICITY: ICD-10-CM

## 2024-01-31 DIAGNOSIS — K21.9 GASTROESOPHAGEAL REFLUX DISEASE, UNSPECIFIED WHETHER ESOPHAGITIS PRESENT: ICD-10-CM

## 2024-01-31 DIAGNOSIS — Z79.4 TYPE 2 DIABETES MELLITUS WITHOUT COMPLICATION, WITH LONG-TERM CURRENT USE OF INSULIN (HCC): Primary | ICD-10-CM

## 2024-01-31 DIAGNOSIS — E89.0 POSTABLATIVE HYPOTHYROIDISM: ICD-10-CM

## 2024-01-31 DIAGNOSIS — Z79.4 TYPE 2 DIABETES MELLITUS WITHOUT COMPLICATION, WITH LONG-TERM CURRENT USE OF INSULIN (HCC): ICD-10-CM

## 2024-01-31 DIAGNOSIS — R11.2 NAUSEA AND VOMITING, UNSPECIFIED VOMITING TYPE: ICD-10-CM

## 2024-01-31 DIAGNOSIS — E11.9 TYPE 2 DIABETES MELLITUS WITHOUT COMPLICATION, WITH LONG-TERM CURRENT USE OF INSULIN (HCC): ICD-10-CM

## 2024-01-31 DIAGNOSIS — R10.10 UPPER ABDOMINAL PAIN: ICD-10-CM

## 2024-01-31 DIAGNOSIS — E11.9 TYPE 2 DIABETES MELLITUS WITHOUT COMPLICATION, WITH LONG-TERM CURRENT USE OF INSULIN (HCC): Primary | ICD-10-CM

## 2024-01-31 DIAGNOSIS — M62.838 MUSCLE SPASM: ICD-10-CM

## 2024-01-31 PROCEDURE — 3077F SYST BP >= 140 MM HG: CPT | Performed by: FAMILY MEDICINE

## 2024-01-31 PROCEDURE — 3052F HG A1C>EQUAL 8.0%<EQUAL 9.0%: CPT | Performed by: FAMILY MEDICINE

## 2024-01-31 PROCEDURE — 99214 OFFICE O/P EST MOD 30 MIN: CPT

## 2024-01-31 PROCEDURE — 1123F ACP DISCUSS/DSCN MKR DOCD: CPT | Performed by: FAMILY MEDICINE

## 2024-01-31 PROCEDURE — 99214 OFFICE O/P EST MOD 30 MIN: CPT | Performed by: FAMILY MEDICINE

## 2024-01-31 PROCEDURE — 3079F DIAST BP 80-89 MM HG: CPT | Performed by: FAMILY MEDICINE

## 2024-01-31 RX ORDER — GLUCOSAMINE HCL/CHONDROITIN SU 500-400 MG
CAPSULE ORAL
Qty: 200 STRIP | Refills: 3 | Status: SHIPPED | OUTPATIENT
Start: 2024-01-31 | End: 2024-01-31 | Stop reason: SDUPTHER

## 2024-01-31 RX ORDER — ALBUTEROL SULFATE 2.5 MG/3ML
2.5 SOLUTION RESPIRATORY (INHALATION) EVERY 6 HOURS PRN
Qty: 360 ML | Refills: 3 | Status: CANCELLED | OUTPATIENT
Start: 2024-01-31

## 2024-01-31 RX ORDER — LANCETS 30 GAUGE
EACH MISCELLANEOUS
Qty: 200 EACH | Refills: 3 | Status: SHIPPED | OUTPATIENT
Start: 2024-01-31

## 2024-01-31 RX ORDER — ONDANSETRON 4 MG/1
4 TABLET, ORALLY DISINTEGRATING ORAL EVERY 8 HOURS PRN
Qty: 12 TABLET | Refills: 0 | Status: SHIPPED | OUTPATIENT
Start: 2024-01-31

## 2024-01-31 RX ORDER — POTASSIUM CHLORIDE 20 MEQ/1
20 TABLET, EXTENDED RELEASE ORAL DAILY
Qty: 90 TABLET | Refills: 3 | Status: SHIPPED | OUTPATIENT
Start: 2024-01-31

## 2024-01-31 RX ORDER — LANCETS 30 GAUGE
EACH MISCELLANEOUS
Qty: 200 EACH | Refills: 3 | Status: SHIPPED | OUTPATIENT
Start: 2024-01-31 | End: 2024-01-31 | Stop reason: SDUPTHER

## 2024-01-31 RX ORDER — PANTOPRAZOLE SODIUM 40 MG/1
40 TABLET, DELAYED RELEASE ORAL DAILY
Qty: 90 TABLET | Refills: 3 | Status: SHIPPED | OUTPATIENT
Start: 2024-01-31

## 2024-01-31 RX ORDER — CYCLOBENZAPRINE HCL 10 MG
10 TABLET ORAL 2 TIMES DAILY PRN
Qty: 180 TABLET | Refills: 1 | Status: SHIPPED | OUTPATIENT
Start: 2024-01-31

## 2024-01-31 RX ORDER — GLUCOSAMINE HCL/CHONDROITIN SU 500-400 MG
CAPSULE ORAL
Qty: 200 STRIP | Refills: 3 | Status: SHIPPED | OUTPATIENT
Start: 2024-01-31

## 2024-01-31 RX ORDER — FLUTICASONE PROPIONATE AND SALMETEROL 250; 50 UG/1; UG/1
1 POWDER RESPIRATORY (INHALATION) 2 TIMES DAILY
Qty: 180 EACH | Refills: 3 | Status: SHIPPED | OUTPATIENT
Start: 2024-01-31

## 2024-01-31 NOTE — TELEPHONE ENCOUNTER
[facility] Pharm called said patient was there and wanted those two Rx to go to [facility] for today because her dexcom isn't available till tomorrow with her insurance.

## 2024-01-31 NOTE — PROGRESS NOTES
[facility]  [address] [place] [address]  [phone]      Dianna Jameson is a 68 y.o. female who presents today for her medical conditions/complaints as noted below.  Dianna Jameson is c/o of Hypothyroidism and Nausea (X 2 weeks)      HPI:     Pt here today for follow-up of hypothyroidism and nausea.    A1c improved from 10.7% to 9.0% when last checked on 1/26/23.  Has been trying to eat more vegetables, fruits, smoothies, etc.  Has been decreasing greasy foods.  Taking Amaryl 4 mg BID and Lantus 40 units in nightly, along with Trulicity 4.5 mg once weekly on Monday's.  This AM, her glucose was 119; yesterday, it was 135.  Had one low last week to 77 overnight.    Seeing Nisha Pinto - next visit on 2/12.  Has not been able to wear CGM recently -has not been able to get her supplies regularly through the mail for this, so she does not have it on right now.      Has been waking up feeling more nauseous x past few weeks; has been vomiting about 50% of the days, sometimes after she has just taken her meds.  Eats dinner around 5:30-6:00 and not eat after that (for weight loss) - denies that she is eating greasy/spicy/fried foods for dinner that would be irritating her stomach all night.  Did not vomit this AM after eating Cream of Wheat and tea.  Eating breakfast does not typically improve the nausea; will finally resolve on its own gradually.  Sometimes it has resolved after moving her bowels a couple hours after breakfast.  Had one episode of vomiting last week when loading her groceries in the car - she got a sharp pain in her mid-abdomen and then suddenly vomited.  Still taking Protonix 40 mg daily each morning.     Taking Synthroid 88 mcg daily, except 1/2 tab on Monday's; however pt had run out of this on 1/11 - waiting on refill from mail-order pharmacy.  TSH was elevated at 5.24 on 1/26 - will re-check it when she's been back on med for 8 weeks.    Taking HCTZ

## 2024-02-02 ENCOUNTER — HOSPITAL ENCOUNTER (OUTPATIENT)
Dept: INTERVENTIONAL RADIOLOGY/VASCULAR | Age: 69
End: 2024-02-02
Attending: FAMILY MEDICINE
Payer: MEDICARE

## 2024-02-02 ENCOUNTER — HOSPITAL ENCOUNTER (OUTPATIENT)
Age: 69
End: 2024-02-02
Payer: MEDICARE

## 2024-02-02 DIAGNOSIS — R10.10 UPPER ABDOMINAL PAIN: ICD-10-CM

## 2024-02-02 DIAGNOSIS — R11.2 NAUSEA AND VOMITING, UNSPECIFIED VOMITING TYPE: ICD-10-CM

## 2024-02-02 PROCEDURE — 76705 ECHO EXAM OF ABDOMEN: CPT

## 2024-02-05 DIAGNOSIS — N18.31 TYPE 2 DIABETES MELLITUS WITH STAGE 3A CHRONIC KIDNEY DISEASE, WITH LONG-TERM CURRENT USE OF INSULIN (HCC): ICD-10-CM

## 2024-02-05 DIAGNOSIS — Z79.4 TYPE 2 DIABETES MELLITUS WITH STAGE 3A CHRONIC KIDNEY DISEASE, WITH LONG-TERM CURRENT USE OF INSULIN (HCC): ICD-10-CM

## 2024-02-05 DIAGNOSIS — E11.22 TYPE 2 DIABETES MELLITUS WITH STAGE 3A CHRONIC KIDNEY DISEASE, WITH LONG-TERM CURRENT USE OF INSULIN (HCC): ICD-10-CM

## 2024-02-06 RX ORDER — DULAGLUTIDE 4.5 MG/.5ML
4.5 INJECTION, SOLUTION SUBCUTANEOUS WEEKLY
Qty: 12 ADJUSTABLE DOSE PRE-FILLED PEN SYRINGE | Refills: 2 | Status: SHIPPED | OUTPATIENT
Start: 2024-02-06

## 2024-02-07 RX ORDER — DULAGLUTIDE 1.5 MG/.5ML
1.5 INJECTION, SOLUTION SUBCUTANEOUS WEEKLY
Qty: 2 ML | Refills: 0 | OUTPATIENT
Start: 2024-02-07

## 2024-02-07 NOTE — TELEPHONE ENCOUNTER
Crystal,     I am sending this back to you as it will not let me cancel the order.  Patient has appointment next week. Are you able to cancel order ?

## 2024-02-08 DIAGNOSIS — I48.0 PAROXYSMAL ATRIAL FIBRILLATION (HCC): ICD-10-CM

## 2024-02-08 RX ORDER — DILTIAZEM HYDROCHLORIDE 120 MG/1
CAPSULE, EXTENDED RELEASE ORAL DAILY
Qty: 90 CAPSULE | Refills: 3 | Status: SHIPPED | OUTPATIENT
Start: 2024-02-08

## 2024-02-09 ENCOUNTER — OFFICE VISIT (OUTPATIENT)
Dept: CARDIOLOGY | Age: 69
End: 2024-02-09
Payer: MEDICARE

## 2024-02-09 VITALS
HEART RATE: 70 BPM | HEIGHT: 63 IN | DIASTOLIC BLOOD PRESSURE: 80 MMHG | WEIGHT: 176.8 LBS | BODY MASS INDEX: 31.33 KG/M2 | SYSTOLIC BLOOD PRESSURE: 132 MMHG

## 2024-02-09 DIAGNOSIS — I10 ESSENTIAL HYPERTENSION: ICD-10-CM

## 2024-02-09 DIAGNOSIS — I48.0 PAROXYSMAL ATRIAL FIBRILLATION (HCC): Primary | ICD-10-CM

## 2024-02-09 PROCEDURE — 1123F ACP DISCUSS/DSCN MKR DOCD: CPT | Performed by: INTERNAL MEDICINE

## 2024-02-09 PROCEDURE — 99214 OFFICE O/P EST MOD 30 MIN: CPT | Performed by: INTERNAL MEDICINE

## 2024-02-09 PROCEDURE — 93005 ELECTROCARDIOGRAM TRACING: CPT | Performed by: INTERNAL MEDICINE

## 2024-02-09 PROCEDURE — 3079F DIAST BP 80-89 MM HG: CPT | Performed by: INTERNAL MEDICINE

## 2024-02-09 PROCEDURE — 3075F SYST BP GE 130 - 139MM HG: CPT | Performed by: INTERNAL MEDICINE

## 2024-02-09 PROCEDURE — 93010 ELECTROCARDIOGRAM REPORT: CPT | Performed by: INTERNAL MEDICINE

## 2024-02-09 NOTE — PROGRESS NOTES
Date:   2/9/2024  Patient name:  Dianna Jameson  MRN:   [id number]  YOB: 1955  PCP:    Anny Harkins DO    Reason for visit: had concerns including Atrial Fibrillation, Hypertension, and Hyperlipidemia.    Subjective:       Clinical Changes / Abnormalities:Denies exertional chest pain or SOB, no dizziness or syncope and no palpitations.       ROS:   General: No dizziness or syncope and no fever or chills, no fatigue.   Chest: No cough or phlegm, no SOB  Heart: No chest pain, no SOB, no palpitations.   GI: No abdominal pain, no nausea or vomiting. No diarrhea or constipation.   Lower extremity: No claudication, no myalgia      Medications:     Current Outpatient Medications   Medication Sig Dispense Refill    DILT- MG extended release capsule Take 1 capsule by mouth every day 90 capsule 3    Dulaglutide (TRULICITY) 4.5 MG/0.5ML SOPN Inject 4.5 mg into the skin once a week 12 Adjustable Dose Pre-filled Pen Syringe 2    potassium chloride (KLOR-CON M) 20 MEQ extended release tablet Take 1 tablet by mouth daily 90 tablet 3    cyclobenzaprine (FLEXERIL) 10 MG tablet Take 1 tablet by mouth 2 times daily as needed for Muscle spasms 180 tablet 1    diclofenac sodium (VOLTAREN) 1 % GEL Apply 4 g topically 4 times daily as needed for Pain 1050 g 3    fluticasone-salmeterol (ADVAIR) 250-50 MCG/ACT AEPB diskus inhaler Inhale 1 puff into the lungs 2 times daily 180 each 3    pantoprazole (PROTONIX) 40 MG tablet Take 1 tablet by mouth daily 90 tablet 3    ondansetron (ZOFRAN-ODT) 4 MG disintegrating tablet Take 1 tablet by mouth every 8 hours as needed for Nausea or Vomiting 12 tablet 0    blood glucose monitor strips Use to check blood glucose twice daily. 200 strip 3    Lancets MISC Use to check blood glucose twice daily. Please dispense per patients insurance. 200 each 3    levothyroxine (SYNTHROID) 88 MCG tablet Take 1 tab by mouth daily, except 1/2 tab on Monday's 90 tablet 0

## 2024-02-12 ENCOUNTER — OFFICE VISIT (OUTPATIENT)
Dept: DIABETES SERVICES | Age: 69
End: 2024-02-12
Payer: MEDICARE

## 2024-02-12 VITALS
HEART RATE: 73 BPM | WEIGHT: 179.8 LBS | OXYGEN SATURATION: 97 % | SYSTOLIC BLOOD PRESSURE: 112 MMHG | BODY MASS INDEX: 31.86 KG/M2 | DIASTOLIC BLOOD PRESSURE: 78 MMHG | HEIGHT: 63 IN

## 2024-02-12 DIAGNOSIS — E78.2 MIXED HYPERLIPIDEMIA: ICD-10-CM

## 2024-02-12 DIAGNOSIS — E11.9 TYPE 2 DIABETES MELLITUS WITHOUT COMPLICATION, WITH LONG-TERM CURRENT USE OF INSULIN (HCC): Primary | ICD-10-CM

## 2024-02-12 DIAGNOSIS — Z79.4 TYPE 2 DIABETES MELLITUS WITHOUT COMPLICATION, WITH LONG-TERM CURRENT USE OF INSULIN (HCC): Primary | ICD-10-CM

## 2024-02-12 DIAGNOSIS — I10 ESSENTIAL HYPERTENSION: ICD-10-CM

## 2024-02-12 PROCEDURE — 95251 CONT GLUC MNTR ANALYSIS I&R: CPT | Performed by: NURSE PRACTITIONER

## 2024-02-12 PROCEDURE — 99212 OFFICE O/P EST SF 10 MIN: CPT

## 2024-02-12 PROCEDURE — 99214 OFFICE O/P EST MOD 30 MIN: CPT | Performed by: NURSE PRACTITIONER

## 2024-02-12 PROCEDURE — 3052F HG A1C>EQUAL 8.0%<EQUAL 9.0%: CPT | Performed by: NURSE PRACTITIONER

## 2024-02-12 PROCEDURE — 3078F DIAST BP <80 MM HG: CPT | Performed by: NURSE PRACTITIONER

## 2024-02-12 PROCEDURE — 3074F SYST BP LT 130 MM HG: CPT | Performed by: NURSE PRACTITIONER

## 2024-02-12 PROCEDURE — 1123F ACP DISCUSS/DSCN MKR DOCD: CPT | Performed by: NURSE PRACTITIONER

## 2024-02-12 NOTE — PROGRESS NOTES
release capsule Take 1 capsule by mouth every day 90 capsule 3    Dulaglutide (TRULICITY) 4.5 MG/0.5ML SOPN Inject 4.5 mg into the skin once a week 12 Adjustable Dose Pre-filled Pen Syringe 2    cyclobenzaprine (FLEXERIL) 10 MG tablet Take 1 tablet by mouth 2 times daily as needed for Muscle spasms 180 tablet 1    diclofenac sodium (VOLTAREN) 1 % GEL Apply 4 g topically 4 times daily as needed for Pain 1050 g 3    fluticasone-salmeterol (ADVAIR) 250-50 MCG/ACT AEPB diskus inhaler Inhale 1 puff into the lungs 2 times daily 180 each 3    pantoprazole (PROTONIX) 40 MG tablet Take 1 tablet by mouth daily 90 tablet 3    ondansetron (ZOFRAN-ODT) 4 MG disintegrating tablet Take 1 tablet by mouth every 8 hours as needed for Nausea or Vomiting 12 tablet 0    blood glucose monitor strips Use to check blood glucose twice daily. 200 strip 3    Lancets MISC Use to check blood glucose twice daily. Please dispense per patients insurance. 200 each 3    levothyroxine (SYNTHROID) 88 MCG tablet Take 1 tab by mouth daily, except 1/2 tab on Monday's 90 tablet 0    gabapentin (NEURONTIN) 400 MG capsule Take 1 capsule by mouth in the morning and at bedtime for 90 days. 60 capsule 2    insulin glargine (LANTUS SOLOSTAR) 100 UNIT/ML injection pen Inject 40 Units into the skin nightly 12 mL 0    Continuous Blood Gluc Sensor (DEXCOM G6 SENSOR) MISC Use once sensor every 10 days 3 each 3    atorvastatin (LIPITOR) 40 MG tablet Take 1 tablet by mouth daily 90 tablet 3    glimepiride (AMARYL) 2 MG tablet Take 2 tablets by mouth 2 times daily (with meals) 180 tablet 3    Omega-3 Fatty Acids (FISH OIL) 1000 MG capsule Take 2 capsules by mouth 2 times daily 360 capsule 3    polyethylene glycol (MIRALAX) 17 g packet Take 1 packet by mouth daily as needed for Constipation 100 each 3    calcium carbonate (CALCIUM 600) 600 MG TABS tablet Take 1 tablet by mouth in the morning and at bedtime 60 tablet 11    albuterol sulfate HFA

## 2024-02-19 DIAGNOSIS — I10 ESSENTIAL HYPERTENSION: ICD-10-CM

## 2024-02-19 DIAGNOSIS — I48.0 PAROXYSMAL ATRIAL FIBRILLATION (HCC): ICD-10-CM

## 2024-02-19 RX ORDER — LOSARTAN POTASSIUM 25 MG/1
25 TABLET ORAL DAILY
Qty: 30 TABLET | Refills: 11 | Status: SHIPPED | OUTPATIENT
Start: 2024-02-19

## 2024-02-19 NOTE — TELEPHONE ENCOUNTER
"Subjective     Georgina Rosas is a 23 y.o. female who presents with Otalgia (L x3 days )    HPI:  Georgina Rosas is a 23 y.o. female who presents today for evaluation of left ear pain.  Patient reports that approximate 10 days ago she started to get a \"head cold\".  She has been having cough, nasal mentions runny nose, sore throat, fatigue, and body aches.  She was also initially having fever/chills but this has since passed.  3 days ago she started to get left ear pain.  She has been doing ear pain drops which provided no relief.      Review of Systems   Constitutional: Positive for chills, fever and malaise/fatigue.   HENT: Positive for congestion, ear pain and sore throat. Negative for sinus pain.    Eyes: Negative for blurred vision and pain.   Respiratory: Positive for cough. Negative for shortness of breath.    Cardiovascular: Negative for chest pain and palpitations.   Gastrointestinal: Negative for abdominal pain, diarrhea, nausea and vomiting.   Musculoskeletal: Positive for myalgias.   Skin: Negative for rash.   Neurological: Positive for headaches. Negative for dizziness.       PMH:  has no past medical history on file.  MEDS:   Current Outpatient Medications:   •  lidocaine (XYLOCAINE) 2 % Solution, Take 15 mL by mouth every four hours as needed for Throat/Mouth Pain. Gargle and spit every 4 hours as needed (Patient not taking: Reported on 12/11/2021), Disp: 120 mL, Rfl: 0  •  gabapentin (NEURONTIN) 100 MG Cap, Take 1 Cap by mouth 3 times a day. (Patient not taking: Reported on 9/1/2019), Disp: 90 Cap, Rfl: 0  ALLERGIES: No Known Allergies  SURGHX: No past surgical history on file.  SOCHX:  reports that she has never smoked. She has never used smokeless tobacco. She reports current alcohol use. She reports current drug use. Drugs: Marijuana and Inhaled.  FH: Family history was reviewed, no pertinent findings to report      Objective     Temp 36.6 °C (97.9 °F) (Temporal)   Resp 14   Ht 1.499 m " Last Appt:  1/31/2024  Next Appt:   4/30/2024  Med verified in Epic     "(4' 11\")   Wt 93.4 kg (206 lb)   BMI 41.61 kg/m²      Physical Exam  Constitutional:       Appearance: She is well-developed.   HENT:      Head: Normocephalic and atraumatic.      Right Ear: Tympanic membrane, ear canal and external ear normal.      Left Ear: Ear canal and external ear normal. No swelling or tenderness. Tympanic membrane is erythematous and retracted. Tympanic membrane is not perforated.      Nose: Mucosal edema and congestion present. No rhinorrhea.      Mouth/Throat:      Lips: Pink.      Mouth: Mucous membranes are moist.      Pharynx: Oropharynx is clear. Uvula midline. Posterior oropharyngeal erythema present. No uvula swelling.      Tonsils: Tonsillar exudate present. No tonsillar abscesses. 2+ on the right. 2+ on the left.   Eyes:      Conjunctiva/sclera: Conjunctivae normal.      Pupils: Pupils are equal, round, and reactive to light.   Cardiovascular:      Rate and Rhythm: Normal rate and regular rhythm.      Heart sounds: Normal heart sounds. No murmur heard.      Pulmonary:      Effort: Pulmonary effort is normal.      Breath sounds: Normal breath sounds. No wheezing.   Musculoskeletal:      Cervical back: Normal range of motion.   Lymphadenopathy:      Cervical: Cervical adenopathy present.   Skin:     General: Skin is warm and dry.      Capillary Refill: Capillary refill takes less than 2 seconds.   Neurological:      Mental Status: She is alert and oriented to person, place, and time.   Psychiatric:         Behavior: Behavior normal.         Judgment: Judgment normal.             Assessment & Plan       1. Exudative tonsillitis  - amoxicillin-clavulanate (AUGMENTIN) 875-125 MG Tab; Take 1 Tablet by mouth 2 times a day for 10 days.  Dispense: 20 Tablet; Refill: 0  -Supportive care discussed to include salt water gargles, throat lozenges, and increased fluid intake.    2. Non-recurrent acute suppurative otitis media of left ear without spontaneous rupture of tympanic membrane  - " amoxicillin-clavulanate (AUGMENTIN) 875-125 MG Tab; Take 1 Tablet by mouth 2 times a day for 10 days.  Dispense: 20 Tablet; Refill: 0      No point-of-care strep testing available in the urgent care setting today but patient's symptoms are highly consistent with strep pharyngitis.  Already treating for acute otitis media's we will extend the course of antibiotics from 7 days to 10 days to cover for strep throat as well.  Patient was advised that she is contagious until she has been on the antibiotics for a full 24 hours.          Differential Diagnosis, natural history, and supportive care discussed. Return to the Urgent Care or follow up with your PCP if symptoms fail to resolve, or for any new or worsening symptoms. Emergency room precautions discussed. Patient and/or family appears understanding of information.

## 2024-02-20 DIAGNOSIS — E11.9 TYPE 2 DIABETES MELLITUS WITHOUT COMPLICATION, WITH LONG-TERM CURRENT USE OF INSULIN (HCC): ICD-10-CM

## 2024-02-20 DIAGNOSIS — Z79.4 TYPE 2 DIABETES MELLITUS WITHOUT COMPLICATION, WITH LONG-TERM CURRENT USE OF INSULIN (HCC): ICD-10-CM

## 2024-02-20 RX ORDER — GLUCOSAMINE HCL/CHONDROITIN SU 500-400 MG
CAPSULE ORAL
Qty: 200 STRIP | Refills: 3 | Status: SHIPPED | OUTPATIENT
Start: 2024-02-20

## 2024-02-20 NOTE — TELEPHONE ENCOUNTER
Patient called in stating that her dexcom is not working. Patient states she charged it and took it off the changer and tried scanning and it would not work. I did give patient dexcom support phone number. However patient does have blood glucose meter and lancets. If agreeable can we please send in test strips to [facility]. Please advise.

## 2024-02-28 ENCOUNTER — TELEPHONE (OUTPATIENT)
Dept: ONCOLOGY | Age: 69
End: 2024-02-28

## 2024-03-02 DIAGNOSIS — E89.0 POSTABLATIVE HYPOTHYROIDISM: ICD-10-CM

## 2024-03-02 DIAGNOSIS — M50.90 CERVICAL NECK PAIN WITH EVIDENCE OF DISC DISEASE: ICD-10-CM

## 2024-03-04 ENCOUNTER — HOSPITAL ENCOUNTER (EMERGENCY)
Age: 69
Discharge: HOME | End: 2024-03-04
Attending: EMERGENCY MEDICINE
Payer: MEDICARE

## 2024-03-04 VITALS
DIASTOLIC BLOOD PRESSURE: 84 MMHG | OXYGEN SATURATION: 96 % | RESPIRATION RATE: 18 BRPM | BODY MASS INDEX: 30.11 KG/M2 | SYSTOLIC BLOOD PRESSURE: 111 MMHG | WEIGHT: 170 LBS | HEART RATE: 77 BPM

## 2024-03-04 DIAGNOSIS — G89.29 ACUTE EXACERBATION OF CHRONIC LOW BACK PAIN: Primary | ICD-10-CM

## 2024-03-04 DIAGNOSIS — M54.50 ACUTE EXACERBATION OF CHRONIC LOW BACK PAIN: Primary | ICD-10-CM

## 2024-03-04 PROCEDURE — 99284 EMERGENCY DEPT VISIT MOD MDM: CPT

## 2024-03-04 PROCEDURE — 96372 THER/PROPH/DIAG INJ SC/IM: CPT

## 2024-03-04 PROCEDURE — 6370000000 HC RX 637 (ALT 250 FOR IP): Performed by: EMERGENCY MEDICINE

## 2024-03-04 PROCEDURE — 6360000002 HC RX W HCPCS: Performed by: EMERGENCY MEDICINE

## 2024-03-04 RX ORDER — GABAPENTIN 400 MG/1
CAPSULE ORAL
Qty: 60 CAPSULE | Refills: 11 | OUTPATIENT
Start: 2024-03-04

## 2024-03-04 RX ORDER — KETOROLAC TROMETHAMINE 30 MG/ML
15 INJECTION, SOLUTION INTRAMUSCULAR; INTRAVENOUS ONCE
Status: COMPLETED | OUTPATIENT
Start: 2024-03-04 | End: 2024-03-04

## 2024-03-04 RX ORDER — GABAPENTIN 100 MG/1
100 CAPSULE ORAL ONCE
Status: COMPLETED | OUTPATIENT
Start: 2024-03-04 | End: 2024-03-04

## 2024-03-04 RX ADMIN — KETOROLAC TROMETHAMINE 15 MG: 30 INJECTION INTRAMUSCULAR; INTRAVENOUS at 20:01

## 2024-03-04 RX ADMIN — GABAPENTIN 100 MG: 100 CAPSULE ORAL at 20:14

## 2024-03-04 ASSESSMENT — PAIN SCALES - GENERAL
PAINLEVEL_OUTOF10: 10
PAINLEVEL_OUTOF10: 9

## 2024-03-04 ASSESSMENT — PAIN - FUNCTIONAL ASSESSMENT: PAIN_FUNCTIONAL_ASSESSMENT: 0-10

## 2024-03-04 ASSESSMENT — PAIN DESCRIPTION - LOCATION: LOCATION: BACK

## 2024-03-04 NOTE — TELEPHONE ENCOUNTER
Dianna called requesting a refill of the below medication which has been pended for you:     Requested Prescriptions     Pending Prescriptions Disp Refills    levothyroxine (SYNTHROID) 88 MCG tablet [Pharmacy Med Name: Levothyroxine Sodium 88mcg Tablet] 28 tablet 11     Sig: Take 1 tab by mouth daily, except one-half tab on Mon     Refused Prescriptions Disp Refills    gabapentin (NEURONTIN) 400 MG capsule [Pharmacy Med Name: Gabapentin 400mg Capsule] 60 capsule 11     Sig: Take 1 cap by mouth twice daily, morning & bedtime       Last Appointment Date: 1/31/2024  Next Appointment Date: 4/30/2024    Allergies   Allergen Reactions    Norco [Hydrocodone-Acetaminophen]      Nausea and vomiting    Oxycodone Nausea And Vomiting    Percocet [Oxycodone-Acetaminophen] Nausea And Vomiting    Sulfa Antibiotics Other (See Comments)     Hallucinations.

## 2024-03-05 DIAGNOSIS — M50.90 CERVICAL NECK PAIN WITH EVIDENCE OF DISC DISEASE: ICD-10-CM

## 2024-03-05 RX ORDER — GABAPENTIN 400 MG/1
400 CAPSULE ORAL 2 TIMES DAILY
Qty: 60 CAPSULE | Refills: 2 | Status: CANCELLED | OUTPATIENT
Start: 2024-03-05 | End: 2024-06-03

## 2024-03-05 NOTE — ED PROVIDER NOTES
Take 2 tablets by mouth 2 times daily (with meals), Disp-180 tablet, R-3Normal      Omega-3 Fatty Acids (FISH OIL) 1000 MG capsule Take 2 capsules by mouth 2 times daily, Disp-360 capsule, R-3Normal      polyethylene glycol (MIRALAX) 17 g packet Take 1 packet by mouth daily as needed for Constipation, Disp-100 each, R-3Normal      calcium carbonate (CALCIUM 600) 600 MG TABS tablet Take 1 tablet by mouth in the morning and at bedtime, Disp-60 tablet, R-11Normal      albuterol sulfate HFA (PROVENTIL;VENTOLIN;PROAIR) 108 (90 Base) MCG/ACT inhaler Inhale 1-2 puffs into the lungs every 6 hours as needed for Wheezing, Disp-54 g, R-3Normal      fenofibrate (TRICOR) 145 MG tablet Take 1 tablet by mouth daily, Disp-90 tablet, R-3Normal      hydroCHLOROthiazide (HYDRODIURIL) 25 MG tablet Take 1 tablet by mouth every morning, Disp-90 tablet, R-3Normal      Misc. Devices (ROLLER WALKER) MISC Disp-1 each, R-0, PrintUse daily as needed for ambulation.      BIOTIN PO Take by mouthHistorical Med      albuterol (PROVENTIL) (2.5 MG/3ML) 0.083% nebulizer solution Take 3 mLs by nebulization every 6 hours as needed for Wheezing or Shortness of Breath, Disp-360 mL, R-3Normal      vitamin D3 (CHOLECALCIFEROL) 25 MCG (1000 UT) TABS tablet Take 1 tablet by mouth daily, Disp-90 tablet, R-3Normal      loratadine (CLARITIN) 10 MG tablet Take 1 tablet by mouth daily, Disp-90 tablet, R-3Normal      vitamin E 1000 units capsule Take 1 capsule by mouth dailyHistorical Med      Ascorbic Acid (VITAMIN C) 250 MG tablet Take 4 tablets by mouth dailyHistorical Med      zinc gluconate 50 MG tablet Take 1 tablet by mouth dailyHistorical Med             ALLERGIES     is allergic to norco [hydrocodone-acetaminophen], oxycodone, percocet [oxycodone-acetaminophen], and sulfa antibiotics.    FAMILY HISTORY     She indicated that her mother is alive. She indicated that her father is . She indicated that all of her three sisters are alive. She indicated

## 2024-03-05 NOTE — TELEPHONE ENCOUNTER
Per OARRS, last fill 2/6, quantity 60 for 30 days. No refills noted per OARRS.    Dianna called requesting a refill of the below medication which has been pended for you:     Requested Prescriptions     Pending Prescriptions Disp Refills    gabapentin (NEURONTIN) 400 MG capsule 60 capsule 2     Sig: Take 1 capsule by mouth in the morning and at bedtime for 90 days.       Last Appointment Date: 1/31/2024  Next Appointment Date: 3/6/2024    Allergies   Allergen Reactions    Norco [Hydrocodone-Acetaminophen]      Nausea and vomiting    Oxycodone Nausea And Vomiting    Percocet [Oxycodone-Acetaminophen] Nausea And Vomiting    Sulfa Antibiotics Other (See Comments)     Hallucinations.

## 2024-03-06 ENCOUNTER — OFFICE VISIT (OUTPATIENT)
Dept: FAMILY MEDICINE CLINIC | Age: 69
End: 2024-03-06
Payer: MEDICARE

## 2024-03-06 VITALS
HEART RATE: 69 BPM | OXYGEN SATURATION: 96 % | HEIGHT: 63 IN | WEIGHT: 169.4 LBS | RESPIRATION RATE: 16 BRPM | BODY MASS INDEX: 30.02 KG/M2 | DIASTOLIC BLOOD PRESSURE: 82 MMHG | SYSTOLIC BLOOD PRESSURE: 132 MMHG | TEMPERATURE: 97.3 F

## 2024-03-06 DIAGNOSIS — N18.31 TYPE 2 DIABETES MELLITUS WITH STAGE 3A CHRONIC KIDNEY DISEASE, WITH LONG-TERM CURRENT USE OF INSULIN (HCC): ICD-10-CM

## 2024-03-06 DIAGNOSIS — M54.41 ACUTE RIGHT-SIDED LOW BACK PAIN WITH RIGHT-SIDED SCIATICA: Primary | ICD-10-CM

## 2024-03-06 DIAGNOSIS — E11.9 TYPE 2 DIABETES MELLITUS WITHOUT COMPLICATION, WITH LONG-TERM CURRENT USE OF INSULIN (HCC): ICD-10-CM

## 2024-03-06 DIAGNOSIS — R20.2 PARESTHESIA OF BOTH LOWER EXTREMITIES: ICD-10-CM

## 2024-03-06 DIAGNOSIS — Z79.4 TYPE 2 DIABETES MELLITUS WITH STAGE 3A CHRONIC KIDNEY DISEASE, WITH LONG-TERM CURRENT USE OF INSULIN (HCC): ICD-10-CM

## 2024-03-06 DIAGNOSIS — Z79.4 TYPE 2 DIABETES MELLITUS WITHOUT COMPLICATION, WITH LONG-TERM CURRENT USE OF INSULIN (HCC): ICD-10-CM

## 2024-03-06 DIAGNOSIS — E11.22 TYPE 2 DIABETES MELLITUS WITH STAGE 3A CHRONIC KIDNEY DISEASE, WITH LONG-TERM CURRENT USE OF INSULIN (HCC): ICD-10-CM

## 2024-03-06 PROCEDURE — 99214 OFFICE O/P EST MOD 30 MIN: CPT | Performed by: FAMILY MEDICINE

## 2024-03-06 PROCEDURE — 1123F ACP DISCUSS/DSCN MKR DOCD: CPT | Performed by: FAMILY MEDICINE

## 2024-03-06 PROCEDURE — 3052F HG A1C>EQUAL 8.0%<EQUAL 9.0%: CPT | Performed by: FAMILY MEDICINE

## 2024-03-06 PROCEDURE — 96372 THER/PROPH/DIAG INJ SC/IM: CPT | Performed by: FAMILY MEDICINE

## 2024-03-06 PROCEDURE — PBSHW PBB SHADOW CHARGE: Performed by: FAMILY MEDICINE

## 2024-03-06 PROCEDURE — 3079F DIAST BP 80-89 MM HG: CPT | Performed by: FAMILY MEDICINE

## 2024-03-06 PROCEDURE — 3075F SYST BP GE 130 - 139MM HG: CPT | Performed by: FAMILY MEDICINE

## 2024-03-06 RX ORDER — GLIMEPIRIDE 2 MG/1
4 TABLET ORAL 2 TIMES DAILY WITH MEALS
Qty: 28 TABLET | Refills: 0 | Status: SHIPPED | OUTPATIENT
Start: 2024-03-06

## 2024-03-06 RX ORDER — GLIMEPIRIDE 2 MG/1
4 TABLET ORAL 2 TIMES DAILY WITH MEALS
Qty: 360 TABLET | Refills: 3 | Status: SHIPPED | OUTPATIENT
Start: 2024-03-06

## 2024-03-06 RX ORDER — LEVOTHYROXINE SODIUM 88 UG/1
TABLET ORAL
Qty: 30 TABLET | Refills: 0 | Status: SHIPPED | OUTPATIENT
Start: 2024-03-06

## 2024-03-06 RX ORDER — GABAPENTIN 400 MG/1
400 CAPSULE ORAL 2 TIMES DAILY
Qty: 14 CAPSULE | Refills: 0 | Status: SHIPPED | OUTPATIENT
Start: 2024-03-06 | End: 2024-03-13

## 2024-03-06 RX ORDER — BETAMETHASONE SODIUM PHOSPHATE AND BETAMETHASONE ACETATE 3; 3 MG/ML; MG/ML
6 INJECTION, SUSPENSION INTRA-ARTICULAR; INTRALESIONAL; INTRAMUSCULAR; SOFT TISSUE ONCE
Status: COMPLETED | OUTPATIENT
Start: 2024-03-06 | End: 2024-03-06

## 2024-03-06 RX ADMIN — BETAMETHASONE SODIUM PHOSPHATE AND BETAMETHASONE ACETATE 6 MG: 3; 3 INJECTION, SUSPENSION INTRA-ARTICULAR; INTRALESIONAL; INTRAMUSCULAR at 10:51

## 2024-03-06 NOTE — TELEPHONE ENCOUNTER
Med ordered by [facility]. Pt states she did not receive this medication in her [facility] mail order pharm box last month. Needs refill. Please send to [facility]. Would like short term supply sent to [facility] while she is waiting for mail order to arrive. Pended now.      Dianna called requesting a refill of the below medication which has been pended for you:     Requested Prescriptions     Pending Prescriptions Disp Refills    glimepiride (AMARYL) 2 MG tablet 28 tablet 0     Sig: Take 2 tablets by mouth 2 times daily (with meals)    glimepiride (AMARYL) 2 MG tablet 360 tablet 3     Sig: Take 2 tablets by mouth 2 times daily (with meals)       Last Appointment Date: 3/6/2024  Next Appointment Date: 4/30/2024    Allergies   Allergen Reactions    Norco [Hydrocodone-Acetaminophen]      Nausea and vomiting    Oxycodone Nausea And Vomiting    Percocet [Oxycodone-Acetaminophen] Nausea And Vomiting    Sulfa Antibiotics Other (See Comments)     Hallucinations.

## 2024-03-06 NOTE — PROGRESS NOTES
[facility]  [address] [place] [address]  [phone]      Dianna Jameson is a 68 y.o. female who presents today for her medical conditions/complaints as noted below.  Dianna Jameson is c/o of Follow-up (Sciatica )      HPI:     Pt here today for ER follow-up.    Pt was seen in ER on 3/4 for R-sided low back pain and sciatica - received IM Toradol 15 mg and one dose of Gabapentin 100 mg, and was discharged.     Feeling a little worse today - anytime she walks and puts weight on the R leg, it radiates down the back of her leg to her knee.  Also feeling numbness in both legs from hips to feet - intermittent.  Has been out of her Gabapentin 400 mg BID - states she did not get the supply for February, even though OARRS states it was dispensed on 2/6.  Pt states it did not come in her box through [facility].  Also states it was dispensed on 3/5, so hopefully coming in the mail now.  Pt requesting short-term supply to [facility].    Trying not to take Aleve very often due to elevated Cr.          Past Medical History:   Diagnosis Date    Abdominal pain     left side    Asthma     Atrial fibrillation (HCC)     Bowel obstruction (HCC)     COPD (chronic obstructive pulmonary disease) (HCC)     DDD (degenerative disc disease)     Diabetes mellitus (HCC)     Hyperlipidemia     Hypertension     Hyperthyroidism     Type II or unspecified type diabetes mellitus without mention of complication, not stated as uncontrolled       Past Surgical History:   Procedure Laterality Date    COLONOSCOPY  04/23/2014    COLONOSCOPY N/A 01/26/2021    COLONOSCOPY WITH BIOPSY performed by Darvin Cooper MD at [facility]    COLONOSCOPY  01/26/2021    COLONOSCOPY POLYPECTOMY SNARE/COLD BIOPSY performed by Darvin Cooper MD at [facility]    DENTAL SURGERY N/A 03/08/2017    REMOVAL OF BILATERAL MANDIBULAR LELO AND MAXILLARY EDENTULOUS ALVEOPLASTY performed by Erick Vivas DDS at [facility] OR

## 2024-03-11 ENCOUNTER — TELEPHONE (OUTPATIENT)
Dept: FAMILY MEDICINE CLINIC | Age: 69
End: 2024-03-11

## 2024-03-11 ENCOUNTER — TELEMEDICINE (OUTPATIENT)
Dept: FAMILY MEDICINE CLINIC | Age: 69
End: 2024-03-11
Payer: MEDICARE

## 2024-03-11 DIAGNOSIS — Z00.00 MEDICARE ANNUAL WELLNESS VISIT, SUBSEQUENT: Primary | ICD-10-CM

## 2024-03-11 PROCEDURE — G0439 PPPS, SUBSEQ VISIT: HCPCS | Performed by: FAMILY MEDICINE

## 2024-03-11 PROCEDURE — 1123F ACP DISCUSS/DSCN MKR DOCD: CPT | Performed by: FAMILY MEDICINE

## 2024-03-11 SDOH — HEALTH STABILITY: PHYSICAL HEALTH: ON AVERAGE, HOW MANY MINUTES DO YOU ENGAGE IN EXERCISE AT THIS LEVEL?: 60 MIN

## 2024-03-11 SDOH — HEALTH STABILITY: PHYSICAL HEALTH: ON AVERAGE, HOW MANY DAYS PER WEEK DO YOU ENGAGE IN MODERATE TO STRENUOUS EXERCISE (LIKE A BRISK WALK)?: 3 DAYS

## 2024-03-11 ASSESSMENT — LIFESTYLE VARIABLES
HOW OFTEN DO YOU HAVE A DRINK CONTAINING ALCOHOL: NEVER
HOW OFTEN DO YOU HAVE SIX OR MORE DRINKS ON ONE OCCASION: 1
HOW OFTEN DO YOU HAVE A DRINK CONTAINING ALCOHOL: 1
HOW MANY STANDARD DRINKS CONTAINING ALCOHOL DO YOU HAVE ON A TYPICAL DAY: 0
HOW MANY STANDARD DRINKS CONTAINING ALCOHOL DO YOU HAVE ON A TYPICAL DAY: PATIENT DOES NOT DRINK

## 2024-03-11 ASSESSMENT — PATIENT HEALTH QUESTIONNAIRE - PHQ9
1. LITTLE INTEREST OR PLEASURE IN DOING THINGS: 0
2. FEELING DOWN, DEPRESSED OR HOPELESS: 0
SUM OF ALL RESPONSES TO PHQ QUESTIONS 1-9: 0
SUM OF ALL RESPONSES TO PHQ9 QUESTIONS 1 & 2: 0
SUM OF ALL RESPONSES TO PHQ QUESTIONS 1-9: 0

## 2024-03-11 ASSESSMENT — ENCOUNTER SYMPTOMS: BACK PAIN: 1

## 2024-03-11 NOTE — TELEPHONE ENCOUNTER
Pt calling stating she was seen on 3-6 for leg pain and pt is calling today with an update, stating there's been no improvement, it's still very painful, please call with recommendations, pt uses pended pharmacy.

## 2024-03-11 NOTE — PROGRESS NOTES
Medicare Annual Wellness Visit    Dianna Jameson is here for Medicare AWV    Assessment & Plan   Medicare annual wellness visit, subsequent    Recommendations for Preventive Services Due: see orders and patient instructions/AVS.  Recommended screening schedule for the next 5-10 years is provided to the patient in written form: see Patient Instructions/AVS.     Return in 1 year (on 3/11/2025).     Subjective     Patient's complete Health Risk Assessment and screening values have been reviewed and are found in Flowsheets. The following problems were reviewed today and where indicated follow up appointments were made and/or referrals ordered.    Positive Risk Factor Screenings with Interventions:    Fall Risk:  Do you feel unsteady or are you worried about falling? : (!) yes  2 or more falls in past year?: (!) yes  Fall with injury in past year?: (!) yes     Interventions:    Reviewed medications, home hazards, visual acuity, and co-morbidities that can increase risk for falls  Pt states she did have a few falls within the last year and did need to seek treatment. Lost her balance and also tripped.    Cognitive:   Clock Drawing Test (CDT): (!) Abnormal (Pt stated to put the hands on the 10 and the 11.)  Words recalled: 3 Words Recalled  Total Score: 3  Total Score Interpretation: Normal Mini-Cog  Interventions:  Will re-assess during next OV           General HRA Questions:  Select all that apply: (!) Stress    Stress Interventions:  Reports feeling stressed about medications, would like to eventually stop taking so many. Writer instructed pt to have labs done and can re-address the situation at next OV. States she already discussed with KB at last OV.      Activity, Diet, and Weight:  On average, how many days per week do you engage in moderate to strenuous exercise (like a brisk walk)?: 3 days  On average, how many minutes do you engage in exercise at this level?: 60 min    Do you eat balanced/healthy meals

## 2024-03-11 NOTE — PATIENT INSTRUCTIONS
the bathroom with you.   Where can you learn more?  Go to https://[email or web address].net/patientEd and enter G117 to learn more about \"Preventing Falls: Care Instructions.\"  Current as of: July 17, 2023               Content Version: 14.0  © 5360-1785 [email or web address].   Care instructions adapted under license by [employer]. If you have questions about a medical condition or this instruction, always ask your healthcare professional. [email or web address] disclaims any warranty or liability for your use of this information.           Learning About Mild Cognitive Impairment (MCI)  What is mild cognitive impairment (MCI)?     It's common to forget things sometimes as we get older. But some older people have memory loss that's more than normal aging. It's called mild cognitive impairment, or MCI. It is not the same as dementia.  People with the condition often know that their memory or mental function has changed. Tests may show some loss. But their minds work well overall. They can carry out daily tasks that are normal for them.  People with MCI have a higher chance of one day getting dementia. But not all people who have it will get dementia. Some people may stay the same over time.  What are the symptoms?  People with MCI have more memory loss than what occurs with normal aging. They may have increasing trouble with recalling words and keeping up with conversations. They may also have trouble remembering important events and making decisions.  What puts you at risk?  The risk of getting MCI increases with age. Having high blood pressure or having a family history of MCI may also increase your risk.  How is it diagnosed?  Your doctor will do a physical exam.  You may be asked questions to check your memory and other mental skills. Your doctor may also talk to close friends and family members. This can help the doctor figure out how your memory and other mental skills have changed.  You may get blood tests

## 2024-03-18 ENCOUNTER — TELEPHONE (OUTPATIENT)
Dept: FAMILY MEDICINE CLINIC | Age: 69
End: 2024-03-18

## 2024-03-18 ENCOUNTER — APPOINTMENT (OUTPATIENT)
Dept: GENERAL RADIOLOGY | Age: 69
End: 2024-03-18
Payer: MEDICARE

## 2024-03-18 ENCOUNTER — HOSPITAL ENCOUNTER (EMERGENCY)
Age: 69
Discharge: HOME | End: 2024-03-18
Attending: STUDENT IN AN ORGANIZED HEALTH CARE EDUCATION/TRAINING PROGRAM
Payer: MEDICARE

## 2024-03-18 VITALS
BODY MASS INDEX: 30.12 KG/M2 | HEIGHT: 63 IN | RESPIRATION RATE: 18 BRPM | SYSTOLIC BLOOD PRESSURE: 143 MMHG | TEMPERATURE: 97.6 F | DIASTOLIC BLOOD PRESSURE: 105 MMHG | HEART RATE: 73 BPM | OXYGEN SATURATION: 91 % | WEIGHT: 170 LBS

## 2024-03-18 DIAGNOSIS — M51.36 DEGENERATIVE DISC DISEASE, LUMBAR: ICD-10-CM

## 2024-03-18 DIAGNOSIS — G89.29 CHRONIC NECK PAIN: ICD-10-CM

## 2024-03-18 DIAGNOSIS — M25.561 ACUTE PAIN OF RIGHT KNEE: ICD-10-CM

## 2024-03-18 DIAGNOSIS — M50.90 CERVICAL NECK PAIN WITH EVIDENCE OF DISC DISEASE: ICD-10-CM

## 2024-03-18 DIAGNOSIS — M54.2 CHRONIC NECK PAIN: ICD-10-CM

## 2024-03-18 DIAGNOSIS — M25.551 RIGHT HIP PAIN: Primary | ICD-10-CM

## 2024-03-18 DIAGNOSIS — M50.30 DEGENERATIVE DISC DISEASE, CERVICAL: Primary | ICD-10-CM

## 2024-03-18 PROCEDURE — 73502 X-RAY EXAM HIP UNI 2-3 VIEWS: CPT

## 2024-03-18 PROCEDURE — 99284 EMERGENCY DEPT VISIT MOD MDM: CPT

## 2024-03-18 PROCEDURE — 73562 X-RAY EXAM OF KNEE 3: CPT

## 2024-03-18 PROCEDURE — 6360000002 HC RX W HCPCS: Performed by: STUDENT IN AN ORGANIZED HEALTH CARE EDUCATION/TRAINING PROGRAM

## 2024-03-18 PROCEDURE — 96372 THER/PROPH/DIAG INJ SC/IM: CPT

## 2024-03-18 RX ORDER — ACETAMINOPHEN 500 MG
1000 TABLET ORAL EVERY 6 HOURS PRN
Qty: 56 TABLET | Refills: 0 | Status: SHIPPED | OUTPATIENT
Start: 2024-03-18 | End: 2024-03-25

## 2024-03-18 RX ORDER — KETOROLAC TROMETHAMINE 30 MG/ML
30 INJECTION, SOLUTION INTRAMUSCULAR; INTRAVENOUS ONCE
Status: COMPLETED | OUTPATIENT
Start: 2024-03-18 | End: 2024-03-18

## 2024-03-18 RX ADMIN — KETOROLAC TROMETHAMINE 30 MG: 30 INJECTION, SOLUTION INTRAMUSCULAR; INTRAVENOUS at 10:31

## 2024-03-18 ASSESSMENT — PAIN DESCRIPTION - LOCATION
LOCATION: HIP
LOCATION: HIP;KNEE
LOCATION: HIP

## 2024-03-18 ASSESSMENT — PAIN DESCRIPTION - DESCRIPTORS: DESCRIPTORS: ACHING

## 2024-03-18 ASSESSMENT — PAIN - FUNCTIONAL ASSESSMENT: PAIN_FUNCTIONAL_ASSESSMENT: 0-10

## 2024-03-18 ASSESSMENT — PAIN DESCRIPTION - ORIENTATION
ORIENTATION: RIGHT

## 2024-03-18 ASSESSMENT — PAIN SCALES - GENERAL
PAINLEVEL_OUTOF10: 9
PAINLEVEL_OUTOF10: 9
PAINLEVEL_OUTOF10: 6

## 2024-03-18 ASSESSMENT — PAIN DESCRIPTION - PAIN TYPE: TYPE: ACUTE PAIN

## 2024-03-18 NOTE — TELEPHONE ENCOUNTER
LMTRC to see if pt did receive mail order supply from [facility]. At last OV pt requested to have all further gabapentin prescriptions sent to local pharmacy and NOT to [facility].    Per OARRS, last fill 3/6, quantity 14 for 7 days.   Per OARRS, last fill 3/5, quantity 60 for 30 days.   Dianna called requesting a refill of the below medication which has been pended for you:     Requested Prescriptions     Pending Prescriptions Disp Refills    gabapentin (NEURONTIN) 400 MG capsule 60 capsule 2     Sig: Take 1 capsule by mouth in the morning and at bedtime for 90 days.       Last Appointment Date: 3/11/2024  Next Appointment Date: 3/18/2024    Allergies   Allergen Reactions    Norco [Hydrocodone-Acetaminophen]      Nausea and vomiting    Oxycodone Nausea And Vomiting    Percocet [Oxycodone-Acetaminophen] Nausea And Vomiting    Sulfa Antibiotics Other (See Comments)     Hallucinations.

## 2024-03-18 NOTE — DISCHARGE INSTRUCTIONS
SUMMARY OF YOUR VISIT    Today you were seen for right hip pain and right knee pain.  We discussed that your x-rays were negative.  As we discussed I have called you in Tylenol and Voltaren cream to be used as needed for pain control.  All like you to follow-up with your primary care provider for reevaluation to ensure that your symptoms are improving.    If your symptoms are worsening or not improving you can return to the emergency department for reevaluation    Please continue to take your home medication as previously prescribed, I have made no changes to your home medications.        You can return to our or another Emergency Department as needed or for worsening symptoms of chest pain, shortness of breath, high fevers not relieved by acetaminophen (Tylenol) and/or ibuprofen (Motrin / Advil), chills, feeling of your heart fluttering or racing, persistent nausea and/or vomiting, vomiting up blood, blood in your stool, loss of consciousness, numbness, weakness or tingling in the arms or legs or change in color of the extremities, changes in mental status, persistent headache, blurry vision, loss of bladder / bowel control, if you are unable to follow up with your physician, or other any other care or concern.    Thank You!    On behalf of the Emergency Department staff and team, I would like to thank you for allowing us the opportunity to participate in your health care and evaluation today.

## 2024-03-18 NOTE — TELEPHONE ENCOUNTER
Pt calling stating she was seen at ER today for her hip and states she was given medication that she knows is not going to help, questions what you can do for her, please advise.

## 2024-03-18 NOTE — ED PROVIDER NOTES
[facility] ED  Emergency Department Encounter  [facility]       Pt Name:Dianna Jameson  MRN: [id number]  Birthdate 1955  Date of evaluation: 3/18/24  PCP:  Anny Harkins DO      CHIEF COMPLAINT       Chief Complaint   Patient presents with    Hip Pain     Right hip and knee pain x1 week       HISTORY OF PRESENT ILLNESS     Dianna Jameson is a 68 y.o. female who presents via personal vehicle with reports of right knee pain and right hip pain.  Patient reports that pain is worse with ambulating.  She reports a history of arthritis.  She denies falls or trauma.  She reports that this has been worsening for quite some time.  Came in today due to continued pain.  No medications prior to arrival.  No fevers.  No chills.  No numbness.  No tingling.  No other acute concerns at this time.  Patient is still able to ambulate.  She is able to bear weight on this leg.    PAST MEDICAL / SURGICAL / SOCIAL / FAMILY HISTORY      has a past medical history of Abdominal pain, Asthma, Atrial fibrillation (HCC), Bowel obstruction (HCC), COPD (chronic obstructive pulmonary disease) (HCC), DDD (degenerative disc disease), Diabetes mellitus (HCC), Hyperlipidemia, Hypertension, Hyperthyroidism, and Type II or unspecified type diabetes mellitus without mention of complication, not stated as uncontrolled.       has a past surgical history that includes Tubal ligation; Tonsillectomy; Colonoscopy (04/23/2014); Upper gastrointestinal endoscopy (03/16/2016); tracheostomy (2015); tracheostomy closure; Gastrostomy tube placement; gastrostomy tube change; Dental surgery (N/A, 03/08/2017); Insert Midline Catheter (01/22/2021); Colonoscopy (N/A, 01/26/2021); Upper gastrointestinal endoscopy (N/A, 01/26/2021); Colonoscopy (01/26/2021); and Thyroid surgery (2021).      Social History     Socioeconomic History    Marital status: Single     Spouse name: Not on file    Number of children: Not on file    Years of

## 2024-03-19 ENCOUNTER — TELEPHONE (OUTPATIENT)
Dept: INTERNAL MEDICINE | Age: 69
End: 2024-03-19

## 2024-03-19 ENCOUNTER — HOSPITAL ENCOUNTER (OUTPATIENT)
Dept: CT IMAGING | Age: 69
Discharge: HOME | End: 2024-03-21
Attending: FAMILY MEDICINE
Payer: MEDICARE

## 2024-03-19 ENCOUNTER — HOSPITAL ENCOUNTER (OUTPATIENT)
Age: 69
Discharge: HOME | End: 2024-03-21
Attending: INTERNAL MEDICINE
Payer: MEDICARE

## 2024-03-19 DIAGNOSIS — I10 ESSENTIAL HYPERTENSION: ICD-10-CM

## 2024-03-19 DIAGNOSIS — Z87.891 PERSONAL HISTORY OF TOBACCO USE: ICD-10-CM

## 2024-03-19 DIAGNOSIS — I48.0 PAROXYSMAL ATRIAL FIBRILLATION (HCC): ICD-10-CM

## 2024-03-19 PROCEDURE — 93246 EXT ECG>7D<15D RECORDING: CPT

## 2024-03-19 PROCEDURE — 71271 CT THORAX LUNG CANCER SCR C-: CPT

## 2024-03-19 NOTE — TELEPHONE ENCOUNTER
Patient said she was in need of a new dexcom transmitter because she lost hers.  She did call the company and they were to send her a new one but it has been two weeks and she still does not have it.  She states she has tried to call them bt is not getting anywhere.  Can you help her?   [phone]

## 2024-03-19 NOTE — TELEPHONE ENCOUNTER
Called and spoke with patient.  Patient stated that she was in need of a new transmitter but has not been able to get it from her DME.  Writer called DME who confirmed that she was due for her next shipment, however patient had not called.  Spoke with Jorge Luis from [employer], Writer currently awaiting fax of PWO and records request from [employer].

## 2024-03-20 ENCOUNTER — TELEPHONE (OUTPATIENT)
Dept: FAMILY MEDICINE CLINIC | Age: 69
End: 2024-03-20

## 2024-03-20 NOTE — TELEPHONE ENCOUNTER
Pt calling to get a referral to rheumatology for her rheumatoid arthritis as pt states she's having a lot of pain, please advise.

## 2024-03-20 NOTE — TELEPHONE ENCOUNTER
Lisandro Suggs returned writers email. Stated that all is set for shipment and nothing is needed at this time.

## 2024-03-20 NOTE — TELEPHONE ENCOUNTER
Pt states please send gabapentin to [facility], do not send to [facility]. Did not receive their shipment. Needs refill now.  Would like referral to Rheum as she discussed pain with Dr. Harkins at last OV and was told there isn't much more medications that can be prescribed. Has been doing the tylenol and gabapentin as directed. Writer advised if KB is OK with Rheum referral, we may need to order more labs before they schedule her. Voiced understanding and agreeable to labs if needed for referral.

## 2024-03-21 DIAGNOSIS — E11.22 TYPE 2 DIABETES MELLITUS WITH STAGE 3A CHRONIC KIDNEY DISEASE, WITH LONG-TERM CURRENT USE OF INSULIN (HCC): ICD-10-CM

## 2024-03-21 DIAGNOSIS — Z79.4 TYPE 2 DIABETES MELLITUS WITH STAGE 3A CHRONIC KIDNEY DISEASE, WITH LONG-TERM CURRENT USE OF INSULIN (HCC): ICD-10-CM

## 2024-03-21 DIAGNOSIS — N18.31 TYPE 2 DIABETES MELLITUS WITH STAGE 3A CHRONIC KIDNEY DISEASE, WITH LONG-TERM CURRENT USE OF INSULIN (HCC): ICD-10-CM

## 2024-03-21 NOTE — TELEPHONE ENCOUNTER
Pharmacy called requesting a refill of the below medication which has been pended for you:     Requested Prescriptions     Pending Prescriptions Disp Refills    insulin glargine (LANTUS SOLOSTAR) 100 UNIT/ML injection pen 15 mL 0     Sig: Inject 40 Units into the skin nightly       Last Appointment Date: 2/12/2024  Next Appointment Date: 5/13/2024    Allergies   Allergen Reactions    Norco [Hydrocodone-Acetaminophen]      Nausea and vomiting    Oxycodone Nausea And Vomiting    Percocet [Oxycodone-Acetaminophen] Nausea And Vomiting    Sulfa Antibiotics Other (See Comments)     Hallucinations.

## 2024-03-23 RX ORDER — GABAPENTIN 400 MG/1
400 CAPSULE ORAL 2 TIMES DAILY
Qty: 60 CAPSULE | Refills: 1 | Status: SHIPPED | OUTPATIENT
Start: 2024-03-23 | End: 2024-05-22

## 2024-03-23 NOTE — TELEPHONE ENCOUNTER
Referral placed for Rheumatology - will await their instruction if they require labs/testing before appt can be scheduled.      Controlled Substance Monitoring:    Acute and Chronic Pain Monitoring:   RX Monitoring Periodic Controlled Substance Monitoring   3/23/2024   4:13 PM No signs of potential drug abuse or diversion identified.

## 2024-03-25 RX ORDER — INSULIN GLARGINE 100 [IU]/ML
40 INJECTION, SOLUTION SUBCUTANEOUS NIGHTLY
Qty: 30 ML | Refills: 0 | Status: SHIPPED | OUTPATIENT
Start: 2024-03-25 | End: 2024-06-08

## 2024-03-27 DIAGNOSIS — Z87.891 PERSONAL HISTORY OF TOBACCO USE: Primary | ICD-10-CM

## 2024-03-31 ENCOUNTER — HOSPITAL ENCOUNTER (EMERGENCY)
Age: 69
Discharge: HOME | End: 2024-03-31
Attending: EMERGENCY MEDICINE
Payer: MEDICARE

## 2024-03-31 ENCOUNTER — APPOINTMENT (OUTPATIENT)
Dept: CT IMAGING | Age: 69
End: 2024-03-31
Payer: MEDICARE

## 2024-03-31 VITALS
SYSTOLIC BLOOD PRESSURE: 166 MMHG | DIASTOLIC BLOOD PRESSURE: 95 MMHG | HEART RATE: 83 BPM | BODY MASS INDEX: 30.11 KG/M2 | WEIGHT: 170 LBS | RESPIRATION RATE: 18 BRPM | OXYGEN SATURATION: 100 % | TEMPERATURE: 97 F

## 2024-03-31 DIAGNOSIS — R11.2 NAUSEA AND VOMITING, UNSPECIFIED VOMITING TYPE: Primary | ICD-10-CM

## 2024-03-31 DIAGNOSIS — E86.0 DEHYDRATION: ICD-10-CM

## 2024-03-31 LAB
ALBUMIN SERPL-MCNC: 4.3 G/DL (ref 3.5–5.2)
ALBUMIN/GLOB SERPL: 1.2 {RATIO} (ref 1–2.5)
ALP SERPL-CCNC: 96 U/L (ref 35–104)
ALT SERPL-CCNC: 20 U/L (ref 5–33)
ANION GAP SERPL CALCULATED.3IONS-SCNC: 15 MMOL/L (ref 9–17)
AST SERPL-CCNC: 21 U/L
BACTERIA URNS QL MICRO: ABNORMAL
BASOPHILS # BLD: 0.06 K/UL (ref 0–0.2)
BASOPHILS NFR BLD: 1 % (ref 0–2)
BILIRUB SERPL-MCNC: 0.6 MG/DL (ref 0.3–1.2)
BILIRUB UR QL STRIP: NEGATIVE
BUN SERPL-MCNC: 27 MG/DL (ref 8–23)
BUN/CREAT SERPL: 16 (ref 9–20)
CALCIUM SERPL-MCNC: 10.1 MG/DL (ref 8.6–10.4)
CHARACTER UR: ABNORMAL
CHLORIDE SERPL-SCNC: 91 MMOL/L (ref 98–107)
CLARITY UR: CLEAR
CO2 SERPL-SCNC: 26 MMOL/L (ref 20–31)
COLOR UR: YELLOW
CREAT SERPL-MCNC: 1.7 MG/DL (ref 0.5–0.9)
EOSINOPHIL # BLD: 0.18 K/UL (ref 0–0.44)
EOSINOPHILS RELATIVE PERCENT: 3 % (ref 1–4)
EPI CELLS #/AREA URNS HPF: ABNORMAL /HPF (ref 0–5)
ERYTHROCYTE [DISTWIDTH] IN BLOOD BY AUTOMATED COUNT: 12.8 % (ref 11.8–14.4)
GFR SERPL CREATININE-BSD FRML MDRD: 33 ML/MIN/1.73M2
GLUCOSE SERPL-MCNC: 287 MG/DL (ref 70–99)
GLUCOSE UR STRIP-MCNC: ABNORMAL MG/DL
HCT VFR BLD AUTO: 41.2 % (ref 36.3–47.1)
HGB BLD-MCNC: 14.4 G/DL (ref 11.9–15.1)
HGB UR QL STRIP.AUTO: NEGATIVE
IMM GRANULOCYTES # BLD AUTO: 0.03 K/UL (ref 0–0.3)
IMM GRANULOCYTES NFR BLD: 0 %
KETONES UR STRIP-MCNC: NEGATIVE MG/DL
LACTATE BLDV-SCNC: 1.3 MMOL/L (ref 0.5–2.2)
LEUKOCYTE ESTERASE UR QL STRIP: ABNORMAL
LIPASE SERPL-CCNC: 58 U/L (ref 13–60)
LYMPHOCYTES NFR BLD: 2 K/UL (ref 1.1–3.7)
LYMPHOCYTES RELATIVE PERCENT: 30 % (ref 24–43)
MAGNESIUM SERPL-MCNC: 1.6 MG/DL (ref 1.6–2.6)
MCH RBC QN AUTO: 30.3 PG (ref 25.2–33.5)
MCHC RBC AUTO-ENTMCNC: 35 G/DL (ref 25.2–33.5)
MCV RBC AUTO: 86.6 FL (ref 82.6–102.9)
MONOCYTES NFR BLD: 0.52 K/UL (ref 0.1–1.2)
MONOCYTES NFR BLD: 8 % (ref 3–12)
NEUTROPHILS NFR BLD: 58 % (ref 36–65)
NEUTS SEG NFR BLD: 4 K/UL (ref 1.5–8.1)
NITRITE UR QL STRIP: NEGATIVE
NRBC BLD-RTO: 0 PER 100 WBC
PH UR STRIP: 6 [PH] (ref 5–6)
PLATELET # BLD AUTO: 251 K/UL (ref 138–453)
PMV BLD AUTO: 9.9 FL (ref 8.1–13.5)
POTASSIUM SERPL-SCNC: 3.6 MMOL/L (ref 3.7–5.3)
PROT SERPL-MCNC: 7.8 G/DL (ref 6.4–8.3)
PROT UR STRIP-MCNC: NEGATIVE MG/DL
RBC # BLD AUTO: 4.76 M/UL (ref 3.95–5.11)
RBC #/AREA URNS HPF: ABNORMAL /HPF (ref 0–4)
SODIUM SERPL-SCNC: 132 MMOL/L (ref 135–144)
SP GR UR STRIP: 1.02 (ref 1.01–1.02)
TROPONIN I SERPL HS-MCNC: 11 NG/L (ref 0–14)
TROPONIN I SERPL HS-MCNC: 11 NG/L (ref 0–14)
UROBILINOGEN UR STRIP-ACNC: NORMAL EU/DL (ref 0–1)
WBC #/AREA URNS HPF: ABNORMAL /HPF (ref 0–4)
WBC OTHER # BLD: 6.8 K/UL (ref 3.5–11.3)

## 2024-03-31 PROCEDURE — 93005 ELECTROCARDIOGRAM TRACING: CPT | Performed by: EMERGENCY MEDICINE

## 2024-03-31 PROCEDURE — 6370000000 HC RX 637 (ALT 250 FOR IP): Performed by: EMERGENCY MEDICINE

## 2024-03-31 PROCEDURE — 83605 ASSAY OF LACTIC ACID: CPT

## 2024-03-31 PROCEDURE — 85025 COMPLETE CBC W/AUTO DIFF WBC: CPT

## 2024-03-31 PROCEDURE — 83735 ASSAY OF MAGNESIUM: CPT

## 2024-03-31 PROCEDURE — 96361 HYDRATE IV INFUSION ADD-ON: CPT

## 2024-03-31 PROCEDURE — 80053 COMPREHEN METABOLIC PANEL: CPT

## 2024-03-31 PROCEDURE — 99284 EMERGENCY DEPT VISIT MOD MDM: CPT

## 2024-03-31 PROCEDURE — 96360 HYDRATION IV INFUSION INIT: CPT

## 2024-03-31 PROCEDURE — 84484 ASSAY OF TROPONIN QUANT: CPT

## 2024-03-31 PROCEDURE — 81001 URINALYSIS AUTO W/SCOPE: CPT

## 2024-03-31 PROCEDURE — 36415 COLL VENOUS BLD VENIPUNCTURE: CPT

## 2024-03-31 PROCEDURE — 83690 ASSAY OF LIPASE: CPT

## 2024-03-31 PROCEDURE — 74176 CT ABD & PELVIS W/O CONTRAST: CPT

## 2024-03-31 PROCEDURE — 2580000003 HC RX 258: Performed by: EMERGENCY MEDICINE

## 2024-03-31 RX ORDER — LIDOCAINE 4 G/G
1 PATCH TOPICAL ONCE
Status: DISCONTINUED | OUTPATIENT
Start: 2024-03-31 | End: 2024-03-31 | Stop reason: HOSPADM

## 2024-03-31 RX ORDER — 0.9 % SODIUM CHLORIDE 0.9 %
1000 INTRAVENOUS SOLUTION INTRAVENOUS ONCE
Status: COMPLETED | OUTPATIENT
Start: 2024-03-31 | End: 2024-03-31

## 2024-03-31 RX ADMIN — SODIUM CHLORIDE 1000 ML: 9 INJECTION, SOLUTION INTRAVENOUS at 16:29

## 2024-03-31 RX ADMIN — SODIUM CHLORIDE 1000 ML: 9 INJECTION, SOLUTION INTRAVENOUS at 19:24

## 2024-03-31 ASSESSMENT — PAIN SCALES - GENERAL: PAINLEVEL_OUTOF10: 9

## 2024-03-31 ASSESSMENT — PAIN - FUNCTIONAL ASSESSMENT: PAIN_FUNCTIONAL_ASSESSMENT: 0-10

## 2024-03-31 NOTE — DISCHARGE INSTRUCTIONS
Please up with your PCP within 2 days.  Return to the emergency department for chest pain, difficulty breathing, return or worsening abdominal pain, persistent nausea vomiting not trying fluids by mouth, or any other acute concerns.

## 2024-04-01 DIAGNOSIS — G89.29 CHRONIC NECK PAIN: ICD-10-CM

## 2024-04-01 DIAGNOSIS — M54.2 CHRONIC NECK PAIN: ICD-10-CM

## 2024-04-01 DIAGNOSIS — M50.30 DEGENERATIVE DISC DISEASE, CERVICAL: ICD-10-CM

## 2024-04-01 LAB
EKG ATRIAL RATE: 85 BPM
EKG ATRIAL RATE: 94 BPM
EKG P AXIS: 77 DEGREES
EKG P AXIS: 79 DEGREES
EKG P-R INTERVAL: 190 MS
EKG P-R INTERVAL: 200 MS
EKG Q-T INTERVAL: 364 MS
EKG Q-T INTERVAL: 378 MS
EKG QRS DURATION: 80 MS
EKG QRS DURATION: 82 MS
EKG QTC CALCULATION (BAZETT): 433 MS
EKG QTC CALCULATION (BAZETT): 472 MS
EKG R AXIS: 50 DEGREES
EKG R AXIS: 55 DEGREES
EKG T AXIS: 64 DEGREES
EKG T AXIS: 79 DEGREES
EKG VENTRICULAR RATE: 85 BPM
EKG VENTRICULAR RATE: 94 BPM

## 2024-04-01 RX ORDER — GABAPENTIN 400 MG/1
CAPSULE ORAL
Qty: 60 CAPSULE | Refills: 11 | OUTPATIENT
Start: 2024-04-01

## 2024-04-01 ASSESSMENT — ENCOUNTER SYMPTOMS
DIARRHEA: 0
WHEEZING: 0
NAUSEA: 1
COUGH: 0
SHORTNESS OF BREATH: 0
ABDOMINAL PAIN: 0
VOMITING: 1

## 2024-04-01 NOTE — TELEPHONE ENCOUNTER
Airway    Performed by: Marvel Daniels MD  Authorized by: Marvel Daniels MD    Final Airway Type:  Endotracheal airway  Final Endotracheal Airway*:  ETT  ETT Size (mm)*:  7.0  Cuff*:  Regular  Technique Used for Successful ETT Placement:  Direct laryngoscopy  Intubation Procedure*:  ETCO2, Preoxygenation, Atraumatic and Dentition Unchanged  Insertion Site:  Oral  Blade Type*:  MAC  Blade Size*:  3  Measured from*:  Lips  Secured at (cm)*:  19  Placement Verified by: auscultation, capnometry and equal breath sounds    Glottic View*:  1 - full view of glottis  Attempts*:  1  Number of Other Approaches Attempted:  0   Patient Identified, Procedure confirmed, Emergency equipment available and Safety protocols followed  Location:  OR  Urgency:  Elective  Difficult Airway: No    Indications for Airway Management:  Anesthesia  Spontaneous Ventilation: absent    Sedation Level:  Anesthetized  MILS Maintained Throughout: No    Mask Difficulty Assessment:  0 - not attempted  Start Time: 1/12/2024 7:47 AM       Pt no longer receiving med from [email or web address] mail pharmacy. Gabapentin goes to [facility]. Rx sent on 3/23/24.

## 2024-04-01 NOTE — ED PROVIDER NOTES
portions of this note were completed with a voice recognition program.  Efforts were made to edit the dictations but occasionally words are mis-transcribed.)    Miranda Wong MD, FAAEM  Attending Emergency Medicine Physician       Miranda Wong MD  04/01/24 1919

## 2024-04-11 DIAGNOSIS — M54.50 LUMBAR SPINE PAIN: Primary | ICD-10-CM

## 2024-04-12 ENCOUNTER — OFFICE VISIT (OUTPATIENT)
Dept: ORTHOPEDIC SURGERY | Age: 69
End: 2024-04-12
Attending: ORTHOPAEDIC SURGERY
Payer: MEDICARE

## 2024-04-12 ENCOUNTER — HOSPITAL ENCOUNTER (OUTPATIENT)
Dept: GENERAL RADIOLOGY | Age: 69
End: 2024-04-12
Attending: ORTHOPAEDIC SURGERY
Payer: MEDICARE

## 2024-04-12 VITALS
SYSTOLIC BLOOD PRESSURE: 137 MMHG | HEIGHT: 63 IN | WEIGHT: 170 LBS | HEART RATE: 94 BPM | DIASTOLIC BLOOD PRESSURE: 77 MMHG | BODY MASS INDEX: 30.12 KG/M2

## 2024-04-12 DIAGNOSIS — M54.50 LUMBAR SPINE PAIN: ICD-10-CM

## 2024-04-12 DIAGNOSIS — G89.29 CHRONIC MIDLINE LOW BACK PAIN WITH RIGHT-SIDED SCIATICA: Primary | ICD-10-CM

## 2024-04-12 DIAGNOSIS — M54.41 CHRONIC MIDLINE LOW BACK PAIN WITH RIGHT-SIDED SCIATICA: Primary | ICD-10-CM

## 2024-04-12 PROCEDURE — 1123F ACP DISCUSS/DSCN MKR DOCD: CPT | Performed by: ORTHOPAEDIC SURGERY

## 2024-04-12 PROCEDURE — 3075F SYST BP GE 130 - 139MM HG: CPT | Performed by: ORTHOPAEDIC SURGERY

## 2024-04-12 PROCEDURE — 72100 X-RAY EXAM L-S SPINE 2/3 VWS: CPT

## 2024-04-12 PROCEDURE — 99203 OFFICE O/P NEW LOW 30 MIN: CPT | Performed by: ORTHOPAEDIC SURGERY

## 2024-04-12 PROCEDURE — 3078F DIAST BP <80 MM HG: CPT | Performed by: ORTHOPAEDIC SURGERY

## 2024-04-12 PROCEDURE — 99214 OFFICE O/P EST MOD 30 MIN: CPT | Performed by: ORTHOPAEDIC SURGERY

## 2024-04-12 NOTE — PROGRESS NOTES
Patient ID: Dianna Jameson is a 68 y.o. female    Chief Compliant:  Chief Complaint   Patient presents with    Back Problem     Lumbar spine pain, films today        Diagnostic imaging:  CT scan and x-rays lumbar spine reviewed patient with significant disc space collapse at L4-5 CT scan indicates moderate at least stenosis at L4-5      Assessment and Plan:  1. Chronic midline low back pain with right-sided sciatica        Patient complains of acute right buttock pain that radiates down her right leg to her knee.    Patient has been having some degree of chronic low back pain and radicular pain for greater than 4 years however it is worsened patient with recent emergency department visit failure of gabapentin and physical therapy historically      PT lumbar spine    MRI lumbar spine    Follow up after imaging    HPI:  This is a 68 y.o. female who presents to the clinic today as a new patient for low back evaluation.     Patient complains of right buttock pain that radiates down her right leg to her knee that has been ongoing for 3 weeks.    She further reports a 15 year history of chronic low back pain.     She has been using ice, lidocaine patches, and taking Gabapentin with no relief. She reports that she has been taking her Gabapentin for 15 years for her chronic low back pain.     Patient has completed PT     Review of Systems   All other systems reviewed and are negative.      Past History:    Current Outpatient Medications:     insulin glargine (LANTUS SOLOSTAR) 100 UNIT/ML injection pen, Inject 40 Units into the skin nightly, Disp: 30 mL, Rfl: 0    gabapentin (NEURONTIN) 400 MG capsule, Take 1 capsule by mouth in the morning and at bedtime for 60 days., Disp: 60 capsule, Rfl: 1    diclofenac sodium (VOLTAREN) 1 % GEL, Apply 4 g topically 4 times daily as needed for Pain, Disp: 100 g, Rfl: 0    acetaminophen (TYLENOL) 500 MG tablet, Take 2 tablets by mouth every 6 hours as needed for Pain or Fever, Disp: 56

## 2024-04-22 ENCOUNTER — HOSPITAL ENCOUNTER (OUTPATIENT)
Dept: PHYSICAL THERAPY | Age: 69
Setting detail: THERAPIES SERIES
Discharge: HOME | End: 2024-04-22
Attending: ORTHOPAEDIC SURGERY
Payer: MEDICARE

## 2024-04-22 PROCEDURE — 97161 PT EVAL LOW COMPLEX 20 MIN: CPT | Performed by: PHYSICAL THERAPIST

## 2024-04-22 ASSESSMENT — PAIN DESCRIPTION - ORIENTATION: ORIENTATION: RIGHT

## 2024-04-22 ASSESSMENT — PAIN DESCRIPTION - LOCATION: LOCATION: BACK;HIP;BUTTOCKS

## 2024-04-22 ASSESSMENT — PAIN DESCRIPTION - DESCRIPTORS: DESCRIPTORS: ACHING;SHOOTING;SHARP

## 2024-04-22 ASSESSMENT — PAIN DESCRIPTION - PAIN TYPE: TYPE: ACUTE PAIN

## 2024-04-22 ASSESSMENT — PAIN SCALES - GENERAL: PAINLEVEL_OUTOF10: 8

## 2024-04-22 NOTE — FLOWSHEET NOTE
Physical Therapy Daily Treatment Note    Date:  2024    Patient Name:  Dianna Jameson    :  1955  MRN: [id number]  Restrictions/Precautions:   High fall Risk  Medical/Treatment Diagnosis Information:   Diagnosis: M54.41 LBP, R sciatica  Treatment Diagnosis: M54.41 LBP & R sciatica, and gait/ balance difficulty  Insurance/Certification information:  PT Insurance Information: Aetna Medicare  Physician Information:   Ney  Plan of care signed (Y/N):    Visit# / total visits:  1/10  Pain level: 9/10       Time In:10:00   Time Out:10:42    Progress Note: [x]  Yes  []  No  Next due by: Visit #10  , or 24    Subjective:   See eval    Objective: See eval  Observation:   Test measurements:      Exercises:   Exercise/Equipment Resistance/Repetitions Other comments   Lay prone R SG 2'    Prone on elbows 1'    Press up 10x    B PKF     Modified extension 5x    Back Bend  Lean backward on counter        Counter ex     Gait RW                               [x] Provided verbal/tactile cueing for activities related to strengthening, flexibility, endurance, ROM. (00409)  [] Provided verbal/tactile cueing for activities related to improving balance, coordination, kinesthetic sense, posture, motor skill, proprioception. (79061)    Therapeutic Activities:     [] Therapeutic activities, direct (one-on-one) patient contact (use of dynamic activities to improve functional performance). (67249)    Gait:   [] Provided training and instruction to the patient for ambulation re-education. (84395)    Self-Care/ADL's  [] Self-care/home management training and compensatory training, meal preparation, safety procedures, and instructions in use of assistive technology devices/adaptive equipment, direct one-on-one contact. (32168)    Home Exercise Program:     [] Reviewed/Progressed HEP activities related to strengthening, flexibility, endurance, ROM. (70718)  [] Reviewed/Progressed HEP activities related to improving balance,

## 2024-04-22 NOTE — PLAN OF CARE
Estelita Dietrich/[place] [facility]  Rehabilitation and Sports Medicine    [] [place]  Phone: [phone]  Fax: [phone]      [] [place]  Phone: [phone]  Fax: [phone]        To:        Patient: Dianna Jameson  : 1955   MRN: [id number]  Evaluation Date: 2024      Diagnosis Information:  Diagnosis: M54.41 LBP, R sciatica   Treatment Diagnosis: M54.41 LBP & R sciatica, and gait/ balance difficulty     Physical Therapy Certification Form  Dear   The following patient has been evaluated for physical therapy services and for therapy to continue, Medicare requires monthly physician review of the treatment plan. Please review the attached evaluation and/or summary of the patient's plan of care, and verify that you agree therapy should continue by signing the attached document and sending it back to our office.    Plan of Care/Treatment to date:  [x] Therapeutic Exercise    [] Modalities:  [] Therapeutic Activity     [] Ultrasound  [] Electrical Stimulation  [x] Gait Training      [] Cervical Traction [] Lumbar Traction  [] Neuromuscular Re-education    [] Cold/hotpack [] Iontophoresis   [x] Instruction in HEP     Other:  [x] Manual Therapy      []             [] Aquatic Therapy      []                 Goals:  Short Term Goals  Time Frame for Short Term Goals: 1 week  Short Term Goal 1: Start HEP for LBP  Short Term Goal 2: Assess gait safety with RW    Long Term Goals  Time Frame for Long Term Goals : 4-6 weeks  Long Term Goal 1: LBP controlled at 2-3/10 to allow basic ADL  Long Term Goal 2: R sciatica resolved 80%  Long Term Goal 3: TUG improve to 13\" for reduced fall risk  Long Term Goal 4: Able to walk 500 ft for safe community activity    Frequency/Duration:24 - 24  # Days per week: [] 1 day # Weeks: [] 1 week [] 5 weeks     [x] 2 days   [] 2 weeks [] 6 weeks     [] 3 days   [] 3 weeks [] 7 weeks     [] 4 days   [x] 4 weeks [] 8 weeks    Rehab Potential: [] Excellent [x] Good []

## 2024-04-22 NOTE — PROGRESS NOTES
Physical Therapy  Initial Assessment  Date: 2024  Patient Name: Dianna Jameson  MRN: [id number]  : 1955    Referring Physician: José Miguel Brewster MD [place]   PCP: Anny Harkins DO     Medical Diagnosis: Chronic midline low back pain with right-sided sciatica [M54.41, G89.29] M54.41 LBP, R sciatica  Treatment Diagnosis: M54.41 LBP & R sciatica, and gait/ balance difficulty      Insurance: Payor: [facility] MEDICARE / Plan: [employer] MEDICARE ADVANTAGE HMO / Product Type: Medicare /   Insurance ID: [id number] - (Medicare Managed)      Restrictions:Fall risk/ balance loss       Subjective:   General  Chart Reviewed: Yes  Patient Assessed for Rehabilitation Services: Yes  History obtained from:: Patient, Chart Review  Family/Caregiver Present: No  Diagnosis: M54.41 LBP, R sciatica  Referring Provider (secondary): Ney  Follows Commands: Within Functional Limits  PT Visit Information  Onset Date: 24  PT Insurance Information: [facility] Medicare  Referring Provider (secondary): Ney  Subjective  Subjective: Long histoery of LBP. Has gotten worse. ED visit 3/19/24, and got worse again 24 when simply getting out of bed. Has LBP into the R leg as far as the knee. Walking has become more difficult. Admits to several falls over the past 6-9 months.  Prior diagnostic testing:: X-ray  Pain Screening  Patient Currently in Pain: Yes  Pain Assessment: 0-10  Pain Level: 8  Best Pain Level: 8  Worst Pain Level: 9  Pain Type: Acute pain  Pain Location: Back, Hip, Buttocks  Pain Orientation: Right  Pain Descriptors: Aching, Shooting, Sharp       Vision/Hearing:  Vision  Vision: Within Functional Limits  Hearing  Hearing: Within functional limits    Orientation:  Orientation  Overall Orientation Status: Within Normal Limits  Follows Commands: Within Functional Limits    Social History:  Social History  Lives With: Friend(s)  Type of Home: Apartment  Home Layout: One level  Home Access: Elevator  Home Equipment: Cane;Walker,

## 2024-04-23 ENCOUNTER — HOSPITAL ENCOUNTER (OUTPATIENT)
Dept: PHYSICAL THERAPY | Age: 69
Setting detail: THERAPIES SERIES
Discharge: HOME | End: 2024-04-23
Attending: ORTHOPAEDIC SURGERY
Payer: MEDICARE

## 2024-04-23 PROCEDURE — 97110 THERAPEUTIC EXERCISES: CPT

## 2024-04-23 NOTE — FLOWSHEET NOTE
Physical Therapy Daily Treatment Note    Date:  2024    Patient Name:  Dianna Jameson    :  1955  MRN: [id number]  Restrictions/Precautions:   High fall Risk  Medical/Treatment Diagnosis Information:   Diagnosis: M54.41 LBP, R sciatica  Treatment Diagnosis: M54.41 LBP & R sciatica, and gait/ balance difficulty  Insurance/Certification information:  PT Insurance Information: A[employer]na Medicare  Physician Information:   Ney  Plan of care signed (Y/N):  y  Visit# / total visits:  2/10  Pain level: 10       Time In:10:08   Time Out:10:38    Progress Note: []  Yes  [x]  No  Next due by: Visit #10  , or 24    Subjective:   Pt reports back is doing okay this date. R hip is most bothersome. Continued to have radicular symptoms. Knows has Degenerative disc disease.     Objective: CHRISTOS performed per flow sheet for increased mobility and strengthening with goals of pain relief and maintain function for daily living tasks. Verbal cueing for sequencing and proper form. HEP given for lumbar back program. Encouraged full time use of RW as pt ambulated in gym with it and felt back pain relief and improved balance. Pt verbalized understanding however states still may not utilize it.     Observation:  lacking lumbar extension ROM  Test measurements:      Exercises:   Exercise/Equipment Resistance/Repetitions Other comments   Lay prone R SG 2'    Prone on elbows 2'    Press up 10x    B PKF 10x    Modified extension 5x    Back Bend 5x Lean backward on counter        Counter ex 10x    Gait RW 2 laps                               [x] Provided verbal/tactile cueing for activities related to strengthening, flexibility, endurance, ROM. (75644)  [] Provided verbal/tactile cueing for activities related to improving balance, coordination, kinesthetic sense, posture, motor skill, proprioception. (13723)    Therapeutic Activities:     [] Therapeutic activities, direct (one-on-one) patient contact (use of dynamic activities to

## 2024-04-24 ENCOUNTER — HOSPITAL ENCOUNTER (OUTPATIENT)
Dept: MRI IMAGING | Age: 69
Discharge: HOME | End: 2024-04-26
Attending: ORTHOPAEDIC SURGERY
Payer: MEDICARE

## 2024-04-24 DIAGNOSIS — M54.41 CHRONIC MIDLINE LOW BACK PAIN WITH RIGHT-SIDED SCIATICA: ICD-10-CM

## 2024-04-24 DIAGNOSIS — G89.29 CHRONIC MIDLINE LOW BACK PAIN WITH RIGHT-SIDED SCIATICA: ICD-10-CM

## 2024-04-24 PROCEDURE — 72148 MRI LUMBAR SPINE W/O DYE: CPT

## 2024-04-24 NOTE — PROGRESS NOTES
I have reviewed and agree to the content of the note written by the PTA.  Electronically signed by Mohinder Barrow PT [id number]

## 2024-04-25 ENCOUNTER — HOSPITAL ENCOUNTER (OUTPATIENT)
Dept: PHYSICAL THERAPY | Age: 69
Setting detail: THERAPIES SERIES
Discharge: HOME | End: 2024-04-25
Attending: ORTHOPAEDIC SURGERY
Payer: MEDICARE

## 2024-04-25 NOTE — FLOWSHEET NOTE
Outpatient Physical Therapy    [x] [place]  Phone: [phone]  Fax: [phone]      [] [place]  Phone: [phone]  Fax: [phone]    Physical Therapy  Cancellation/No-show Note  Patient Name:  Dianna Jameson  :  1955   Date:  2024  Cancelled visits to date: 1  No-shows to date: 0    For today's appointment patient:  [x]  Cancelled  []  Rescheduled appointment  []  No-show     Reason given by patient:  []  Patient ill  [x]  Conflicting appointment  []  No transportation    []  Conflict with work  []  No reason given  []  Other:     Comments:  Has another Dr. juárez     Electronically signed by: EVANGELISTA WEBER PTA

## 2024-04-30 ENCOUNTER — HOSPITAL ENCOUNTER (OUTPATIENT)
Dept: PHYSICAL THERAPY | Age: 69
Setting detail: THERAPIES SERIES
Discharge: HOME | End: 2024-04-30
Attending: ORTHOPAEDIC SURGERY
Payer: MEDICARE

## 2024-04-30 ENCOUNTER — TELEPHONE (OUTPATIENT)
Dept: FAMILY MEDICINE CLINIC | Age: 69
End: 2024-04-30

## 2024-04-30 PROCEDURE — 97110 THERAPEUTIC EXERCISES: CPT | Performed by: PHYSICAL THERAPY ASSISTANT

## 2024-04-30 NOTE — TELEPHONE ENCOUNTER
Attempted to reach patient regarding missed appointment on 4/30/24. Unable to contact at this time. Left message to reschedule.

## 2024-04-30 NOTE — FLOWSHEET NOTE
activities to improve functional performance). (19753)    Gait:   [] Provided training and instruction to the patient for ambulation re-education. (19083)    Self-Care/ADL's  [] Self-care/home management training and compensatory training, meal preparation, safety procedures, and instructions in use of assistive technology devices/adaptive equipment, direct one-on-one contact. (78032)    Home Exercise Program:   Lumbar extension progression HEP  [x] Reviewed/Progressed HEP activities related to strengthening, flexibility, endurance, ROM. (92291)  [] Reviewed/Progressed HEP activities related to improving balance, coordination, kinesthetic sense, posture, motor skill, proprioception.  (79084)    Manual Treatments:    [] Provided manual therapy to mobilize soft tissue/joints for the purpose of modulating pain, promoting relaxation,  increasing ROM, reducing/eliminating soft tissue swelling/inflammation/restriction, improving soft tissue extensibility. (92671)    Service Based Modalities:      Timed Code Treatment Minutes:   34' therex    Total Treatment Minutes:   34'    Treatment/Activity Tolerance:  [x] Patient tolerated treatment well [] Patient limited by fatique  [] Patient limited by pain  [x] Patient limited by other medical complications  [] Other:     Prognosis: [x] Good [] Fair  [] Poor    Patient Requires Follow-up: [x] Yes  [] No    Goals:  Short Term Goals  Time Frame for Short Term Goals: 1 week  Short Term Goal 1: Start HEP for LBP  Short Term Goal 2: Assess gait safety with RW    Long Term Goals  Time Frame for Long Term Goals : 4-6 weeks  Long Term Goal 1: LBP controlled at 2-3/10 to allow basic ADL  Long Term Goal 2: R sciatica resolved 80%  Long Term Goal 3: TUG improve to 13\" for reduced fall risk  Long Term Goal 4: Able to walk 500 ft for safe community activity    Plan:   [x] Continue per plan of care [] Alter current plan (see comments)  [] Plan of care initiated [] Hold pending MD visit []

## 2024-05-01 NOTE — PROGRESS NOTES
I have reviewed and agree to the content of the note written by the PTA.  Electronically signed by Mohinder Barrow PT [id number]

## 2024-05-02 ENCOUNTER — APPOINTMENT (OUTPATIENT)
Dept: PHYSICAL THERAPY | Age: 69
End: 2024-05-02
Attending: ORTHOPAEDIC SURGERY
Payer: MEDICARE

## 2024-05-07 ENCOUNTER — HOSPITAL ENCOUNTER (OUTPATIENT)
Dept: PHYSICAL THERAPY | Age: 69
Setting detail: THERAPIES SERIES
Discharge: HOME | End: 2024-05-07
Attending: ORTHOPAEDIC SURGERY
Payer: MEDICARE

## 2024-05-07 PROCEDURE — 97110 THERAPEUTIC EXERCISES: CPT

## 2024-05-07 NOTE — FLOWSHEET NOTE
Physical Therapy Daily Treatment Note    Date:  2024    Patient Name:  Dianna Jameson    :  1955  MRN: [id number]  Restrictions/Precautions:   High fall Risk  Medical/Treatment Diagnosis Information:   Diagnosis: M54.41 LBP, R sciatica  Treatment Diagnosis: M54.41 LBP & R sciatica, and gait/ balance difficulty  Insurance/Certification information:  PT Insurance Information: A[employer]na Medicare  Physician Information:   Ney  Plan of care signed (Y/N):  y  Visit# / total visits:  4/10  Pain level: 6/10       Time In:1007  Time Out:1037    Progress Note: []  Yes  [x]  No  Next due by: Visit #10  , or 24    Subjective:   States pain is not too bad this date. Yesterday was a very bad day. Had a fall the other day and missed therapy. No radicular pain this date which has not been occurring near as often. Walked from house to PT. Usually gets most sore with prolonged walking. Notes Les given out when too fatigued     Objective: CHRISTOS performed per flow sheet for increased mobility and strengthening with goals of pain relief and maintain function for daily living tasks. Verbal cueing for sequencing and proper form.  Difficulty with end range extension remains but improved from start of treatment. Able to decrease overall pain with exercises.     Observation:  lacking lumbar extension ROM  Test measurements:  lacking ~40% lumbar ext in prone     Exercises:   Exercise/Equipment Resistance/Repetitions Other comments   Lay prone R SG 2'    Prone on elbows 4'    Press up 10x2    B PKF 10x2    Hip ext 5x         Clams 10x ea    Bridge 10x    Hip abd/add 10x ea  Ball / RED   Ab brace 10x         Modified extension 10x    Back Bend 10x Lean backward on counter        Hip ext, abd, HS curls 10x    Gait RW                               [x] Provided verbal/tactile cueing for activities related to strengthening, flexibility, endurance, ROM. (59423)  [] Provided verbal/tactile cueing for activities related to improving

## 2024-05-09 ENCOUNTER — HOSPITAL ENCOUNTER (OUTPATIENT)
Dept: PHYSICAL THERAPY | Age: 69
Setting detail: THERAPIES SERIES
Discharge: HOME | End: 2024-05-09
Attending: ORTHOPAEDIC SURGERY
Payer: MEDICARE

## 2024-05-09 PROCEDURE — 97110 THERAPEUTIC EXERCISES: CPT

## 2024-05-09 NOTE — FLOWSHEET NOTE
Physical Therapy Daily Treatment Note    Date:  2024    Patient Name:  Dianna Jameson    :  1955  MRN: [id number]  Restrictions/Precautions:   High fall Risk  Medical/Treatment Diagnosis Information:   Diagnosis: M54.41 LBP, R sciatica  Treatment Diagnosis: M54.41 LBP & R sciatica, and gait/ balance difficulty  Insurance/Certification information:  PT Insurance Information: Aetna Medicare  Physician Information:   Ney  Plan of care signed (Y/N):  y  Visit# / total visits:  5/10  Pain level: 10       Time In: 10:20  Time Out:10:51    Progress Note: []  Yes  [x]  No  Next due by: Visit #10  , or 24    Subjective:   pt reports doing okay this date. Pt reports wanting them to look into a back procedure however stated to try therapy first. Therapy has helped very minimally thus far. Prolonged standing and walking is still very painful.    Objective: CHRISTOS performed per flow sheet for increased mobility and strengthening with goals of pain relief and maintain function for daily living tasks. Verbal cueing for sequencing and proper form.  Difficulty with end range extension remains but improved from start of treatment. Able to decrease overall pain with exercises.     Observation:  lacking lumbar extension ROM  Test measurements:     Exercises:   Exercise/Equipment Resistance/Repetitions Other comments   Lay prone R SG 2'    Prone on elbows 4'    Press up 10x2    B PKF 10x2    Hip ext 5x         Clams 10x ea    Bridge 15x    Hip abd/add 10x ea  Ball / RED   Ab brace 10x         Modified extension 10x    Back Bend 10x Lean backward on counter        Hip ext, abd, HS curls 10x    Gait RW                               [x] Provided verbal/tactile cueing for activities related to strengthening, flexibility, endurance, ROM. (20596)  [] Provided verbal/tactile cueing for activities related to improving balance, coordination, kinesthetic sense, posture, motor skill, proprioception. (38023)    Therapeutic

## 2024-05-11 ENCOUNTER — HOSPITAL ENCOUNTER (EMERGENCY)
Age: 69
Discharge: HOME | End: 2024-05-11
Attending: EMERGENCY MEDICINE
Payer: MEDICARE

## 2024-05-11 ENCOUNTER — APPOINTMENT (OUTPATIENT)
Dept: CT IMAGING | Age: 69
End: 2024-05-11
Payer: MEDICARE

## 2024-05-11 VITALS
HEART RATE: 92 BPM | TEMPERATURE: 97.9 F | DIASTOLIC BLOOD PRESSURE: 92 MMHG | SYSTOLIC BLOOD PRESSURE: 154 MMHG | RESPIRATION RATE: 16 BRPM | OXYGEN SATURATION: 96 %

## 2024-05-11 DIAGNOSIS — R10.84 GENERALIZED ABDOMINAL PAIN: ICD-10-CM

## 2024-05-11 DIAGNOSIS — R19.7 NAUSEA VOMITING AND DIARRHEA: Primary | ICD-10-CM

## 2024-05-11 DIAGNOSIS — R11.2 NAUSEA VOMITING AND DIARRHEA: Primary | ICD-10-CM

## 2024-05-11 LAB
ALBUMIN SERPL-MCNC: 4.8 G/DL (ref 3.5–5.2)
ALBUMIN/GLOB SERPL: 1.4 {RATIO} (ref 1–2.5)
ALP SERPL-CCNC: 101 U/L (ref 35–104)
ALT SERPL-CCNC: 56 U/L (ref 5–33)
ANION GAP SERPL CALCULATED.3IONS-SCNC: 15 MMOL/L (ref 9–17)
AST SERPL-CCNC: 50 U/L
BACTERIA URNS QL MICRO: ABNORMAL
BASOPHILS # BLD: 0.07 K/UL (ref 0–0.2)
BASOPHILS NFR BLD: 1 % (ref 0–2)
BILIRUB SERPL-MCNC: 0.4 MG/DL (ref 0.3–1.2)
BILIRUB UR QL STRIP: NEGATIVE
BUN SERPL-MCNC: 12 MG/DL (ref 8–23)
BUN/CREAT SERPL: 11 (ref 9–20)
CALCIUM SERPL-MCNC: 10.8 MG/DL (ref 8.6–10.4)
CHARACTER UR: ABNORMAL
CHLORIDE SERPL-SCNC: 92 MMOL/L (ref 98–107)
CLARITY UR: CLEAR
CO2 SERPL-SCNC: 27 MMOL/L (ref 20–31)
COLOR UR: YELLOW
CREAT SERPL-MCNC: 1.1 MG/DL (ref 0.5–0.9)
EOSINOPHIL # BLD: 0.17 K/UL (ref 0–0.44)
EOSINOPHILS RELATIVE PERCENT: 3 % (ref 1–4)
EPI CELLS #/AREA URNS HPF: ABNORMAL /HPF (ref 0–5)
ERYTHROCYTE [DISTWIDTH] IN BLOOD BY AUTOMATED COUNT: 13.6 % (ref 11.8–14.4)
GFR, ESTIMATED: 55 ML/MIN/1.73M2
GLUCOSE SERPL-MCNC: 171 MG/DL (ref 70–99)
GLUCOSE UR STRIP-MCNC: NEGATIVE MG/DL
HCT VFR BLD AUTO: 39.8 % (ref 36.3–47.1)
HGB BLD-MCNC: 13.9 G/DL (ref 11.9–15.1)
HGB UR QL STRIP.AUTO: NEGATIVE
IMM GRANULOCYTES # BLD AUTO: <0.03 K/UL (ref 0–0.3)
IMM GRANULOCYTES NFR BLD: 0 %
KETONES UR STRIP-MCNC: NEGATIVE MG/DL
LACTATE BLDV-SCNC: 2 MMOL/L (ref 0.5–2.2)
LEUKOCYTE ESTERASE UR QL STRIP: NEGATIVE
LIPASE SERPL-CCNC: 46 U/L (ref 13–60)
LYMPHOCYTES NFR BLD: 1.67 K/UL (ref 1.1–3.7)
LYMPHOCYTES RELATIVE PERCENT: 25 % (ref 24–43)
MCH RBC QN AUTO: 30.3 PG (ref 25.2–33.5)
MCHC RBC AUTO-ENTMCNC: 34.9 G/DL (ref 25.2–33.5)
MCV RBC AUTO: 86.7 FL (ref 82.6–102.9)
MONOCYTES NFR BLD: 0.33 K/UL (ref 0.1–1.2)
MONOCYTES NFR BLD: 5 % (ref 3–12)
NEUTROPHILS NFR BLD: 66 % (ref 36–65)
NEUTS SEG NFR BLD: 4.57 K/UL (ref 1.5–8.1)
NITRITE UR QL STRIP: NEGATIVE
NRBC BLD-RTO: 0 PER 100 WBC
PH UR STRIP: 7 [PH] (ref 5–6)
PLATELET # BLD AUTO: 268 K/UL (ref 138–453)
PMV BLD AUTO: 9.5 FL (ref 8.1–13.5)
POTASSIUM SERPL-SCNC: 4.1 MMOL/L (ref 3.7–5.3)
PROT SERPL-MCNC: 8.2 G/DL (ref 6.4–8.3)
PROT UR STRIP-MCNC: ABNORMAL MG/DL
RBC # BLD AUTO: 4.59 M/UL (ref 3.95–5.11)
RBC #/AREA URNS HPF: ABNORMAL /HPF (ref 0–4)
SODIUM SERPL-SCNC: 134 MMOL/L (ref 135–144)
SP GR UR STRIP: 1.02 (ref 1.01–1.02)
TROPONIN I SERPL HS-MCNC: 10 NG/L (ref 0–14)
UROBILINOGEN UR STRIP-ACNC: NORMAL EU/DL (ref 0–1)
WBC #/AREA URNS HPF: ABNORMAL /HPF (ref 0–4)
WBC OTHER # BLD: 6.8 K/UL (ref 3.5–11.3)

## 2024-05-11 PROCEDURE — 83605 ASSAY OF LACTIC ACID: CPT

## 2024-05-11 PROCEDURE — 80053 COMPREHEN METABOLIC PANEL: CPT

## 2024-05-11 PROCEDURE — 85025 COMPLETE CBC W/AUTO DIFF WBC: CPT

## 2024-05-11 PROCEDURE — 74176 CT ABD & PELVIS W/O CONTRAST: CPT

## 2024-05-11 PROCEDURE — 93005 ELECTROCARDIOGRAM TRACING: CPT | Performed by: EMERGENCY MEDICINE

## 2024-05-11 PROCEDURE — 99284 EMERGENCY DEPT VISIT MOD MDM: CPT

## 2024-05-11 PROCEDURE — 81001 URINALYSIS AUTO W/SCOPE: CPT

## 2024-05-11 PROCEDURE — 84484 ASSAY OF TROPONIN QUANT: CPT

## 2024-05-11 PROCEDURE — 83690 ASSAY OF LIPASE: CPT

## 2024-05-11 RX ORDER — ONDANSETRON 4 MG/1
4 TABLET, FILM COATED ORAL EVERY 8 HOURS PRN
Qty: 20 TABLET | Refills: 0 | Status: SHIPPED | OUTPATIENT
Start: 2024-05-11

## 2024-05-11 RX ORDER — SODIUM CHLORIDE 0.9 % (FLUSH) 0.9 %
3 SYRINGE (ML) INJECTION EVERY 8 HOURS
Status: DISCONTINUED | OUTPATIENT
Start: 2024-05-11 | End: 2024-05-11 | Stop reason: HOSPADM

## 2024-05-11 ASSESSMENT — LIFESTYLE VARIABLES: HOW OFTEN DO YOU HAVE A DRINK CONTAINING ALCOHOL: NEVER

## 2024-05-11 NOTE — ED PROVIDER NOTES
[facility]  [address] [address]  Phone: [phone]      Pt Name: Dianna Jameson  MRN:[id number]  Birthdate 1955  Date of evaluation: 5/11/2024      CHIEF COMPLAINT       Chief Complaint   Patient presents with    Emesis     Started yesterday    Diarrhea       HISTORY OF PRESENT ILLNESS   68-year-old female presents to the emergency department today complaining of nausea vomiting as well as abdominal pain.  She does report associated mucousy stools.  Has been going on since yesterday she is concerned because she cannot take her typical medication.  She is convinced though that her medications are causing the symptoms.  She denies any fevers or chills.  Abdominal pain is diffuse.  No mitigating precipitating or exacerbating factors except patient does admit eating does sometimes make it worse.  She has had episodes like this in the past and has always been evaluated in the emergency department but never by her personal physician.  In fact [place] she recently fired her personal physician went to a different physician demanding review of her medications because she is concerned of adverse reactions.  No distress here frequency.  No hematuria.  No blood in the stool.  No blood in the vomitus.    REVIEW OF SYSTEMS     Review of Systems   All other systems reviewed and are negative.        PAST MEDICAL HISTORY    has a past medical history of Abdominal pain, Asthma, Atrial fibrillation (HCC), Bowel obstruction (HCC), COPD (chronic obstructive pulmonary disease) (HCC), DDD (degenerative disc disease), Diabetes mellitus (HCC), Hyperlipidemia, Hypertension, Hyperthyroidism, and Type II or unspecified type diabetes mellitus without mention of complication, not stated as uncontrolled.    SURGICAL HISTORY      has a past surgical history that includes Tubal ligation; Tonsillectomy; Colonoscopy (04/23/2014); Upper gastrointestinal endoscopy (03/16/2016); tracheostomy (2015); tracheostomy

## 2024-05-12 LAB
EKG ATRIAL RATE: 102 BPM
EKG P AXIS: 78 DEGREES
EKG P-R INTERVAL: 186 MS
EKG Q-T INTERVAL: 366 MS
EKG QRS DURATION: 78 MS
EKG QTC CALCULATION (BAZETT): 477 MS
EKG R AXIS: 42 DEGREES
EKG T AXIS: 64 DEGREES
EKG VENTRICULAR RATE: 102 BPM

## 2024-05-13 ENCOUNTER — HOSPITAL ENCOUNTER (OUTPATIENT)
Dept: PHYSICAL THERAPY | Age: 69
Setting detail: THERAPIES SERIES
Discharge: HOME | End: 2024-05-13
Attending: ORTHOPAEDIC SURGERY
Payer: MEDICARE

## 2024-05-13 ENCOUNTER — OFFICE VISIT (OUTPATIENT)
Dept: DIABETES SERVICES | Age: 69
End: 2024-05-13
Payer: MEDICARE

## 2024-05-13 VITALS
HEIGHT: 63 IN | DIASTOLIC BLOOD PRESSURE: 70 MMHG | OXYGEN SATURATION: 97 % | SYSTOLIC BLOOD PRESSURE: 132 MMHG | HEART RATE: 76 BPM | WEIGHT: 165 LBS | BODY MASS INDEX: 29.23 KG/M2

## 2024-05-13 DIAGNOSIS — N18.31 TYPE 2 DIABETES MELLITUS WITH STAGE 3A CHRONIC KIDNEY DISEASE, WITH LONG-TERM CURRENT USE OF INSULIN (HCC): ICD-10-CM

## 2024-05-13 DIAGNOSIS — E78.2 MIXED HYPERLIPIDEMIA: Primary | ICD-10-CM

## 2024-05-13 DIAGNOSIS — I10 ESSENTIAL HYPERTENSION: ICD-10-CM

## 2024-05-13 DIAGNOSIS — E11.22 TYPE 2 DIABETES MELLITUS WITH STAGE 3A CHRONIC KIDNEY DISEASE, WITH LONG-TERM CURRENT USE OF INSULIN (HCC): ICD-10-CM

## 2024-05-13 DIAGNOSIS — Z79.4 TYPE 2 DIABETES MELLITUS WITH STAGE 3A CHRONIC KIDNEY DISEASE, WITH LONG-TERM CURRENT USE OF INSULIN (HCC): ICD-10-CM

## 2024-05-13 PROCEDURE — 3052F HG A1C>EQUAL 8.0%<EQUAL 9.0%: CPT | Performed by: NURSE PRACTITIONER

## 2024-05-13 PROCEDURE — 3078F DIAST BP <80 MM HG: CPT | Performed by: NURSE PRACTITIONER

## 2024-05-13 PROCEDURE — 1123F ACP DISCUSS/DSCN MKR DOCD: CPT | Performed by: NURSE PRACTITIONER

## 2024-05-13 PROCEDURE — G2211 COMPLEX E/M VISIT ADD ON: HCPCS | Performed by: NURSE PRACTITIONER

## 2024-05-13 PROCEDURE — 97110 THERAPEUTIC EXERCISES: CPT | Performed by: PHYSICAL THERAPY ASSISTANT

## 2024-05-13 PROCEDURE — 99212 OFFICE O/P EST SF 10 MIN: CPT | Performed by: NURSE PRACTITIONER

## 2024-05-13 PROCEDURE — 99214 OFFICE O/P EST MOD 30 MIN: CPT | Performed by: NURSE PRACTITIONER

## 2024-05-13 PROCEDURE — 3075F SYST BP GE 130 - 139MM HG: CPT | Performed by: NURSE PRACTITIONER

## 2024-05-13 RX ORDER — GLIMEPIRIDE 2 MG/1
4 TABLET ORAL 2 TIMES DAILY WITH MEALS
Qty: 120 TABLET | Refills: 3
Start: 2024-05-13

## 2024-05-13 RX ORDER — INSULIN GLARGINE 100 [IU]/ML
45 INJECTION, SOLUTION SUBCUTANEOUS NIGHTLY
Qty: 30 ML | Refills: 0
Start: 2024-05-13 | End: 2024-07-27

## 2024-05-13 ASSESSMENT — ENCOUNTER SYMPTOMS
ABDOMINAL PAIN: 0
SHORTNESS OF BREATH: 0
RESPIRATORY NEGATIVE: 1

## 2024-05-13 NOTE — FLOWSHEET NOTE
Physical Therapy Daily Treatment Note    Date:  2024    Patient Name:  Dianna Jamseon    :  1955  MRN: [id number]  Restrictions/Precautions:   High fall Risk  Medical/Treatment Diagnosis Information:   Diagnosis: M54.41 LBP, R sciatica  Treatment Diagnosis: M54.41 LBP & R sciatica, and gait/ balance difficulty  Insurance/Certification information:  PT Insurance Information: Aetna Medicare  Physician Information:   Ney  Plan of care signed (Y/N):  y  Visit# / total visits:  6/10  Pain level: 710       Time In: 0928  Time Out:1000    Progress Note: []  Yes  [x]  No  Next due by: Visit #10  , or 24    Subjective:   Notes feeling overall improvement. Pain this date rated 7/10 through low back.  Patient arrives ~10 min late    Objective: CHRISTOS performed per flow sheet for increased mobility and strengthening for pain relief and maintain function for daily living tasks. Verbal cueing for sequencing and proper form.  Difficulty with end range extension remains but improved. Fatigue noted with prolonged standing or ambulation.     Observation:  lacking lumbar extension ROM  Test measurements: TU sec    Exercises:   Exercise/Equipment Resistance/Repetitions Other comments   Lay prone R SG     Prone on elbows 4'    Press up 10x2    B PKF 10x2    Hip ext 10x         Clams 10x ea    Bridge 15x    Hip abd/add 10x ea  Ball / RED   Ab brace 10x         Modified extension 10x    Back Bend 10x Lean backward on counter        Hip ext, abd, HS curls 15x    Gait RW                               [x] Provided verbal/tactile cueing for activities related to strengthening, flexibility, endurance, ROM. (63770)  [] Provided verbal/tactile cueing for activities related to improving balance, coordination, kinesthetic sense, posture, motor skill, proprioception. (46428)    Therapeutic Activities:     [] Therapeutic activities, direct (one-on-one) patient contact (use of dynamic activities to improve functional

## 2024-05-13 NOTE — PATIENT INSTRUCTIONS
Food journal   Restart dexcom   Stop Trulicity  Increase Lantus 45 units   Increase Glimepride to 4 mg twice a day      31-Oct-2019 13:37

## 2024-05-13 NOTE — PROGRESS NOTES
[facility] OF [facility]  [address] [facility] [address] [phone]        HISTORY:    Dianna Jameson presents today for evaluation and management of:  Chief Complaint   Patient presents with    Diabetes     Patient states that she is not using Dexcom due to  not working.         Patient Care Team:  Anny Harkins DO as PCP - General (Family Medicine)  Anny Harkins DO as PCP - Empaneled Provider  Maurice Ni MD as Consulting Physician (Pain Management)  Willy Ji MD as Consulting Physician (Cardiology)      Interval History:    Past DM Medications   none     Current Diabetic Medications  Lantus 35 units once daily (decreased due to nausea)  Trulicity 4.5 mg once weekly   Glimepiride 2 mg bid (decreased due to nausea)     DKA episodes: 0      02/13/24   Dianna Jameson is a 68 y.o. female patient who presents to clinic today for her diabetes. she has a history of HTN, CKD, hyperlipidemia and mobility issues which contributes to her diabetes. At previous visit glimepiride was increased. she denies any current signs or symptoms of hyper/hypoglycemia. she states they are taking their medications as prescribed without any adverse effects.   Diet: regular   Exercise: none  BS testing: uses cgm daily with success and is adherent to cgm therapy  -did not brink reader and is out of sensors  Hypoglycemia: No  Hypoglycemia as needed treatment: snack  Issues:   Diabetic foot exam up-to-date: Yes  Diabetic retinal exam up-to-date: No     Diabetes complications:nephropathy     05/13/24   Dianna Jameson is a 68 y.o. female patient who presents to clinic today for her diabetes. she has a history of HTN, CKD, hyperlipidemia and mobility issues which contributes to her diabetes. At previous visit diabetes counseling was provided. she denies any current signs or symptoms of hyper/hypoglycemia. she states they are taking

## 2024-05-14 NOTE — PROGRESS NOTES
I have reviewed and agree to the content of the note written by the PTA.  Electronically signed by Mohinder Barrow PT [id number]

## 2024-05-17 ENCOUNTER — HOSPITAL ENCOUNTER (OUTPATIENT)
Dept: PHYSICAL THERAPY | Age: 69
Setting detail: THERAPIES SERIES
Discharge: HOME | End: 2024-05-17
Attending: ORTHOPAEDIC SURGERY
Payer: MEDICARE

## 2024-05-17 PROCEDURE — 97110 THERAPEUTIC EXERCISES: CPT

## 2024-05-17 NOTE — FLOWSHEET NOTE
Physical Therapy Daily Treatment Note    Date:  2024    Patient Name:  Dianna Jameson    :  1955  MRN: [id number]  Restrictions/Precautions:   High fall Risk  Medical/Treatment Diagnosis Information:   Diagnosis: M54.41 LBP, R sciatica  Treatment Diagnosis: M54.41 LBP & R sciatica, and gait/ balance difficulty  Insurance/Certification information:  PT Insurance Information: [employer] Medicare  Physician Information:   Ney  Plan of care signed (Y/N):  y  Visit# / total visits:  7/10  Pain level: 6/10       Time In: 11:02  Time Out: 11:30    Progress Note: []  Yes  [x]  No  Next due by: Visit #10  , or 24    Subjective:   Pain is still apparent however has gotten better.     Objective: CHRISTOS performed per flow sheet for increased mobility and strengthening for pain relief and maintain function for daily living tasks. Verbal cueing for sequencing and proper form.  Difficulty with end range extension remains but improved. Fatigue noted with prolonged standing or ambulation.     Observation:  lacking lumbar extension ROM  Test measurements:     Exercises:   Exercise/Equipment Resistance/Repetitions Other comments   Lay prone R SG     Prone on elbows 4'    Press up 10x2    B PKF 10x2    Hip ext 10x         Clams 10x ea    Bridge 15x    Hip abd/add 10x ea  Ball / RED   Ab brace 10x         Modified extension 10x    Back Bend 10x Lean backward on counter        Hip ext, abd, HS curls 15x    Gait RW                               [x] Provided verbal/tactile cueing for activities related to strengthening, flexibility, endurance, ROM. (74958)  [] Provided verbal/tactile cueing for activities related to improving balance, coordination, kinesthetic sense, posture, motor skill, proprioception. (89998)    Therapeutic Activities:     [] Therapeutic activities, direct (one-on-one) patient contact (use of dynamic activities to improve functional performance). (10993)    Gait:   [] Provided training and instruction to the

## 2024-05-22 ENCOUNTER — HOSPITAL ENCOUNTER (OUTPATIENT)
Dept: PHYSICAL THERAPY | Age: 69
Setting detail: THERAPIES SERIES
Discharge: HOME | End: 2024-05-22
Attending: ORTHOPAEDIC SURGERY
Payer: MEDICARE

## 2024-05-22 PROCEDURE — 97110 THERAPEUTIC EXERCISES: CPT

## 2024-05-22 NOTE — FLOWSHEET NOTE
Physical Therapy Daily Treatment Note    Date:  2024    Patient Name:  Dianna Jameson    :  1955  MRN: [id number]  Restrictions/Precautions:   High fall Risk  Medical/Treatment Diagnosis Information:   Diagnosis: M54.41 LBP, R sciatica  Treatment Diagnosis: M54.41 LBP & R sciatica, and gait/ balance difficulty  Insurance/Certification information:  PT Insurance Information: A[employer]na Medicare  Physician Information:   Ney  Plan of care signed (Y/N):  y  Visit# / total visits:  8/10  Pain level: 6/10       Time In: 10:54  Time Out: 11:25    Progress Note: [x]  Yes  []  No  Next due by: Visit #10  , or 24    Subjective:   Pain has been a lot better. As long as does exercises is okay. If does not then usually gets stiff and painful. States has not had anymore radicular pain     Objective: CHRISTOS performed per flow sheet for increased mobility and strengthening for pain relief and maintain function for daily living tasks. Verbal cueing for sequencing and proper form.  Difficulty with end range extension remains but improved. Fatigue noted with prolonged standing or ambulation. Function has improved however pain has stayed about the same in back throughout PT. Will place pt on 30 day hold as feels can complete exercises at home however sees Dr Brewster next Friday to discuss lumbar MRI. Will keep chart open in case needs more visits    Observation:  lacking lumbar extension ROM  Test measurements:  TUG no AD: 12 seconds     Exercises:   Exercise/Equipment Resistance/Repetitions Other comments   Lay prone R SG     Prone on elbows 4'    Press up 10x2    B PKF 10x2    Hip ext 10x         Clams 10x ea    Bridge 15x    Hip abd/add 10x ea  Ball / RED   Ab brace 10x         Modified extension 10x    Back Bend 10x Lean backward on counter        Hip ext, abd, HS curls 15x    Gait RW                               [x] Provided verbal/tactile cueing for activities related to strengthening, flexibility, endurance, ROM.

## 2024-05-28 ENCOUNTER — APPOINTMENT (OUTPATIENT)
Dept: PHYSICAL THERAPY | Age: 69
End: 2024-05-28
Attending: ORTHOPAEDIC SURGERY
Payer: MEDICARE

## 2024-05-30 ENCOUNTER — APPOINTMENT (OUTPATIENT)
Dept: PHYSICAL THERAPY | Age: 69
End: 2024-05-30
Attending: ORTHOPAEDIC SURGERY
Payer: MEDICARE

## 2024-06-07 ENCOUNTER — OFFICE VISIT (OUTPATIENT)
Dept: ORTHOPEDIC SURGERY | Age: 69
End: 2024-06-07
Payer: MEDICARE

## 2024-06-07 VITALS
SYSTOLIC BLOOD PRESSURE: 126 MMHG | BODY MASS INDEX: 29.23 KG/M2 | HEART RATE: 50 BPM | WEIGHT: 165 LBS | DIASTOLIC BLOOD PRESSURE: 76 MMHG | OXYGEN SATURATION: 97 % | HEIGHT: 63 IN

## 2024-06-07 DIAGNOSIS — G89.29 CHRONIC MIDLINE LOW BACK PAIN WITH RIGHT-SIDED SCIATICA: Primary | ICD-10-CM

## 2024-06-07 DIAGNOSIS — M48.061 SPINAL STENOSIS OF LUMBAR REGION, UNSPECIFIED WHETHER NEUROGENIC CLAUDICATION PRESENT: ICD-10-CM

## 2024-06-07 DIAGNOSIS — M54.41 CHRONIC MIDLINE LOW BACK PAIN WITH RIGHT-SIDED SCIATICA: Primary | ICD-10-CM

## 2024-06-07 PROCEDURE — 1123F ACP DISCUSS/DSCN MKR DOCD: CPT | Performed by: ORTHOPAEDIC SURGERY

## 2024-06-07 PROCEDURE — 3078F DIAST BP <80 MM HG: CPT | Performed by: ORTHOPAEDIC SURGERY

## 2024-06-07 PROCEDURE — 99215 OFFICE O/P EST HI 40 MIN: CPT | Performed by: ORTHOPAEDIC SURGERY

## 2024-06-07 PROCEDURE — 99213 OFFICE O/P EST LOW 20 MIN: CPT | Performed by: ORTHOPAEDIC SURGERY

## 2024-06-07 PROCEDURE — 3074F SYST BP LT 130 MM HG: CPT | Performed by: ORTHOPAEDIC SURGERY

## 2024-06-07 NOTE — PROGRESS NOTES
Social History     Occupational History    Not on file   Tobacco Use    Smoking status: Former     Current packs/day: 0.00     Average packs/day: 1 pack/day for 20.0 years (20.0 ttl pk-yrs)     Types: Cigarettes     Start date: 10/23/1993     Quit date: 10/23/2013     Years since quitting: 10.6    Smokeless tobacco: Never   Substance and Sexual Activity    Alcohol use: No     Comment: beer once a month    Drug use: No     Comment: former marijuana    Sexual activity: Not on file        Review of Systems         Physical Exam  Vitals and nursing note reviewed.   Constitutional:       Appearance: She is well-developed.   HENT:      Head: Normocephalic and atraumatic.      Nose: Nose normal.   Eyes:      Conjunctiva/sclera: Conjunctivae normal.   Pulmonary:      Effort: Pulmonary effort is normal. No respiratory distress.   Musculoskeletal:      Cervical back: Normal range of motion and neck supple.      Comments: Normal gait     Skin:     General: Skin is warm and dry.   Neurological:      Mental Status: She is alert and oriented to person, place, and time.      Sensory: No sensory deficit.   Psychiatric:         Behavior: Behavior normal.         Thought Content: Thought content normal.     MRI lumbar spine is reviewed advanced disc space collapse L4-5 with severe lumbar spinal stenosis particularly in the lateral recess and foraminal region    Assessment:          1. Chronic midline low back pain with right-sided sciatica    2. Spinal stenosis of lumbar region, unspecified whether neurogenic claudication present        Plan:     Patient with chronic low back pain right radicular leg pain failure of nonsurgical intervention    Patient is a candidate for L4-5 PLIF    No orders of the defined types were placed in this encounter.       José Miguel Brewster MD    Please note that this chart was generated using voicerecognition Dragon dictation software.  Although every effort was made to ensurethe accuracy of this

## 2024-06-11 ENCOUNTER — TELEPHONE (OUTPATIENT)
Dept: ORTHOPEDIC SURGERY | Age: 69
End: 2024-06-11

## 2024-06-13 ENCOUNTER — HOSPITAL ENCOUNTER (EMERGENCY)
Age: 69
Discharge: HOME | End: 2024-06-13
Attending: EMERGENCY MEDICINE
Payer: MEDICARE

## 2024-06-13 ENCOUNTER — APPOINTMENT (OUTPATIENT)
Dept: CT IMAGING | Age: 69
End: 2024-06-13
Payer: MEDICARE

## 2024-06-13 VITALS
OXYGEN SATURATION: 96 % | TEMPERATURE: 97.9 F | HEART RATE: 56 BPM | RESPIRATION RATE: 18 BRPM | DIASTOLIC BLOOD PRESSURE: 92 MMHG | SYSTOLIC BLOOD PRESSURE: 143 MMHG

## 2024-06-13 DIAGNOSIS — R10.31 RIGHT LOWER QUADRANT ABDOMINAL PAIN: Primary | ICD-10-CM

## 2024-06-13 LAB
ALBUMIN SERPL-MCNC: 4.5 G/DL (ref 3.5–5.2)
ALBUMIN/GLOB SERPL: 1.6 {RATIO} (ref 1–2.5)
ALP SERPL-CCNC: 80 U/L (ref 35–104)
ALT SERPL-CCNC: 21 U/L (ref 5–33)
ANION GAP SERPL CALCULATED.3IONS-SCNC: 13 MMOL/L (ref 9–17)
AST SERPL-CCNC: 24 U/L
BACTERIA URNS QL MICRO: ABNORMAL
BASOPHILS # BLD: 0.07 K/UL (ref 0–0.2)
BASOPHILS NFR BLD: 1 % (ref 0–2)
BILIRUB SERPL-MCNC: 0.3 MG/DL (ref 0.3–1.2)
BILIRUB UR QL STRIP: NEGATIVE
BUN SERPL-MCNC: 19 MG/DL (ref 8–23)
BUN/CREAT SERPL: 15 (ref 9–20)
CALCIUM SERPL-MCNC: 9.8 MG/DL (ref 8.6–10.4)
CHARACTER UR: ABNORMAL
CHLORIDE SERPL-SCNC: 98 MMOL/L (ref 98–107)
CLARITY UR: CLEAR
CO2 SERPL-SCNC: 25 MMOL/L (ref 20–31)
COLOR UR: YELLOW
CREAT SERPL-MCNC: 1.3 MG/DL (ref 0.5–0.9)
EOSINOPHIL # BLD: 0.23 K/UL (ref 0–0.44)
EOSINOPHILS RELATIVE PERCENT: 4 % (ref 1–4)
EPI CELLS #/AREA URNS HPF: ABNORMAL /HPF (ref 0–5)
ERYTHROCYTE [DISTWIDTH] IN BLOOD BY AUTOMATED COUNT: 14.4 % (ref 11.8–14.4)
GFR, ESTIMATED: 45 ML/MIN/1.73M2
GLUCOSE SERPL-MCNC: 230 MG/DL (ref 70–99)
GLUCOSE UR STRIP-MCNC: NEGATIVE MG/DL
HCT VFR BLD AUTO: 34.5 % (ref 36.3–47.1)
HGB BLD-MCNC: 11.8 G/DL (ref 11.9–15.1)
HGB UR QL STRIP.AUTO: NEGATIVE
IMM GRANULOCYTES # BLD AUTO: <0.03 K/UL (ref 0–0.3)
IMM GRANULOCYTES NFR BLD: 0 %
KETONES UR STRIP-MCNC: NEGATIVE MG/DL
LACTATE BLDV-SCNC: 1.1 MMOL/L (ref 0.5–2.2)
LEUKOCYTE ESTERASE UR QL STRIP: ABNORMAL
LIPASE SERPL-CCNC: 75 U/L (ref 13–60)
LYMPHOCYTES NFR BLD: 1.49 K/UL (ref 1.1–3.7)
LYMPHOCYTES RELATIVE PERCENT: 25 % (ref 24–43)
MCH RBC QN AUTO: 30.4 PG (ref 25.2–33.5)
MCV RBC AUTO: 88.9 FL (ref 82.6–102.9)
MONOCYTES NFR BLD: 0.37 K/UL (ref 0.1–1.2)
MONOCYTES NFR BLD: 6 % (ref 3–12)
NEUTROPHILS NFR BLD: 64 % (ref 36–65)
NEUTS SEG NFR BLD: 3.79 K/UL (ref 1.5–8.1)
NITRITE UR QL STRIP: NEGATIVE
NRBC BLD-RTO: 0 PER 100 WBC
PH UR STRIP: 6.5 [PH] (ref 5–6)
PLATELET # BLD AUTO: 241 K/UL (ref 138–453)
PMV BLD AUTO: 10 FL (ref 8.1–13.5)
POTASSIUM SERPL-SCNC: 4.1 MMOL/L (ref 3.7–5.3)
PROT SERPL-MCNC: 7.4 G/DL (ref 6.4–8.3)
PROT UR STRIP-MCNC: ABNORMAL MG/DL
RBC # BLD AUTO: 3.88 M/UL (ref 3.95–5.11)
RBC #/AREA URNS HPF: ABNORMAL /HPF (ref 0–4)
SODIUM SERPL-SCNC: 136 MMOL/L (ref 135–144)
SP GR UR STRIP: 1.02 (ref 1.01–1.02)
UROBILINOGEN UR STRIP-ACNC: NORMAL EU/DL (ref 0–1)
WBC #/AREA URNS HPF: ABNORMAL /HPF (ref 0–4)
WBC OTHER # BLD: 6 K/UL (ref 3.5–11.3)

## 2024-06-13 PROCEDURE — 85025 COMPLETE CBC W/AUTO DIFF WBC: CPT

## 2024-06-13 PROCEDURE — 81001 URINALYSIS AUTO W/SCOPE: CPT

## 2024-06-13 PROCEDURE — 83690 ASSAY OF LIPASE: CPT

## 2024-06-13 PROCEDURE — 83605 ASSAY OF LACTIC ACID: CPT

## 2024-06-13 PROCEDURE — 99284 EMERGENCY DEPT VISIT MOD MDM: CPT

## 2024-06-13 PROCEDURE — 74176 CT ABD & PELVIS W/O CONTRAST: CPT

## 2024-06-13 PROCEDURE — 36415 COLL VENOUS BLD VENIPUNCTURE: CPT

## 2024-06-13 PROCEDURE — 80053 COMPREHEN METABOLIC PANEL: CPT

## 2024-06-13 PROCEDURE — 2580000003 HC RX 258: Performed by: EMERGENCY MEDICINE

## 2024-06-13 RX ORDER — POLYETHYLENE GLYCOL 3350 17 G/17G
17 POWDER, FOR SOLUTION ORAL DAILY
Qty: 510 G | Refills: 0 | Status: SHIPPED | OUTPATIENT
Start: 2024-06-13 | End: 2024-06-16

## 2024-06-13 RX ORDER — SODIUM CHLORIDE 0.9 % (FLUSH) 0.9 %
3 SYRINGE (ML) INJECTION EVERY 8 HOURS
Status: DISCONTINUED | OUTPATIENT
Start: 2024-06-13 | End: 2024-06-13 | Stop reason: HOSPADM

## 2024-06-13 RX ADMIN — SODIUM CHLORIDE, PRESERVATIVE FREE 3 ML: 5 INJECTION INTRAVENOUS at 12:10

## 2024-06-13 ASSESSMENT — PAIN - FUNCTIONAL ASSESSMENT: PAIN_FUNCTIONAL_ASSESSMENT: 0-10

## 2024-06-13 ASSESSMENT — PAIN SCALES - GENERAL: PAINLEVEL_OUTOF10: 9

## 2024-06-13 ASSESSMENT — PAIN DESCRIPTION - ORIENTATION: ORIENTATION: RIGHT;LOWER

## 2024-06-13 NOTE — ED PROVIDER NOTES
[facility]  [address] [facility] [address]  Phone: [phone]      Pt Name: Dianna Jameson  MRN:[id number]  Birthdate 1955  Date of evaluation: 6/13/2024      CHIEF COMPLAINT       Chief Complaint   Patient presents with    Abdominal Pain       HISTORY OF PRESENT ILLNESS   68-year-old female presents to the emergency department today complaining of right lower quadrant abdominal pain has been going on since yesterday evening.  She is concerned that she could have appendicitis.  Pain has been constant since then.  It does not radiate anywhere.  Move around makes pain worse.  Rubbing it makes the pain feel better.  She feels that she had a subjective and fever and chills this morning when she awakened.  No dysuria hesitancy or frequency.  She had a bowel movement last night and she had to strain a little more than normal according to the patient.  She never had pain or problems like this in the past.  She denies any other contemporaneous evaluation or management of the symptoms prior to arrival.    REVIEW OF SYSTEMS     Review of Systems   All other systems reviewed and are negative.        PAST MEDICAL HISTORY    has a past medical history of Abdominal pain, Asthma, Atrial fibrillation (HCC), Bowel obstruction (HCC), COPD (chronic obstructive pulmonary disease) (HCC), DDD (degenerative disc disease), Diabetes mellitus (HCC), Hyperlipidemia, Hypertension, Hyperthyroidism, and Type II or unspecified type diabetes mellitus without mention of complication, not stated as uncontrolled.    SURGICAL HISTORY      has a past surgical history that includes Tubal ligation; Tonsillectomy; Colonoscopy (04/23/2014); Upper gastrointestinal endoscopy (03/16/2016); tracheostomy (2015); tracheostomy closure; Gastrostomy tube placement; gastrostomy tube change; Dental surgery (N/A, 03/08/2017); Insert Midline Catheter (01/22/2021); Colonoscopy (N/A, 01/26/2021); Upper gastrointestinal endoscopy (N/A,

## 2024-07-03 ENCOUNTER — TELEPHONE (OUTPATIENT)
Dept: PHARMACY | Facility: CLINIC | Age: 69
End: 2024-07-03

## 2024-07-03 RX ORDER — TRAMADOL HYDROCHLORIDE 50 MG/1
TABLET ORAL
COMMUNITY
Start: 2024-04-10 | End: 2024-07-08

## 2024-07-03 RX ORDER — IBUPROFEN 600 MG/1
TABLET ORAL
COMMUNITY
Start: 2024-04-10 | End: 2024-07-08

## 2024-07-03 RX ORDER — DILTIAZEM HYDROCHLORIDE 120 MG/1
120 CAPSULE, EXTENDED RELEASE ORAL EVERY MORNING
COMMUNITY
Start: 2024-05-09 | End: 2024-07-08

## 2024-07-03 NOTE — TELEPHONE ENCOUNTER
[facility] CLINICAL PHARMACY: ADHERENCE REVIEW  Identified care gap per [employer]: fills at Non-preferred pharmacy: [facility]: Statin adherence    Patient also appears to be prescribed: ACE/ARB and Diabetes    ASSESSMENT  STATIN ADHERENCE    Insurance Records claims through 24 (Prior Year PDC = not reported; YTD PDC = 69%; Potential Fail Date: 24):   ATORVASTATIN TAB 40MG  last filled on 24 for 30 day supply. Next refill due: 24    Prescribed si tablet/capsule daily    Per Insurer Portal: last filled on same    Lab Results   Component Value Date    CHOL 166 2024    TRIG 297 (H) 2024    HDL 32 (L) 2024    LDLDIRECT 133 (H) 2023     Lab Results   Component Value Date    LDL 75 2024    LDLDIRECT 133 (H) 2023      ALT   Date Value Ref Range Status   2024 21 5 - 33 U/L Final     AST   Date Value Ref Range Status   2024 24 <32 U/L Final     The 10-year ASCVD risk score (Jerry LORENZ, et al., 2019) is: 22.8%    Values used to calculate the score:      Age: 68 years      Sex: Female      Is Non- : No      Diabetic: Yes      Tobacco smoker: No      Systolic Blood Pressure: 143 mmHg      Is BP treated: Yes      HDL Cholesterol: 32 mg/dL      Total Cholesterol: 166 mg/dL     PLAN  Per insurer report, LIS-2 - may be able to receive up to a 100-day supply for the same cost as a 30-day supply.      The following are interventions that have been identified:   Patient overdue refilling Atorvastatin. Unsure if patient is still prescribed this medication.   Patient eligible for 100 day supply of Atorvastatin  Patient filling at a non-preferred pharmacy    Attempting to reach patient to review - unable to leave message. Letter sent to patient.   24 patient stated not taking anymore    Last Visit: 24  Next Visit: none            Cristina Abdi CPhT  [facility] Clinical   [facility] Clinical

## 2024-07-08 ENCOUNTER — HOSPITAL ENCOUNTER (OUTPATIENT)
Dept: PREADMISSION TESTING | Age: 69
Discharge: HOME | End: 2024-07-12
Payer: MEDICARE

## 2024-07-08 VITALS
HEIGHT: 64 IN | TEMPERATURE: 97.9 F | RESPIRATION RATE: 18 BRPM | BODY MASS INDEX: 28.17 KG/M2 | SYSTOLIC BLOOD PRESSURE: 176 MMHG | DIASTOLIC BLOOD PRESSURE: 92 MMHG | OXYGEN SATURATION: 97 % | WEIGHT: 165 LBS | HEART RATE: 59 BPM

## 2024-07-08 LAB
ANION GAP SERPL CALCULATED.3IONS-SCNC: 13 MMOL/L (ref 9–17)
BASOPHILS # BLD: 0.1 K/UL (ref 0–0.2)
BASOPHILS NFR BLD: 1 % (ref 0–2)
BUN SERPL-MCNC: 19 MG/DL (ref 8–23)
CALCIUM SERPL-MCNC: 10.1 MG/DL (ref 8.6–10.4)
CHLORIDE SERPL-SCNC: 99 MMOL/L (ref 98–107)
CO2 SERPL-SCNC: 27 MMOL/L (ref 20–31)
CREAT SERPL-MCNC: 1.1 MG/DL (ref 0.5–0.9)
EOSINOPHIL # BLD: 0.3 K/UL (ref 0–0.4)
EOSINOPHILS RELATIVE PERCENT: 4 % (ref 0–4)
ERYTHROCYTE [DISTWIDTH] IN BLOOD BY AUTOMATED COUNT: 14.9 % (ref 11.5–14.9)
GFR, ESTIMATED: 55 ML/MIN/1.73M2
GLUCOSE SERPL-MCNC: 132 MG/DL (ref 70–99)
HCT VFR BLD AUTO: 38.7 % (ref 36–46)
HGB BLD-MCNC: 13 G/DL (ref 12–16)
LYMPHOCYTES NFR BLD: 1.9 K/UL (ref 1–4.8)
LYMPHOCYTES RELATIVE PERCENT: 23 % (ref 24–44)
MCH RBC QN AUTO: 30.7 PG (ref 26–34)
MCHC RBC AUTO-ENTMCNC: 33.7 G/DL (ref 31–37)
MCV RBC AUTO: 91.2 FL (ref 80–100)
MONOCYTES NFR BLD: 0.5 K/UL (ref 0.1–1.3)
MONOCYTES NFR BLD: 6 % (ref 1–7)
NEUTROPHILS NFR BLD: 66 % (ref 36–66)
NEUTS SEG NFR BLD: 5.6 K/UL (ref 1.3–9.1)
PLATELET # BLD AUTO: 261 K/UL (ref 150–450)
PMV BLD AUTO: 8.2 FL (ref 6–12)
POTASSIUM SERPL-SCNC: 4.1 MMOL/L (ref 3.7–5.3)
RBC # BLD AUTO: 4.25 M/UL (ref 4–5.2)
SODIUM SERPL-SCNC: 139 MMOL/L (ref 135–144)
WBC OTHER # BLD: 8.4 K/UL (ref 3.5–11)

## 2024-07-08 PROCEDURE — APPSS45 APP SPLIT SHARED TIME 31-45 MINUTES: Performed by: NURSE PRACTITIONER

## 2024-07-08 PROCEDURE — 80048 BASIC METABOLIC PNL TOTAL CA: CPT

## 2024-07-08 PROCEDURE — 85025 COMPLETE CBC W/AUTO DIFF WBC: CPT

## 2024-07-08 PROCEDURE — 36415 COLL VENOUS BLD VENIPUNCTURE: CPT

## 2024-07-08 RX ORDER — ASPIRIN 81 MG/1
81 TABLET ORAL DAILY
COMMUNITY

## 2024-07-08 RX ORDER — GABAPENTIN 800 MG/1
800 TABLET ORAL 2 TIMES DAILY
COMMUNITY

## 2024-07-08 RX ORDER — DILTIAZEM HYDROCHLORIDE 120 MG/1
120 CAPSULE, EXTENDED RELEASE ORAL DAILY
COMMUNITY

## 2024-07-08 ASSESSMENT — ENCOUNTER SYMPTOMS
SHORTNESS OF BREATH: 1
APNEA: 0
TROUBLE SWALLOWING: 0
VOMITING: 0
NAUSEA: 0
CONSTIPATION: 1
SORE THROAT: 0
ABDOMINAL PAIN: 1
COUGH: 1
DIARRHEA: 0

## 2024-07-08 ASSESSMENT — PAIN DESCRIPTION - DESCRIPTORS: DESCRIPTORS: ACHING

## 2024-07-08 ASSESSMENT — PAIN DESCRIPTION - PAIN TYPE: TYPE: ACUTE PAIN

## 2024-07-08 ASSESSMENT — PAIN DESCRIPTION - LOCATION: LOCATION: BACK

## 2024-07-08 ASSESSMENT — PAIN SCALES - GENERAL: PAINLEVEL_OUTOF10: 8

## 2024-07-08 ASSESSMENT — PAIN DESCRIPTION - ORIENTATION: ORIENTATION: LOWER

## 2024-07-08 NOTE — DISCHARGE INSTRUCTIONS
Pre-op Instructions For Patient Being Admitted After Surgery    Medication Instructions:  Please stop herbs and any supplements now (includes vitamins and minerals).    Please contact your surgeon and prescribing physician for pre-op instructions for any blood thinners. Ibuprofen as directed.    If you have inhalers/aerosol treatments at home, please use them the morning of your surgery and bring the inhalers with you to the hospital.    Please take the following medications the morning of your surgery with a sip of water:    nebulizer, inhaler, metoprolol, dilt-XR (cardia), levothyroxine, pantoprazole    Surgery Instructions:  After midnight before surgery:  Do not eat or drink anything, including water, mints, gum, and hard candy.  You may brush your teeth without swallowing.  No smoking, chewing tobacco, or street drugs.    Please shower or bathe before surgery.  If you were given Surgical Scrub Chlorhexidine Gluconate Liquid (CHG), please shower the night before and the morning of your surgery following the detailed instructions you received during your pre-admission visit.     Please do not wear any cologne, lotion, powder, deodorant, jewelry, piercings, perfume, makeup, nail polish, hair accessories, or hair spray on the day of surgery.  Wear loose comfortable clothing.    Leave your valuables at home.  Bring a storage case for any glasses/contacts.    The Day of Surgery:  Arrive at [facility] Surgery Entrance at the time directed by your surgeon and check in at the desk.    If you have a living will or healthcare power of , please bring a copy.    You will be taken to the pre-op holding area where you will be prepared for surgery.  A physical assessment will be performed by a nurse practitioner or house officer.  Your IV will be started and you will meet your anesthesiologist.    When you go to surgery, your family will be directed to the surgical waiting room,

## 2024-07-08 NOTE — H&P
lower legs) and numbness (bilateral lower legs). Negative for dizziness, seizures, speech difficulty and headaches.   Hematological:  Does not bruise/bleed easily.   Psychiatric/Behavioral: Negative.         GENERAL PHYSICAL EXAM     Vitals: BP (!) 176/92 Comment: 172/84 left upper arm  Pulse 59   Temp 97.9 °F (36.6 °C) (Infrared)   Resp 18   Ht 1.626 m (5' 4\")   Wt 74.8 kg (165 lb)   SpO2 97%   BMI 28.32 kg/m²               Physical Exam  Constitutional:       General: She is not in acute distress.     Appearance: Normal appearance.   HENT:      Head: Normocephalic.      Right Ear: External ear normal.      Left Ear: External ear normal.      Nose: Nose normal.      Mouth/Throat:      Mouth: Mucous membranes are dry.      Pharynx: Oropharynx is clear.   Eyes:      General:         Right eye: No discharge.         Left eye: No discharge.   Cardiovascular:      Rate and Rhythm: Normal rate and regular rhythm.      Heart sounds: Normal heart sounds. No murmur heard.  Pulmonary:      Effort: Pulmonary effort is normal. No respiratory distress.      Breath sounds: Normal breath sounds. No wheezing, rhonchi or rales.   Abdominal:      General: Bowel sounds are normal. There is no distension.      Tenderness: There is no abdominal tenderness. There is no guarding.   Musculoskeletal:         General: No swelling.      Cervical back: Normal range of motion.      Right lower leg: No edema.      Left lower leg: No edema.   Skin:     General: Skin is dry.   Neurological:      Mental Status: She is alert and oriented to person, place, and time.      Motor: Weakness (right lower leg) present.      Gait: Gait abnormal.   Psychiatric:         Mood and Affect: Mood normal.         Behavior: Behavior normal.         LAB REVIEW     Lab Results   Component Value Date    WBC 8.4 07/08/2024    HGB 13.0 07/08/2024    HCT 38.7 07/08/2024    MCV 91.2 07/08/2024     07/08/2024    LYMPHOPCT 23 (L) 07/08/2024    RBC 4.25

## 2024-07-08 NOTE — H&P (VIEW-ONLY)
HISTORY and PHYSICAL  [facility]       NAME:  Dianna Jameson  MRN: [id number]   YOB: 1955   Date: 7/8/2024   Age: 68 y.o.  Gender: female     COMPLAINT AND PRESENT HISTORY:   Dianna Jameson is 68 y.o.,  female, presents for pre-anesthesia/admission testing for POSTERIOR LUMBAR DECOMPRESSION INSTRUMENTED FUSION L4-L5 per Dr. Brewster.  Primary dx: Spinal stenosis at L4-L5 level [M48.061].    Office note per Dr Brewster on 6/7/2024  HPI   Please refer to previous clinic note.   Patient with 15-year history of chronic low back pain neurogenic claudication qualities.   Patient had acute right radicular leg pain this seems to have calmed down with physical therapy  IMAGING  MRI lumbar spine is reviewed advanced disc space collapse L4-5 with severe lumbar spinal stenosis particularly in the lateral recess and foraminal region     Office note per Dr Brewster on 4/12/2024  HPI:  This is a 68 y.o. female who presents to the clinic today as a new patient for low back evaluation.    Patient complains of right buttock pain that radiates down her right leg to her knee that has been ongoing for 3 weeks.   She further reports a 15 year history of chronic low back pain.    She has been using ice, lidocaine patches, and taking Gabapentin with no relief. She reports that she has been taking her Gabapentin for 15 years for her chronic low back pain.    Patient has completed PT   Diagnostic imaging:  CT scan and x-rays lumbar spine reviewed patient with significant disc space collapse at L4-5 CT scan indicates moderate at least stenosis at L4-5    UPDATE 7/8/2024  Dianna Jameson is 68 y.o.,  female C/O of intermittent described as a jabbing pain in the lumbar spine area worse on the right.  Pain limitation of the range of motion , radiates to the lower limbs worse on the Rt Lt side.  Associated symptoms claudication with walking  The symptoms started 10 years pain rated  8/10, typically moderate in intensity

## 2024-07-09 ENCOUNTER — TELEPHONE (OUTPATIENT)
Dept: CARDIOLOGY | Age: 69
End: 2024-07-09

## 2024-07-11 ENCOUNTER — TELEPHONE (OUTPATIENT)
Dept: ORTHOPEDIC SURGERY | Age: 69
End: 2024-07-11

## 2024-07-11 NOTE — TELEPHONE ENCOUNTER
Karli from [facility] called in stating patient has been approved for surgery    Auth# [id number]    Karli stated she will be sending fax with auth information, will scan fax in once received

## 2024-07-17 ENCOUNTER — OFFICE VISIT (OUTPATIENT)
Dept: CARDIOLOGY | Age: 69
End: 2024-07-17
Payer: MEDICARE

## 2024-07-17 VITALS
SYSTOLIC BLOOD PRESSURE: 128 MMHG | DIASTOLIC BLOOD PRESSURE: 74 MMHG | BODY MASS INDEX: 28.49 KG/M2 | WEIGHT: 166 LBS | OXYGEN SATURATION: 95 % | HEART RATE: 60 BPM | RESPIRATION RATE: 16 BRPM

## 2024-07-17 DIAGNOSIS — I10 ESSENTIAL HYPERTENSION: ICD-10-CM

## 2024-07-17 DIAGNOSIS — I48.0 PAROXYSMAL ATRIAL FIBRILLATION (HCC): Primary | ICD-10-CM

## 2024-07-17 DIAGNOSIS — E78.49 FAMILIAL HYPERLIPIDEMIA: ICD-10-CM

## 2024-07-17 PROCEDURE — 99212 OFFICE O/P EST SF 10 MIN: CPT | Performed by: INTERNAL MEDICINE

## 2024-07-17 PROCEDURE — 99214 OFFICE O/P EST MOD 30 MIN: CPT | Performed by: INTERNAL MEDICINE

## 2024-07-17 PROCEDURE — 1123F ACP DISCUSS/DSCN MKR DOCD: CPT | Performed by: INTERNAL MEDICINE

## 2024-07-17 PROCEDURE — 93005 ELECTROCARDIOGRAM TRACING: CPT | Performed by: INTERNAL MEDICINE

## 2024-07-17 PROCEDURE — 3074F SYST BP LT 130 MM HG: CPT | Performed by: INTERNAL MEDICINE

## 2024-07-17 PROCEDURE — 93010 ELECTROCARDIOGRAM REPORT: CPT | Performed by: INTERNAL MEDICINE

## 2024-07-17 PROCEDURE — 3078F DIAST BP <80 MM HG: CPT | Performed by: INTERNAL MEDICINE

## 2024-07-17 RX ORDER — GLIMEPIRIDE 4 MG/1
4 TABLET ORAL 2 TIMES DAILY
COMMUNITY
Start: 2024-06-18

## 2024-07-17 RX ORDER — EVOLOCUMAB 140 MG/ML
140 INJECTION, SOLUTION SUBCUTANEOUS
Qty: 6 ADJUSTABLE DOSE PRE-FILLED PEN SYRINGE | Refills: 3 | Status: SHIPPED | OUTPATIENT
Start: 2024-07-17

## 2024-07-17 RX ORDER — DILTIAZEM HYDROCHLORIDE 120 MG/1
CAPSULE, EXTENDED RELEASE ORAL
COMMUNITY
Start: 2024-07-08

## 2024-07-17 ASSESSMENT — PATIENT HEALTH QUESTIONNAIRE - PHQ9
1. LITTLE INTEREST OR PLEASURE IN DOING THINGS: NOT AT ALL
SUM OF ALL RESPONSES TO PHQ QUESTIONS 1-9: 0
SUM OF ALL RESPONSES TO PHQ QUESTIONS 1-9: 0
2. FEELING DOWN, DEPRESSED OR HOPELESS: NOT AT ALL
SUM OF ALL RESPONSES TO PHQ QUESTIONS 1-9: 0
SUM OF ALL RESPONSES TO PHQ QUESTIONS 1-9: 0
SUM OF ALL RESPONSES TO PHQ9 QUESTIONS 1 & 2: 0

## 2024-07-17 NOTE — PROGRESS NOTES
[facility] SPECIALY CARE, [facility]  [facility] CARDIOLOGY A DEPARTMENT OF [place] [id number]  Dept: [phone]      Nicolás Lacey MD        7/17/2024 (void after 30 days)        Re:  Dianna Jameson            1955        Surgeon:  Dr. Brewster    Procedure/Surgery: Fusion lumbar surgery    Dianna Jameson is at moderate cardiac risk, but acceptable for the planned surgical procedure.  She may proceed accordingly.      Special Instructions:    [x]  Patient is on ASA.  They will need to stop this medication 7 days prior to surgery and resume after.    Sincerely,      Nicolás Lacey MD

## 2024-07-17 NOTE — PROGRESS NOTES
Today's Date: 7/17/2024  Patient's Name: Dianna Jameson  Patient's age: 68 y.o., 1955    Subjective:  Dianna Jameson is being seen in clinic today regarding paroxysmal atrial fibrillation, hyperlipidemia    she is doing well from a cardiac standpoint. No chest pain, no dyspnea, no PND, no syncope or pre-syncope, no orthopnea. She is fusion lower back surgery scheduled.        Past Medical History:   has a past medical history of Abdominal pain, Asthma, Atrial fibrillation (HCC), Bowel obstruction (HCC), COPD (chronic obstructive pulmonary disease) (HCC), DDD (degenerative disc disease), Diabetes mellitus (HCC), Hyperlipidemia, Hypertension, Hyperthyroidism, Kidney stone, and Type II or unspecified type diabetes mellitus without mention of complication, not stated as uncontrolled.    Past Surgical History:   has a past surgical history that includes Tubal ligation; Tonsillectomy; Colonoscopy (04/23/2014); Upper gastrointestinal endoscopy (03/16/2016); tracheostomy (2015); tracheostomy closure; Gastrostomy tube placement; gastrostomy tube change; Dental surgery (N/A, 03/08/2017); Insert Midline Catheter (01/22/2021); Colonoscopy (N/A, 01/26/2021); Upper gastrointestinal endoscopy (N/A, 01/26/2021); Colonoscopy (01/26/2021); Thyroid surgery (2021); and Back Injection.    Home Medications:  Prior to Admission medications    Medication Sig Start Date End Date Taking? Authorizing Provider   aspirin 81 MG EC tablet Take 1 tablet by mouth daily    Constantino Cisneros MD   dilTIAZem (CARDIZEM 12 HR) 120 MG extended release capsule Take 1 capsule by mouth daily    Constantino Cisneros MD   gabapentin (NEURONTIN) 800 MG tablet Take 1 tablet by mouth 2 times daily.    Constantino Cisneros MD   linaclotide (LINZESS) 145 MCG capsule Take 1 capsule by mouth every morning (before breakfast)    Constantino Cisneros MD   glimepiride (AMARYL) 2 MG tablet Take 2 tablets by mouth 2 times daily (with meals) Patient states

## 2024-07-19 NOTE — PRE-PROCEDURE INSTRUCTIONS
No answer, left message ?                             Unable to leave message ? Number on chart not working attempted to call patient's bother and mother , no one answers message not left     When were you told to arrive at hospital ?      Do you have a  ?    Are you on any blood thinners ?                     If yes when did you stop taking ?    Do you have your prep Rx filled and instruction ?      Nothing to eat the day before , only clear liquids.    Are you experiencing any covid symptoms ?     Do you have any infections or rash we should be aware of ?      Do you have the Hibiclens soap to use the night before and the morning of surgery ?    Nothing to eat or drink after midnight, only a sip of water to take any medication instructed to take the night before.  Wear comfortable clothing, leave any valuables at home, remove any jewelry and body piercing .

## 2024-07-22 ENCOUNTER — HOSPITAL ENCOUNTER (OUTPATIENT)
Age: 69
Setting detail: OUTPATIENT SURGERY
Discharge: HOME | End: 2024-07-22
Attending: ORTHOPAEDIC SURGERY | Admitting: ORTHOPAEDIC SURGERY
Payer: MEDICARE

## 2024-07-22 ENCOUNTER — TELEPHONE (OUTPATIENT)
Dept: ORTHOPEDIC SURGERY | Age: 69
End: 2024-07-22

## 2024-07-22 VITALS
WEIGHT: 165 LBS | OXYGEN SATURATION: 95 % | HEIGHT: 64 IN | BODY MASS INDEX: 28.17 KG/M2 | TEMPERATURE: 98.7 F | HEART RATE: 56 BPM | SYSTOLIC BLOOD PRESSURE: 146 MMHG | DIASTOLIC BLOOD PRESSURE: 68 MMHG | RESPIRATION RATE: 18 BRPM

## 2024-07-22 DIAGNOSIS — M54.41 CHRONIC MIDLINE LOW BACK PAIN WITH RIGHT-SIDED SCIATICA: Primary | ICD-10-CM

## 2024-07-22 DIAGNOSIS — G89.29 CHRONIC MIDLINE LOW BACK PAIN WITH RIGHT-SIDED SCIATICA: Primary | ICD-10-CM

## 2024-07-22 DIAGNOSIS — M48.061 SPINAL STENOSIS OF LUMBAR REGION, UNSPECIFIED WHETHER NEUROGENIC CLAUDICATION PRESENT: ICD-10-CM

## 2024-07-22 LAB — GLUCOSE BLD-MCNC: 166 MG/DL (ref 65–105)

## 2024-07-22 PROCEDURE — 2580000003 HC RX 258: Performed by: ANESTHESIOLOGY

## 2024-07-22 PROCEDURE — 2500000003 HC RX 250 WO HCPCS: Performed by: ANESTHESIOLOGY

## 2024-07-22 PROCEDURE — 6370000000 HC RX 637 (ALT 250 FOR IP): Performed by: ANESTHESIOLOGY

## 2024-07-22 PROCEDURE — 82947 ASSAY GLUCOSE BLOOD QUANT: CPT

## 2024-07-22 RX ORDER — ACETAMINOPHEN 500 MG
1000 TABLET ORAL ONCE
Status: COMPLETED | OUTPATIENT
Start: 2024-07-22 | End: 2024-07-22

## 2024-07-22 RX ORDER — GABAPENTIN 100 MG/1
100 CAPSULE ORAL ONCE
Status: DISCONTINUED | OUTPATIENT
Start: 2024-07-22 | End: 2024-07-22 | Stop reason: HOSPADM

## 2024-07-22 RX ORDER — SODIUM CHLORIDE 0.9 % (FLUSH) 0.9 %
5-40 SYRINGE (ML) INJECTION PRN
Status: DISCONTINUED | OUTPATIENT
Start: 2024-07-22 | End: 2024-07-22 | Stop reason: HOSPADM

## 2024-07-22 RX ORDER — SODIUM CHLORIDE 9 MG/ML
INJECTION, SOLUTION INTRAVENOUS CONTINUOUS
Status: DISCONTINUED | OUTPATIENT
Start: 2024-07-22 | End: 2024-07-22 | Stop reason: HOSPADM

## 2024-07-22 RX ORDER — SODIUM CHLORIDE 9 MG/ML
INJECTION, SOLUTION INTRAVENOUS PRN
Status: DISCONTINUED | OUTPATIENT
Start: 2024-07-22 | End: 2024-07-22 | Stop reason: HOSPADM

## 2024-07-22 RX ORDER — LIDOCAINE HYDROCHLORIDE 10 MG/ML
1 INJECTION, SOLUTION EPIDURAL; INFILTRATION; INTRACAUDAL; PERINEURAL
Status: COMPLETED | OUTPATIENT
Start: 2024-07-22 | End: 2024-07-22

## 2024-07-22 RX ORDER — SODIUM CHLORIDE 0.9 % (FLUSH) 0.9 %
5-40 SYRINGE (ML) INJECTION EVERY 12 HOURS SCHEDULED
Status: DISCONTINUED | OUTPATIENT
Start: 2024-07-22 | End: 2024-07-22 | Stop reason: HOSPADM

## 2024-07-22 RX ADMIN — ACETAMINOPHEN 1000 MG: 500 TABLET ORAL at 11:17

## 2024-07-22 RX ADMIN — SODIUM CHLORIDE: 9 INJECTION, SOLUTION INTRAVENOUS at 11:26

## 2024-07-22 RX ADMIN — LIDOCAINE HYDROCHLORIDE 1 ML: 10 INJECTION, SOLUTION EPIDURAL; INFILTRATION; INTRACAUDAL; PERINEURAL at 11:26

## 2024-07-22 NOTE — TELEPHONE ENCOUNTER
Attempted to reach patient in regards to re-scheduling her surgery for today.  Called 3 different times and received an error that the subscriber is not in service.

## 2024-07-22 NOTE — INTERVAL H&P NOTE
Update History & Physical    The patient's History and Physical of July 8, 2024 was reviewed with the patient and I examined the patient. There was no change. The surgical site was confirmed by the patient and me. Pt undergoing for POSTERIOR LUMBAR DECOMPRESSION INSTRUMENTED FUSION L4-L5 per Dr. Brewster.   Pt denies fever/chills, chest pain or SOB   Pt Npo since the past midnight , pt took her am medication with sip of water  Pt stopped taking ASA one week ago   Denies hx of MRSA infection.  Denies hx of blood clots.  Denies hx of any personal or family hx of complications w/anesthesia.   Physical exam remains unchanged including cardiac and pulmonary assessment   See nursing flow sheet for vital sings     Lab Results   Component Value Date    WBC 8.4 07/08/2024    HGB 13.0 07/08/2024    HCT 38.7 07/08/2024    MCV 91.2 07/08/2024     07/08/2024     Lab Results   Component Value Date/Time     07/08/2024 03:33 PM    K 4.1 07/08/2024 03:33 PM    CL 99 07/08/2024 03:33 PM    CO2 27 07/08/2024 03:33 PM    BUN 19 07/08/2024 03:33 PM    CREATININE 1.1 07/08/2024 03:33 PM    GLUCOSE 132 07/08/2024 03:33 PM    CALCIUM 10.1 07/08/2024 03:33 PM    LABGLOM 55 07/08/2024 03:33 PM    LABGLOM 33 03/31/2024 03:35 PM      Lab Results   Component Value Date/Time    COLORU Yellow 06/13/2024 11:35 AM    NITRU NEGATIVE 06/13/2024 11:35 AM    GLUCOSEU NEGATIVE 06/13/2024 11:35 AM    KETUA NEGATIVE 06/13/2024 11:35 AM    UROBILINOGEN Normal 06/13/2024 11:35 AM    BILIRUBINUR NEGATIVE 06/13/2024 11:35 AM         Electronically signed by RAJNI Goyal CNP on 7/22/2024 at 10:34 AM

## 2024-07-22 NOTE — PROGRESS NOTES
Patient made aware her surgery is cancelled due to the surgery Dr. Brewster is in is taking longer than expected, she attempted to call the medical cab she came here in but they are unable to take her home

## 2024-07-22 NOTE — PROGRESS NOTES
[employer] called, voucher given, will  patient at surgery entrance between 130 pm and 145 pm

## 2024-07-23 ENCOUNTER — TELEPHONE (OUTPATIENT)
Dept: ORTHOPEDIC SURGERY | Age: 69
End: 2024-07-23

## 2024-07-23 NOTE — TELEPHONE ENCOUNTER
Attempted to call patient again regarding re-scheduling her cancelled surgery from 7/22.  Phone number still stating it is not in service.

## 2024-07-23 NOTE — TELEPHONE ENCOUNTER
Patient called and left me a voicemail from a new phone number to try and re-schedule her surgery.  ([phone])    Called and left patient a voicemail regarding a new surgery date and time.    Attempted to call a second time and went to voicemail again.

## 2024-08-09 ENCOUNTER — ANESTHESIA EVENT (OUTPATIENT)
Dept: OPERATING ROOM | Age: 69
End: 2024-08-09
Payer: MEDICARE

## 2024-08-09 NOTE — PRE-PROCEDURE INSTRUCTIONS
Nothing to eat after midnight.yes  Are you taking any blood thinners? noWhen was the last day?  Make sure to use Hibiclens prior to surgery.yes  Remove any jewelry and body piercings.yes  Do you wear glasses? If so, please bring a case to store them in.  Are you having any Covid symptoms?no  Do you have any new rashes, infections, etc. that we should be aware of?no  Do you have a ride home the day of surgery? It cannot be a cab or medical transportation. no  Verify surgery time and what time to arrive at hospital. 0530/0730  Pt states she has arranged  through her insurance company for a cab to take her home. Pt also states that she doesn't have any family members, any friends, or any responsible adult that can be with her. Pt states she going to try to find someone.   Writer notified Yayo at Dr Brewster office regarding pts surgery. Yayo stated that pt will be overnight admit.   Left message with pt that she will be admitted overnight. Awaiting call back.

## 2024-08-09 NOTE — PRE-PROCEDURE INSTRUCTIONS
Have you received your Prep? Any questions with prep instructions?   Only Clear Liquid Diet day before.  Nothing to eat after midnight day before procedure.  Are you taking any blood thinners? If so, you need to Stop.  Remove any jewelry and body piercings.  Do you wear glasses? If so, please bring a case to store them in.  Are you having any Covid symptoms?  Do you have any new rashes, infections, etc. that we should be aware of?  Do you have a ride home the day of surgery? It cannot be a cab or medical transportation.  Verify surgery time/date and what time to arrive at hospital.  NO ANSWER, VM LEFT TO ARRIVE AT 0530

## 2024-08-12 ENCOUNTER — HOSPITAL ENCOUNTER (OUTPATIENT)
Age: 69
Setting detail: OBSERVATION
Discharge: HOME | End: 2024-08-14
Attending: ORTHOPAEDIC SURGERY | Admitting: ORTHOPAEDIC SURGERY
Payer: MEDICARE

## 2024-08-12 ENCOUNTER — APPOINTMENT (OUTPATIENT)
Dept: GENERAL RADIOLOGY | Age: 69
End: 2024-08-12
Attending: ORTHOPAEDIC SURGERY
Payer: MEDICARE

## 2024-08-12 ENCOUNTER — ANESTHESIA (OUTPATIENT)
Dept: OPERATING ROOM | Age: 69
End: 2024-08-12
Payer: MEDICARE

## 2024-08-12 DIAGNOSIS — Z98.1 HISTORY OF LUMBAR FUSION: Primary | ICD-10-CM

## 2024-08-12 DIAGNOSIS — M48.062 SPINAL STENOSIS OF LUMBAR REGION WITH NEUROGENIC CLAUDICATION: Primary | ICD-10-CM

## 2024-08-12 LAB
GLUCOSE BLD-MCNC: 221 MG/DL (ref 65–105)
GLUCOSE BLD-MCNC: 365 MG/DL (ref 65–105)
GLUCOSE BLD-MCNC: 94 MG/DL (ref 65–105)

## 2024-08-12 PROCEDURE — 2580000003 HC RX 258: Performed by: ANESTHESIOLOGY

## 2024-08-12 PROCEDURE — 3600000013 HC SURGERY LEVEL 3 ADDTL 15MIN: Performed by: ORTHOPAEDIC SURGERY

## 2024-08-12 PROCEDURE — 22853 INSJ BIOMECHANICAL DEVICE: CPT | Performed by: PHYSICIAN ASSISTANT

## 2024-08-12 PROCEDURE — 2500000003 HC RX 250 WO HCPCS: Performed by: NURSE ANESTHETIST, CERTIFIED REGISTERED

## 2024-08-12 PROCEDURE — 22840 INSERT SPINE FIXATION DEVICE: CPT | Performed by: ORTHOPAEDIC SURGERY

## 2024-08-12 PROCEDURE — 6370000000 HC RX 637 (ALT 250 FOR IP): Performed by: ORTHOPAEDIC SURGERY

## 2024-08-12 PROCEDURE — 2500000003 HC RX 250 WO HCPCS: Performed by: ORTHOPAEDIC SURGERY

## 2024-08-12 PROCEDURE — 7100000000 HC PACU RECOVERY - FIRST 15 MIN: Performed by: ORTHOPAEDIC SURGERY

## 2024-08-12 PROCEDURE — G0378 HOSPITAL OBSERVATION PER HR: HCPCS

## 2024-08-12 PROCEDURE — 3600000003 HC SURGERY LEVEL 3 BASE: Performed by: ORTHOPAEDIC SURGERY

## 2024-08-12 PROCEDURE — 94640 AIRWAY INHALATION TREATMENT: CPT

## 2024-08-12 PROCEDURE — 6360000002 HC RX W HCPCS: Performed by: ORTHOPAEDIC SURGERY

## 2024-08-12 PROCEDURE — 22633 ARTHRD CMBN 1NTRSPC LUMBAR: CPT | Performed by: PHYSICIAN ASSISTANT

## 2024-08-12 PROCEDURE — 22853 INSJ BIOMECHANICAL DEVICE: CPT | Performed by: ORTHOPAEDIC SURGERY

## 2024-08-12 PROCEDURE — 6360000002 HC RX W HCPCS: Performed by: ANESTHESIOLOGY

## 2024-08-12 PROCEDURE — 2709999900 HC NON-CHARGEABLE SUPPLY: Performed by: ORTHOPAEDIC SURGERY

## 2024-08-12 PROCEDURE — C1889 IMPLANT/INSERT DEVICE, NOC: HCPCS | Performed by: ORTHOPAEDIC SURGERY

## 2024-08-12 PROCEDURE — C1713 ANCHOR/SCREW BN/BN,TIS/BN: HCPCS | Performed by: ORTHOPAEDIC SURGERY

## 2024-08-12 PROCEDURE — 22633 ARTHRD CMBN 1NTRSPC LUMBAR: CPT | Performed by: ORTHOPAEDIC SURGERY

## 2024-08-12 PROCEDURE — 63052 LAM FACETC/FRMT ARTHRD LUM 1: CPT | Performed by: ORTHOPAEDIC SURGERY

## 2024-08-12 PROCEDURE — 22840 INSERT SPINE FIXATION DEVICE: CPT | Performed by: PHYSICIAN ASSISTANT

## 2024-08-12 PROCEDURE — 3700000001 HC ADD 15 MINUTES (ANESTHESIA): Performed by: ORTHOPAEDIC SURGERY

## 2024-08-12 PROCEDURE — 63052 LAM FACETC/FRMT ARTHRD LUM 1: CPT | Performed by: PHYSICIAN ASSISTANT

## 2024-08-12 PROCEDURE — 6360000002 HC RX W HCPCS: Performed by: NURSE ANESTHETIST, CERTIFIED REGISTERED

## 2024-08-12 PROCEDURE — 3700000000 HC ANESTHESIA ATTENDED CARE: Performed by: ORTHOPAEDIC SURGERY

## 2024-08-12 PROCEDURE — 2720000010 HC SURG SUPPLY STERILE: Performed by: ORTHOPAEDIC SURGERY

## 2024-08-12 PROCEDURE — 82947 ASSAY GLUCOSE BLOOD QUANT: CPT

## 2024-08-12 PROCEDURE — 2580000003 HC RX 258: Performed by: ORTHOPAEDIC SURGERY

## 2024-08-12 PROCEDURE — 7100000001 HC PACU RECOVERY - ADDTL 15 MIN: Performed by: ORTHOPAEDIC SURGERY

## 2024-08-12 DEVICE — SPACER SPNL 15 DEG SM 28X10 MM STRL PROLIFT: Type: IMPLANTABLE DEVICE | Site: SPINE LUMBAR | Status: FUNCTIONAL

## 2024-08-12 DEVICE — GRAFT BNE CHIP FRZ DRY CANC CORT 1MM 8MM RANG 30CC READIGRFT: Type: IMPLANTABLE DEVICE | Site: SPINE LUMBAR | Status: FUNCTIONAL

## 2024-08-12 DEVICE — ROD SPNL CONTOURED 5.5X45 MM LUMBAR TI DENALI: Type: IMPLANTABLE DEVICE | Site: SPINE LUMBAR | Status: FUNCTIONAL

## 2024-08-12 DEVICE — IMPLANTABLE DEVICE: Type: IMPLANTABLE DEVICE | Site: SPINE LUMBAR | Status: FUNCTIONAL

## 2024-08-12 DEVICE — SCREW SET EVEREST: Type: IMPLANTABLE DEVICE | Site: SPINE LUMBAR | Status: FUNCTIONAL

## 2024-08-12 RX ORDER — ONDANSETRON 2 MG/ML
INJECTION INTRAMUSCULAR; INTRAVENOUS PRN
Status: DISCONTINUED | OUTPATIENT
Start: 2024-08-12 | End: 2024-08-12 | Stop reason: SDUPTHER

## 2024-08-12 RX ORDER — GABAPENTIN 100 MG/1
100 CAPSULE ORAL ONCE
Status: DISCONTINUED | OUTPATIENT
Start: 2024-08-12 | End: 2024-08-12 | Stop reason: HOSPADM

## 2024-08-12 RX ORDER — SODIUM CHLORIDE 9 MG/ML
INJECTION, SOLUTION INTRAVENOUS PRN
Status: DISCONTINUED | OUTPATIENT
Start: 2024-08-12 | End: 2024-08-12 | Stop reason: HOSPADM

## 2024-08-12 RX ORDER — POLYETHYLENE GLYCOL 3350 17 G/17G
17 POWDER, FOR SOLUTION ORAL DAILY PRN
Status: DISCONTINUED | OUTPATIENT
Start: 2024-08-12 | End: 2024-08-14 | Stop reason: HOSPADM

## 2024-08-12 RX ORDER — FENTANYL CITRATE 50 UG/ML
INJECTION, SOLUTION INTRAMUSCULAR; INTRAVENOUS PRN
Status: DISCONTINUED | OUTPATIENT
Start: 2024-08-12 | End: 2024-08-12 | Stop reason: SDUPTHER

## 2024-08-12 RX ORDER — SODIUM CHLORIDE 0.9 % (FLUSH) 0.9 %
5-40 SYRINGE (ML) INJECTION EVERY 12 HOURS SCHEDULED
Status: DISCONTINUED | OUTPATIENT
Start: 2024-08-12 | End: 2024-08-12 | Stop reason: HOSPADM

## 2024-08-12 RX ORDER — LIDOCAINE HYDROCHLORIDE 20 MG/ML
INJECTION, SOLUTION EPIDURAL; INFILTRATION; INTRACAUDAL; PERINEURAL PRN
Status: DISCONTINUED | OUTPATIENT
Start: 2024-08-12 | End: 2024-08-12 | Stop reason: SDUPTHER

## 2024-08-12 RX ORDER — ACETAMINOPHEN 325 MG/1
650 TABLET ORAL
Status: DISCONTINUED | OUTPATIENT
Start: 2024-08-12 | End: 2024-08-12 | Stop reason: HOSPADM

## 2024-08-12 RX ORDER — CALCIUM CARBONATE 500(1250)
1 TABLET ORAL 2 TIMES DAILY
Status: DISCONTINUED | OUTPATIENT
Start: 2024-08-12 | End: 2024-08-14 | Stop reason: HOSPADM

## 2024-08-12 RX ORDER — TRAMADOL HYDROCHLORIDE 50 MG/1
50 TABLET ORAL EVERY 6 HOURS PRN
Status: DISCONTINUED | OUTPATIENT
Start: 2024-08-12 | End: 2024-08-14 | Stop reason: HOSPADM

## 2024-08-12 RX ORDER — HYDRALAZINE HYDROCHLORIDE 20 MG/ML
10 INJECTION INTRAMUSCULAR; INTRAVENOUS
Status: DISCONTINUED | OUTPATIENT
Start: 2024-08-12 | End: 2024-08-12 | Stop reason: HOSPADM

## 2024-08-12 RX ORDER — LABETALOL HYDROCHLORIDE 5 MG/ML
10 INJECTION, SOLUTION INTRAVENOUS
Status: DISCONTINUED | OUTPATIENT
Start: 2024-08-12 | End: 2024-08-12 | Stop reason: HOSPADM

## 2024-08-12 RX ORDER — HYDROCHLOROTHIAZIDE 25 MG/1
25 TABLET ORAL EVERY MORNING
Status: DISCONTINUED | OUTPATIENT
Start: 2024-08-12 | End: 2024-08-14 | Stop reason: HOSPADM

## 2024-08-12 RX ORDER — SODIUM CHLORIDE 0.9 % (FLUSH) 0.9 %
5-40 SYRINGE (ML) INJECTION PRN
Status: DISCONTINUED | OUTPATIENT
Start: 2024-08-12 | End: 2024-08-14 | Stop reason: HOSPADM

## 2024-08-12 RX ORDER — GLIPIZIDE 5 MG/1
10 TABLET ORAL
Status: DISCONTINUED | OUTPATIENT
Start: 2024-08-12 | End: 2024-08-14 | Stop reason: HOSPADM

## 2024-08-12 RX ORDER — DEXAMETHASONE SODIUM PHOSPHATE 4 MG/ML
INJECTION, SOLUTION INTRA-ARTICULAR; INTRALESIONAL; INTRAMUSCULAR; INTRAVENOUS; SOFT TISSUE PRN
Status: DISCONTINUED | OUTPATIENT
Start: 2024-08-12 | End: 2024-08-12 | Stop reason: SDUPTHER

## 2024-08-12 RX ORDER — LEVOTHYROXINE SODIUM 0.1 MG/1
100 TABLET ORAL DAILY
Status: DISCONTINUED | OUTPATIENT
Start: 2024-08-12 | End: 2024-08-12

## 2024-08-12 RX ORDER — NALOXONE HYDROCHLORIDE 0.4 MG/ML
INJECTION, SOLUTION INTRAMUSCULAR; INTRAVENOUS; SUBCUTANEOUS PRN
Status: DISCONTINUED | OUTPATIENT
Start: 2024-08-12 | End: 2024-08-12 | Stop reason: HOSPADM

## 2024-08-12 RX ORDER — POTASSIUM CHLORIDE 20 MEQ/1
20 TABLET, EXTENDED RELEASE ORAL DAILY
Status: DISCONTINUED | OUTPATIENT
Start: 2024-08-12 | End: 2024-08-14 | Stop reason: HOSPADM

## 2024-08-12 RX ORDER — MEPERIDINE HYDROCHLORIDE 25 MG/ML
12.5 INJECTION INTRAMUSCULAR; INTRAVENOUS; SUBCUTANEOUS EVERY 5 MIN PRN
Status: DISCONTINUED | OUTPATIENT
Start: 2024-08-12 | End: 2024-08-12 | Stop reason: HOSPADM

## 2024-08-12 RX ORDER — BUDESONIDE AND FORMOTEROL FUMARATE DIHYDRATE 80; 4.5 UG/1; UG/1
2 AEROSOL RESPIRATORY (INHALATION)
Status: DISCONTINUED | OUTPATIENT
Start: 2024-08-12 | End: 2024-08-14 | Stop reason: HOSPADM

## 2024-08-12 RX ORDER — GABAPENTIN 400 MG/1
800 CAPSULE ORAL 2 TIMES DAILY
Status: DISCONTINUED | OUTPATIENT
Start: 2024-08-12 | End: 2024-08-14 | Stop reason: HOSPADM

## 2024-08-12 RX ORDER — SODIUM CHLORIDE 9 MG/ML
INJECTION, SOLUTION INTRAVENOUS CONTINUOUS
Status: DISCONTINUED | OUTPATIENT
Start: 2024-08-12 | End: 2024-08-12 | Stop reason: HOSPADM

## 2024-08-12 RX ORDER — ALBUTEROL SULFATE 2.5 MG/3ML
2.5 SOLUTION RESPIRATORY (INHALATION) EVERY 6 HOURS PRN
Status: DISCONTINUED | OUTPATIENT
Start: 2024-08-12 | End: 2024-08-14 | Stop reason: HOSPADM

## 2024-08-12 RX ORDER — MIDAZOLAM HYDROCHLORIDE 1 MG/ML
INJECTION INTRAMUSCULAR; INTRAVENOUS PRN
Status: DISCONTINUED | OUTPATIENT
Start: 2024-08-12 | End: 2024-08-12 | Stop reason: SDUPTHER

## 2024-08-12 RX ORDER — ALBUTEROL SULFATE 90 UG/1
1 AEROSOL, METERED RESPIRATORY (INHALATION) EVERY 6 HOURS PRN
Status: DISCONTINUED | OUTPATIENT
Start: 2024-08-12 | End: 2024-08-14 | Stop reason: HOSPADM

## 2024-08-12 RX ORDER — PROPOFOL 10 MG/ML
INJECTION, EMULSION INTRAVENOUS PRN
Status: DISCONTINUED | OUTPATIENT
Start: 2024-08-12 | End: 2024-08-12 | Stop reason: SDUPTHER

## 2024-08-12 RX ORDER — SODIUM CHLORIDE 0.9 % (FLUSH) 0.9 %
5-40 SYRINGE (ML) INJECTION PRN
Status: DISCONTINUED | OUTPATIENT
Start: 2024-08-12 | End: 2024-08-12 | Stop reason: HOSPADM

## 2024-08-12 RX ORDER — SODIUM CHLORIDE 9 MG/ML
INJECTION, SOLUTION INTRAVENOUS PRN
Status: DISCONTINUED | OUTPATIENT
Start: 2024-08-12 | End: 2024-08-14 | Stop reason: HOSPADM

## 2024-08-12 RX ORDER — METOCLOPRAMIDE HYDROCHLORIDE 5 MG/ML
10 INJECTION INTRAMUSCULAR; INTRAVENOUS
Status: DISCONTINUED | OUTPATIENT
Start: 2024-08-12 | End: 2024-08-12 | Stop reason: HOSPADM

## 2024-08-12 RX ORDER — LOSARTAN POTASSIUM 25 MG/1
25 TABLET ORAL DAILY
Status: DISCONTINUED | OUTPATIENT
Start: 2024-08-12 | End: 2024-08-14 | Stop reason: HOSPADM

## 2024-08-12 RX ORDER — TRAMADOL HYDROCHLORIDE 50 MG/1
100 TABLET ORAL EVERY 6 HOURS PRN
Status: DISCONTINUED | OUTPATIENT
Start: 2024-08-12 | End: 2024-08-14 | Stop reason: HOSPADM

## 2024-08-12 RX ORDER — ATORVASTATIN CALCIUM 40 MG/1
40 TABLET, FILM COATED ORAL DAILY
Status: DISCONTINUED | OUTPATIENT
Start: 2024-08-12 | End: 2024-08-14 | Stop reason: HOSPADM

## 2024-08-12 RX ORDER — TRANEXAMIC ACID 100 MG/ML
INJECTION, SOLUTION INTRAVENOUS PRN
Status: DISCONTINUED | OUTPATIENT
Start: 2024-08-12 | End: 2024-08-12 | Stop reason: SDUPTHER

## 2024-08-12 RX ORDER — ROCURONIUM BROMIDE 10 MG/ML
INJECTION, SOLUTION INTRAVENOUS PRN
Status: DISCONTINUED | OUTPATIENT
Start: 2024-08-12 | End: 2024-08-12 | Stop reason: SDUPTHER

## 2024-08-12 RX ORDER — VANCOMYCIN HYDROCHLORIDE 1 G/20ML
INJECTION, POWDER, LYOPHILIZED, FOR SOLUTION INTRAVENOUS PRN
Status: DISCONTINUED | OUTPATIENT
Start: 2024-08-12 | End: 2024-08-12 | Stop reason: ALTCHOICE

## 2024-08-12 RX ORDER — LEVOTHYROXINE SODIUM 88 UG/1
88 TABLET ORAL DAILY
Status: DISCONTINUED | OUTPATIENT
Start: 2024-08-12 | End: 2024-08-14 | Stop reason: HOSPADM

## 2024-08-12 RX ORDER — BUPIVACAINE HYDROCHLORIDE AND EPINEPHRINE 2.5; 5 MG/ML; UG/ML
INJECTION, SOLUTION EPIDURAL; INFILTRATION; INTRACAUDAL; PERINEURAL PRN
Status: DISCONTINUED | OUTPATIENT
Start: 2024-08-12 | End: 2024-08-12 | Stop reason: ALTCHOICE

## 2024-08-12 RX ORDER — MORPHINE SULFATE 4 MG/ML
4 INJECTION, SOLUTION INTRAMUSCULAR; INTRAVENOUS
Status: DISCONTINUED | OUTPATIENT
Start: 2024-08-12 | End: 2024-08-14 | Stop reason: HOSPADM

## 2024-08-12 RX ORDER — MORPHINE SULFATE 2 MG/ML
2 INJECTION, SOLUTION INTRAMUSCULAR; INTRAVENOUS
Status: DISCONTINUED | OUTPATIENT
Start: 2024-08-12 | End: 2024-08-14 | Stop reason: HOSPADM

## 2024-08-12 RX ORDER — VITAMIN B COMPLEX
1000 TABLET ORAL DAILY
Status: DISCONTINUED | OUTPATIENT
Start: 2024-08-12 | End: 2024-08-14 | Stop reason: HOSPADM

## 2024-08-12 RX ORDER — DIPHENHYDRAMINE HYDROCHLORIDE 50 MG/ML
12.5 INJECTION INTRAMUSCULAR; INTRAVENOUS
Status: COMPLETED | OUTPATIENT
Start: 2024-08-12 | End: 2024-08-12

## 2024-08-12 RX ORDER — INSULIN GLARGINE 100 [IU]/ML
35 INJECTION, SOLUTION SUBCUTANEOUS 2 TIMES DAILY
Status: DISCONTINUED | OUTPATIENT
Start: 2024-08-12 | End: 2024-08-14 | Stop reason: HOSPADM

## 2024-08-12 RX ORDER — CHLORAL HYDRATE 500 MG
1 CAPSULE ORAL 2 TIMES DAILY
Status: DISCONTINUED | OUTPATIENT
Start: 2024-08-12 | End: 2024-08-12

## 2024-08-12 RX ORDER — ONDANSETRON 2 MG/ML
4 INJECTION INTRAMUSCULAR; INTRAVENOUS
Status: COMPLETED | OUTPATIENT
Start: 2024-08-12 | End: 2024-08-12

## 2024-08-12 RX ORDER — SODIUM CHLORIDE 0.9 % (FLUSH) 0.9 %
5-40 SYRINGE (ML) INJECTION EVERY 12 HOURS SCHEDULED
Status: DISCONTINUED | OUTPATIENT
Start: 2024-08-12 | End: 2024-08-14 | Stop reason: HOSPADM

## 2024-08-12 RX ORDER — DILTIAZEM HYDROCHLORIDE 120 MG/1
120 CAPSULE, COATED, EXTENDED RELEASE ORAL DAILY
Status: DISCONTINUED | OUTPATIENT
Start: 2024-08-12 | End: 2024-08-14 | Stop reason: HOSPADM

## 2024-08-12 RX ORDER — GABAPENTIN 400 MG/1
400 CAPSULE ORAL 2 TIMES DAILY
Status: DISCONTINUED | OUTPATIENT
Start: 2024-08-12 | End: 2024-08-12 | Stop reason: SDUPTHER

## 2024-08-12 RX ORDER — PROPOFOL 10 MG/ML
INJECTION, EMULSION INTRAVENOUS CONTINUOUS PRN
Status: DISCONTINUED | OUTPATIENT
Start: 2024-08-12 | End: 2024-08-12 | Stop reason: SDUPTHER

## 2024-08-12 RX ORDER — EPHEDRINE SULFATE/0.9% NACL/PF 25 MG/5 ML
SYRINGE (ML) INTRAVENOUS PRN
Status: DISCONTINUED | OUTPATIENT
Start: 2024-08-12 | End: 2024-08-12 | Stop reason: SDUPTHER

## 2024-08-12 RX ORDER — CYCLOBENZAPRINE HCL 10 MG
10 TABLET ORAL 2 TIMES DAILY PRN
Status: DISCONTINUED | OUTPATIENT
Start: 2024-08-12 | End: 2024-08-14 | Stop reason: HOSPADM

## 2024-08-12 RX ORDER — FENTANYL CITRATE 0.05 MG/ML
25 INJECTION, SOLUTION INTRAMUSCULAR; INTRAVENOUS EVERY 5 MIN PRN
Status: COMPLETED | OUTPATIENT
Start: 2024-08-12 | End: 2024-08-12

## 2024-08-12 RX ORDER — LIDOCAINE HYDROCHLORIDE 10 MG/ML
1 INJECTION, SOLUTION EPIDURAL; INFILTRATION; INTRACAUDAL; PERINEURAL
Status: DISCONTINUED | OUTPATIENT
Start: 2024-08-12 | End: 2024-08-12 | Stop reason: HOSPADM

## 2024-08-12 RX ORDER — DEXTROSE MONOHYDRATE 100 MG/ML
INJECTION, SOLUTION INTRAVENOUS CONTINUOUS PRN
Status: DISCONTINUED | OUTPATIENT
Start: 2024-08-12 | End: 2024-08-14 | Stop reason: HOSPADM

## 2024-08-12 RX ADMIN — EPHEDRINE SULFATE 10 MG: 5 INJECTION INTRAVENOUS at 08:22

## 2024-08-12 RX ADMIN — GLIPIZIDE 10 MG: 5 TABLET ORAL at 15:38

## 2024-08-12 RX ADMIN — HYDROMORPHONE HYDROCHLORIDE 0.5 MG: 1 INJECTION, SOLUTION INTRAMUSCULAR; INTRAVENOUS; SUBCUTANEOUS at 10:33

## 2024-08-12 RX ADMIN — LIDOCAINE HYDROCHLORIDE 100 MG: 20 INJECTION, SOLUTION EPIDURAL; INFILTRATION; INTRACAUDAL; PERINEURAL at 07:35

## 2024-08-12 RX ADMIN — MIDAZOLAM 2 MG: 1 INJECTION INTRAMUSCULAR; INTRAVENOUS at 07:35

## 2024-08-12 RX ADMIN — BUDESONIDE AND FORMOTEROL FUMARATE DIHYDRATE 2 PUFF: 80; 4.5 AEROSOL RESPIRATORY (INHALATION) at 19:11

## 2024-08-12 RX ADMIN — ROCURONIUM BROMIDE 25 MG: 10 INJECTION, SOLUTION INTRAVENOUS at 07:36

## 2024-08-12 RX ADMIN — FENTANYL CITRATE 25 MCG: 0.05 INJECTION, SOLUTION INTRAMUSCULAR; INTRAVENOUS at 11:45

## 2024-08-12 RX ADMIN — PROPOFOL 100 MG: 10 INJECTION, EMULSION INTRAVENOUS at 07:35

## 2024-08-12 RX ADMIN — ONDANSETRON 4 MG: 2 INJECTION INTRAMUSCULAR; INTRAVENOUS at 09:50

## 2024-08-12 RX ADMIN — SUGAMMADEX 150 MG: 100 INJECTION, SOLUTION INTRAVENOUS at 10:07

## 2024-08-12 RX ADMIN — MORPHINE SULFATE 4 MG: 4 INJECTION, SOLUTION INTRAMUSCULAR; INTRAVENOUS at 19:26

## 2024-08-12 RX ADMIN — SODIUM CHLORIDE: 9 INJECTION, SOLUTION INTRAVENOUS at 06:53

## 2024-08-12 RX ADMIN — SODIUM CHLORIDE: 9 INJECTION, SOLUTION INTRAVENOUS at 10:33

## 2024-08-12 RX ADMIN — CALCIUM 500 MG: 500 TABLET ORAL at 15:37

## 2024-08-12 RX ADMIN — DEXAMETHASONE SODIUM PHOSPHATE 4 MG: 4 INJECTION INTRA-ARTICULAR; INTRALESIONAL; INTRAMUSCULAR; INTRAVENOUS; SOFT TISSUE at 08:22

## 2024-08-12 RX ADMIN — FENTANYL CITRATE 25 MCG: 0.05 INJECTION, SOLUTION INTRAMUSCULAR; INTRAVENOUS at 11:50

## 2024-08-12 RX ADMIN — Medication 2000 MG: at 15:47

## 2024-08-12 RX ADMIN — DIPHENHYDRAMINE HYDROCHLORIDE 12.5 MG: 50 INJECTION INTRAMUSCULAR; INTRAVENOUS at 10:44

## 2024-08-12 RX ADMIN — PROPOFOL 100 MCG/KG/MIN: 10 INJECTION, EMULSION INTRAVENOUS at 07:50

## 2024-08-12 RX ADMIN — MORPHINE SULFATE 4 MG: 4 INJECTION, SOLUTION INTRAMUSCULAR; INTRAVENOUS at 13:00

## 2024-08-12 RX ADMIN — Medication 12.5 MCG: at 09:15

## 2024-08-12 RX ADMIN — Medication 2000 MG: at 07:31

## 2024-08-12 RX ADMIN — EPHEDRINE SULFATE 5 MG: 5 INJECTION INTRAVENOUS at 08:38

## 2024-08-12 RX ADMIN — Medication 12.5 MCG: at 07:35

## 2024-08-12 RX ADMIN — ONDANSETRON 4 MG: 2 INJECTION INTRAMUSCULAR; INTRAVENOUS at 10:33

## 2024-08-12 RX ADMIN — Medication 40 MG: at 08:15

## 2024-08-12 RX ADMIN — HYDROCHLOROTHIAZIDE 25 MG: 25 TABLET ORAL at 15:38

## 2024-08-12 RX ADMIN — CALCIUM 500 MG: 500 TABLET ORAL at 19:24

## 2024-08-12 RX ADMIN — GABAPENTIN 800 MG: 400 CAPSULE ORAL at 19:24

## 2024-08-12 RX ADMIN — MORPHINE SULFATE 4 MG: 4 INJECTION, SOLUTION INTRAMUSCULAR; INTRAVENOUS at 15:36

## 2024-08-12 RX ADMIN — LOSARTAN POTASSIUM 25 MG: 25 TABLET, FILM COATED ORAL at 15:38

## 2024-08-12 RX ADMIN — SODIUM CHLORIDE: 9 INJECTION, SOLUTION INTRAVENOUS at 10:18

## 2024-08-12 RX ADMIN — INSULIN GLARGINE 35 UNITS: 100 INJECTION, SOLUTION SUBCUTANEOUS at 21:29

## 2024-08-12 RX ADMIN — POTASSIUM CHLORIDE 20 MEQ: 1500 TABLET, EXTENDED RELEASE ORAL at 15:38

## 2024-08-12 RX ADMIN — ATORVASTATIN CALCIUM 40 MG: 40 TABLET, FILM COATED ORAL at 15:38

## 2024-08-12 RX ADMIN — METOPROLOL TARTRATE 25 MG: 25 TABLET, FILM COATED ORAL at 19:27

## 2024-08-12 RX ADMIN — Medication 1000 UNITS: at 15:38

## 2024-08-12 RX ADMIN — SODIUM CHLORIDE, PRESERVATIVE FREE 10 ML: 5 INJECTION INTRAVENOUS at 21:29

## 2024-08-12 RX ADMIN — TRANEXAMIC ACID 1000 MG: 100 INJECTION, SOLUTION INTRAVENOUS at 07:52

## 2024-08-12 RX ADMIN — HYDROMORPHONE HYDROCHLORIDE 0.5 MG: 1 INJECTION, SOLUTION INTRAMUSCULAR; INTRAVENOUS; SUBCUTANEOUS at 10:44

## 2024-08-12 RX ADMIN — EPHEDRINE SULFATE 10 MG: 5 INJECTION INTRAVENOUS at 08:50

## 2024-08-12 RX ADMIN — Medication 2000 MG: at 23:19

## 2024-08-12 ASSESSMENT — PAIN DESCRIPTION - LOCATION
LOCATION: BACK
LOCATION: BACK

## 2024-08-12 ASSESSMENT — ENCOUNTER SYMPTOMS
BACK PAIN: 1
CHEST TIGHTNESS: 1
CONSTIPATION: 1
VOMITING: 0
ABDOMINAL PAIN: 0
SHORTNESS OF BREATH: 0
COUGH: 1
DIARRHEA: 0
NAUSEA: 0
SORE THROAT: 0

## 2024-08-12 ASSESSMENT — PAIN SCALES - GENERAL
PAINLEVEL_OUTOF10: 9
PAINLEVEL_OUTOF10: 7
PAINLEVEL_OUTOF10: 10

## 2024-08-12 ASSESSMENT — PAIN - FUNCTIONAL ASSESSMENT
PAIN_FUNCTIONAL_ASSESSMENT: PREVENTS OR INTERFERES WITH MANY ACTIVE NOT PASSIVE ACTIVITIES
PAIN_FUNCTIONAL_ASSESSMENT: 0-10
PAIN_FUNCTIONAL_ASSESSMENT: 0-10

## 2024-08-12 ASSESSMENT — PAIN DESCRIPTION - ONSET
ONSET: AWAKENED FROM SLEEP

## 2024-08-12 ASSESSMENT — PAIN DESCRIPTION - DESCRIPTORS: DESCRIPTORS: PRESSURE

## 2024-08-12 ASSESSMENT — PAIN DESCRIPTION - ORIENTATION: ORIENTATION: LOWER;RIGHT;LEFT

## 2024-08-12 NOTE — ANESTHESIA POSTPROCEDURE EVALUATION
Department of Anesthesiology  Postprocedure Note    Patient: Dianna Jameson  MRN: [id number]  YOB: 1955  Date of evaluation: 8/12/2024    Procedure Summary       Date: 08/12/24 Room / Location: [address]    Anesthesia Start: 0731 Anesthesia Stop: 1026    Procedure: LUMBAR LAMINECTOMY FUSION POSTERIOR L4-5 (Spine Lumbar) Diagnosis:       Spinal stenosis, unspecified spinal region      (Spinal stenosis, unspecified spinal region [M48.00])    Surgeons: José Miguel Brewster MD Responsible Provider: David Granados MD    Anesthesia Type: general ASA Status: 3            Anesthesia Type: No value filed.    Nathaniel Phase I: Nathaniel Score: 8    Nathaniel Phase II:      Anesthesia Post Evaluation    Comments: POST- ANESTHESIA EVALUATION       Pt Name: Dianna Jameson  MRN: [id number]  YOB: 1955  Date of evaluation: 8/12/2024  Time:  2:09 PM      BP (!) 175/77   Pulse 61   Temp 97.5 °F (36.4 °C) (Axillary)   Resp 12   Ht 1.626 m (5' 4\")   Wt 74.8 kg (165 lb)   SpO2 96%   BMI 28.32 kg/m²      Consciousness Level  Awake  Cardiopulmonary Status  Stable  Pain Adequately Treated YES  Nausea / Vomiting  NO  Adequate Hydration  YES  Anesthesia Related Complications NONE      Electronically signed by David Granados MD on 8/12/2024 at 2:09 PM           No notable events documented.

## 2024-08-12 NOTE — ANESTHESIA PRE PROCEDURE
Department of Anesthesiology  Preprocedure Note       Name:  Dianna Jameson   Age:  69 y.o.  :  1955                                          MRN:  [id number]         Date:  2024      Surgeon: Surgeon(s):  José Miguel Brewster MD    Procedure: Procedure(s):  LUMBAR LAMINECTOMY FUSION POSTERIOR L4-5    Medications prior to admission:   Prior to Admission medications    Medication Sig Start Date End Date Taking? Authorizing Provider   CARTIA  MG extended release capsule  24  Yes Constantino Cisneros MD   gabapentin (NEURONTIN) 800 MG tablet Take 1 tablet by mouth 2 times daily.   Yes ProviderConstantino MD   insulin glargine (LANTUS SOLOSTAR) 100 UNIT/ML injection pen Inject 45 Units into the skin nightly  Patient taking differently: Inject 35 Units into the skin 2 times daily 24 Yes Nisha Pinto APRN - CNP   gabapentin (NEURONTIN) 400 MG capsule Take 1 capsule by mouth in the morning and at bedtime for 60 days. 3/23/24 8/12/24 Yes Anny Harkins DO   levothyroxine (SYNTHROID) 88 MCG tablet Take 1 tab by mouth daily, except one-half tab on Mon 3/6/24  Yes Anny Harkins DO   metoprolol tartrate (LOPRESSOR) 25 MG tablet Take 1 tablet by mouth twice daily 24  Yes Rylie Ron APRN - NP   potassium chloride (KLOR-CON M) 20 MEQ extended release tablet Take 1 tablet by mouth daily 24  Yes Anny Harkins DO   cyclobenzaprine (FLEXERIL) 10 MG tablet Take 1 tablet by mouth 2 times daily as needed for Muscle spasms 24  Yes Anny Harkins DO   fluticasone-salmeterol (ADVAIR) 250-50 MCG/ACT AEPB diskus inhaler Inhale 1 puff into the lungs 2 times daily 24  Yes Anny Harkins DO   atorvastatin (LIPITOR) 40 MG tablet Take 1 tablet by mouth daily 23  Yes Nicolás Lacey MD   Omega-3 Fatty Acids (FISH OIL) 1000 MG capsule Take 2 capsules by mouth 2 times daily 10/16/23  Yes Anny Harkins DO   polyethylene glycol (MIRALAX) 17 g packet Take 1 packet by mouth

## 2024-08-12 NOTE — H&P
Positive for visual disturbance (glasses, not wearing today).   Respiratory:  Positive for cough (Chronic, hx of COPD) and chest tightness. Negative for shortness of breath.    Cardiovascular:  Negative for chest pain and palpitations.   Gastrointestinal:  Positive for constipation (Takes linzess). Negative for abdominal pain, diarrhea, nausea and vomiting.   Genitourinary:  Negative for dysuria and hematuria.   Musculoskeletal:  Positive for back pain and gait problem.   Skin:  Negative for rash and wound.   Neurological:  Positive for weakness. Negative for speech difficulty.   Hematological:  Bruises/bleeds easily.         GENERAL PHYSICAL EXAM     Vitals: Review vitals per RN flowsheet.                              Physical Exam  Constitutional:       General: She is not in acute distress.     Appearance: She is well-developed. She is not ill-appearing.   HENT:      Head: Normocephalic and atraumatic.      Nose: Nose normal.      Mouth/Throat:      Mouth: Mucous membranes are dry.      Pharynx: Oropharynx is clear. No oropharyngeal exudate or posterior oropharyngeal erythema.      Comments: edentulous  Eyes:      General: No scleral icterus.        Right eye: No discharge.         Left eye: No discharge.      Pupils: Pupils are equal, round, and reactive to light.   Neck:      Trachea: No tracheal deviation.   Cardiovascular:      Rate and Rhythm: Normal rate and regular rhythm.      Heart sounds: Normal heart sounds. No murmur heard.     No friction rub. No gallop.   Pulmonary:      Effort: Pulmonary effort is normal. No respiratory distress.      Breath sounds: Normal breath sounds. No wheezing, rhonchi or rales.   Abdominal:      General: Bowel sounds are normal. There is no distension.      Palpations: Abdomen is soft.      Tenderness: There is no abdominal tenderness. There is no guarding.   Musculoskeletal:      Cervical back: Neck supple.      Lumbar back: Tenderness present.      Right upper leg:

## 2024-08-12 NOTE — BRIEF OP NOTE
Brief Postoperative Note      Patient: Dianna Jameson  YOB: 1955  MRN: [id number]    Date of Procedure: 8/12/2024    Pre-Op Diagnosis Codes:      * Spinal stenosis, unspecified spinal region [M48.00]    Post-Op Diagnosis: Same       Procedure(s):  LUMBAR LAMINECTOMY FUSION POSTERIOR L4-5  PLIF L4-5  Surgeon(s):  José Miguel Brewstre MD Hill, Taylor, PA    Assistant:  * No surgical staff found *    Anesthesia: General    Estimated Blood Loss (mL): 200     Complications: None    Specimens:   * No specimens in log *    Implants:  Implant Name Type Inv. Item Serial No.  Lot No. LRB No. Used Action   SPACER SPNL 15 DEG SM 28X10 MM STRL PROLIFT - [id number]  SPACER SPNL 15 DEG SM 28X10 MM STRL PROLIFT  LUIS CARLOS SPINE HOWM-WD [id number] N/A 1 Implanted   SPACER SPNL 15 DEG SM 28X10 MM STRL PROLIFT - [id number]  SPACER SPNL 15 DEG SM 28X10 MM STRL PROLIFT  LUIS CARLOS SPINE HOW- [id number] N/A 1 Implanted   GRAFT BNE CHIP FRZ DRY CANC BENTON 1MM 8MM RANG 30CC READIGRFT - [id number]  GRAFT BNE CHIP FRZ DRY CANC BENTON 1MM 8MM RANG 30CC READIGRFT  [facility] TISSUE [facility]- [id number] N/A 1 Implanted   SCREW SPNL POLYAX 6.5X40 MM NAVA FEN INVASIVE EVEREST - [id number]  SCREW SPNL POLYAX 6.5X40 MM NAVA FEN INVASIVE EVEREST  LUIS CARLOS SPINE HOW-WD  N/A 4 Implanted   SCREW SET EVEREST - [id number]  SCREW SET EVEREST  LUIS CARLOS SPINE HOW-WD  N/A 4 Implanted   OMAR SPNL CONTOURED 5.5X45 MM LUMBAR TI MOUSTAPHA - [id number]  OMAR SPNL CONTOURED 5.5X45 MM LUMBAR TI MOUSTAPHA  LUIS CARLOS SPINE HOWM-WD  N/A 2 Implanted         Drains: * No LDAs found *    Findings:  Infection Present At Time Of Surgery (PATOS) (choose all levels that have infection present):  No infection present  Other Findings: see dictation    Electronically signed by José Miguel Brewster MD on 8/12/2024 at 10:26 AM

## 2024-08-12 NOTE — OP NOTE
[place] [address]                            OPERATIVE REPORT      PATIENT NAME: MONICA HTURSTON              : 1955  MED REC NO: [id number]                          ROOM:   ACCOUNT NO: [id number]                       ADMIT DATE: 2024  PROVIDER: José Miguel Brewster MD      DATE OF PROCEDURE:  2024    SURGEON:  José Miguel Brewster MD    PREOPERATIVE DIAGNOSES:  L4-5 degenerative disk disease, lumbar spinal stenosis.    POSTOPERATIVE DIAGNOSES:  L4-5 degenerative disk disease, lumbar spinal stenosis.    PROCEDURES PERFORMED:    1. L4-5 posterior decompression with laminectomy, bilateral partial medial facetectomies, neural foraminotomies.  2. L4-5 posterolateral instrumented fusion.  3. Posterior interbody fusion with application of vertebral body fusion cages.  4. Local autograft, nonstructural allograft bone grafting, fluoroscopic assistance.    FIRST ASSISTANT:  FANY Hebert, utilized for patient positioning, wound retraction, suctioning, and aid in skin closure.    ANESTHESIA:  General.    BLOOD LOSS:  200.    COMPLICATIONS:  None.    SPECIMEN:  None.    IMPLANTS:  [place] K2 Everest screw frank construct with 15-degree lordotic expandable intervertebral body fusion cages.    DRAINS:  None.    FINDINGS:  Fluoroscopy utilized for level localization, aid in pedicle screw placement.  Final AP and lateral fluoroscopic images showed satisfactory disk space elevation, a little bit less lordosis than ideal, but acceptable, acceptable graft hardware placement.    PROCEDURE IN DETAIL:  The patient was taken to operating room, placed under general anesthesia, transferred to [facility] table, checked for padding and positioning.  Back was prepped and draped in usual sterile fashion.  18-gauge needle was advanced from the skin to the transverse process of L5, backed off just a little bit and a field injection was given

## 2024-08-13 LAB
GLUCOSE BLD-MCNC: 206 MG/DL (ref 65–105)
GLUCOSE BLD-MCNC: 214 MG/DL (ref 65–105)
GLUCOSE BLD-MCNC: 246 MG/DL (ref 65–105)
GLUCOSE BLD-MCNC: 265 MG/DL (ref 65–105)

## 2024-08-13 PROCEDURE — G0378 HOSPITAL OBSERVATION PER HR: HCPCS

## 2024-08-13 PROCEDURE — 96374 THER/PROPH/DIAG INJ IV PUSH: CPT

## 2024-08-13 PROCEDURE — 6370000000 HC RX 637 (ALT 250 FOR IP): Performed by: ORTHOPAEDIC SURGERY

## 2024-08-13 PROCEDURE — 2580000003 HC RX 258: Performed by: ORTHOPAEDIC SURGERY

## 2024-08-13 PROCEDURE — 97161 PT EVAL LOW COMPLEX 20 MIN: CPT

## 2024-08-13 PROCEDURE — 94640 AIRWAY INHALATION TREATMENT: CPT

## 2024-08-13 PROCEDURE — 6360000002 HC RX W HCPCS: Performed by: ORTHOPAEDIC SURGERY

## 2024-08-13 PROCEDURE — 82947 ASSAY GLUCOSE BLOOD QUANT: CPT

## 2024-08-13 PROCEDURE — 97530 THERAPEUTIC ACTIVITIES: CPT

## 2024-08-13 PROCEDURE — 96376 TX/PRO/DX INJ SAME DRUG ADON: CPT

## 2024-08-13 PROCEDURE — 99024 POSTOP FOLLOW-UP VISIT: CPT | Performed by: ORTHOPAEDIC SURGERY

## 2024-08-13 PROCEDURE — 94761 N-INVAS EAR/PLS OXIMETRY MLT: CPT

## 2024-08-13 RX ORDER — TRAMADOL HYDROCHLORIDE 50 MG/1
50 TABLET ORAL EVERY 6 HOURS PRN
Qty: 28 TABLET | Refills: 0 | Status: SHIPPED | OUTPATIENT
Start: 2024-08-13 | End: 2024-08-20

## 2024-08-13 RX ADMIN — GLIPIZIDE 10 MG: 5 TABLET ORAL at 06:26

## 2024-08-13 RX ADMIN — POTASSIUM CHLORIDE 20 MEQ: 1500 TABLET, EXTENDED RELEASE ORAL at 08:12

## 2024-08-13 RX ADMIN — DILTIAZEM HYDROCHLORIDE 120 MG: 120 CAPSULE, COATED, EXTENDED RELEASE ORAL at 08:12

## 2024-08-13 RX ADMIN — INSULIN GLARGINE 35 UNITS: 100 INJECTION, SOLUTION SUBCUTANEOUS at 08:11

## 2024-08-13 RX ADMIN — GABAPENTIN 800 MG: 400 CAPSULE ORAL at 08:11

## 2024-08-13 RX ADMIN — CALCIUM 500 MG: 500 TABLET ORAL at 08:17

## 2024-08-13 RX ADMIN — LOSARTAN POTASSIUM 25 MG: 25 TABLET, FILM COATED ORAL at 08:13

## 2024-08-13 RX ADMIN — HYDROCHLOROTHIAZIDE 25 MG: 25 TABLET ORAL at 08:13

## 2024-08-13 RX ADMIN — TRAMADOL HYDROCHLORIDE 50 MG: 50 TABLET ORAL at 22:19

## 2024-08-13 RX ADMIN — METOPROLOL TARTRATE 25 MG: 25 TABLET, FILM COATED ORAL at 08:12

## 2024-08-13 RX ADMIN — BUDESONIDE AND FORMOTEROL FUMARATE DIHYDRATE 2 PUFF: 80; 4.5 AEROSOL RESPIRATORY (INHALATION) at 19:41

## 2024-08-13 RX ADMIN — MORPHINE SULFATE 4 MG: 4 INJECTION, SOLUTION INTRAMUSCULAR; INTRAVENOUS at 04:06

## 2024-08-13 RX ADMIN — SODIUM CHLORIDE, PRESERVATIVE FREE 10 ML: 5 INJECTION INTRAVENOUS at 22:19

## 2024-08-13 RX ADMIN — TRAMADOL HYDROCHLORIDE 100 MG: 50 TABLET ORAL at 13:11

## 2024-08-13 RX ADMIN — Medication 1000 UNITS: at 08:13

## 2024-08-13 RX ADMIN — TRAMADOL HYDROCHLORIDE 100 MG: 50 TABLET ORAL at 06:25

## 2024-08-13 RX ADMIN — CALCIUM 500 MG: 500 TABLET ORAL at 22:17

## 2024-08-13 RX ADMIN — MORPHINE SULFATE 4 MG: 4 INJECTION, SOLUTION INTRAMUSCULAR; INTRAVENOUS at 08:21

## 2024-08-13 RX ADMIN — BUDESONIDE AND FORMOTEROL FUMARATE DIHYDRATE 2 PUFF: 80; 4.5 AEROSOL RESPIRATORY (INHALATION) at 08:29

## 2024-08-13 RX ADMIN — MORPHINE SULFATE 2 MG: 2 INJECTION, SOLUTION INTRAMUSCULAR; INTRAVENOUS at 16:54

## 2024-08-13 RX ADMIN — GABAPENTIN 800 MG: 400 CAPSULE ORAL at 22:17

## 2024-08-13 RX ADMIN — GLIPIZIDE 10 MG: 5 TABLET ORAL at 16:54

## 2024-08-13 RX ADMIN — SODIUM CHLORIDE, PRESERVATIVE FREE 10 ML: 5 INJECTION INTRAVENOUS at 08:14

## 2024-08-13 RX ADMIN — METOPROLOL TARTRATE 25 MG: 25 TABLET, FILM COATED ORAL at 22:17

## 2024-08-13 RX ADMIN — INSULIN GLARGINE 35 UNITS: 100 INJECTION, SOLUTION SUBCUTANEOUS at 22:18

## 2024-08-13 RX ADMIN — MORPHINE SULFATE 2 MG: 2 INJECTION, SOLUTION INTRAMUSCULAR; INTRAVENOUS at 23:38

## 2024-08-13 RX ADMIN — LEVOTHYROXINE SODIUM 88 MCG: 0.09 TABLET ORAL at 07:32

## 2024-08-13 ASSESSMENT — PAIN SCALES - GENERAL
PAINLEVEL_OUTOF10: 8
PAINLEVEL_OUTOF10: 7
PAINLEVEL_OUTOF10: 8
PAINLEVEL_OUTOF10: 8
PAINLEVEL_OUTOF10: 9
PAINLEVEL_OUTOF10: 5

## 2024-08-13 ASSESSMENT — PAIN DESCRIPTION - DESCRIPTORS
DESCRIPTORS: STABBING
DESCRIPTORS: SORE
DESCRIPTORS: SORE
DESCRIPTORS: DISCOMFORT;SORE
DESCRIPTORS: SORE
DESCRIPTORS: SORE

## 2024-08-13 ASSESSMENT — PAIN DESCRIPTION - LOCATION
LOCATION: BACK

## 2024-08-13 ASSESSMENT — PAIN DESCRIPTION - ORIENTATION
ORIENTATION: LOWER
ORIENTATION: MID;LOWER
ORIENTATION: LOWER;MID
ORIENTATION: MID;LOWER
ORIENTATION: LOWER;MID
ORIENTATION: MID;LOWER
ORIENTATION: MID

## 2024-08-13 ASSESSMENT — PAIN DESCRIPTION - PAIN TYPE: TYPE: SURGICAL PAIN

## 2024-08-13 ASSESSMENT — PAIN - FUNCTIONAL ASSESSMENT
PAIN_FUNCTIONAL_ASSESSMENT: PREVENTS OR INTERFERES SOME ACTIVE ACTIVITIES AND ADLS
PAIN_FUNCTIONAL_ASSESSMENT: PREVENTS OR INTERFERES WITH MANY ACTIVE NOT PASSIVE ACTIVITIES
PAIN_FUNCTIONAL_ASSESSMENT: PREVENTS OR INTERFERES SOME ACTIVE ACTIVITIES AND ADLS

## 2024-08-13 NOTE — PROGRESS NOTES
Spiritual Health Assessment/Progress Note  [facility]    Spiritual/Emotional Needs,  ,  ,      Name: Dianna Jameson MRN: [id number]    Age: 69 y.o.     Sex: female   Language: English   Latter-day: Church   Spinal stenosis of lumbar region with neurogenic claudication     Pt is in good spirits and noted surgery went well and while there is pain, it is tolerable. We had good discussion about pt's reina practices and spiritual beliefs, all which are strong coping mechanisms for pt.     Date: 8/13/2024            Total Time Calculated: 10 min              Spiritual Assessment began in [facility]        Referral/Consult From: Rounding   Encounter Overview/Reason: Spiritual/Emotional Needs  Service Provided For: Patient    Reina, Belief, Meaning:   Patient is connected with a reina tradition or spiritual practice and has beliefs or practices that help with coping during difficult times  Family/Friends No family/friends present      Importance and Influence:  Patient has no beliefs influential to healthcare decision-making identified during this visit  Family/Friends no family/friends present    Community:  Patient is connected with a spiritual community  Family/Friends Other: unknown    Assessment and Plan of Care:     Patient Interventions include: Facilitated expression of thoughts and feelings, Explored spiritual coping/struggle/distress and theological reflection, and Affirmed coping skills/support systems  Family/Friends Interventions include: Other: unknown    Patient Plan of Care: Spiritual Care available upon further referral  Family/Friends Plan of Care: Other: none    Electronically signed by Chaplain Ike on 8/13/2024 at 11:54 AM

## 2024-08-13 NOTE — CARE COORDINATION
Therapy recommended rolling walker. Order faxed to [facility]. Electronically signed by Joana Ramirez RN on 8/13/2024 at 12:56 PM

## 2024-08-13 NOTE — PROGRESS NOTES
8/13/24 11:50am Nurse Babs pt not leaving until 8/14 at earliest. Keep Tramadol in Outpatient Pharmacy can p/u at discharge or will deliver [id number] when pharmacy is notified pt is discharging pharmacy opens 9am, first [id number] delivery 11:30-noon nicole

## 2024-08-13 NOTE — ANESTHESIA POSTPROCEDURE EVALUATION
Department of Anesthesiology  Postprocedure Note    Patient: Dianna Jameson  MRN: [id number]  YOB: 1955  Date of evaluation: 8/13/2024    Procedure Summary       Date: 08/12/24 Room / Location: [address]    Anesthesia Start: 0731 Anesthesia Stop: 1026    Procedure: LUMBAR LAMINECTOMY FUSION POSTERIOR L4-5 (Spine Lumbar) Diagnosis:       Spinal stenosis, unspecified spinal region      (Spinal stenosis, unspecified spinal region [M48.00])    Surgeons: José Miguel Brewster MD Responsible Provider: David Granados MD    Anesthesia Type: general ASA Status: 3            Anesthesia Type: No value filed.    Nathaniel Phase I: Nathaniel Score: 8    Nathaniel Phase II:      Anesthesia Post Evaluation    Comments: POD #1 Patient seen asleep in bed. No anesthesia related issues reported by surgical or floor staff.    No notable events documented.

## 2024-08-13 NOTE — CARE COORDINATION
Case Management Assessment  Initial Evaluation    Date/Time of Evaluation: 8/13/2024 11:14 AM  Assessment Completed by: Joana Ramirez RN    If patient is discharged prior to next notation, then this note serves as note for discharge by case management.    Patient Name: Dianna Jameson                   YOB: 1955  Diagnosis: Spinal stenosis, unspecified spinal region [M48.00]  Spinal stenosis of lumbar region with neurogenic claudication [M48.062]                   Date / Time: 8/12/2024  5:42 AM    Patient Admission Status: Observation   Readmission Risk (Low < 19, Mod (19-27), High > 27): No data recorded  Current PCP: Delilah Mcgee MD  PCP verified by CM? Yes    Chart Reviewed: Yes      History Provided by: Patient, Medical Record  Patient Orientation: Alert and Oriented    Patient Cognition: Alert    Hospitalization in the last 30 days (Readmission):  No    If yes, Readmission Assessment in CM Navigator will be completed.    Advance Directives:      Code Status: Full Code   Patient's Primary Decision Maker is: Legal Next of Kin      Discharge Planning:    Patient lives with: Alone Type of Home: Apartment  Primary Care Giver: Self  Patient Support Systems include: Family Members, Friends/Neighbors   Current Financial resources: Medicare, Medicaid  Current community resources: Transportation (MEDICAL CAB)  Current services prior to admission: Durable Medical Equipment, Transportation            Current DME: Shower Chair, Glucometer, Walker            Type of Home Care services:  None    ADLS  Prior functional level: Independent in ADLs/IADLs, Assistance with the following:, Shopping  Current functional level: Independent in ADLs/IADLs    PT AM-PAC:   /24  OT AM-PAC:   /24    Family can provide assistance at DC: No  Would you like Case Management to discuss the discharge plan with any other family members/significant others, and if so, who? No  Plans to Return to Present Housing: Yes  Other  Identified Issues/Barriers to RETURNING to current housing: no barriers  Potential Assistance needed at discharge: N/A            Potential DME:  none  Patient expects to discharge to: Apartment  Plan for transportation at discharge: Cab    Financial    Payor: [employer] MEDICARE / Plan: AETNA MEDICARE ADVANTAGE HMO / Product Type: Medicare /     Does insurance require precert for SNF: Yes    Potential assistance Purchasing Medications: No  Meds-to-Beds request: Yes      [id number] - [place], [place] - [address] - P [phone] - F [phone]  [address]  [place] [address]  Phone: [phone] Fax: [phone]    [facility] - [place], [place] - [address] -  [phone] - F [phone]  [address]  [facility] [place] [address]  Phone: [phone] Fax: [phone]      Notes:    Factors facilitating achievement of predicted outcomes: Friend support, Motivated, Cooperative, Pleasant, Good insight into deficits, and Has needed Durable Medical Equipment at home    Barriers to discharge: Lower extremity weakness and Long standing deficits    Additional Case Management Notes: 8/13/2024 AETNA MEDICARE/MEDICAID [place]; from apartment alone; DME rollator, shower chair, glucometer;VNS none/declines;  POD #1 L4-5 PLIF; PT/OT, plan is home tomorrow, transport set up per pt with insurance, plan is home no needs; [place] N/A    The Plan for Transition of Care is related to the following treatment goals of Spinal stenosis, unspecified spinal region [M48.00]  Spinal stenosis of lumbar region with neurogenic claudication [M48.062]    IF APPLICABLE: The Patient and/or patient representative Dianna and her family were provided with a choice of provider and agrees with the discharge plan. Freedom of choice list with basic dialogue that supports the patient's individualized plan of care/goals and shares the quality data associated with the providers was provided to: Patient       The

## 2024-08-13 NOTE — CARE COORDINATION
[facility] Encounter Date/Time: 2024 0542    Hospital Account: [id number]    MRN: [id number]    Patient: Monica Thurston    Contact Serial #: [id number]      ENCOUNTER          Patient Class: OBS Private Enc?  No Unit RM BD: [facility] MED MARISSA    Hospital Service: MED   Encounter DX: Spinal stenosis, unspeci*   ADM Provider: José Miguel Brewster MD   Procedure: IN ARTHRODESIS POSTERIOR*   ATT Provider: José Miguel Brewster MD   REF Provider:        Admission DX: Spinal stenosis, unspecified spinal region, Spinal stenosis of lumbar region with neurogenic claudication and DX codes: M48.00, M48.062      PATIENT                 Name: Monica Thurston : 1955 (69 yrs)   Address: [address] [place] [address]  Sex: Female   City: [place] [address]         Marital Status: Single   Employer: [employer]         Anglican: Anabaptist   Primary Care Provider: Delilah Mcgee MD         Primary Phone: [phone]   EMERGENCY CONTACT   Contact Name Legal Guardian? Relationship to Patient Home Phone Work Phone   1. Mark Ravi  2. Soraida Schuler      Brother/Sister  Parent [phone]         GUARANTOR            Guarantor: Monica Thurston     : 1955   Address: [address] [place] [address]  Sex: [place] [address]     Relation to Patient: Self       Home Phone: [phone]   Guarantor ID: [id number]       Work Phone:     Guarantor Employer: [employer]         Status: RETIRED      COVERAGE        PRIMARY INSURANCE   Payor: AETNA MEDICARE Plan: AETNA MEDICARE ADVANTAGE*   Payor Address: [facility] [address],  [place] [address]       Group Number: [id number] Insurance Type: INDEMNITY   Subscriber Name: MONICA THURSTON Subscriber : 1955   Subscriber ID: [id number] Pat. Rel. to Sub: Self   SECONDARY INSURANCE   Payor: MEDICAID [place] Plan: MEDICAID [facility] DEPT OF*   Payor Address:  [facility] [address], [place] [address]          Group Number:    Insurance Type: INDEMNITY   Subscriber Name: MONICA THURSTON Subscriber : 1955   Subscriber ID: [id number] Pat. Rel. to Sub: SELF        CSN: [id number]      [id number]        CSN                                    Req/Control # [Problem retrieving Specimen ID]                                   Order Date:  Aug 13, 2024  [id number]                                          Patient Information      Name:  Monica Thurston  :  1955  Age:  69 y.o.   Address:  [address]   Zip:  [address]  PCP: Delilah Mcgee MD Sex:  F  SSN: [id number]  Home Phone: [phone]  Work Phone:    Patient MRN:  [id number]    Alt Patient ID:  [id number]  PCP Phone: [phone]       Authorizing Provider Information       AUTHORIZING PROVIDER: José Miguel Brewster MD  Physician ID: [id number]  NPI:  [id number]  Site:   Address: [address] [place] [address] Zip: [place] [address]  Phone: [phone]  Fax: [phone]             EQUIPMENT ORDERED  DME Order for Walker as OP [DME02] (ORD   #:   [id number]) Priority  Routine Class  Hospital Performed        Associated Diagnosis:          Comments:   You must complete the order parameters below and add the medical necessity documentation for this DME in a separate note.     Folding Walker with Wheels     Current patient weight: Weight - Scale: 74.8 kg (165 lb)  Current patient height: Height: 162.6 cm (5' 4\")  Diagnosis: Debility, Post Operative PLIF L4-5  Duration: Purchase            Scheduling Instructions:                                 Specimen Source             Collection Date    Collection Time    Order Status    Expected Date                 Electronically Signed By  José Miguel Brewster MD  NPI:  [id number] Date  Aug 13, 2024  12:53 PM               Responsible Party /Guarantor Information     Guar-ActID   Relationship Account Type Home Phone   MONICA THURSTON - [phone]* [address] [place] [address] / [place], *

## 2024-08-13 NOTE — PLAN OF CARE
Problem: Discharge Planning  Goal: Discharge to home or other facility with appropriate resources  8/13/2024 1705 by Babs Sampson RN  Outcome: Progressing  Flowsheets (Taken 8/12/2024 2120 by Jeanette Wills, RN)  Discharge to home or other facility with appropriate resources:   Identify barriers to discharge with patient and caregiver   Arrange for needed discharge resources and transportation as appropriate   Identify discharge learning needs (meds, wound care, etc)   Refer to discharge planning if patient needs post-hospital services based on physician order or complex needs related to functional status, cognitive ability or social support system  8/13/2024 0332 by Jeanette Wilsl RN  Outcome: Progressing  Flowsheets (Taken 8/12/2024 2120)  Discharge to home or other facility with appropriate resources:   Identify barriers to discharge with patient and caregiver   Arrange for needed discharge resources and transportation as appropriate   Identify discharge learning needs (meds, wound care, etc)   Refer to discharge planning if patient needs post-hospital services based on physician order or complex needs related to functional status, cognitive ability or social support system     Problem: Pain  Goal: Verbalizes/displays adequate comfort level or baseline comfort level  8/13/2024 1705 by Babs Sampson RN  Outcome: Progressing  Flowsheets (Taken 8/12/2024 1926 by Jeanette Wills, RN)  Verbalizes/displays adequate comfort level or baseline comfort level:   Encourage patient to monitor pain and request assistance   Assess pain using appropriate pain scale   Implement non-pharmacological measures as appropriate and evaluate response   Administer analgesics based on type and severity of pain and evaluate response   Notify Licensed Independent Practitioner if interventions unsuccessful or patient reports new pain  8/13/2024 0332 by Jeanette Wills, RN  Outcome: Progressing  Flowsheets (Taken 8/12/2024

## 2024-08-13 NOTE — PROGRESS NOTES
Physical Therapy  Facility/Department: [facility] MED SURG  Physical Therapy Initial Assessment    Name: Dianna Jameson  : 1955  MRN: [id number]  Date of Service: 2024    Discharge Recommendations:  Patient would benefit from continued therapy after discharge   PT Equipment Recommendations  Equipment Needed: Yes  Mobility Devices: Walker (RW)      Patient Diagnosis(es): The encounter diagnosis was Spinal stenosis of lumbar region with neurogenic claudication.  Past Medical History:  has a past medical history of Abdominal pain, Asthma, Atrial fibrillation (HCC), Bowel obstruction (HCC), COPD (chronic obstructive pulmonary disease) (HCC), DDD (degenerative disc disease), Diabetes mellitus (HCC), Hyperlipidemia, Hypertension, Hyperthyroidism, Kidney stone, and Type II or unspecified type diabetes mellitus without mention of complication, not stated as uncontrolled.  Past Surgical History:  has a past surgical history that includes Tubal ligation; Tonsillectomy; Colonoscopy (2014); Upper gastrointestinal endoscopy (2016); tracheostomy (); tracheostomy closure; Gastrostomy tube placement; gastrostomy tube change; Dental surgery (N/A, 2017); Insert Midline Catheter (2021); Colonoscopy (N/A, 2021); Upper gastrointestinal endoscopy (N/A, 2021); Colonoscopy (2021); Thyroid surgery (); Back Injection; and lumbar fusion (N/A, 2024).    Assessment   Assessment: Pt doing well - 1 assist with RW in room and ice packs applied x2 lateral to incision. Pt educated on safety, use of RW and call light. pt would benefit from continued skilled therapy services  Treatment Diagnosis: impaired functional mobility 2* back pain  Specific Instructions for Next Treatment: gait, stairs, HEP  Therapy Prognosis: Good  Decision Making: Low Complexity  Exam: ROM, MMT, bed mobility, transfers, amb, balance  Clinical Presentation: Pt alert, cooperative, pleasant  Barriers to Learning:

## 2024-08-13 NOTE — PROGRESS NOTES
Surgical Progress Note    POD: 1    Patient doing fairly well  Vitals:    24 0625   BP:    Pulse:    Resp: 20   Temp:    SpO2:       Temp (24hrs), Av.6 °F (36.4 °C), Min:97.3 °F (36.3 °C), Max:98.2 °F (36.8 °C)       Pain Control good  No unusual nausea    Exam: doing well   Significant wound drainage  Legs good        Lungs:  No respiratory distress    Labs reviewed:  Labs:                I/O last 3 completed shifts:  In: 1490 [P.O.:440; I.V.:1000; IV Piggyback:50]  Out: 200 [Blood:200]    Assessment:    Patient Active Problem List   Diagnosis    Cervical neck pain with evidence of disc disease    Paroxysmal atrial fibrillation (HCC)    Graves' disease    Asthma with COPD (HCC)    Essential hypertension    Type 2 diabetes mellitus with stage 3a chronic kidney disease, with long-term current use of insulin (HCC)    Iron deficiency anemia    Irritable bowel syndrome with both constipation and diarrhea    Mixed hyperlipidemia    GI bleed    Patient is Religious    Bilateral impacted cerumen    Hearing loss of left ear due to cerumen impaction    Muscle spasm    Physical debility    Recurrent falls    Vaginal itching    Acute kidney injury superimposed on CKD (HCC)    Spinal stenosis of lumbar region with neurogenic claudication       Plan:  See my orders  Likely dc tomorrow  José Miguel Brewster MD MD  2024 7:29 AM

## 2024-08-13 NOTE — PLAN OF CARE
Problem: Discharge Planning  Goal: Discharge to home or other facility with appropriate resources  Outcome: Progressing  Flowsheets (Taken 8/12/2024 2120)  Discharge to home or other facility with appropriate resources:   Identify barriers to discharge with patient and caregiver   Arrange for needed discharge resources and transportation as appropriate   Identify discharge learning needs (meds, wound care, etc)   Refer to discharge planning if patient needs post-hospital services based on physician order or complex needs related to functional status, cognitive ability or social support system     Problem: Pain  Goal: Verbalizes/displays adequate comfort level or baseline comfort level  Outcome: Progressing  Flowsheets (Taken 8/12/2024 1926)  Verbalizes/displays adequate comfort level or baseline comfort level:   Encourage patient to monitor pain and request assistance   Assess pain using appropriate pain scale   Implement non-pharmacological measures as appropriate and evaluate response   Administer analgesics based on type and severity of pain and evaluate response   Notify Licensed Independent Practitioner if interventions unsuccessful or patient reports new pain  Pt reports pain is controlled with medications.      Problem: Skin/Tissue Integrity  Goal: Absence of new skin breakdown  Description: 1.  Monitor for areas of redness and/or skin breakdown  2.  Assess vascular access sites hourly  3.  Every 4-6 hours minimum:  Change oxygen saturation probe site  4.  Every 4-6 hours:  If on nasal continuous positive airway pressure, respiratory therapy assess nares and determine need for appliance change or resting period.  Outcome: Progressing     Problem: Safety - Adult  Goal: Free from fall injury  Outcome: Progressing  Pt uses call light to ask for assistance as needed.      Problem: ABCDS Injury Assessment  Goal: Absence of physical injury  Outcome: Progressing

## 2024-08-14 VITALS
HEART RATE: 74 BPM | TEMPERATURE: 99.5 F | OXYGEN SATURATION: 95 % | BODY MASS INDEX: 28.17 KG/M2 | RESPIRATION RATE: 18 BRPM | WEIGHT: 165 LBS | SYSTOLIC BLOOD PRESSURE: 129 MMHG | DIASTOLIC BLOOD PRESSURE: 54 MMHG | HEIGHT: 64 IN

## 2024-08-14 LAB — GLUCOSE BLD-MCNC: 179 MG/DL (ref 65–105)

## 2024-08-14 PROCEDURE — 94761 N-INVAS EAR/PLS OXIMETRY MLT: CPT

## 2024-08-14 PROCEDURE — G0378 HOSPITAL OBSERVATION PER HR: HCPCS

## 2024-08-14 PROCEDURE — 94640 AIRWAY INHALATION TREATMENT: CPT

## 2024-08-14 PROCEDURE — 6370000000 HC RX 637 (ALT 250 FOR IP): Performed by: ORTHOPAEDIC SURGERY

## 2024-08-14 PROCEDURE — 99024 POSTOP FOLLOW-UP VISIT: CPT | Performed by: ORTHOPAEDIC SURGERY

## 2024-08-14 PROCEDURE — 82947 ASSAY GLUCOSE BLOOD QUANT: CPT

## 2024-08-14 PROCEDURE — 97110 THERAPEUTIC EXERCISES: CPT

## 2024-08-14 PROCEDURE — 2580000003 HC RX 258: Performed by: ORTHOPAEDIC SURGERY

## 2024-08-14 PROCEDURE — 6360000002 HC RX W HCPCS: Performed by: ORTHOPAEDIC SURGERY

## 2024-08-14 PROCEDURE — 97116 GAIT TRAINING THERAPY: CPT

## 2024-08-14 PROCEDURE — 96376 TX/PRO/DX INJ SAME DRUG ADON: CPT

## 2024-08-14 RX ADMIN — MORPHINE SULFATE 4 MG: 4 INJECTION, SOLUTION INTRAMUSCULAR; INTRAVENOUS at 08:09

## 2024-08-14 RX ADMIN — SODIUM CHLORIDE, PRESERVATIVE FREE 10 ML: 5 INJECTION INTRAVENOUS at 08:10

## 2024-08-14 RX ADMIN — CALCIUM 500 MG: 500 TABLET ORAL at 08:08

## 2024-08-14 RX ADMIN — GLIPIZIDE 10 MG: 5 TABLET ORAL at 07:07

## 2024-08-14 RX ADMIN — TRAMADOL HYDROCHLORIDE 50 MG: 50 TABLET ORAL at 07:06

## 2024-08-14 RX ADMIN — LEVOTHYROXINE SODIUM 88 MCG: 0.09 TABLET ORAL at 07:06

## 2024-08-14 RX ADMIN — DILTIAZEM HYDROCHLORIDE 120 MG: 120 CAPSULE, COATED, EXTENDED RELEASE ORAL at 08:08

## 2024-08-14 RX ADMIN — GABAPENTIN 800 MG: 400 CAPSULE ORAL at 08:08

## 2024-08-14 RX ADMIN — HYDROCHLOROTHIAZIDE 25 MG: 25 TABLET ORAL at 08:09

## 2024-08-14 RX ADMIN — POTASSIUM CHLORIDE 20 MEQ: 1500 TABLET, EXTENDED RELEASE ORAL at 08:08

## 2024-08-14 RX ADMIN — LOSARTAN POTASSIUM 25 MG: 25 TABLET, FILM COATED ORAL at 08:08

## 2024-08-14 RX ADMIN — BUDESONIDE AND FORMOTEROL FUMARATE DIHYDRATE 2 PUFF: 80; 4.5 AEROSOL RESPIRATORY (INHALATION) at 07:21

## 2024-08-14 RX ADMIN — INSULIN GLARGINE 35 UNITS: 100 INJECTION, SOLUTION SUBCUTANEOUS at 08:09

## 2024-08-14 RX ADMIN — METOPROLOL TARTRATE 25 MG: 25 TABLET, FILM COATED ORAL at 08:07

## 2024-08-14 RX ADMIN — Medication 1000 UNITS: at 08:08

## 2024-08-14 ASSESSMENT — PAIN SCALES - WONG BAKER
WONGBAKER_NUMERICALRESPONSE: NO HURT

## 2024-08-14 ASSESSMENT — PAIN SCALES - GENERAL
PAINLEVEL_OUTOF10: 9
PAINLEVEL_OUTOF10: 9

## 2024-08-14 ASSESSMENT — PAIN DESCRIPTION - ORIENTATION
ORIENTATION: LOWER
ORIENTATION: LOWER;MID

## 2024-08-14 ASSESSMENT — PAIN DESCRIPTION - DESCRIPTORS
DESCRIPTORS: TEARING
DESCRIPTORS: SORE

## 2024-08-14 ASSESSMENT — PAIN DESCRIPTION - LOCATION
LOCATION: BACK
LOCATION: BACK

## 2024-08-14 NOTE — CARE COORDINATION
ONGOING DISCHARGE PLAN:    Patient is alert and oriented x4.    Spoke with patient regarding discharge plan and patient confirms that plan is still home. Denies needs for VNS.    POD #2 L4-5 PLIF    Walker was delivered to room. Reminded her to take with her at discharge.     Has transport with access to care today.      Will continue to follow for additional discharge needs.    If patient is discharged prior to next notation, then this note serves as note for discharge by case management.    Electronically signed by Joana Ramirez RN on 8/14/2024 at 9:22 AM

## 2024-08-14 NOTE — PLAN OF CARE
Problem: Discharge Planning  Goal: Discharge to home or other facility with appropriate resources  8/14/2024 0133 by Constance Peter RN  Outcome: Progressing  Flowsheets (Taken 8/13/2024 2222)  Discharge to home or other facility with appropriate resources:   Identify barriers to discharge with patient and caregiver   Arrange for needed discharge resources and transportation as appropriate   Identify discharge learning needs (meds, wound care, etc)     Problem: Pain  Goal: Verbalizes/displays adequate comfort level or baseline comfort level  8/14/2024 0133 by Constance Peter RN  Outcome: Progressing     Problem: Chronic Conditions and Co-morbidities  Goal: Patient's chronic conditions and co-morbidity symptoms are monitored and maintained or improved  8/14/2024 0133 by Constance Peter RN  Outcome: Progressing  Flowsheets (Taken 8/13/2024 2222)  Care Plan - Patient's Chronic Conditions and Co-Morbidity Symptoms are Monitored and Maintained or Improved:   Monitor and assess patient's chronic conditions and comorbid symptoms for stability, deterioration, or improvement   Collaborate with multidisciplinary team to address chronic and comorbid conditions and prevent exacerbation or deterioration     Problem: Skin/Tissue Integrity  Goal: Absence of new skin breakdown  Description: 1.  Monitor for areas of redness and/or skin breakdown  2.  Assess vascular access sites hourly  3.  Every 4-6 hours minimum:  Change oxygen saturation probe site  4.  Every 4-6 hours:  If on nasal continuous positive airway pressure, respiratory therapy assess nares and determine need for appliance change or resting period.  8/14/2024 0133 by Constance Peter RN  Outcome: Progressing     Problem: Safety - Adult  Goal: Free from fall injury  8/14/2024 0133 by Constance Peter RN  Outcome: Progressing  Flowsheets (Taken 8/14/2024 0132)  Free From Fall Injury: Instruct family/caregiver on patient safety     Problem: ABCDS Injury Assessment  Goal:

## 2024-08-14 NOTE — PROGRESS NOTES
CLINICAL PHARMACY NOTE: MEDS TO BEDS    Total # of Prescriptions Filled: 1   The following medications were delivered to the patient:  Tramadol 50mg    Additional Documentation: 8/14/24 delivered to pt themself in room put on transport wheelchair pt discharging per Nurse Babs no lock-up 3:07pm nicole

## 2024-08-14 NOTE — PROGRESS NOTES
Physical Therapy  Rehabilitation Physical Therapy    Date: 2024  Patient Name: Dianna Jameson      Room:   MRN: [id number]    : 1955  (69 y.o.)  Gender: female     Referring Practitioner: José Miguel Brewster MD  Diagnosis: spinal stenosis  Additional Pertinent Hx: L4-L5 fusion    Discharge Recommendations:  Patient would benefit from continued therapy after discharge  Equipment Needed: Yes  Mobility Devices: Walker (RW)    Assessment  Assessment  Activity Tolerance: Patient limited by pain;Patient limited by endurance  Discharge Recommendations: Patient would benefit from continued therapy after discharge    Plan  Physical Therapy Plan  General Plan: 2 times a day 7 days a week  Specific Instructions for Next Treatment: gait, stairs, HEP  Current Treatment Recommendations: Strengthening, Balance training, Functional mobility training, Transfer training, Endurance training, Gait training, Stair training, Equipment evaluation, education, & procurement, Patient/Caregiver education & training, Safety education & training, Home exercise program, Positioning, Therapeutic activities, Pain management     Restrictions  Restrictions/Precautions: Fall Risk, Contact Precautions  Implants present? : Metal implants (teeth implants, back sx)  Spinal Precautions: no excessive bending, lifting, twisting for pain control  Other position/activity restrictions: PT eval and treat    Subjective  Subjective  Subjective: Patient resting in bed upon approach, agreeable to PT intervetnion at this time. CLIFTON Talbert approved treatment.  Pain: 9/10 @ incision site     Overall Orientation Status: Within Normal Limits    Vitals  Temp: 99.5 °F (37.5 °C)  Pulse: 74  Heart Rate Source: Monitor  Respirations: 18  SpO2: 95 %  O2 Device: None (Room air)  BP: (!) 129/54  MAP (Calculated): 79  BP Location: Right upper arm  BP Method: Automatic  Patient Position: Right side    Objective  Bed Mobility  Bed Mobility: Yes (bed flat, no use of bed  Techniques: encouraged to use flutter valve  Motor Control/Coordination: cueing for increased step length to normalize gait  Disease-specific Exercises: extensive review of BLT spinal precautions     Education  Education Given To: Patient  Education Provided: Plan of Care;Precautions;Transfer Training;Equipment;Fall Prevention Strategies;Home Exercise Program  Education Method: Demonstration;Verbal  Barriers to Learning: None  Education Outcome: Continued education needed;Verbalized understanding    Goals  Patient Goals   Patient Goals : To go home  Short Term Goals  Time Frame for Short Term Goals: 2-3 days  Short Term Goal 1: Pt to demo IND bed mobility with good log rolling technique for pain control.  Short Term Goal 2: Pt to perform transfers MOD I from varied surfaces.  Short Term Goal 3: Pt to amb 100' with device MOD I.  Short Term Goal 4: pt to ascend/descend 5 stairs 1-2 UE support for strength and endurance.  Short Term Goal 5: Pt to demo good technique for HEP for pain control, strength, and balance.     AM-[facility] Basic Mobility - Inpatient  How much help is needed turning from your back to your side while in a flat bed without using bedrails?: A Little  How much help is needed moving from lying on your back to sitting on the side of a flat bed without using bedrails?: A Little  How much help is needed moving to and from a bed to a chair?: A Little  How much help is needed standing up from a chair using your arms?: A Little  How much help is needed walking in hospital room?: A Little  How much help is needed climbing 3-5 steps with a railing?: A Little  AM-[facility] Inpatient Mobility Raw Score : 18  AM-[facility] Inpatient T-Scale Score : 43.63  Mobility Inpatient CMS 0-100% Score: 46.58  Mobility Inpatient CMS G-Code Modifier : CK     PT Individual Minutes  Time In: 0931  Time Out: 0958  Minutes: 27    Electronically signed by Katt Rodriguez PTA on 8/14/24 at 10:17 AM EDT

## 2024-08-15 ENCOUNTER — ENROLLMENT (OUTPATIENT)
Dept: CARE COORDINATION | Age: 69
End: 2024-08-15

## 2024-08-15 ENCOUNTER — TELEPHONE (OUTPATIENT)
Dept: ORTHOPEDIC SURGERY | Age: 69
End: 2024-08-15

## 2024-08-15 ENCOUNTER — CARE COORDINATION (OUTPATIENT)
Dept: CARE COORDINATION | Age: 69
End: 2024-08-15

## 2024-08-15 DIAGNOSIS — M48.062 SPINAL STENOSIS OF LUMBAR REGION WITH NEUROGENIC CLAUDICATION: Primary | ICD-10-CM

## 2024-08-15 NOTE — DISCHARGE SUMMARY
Discharge Summary    Attending Physician: No att. providers found  Admit Date: 8/12/2024  Discharge Date: 8/14/2024   Primary Care Physician: Delilah Mcgee MD    Admitting Diagnosis:  Principal Problem:    Spinal stenosis of lumbar region with neurogenic claudication  Resolved Problems:    * No resolved hospital problems. *        Discharge Diagnoses:  Principal Problem:    Spinal stenosis of lumbar region with neurogenic claudication  Resolved Problems:    * No resolved hospital problems. *         Past Medical History:   Diagnosis Date    Abdominal pain     left side    Asthma     Atrial fibrillation (HCC)     Bowel obstruction (HCC)     COPD (chronic obstructive pulmonary disease) (HCC)     DDD (degenerative disc disease)     Diabetes mellitus (HCC)     Hyperlipidemia     Hypertension     Hyperthyroidism     Kidney stone     passed on own    Type II or unspecified type diabetes mellitus without mention of complication, not stated as uncontrolled        Procedures Performed and Findings  Procedure(s) with comments:  LUMBAR LAMINECTOMY FUSION POSTERIOR L4-5 - K2M  Evokes     Per Yayo patient is Surgery Admit     Consultations Obtained  IP CONSULT TO INTERNAL MEDICINE    Hospital Course  uncomplicated    Discharge Medications       Medication List        START taking these medications      traMADol 50 MG tablet  Commonly known as: Ultram  Take 1 tablet by mouth every 6 hours as needed for Pain for up to 7 days. Intended supply: 7 days. Take lowest dose possible to manage pain Max Daily Amount: 200 mg            CHANGE how you take these medications      Lantus SoloStar 100 UNIT/ML injection pen  Generic drug: insulin glargine  Inject 45 Units into the skin nightly  What changed:   how much to take  when to take this            CONTINUE taking these medications      * albuterol (2.5 MG/3ML) 0.083% nebulizer solution  Commonly known as: PROVENTIL  Take 3 mLs by nebulization every 6 hours as needed for Wheezing or

## 2024-08-15 NOTE — CARE COORDINATION
Care Transitions Note    Initial Call - Call within 2 business days of discharge: Yes    Patient Current Location:  Home: [address]  [place] [address]    Care Transition Nurse contacted the patient by telephone to perform post hospital discharge assessment, verified name and  as identifiers. Provided introduction to self, and explanation of the Care Transition Nurse role.     Patient: Dianna Jameson    Patient : 1955   MRN: [id number]    Reason for Admission: surgery  Discharge Date: 24  RURS: No data recorded    Last Discharge Facility       Date Complaint Diagnosis Description Type Department Provider    24  Spinal stenosis of lumbar region with neurogenic claudication Admission (Discharged) José Miguel Grossman MD            Was this an external facility discharge? No    Additional needs identified to be addressed with provider   High priority: Patient would like VNS, with an HHA and pt/ot      *[facility] Follow-up    Discharge date: 24    Discharge hospital: [facility]    Diagnosis:  spinal stenosis    Initial appointment date offered:   Reason patient wasn't scheduled: patient does have an appt for diabetic management on  but needs HFU    Patient needs: needs HFU    *Please schedule within 7 days of discharge and contact the patient.               Method of communication with provider: chart routing.    Patients top risk factors for readmission: functional physical ability, level of motivation, medication management, and support system    Interventions to address risk factors:   Review of patient management of conditions/medications:    Communication with providers: sent request to surgeon office    Care Summary Note: Writer spoke to patient, she is doing ok, having some pain but it is manageable, reviewed discharge instructions and medications, 1111F order completed, patient has called surgeon office requesting some VNS, an HHA and therapy, writer contacted 
Yes

## 2024-08-15 NOTE — TELEPHONE ENCOUNTER
Patient would like assistance with getting home health care provided for herself.    Surgery:  8/12/24 Spinal Stenosis of Lumbar Region with Neurogenic Claudication.    She stated that she needs help with changing the bandages.

## 2024-08-15 NOTE — TELEPHONE ENCOUNTER
Dr. Brewster,    Patient is requesting a home health referral. She would like a nurse/aid to come out to assist her with bathing and activities of daily life. She also wants the home health referral to include physical therapy if needed.     Please advise on referral. Patient is status post L4-5 PLIF 8/12/24

## 2024-08-16 ENCOUNTER — CARE COORDINATION (OUTPATIENT)
Dept: CARE COORDINATION | Age: 69
End: 2024-08-16

## 2024-08-16 NOTE — CARE COORDINATION
Care Transitions Note    Follow Up Call     Patient Current Location:  Home: [address]  [place] [address]    Care Transition Nurse contacted the patient by telephone. Verified name and  as identifiers.    Additional needs identified to be addressed with provider   Patient would like some vns, pt and HHA                 Method of communication with provider: chart routing.    Care Summary Note: Writer spoke to patient, she still hasn't heard anything in regards to VNS from the surgeons office, writer explained that maybe Dr. Brewster will want to see her before he orders any vns or therapy, she was going to f/u with office again, no c/o fever, chills, n/v/d sob, cp or s/s/ of infection at time of call, will follow//JU    Plan of care updates since last contact:  Review of patient management of conditions/medications:    Referrals: sent to office, request for VNS        Advance Care Planning:   Does patient have an Advance Directive: reviewed during previous call, see note. .    Medication Review:  No changes since last call.     Remote Patient Monitoring:  Offered patient enrollment in the Remote Patient Monitoring (RPM) program for in-home monitoring: Patient is not eligible for RPM program because: not interested .    Assessments:  No changes since last call    Follow Up Appointment:   Reviewed upcoming appointment(s).  Future Appointments         Provider Specialty Dept Phone    2024 10:00 AM José Miguel Brewster MD Orthopedic Surgery [phone]    2025 2:30 PM Yeni Ramirez APRN - CNP Cardiology [phone]    3/13/2025 11:00 AM [facility] PF, AWV NURSE SCHEDULE Family Medicine [phone]                Handoff:       Patient has agreed to contact primary care provider and/or specialist for any further questions, concerns, or needs.    Katie White, RN

## 2024-08-17 LAB — GLUCOSE BLD-MCNC: 248 MG/DL (ref 65–105)

## 2024-08-20 ENCOUNTER — CARE COORDINATION (OUTPATIENT)
Dept: CARE COORDINATION | Age: 69
End: 2024-08-20

## 2024-08-20 NOTE — CARE COORDINATION
Care Transitions Note    Follow Up Call     Patient Current Location:  Home: [address]  [place] [address]    Care Transition Nurse contacted the patient by telephone. Verified name and  as identifiers.    Additional needs identified to be addressed with provider                     Method of communication with provider:    .    Care Summary Note: Writer spoke to patient, she stated she is doing a little better, has been up moving around, stated she washed her dishes today, denied any c/o fever, chills, n/v/d sob or chest pain, no s/s/ of infection, reviewed up coming appointments, is to see surgeon on Friday, will continue to follow//JU    Plan of care updates since last contact:  Review of patient management of conditions/medications:         Advance Care Planning:   Does patient have an Advance Directive: reviewed during previous call, see note. .    Medication Review:  No changes since last call.     Remote Patient Monitoring:  Offered patient enrollment in the Remote Patient Monitoring (RPM) program for in-home monitoring: .    Assessments:   Goals Addressed    None          Follow Up Appointment:   Reviewed upcoming appointment(s).  Future Appointments         Provider Specialty Dept Phone    2024 10:00 AM José Miguel Brewster MD Orthopedic Surgery [phone]    2025 2:30 PM Yeni Ramirez APRN - CNP Cardiology [phone]    3/13/2025 11:00 AM LISA CYR NURSE SCHEDULE Family Medicine [phone]            Plan for next call:         Katie White RN

## 2024-08-23 ENCOUNTER — HOSPITAL ENCOUNTER (OUTPATIENT)
Dept: GENERAL RADIOLOGY | Age: 69
End: 2024-08-23
Payer: MEDICARE

## 2024-08-23 ENCOUNTER — OFFICE VISIT (OUTPATIENT)
Dept: ORTHOPEDIC SURGERY | Age: 69
End: 2024-08-23
Payer: MEDICARE

## 2024-08-23 VITALS
SYSTOLIC BLOOD PRESSURE: 136 MMHG | WEIGHT: 165 LBS | HEIGHT: 64 IN | BODY MASS INDEX: 28.17 KG/M2 | DIASTOLIC BLOOD PRESSURE: 84 MMHG

## 2024-08-23 DIAGNOSIS — M54.41 CHRONIC MIDLINE LOW BACK PAIN WITH RIGHT-SIDED SCIATICA: Primary | ICD-10-CM

## 2024-08-23 DIAGNOSIS — Z98.1 HISTORY OF LUMBAR FUSION: ICD-10-CM

## 2024-08-23 DIAGNOSIS — M48.061 SPINAL STENOSIS OF LUMBAR REGION, UNSPECIFIED WHETHER NEUROGENIC CLAUDICATION PRESENT: ICD-10-CM

## 2024-08-23 DIAGNOSIS — G89.29 CHRONIC MIDLINE LOW BACK PAIN WITH RIGHT-SIDED SCIATICA: Primary | ICD-10-CM

## 2024-08-23 PROCEDURE — 99214 OFFICE O/P EST MOD 30 MIN: CPT | Performed by: ORTHOPAEDIC SURGERY

## 2024-08-23 PROCEDURE — 99024 POSTOP FOLLOW-UP VISIT: CPT | Performed by: ORTHOPAEDIC SURGERY

## 2024-08-23 PROCEDURE — 72100 X-RAY EXAM L-S SPINE 2/3 VWS: CPT

## 2024-08-23 NOTE — PROGRESS NOTES
Physical Exam:  Vitals signs and nursing note reviewed.   Constitutional:       Appearance: well-developed.   HENT:      Head: Normocephalic and atraumatic.      Nose: Nose normal.   Eyes:      Conjunctiva/sclera: Conjunctivae normal.   Neck:      Musculoskeletal: Normal range of motion and neck supple.   Pulmonary:      Effort: Pulmonary effort is normal. No respiratory distress.   Musculoskeletal:      Comments: Normal gait     Skin:     General: Skin is warm and dry.   Neurological:      Mental Status: Alert and oriented to person, place, and time.      Sensory: No sensory deficit.   Psychiatric:         Behavior: Behavior normal.         Thought Content: Thought content normal.        Please note that this chart was generated using voice recognition Dragon dictation software.  Although every effort was made to ensure the accuracy of this automated transcription, some errors in transcription may have occurred.

## 2024-08-27 ENCOUNTER — CARE COORDINATION (OUTPATIENT)
Dept: CARE COORDINATION | Age: 69
End: 2024-08-27

## 2024-08-27 NOTE — CARE COORDINATION
Care Transitions Note    Follow Up Call     Patient Current Location:  Home: [address]  [place] [address]    [facility] Care Coordinator contacted the patient by telephone. Verified name and  as identifiers.    Additional needs identified to be addressed with provider   No needs identified                 Method of communication with provider: none.    Care Summary Note: Writer spoke to Dianna she reports that she is doing okay she does have a friend to come over to look over incision she reports no redness drainage or warmth to incision denies fever or chill. She is able to shower no issues. She denies need for Home care at  this time. Denies HA dizziness sob cp N/VD bowels are moving.   She had post op appointment stated Dr. Brewster said incision is looking good. She reports neve sensation writer informed her that she will sometimes feel sensation because of surgery. She stated that her surgeon told her the same thing. She does not have loss of B/B numbness or weakness. X-ray done hardware in place.    She stated she did have a appointment with pcp no medication changes. She had diabetic appointment has Dexcon in place .  She voiced no other needs or concerns at this time.         Plan of care updates since last contact:  Doing well had post op pcp diabetic ed        Advance Care Planning:   Does patient have an Advance Directive: reviewed during previous call, see note. .    Medication Review:  No changes since last call.     Remote Patient Monitoring:  Offered patient enrollment in the Remote Patient Monitoring (RPM) program for in-home monitoring: Patient is not eligible for RPM program because: affiliate provider.    Assessments:  Care Transitions ED Follow Up    Care Transitions Interventions  Do you have any ongoing symptoms?: No   Do you have all of your prescriptions and are they filled?: Yes   Were you discharged with any Home Care or Post Acute Services or do you currently have any

## 2024-08-29 NOTE — TELEPHONE ENCOUNTER
Per consultation with Dr. Brewster, he discussed with patient that order for home health unwarranted at 8/23/24 office visit.

## 2024-09-20 ENCOUNTER — OFFICE VISIT (OUTPATIENT)
Dept: ORTHOPEDIC SURGERY | Age: 69
End: 2024-09-20
Payer: MEDICARE

## 2024-09-20 VITALS
HEART RATE: 62 BPM | WEIGHT: 165 LBS | BODY MASS INDEX: 28.17 KG/M2 | SYSTOLIC BLOOD PRESSURE: 144 MMHG | HEIGHT: 64 IN | DIASTOLIC BLOOD PRESSURE: 71 MMHG

## 2024-09-20 DIAGNOSIS — Z98.1 HISTORY OF LUMBAR FUSION: Primary | ICD-10-CM

## 2024-09-20 PROCEDURE — 99214 OFFICE O/P EST MOD 30 MIN: CPT | Performed by: ORTHOPAEDIC SURGERY

## 2024-09-26 ENCOUNTER — HOSPITAL ENCOUNTER (OUTPATIENT)
Dept: PHYSICAL THERAPY | Age: 69
Setting detail: THERAPIES SERIES
Discharge: HOME | End: 2024-09-26
Attending: ORTHOPAEDIC SURGERY
Payer: MEDICARE

## 2024-09-26 PROCEDURE — 97161 PT EVAL LOW COMPLEX 20 MIN: CPT | Performed by: PHYSICAL THERAPIST

## 2024-09-26 ASSESSMENT — PAIN SCALES - GENERAL: PAINLEVEL_OUTOF10: 0

## 2024-09-26 ASSESSMENT — PAIN DESCRIPTION - DESCRIPTORS: DESCRIPTORS: ACHING;SHOOTING

## 2024-09-26 ASSESSMENT — PAIN DESCRIPTION - PAIN TYPE: TYPE: SURGICAL PAIN;CHRONIC PAIN

## 2024-09-26 ASSESSMENT — PAIN DESCRIPTION - LOCATION: LOCATION: KNEE;LEG

## 2024-09-26 ASSESSMENT — PAIN DESCRIPTION - ORIENTATION: ORIENTATION: RIGHT;LEFT

## 2024-09-30 ENCOUNTER — HOSPITAL ENCOUNTER (OUTPATIENT)
Dept: PHYSICAL THERAPY | Age: 69
Setting detail: THERAPIES SERIES
Discharge: HOME | End: 2024-09-30
Attending: ORTHOPAEDIC SURGERY
Payer: MEDICARE

## 2024-09-30 PROCEDURE — 97110 THERAPEUTIC EXERCISES: CPT

## 2024-09-30 NOTE — FLOWSHEET NOTE
Physical Therapy Daily Treatment Note    Date:  2024    Patient Name:  Dianna Jameson    :  1955  MRN: [id number]  Restrictions/Precautions:     Medical/Treatment Diagnosis Information:   Diagnosis: Z98.1 S/P L4-5 instrumented fusion 24  Treatment Diagnosis: Z98.1 S/P L4-5 fusion 24  Insurance/Certification information:  PT Insurance Information: Aetna Medicare  Physician Information:   Nye  Plan of care signed (Y/N):  y  Visit# / total visits: 2/10  Pain level:   5/10 when walking       Time In: 1:40   Time Out: 2:18    Progress Note: []  Yes  [x]  No  Next due by: Visit #10  , or 10/25/24    Subjective: Pt reports doing well. Felt like recovered well from surgery. Mostly feels like has LE weakness. Gets tingling sensation in shin area still.     Objective: CHRISTOS performed per flow sheet for increased mobility, stability, and strengthening for improvements I ambulation and completion of ADLS. Verbal cueing for progression of exercises. Counter HEP given this date.     Observation:   Test measurements:      Exercises:   Exercise/Equipment Resistance/Repetitions Other comments   NUSTEP 6'    Counter ex 10x    Squat matrix 10x    Lunges 10x F   STS 10x  No UEs   1/2 kneel & up  Week 2   Walk up/down ramp     Reciprocal stairs 3x    100 ft carry 5#   completed                               [x] Provided verbal/tactile cueing for activities related to strengthening, flexibility, endurance, ROM. (02539)  [] Provided verbal/tactile cueing for activities related to improving balance, coordination, kinesthetic sense, posture, motor skill, proprioception. (50518)    Therapeutic Activities:     [] Therapeutic activities, direct (one-on-one) patient contact (use of dynamic activities to improve functional performance). (69949)    Gait:   [] Provided training and instruction to the patient for ambulation re-education. (48178)    Self-Care/ADL's  [] Self-care/home management training and compensatory

## 2024-10-02 ENCOUNTER — HOSPITAL ENCOUNTER (OUTPATIENT)
Dept: PHYSICAL THERAPY | Age: 69
Setting detail: THERAPIES SERIES
Discharge: HOME | End: 2024-10-02
Attending: ORTHOPAEDIC SURGERY

## 2024-10-02 NOTE — PROGRESS NOTES
Physical Therapy  Outpatient Physical Therapy    [] [place]  Phone: [phone]  Fax: [phone]      [] [place]  Phone: [phone]  Fax: [phone]    Physical Therapy  Cancellation/No-show Note  Patient Name:  Dianna Jameson  :  1955   Date:  10/2/2024  Cancelled visits to date: 1  No-shows to date: 0    For today's appointment patient:  [x]  Cancelled  []  Rescheduled appointment  []  No-show     Reason given by patient:  []  Patient ill  []  Conflicting appointment  []  No transportation    []  Conflict with work  []  No reason given  []  Other:     Comments:  left message to cancel, no reason    Electronically signed by: Juhi May PTA

## 2024-10-07 ENCOUNTER — HOSPITAL ENCOUNTER (OUTPATIENT)
Dept: PHYSICAL THERAPY | Age: 69
Setting detail: THERAPIES SERIES
Discharge: HOME | End: 2024-10-07
Attending: ORTHOPAEDIC SURGERY
Payer: MEDICARE

## 2024-10-07 PROCEDURE — 97110 THERAPEUTIC EXERCISES: CPT

## 2024-10-07 NOTE — FLOWSHEET NOTE
Physical Therapy Daily Treatment Note    Date:  10/7/2024    Patient Name:  Dianna Jameson    :  1955  MRN: [id number]  Restrictions/Precautions:     Medical/Treatment Diagnosis Information:   Diagnosis: Z98.1 S/P L4-5 instrumented fusion 24  Treatment Diagnosis: Z98.1 S/P L4-5 fusion 24  Insurance/Certification information:  PT Insurance Information: Aetna Medicare  Physician Information:   Ney  Plan of care signed (Y/N):  y  Visit# / total visits: 3/10  Pain level:   5-6/10 when walking       Time In: 12:28   Time Out: 12:53    Progress Note: []  Yes  [x]  No  Next due by: Visit #10  , or 10/25/24    Subjective: Pt reports doing well. LE's are feeling weak and shaky. Still having tingling sensation in shin area. Is completing HEP every other day.     Objective: CHRISTOS performed per flow sheet for increased mobility, stability, and strengthening for improvements in ambulation and completion of ADLS. Verbal cueing for proper technique, progression of exercises. After completing STS and lunges, pt reports significant sharp left knee pain. After taking multiple rest breaks pt states knee pain is worsening, therefore held additional progressions. Provided ongoing education at beginning of session on importance of completing HEP daily. Pt voices understanding.     Observation:   Test measurements:      Exercises:   Exercise/Equipment Resistance/Repetitions Other comments   NUSTEP 6'    Counter ex 10x    Squat matrix 10x 2 position  Narrow, shld width    Lunges 5x ea  F   Reduced reps due to L knee pain    STS 10x  No UEs   1/2 kneel & up  Week 2   Walk up/down ramp     Reciprocal stairs 3x    100 ft carry 5#   completed                               [x] Provided verbal/tactile cueing for activities related to strengthening, flexibility, endurance, ROM. (90549)  [] Provided verbal/tactile cueing for activities related to improving balance, coordination, kinesthetic sense, posture, motor skill,

## 2024-10-08 NOTE — PROGRESS NOTES
I have reviewed and agree to the content of the note written by the PTA.  Electronically signed by Mohinder Barrow PT [id number]

## 2024-10-09 ENCOUNTER — HOSPITAL ENCOUNTER (OUTPATIENT)
Dept: PHYSICAL THERAPY | Age: 69
Setting detail: THERAPIES SERIES
Discharge: HOME | End: 2024-10-09
Attending: ORTHOPAEDIC SURGERY
Payer: MEDICARE

## 2024-10-09 NOTE — PROGRESS NOTES
Physical Therapy  Outpatient Physical Therapy    [x] [place]  Phone: [phone]  Fax: [phone]      [] [place]  Phone: [phone]  Fax: [phone]    Physical Therapy  Cancellation/No-show Note  Patient Name:  Dianna Jameson  :  1955   Date:  10/9/2024  Cancelled visits to date: 2  No-shows to date: 0    For today's appointment patient:  [x]  Cancelled  []  Rescheduled appointment  []  No-show     Reason given by patient:  []  Patient ill  []  Conflicting appointment  []  No transportation    []  Conflict with work  []  No reason given  [x]  Other:     Comments:  too much pain     Electronically signed by: EVANGELISTA WEBER PTA

## 2024-10-16 ENCOUNTER — HOSPITAL ENCOUNTER (OUTPATIENT)
Dept: PHYSICAL THERAPY | Age: 69
Setting detail: THERAPIES SERIES
Discharge: HOME | End: 2024-10-16
Attending: ORTHOPAEDIC SURGERY
Payer: MEDICARE

## 2024-10-16 PROCEDURE — 97110 THERAPEUTIC EXERCISES: CPT

## 2024-10-16 NOTE — FLOWSHEET NOTE
becomes limiting      Plan:   [x] Continue per plan of care [] Alter current plan (see comments)  [] Plan of care initiated [] Hold pending MD visit [] Discharge    Plan for Next Session:      Electronically signed by:  Juhi May PTA

## 2024-10-17 NOTE — PROGRESS NOTES
I have reviewed and agree to the content of the note written by the PTA.  Electronically signed by Mohinder Barrow PT [id number]

## 2024-10-21 ENCOUNTER — HOSPITAL ENCOUNTER (OUTPATIENT)
Dept: PHYSICAL THERAPY | Age: 69
Setting detail: THERAPIES SERIES
Discharge: HOME | End: 2024-10-21
Attending: ORTHOPAEDIC SURGERY
Payer: MEDICARE

## 2024-10-21 PROCEDURE — 97110 THERAPEUTIC EXERCISES: CPT

## 2024-10-21 NOTE — FLOWSHEET NOTE
Physical Therapy Daily Treatment Note    Date:  10/21/2024    Patient Name:  Dianna Jameson    :  1955  MRN: [id number]  Restrictions/Precautions:     Medical/Treatment Diagnosis Information:   Diagnosis: Z98.1 S/P L4-5 instrumented fusion 24  Treatment Diagnosis: Z98.1 S/P L4-5 fusion 24  Insurance/Certification information:  PT Insurance Information: Aetna Medicare  Physician Information:   Ney  Plan of care signed (Y/N):  y  Visit# / total visits: 5/10  Pain level:   5-6/10 when walking       Time In: 12:45   Time Out: 1:15    Progress Note: []  Yes  [x]  No  Next due by: Visit #10  , or 10/25/24    Subjective:  Pt reports not doing too bad thi date. States usually has most pain  in colder weather. Les have not bee too bad over the weekend and this date. Set to see Dr Brewster for new knee issues 24.    Objective: CHRISTOS performed per flow sheet for increased mobility, stability, and strengthening for improvements in ambulation and completion of ADLS. Verbal cueing for proper technique, progression of exercises.  Limited by pain this date.       Observation:   Test measurements:     Exercises:   Exercise/Equipment Resistance/Repetitions Other comments   NUSTEP 6'    Counter ex 15x    Squat matrix 10x 2 position  Narrow, shld width    Lunges 10x ea  F    STS 10x  No UEs   1/2 kneel & up NEXT Week 2   Walk up/down ramp 2x    Reciprocal stairs 3x    100 ft carry 5#   completed           Slant board 2x30\"                   [x] Provided verbal/tactile cueing for activities related to strengthening, flexibility, endurance, ROM. (63944)  [] Provided verbal/tactile cueing for activities related to improving balance, coordination, kinesthetic sense, posture, motor skill, proprioception. (79318)    Therapeutic Activities:     [] Therapeutic activities, direct (one-on-one) patient contact (use of dynamic activities to improve functional performance). (85397)    Gait:   [] Provided training and instruction

## 2024-10-23 ENCOUNTER — HOSPITAL ENCOUNTER (OUTPATIENT)
Dept: PHYSICAL THERAPY | Age: 69
Setting detail: THERAPIES SERIES
Discharge: HOME | End: 2024-10-23
Attending: ORTHOPAEDIC SURGERY
Payer: MEDICARE

## 2024-10-23 NOTE — PROGRESS NOTES
Physical Therapy  Outpatient Physical Therapy    [x] [place]  Phone: [phone]  Fax: [phone]      [] [place]  Phone: [phone]  Fax: [phone]    Physical Therapy  Cancellation/No-show Note  Patient Name:  Dianna Jameson  :  1955   Date:  10/23/2024  Cancelled visits to date: 3  No-shows to date: 1    For today's appointment patient:  [x]  Cancelled  []  Rescheduled appointment  []  No-show     Reason given by patient:  []  Patient ill  []  Conflicting appointment  []  No transportation    []  Conflict with work  []  No reason given  [x]  Other:     Comments:  legs hurt too bad, not going to make it    Electronically signed by: Juhi May, PTA

## 2024-11-01 ENCOUNTER — OFFICE VISIT (OUTPATIENT)
Dept: ORTHOPEDIC SURGERY | Age: 69
End: 2024-11-01
Payer: MEDICARE

## 2024-11-01 VITALS
BODY MASS INDEX: 28.17 KG/M2 | WEIGHT: 165 LBS | HEIGHT: 64 IN | DIASTOLIC BLOOD PRESSURE: 76 MMHG | SYSTOLIC BLOOD PRESSURE: 128 MMHG

## 2024-11-01 DIAGNOSIS — Z98.1 HISTORY OF LUMBAR FUSION: ICD-10-CM

## 2024-11-01 DIAGNOSIS — M79.661 BILATERAL CALF PAIN: Primary | ICD-10-CM

## 2024-11-01 DIAGNOSIS — M79.662 BILATERAL CALF PAIN: Primary | ICD-10-CM

## 2024-11-01 DIAGNOSIS — I73.9 PAD (PERIPHERAL ARTERY DISEASE) (HCC): ICD-10-CM

## 2024-11-01 PROCEDURE — 99213 OFFICE O/P EST LOW 20 MIN: CPT | Performed by: ORTHOPAEDIC SURGERY

## 2024-11-01 PROCEDURE — 99024 POSTOP FOLLOW-UP VISIT: CPT | Performed by: ORTHOPAEDIC SURGERY

## 2024-11-01 NOTE — PROGRESS NOTES
Patient ID: Dianna Jameson is a 69 y.o. female    Chief Compliant:  Chief Complaint   Patient presents with    Knee Pain     Fracisco knee pain          Diagnostic imaging:        Assessment and Plan:  1. Bilateral calf pain    2. PAD (peripheral artery disease) (HCC)    3. History of lumbar fusion            Follow up post vascular studies    HPI:  This is a 69 y.o. female who presents to the clinic today for unusual complaint of pain below the knees bilaterally worse with activity reports her calves get tight with progressive ambulation new since surgery    Patient's description is little bit unusual actually she indicates with her finger Citic tenderness starts lateral to the knee and works its way down the anterior aspect of the shin but is also in her calfs.     Patient without current smoking but did it a one-time have a long smoking history    Review of Systems   All other systems reviewed and are negative.      Past History:    Current Outpatient Medications:     CARTIA  MG extended release capsule, , Disp: , Rfl:     glimepiride (AMARYL) 4 MG tablet, Take 1 tablet by mouth 2 times daily, Disp: , Rfl:     Evolocumab (REPATHA SURECLICK) 140 MG/ML SOAJ, Inject 140 mg into the skin every 14 days, Disp: 6 Adjustable Dose Pre-filled Pen Syringe, Rfl: 3    aspirin 81 MG EC tablet, Take 1 tablet by mouth daily, Disp: , Rfl:     dilTIAZem (CARDIZEM 12 HR) 120 MG extended release capsule, Take 1 capsule by mouth daily, Disp: , Rfl:     gabapentin (NEURONTIN) 800 MG tablet, Take 1 tablet by mouth 2 times daily., Disp: , Rfl:     linaclotide (LINZESS) 145 MCG capsule, Take 1 capsule by mouth every morning (before breakfast), Disp: , Rfl:     levothyroxine (SYNTHROID) 88 MCG tablet, Take 1 tab by mouth daily, except one-half tab on Mon, Disp: 30 tablet, Rfl: 0    blood glucose monitor strips, Use to check blood glucose twice daily., Disp: 200 strip, Rfl: 3    metoprolol tartrate (LOPRESSOR) 25 MG tablet, Take 1 tablet

## 2024-11-13 ENCOUNTER — HOSPITAL ENCOUNTER (OUTPATIENT)
Dept: INTERVENTIONAL RADIOLOGY/VASCULAR | Age: 69
Discharge: HOME | End: 2024-11-15
Attending: ORTHOPAEDIC SURGERY
Payer: MEDICARE

## 2024-11-13 DIAGNOSIS — I73.9 PAD (PERIPHERAL ARTERY DISEASE) (HCC): ICD-10-CM

## 2024-11-13 DIAGNOSIS — M79.662 BILATERAL CALF PAIN: ICD-10-CM

## 2024-11-13 DIAGNOSIS — M79.661 BILATERAL CALF PAIN: ICD-10-CM

## 2024-11-13 DIAGNOSIS — Z98.1 HISTORY OF LUMBAR FUSION: ICD-10-CM

## 2024-11-13 LAB
VAS LEFT ABI: 0.66 RATIO
VAS LEFT ARM BP: 128 MMHG
VAS LEFT DORSALIS PEDIS BP: 87 MMHG
VAS LEFT PTA BP: 92 MMHG
VAS LEFT TBI: 0.73 RATIO
VAS LEFT TOE PRESSURE: 102 MMHG
VAS RIGHT ARM BP: 140 MMHG
VAS RIGHT TBI: 0.47 RATIO
VAS RIGHT TOE PRESSURE: 66 MMHG

## 2024-11-13 PROCEDURE — 93922 UPR/L XTREMITY ART 2 LEVELS: CPT

## 2024-11-13 PROCEDURE — 93922 UPR/L XTREMITY ART 2 LEVELS: CPT | Performed by: SURGERY

## 2024-11-13 PROCEDURE — 93970 EXTREMITY STUDY: CPT

## 2024-11-15 ENCOUNTER — OFFICE VISIT (OUTPATIENT)
Dept: ORTHOPEDIC SURGERY | Age: 69
End: 2024-11-15
Payer: MEDICARE

## 2024-11-15 VITALS
HEIGHT: 64 IN | WEIGHT: 165 LBS | SYSTOLIC BLOOD PRESSURE: 132 MMHG | HEART RATE: 104 BPM | DIASTOLIC BLOOD PRESSURE: 69 MMHG | BODY MASS INDEX: 28.17 KG/M2

## 2024-11-15 DIAGNOSIS — I73.9 PAD (PERIPHERAL ARTERY DISEASE) (HCC): ICD-10-CM

## 2024-11-15 DIAGNOSIS — M79.661 BILATERAL CALF PAIN: Primary | ICD-10-CM

## 2024-11-15 DIAGNOSIS — G89.29 CHRONIC MIDLINE LOW BACK PAIN WITH RIGHT-SIDED SCIATICA: ICD-10-CM

## 2024-11-15 DIAGNOSIS — M54.41 CHRONIC MIDLINE LOW BACK PAIN WITH RIGHT-SIDED SCIATICA: ICD-10-CM

## 2024-11-15 DIAGNOSIS — M79.662 BILATERAL CALF PAIN: Primary | ICD-10-CM

## 2024-11-15 PROCEDURE — 99214 OFFICE O/P EST MOD 30 MIN: CPT | Performed by: ORTHOPAEDIC SURGERY

## 2024-11-15 NOTE — PROGRESS NOTES
Patient ID: Dianna Jameson is a 69 y.o. female    Chief Compliant:  Chief Complaint   Patient presents with    Results     Review vascular studies        Diagnostic imaging:    Duplex ultrasound negative    Noninvasive vascular studies positive for peripheral arterial disease    Assessment and Plan:  1. Bilateral calf pain    2. PAD (peripheral artery disease) (HCC)    3. Chronic midline low back pain with right-sided sciatica        I believe the patient's biggest complaint right now is of her peripheral arterial disease with pain below her knees    Follow up prn    HPI:  This is a 69 y.o. female who presents to the clinic today for please refer to previous clinic note.    Patient is here for follow-up noninvasive vascular studies    Noninvasive vascular studies negative for DVT positive for significant peripheral arterial disease.         Review of Systems   All other systems reviewed and are negative.      Past History:    Current Outpatient Medications:     CARTIA  MG extended release capsule, , Disp: , Rfl:     glimepiride (AMARYL) 4 MG tablet, Take 1 tablet by mouth 2 times daily, Disp: , Rfl:     Evolocumab (REPATHA SURECLICK) 140 MG/ML SOAJ, Inject 140 mg into the skin every 14 days, Disp: 6 Adjustable Dose Pre-filled Pen Syringe, Rfl: 3    aspirin 81 MG EC tablet, Take 1 tablet by mouth daily, Disp: , Rfl:     dilTIAZem (CARDIZEM 12 HR) 120 MG extended release capsule, Take 1 capsule by mouth daily, Disp: , Rfl:     gabapentin (NEURONTIN) 800 MG tablet, Take 1 tablet by mouth 2 times daily., Disp: , Rfl:     linaclotide (LINZESS) 145 MCG capsule, Take 1 capsule by mouth every morning (before breakfast), Disp: , Rfl:     levothyroxine (SYNTHROID) 88 MCG tablet, Take 1 tab by mouth daily, except one-half tab on Mon, Disp: 30 tablet, Rfl: 0    blood glucose monitor strips, Use to check blood glucose twice daily., Disp: 200 strip, Rfl: 3    metoprolol tartrate (LOPRESSOR) 25 MG tablet, Take 1 tablet by

## 2025-01-23 ENCOUNTER — OFFICE VISIT (OUTPATIENT)
Dept: VASCULAR SURGERY | Age: 70
End: 2025-01-23
Payer: MEDICARE

## 2025-01-23 VITALS
BODY MASS INDEX: 29.43 KG/M2 | HEIGHT: 64 IN | HEART RATE: 60 BPM | OXYGEN SATURATION: 95 % | SYSTOLIC BLOOD PRESSURE: 150 MMHG | DIASTOLIC BLOOD PRESSURE: 88 MMHG | RESPIRATION RATE: 18 BRPM | WEIGHT: 172.4 LBS

## 2025-01-23 DIAGNOSIS — I70.213 ATHEROSCLEROSIS OF NATIVE ARTERIES OF EXTREMITIES WITH INTERMITTENT CLAUDICATION, BILATERAL LEGS (HCC): Primary | ICD-10-CM

## 2025-01-23 PROCEDURE — 3080F DIAST BP >= 90 MM HG: CPT | Performed by: SURGERY

## 2025-01-23 PROCEDURE — 99203 OFFICE O/P NEW LOW 30 MIN: CPT | Performed by: SURGERY

## 2025-01-23 PROCEDURE — 1123F ACP DISCUSS/DSCN MKR DOCD: CPT | Performed by: SURGERY

## 2025-01-23 PROCEDURE — 1159F MED LIST DOCD IN RCRD: CPT | Performed by: SURGERY

## 2025-01-23 PROCEDURE — 3077F SYST BP >= 140 MM HG: CPT | Performed by: SURGERY

## 2025-01-23 PROCEDURE — 99214 OFFICE O/P EST MOD 30 MIN: CPT | Performed by: SURGERY

## 2025-01-23 RX ORDER — POLYETHYLENE GLYCOL 3350, SODIUM SULFATE ANHYDROUS, SODIUM BICARBONATE, SODIUM CHLORIDE, POTASSIUM CHLORIDE 236; 22.74; 6.74; 5.86; 2.97 G/4L; G/4L; G/4L; G/4L; G/4L
POWDER, FOR SOLUTION ORAL
COMMUNITY
Start: 2025-01-09

## 2025-01-23 RX ORDER — PANTOPRAZOLE SODIUM 20 MG/1
20 TABLET, DELAYED RELEASE ORAL DAILY
COMMUNITY
Start: 2025-01-10

## 2025-01-23 RX ORDER — BISACODYL 5 MG
TABLET, DELAYED RELEASE (ENTERIC COATED) ORAL
COMMUNITY
Start: 2024-12-16

## 2025-01-23 RX ORDER — DAPAGLIFLOZIN 10 MG/1
10 TABLET, FILM COATED ORAL EVERY MORNING
COMMUNITY
Start: 2024-11-19

## 2025-01-23 NOTE — PROGRESS NOTES
[facility] VASCULAR SURGERY CLINIC  [facility] CARE, [facility]  [facility] VASCULAR SURG A DEPARTMENT OF [facility]  [address] [facility] [address]    Dianna Jameson  1955  69 y.o.female       Chief Complaint:  Chief Complaint   Patient presents with    Peripheral Artery Disease     New Patient- Referred Dr. Brewster       History of present Illness:  This is a 69 y.o.female with complaints of worsening lower extremity claudication.  She does have a history of diabetes but has no ulcers or wounds.  She had a recent ACE study from a few months ago showing the left ACE to be 0.66.  Right ACE was not obtained due to to patient movement.    Her left leg is more symptomatic.  She said that both of her calves cramp to the point that she cannot even push the shopping cart.  She has to take a motorized scooter when going to a big store like [employer].  We discussed both exercise diet and how this can improve her walking distance.  She is unable to exercise at this point because the pain and I am recommending a left lower extremity arteriogram with intervention.    Past Medical History:  has a past medical history of Abdominal pain, Asthma, Atrial fibrillation (HCC), Bowel obstruction (HCC), COPD (chronic obstructive pulmonary disease) (HCC), DDD (degenerative disc disease), Diabetes mellitus (HCC), Hyperlipidemia, Hypertension, Hyperthyroidism, Kidney stone, and Type II or unspecified type diabetes mellitus without mention of complication, not stated as uncontrolled.    Past Surgical History:   Past Surgical History:   Procedure Laterality Date    BACK INJECTION      c4 c5    COLONOSCOPY  04/23/2014    COLONOSCOPY N/A 01/26/2021    COLONOSCOPY WITH BIOPSY performed by Darvin Cooper MD at [facility]    COLONOSCOPY  01/26/2021    COLONOSCOPY POLYPECTOMY SNARE/COLD BIOPSY performed by Darvin Cooper MD at [facility]    DENTAL SURGERY N/A

## 2025-01-23 NOTE — PATIENT INSTRUCTIONS
You will scheduled for a leg angiogram at [facility] in [place]  The [place] office will call you with a date and time   [phone]

## 2025-01-24 ENCOUNTER — TELEPHONE (OUTPATIENT)
Dept: VASCULAR SURGERY | Age: 70
End: 2025-01-24

## 2025-01-24 NOTE — TELEPHONE ENCOUNTER
----- Message from TERRI WHELAN MA sent at 1/24/2025  2:17 PM EST -----  Regarding: FW: Schedule left leg arteriogram  Spoke to patient - scheduled for Monday 2/24/2025 8:00 am - arrive at 7:0 am.  She is aware NPO / location / time /   Paperwork mailed  ----- Message -----  From: Arabella Meza MA  Sent: 1/23/2025  12:11 PM EST  To: Arabella Meza MA  Subject: FW: Schedule left leg arteriogram                  ----- Message -----  From: Delos Reyes, Arthur, MD  Sent: 1/23/2025  11:56 AM EST  To: [facility] Staff  Subject: Schedule left leg arteriogram                    Diagnosis: Atherosclerosis native arteries with claudication bilateral  Procedure: Left leg arteriogram with intervention  Location: [facility]  Anesthesia: Sedation  Procedure time: 1.5 hours

## 2025-02-21 ENCOUNTER — TELEPHONE (OUTPATIENT)
Dept: CARDIOLOGY | Age: 70
End: 2025-02-21

## 2025-02-26 ENCOUNTER — OFFICE VISIT (OUTPATIENT)
Dept: CARDIOLOGY | Age: 70
End: 2025-02-26
Payer: MEDICARE

## 2025-02-26 VITALS
HEIGHT: 64 IN | SYSTOLIC BLOOD PRESSURE: 138 MMHG | HEART RATE: 51 BPM | BODY MASS INDEX: 28.34 KG/M2 | WEIGHT: 166 LBS | DIASTOLIC BLOOD PRESSURE: 74 MMHG

## 2025-02-26 DIAGNOSIS — I10 ESSENTIAL HYPERTENSION: ICD-10-CM

## 2025-02-26 DIAGNOSIS — I73.9 PAD (PERIPHERAL ARTERY DISEASE): ICD-10-CM

## 2025-02-26 DIAGNOSIS — I48.0 PAROXYSMAL ATRIAL FIBRILLATION (HCC): Primary | ICD-10-CM

## 2025-02-26 DIAGNOSIS — Z01.818 PRE-OP EVALUATION: ICD-10-CM

## 2025-02-26 DIAGNOSIS — E78.49 FAMILIAL HYPERLIPIDEMIA: ICD-10-CM

## 2025-02-26 PROCEDURE — 99214 OFFICE O/P EST MOD 30 MIN: CPT | Performed by: INTERNAL MEDICINE

## 2025-02-26 PROCEDURE — 1123F ACP DISCUSS/DSCN MKR DOCD: CPT | Performed by: INTERNAL MEDICINE

## 2025-02-26 PROCEDURE — 1126F AMNT PAIN NOTED NONE PRSNT: CPT | Performed by: INTERNAL MEDICINE

## 2025-02-26 PROCEDURE — 93005 ELECTROCARDIOGRAM TRACING: CPT | Performed by: INTERNAL MEDICINE

## 2025-02-26 PROCEDURE — 3078F DIAST BP <80 MM HG: CPT | Performed by: INTERNAL MEDICINE

## 2025-02-26 PROCEDURE — 1159F MED LIST DOCD IN RCRD: CPT | Performed by: INTERNAL MEDICINE

## 2025-02-26 PROCEDURE — 93010 ELECTROCARDIOGRAM REPORT: CPT | Performed by: INTERNAL MEDICINE

## 2025-02-26 PROCEDURE — 3075F SYST BP GE 130 - 139MM HG: CPT | Performed by: INTERNAL MEDICINE

## 2025-02-26 RX ORDER — EZETIMIBE 10 MG/1
10 TABLET ORAL EVERY MORNING
Qty: 90 TABLET | Refills: 3 | Status: SHIPPED | OUTPATIENT
Start: 2025-02-26

## 2025-02-26 RX ORDER — ATORVASTATIN CALCIUM 80 MG/1
80 TABLET, FILM COATED ORAL
Qty: 90 TABLET | Refills: 3 | Status: SHIPPED | OUTPATIENT
Start: 2025-02-26

## 2025-02-26 NOTE — PROGRESS NOTES
Today's Date: 2/26/2025  Patient's Name: Dianna Jameson  Patient's age: 69 y.o., 1955    Subjective:  Dianna Jameson is being seen in clinic today regarding pre op evaluation,     she is here for pre op evaluation for left femoral endarterectomy. She has left left claudication. No chest pain, no dyspnea, no PND, no syncope or pre-syncope, no orthopnea.        Past Medical History:   has a past medical history of Abdominal pain, Asthma, Atrial fibrillation (HCC), Bowel obstruction (HCC), COPD (chronic obstructive pulmonary disease) (HCC), DDD (degenerative disc disease), Diabetes mellitus (HCC), Hyperlipidemia, Hypertension, Hyperthyroidism, Kidney stone, and Type II or unspecified type diabetes mellitus without mention of complication, not stated as uncontrolled.    Past Surgical History:   has a past surgical history that includes Tubal ligation; Tonsillectomy; Colonoscopy (04/23/2014); Upper gastrointestinal endoscopy (03/16/2016); tracheostomy (2015); tracheostomy closure; Gastrostomy tube placement; gastrostomy tube change; Dental surgery (N/A, 03/08/2017); Insert Midline Catheter (01/22/2021); Colonoscopy (N/A, 01/26/2021); Upper gastrointestinal endoscopy (N/A, 01/26/2021); Colonoscopy (01/26/2021); Thyroid surgery (2021); Back Injection; and lumbar fusion (N/A, 8/12/2024).    Home Medications:  Prior to Admission medications    Medication Sig Start Date End Date Taking? Authorizing Provider   PEG 3350-KCl-NaBcb-NaCl-NaSulf (PEG-3350/ELECTROLYTES) 236 g SOLR TAKE AS DIRECTED FOR COLONOSCOPY PREP 1/9/25   Constantino Cisneros MD   pantoprazole (PROTONIX) 20 MG tablet Take 1 tablet by mouth daily 1/10/25   Constantino Cisneros MD   FARXIGA 10 MG tablet Take 1 tablet by mouth every morning 11/19/24   Constantino Cisneros MD   BISACODYL 5 MG EC tablet TAKE 4 TABLETS BY MOUTH AS A ONE TIME DOSE AT 4PM THE DAY PRIOR TO COLONOSCOPY THEN DO PREP 12/16/24   Constantino Cisneros MD   CARTIA  MG

## 2025-02-28 ENCOUNTER — HOSPITAL ENCOUNTER (OUTPATIENT)
Age: 70
Discharge: HOME | End: 2025-02-28
Attending: INTERNAL MEDICINE
Payer: MEDICARE

## 2025-02-28 VITALS — BODY MASS INDEX: 29.41 KG/M2 | HEIGHT: 63 IN | WEIGHT: 166 LBS

## 2025-02-28 DIAGNOSIS — I10 ESSENTIAL HYPERTENSION: ICD-10-CM

## 2025-02-28 DIAGNOSIS — Z01.818 PRE-OP EVALUATION: ICD-10-CM

## 2025-02-28 DIAGNOSIS — I48.0 PAROXYSMAL ATRIAL FIBRILLATION (HCC): ICD-10-CM

## 2025-02-28 DIAGNOSIS — I73.9 PAD (PERIPHERAL ARTERY DISEASE): ICD-10-CM

## 2025-02-28 LAB
ECHO AO ROOT DIAM: 3 CM
ECHO AO ROOT INDEX: 1.68 CM/M2
ECHO AV AREA PEAK VELOCITY: 1.4 CM2
ECHO AV AREA VTI: 1.6 CM2
ECHO AV AREA/BSA PEAK VELOCITY: 0.8 CM2/M2
ECHO AV AREA/BSA VTI: 0.9 CM2/M2
ECHO AV MEAN GRADIENT: 5 MMHG
ECHO AV MEAN VELOCITY: 1 M/S
ECHO AV PEAK GRADIENT: 9 MMHG
ECHO AV PEAK GRADIENT: 9 MMHG
ECHO AV PEAK VELOCITY: 1.5 M/S
ECHO AV VELOCITY RATIO: 0.6
ECHO AV VTI: 31 CM
ECHO BSA: 1.83 M2
ECHO LA AREA 2C: 14 CM2
ECHO LA AREA 4C: 12.6 CM2
ECHO LA DIAMETER INDEX: 1.56 CM/M2
ECHO LA DIAMETER: 2.8 CM
ECHO LA MAJOR AXIS: 4.6 CM
ECHO LA MINOR AXIS: 4.9 CM
ECHO LA TO AORTIC ROOT RATIO: 0.93
ECHO LA VOL BP: 31 ML (ref 22–52)
ECHO LA VOL MOD A2C: 32 ML (ref 22–52)
ECHO LA VOL MOD A4C: 27 ML (ref 22–52)
ECHO LA VOL/BSA BIPLANE: 17 ML/M2 (ref 16–34)
ECHO LA VOLUME INDEX MOD A2C: 18 ML/M2 (ref 16–34)
ECHO LA VOLUME INDEX MOD A4C: 15 ML/M2 (ref 16–34)
ECHO LV E' LATERAL VELOCITY: 5.33 CM/S
ECHO LV E' SEPTAL VELOCITY: 4.57 CM/S
ECHO LV EDV A4C: 47 ML
ECHO LV EDV INDEX A4C: 26 ML/M2
ECHO LV EF PHYSICIAN: 55 %
ECHO LV EJECTION FRACTION A4C: 54 %
ECHO LV ESV A4C: 22 ML
ECHO LV ESV INDEX A4C: 12 ML/M2
ECHO LV FRACTIONAL SHORTENING: 41 % (ref 28–44)
ECHO LV INTERNAL DIMENSION DIASTOLE INDEX: 2.07 CM/M2
ECHO LV INTERNAL DIMENSION DIASTOLIC: 3.7 CM (ref 3.9–5.3)
ECHO LV INTERNAL DIMENSION SYSTOLIC INDEX: 1.23 CM/M2
ECHO LV INTERNAL DIMENSION SYSTOLIC: 2.2 CM
ECHO LV IVSD: 1.3 CM (ref 0.6–0.9)
ECHO LV MASS 2D: 166.5 G (ref 67–162)
ECHO LV MASS INDEX 2D: 93 G/M2 (ref 43–95)
ECHO LV POSTERIOR WALL DIASTOLIC: 1.3 CM (ref 0.6–0.9)
ECHO LV RELATIVE WALL THICKNESS RATIO: 0.7
ECHO LVOT AREA: 2.3 CM2
ECHO LVOT AV VTI INDEX: 0.68
ECHO LVOT DIAM: 1.7 CM
ECHO LVOT MEAN GRADIENT: 2 MMHG
ECHO LVOT PEAK GRADIENT: 3 MMHG
ECHO LVOT PEAK VELOCITY: 0.9 M/S
ECHO LVOT STROKE VOLUME INDEX: 26.7 ML/M2
ECHO LVOT SV: 47.9 ML
ECHO LVOT VTI: 21.1 CM
ECHO MV A VELOCITY: 0.84 M/S
ECHO MV E DECELERATION TIME (DT): 349 MS
ECHO MV E VELOCITY: 0.81 M/S
ECHO MV E/A RATIO: 0.96
ECHO MV E/E' LATERAL: 15.2
ECHO MV E/E' RATIO (AVERAGED): 16.46
ECHO MV E/E' SEPTAL: 17.72
ECHO RV TAPSE: 1.6 CM (ref 1.7–?)
ECHO TV REGURGITANT MAX VELOCITY: 2.23 M/S
ECHO TV REGURGITANT PEAK GRADIENT: 20 MMHG
ECHO TV REGURGITANT PEAK GRADIENT: 20 MMHG

## 2025-02-28 PROCEDURE — 93306 TTE W/DOPPLER COMPLETE: CPT | Performed by: INTERNAL MEDICINE

## 2025-02-28 PROCEDURE — 93306 TTE W/DOPPLER COMPLETE: CPT

## (undated) DEVICE — SINGLE PORT MANIFOLD: Brand: NEPTUNE 2

## (undated) DEVICE — GLOVE SURG SZ 8 L12IN FNGR THK79MIL GRN LTX FREE

## (undated) DEVICE — NEEDLE, QUINCKE, 18GX3.5": Brand: MEDLINE

## (undated) DEVICE — GLOVE ORTHO 8   MSG9480

## (undated) DEVICE — 5.0MM X-COARSE DIAMOND

## (undated) DEVICE — X-RAY DETECTABLE SPONGES,16 PLY: Brand: VISTEC

## (undated) DEVICE — GLOVE SURG SZ 75 L12IN FNGR THK79MIL GRN LTX FREE

## (undated) DEVICE — ELECTRODE ES L3IN S STL BLDE INSUL DISP VALLEYLAB EDGE

## (undated) DEVICE — BLANKET WRM W40.2XL55.9IN IORT LO BODY + MISTRAL AIR

## (undated) DEVICE — DRESSING HYDROCOLLOID BORDER 35X10 IN ALUM PRIMASEAL

## (undated) DEVICE — SUTURE VICRYL + SZ 2 L27IN ABSRB UD L40MM CP 1/2 CIR REV CUT VCP195H

## (undated) DEVICE — GLOVE ORANGE PI 7 1/2   MSG9075

## (undated) DEVICE — Device

## (undated) DEVICE — SOLUTION IRRIG 1000ML 0.9% SOD CHL USP POUR PLAS BTL

## (undated) DEVICE — SOLUTION IRRIG 1000ML STRL H2O USP PLAS POUR BTL

## (undated) DEVICE — SYRINGE MED 10ML LUERLOCK TIP W/O SFTY DISP

## (undated) DEVICE — 2000CC GUARDIAN II: Brand: GUARDIAN

## (undated) DEVICE — COVER LT HNDL BLU PLAS

## (undated) DEVICE — MINOR: Brand: MEDLINE INDUSTRIES, INC.

## (undated) DEVICE — MONITORING NEURO WO INTERPRETATION SYS PRICING

## (undated) DEVICE — FORCEPS BX L240CM WRK CHN 2.8MM STD CAP W/ NDL MIC MESH

## (undated) DEVICE — C-ARMOR C-ARM EQUIPMENT COVERS CLEAR STERILE UNIVERSAL FIT 12 PER CASE: Brand: C-ARMOR

## (undated) DEVICE — SNARE ENDOSCP POLYP MED STD AD 2.4X27X240 CM 2.8 MM OVL SENS

## (undated) DEVICE — SUTURE CHROMIC GUT SZ 3-0 L27IN ABSRB BRN L19MM FS-2 3/8 636H

## (undated) DEVICE — NEEDLE HYPO 25GA L1.5IN BVL ORIENTED ECLIPSE

## (undated) DEVICE — DISCONTINUED USE 393278 SYRINGE 10 ML HYPO W/O NDL LL TP PLSTC ST

## (undated) DEVICE — ST CHARLES DR BEEKS SPINE: Brand: MEDLINE INDUSTRIES, INC.

## (undated) DEVICE — SHEET,DRAPE,53X77,STERILE: Brand: MEDLINE